# Patient Record
Sex: MALE | Race: WHITE | Employment: OTHER | URBAN - NONMETROPOLITAN AREA
[De-identification: names, ages, dates, MRNs, and addresses within clinical notes are randomized per-mention and may not be internally consistent; named-entity substitution may affect disease eponyms.]

---

## 2017-01-10 ENCOUNTER — OFFICE VISIT (OUTPATIENT)
Dept: FAMILY MEDICINE CLINIC | Age: 62
End: 2017-01-10

## 2017-01-10 VITALS
DIASTOLIC BLOOD PRESSURE: 62 MMHG | WEIGHT: 165.4 LBS | HEIGHT: 69 IN | TEMPERATURE: 96.7 F | OXYGEN SATURATION: 98 % | SYSTOLIC BLOOD PRESSURE: 118 MMHG | HEART RATE: 80 BPM | BODY MASS INDEX: 24.5 KG/M2

## 2017-01-10 DIAGNOSIS — E78.5 HYPERLIPIDEMIA, UNSPECIFIED HYPERLIPIDEMIA TYPE: ICD-10-CM

## 2017-01-10 DIAGNOSIS — K59.00 CONSTIPATION, UNSPECIFIED CONSTIPATION TYPE: ICD-10-CM

## 2017-01-10 DIAGNOSIS — Z23 NEED FOR PNEUMOCOCCAL VACCINATION: ICD-10-CM

## 2017-01-10 DIAGNOSIS — R97.20 ELEVATED PSA: ICD-10-CM

## 2017-01-10 DIAGNOSIS — K59.00 CONSTIPATION, UNSPECIFIED CONSTIPATION TYPE: Primary | ICD-10-CM

## 2017-01-10 DIAGNOSIS — J32.9 SINUSITIS, UNSPECIFIED CHRONICITY, UNSPECIFIED LOCATION: ICD-10-CM

## 2017-01-10 DIAGNOSIS — R86.9 SEMEN ABNORMALITY: ICD-10-CM

## 2017-01-10 LAB
T4 FREE: 1.1 NG/DL (ref 0.93–1.7)
TSH SERPL DL<=0.05 MIU/L-ACNC: 2.97 UIU/ML (ref 0.27–4.2)

## 2017-01-10 PROCEDURE — 90732 PPSV23 VACC 2 YRS+ SUBQ/IM: CPT | Performed by: NURSE PRACTITIONER

## 2017-01-10 PROCEDURE — 90471 IMMUNIZATION ADMIN: CPT | Performed by: NURSE PRACTITIONER

## 2017-01-10 PROCEDURE — 99213 OFFICE O/P EST LOW 20 MIN: CPT | Performed by: NURSE PRACTITIONER

## 2017-01-10 RX ORDER — LEVOFLOXACIN 500 MG/1
500 TABLET, FILM COATED ORAL DAILY
Qty: 7 TABLET | Refills: 0 | Status: SHIPPED | OUTPATIENT
Start: 2017-01-10 | End: 2017-01-17

## 2017-01-10 ASSESSMENT — ENCOUNTER SYMPTOMS: CONSTIPATION: 1

## 2017-01-11 ENCOUNTER — TELEPHONE (OUTPATIENT)
Dept: FAMILY MEDICINE CLINIC | Age: 62
End: 2017-01-11

## 2017-01-24 ENCOUNTER — OFFICE VISIT (OUTPATIENT)
Dept: UROLOGY | Age: 62
End: 2017-01-24

## 2017-01-24 VITALS
HEIGHT: 69 IN | WEIGHT: 170 LBS | BODY MASS INDEX: 25.18 KG/M2 | DIASTOLIC BLOOD PRESSURE: 80 MMHG | SYSTOLIC BLOOD PRESSURE: 128 MMHG | HEART RATE: 84 BPM

## 2017-01-24 DIAGNOSIS — R86.8 DISCOLORED SEMEN: Primary | ICD-10-CM

## 2017-01-24 DIAGNOSIS — N40.0 BENIGN NON-NODULAR PROSTATIC HYPERPLASIA WITHOUT LOWER URINARY TRACT SYMPTOMS: ICD-10-CM

## 2017-01-24 LAB
BILIRUBIN, POC: NORMAL
BLOOD URINE, POC: NORMAL
CLARITY, POC: CLEAR
COLOR, POC: YELLOW
GLUCOSE URINE, POC: NORMAL
KETONES, POC: NORMAL
LEUKOCYTE EST, POC: NORMAL
NITRITE, POC: NORMAL
PH, POC: 6.5
PROTEIN, POC: NORMAL
SPECIFIC GRAVITY, POC: 1.01
UROBILINOGEN, POC: 0.2

## 2017-01-24 PROCEDURE — 99243 OFF/OP CNSLTJ NEW/EST LOW 30: CPT | Performed by: UROLOGY

## 2017-01-24 PROCEDURE — 81003 URINALYSIS AUTO W/O SCOPE: CPT | Performed by: UROLOGY

## 2017-01-24 ASSESSMENT — ENCOUNTER SYMPTOMS
GASTROINTESTINAL NEGATIVE: 1
RESPIRATORY NEGATIVE: 1

## 2017-02-14 RX ORDER — PANTOPRAZOLE SODIUM 40 MG/1
40 TABLET, DELAYED RELEASE ORAL DAILY
Qty: 30 TABLET | Refills: 5 | Status: SHIPPED | OUTPATIENT
Start: 2017-02-14 | End: 2017-06-19 | Stop reason: SDUPTHER

## 2017-02-14 RX ORDER — LORAZEPAM 2 MG/1
2 TABLET ORAL EVERY 12 HOURS
Qty: 60 TABLET | Refills: 0 | Status: SHIPPED | OUTPATIENT
Start: 2017-02-14 | End: 2017-03-20 | Stop reason: SDUPTHER

## 2017-02-14 RX ORDER — PRAVASTATIN SODIUM 20 MG
TABLET ORAL
Qty: 30 TABLET | Refills: 5 | Status: SHIPPED | OUTPATIENT
Start: 2017-02-14 | End: 2017-07-15 | Stop reason: DRUGHIGH

## 2017-03-14 RX ORDER — FLUOXETINE 10 MG/1
10 CAPSULE ORAL 3 TIMES DAILY
Qty: 90 CAPSULE | Refills: 5 | Status: SHIPPED | OUTPATIENT
Start: 2017-03-14 | End: 2017-10-23 | Stop reason: SDUPTHER

## 2017-03-20 ENCOUNTER — OFFICE VISIT (OUTPATIENT)
Dept: FAMILY MEDICINE CLINIC | Age: 62
End: 2017-03-20

## 2017-03-20 VITALS
BODY MASS INDEX: 24.2 KG/M2 | DIASTOLIC BLOOD PRESSURE: 82 MMHG | HEART RATE: 87 BPM | TEMPERATURE: 96.7 F | SYSTOLIC BLOOD PRESSURE: 130 MMHG | OXYGEN SATURATION: 97 % | HEIGHT: 70 IN | WEIGHT: 169 LBS

## 2017-03-20 DIAGNOSIS — Z23 NEED FOR TDAP VACCINATION: ICD-10-CM

## 2017-03-20 DIAGNOSIS — L08.9 FOREIGN BODY IN LEFT FOOT WITH INFECTION, INITIAL ENCOUNTER: Primary | ICD-10-CM

## 2017-03-20 DIAGNOSIS — S90.852A FOREIGN BODY IN LEFT FOOT WITH INFECTION, INITIAL ENCOUNTER: Primary | ICD-10-CM

## 2017-03-20 PROCEDURE — 90471 IMMUNIZATION ADMIN: CPT | Performed by: NURSE PRACTITIONER

## 2017-03-20 PROCEDURE — 99213 OFFICE O/P EST LOW 20 MIN: CPT | Performed by: NURSE PRACTITIONER

## 2017-03-20 PROCEDURE — 90715 TDAP VACCINE 7 YRS/> IM: CPT | Performed by: NURSE PRACTITIONER

## 2017-03-20 RX ORDER — CEPHALEXIN 500 MG/1
500 CAPSULE ORAL 2 TIMES DAILY
Qty: 20 CAPSULE | Refills: 0 | Status: SHIPPED | OUTPATIENT
Start: 2017-03-20 | End: 2017-03-30

## 2017-03-20 RX ORDER — LORAZEPAM 2 MG/1
2 TABLET ORAL EVERY 12 HOURS
Qty: 60 TABLET | Refills: 0 | Status: SHIPPED | OUTPATIENT
Start: 2017-03-20 | End: 2017-04-20 | Stop reason: SDUPTHER

## 2017-03-20 ASSESSMENT — PATIENT HEALTH QUESTIONNAIRE - PHQ9
SUM OF ALL RESPONSES TO PHQ QUESTIONS 1-9: 0
2. FEELING DOWN, DEPRESSED OR HOPELESS: 0
SUM OF ALL RESPONSES TO PHQ9 QUESTIONS 1 & 2: 0
1. LITTLE INTEREST OR PLEASURE IN DOING THINGS: 0

## 2017-04-14 RX ORDER — LORAZEPAM 2 MG/1
2 TABLET ORAL EVERY 12 HOURS
Qty: 60 TABLET | Refills: 0 | OUTPATIENT
Start: 2017-04-14

## 2017-04-20 RX ORDER — LORAZEPAM 2 MG/1
2 TABLET ORAL EVERY 12 HOURS
Qty: 60 TABLET | Refills: 0 | Status: SHIPPED | OUTPATIENT
Start: 2017-04-20 | End: 2017-05-16 | Stop reason: SDUPTHER

## 2017-04-20 RX ORDER — LORAZEPAM 2 MG/1
2 TABLET ORAL EVERY 12 HOURS
Qty: 60 TABLET | Refills: 0 | Status: CANCELLED | OUTPATIENT
Start: 2017-04-20

## 2017-05-16 RX ORDER — LORAZEPAM 2 MG/1
2 TABLET ORAL EVERY 12 HOURS
Qty: 60 TABLET | Refills: 0 | Status: SHIPPED | OUTPATIENT
Start: 2017-05-16 | End: 2017-06-15 | Stop reason: SDUPTHER

## 2017-06-15 RX ORDER — LORAZEPAM 2 MG/1
2 TABLET ORAL EVERY 12 HOURS
Qty: 60 TABLET | Refills: 0 | Status: SHIPPED | OUTPATIENT
Start: 2017-06-15 | End: 2017-07-15 | Stop reason: SDUPTHER

## 2017-06-19 ENCOUNTER — TELEPHONE (OUTPATIENT)
Dept: FAMILY MEDICINE CLINIC | Age: 62
End: 2017-06-19

## 2017-06-19 RX ORDER — PANTOPRAZOLE SODIUM 40 MG/1
40 TABLET, DELAYED RELEASE ORAL DAILY
Qty: 90 TABLET | Refills: 1 | OUTPATIENT
Start: 2017-06-19 | End: 2017-12-26 | Stop reason: SDUPTHER

## 2017-07-06 ENCOUNTER — TELEPHONE (OUTPATIENT)
Dept: FAMILY MEDICINE CLINIC | Age: 62
End: 2017-07-06

## 2017-07-06 DIAGNOSIS — Z13.220 LIPID SCREENING: Primary | ICD-10-CM

## 2017-07-10 DIAGNOSIS — Z13.220 LIPID SCREENING: ICD-10-CM

## 2017-07-10 DIAGNOSIS — N40.0 BENIGN NON-NODULAR PROSTATIC HYPERPLASIA WITHOUT LOWER URINARY TRACT SYMPTOMS: ICD-10-CM

## 2017-07-10 LAB
CHOLESTEROL, TOTAL: 200 MG/DL (ref 0–199)
HDLC SERPL-MCNC: 48 MG/DL (ref 40–59)
LDL CHOLESTEROL CALCULATED: 132 MG/DL (ref 0–129)
PROSTATE SPECIFIC ANTIGEN: 3.54 NG/ML (ref 0–5.4)
TRIGL SERPL-MCNC: 98 MG/DL (ref 0–200)

## 2017-07-14 ENCOUNTER — OFFICE VISIT (OUTPATIENT)
Dept: UROLOGY | Age: 62
End: 2017-07-14

## 2017-07-14 VITALS
BODY MASS INDEX: 23.62 KG/M2 | HEART RATE: 69 BPM | DIASTOLIC BLOOD PRESSURE: 70 MMHG | SYSTOLIC BLOOD PRESSURE: 116 MMHG | WEIGHT: 165 LBS | HEIGHT: 70 IN

## 2017-07-14 DIAGNOSIS — N40.1 BPH WITH OBSTRUCTION/LOWER URINARY TRACT SYMPTOMS: Primary | ICD-10-CM

## 2017-07-14 DIAGNOSIS — N13.8 BPH WITH OBSTRUCTION/LOWER URINARY TRACT SYMPTOMS: Primary | ICD-10-CM

## 2017-07-14 PROCEDURE — 51798 US URINE CAPACITY MEASURE: CPT | Performed by: UROLOGY

## 2017-07-14 PROCEDURE — 51741 ELECTRO-UROFLOWMETRY FIRST: CPT | Performed by: UROLOGY

## 2017-07-14 PROCEDURE — 99213 OFFICE O/P EST LOW 20 MIN: CPT | Performed by: UROLOGY

## 2017-07-14 ASSESSMENT — ENCOUNTER SYMPTOMS
ABDOMINAL DISTENTION: 0
ABDOMINAL PAIN: 0
SHORTNESS OF BREATH: 0

## 2017-07-15 ENCOUNTER — OFFICE VISIT (OUTPATIENT)
Dept: FAMILY MEDICINE CLINIC | Age: 62
End: 2017-07-15

## 2017-07-15 VITALS
DIASTOLIC BLOOD PRESSURE: 80 MMHG | HEART RATE: 62 BPM | BODY MASS INDEX: 24.2 KG/M2 | HEIGHT: 70 IN | OXYGEN SATURATION: 97 % | TEMPERATURE: 96.7 F | SYSTOLIC BLOOD PRESSURE: 132 MMHG | WEIGHT: 169 LBS

## 2017-07-15 DIAGNOSIS — E78.5 HYPERLIPIDEMIA, UNSPECIFIED HYPERLIPIDEMIA TYPE: ICD-10-CM

## 2017-07-15 DIAGNOSIS — F41.9 ANXIETY: Primary | ICD-10-CM

## 2017-07-15 PROCEDURE — 99213 OFFICE O/P EST LOW 20 MIN: CPT | Performed by: NURSE PRACTITIONER

## 2017-07-15 RX ORDER — LORAZEPAM 2 MG/1
2 TABLET ORAL EVERY 12 HOURS
Qty: 60 TABLET | Refills: 2 | Status: SHIPPED | OUTPATIENT
Start: 2017-07-15 | End: 2017-10-18 | Stop reason: SDUPTHER

## 2017-07-15 RX ORDER — PRAVASTATIN SODIUM 40 MG
40 TABLET ORAL EVERY EVENING
Qty: 30 TABLET | Refills: 5 | Status: SHIPPED | OUTPATIENT
Start: 2017-07-15 | End: 2017-10-02 | Stop reason: SDUPTHER

## 2017-07-15 ASSESSMENT — ENCOUNTER SYMPTOMS
SHORTNESS OF BREATH: 0
COUGH: 0

## 2017-10-02 DIAGNOSIS — E78.5 HYPERLIPIDEMIA, UNSPECIFIED HYPERLIPIDEMIA TYPE: ICD-10-CM

## 2017-10-02 RX ORDER — PRAVASTATIN SODIUM 40 MG
40 TABLET ORAL EVERY EVENING
Qty: 30 TABLET | Refills: 5 | Status: SHIPPED | OUTPATIENT
Start: 2017-10-02 | End: 2018-04-02 | Stop reason: SDUPTHER

## 2017-10-02 NOTE — TELEPHONE ENCOUNTER
Received documentation from [facility] requesting pravastatin.  LOV July

## 2017-10-18 DIAGNOSIS — F41.9 ANXIETY: ICD-10-CM

## 2017-10-18 RX ORDER — LORAZEPAM 2 MG/1
2 TABLET ORAL EVERY 12 HOURS
Qty: 60 TABLET | Refills: 2 | Status: SHIPPED | OUTPATIENT
Start: 2017-10-18 | End: 2018-01-10 | Stop reason: SDUPTHER

## 2017-10-23 RX ORDER — FLUOXETINE 10 MG/1
10 CAPSULE ORAL 3 TIMES DAILY
Qty: 90 CAPSULE | Refills: 5 | Status: SHIPPED | OUTPATIENT
Start: 2017-10-23 | End: 2018-04-23 | Stop reason: SDUPTHER

## 2017-12-26 RX ORDER — PANTOPRAZOLE SODIUM 40 MG/1
40 TABLET, DELAYED RELEASE ORAL DAILY
Qty: 90 TABLET | Refills: 1 | Status: SHIPPED | OUTPATIENT
Start: 2017-12-26 | End: 2018-06-20 | Stop reason: SDUPTHER

## 2018-01-08 DIAGNOSIS — E78.00 ELEVATED CHOLESTEROL: ICD-10-CM

## 2018-01-08 DIAGNOSIS — Z13.220 LIPID SCREENING: Primary | ICD-10-CM

## 2018-01-08 LAB
ALBUMIN SERPL-MCNC: 4.1 G/DL (ref 3.9–4.9)
ALP BLD-CCNC: 53 U/L (ref 35–104)
ALT SERPL-CCNC: 10 U/L (ref 0–41)
ANION GAP SERPL CALCULATED.3IONS-SCNC: 18 MEQ/L (ref 7–13)
AST SERPL-CCNC: 12 U/L (ref 0–40)
BILIRUB SERPL-MCNC: 0.3 MG/DL (ref 0–1.2)
BUN BLDV-MCNC: 21 MG/DL (ref 8–23)
CALCIUM SERPL-MCNC: 9 MG/DL (ref 8.6–10.2)
CHLORIDE BLD-SCNC: 103 MEQ/L (ref 98–107)
CHOLESTEROL, TOTAL: 205 MG/DL (ref 0–199)
CO2: 21 MEQ/L (ref 22–29)
CREAT SERPL-MCNC: 1.01 MG/DL (ref 0.7–1.2)
GFR AFRICAN AMERICAN: >60
GFR NON-AFRICAN AMERICAN: >60
GLOBULIN: 2.5 G/DL (ref 2.3–3.5)
GLUCOSE BLD-MCNC: 75 MG/DL (ref 74–109)
HDLC SERPL-MCNC: 40 MG/DL (ref 40–59)
LDL CHOLESTEROL CALCULATED: 138 MG/DL (ref 0–129)
POTASSIUM SERPL-SCNC: 4 MEQ/L (ref 3.5–5.1)
SODIUM BLD-SCNC: 142 MEQ/L (ref 132–144)
TOTAL PROTEIN: 6.6 G/DL (ref 6.4–8.1)
TRIGL SERPL-MCNC: 134 MG/DL (ref 0–200)

## 2018-01-10 ENCOUNTER — OFFICE VISIT (OUTPATIENT)
Dept: FAMILY MEDICINE CLINIC | Age: 63
End: 2018-01-10

## 2018-01-10 VITALS
WEIGHT: 163 LBS | SYSTOLIC BLOOD PRESSURE: 106 MMHG | HEIGHT: 69 IN | OXYGEN SATURATION: 93 % | DIASTOLIC BLOOD PRESSURE: 66 MMHG | TEMPERATURE: 97.2 F | HEART RATE: 84 BPM | BODY MASS INDEX: 24.14 KG/M2

## 2018-01-10 DIAGNOSIS — F41.9 ANXIETY: ICD-10-CM

## 2018-01-10 DIAGNOSIS — R19.7 DIARRHEA, UNSPECIFIED TYPE: ICD-10-CM

## 2018-01-10 DIAGNOSIS — J34.2 NASAL SEPTAL DEVIATION: Primary | ICD-10-CM

## 2018-01-10 PROCEDURE — G8427 DOCREV CUR MEDS BY ELIG CLIN: HCPCS | Performed by: NURSE PRACTITIONER

## 2018-01-10 PROCEDURE — 4004F PT TOBACCO SCREEN RCVD TLK: CPT | Performed by: NURSE PRACTITIONER

## 2018-01-10 PROCEDURE — G8420 CALC BMI NORM PARAMETERS: HCPCS | Performed by: NURSE PRACTITIONER

## 2018-01-10 PROCEDURE — G8482 FLU IMMUNIZE ORDER/ADMIN: HCPCS | Performed by: NURSE PRACTITIONER

## 2018-01-10 PROCEDURE — 3017F COLORECTAL CA SCREEN DOC REV: CPT | Performed by: NURSE PRACTITIONER

## 2018-01-10 PROCEDURE — 99213 OFFICE O/P EST LOW 20 MIN: CPT | Performed by: NURSE PRACTITIONER

## 2018-01-10 RX ORDER — LORAZEPAM 2 MG/1
2 TABLET ORAL EVERY 12 HOURS
Qty: 60 TABLET | Refills: 2 | Status: SHIPPED | OUTPATIENT
Start: 2018-01-10 | End: 2018-04-18 | Stop reason: SDUPTHER

## 2018-01-10 ASSESSMENT — ENCOUNTER SYMPTOMS
SHORTNESS OF BREATH: 0
COUGH: 0
ABDOMINAL PAIN: 0
DIARRHEA: 1
RHINORRHEA: 1

## 2018-01-10 NOTE — PROGRESS NOTES
capsule Take 1 capsule by mouth 3 times daily 90 capsule 5    pravastatin (PRAVACHOL) 40 MG tablet Take 1 tablet by mouth every evening 30 tablet 5     No current facility-administered medications on file prior to visit. Allergies   Allergen Reactions    Alcohol     Benadryl [Diphenhydramine]      \"speeds him up\"    Sulfa Antibiotics        Review of Systems   Constitutional: Negative for fever. HENT: Positive for congestion and rhinorrhea. Respiratory: Negative for cough and shortness of breath. Gastrointestinal: Positive for diarrhea. Negative for abdominal pain. Objective  Vitals:    01/10/18 1004   BP: 106/66   Site: Left Arm   Position: Sitting   Cuff Size: Medium Adult   Pulse: 84   Temp: 97.2 °F (36.2 °C)   SpO2: 93%   Weight: 163 lb (73.9 kg)   Height: 5' 9\" (1.753 m)     Physical Exam   Constitutional: He is oriented to person, place, and time. He appears well-developed and well-nourished. HENT:   Head: Normocephalic. Right Ear: External ear normal.   Left Ear: External ear normal.   Nose: Mucosal edema, nasal deformity and septal deviation present. Mouth/Throat: Oropharynx is clear and moist.   Eyes: Conjunctivae are normal. Pupils are equal, round, and reactive to light. Neck: Neck supple. No thyromegaly present. Cardiovascular: Normal rate, regular rhythm, normal heart sounds and intact distal pulses. Pulmonary/Chest: Effort normal and breath sounds normal.   Abdominal: Soft. He exhibits no distension. There is no tenderness. Lymphadenopathy:     He has no cervical adenopathy. Neurological: He is alert and oriented to person, place, and time. Psychiatric: He has a normal mood and affect. His behavior is normal. Judgment and thought content normal.       Assessment & Plan    1. Nasal septal deviation  Referral To Ent - ([facility]) - Brent Newsome   2. Anxiety  LORazepam (ATIVAN) 2 MG tablet   3.  Diarrhea, unspecified type         Orders Placed This Encounter

## 2018-02-19 ENCOUNTER — HOSPITAL ENCOUNTER (OUTPATIENT)
Dept: PREADMISSION TESTING | Age: 63
Discharge: HOME | End: 2018-02-23
Payer: COMMERCIAL

## 2018-02-19 VITALS
HEART RATE: 81 BPM | WEIGHT: 161.2 LBS | BODY MASS INDEX: 24.43 KG/M2 | TEMPERATURE: 97.2 F | HEIGHT: 68 IN | OXYGEN SATURATION: 94 % | RESPIRATION RATE: 16 BRPM | DIASTOLIC BLOOD PRESSURE: 70 MMHG | SYSTOLIC BLOOD PRESSURE: 109 MMHG

## 2018-02-19 DIAGNOSIS — G47.33 OSA (OBSTRUCTIVE SLEEP APNEA): ICD-10-CM

## 2018-02-19 DIAGNOSIS — J34.2 DNS (DEVIATED NASAL SEPTUM): ICD-10-CM

## 2018-02-19 DIAGNOSIS — J34.3 HYPERTROPHY OF NASAL TURBINATES: ICD-10-CM

## 2018-02-19 PROBLEM — J30.9 ALLERGIC RHINITIS: Status: ACTIVE | Noted: 2018-02-19

## 2018-02-19 LAB
ANION GAP SERPL CALCULATED.3IONS-SCNC: 15 MEQ/L (ref 7–13)
BUN BLDV-MCNC: 18 MG/DL (ref 8–23)
CALCIUM SERPL-MCNC: 9.2 MG/DL (ref 8.6–10.2)
CHLORIDE BLD-SCNC: 104 MEQ/L (ref 98–107)
CO2: 22 MEQ/L (ref 22–29)
CREAT SERPL-MCNC: 0.85 MG/DL (ref 0.7–1.2)
EKG ATRIAL RATE: 72 BPM
EKG P AXIS: 67 DEGREES
EKG P-R INTERVAL: 136 MS
EKG Q-T INTERVAL: 390 MS
EKG QRS DURATION: 98 MS
EKG QTC CALCULATION (BAZETT): 427 MS
EKG R AXIS: -12 DEGREES
EKG T AXIS: 50 DEGREES
EKG VENTRICULAR RATE: 72 BPM
GFR AFRICAN AMERICAN: >60
GFR NON-AFRICAN AMERICAN: >60
GLUCOSE BLD-MCNC: 90 MG/DL (ref 74–109)
HCT VFR BLD CALC: 45.5 % (ref 42–52)
HEMOGLOBIN: 15.3 G/DL (ref 14–18)
MCH RBC QN AUTO: 32.9 PG (ref 27–31.3)
MCHC RBC AUTO-ENTMCNC: 33.6 % (ref 33–37)
MCV RBC AUTO: 97.8 FL (ref 80–100)
PDW BLD-RTO: 13.8 % (ref 11.5–14.5)
PLATELET # BLD: 225 K/UL (ref 130–400)
POTASSIUM SERPL-SCNC: 4.4 MEQ/L (ref 3.5–5.1)
RBC # BLD: 4.65 M/UL (ref 4.7–6.1)
SODIUM BLD-SCNC: 141 MEQ/L (ref 132–144)
WBC # BLD: 8.9 K/UL (ref 4.8–10.8)

## 2018-02-19 PROCEDURE — 80048 BASIC METABOLIC PNL TOTAL CA: CPT

## 2018-02-19 PROCEDURE — 85027 COMPLETE CBC AUTOMATED: CPT

## 2018-02-19 PROCEDURE — 93005 ELECTROCARDIOGRAM TRACING: CPT

## 2018-02-19 RX ORDER — AMOXICILLIN 875 MG/1
875 TABLET, COATED ORAL 2 TIMES DAILY
COMMUNITY
End: 2018-09-19 | Stop reason: ALTCHOICE

## 2018-02-19 RX ORDER — LIDOCAINE HYDROCHLORIDE 10 MG/ML
1 INJECTION, SOLUTION EPIDURAL; INFILTRATION; INTRACAUDAL; PERINEURAL
Status: CANCELLED | OUTPATIENT
Start: 2018-02-19 | End: 2018-02-19

## 2018-02-19 RX ORDER — SODIUM CHLORIDE 0.9 % (FLUSH) 0.9 %
10 SYRINGE (ML) INJECTION PRN
Status: CANCELLED | OUTPATIENT
Start: 2018-02-19

## 2018-02-19 RX ORDER — SODIUM CHLORIDE, SODIUM LACTATE, POTASSIUM CHLORIDE, CALCIUM CHLORIDE 600; 310; 30; 20 MG/100ML; MG/100ML; MG/100ML; MG/100ML
INJECTION, SOLUTION INTRAVENOUS CONTINUOUS
Status: CANCELLED | OUTPATIENT
Start: 2018-02-19

## 2018-02-19 RX ORDER — SODIUM CHLORIDE 0.9 % (FLUSH) 0.9 %
10 SYRINGE (ML) INJECTION EVERY 12 HOURS SCHEDULED
Status: CANCELLED | OUTPATIENT
Start: 2018-02-19

## 2018-02-19 ASSESSMENT — ENCOUNTER SYMPTOMS
NAUSEA: 0
SORE THROAT: 0
CONSTIPATION: 0
STRIDOR: 0
DOUBLE VISION: 0
WHEEZING: 0
BLURRED VISION: 0
HEARTBURN: 0
SHORTNESS OF BREATH: 0
ABDOMINAL PAIN: 0
VOMITING: 0
EYES NEGATIVE: 1
COUGH: 0
BACK PAIN: 0
DIARRHEA: 0

## 2018-02-20 PROCEDURE — 93010 ELECTROCARDIOGRAM REPORT: CPT | Performed by: INTERNAL MEDICINE

## 2018-02-21 ENCOUNTER — ANESTHESIA EVENT (OUTPATIENT)
Dept: OPERATING ROOM | Age: 63
End: 2018-02-21
Payer: COMMERCIAL

## 2018-02-22 ENCOUNTER — HOSPITAL ENCOUNTER (OUTPATIENT)
Age: 63
Setting detail: OUTPATIENT SURGERY
Discharge: HOME | End: 2018-02-22
Attending: OTOLARYNGOLOGY | Admitting: OTOLARYNGOLOGY
Payer: COMMERCIAL

## 2018-02-22 ENCOUNTER — ANESTHESIA (OUTPATIENT)
Dept: OPERATING ROOM | Age: 63
End: 2018-02-22
Payer: COMMERCIAL

## 2018-02-22 VITALS — DIASTOLIC BLOOD PRESSURE: 68 MMHG | OXYGEN SATURATION: 100 % | TEMPERATURE: 95.5 F | SYSTOLIC BLOOD PRESSURE: 104 MMHG

## 2018-02-22 VITALS
HEART RATE: 88 BPM | DIASTOLIC BLOOD PRESSURE: 79 MMHG | OXYGEN SATURATION: 96 % | TEMPERATURE: 97.6 F | SYSTOLIC BLOOD PRESSURE: 126 MMHG | HEIGHT: 68 IN | BODY MASS INDEX: 24.4 KG/M2 | RESPIRATION RATE: 16 BRPM | WEIGHT: 161 LBS

## 2018-02-22 DIAGNOSIS — J34.2 DNS (DEVIATED NASAL SEPTUM): Primary | ICD-10-CM

## 2018-02-22 PROCEDURE — 3700000000 HC ANESTHESIA ATTENDED CARE: Performed by: OTOLARYNGOLOGY

## 2018-02-22 PROCEDURE — 2500000003 HC RX 250 WO HCPCS: Performed by: OTOLARYNGOLOGY

## 2018-02-22 PROCEDURE — 3600000004 HC SURGERY LEVEL 4 BASE: Performed by: OTOLARYNGOLOGY

## 2018-02-22 PROCEDURE — 7100000001 HC PACU RECOVERY - ADDTL 15 MIN: Performed by: OTOLARYNGOLOGY

## 2018-02-22 PROCEDURE — 7100000000 HC PACU RECOVERY - FIRST 15 MIN: Performed by: OTOLARYNGOLOGY

## 2018-02-22 PROCEDURE — 3700000001 HC ADD 15 MINUTES (ANESTHESIA): Performed by: OTOLARYNGOLOGY

## 2018-02-22 PROCEDURE — 6370000000 HC RX 637 (ALT 250 FOR IP): Performed by: OTOLARYNGOLOGY

## 2018-02-22 PROCEDURE — 2580000003 HC RX 258: Performed by: STUDENT IN AN ORGANIZED HEALTH CARE EDUCATION/TRAINING PROGRAM

## 2018-02-22 PROCEDURE — 7100000011 HC PHASE II RECOVERY - ADDTL 15 MIN: Performed by: OTOLARYNGOLOGY

## 2018-02-22 PROCEDURE — 88304 TISSUE EXAM BY PATHOLOGIST: CPT

## 2018-02-22 PROCEDURE — 6360000002 HC RX W HCPCS: Performed by: NURSE ANESTHETIST, CERTIFIED REGISTERED

## 2018-02-22 PROCEDURE — 3600000014 HC SURGERY LEVEL 4 ADDTL 15MIN: Performed by: OTOLARYNGOLOGY

## 2018-02-22 PROCEDURE — 2500000003 HC RX 250 WO HCPCS: Performed by: NURSE ANESTHETIST, CERTIFIED REGISTERED

## 2018-02-22 PROCEDURE — 7100000010 HC PHASE II RECOVERY - FIRST 15 MIN: Performed by: OTOLARYNGOLOGY

## 2018-02-22 PROCEDURE — 2580000003 HC RX 258

## 2018-02-22 PROCEDURE — 2740000010 HC MISC ORTHOTIC: Performed by: OTOLARYNGOLOGY

## 2018-02-22 PROCEDURE — 88311 DECALCIFY TISSUE: CPT

## 2018-02-22 PROCEDURE — 6360000002 HC RX W HCPCS: Performed by: NURSE PRACTITIONER

## 2018-02-22 PROCEDURE — 2580000003 HC RX 258: Performed by: OTOLARYNGOLOGY

## 2018-02-22 RX ORDER — WOUND DRESSING ADHESIVE - LIQUID
LIQUID MISCELLANEOUS PRN
Status: DISCONTINUED | OUTPATIENT
Start: 2018-02-22 | End: 2018-02-22 | Stop reason: HOSPADM

## 2018-02-22 RX ORDER — LIDOCAINE HYDROCHLORIDE AND EPINEPHRINE 10; 10 MG/ML; UG/ML
INJECTION, SOLUTION INFILTRATION; PERINEURAL PRN
Status: DISCONTINUED | OUTPATIENT
Start: 2018-02-22 | End: 2018-02-22 | Stop reason: HOSPADM

## 2018-02-22 RX ORDER — ROCURONIUM BROMIDE 10 MG/ML
INJECTION, SOLUTION INTRAVENOUS PRN
Status: DISCONTINUED | OUTPATIENT
Start: 2018-02-22 | End: 2018-02-22 | Stop reason: SDUPTHER

## 2018-02-22 RX ORDER — AMOXICILLIN AND CLAVULANATE POTASSIUM 875; 125 MG/1; MG/1
1 TABLET, FILM COATED ORAL 2 TIMES DAILY
Qty: 20 TABLET | Refills: 0 | Status: CANCELLED | OUTPATIENT
Start: 2018-02-22 | End: 2018-03-04

## 2018-02-22 RX ORDER — ONDANSETRON 2 MG/ML
4 INJECTION INTRAMUSCULAR; INTRAVENOUS EVERY 6 HOURS PRN
Status: DISCONTINUED | OUTPATIENT
Start: 2018-02-22 | End: 2018-02-22 | Stop reason: HOSPADM

## 2018-02-22 RX ORDER — ONDANSETRON 2 MG/ML
INJECTION INTRAMUSCULAR; INTRAVENOUS PRN
Status: DISCONTINUED | OUTPATIENT
Start: 2018-02-22 | End: 2018-02-22 | Stop reason: SDUPTHER

## 2018-02-22 RX ORDER — LIDOCAINE HYDROCHLORIDE 10 MG/ML
1 INJECTION, SOLUTION EPIDURAL; INFILTRATION; INTRACAUDAL; PERINEURAL
Status: DISCONTINUED | OUTPATIENT
Start: 2018-02-22 | End: 2018-02-22 | Stop reason: HOSPADM

## 2018-02-22 RX ORDER — SODIUM CHLORIDE 0.9 % (FLUSH) 0.9 %
10 SYRINGE (ML) INJECTION EVERY 12 HOURS SCHEDULED
Status: DISCONTINUED | OUTPATIENT
Start: 2018-02-22 | End: 2018-02-22 | Stop reason: HOSPADM

## 2018-02-22 RX ORDER — METOCLOPRAMIDE HYDROCHLORIDE 5 MG/ML
10 INJECTION INTRAMUSCULAR; INTRAVENOUS
Status: DISCONTINUED | OUTPATIENT
Start: 2018-02-22 | End: 2018-02-22 | Stop reason: HOSPADM

## 2018-02-22 RX ORDER — FENTANYL CITRATE 50 UG/ML
INJECTION, SOLUTION INTRAMUSCULAR; INTRAVENOUS PRN
Status: DISCONTINUED | OUTPATIENT
Start: 2018-02-22 | End: 2018-02-22 | Stop reason: SDUPTHER

## 2018-02-22 RX ORDER — LIDOCAINE HYDROCHLORIDE 10 MG/ML
INJECTION, SOLUTION INFILTRATION; PERINEURAL PRN
Status: DISCONTINUED | OUTPATIENT
Start: 2018-02-22 | End: 2018-02-22 | Stop reason: SDUPTHER

## 2018-02-22 RX ORDER — HYDROCODONE BITARTRATE AND ACETAMINOPHEN 5; 325 MG/1; MG/1
2 TABLET ORAL PRN
Status: DISCONTINUED | OUTPATIENT
Start: 2018-02-22 | End: 2018-02-22 | Stop reason: HOSPADM

## 2018-02-22 RX ORDER — SODIUM CHLORIDE 0.9 % (FLUSH) 0.9 %
10 SYRINGE (ML) INJECTION PRN
Status: DISCONTINUED | OUTPATIENT
Start: 2018-02-22 | End: 2018-02-22 | Stop reason: HOSPADM

## 2018-02-22 RX ORDER — SODIUM CHLORIDE, SODIUM LACTATE, POTASSIUM CHLORIDE, CALCIUM CHLORIDE 600; 310; 30; 20 MG/100ML; MG/100ML; MG/100ML; MG/100ML
INJECTION, SOLUTION INTRAVENOUS CONTINUOUS
Status: DISCONTINUED | OUTPATIENT
Start: 2018-02-22 | End: 2018-02-22 | Stop reason: HOSPADM

## 2018-02-22 RX ORDER — GLYCOPYRROLATE 0.2 MG/ML
INJECTION INTRAMUSCULAR; INTRAVENOUS PRN
Status: DISCONTINUED | OUTPATIENT
Start: 2018-02-22 | End: 2018-02-22 | Stop reason: SDUPTHER

## 2018-02-22 RX ORDER — ACETAMINOPHEN 325 MG/1
650 TABLET ORAL EVERY 4 HOURS PRN
Status: DISCONTINUED | OUTPATIENT
Start: 2018-02-22 | End: 2018-02-22 | Stop reason: HOSPADM

## 2018-02-22 RX ORDER — ONDANSETRON 2 MG/ML
4 INJECTION INTRAMUSCULAR; INTRAVENOUS
Status: DISCONTINUED | OUTPATIENT
Start: 2018-02-22 | End: 2018-02-22 | Stop reason: HOSPADM

## 2018-02-22 RX ORDER — MAGNESIUM HYDROXIDE 1200 MG/15ML
LIQUID ORAL CONTINUOUS PRN
Status: DISCONTINUED | OUTPATIENT
Start: 2018-02-22 | End: 2018-02-22 | Stop reason: HOSPADM

## 2018-02-22 RX ORDER — PROPOFOL 10 MG/ML
INJECTION, EMULSION INTRAVENOUS PRN
Status: DISCONTINUED | OUTPATIENT
Start: 2018-02-22 | End: 2018-02-22 | Stop reason: SDUPTHER

## 2018-02-22 RX ORDER — MIDAZOLAM HYDROCHLORIDE 1 MG/ML
INJECTION INTRAMUSCULAR; INTRAVENOUS PRN
Status: DISCONTINUED | OUTPATIENT
Start: 2018-02-22 | End: 2018-02-22 | Stop reason: SDUPTHER

## 2018-02-22 RX ORDER — FENTANYL CITRATE 50 UG/ML
50 INJECTION, SOLUTION INTRAMUSCULAR; INTRAVENOUS EVERY 10 MIN PRN
Status: DISCONTINUED | OUTPATIENT
Start: 2018-02-22 | End: 2018-02-22 | Stop reason: HOSPADM

## 2018-02-22 RX ORDER — SODIUM CHLORIDE, SODIUM LACTATE, POTASSIUM CHLORIDE, CALCIUM CHLORIDE 600; 310; 30; 20 MG/100ML; MG/100ML; MG/100ML; MG/100ML
INJECTION, SOLUTION INTRAVENOUS
Status: COMPLETED
Start: 2018-02-22 | End: 2018-02-22

## 2018-02-22 RX ORDER — HYDROCODONE BITARTRATE AND ACETAMINOPHEN 5; 325 MG/1; MG/1
1 TABLET ORAL PRN
Status: DISCONTINUED | OUTPATIENT
Start: 2018-02-22 | End: 2018-02-22 | Stop reason: HOSPADM

## 2018-02-22 RX ORDER — DEXAMETHASONE SODIUM PHOSPHATE 10 MG/ML
INJECTION INTRAMUSCULAR; INTRAVENOUS PRN
Status: DISCONTINUED | OUTPATIENT
Start: 2018-02-22 | End: 2018-02-22 | Stop reason: SDUPTHER

## 2018-02-22 RX ORDER — HYDROCODONE BITARTRATE AND ACETAMINOPHEN 5; 325 MG/1; MG/1
1 TABLET ORAL EVERY 4 HOURS PRN
Qty: 20 TABLET | Refills: 0 | Status: CANCELLED | OUTPATIENT
Start: 2018-02-22 | End: 2018-03-01

## 2018-02-22 RX ADMIN — PHENYLEPHRINE HYDROCHLORIDE 100 MCG: 10 INJECTION INTRAVENOUS at 09:19

## 2018-02-22 RX ADMIN — MIDAZOLAM HYDROCHLORIDE 2 MG: 1 INJECTION, SOLUTION INTRAMUSCULAR; INTRAVENOUS at 08:39

## 2018-02-22 RX ADMIN — GLYCOPYRROLATE 0.2 MG: 0.2 INJECTION INTRAMUSCULAR; INTRAVENOUS at 09:17

## 2018-02-22 RX ADMIN — PHENYLEPHRINE HYDROCHLORIDE 200 MCG: 10 INJECTION INTRAVENOUS at 09:26

## 2018-02-22 RX ADMIN — SUGAMMADEX 200 MG: 100 INJECTION, SOLUTION INTRAVENOUS at 10:01

## 2018-02-22 RX ADMIN — DEXAMETHASONE SODIUM PHOSPHATE 10 MG: 10 INJECTION INTRAMUSCULAR; INTRAVENOUS at 09:00

## 2018-02-22 RX ADMIN — SODIUM CHLORIDE, POTASSIUM CHLORIDE, SODIUM LACTATE AND CALCIUM CHLORIDE: 600; 310; 30; 20 INJECTION, SOLUTION INTRAVENOUS at 09:52

## 2018-02-22 RX ADMIN — SODIUM CHLORIDE, POTASSIUM CHLORIDE, SODIUM LACTATE AND CALCIUM CHLORIDE: 600; 310; 30; 20 INJECTION, SOLUTION INTRAVENOUS at 07:40

## 2018-02-22 RX ADMIN — ROCURONIUM BROMIDE 20 MG: 10 INJECTION INTRAVENOUS at 09:27

## 2018-02-22 RX ADMIN — PHENYLEPHRINE HYDROCHLORIDE 100 MCG: 10 INJECTION INTRAVENOUS at 09:17

## 2018-02-22 RX ADMIN — ROCURONIUM BROMIDE 50 MG: 10 INJECTION INTRAVENOUS at 08:44

## 2018-02-22 RX ADMIN — CEFAZOLIN SODIUM 2 G: 2 SOLUTION INTRAVENOUS at 08:40

## 2018-02-22 RX ADMIN — PROPOFOL 160 MG: 10 INJECTION, EMULSION INTRAVENOUS at 08:44

## 2018-02-22 RX ADMIN — FENTANYL CITRATE 100 MCG: 50 INJECTION, SOLUTION INTRAMUSCULAR; INTRAVENOUS at 08:44

## 2018-02-22 RX ADMIN — PROPOFOL 40 MG: 10 INJECTION, EMULSION INTRAVENOUS at 09:00

## 2018-02-22 RX ADMIN — ONDANSETRON 4 MG: 2 INJECTION INTRAMUSCULAR; INTRAVENOUS at 09:00

## 2018-02-22 RX ADMIN — LIDOCAINE HYDROCHLORIDE 50 MG: 10 INJECTION, SOLUTION INFILTRATION; PERINEURAL at 08:44

## 2018-02-22 RX ADMIN — PHENYLEPHRINE HYDROCHLORIDE 200 MCG: 10 INJECTION INTRAVENOUS at 09:29

## 2018-02-22 ASSESSMENT — PULMONARY FUNCTION TESTS
PIF_VALUE: 16
PIF_VALUE: 16
PIF_VALUE: 13
PIF_VALUE: 16
PIF_VALUE: 16
PIF_VALUE: 13
PIF_VALUE: 12
PIF_VALUE: 18
PIF_VALUE: 16
PIF_VALUE: 3
PIF_VALUE: 16
PIF_VALUE: 17
PIF_VALUE: 16
PIF_VALUE: 14
PIF_VALUE: 13
PIF_VALUE: 13
PIF_VALUE: 19
PIF_VALUE: 16
PIF_VALUE: 13
PIF_VALUE: 16
PIF_VALUE: 15
PIF_VALUE: 8
PIF_VALUE: 16
PIF_VALUE: 16
PIF_VALUE: 13
PIF_VALUE: 16
PIF_VALUE: 16
PIF_VALUE: 6
PIF_VALUE: 16
PIF_VALUE: 16
PIF_VALUE: 15
PIF_VALUE: 16
PIF_VALUE: 13
PIF_VALUE: 12
PIF_VALUE: 16
PIF_VALUE: 20
PIF_VALUE: 17
PIF_VALUE: 16
PIF_VALUE: 15
PIF_VALUE: 4
PIF_VALUE: 10
PIF_VALUE: 16
PIF_VALUE: 3
PIF_VALUE: 5
PIF_VALUE: 15
PIF_VALUE: 15
PIF_VALUE: 16
PIF_VALUE: 16
PIF_VALUE: 13
PIF_VALUE: 15
PIF_VALUE: 3
PIF_VALUE: 13
PIF_VALUE: 15
PIF_VALUE: 16
PIF_VALUE: 12
PIF_VALUE: 16
PIF_VALUE: 13
PIF_VALUE: 0
PIF_VALUE: 13
PIF_VALUE: 16
PIF_VALUE: 13
PIF_VALUE: 16
PIF_VALUE: 16
PIF_VALUE: 12
PIF_VALUE: 15
PIF_VALUE: 12
PIF_VALUE: 16
PIF_VALUE: 16
PIF_VALUE: 0
PIF_VALUE: 12
PIF_VALUE: 3
PIF_VALUE: 16
PIF_VALUE: 16
PIF_VALUE: 15
PIF_VALUE: 16

## 2018-02-22 ASSESSMENT — PAIN - FUNCTIONAL ASSESSMENT: PAIN_FUNCTIONAL_ASSESSMENT: 0-10

## 2018-02-22 ASSESSMENT — PAIN DESCRIPTION - DESCRIPTORS: DESCRIPTORS: ACHING

## 2018-02-22 NOTE — H&P (VIEW-ONLY)
Topics    Smoking status: Current Every Day Smoker     Years: 4.00     Types: Cigars    Smokeless tobacco: Never Used      Comment: smoked cigarettes for 30 yrs, quit 9/1996    Alcohol use No      Comment: sober > 22 yrs    Drug use: No    Sexual activity: Not on file     Other Topics Concern    Not on file     Social History Narrative    No narrative on file       Family History:       Problem Relation Age of Onset    Cancer Mother      stomach    Heart Disease Father 48    Cancer Father      bone    Cancer Maternal Grandmother      lung    Cancer Paternal Grandmother     Heart Disease Paternal Grandfather     No Known Problems Sister     Cancer Brother     Heart Disease Brother      hole in heart in 1959 at 1 months age       Review of Systems   Constitutional: Negative. Negative for chills and fever. HENT: Positive for congestion. Negative for sore throat. Problems breathing at nite   Eyes: Negative. Negative for blurred vision and double vision. Respiratory: Negative for cough, shortness of breath, wheezing and stridor. Occas SOB / smoker   Cardiovascular: Negative for chest pain, palpitations and leg swelling. Gastrointestinal: Negative for abdominal pain, constipation, diarrhea, heartburn, nausea and vomiting. Genitourinary: Negative for dysuria and frequency. Musculoskeletal: Positive for joint pain. Negative for back pain, myalgias and neck pain. Right leg pain    Skin: Negative. Neurological: Negative. Negative for seizures, loss of consciousness and headaches. Endo/Heme/Allergies: Negative. Psychiatric/Behavioral: Negative. Vitals:  /70   Pulse 81   Temp 97.2 °F (36.2 °C) (Temporal)   Resp 16   Ht 5' 8\" (1.727 m)   Wt 161 lb 3.2 oz (73.1 kg)   SpO2 94%   BMI 24.51 kg/m²     Physical Exam   Constitutional: He is oriented to person, place, and time and well-developed, well-nourished, and in no distress.    HENT:   Head: Normocephalic and atraumatic. Right Ear: External ear normal.   Left Ear: External ear normal.   Nose: Nose normal.   Mouth/Throat: Oropharynx is clear and moist. No oropharyngeal exudate. Many absent teeth / remaining worn   Eyes: Conjunctivae and EOM are normal. Pupils are equal, round, and reactive to light. No scleral icterus. Bilateral arcus senilus   Neck: Normal range of motion. Neck supple. Cardiovascular: Normal rate, regular rhythm and normal heart sounds. Exam reveals no gallop and no friction rub. No murmur heard. Pulmonary/Chest: Effort normal and breath sounds normal. He has no wheezes. He has no rales. Abdominal: Soft. Bowel sounds are normal. He exhibits no mass. There is no tenderness. Genitourinary:   Genitourinary Comments: deferred   Musculoskeletal: Normal range of motion. He exhibits no edema. Neurological: He is alert and oriented to person, place, and time. Gait normal.   Skin: Skin is warm and dry. Psychiatric: Mood, memory, affect and judgment normal.   Vitals reviewed.       Assessment:  Patient Active Problem List   Diagnosis    DNS (deviated nasal septum)    Allergic rhinitis    Hypertrophy of nasal turbinates    ARNAUD (obstructive sleep apnea)         Plan:  Scheduled for septoplasty, microdebrider assisted turbinoplasty and out-fracturing myla nasal endoscopy    Paige Muñoz NP  2/19/2018  8:36 AM

## 2018-02-22 NOTE — OP NOTE
1%  with 1:200,000 epinephrine was injected under the mucoperichondrium on both  sides. A left hemitransfixion incision was made and the mucoperichondrium  was elevated superiorly, posteriorly, and inferiorly. It was noted that  there were three points of fracture in the nasal septum and quadrilateral  cartilage was then incised with a use of a #15 knife blade and the  mucoperichondrium on the opposite side was elevated in the same fashion. With his maneuver, we were able to free the bone and cartilaginous septum  from the overlying mucoperichondrium attachment. The quadrilateral  cartilage was then removed with a use of swivel knife and the inferior  margin was dislocated from the maxillary crest, was dissected with a use of  Kailash dissector and this was retrieved with the use of a Idalmis  forceps. The perpendicular plate of the quadrilateral cartilage was  dislocated and was warped against itself. This was taken down by a Turner  dissector and removed with use of a Idalmis forceps. The perpendicular  plate of the ethmoid was taken down by piecemeal with use of a  Reyna double-action forceps. With this maneuver, we were able  to realign the septum in the midline. The surgical wound was approximated  utilizing a 4-0 chromic suture material and the two leaves of the leaves of  mucoperichondrium were sutured with a use of absorbable staples to  obliterate the space. Then, we directed our attention in doing a  microdebrider-assisted turbinoplasty by first injecting the inferior  turbinates with the use of a Xylocaine 1% with 1:200,000 epinephrine and  using Fanzy microdebrider. This spatula tip was then inserted at the  anterior attachment of the inferior turbinates  and this was advanced posteriorly along the axis of the inferior  turbinates. Through a rotatory motion, the submucosa was then removed and  suctioned out.   This was supplemented with cauterization utilizing a

## 2018-04-02 DIAGNOSIS — E78.5 HYPERLIPIDEMIA, UNSPECIFIED HYPERLIPIDEMIA TYPE: ICD-10-CM

## 2018-04-02 RX ORDER — PRAVASTATIN SODIUM 40 MG
40 TABLET ORAL EVERY EVENING
Qty: 30 TABLET | Refills: 5 | Status: SHIPPED | OUTPATIENT
Start: 2018-04-02 | End: 2018-09-28 | Stop reason: SDUPTHER

## 2018-04-24 RX ORDER — FLUOXETINE 10 MG/1
10 CAPSULE ORAL 3 TIMES DAILY
Qty: 90 CAPSULE | Refills: 1 | Status: SHIPPED | OUTPATIENT
Start: 2018-04-24 | End: 2018-06-25 | Stop reason: SDUPTHER

## 2018-05-22 ENCOUNTER — OFFICE VISIT (OUTPATIENT)
Dept: FAMILY MEDICINE CLINIC | Age: 63
End: 2018-05-22
Payer: COMMERCIAL

## 2018-05-22 VITALS
SYSTOLIC BLOOD PRESSURE: 118 MMHG | HEIGHT: 69 IN | TEMPERATURE: 98.6 F | DIASTOLIC BLOOD PRESSURE: 68 MMHG | HEART RATE: 95 BPM | OXYGEN SATURATION: 98 % | BODY MASS INDEX: 25.03 KG/M2 | WEIGHT: 169 LBS

## 2018-05-22 DIAGNOSIS — F41.9 ANXIETY: Primary | ICD-10-CM

## 2018-05-22 PROCEDURE — 99213 OFFICE O/P EST LOW 20 MIN: CPT | Performed by: NURSE PRACTITIONER

## 2018-05-22 PROCEDURE — 4040F PNEUMOC VAC/ADMIN/RCVD: CPT | Performed by: NURSE PRACTITIONER

## 2018-05-22 PROCEDURE — 3017F COLORECTAL CA SCREEN DOC REV: CPT | Performed by: NURSE PRACTITIONER

## 2018-05-22 PROCEDURE — 4004F PT TOBACCO SCREEN RCVD TLK: CPT | Performed by: NURSE PRACTITIONER

## 2018-05-22 PROCEDURE — G8427 DOCREV CUR MEDS BY ELIG CLIN: HCPCS | Performed by: NURSE PRACTITIONER

## 2018-05-22 PROCEDURE — 1123F ACP DISCUSS/DSCN MKR DOCD: CPT | Performed by: NURSE PRACTITIONER

## 2018-05-22 PROCEDURE — G8420 CALC BMI NORM PARAMETERS: HCPCS | Performed by: NURSE PRACTITIONER

## 2018-05-22 RX ORDER — LORAZEPAM 2 MG/1
2 TABLET ORAL EVERY 12 HOURS PRN
Qty: 60 TABLET | Refills: 1 | Status: SHIPPED | OUTPATIENT
Start: 2018-05-22 | End: 2018-07-14 | Stop reason: SDUPTHER

## 2018-05-22 RX ORDER — LORAZEPAM 2 MG/1
2 TABLET ORAL EVERY 12 HOURS PRN
COMMUNITY
End: 2018-05-22 | Stop reason: SDUPTHER

## 2018-05-22 ASSESSMENT — ENCOUNTER SYMPTOMS
SHORTNESS OF BREATH: 0
COUGH: 0

## 2018-05-22 ASSESSMENT — PATIENT HEALTH QUESTIONNAIRE - PHQ9
2. FEELING DOWN, DEPRESSED OR HOPELESS: 0
SUM OF ALL RESPONSES TO PHQ9 QUESTIONS 1 & 2: 0
1. LITTLE INTEREST OR PLEASURE IN DOING THINGS: 0
SUM OF ALL RESPONSES TO PHQ QUESTIONS 1-9: 0

## 2018-06-20 RX ORDER — PANTOPRAZOLE SODIUM 40 MG/1
40 TABLET, DELAYED RELEASE ORAL DAILY
Qty: 90 TABLET | Refills: 1 | Status: SHIPPED | OUTPATIENT
Start: 2018-06-20 | End: 2018-12-12 | Stop reason: SDUPTHER

## 2018-06-25 RX ORDER — FLUOXETINE 10 MG/1
10 CAPSULE ORAL 3 TIMES DAILY
Qty: 90 CAPSULE | Refills: 1 | Status: SHIPPED | OUTPATIENT
Start: 2018-06-25 | End: 2019-01-29

## 2018-09-19 ENCOUNTER — OFFICE VISIT (OUTPATIENT)
Dept: FAMILY MEDICINE CLINIC | Age: 63
End: 2018-09-19
Payer: COMMERCIAL

## 2018-09-19 VITALS
OXYGEN SATURATION: 96 % | HEART RATE: 63 BPM | SYSTOLIC BLOOD PRESSURE: 118 MMHG | WEIGHT: 161 LBS | HEIGHT: 70 IN | TEMPERATURE: 97.5 F | BODY MASS INDEX: 23.05 KG/M2 | DIASTOLIC BLOOD PRESSURE: 80 MMHG

## 2018-09-19 DIAGNOSIS — F41.9 ANXIETY: ICD-10-CM

## 2018-09-19 DIAGNOSIS — G47.00 INSOMNIA, UNSPECIFIED TYPE: Primary | ICD-10-CM

## 2018-09-19 PROCEDURE — 99213 OFFICE O/P EST LOW 20 MIN: CPT | Performed by: NURSE PRACTITIONER

## 2018-09-19 PROCEDURE — 4040F PNEUMOC VAC/ADMIN/RCVD: CPT | Performed by: NURSE PRACTITIONER

## 2018-09-19 PROCEDURE — 1101F PT FALLS ASSESS-DOCD LE1/YR: CPT | Performed by: NURSE PRACTITIONER

## 2018-09-19 PROCEDURE — 4004F PT TOBACCO SCREEN RCVD TLK: CPT | Performed by: NURSE PRACTITIONER

## 2018-09-19 PROCEDURE — G8427 DOCREV CUR MEDS BY ELIG CLIN: HCPCS | Performed by: NURSE PRACTITIONER

## 2018-09-19 PROCEDURE — 3017F COLORECTAL CA SCREEN DOC REV: CPT | Performed by: NURSE PRACTITIONER

## 2018-09-19 PROCEDURE — G8420 CALC BMI NORM PARAMETERS: HCPCS | Performed by: NURSE PRACTITIONER

## 2018-09-19 PROCEDURE — 1123F ACP DISCUSS/DSCN MKR DOCD: CPT | Performed by: NURSE PRACTITIONER

## 2018-09-19 RX ORDER — TRAZODONE HYDROCHLORIDE 150 MG/1
150 TABLET ORAL NIGHTLY
Qty: 30 TABLET | Refills: 2 | Status: SHIPPED | OUTPATIENT
Start: 2018-09-19 | End: 2018-10-11 | Stop reason: SDUPTHER

## 2018-09-19 ASSESSMENT — ENCOUNTER SYMPTOMS
SHORTNESS OF BREATH: 0
COUGH: 0

## 2018-09-19 NOTE — PROGRESS NOTES
Subjective  Chief Complaint   Patient presents with    Anxiety     pt states doing well, pt states occasionally he gets a little more on edge then usual and it always seems to be around 4 AM     Flu Vaccine     getting at RA       HPI     Doing well. Much better than previously in the year. Was struggling with back pain radiating into hip. Went through physical therapy without much success   However, went to chiropractor and got a lot of relief. Doing ok with anxiety. Has some episodes in the middle of the night. Has had issues with sleeping for a long time. Tried ambien before and had sleep walking issues.    Has never had a sleep study    Patient Active Problem List    Diagnosis Date Noted    Anxiety 09/19/2018    Insomnia 09/19/2018    DNS (deviated nasal septum) 02/19/2018    Allergic rhinitis 02/19/2018    Hypertrophy of nasal turbinates 02/19/2018    ARNAUD (obstructive sleep apnea) 02/19/2018     Past Medical History:   Diagnosis Date    Allergic rhinitis 2/19/2018    Anxiety     Disorder of stomach     valve not closing properly    Hyperlipidemia     meds > 22 yrs    ARNAUD (obstructive sleep apnea) 2/19/2018    Substance abuse     alcholic, quit 20 yrs      Past Surgical History:   Procedure Laterality Date    COLONOSCOPY  12/22/2016    A SHALA MARLEY    DENTAL SURGERY  10 yrs ago    ENDOSCOPY, COLON, DIAGNOSTIC      EYE SURGERY      Lasik OU    KNEE ARTHROSCOPY Right     KNEE SURGERY Right 05/2016    Ray procedure    [place] NASAL SCOPY,OPEN MAXILL SINUS N/A 2/22/2018    SEPTOPLASTY MICRODEBRIDER ASSISTED TURBINOPLASTY AND OUT-FRACTURING BILATERAL NASAL ENDOSCOPY performed by Lexi Martinez MD at [address] History   Problem Relation Age of Onset    Cancer Mother         stomach    Heart Disease Father 48    Cancer Father         bone    Cancer Maternal Grandmother         lung    Cancer Paternal Grandmother     Heart Disease Paternal Grandfather     No Known Problems

## 2018-09-28 DIAGNOSIS — E78.5 HYPERLIPIDEMIA, UNSPECIFIED HYPERLIPIDEMIA TYPE: ICD-10-CM

## 2018-10-01 RX ORDER — PRAVASTATIN SODIUM 40 MG
40 TABLET ORAL EVERY EVENING
Qty: 90 TABLET | Refills: 1 | Status: SHIPPED | OUTPATIENT
Start: 2018-10-01 | End: 2019-03-31 | Stop reason: SDUPTHER

## 2018-10-11 DIAGNOSIS — G47.00 INSOMNIA, UNSPECIFIED TYPE: ICD-10-CM

## 2018-10-11 RX ORDER — TRAZODONE HYDROCHLORIDE 150 MG/1
150 TABLET ORAL NIGHTLY
Qty: 90 TABLET | Refills: 0 | Status: SHIPPED | OUTPATIENT
Start: 2018-10-11 | End: 2019-01-24

## 2018-12-12 RX ORDER — PANTOPRAZOLE SODIUM 40 MG/1
40 TABLET, DELAYED RELEASE ORAL DAILY
Qty: 90 TABLET | Refills: 1 | Status: SHIPPED | OUTPATIENT
Start: 2018-12-12 | End: 2019-06-12 | Stop reason: SDUPTHER

## 2019-01-24 ENCOUNTER — OFFICE VISIT (OUTPATIENT)
Dept: FAMILY MEDICINE CLINIC | Age: 64
End: 2019-01-24
Payer: COMMERCIAL

## 2019-01-24 VITALS
DIASTOLIC BLOOD PRESSURE: 60 MMHG | BODY MASS INDEX: 24.02 KG/M2 | TEMPERATURE: 96.9 F | SYSTOLIC BLOOD PRESSURE: 106 MMHG | OXYGEN SATURATION: 94 % | WEIGHT: 162.2 LBS | HEART RATE: 62 BPM | HEIGHT: 69 IN

## 2019-01-24 DIAGNOSIS — F41.9 ANXIETY: ICD-10-CM

## 2019-01-24 DIAGNOSIS — F17.200 SMOKER: Primary | ICD-10-CM

## 2019-01-24 DIAGNOSIS — G47.33 OSA (OBSTRUCTIVE SLEEP APNEA): ICD-10-CM

## 2019-01-24 DIAGNOSIS — R53.83 OTHER FATIGUE: ICD-10-CM

## 2019-01-24 DIAGNOSIS — E78.5 HYPERLIPIDEMIA, UNSPECIFIED HYPERLIPIDEMIA TYPE: ICD-10-CM

## 2019-01-24 DIAGNOSIS — Z87.891 PERSONAL HISTORY OF TOBACCO USE: ICD-10-CM

## 2019-01-24 LAB
ALBUMIN SERPL-MCNC: 4.2 G/DL (ref 3.9–4.9)
ALP BLD-CCNC: 56 U/L (ref 35–104)
ALT SERPL-CCNC: 9 U/L (ref 0–41)
ANION GAP SERPL CALCULATED.3IONS-SCNC: 12 MEQ/L (ref 7–13)
AST SERPL-CCNC: 13 U/L (ref 0–40)
BASOPHILS ABSOLUTE: 0.1 K/UL (ref 0–0.2)
BASOPHILS RELATIVE PERCENT: 1.2 %
BILIRUB SERPL-MCNC: 0.3 MG/DL (ref 0–1.2)
BUN BLDV-MCNC: 17 MG/DL (ref 8–23)
CALCIUM SERPL-MCNC: 9.4 MG/DL (ref 8.6–10.2)
CHLORIDE BLD-SCNC: 98 MEQ/L (ref 98–107)
CHOLESTEROL, TOTAL: 203 MG/DL (ref 0–199)
CO2: 25 MEQ/L (ref 22–29)
CREAT SERPL-MCNC: 0.98 MG/DL (ref 0.7–1.2)
EOSINOPHILS ABSOLUTE: 0.3 K/UL (ref 0–0.7)
EOSINOPHILS RELATIVE PERCENT: 3 %
GFR AFRICAN AMERICAN: >60
GFR NON-AFRICAN AMERICAN: >60
GLOBULIN: 2.8 G/DL (ref 2.3–3.5)
GLUCOSE BLD-MCNC: 87 MG/DL (ref 74–109)
HCT VFR BLD CALC: 47.4 % (ref 42–52)
HDLC SERPL-MCNC: 45 MG/DL (ref 40–59)
HEMOGLOBIN: 16.5 G/DL (ref 14–18)
LDL CHOLESTEROL CALCULATED: 135 MG/DL (ref 0–129)
LYMPHOCYTES ABSOLUTE: 2.5 K/UL (ref 1–4.8)
LYMPHOCYTES RELATIVE PERCENT: 30 %
MCH RBC QN AUTO: 33.6 PG (ref 27–31.3)
MCHC RBC AUTO-ENTMCNC: 34.8 % (ref 33–37)
MCV RBC AUTO: 96.7 FL (ref 80–100)
MONOCYTES ABSOLUTE: 0.5 K/UL (ref 0.2–0.8)
MONOCYTES RELATIVE PERCENT: 5.7 %
NEUTROPHILS ABSOLUTE: 5.1 K/UL (ref 1.4–6.5)
NEUTROPHILS RELATIVE PERCENT: 60.1 %
PDW BLD-RTO: 13.9 % (ref 11.5–14.5)
PLATELET # BLD: 238 K/UL (ref 130–400)
POTASSIUM SERPL-SCNC: 4.1 MEQ/L (ref 3.5–5.1)
RBC # BLD: 4.9 M/UL (ref 4.7–6.1)
SODIUM BLD-SCNC: 135 MEQ/L (ref 132–144)
TOTAL PROTEIN: 7 G/DL (ref 6.4–8.1)
TRIGL SERPL-MCNC: 115 MG/DL (ref 0–200)
WBC # BLD: 8.5 K/UL (ref 4.8–10.8)

## 2019-01-24 PROCEDURE — G0296 VISIT TO DETERM LDCT ELIG: HCPCS | Performed by: NURSE PRACTITIONER

## 2019-01-24 PROCEDURE — 99214 OFFICE O/P EST MOD 30 MIN: CPT | Performed by: NURSE PRACTITIONER

## 2019-01-24 PROCEDURE — G8427 DOCREV CUR MEDS BY ELIG CLIN: HCPCS | Performed by: NURSE PRACTITIONER

## 2019-01-24 PROCEDURE — 4040F PNEUMOC VAC/ADMIN/RCVD: CPT | Performed by: NURSE PRACTITIONER

## 2019-01-24 PROCEDURE — 1101F PT FALLS ASSESS-DOCD LE1/YR: CPT | Performed by: NURSE PRACTITIONER

## 2019-01-24 PROCEDURE — 4004F PT TOBACCO SCREEN RCVD TLK: CPT | Performed by: NURSE PRACTITIONER

## 2019-01-24 PROCEDURE — G8420 CALC BMI NORM PARAMETERS: HCPCS | Performed by: NURSE PRACTITIONER

## 2019-01-24 PROCEDURE — 3017F COLORECTAL CA SCREEN DOC REV: CPT | Performed by: NURSE PRACTITIONER

## 2019-01-24 PROCEDURE — 1123F ACP DISCUSS/DSCN MKR DOCD: CPT | Performed by: NURSE PRACTITIONER

## 2019-01-24 PROCEDURE — G8484 FLU IMMUNIZE NO ADMIN: HCPCS | Performed by: NURSE PRACTITIONER

## 2019-01-24 ASSESSMENT — ENCOUNTER SYMPTOMS
COUGH: 0
WHEEZING: 0
SHORTNESS OF BREATH: 1
CHEST TIGHTNESS: 0

## 2019-01-29 RX ORDER — FLUOXETINE 10 MG/1
10 CAPSULE ORAL 3 TIMES DAILY
Qty: 90 CAPSULE | Refills: 1 | Status: SHIPPED | OUTPATIENT
Start: 2019-01-29 | End: 2019-02-20 | Stop reason: SDUPTHER

## 2019-01-31 ENCOUNTER — TELEPHONE (OUTPATIENT)
Dept: CASE MANAGEMENT | Age: 64
End: 2019-01-31

## 2019-02-11 ENCOUNTER — HOSPITAL ENCOUNTER (OUTPATIENT)
Dept: CT IMAGING | Age: 64
Discharge: HOME | End: 2019-02-13
Payer: COMMERCIAL

## 2019-02-11 ENCOUNTER — HOSPITAL ENCOUNTER (OUTPATIENT)
Dept: PULMONOLOGY | Age: 64
Discharge: HOME | End: 2019-02-11
Payer: COMMERCIAL

## 2019-02-11 VITALS — DIASTOLIC BLOOD PRESSURE: 75 MMHG | RESPIRATION RATE: 16 BRPM | HEART RATE: 78 BPM | SYSTOLIC BLOOD PRESSURE: 103 MMHG

## 2019-02-11 DIAGNOSIS — G47.33 OSA (OBSTRUCTIVE SLEEP APNEA): ICD-10-CM

## 2019-02-11 DIAGNOSIS — Z87.891 PERSONAL HISTORY OF TOBACCO USE: ICD-10-CM

## 2019-02-11 DIAGNOSIS — F17.200 SMOKER: ICD-10-CM

## 2019-02-11 PROCEDURE — 94729 DIFFUSING CAPACITY: CPT

## 2019-02-11 PROCEDURE — 94726 PLETHYSMOGRAPHY LUNG VOLUMES: CPT

## 2019-02-11 PROCEDURE — 94729 DIFFUSING CAPACITY: CPT | Performed by: INTERNAL MEDICINE

## 2019-02-11 PROCEDURE — 94726 PLETHYSMOGRAPHY LUNG VOLUMES: CPT | Performed by: INTERNAL MEDICINE

## 2019-02-11 PROCEDURE — 94010 BREATHING CAPACITY TEST: CPT

## 2019-02-11 PROCEDURE — 94010 BREATHING CAPACITY TEST: CPT | Performed by: INTERNAL MEDICINE

## 2019-02-11 PROCEDURE — G0297 LDCT FOR LUNG CA SCREEN: HCPCS

## 2019-02-20 RX ORDER — FLUOXETINE 10 MG/1
10 CAPSULE ORAL 3 TIMES DAILY
Qty: 90 CAPSULE | Refills: 1 | Status: SHIPPED | OUTPATIENT
Start: 2019-02-20 | End: 2019-02-27 | Stop reason: SDUPTHER

## 2019-02-27 RX ORDER — FLUOXETINE 10 MG/1
10 CAPSULE ORAL 3 TIMES DAILY
Qty: 90 CAPSULE | Refills: 1 | Status: SHIPPED | OUTPATIENT
Start: 2019-02-27 | End: 2019-06-12 | Stop reason: SDUPTHER

## 2019-02-28 ENCOUNTER — OFFICE VISIT (OUTPATIENT)
Dept: FAMILY MEDICINE CLINIC | Age: 64
End: 2019-02-28
Payer: COMMERCIAL

## 2019-02-28 VITALS
HEIGHT: 70 IN | SYSTOLIC BLOOD PRESSURE: 110 MMHG | HEART RATE: 67 BPM | BODY MASS INDEX: 23.96 KG/M2 | TEMPERATURE: 97.1 F | OXYGEN SATURATION: 96 % | DIASTOLIC BLOOD PRESSURE: 76 MMHG | WEIGHT: 167.4 LBS

## 2019-02-28 DIAGNOSIS — Z87.891 PERSONAL HISTORY OF TOBACCO USE: Primary | ICD-10-CM

## 2019-02-28 DIAGNOSIS — J43.9 PULMONARY EMPHYSEMA, UNSPECIFIED EMPHYSEMA TYPE (HCC): ICD-10-CM

## 2019-02-28 PROCEDURE — 4040F PNEUMOC VAC/ADMIN/RCVD: CPT | Performed by: NURSE PRACTITIONER

## 2019-02-28 PROCEDURE — G8926 SPIRO NO PERF OR DOC: HCPCS | Performed by: NURSE PRACTITIONER

## 2019-02-28 PROCEDURE — 99213 OFFICE O/P EST LOW 20 MIN: CPT | Performed by: NURSE PRACTITIONER

## 2019-02-28 PROCEDURE — 3023F SPIROM DOC REV: CPT | Performed by: NURSE PRACTITIONER

## 2019-02-28 PROCEDURE — 4004F PT TOBACCO SCREEN RCVD TLK: CPT | Performed by: NURSE PRACTITIONER

## 2019-02-28 PROCEDURE — G8420 CALC BMI NORM PARAMETERS: HCPCS | Performed by: NURSE PRACTITIONER

## 2019-02-28 PROCEDURE — G8427 DOCREV CUR MEDS BY ELIG CLIN: HCPCS | Performed by: NURSE PRACTITIONER

## 2019-02-28 PROCEDURE — 1123F ACP DISCUSS/DSCN MKR DOCD: CPT | Performed by: NURSE PRACTITIONER

## 2019-02-28 PROCEDURE — 3017F COLORECTAL CA SCREEN DOC REV: CPT | Performed by: NURSE PRACTITIONER

## 2019-02-28 PROCEDURE — G8484 FLU IMMUNIZE NO ADMIN: HCPCS | Performed by: NURSE PRACTITIONER

## 2019-02-28 PROCEDURE — 1101F PT FALLS ASSESS-DOCD LE1/YR: CPT | Performed by: NURSE PRACTITIONER

## 2019-02-28 ASSESSMENT — PATIENT HEALTH QUESTIONNAIRE - PHQ9
SUM OF ALL RESPONSES TO PHQ QUESTIONS 1-9: 2
2. FEELING DOWN, DEPRESSED OR HOPELESS: 1
SUM OF ALL RESPONSES TO PHQ9 QUESTIONS 1 & 2: 2
SUM OF ALL RESPONSES TO PHQ QUESTIONS 1-9: 2
1. LITTLE INTEREST OR PLEASURE IN DOING THINGS: 1

## 2019-02-28 ASSESSMENT — ENCOUNTER SYMPTOMS
COUGH: 0
SHORTNESS OF BREATH: 1

## 2019-03-31 DIAGNOSIS — E78.5 HYPERLIPIDEMIA, UNSPECIFIED HYPERLIPIDEMIA TYPE: ICD-10-CM

## 2019-03-31 NOTE — TELEPHONE ENCOUNTER
Requesting medication refill.  Please approve or deny this request.    Rx requested:  Requested Prescriptions     Pending Prescriptions Disp Refills    pravastatin (PRAVACHOL) 40 MG tablet [Pharmacy Med Name: PRAVASTATIN SODIUM 40 MG TAB] 90 tablet 1     Sig: TAKE 1 TABLET BY MOUTH EVERY DAY IN THE EVENING       Last Office Visit:   2/28/2019        Next Visit Date:  Future Appointments   Date Time Provider Raphael Thompson   4/29/2019 10:15 AM MANUELA Muro CNP [facility] EMERGENCY [facility] AT [place]

## 2019-04-01 RX ORDER — PRAVASTATIN SODIUM 40 MG
TABLET ORAL
Qty: 90 TABLET | Refills: 1 | Status: SHIPPED | OUTPATIENT
Start: 2019-04-01 | End: 2019-06-12 | Stop reason: SDUPTHER

## 2019-04-29 ENCOUNTER — OFFICE VISIT (OUTPATIENT)
Dept: FAMILY MEDICINE CLINIC | Age: 64
End: 2019-04-29
Payer: COMMERCIAL

## 2019-04-29 VITALS
DIASTOLIC BLOOD PRESSURE: 88 MMHG | OXYGEN SATURATION: 94 % | SYSTOLIC BLOOD PRESSURE: 138 MMHG | BODY MASS INDEX: 24.29 KG/M2 | HEIGHT: 70 IN | TEMPERATURE: 97.8 F | WEIGHT: 169.7 LBS | HEART RATE: 75 BPM

## 2019-04-29 DIAGNOSIS — Z87.891 PERSONAL HISTORY OF TOBACCO USE: ICD-10-CM

## 2019-04-29 DIAGNOSIS — E78.5 HYPERLIPIDEMIA, UNSPECIFIED HYPERLIPIDEMIA TYPE: Primary | ICD-10-CM

## 2019-04-29 DIAGNOSIS — F41.9 ANXIETY: ICD-10-CM

## 2019-04-29 PROCEDURE — 1123F ACP DISCUSS/DSCN MKR DOCD: CPT | Performed by: NURSE PRACTITIONER

## 2019-04-29 PROCEDURE — 99213 OFFICE O/P EST LOW 20 MIN: CPT | Performed by: NURSE PRACTITIONER

## 2019-04-29 PROCEDURE — G8427 DOCREV CUR MEDS BY ELIG CLIN: HCPCS | Performed by: NURSE PRACTITIONER

## 2019-04-29 PROCEDURE — 3017F COLORECTAL CA SCREEN DOC REV: CPT | Performed by: NURSE PRACTITIONER

## 2019-04-29 PROCEDURE — 4040F PNEUMOC VAC/ADMIN/RCVD: CPT | Performed by: NURSE PRACTITIONER

## 2019-04-29 PROCEDURE — G8420 CALC BMI NORM PARAMETERS: HCPCS | Performed by: NURSE PRACTITIONER

## 2019-04-29 PROCEDURE — 4004F PT TOBACCO SCREEN RCVD TLK: CPT | Performed by: NURSE PRACTITIONER

## 2019-04-29 ASSESSMENT — ENCOUNTER SYMPTOMS
RESPIRATORY NEGATIVE: 1
EYES NEGATIVE: 1
CHEST TIGHTNESS: 0
SHORTNESS OF BREATH: 0
GASTROINTESTINAL NEGATIVE: 1

## 2019-04-29 NOTE — PROGRESS NOTES
Subjective  Chief Complaint   Patient presents with    3 Month Follow-Up     NO CONCERNS     Anxiety    Hyperlipidemia       HPI     Pt taking ativan 2 times a day in morning and evening. Pt feels good with that dosing and mood is stable. Pt feels more stressed out due to stress at home but states it is manageable right now. Pt states he doesn't really follow a healthy diet and is aware that it is not good for his health. States he wakes up at night to eat. Trying to cut back on smoking. Down to 2 cigars per day. No longer experiencing inflammatory pain after adjusting statin dosing.        Patient Active Problem List    Diagnosis Date Noted    Personal history of tobacco use     Hyperlipidemia 01/24/2019    Anxiety 09/19/2018    Insomnia 09/19/2018    DNS (deviated nasal septum) 02/19/2018    Allergic rhinitis 02/19/2018    Hypertrophy of nasal turbinates 02/19/2018    ARNAUD (obstructive sleep apnea) 02/19/2018     Past Medical History:   Diagnosis Date    Allergic rhinitis 2/19/2018    Anxiety     Disorder of stomach     valve not closing properly    Hyperlipidemia     meds > 22 yrs    ARNAUD (obstructive sleep apnea) 2/19/2018    Substance abuse ([address]     alcholic, quit 20 yrs      Past Surgical History:   Procedure Laterality Date    COLONOSCOPY  12/22/2016    DALIA LAST MD    DENTAL SURGERY  10 yrs ago    ENDOSCOPY, COLON, DIAGNOSTIC      EYE SURGERY      Lasik OU    KNEE ARTHROSCOPY Right     KNEE SURGERY Right 05/2016    Ray procedure    [place] NASAL SCOPY,OPEN MAXILL SINUS N/A 2/22/2018    SEPTOPLASTY MICRODEBRIDER ASSISTED TURBINOPLASTY AND OUT-FRACTURING BILATERAL NASAL ENDOSCOPY performed by Dre Novoa MD at [facility]     Family History   Problem Relation Age of Onset    Cancer Mother         stomach    Heart Disease Father 48    Cancer Father         bone    Cancer Maternal Grandmother         lung    Cancer Paternal Grandmother     Heart Disease Paternal Grandfather  No Known Problems Sister     Cancer Brother     Heart Disease Brother         hole in heart in 65 at 1 months age     Social History     Socioeconomic History    Marital status:      Spouse name: None    Number of children: None    Years of education: None    Highest education level: None   Occupational History    None   Social Needs    Financial resource strain: None    Food insecurity:     Worry: None     Inability: None    Transportation needs:     Medical: None     Non-medical: None   Tobacco Use    Smoking status: Current Every Day Smoker     Packs/day: 3.00     Years: 34.00     Pack years: 102.00     Types: Cigars, Cigarettes    Smokeless tobacco: Never Used    Tobacco comment: smoked cigarettes for 30 yrs, quit 9/1996, started smoking Cigars in 2014   Substance and Sexual Activity    Alcohol use: No     Comment: sober > 22 yrs    Drug use: No    Sexual activity: None   Lifestyle    Physical activity:     Days per week: None     Minutes per session: None    Stress: None   Relationships    Social connections:     Talks on phone: None     Gets together: None     Attends L[employer] service: None     Active member of club or organization: None     Attends meetings of clubs or organizations: None     Relationship status: None    Intimate partner violence:     Fear of current or ex partner: None     Emotionally abused: None     Physically abused: None     Forced sexual activity: None   Other Topics Concern    None   Social History Narrative    None     Current Outpatient Medications on File Prior to Visit   Medication Sig Dispense Refill    pravastatin (PRAVACHOL) 40 MG tablet TAKE 1 TABLET BY MOUTH EVERY DAY IN THE EVENING 90 tablet 1    LORazepam (ATIVAN) 2 MG tablet TAKE 1 TABLET BY MOUTH EVERY 12 HOURS IF NEEDED FOR ANXIETY 60 tablet 1    pantoprazole (PROTONIX) 40 MG tablet Take 1 tablet by mouth daily 90 tablet 1    FLUoxetine (PROZAC) 10 MG capsule Take 1 capsule by mouth 3 times daily (Patient taking differently: Take 10 mg by mouth 3 times daily Only taking in the winter) 90 capsule 1     No current facility-administered medications on file prior to visit. Allergies   Allergen Reactions    Alcohol Other (See Comments)     Sober 22 yrs    Benadryl [Diphenhydramine]      \"speeds him up\"    Demerol Hcl [Meperidine] Other (See Comments)     Aggressive behavior    Sulfa Antibiotics Other (See Comments)     Told by mother / patient unaware of reaction       Review of Systems   Constitutional: Negative. HENT: Negative. Eyes: Negative. Respiratory: Negative. Negative for chest tightness and shortness of breath. Cardiovascular: Negative. Gastrointestinal: Negative. Endocrine: Negative. Musculoskeletal: Negative. Skin: Negative. Neurological: Negative. Psychiatric/Behavioral: Negative. Objective  Vitals:    04/29/19 1008   BP: 138/88   Pulse: 75   Temp: 97.8 °F (36.6 °C)   SpO2: 94%   Weight: 169 lb 11.2 oz (77 kg)   Height: 5' 10\" (1.778 m)     Physical Exam   Constitutional: He is oriented to person, place, and time. He appears well-developed and well-nourished. HENT:   Head: Normocephalic and atraumatic. Right Ear: External ear normal.   Left Ear: External ear normal.   Nose: Nose normal.   Mouth/Throat: Oropharynx is clear and moist.   Eyes: Pupils are equal, round, and reactive to light. Conjunctivae and EOM are normal.   Cardiovascular: Normal rate, regular rhythm, normal heart sounds and intact distal pulses. Pulmonary/Chest: Breath sounds normal.   Musculoskeletal: Normal range of motion. Neurological: He is alert and oriented to person, place, and time. Skin: Skin is warm and dry. Capillary refill takes less than 2 seconds. Psychiatric: He has a normal mood and affect. His behavior is normal. Judgment and thought content normal.     Assessment & Plan     Diagnosis Orders   1.  Hyperlipidemia, unspecified hyperlipidemia type     2. Anxiety     3. Personal history of tobacco use       Side effects, adverse effects of the medication prescribed today, as well as treatment plan/ rationale and result expectations have been discussed with the patient who expresses understanding and desires to proceed. Close follow up to evaluate treatment results and for coordination of care. I have reviewed the patient's medical history in detail and updated the computerized patient record. As always, patient is advised that if symptoms worsen in any way they will proceed to the nearest emergency room. Pt declined AAA screening order today. Will consider at future date. No orders of the defined types were placed in this encounter. Return in about 4 months (around 8/29/2019).     Riya Beck, MANUELA - CNP

## 2019-04-30 ENCOUNTER — TELEPHONE (OUTPATIENT)
Dept: ULTRASOUND IMAGING | Age: 64
End: 2019-04-30

## 2019-04-30 NOTE — TELEPHONE ENCOUNTER
Pt would like to get the [place] for AAA   Before 5/31st. His insurance will be changing. Stated that you talked him into it. Please let him know.

## 2019-05-01 DIAGNOSIS — F17.200 SMOKER: Primary | ICD-10-CM

## 2019-05-07 ENCOUNTER — HOSPITAL ENCOUNTER (OUTPATIENT)
Dept: ULTRASOUND IMAGING | Age: 64
Discharge: HOME | End: 2019-05-09
Payer: COMMERCIAL

## 2019-05-07 DIAGNOSIS — F17.200 SMOKER: ICD-10-CM

## 2019-05-07 PROCEDURE — 76706 US ABDL AORTA SCREEN AAA: CPT

## 2019-06-11 ENCOUNTER — TELEPHONE (OUTPATIENT)
Dept: FAMILY MEDICINE CLINIC | Age: 64
End: 2019-06-11

## 2019-06-12 DIAGNOSIS — E78.5 HYPERLIPIDEMIA, UNSPECIFIED HYPERLIPIDEMIA TYPE: ICD-10-CM

## 2019-06-12 NOTE — TELEPHONE ENCOUNTER
3 meds are ordered. PROZAC- pt is only taking 10 mg TID when needed. There is [facility] only 20 or 40 mg??? They are requesting on paper 10 mg. I HAVE LEFT SCRIPT A 10 mg, CORRECT?     LOV April

## 2019-06-13 RX ORDER — PRAVASTATIN SODIUM 40 MG
TABLET ORAL
Qty: 90 TABLET | Refills: 1 | Status: SHIPPED | OUTPATIENT
Start: 2019-06-13 | End: 2019-06-17 | Stop reason: SDUPTHER

## 2019-06-13 RX ORDER — FLUOXETINE 10 MG/1
10 CAPSULE ORAL 3 TIMES DAILY
Qty: 90 CAPSULE | Refills: 1 | Status: SHIPPED | OUTPATIENT
Start: 2019-06-13 | End: 2019-06-17 | Stop reason: SDUPTHER

## 2019-06-13 RX ORDER — PANTOPRAZOLE SODIUM 40 MG/1
TABLET, DELAYED RELEASE ORAL
Qty: 90 TABLET | Refills: 1 | Status: SHIPPED | OUTPATIENT
Start: 2019-06-13 | End: 2019-06-17 | Stop reason: SDUPTHER

## 2019-06-14 NOTE — TELEPHONE ENCOUNTER
NL pt. These meds were supposed to go to [facility], not [facility]. Can you please change pharmacies for pt? Thanks!     [phone]

## 2019-06-17 RX ORDER — PANTOPRAZOLE SODIUM 40 MG/1
TABLET, DELAYED RELEASE ORAL
Qty: 90 TABLET | Refills: 1 | Status: SHIPPED | OUTPATIENT
Start: 2019-06-17 | End: 2021-05-24 | Stop reason: SDUPTHER

## 2019-06-17 RX ORDER — FLUOXETINE 10 MG/1
10 CAPSULE ORAL 3 TIMES DAILY
Qty: 90 CAPSULE | Refills: 1 | Status: SHIPPED | OUTPATIENT
Start: 2019-06-17 | End: 2019-06-27 | Stop reason: SDUPTHER

## 2019-06-17 RX ORDER — PRAVASTATIN SODIUM 40 MG
TABLET ORAL
Qty: 90 TABLET | Refills: 1 | Status: SHIPPED | OUTPATIENT
Start: 2019-06-17 | End: 2021-12-13 | Stop reason: ALTCHOICE

## 2019-06-21 DIAGNOSIS — F41.9 ANXIETY: ICD-10-CM

## 2019-06-21 RX ORDER — LORAZEPAM 2 MG/1
TABLET ORAL
Qty: 60 TABLET | Refills: 0 | Status: SHIPPED | OUTPATIENT
Start: 2019-06-21 | End: 2019-06-22 | Stop reason: SDUPTHER

## 2019-06-21 NOTE — TELEPHONE ENCOUNTER
Pt stopped by the office and states that he is out of this med. Would like refilled before the weekend. Thanks!

## 2019-06-22 RX ORDER — LORAZEPAM 2 MG/1
TABLET ORAL
Qty: 60 TABLET | Refills: 0 | Status: SHIPPED | OUTPATIENT
Start: 2019-06-22 | End: 2019-06-24

## 2019-06-22 NOTE — TELEPHONE ENCOUNTER
Orders Placed This Encounter   Medications    DISCONTD: LORazepam (ATIVAN) 2 MG tablet     Sig: take 1 tablet by mouth every 12 hours if needed FOR ANXIETY     Dispense:  60 tablet     Refill:  0    LORazepam (ATIVAN) 2 MG tablet     Sig: take 1 tablet by mouth every 12 hours if needed FOR ANXIETY     Dispense:  60 tablet     Refill:  0       The above med(s) were e-scripted to the patient's pharmacy. Please advise patient  Dominique Renteria MD    Cancel other refill at other pharm!

## 2019-06-22 NOTE — TELEPHONE ENCOUNTER
Per [facility], this pt contacted her last night (on-call) and needs this med to go to [facility]. Can you please correct and advise pt?

## 2019-06-27 RX ORDER — FLUOXETINE 10 MG/1
10 CAPSULE ORAL 3 TIMES DAILY
Qty: 90 CAPSULE | Refills: 1 | Status: SHIPPED | OUTPATIENT
Start: 2019-06-27 | End: 2021-03-10

## 2019-07-01 ENCOUNTER — OFFICE VISIT (OUTPATIENT)
Dept: FAMILY MEDICINE CLINIC | Age: 64
End: 2019-07-01
Payer: COMMERCIAL

## 2019-07-01 VITALS
OXYGEN SATURATION: 98 % | HEART RATE: 82 BPM | WEIGHT: 155 LBS | SYSTOLIC BLOOD PRESSURE: 122 MMHG | HEIGHT: 69 IN | BODY MASS INDEX: 22.96 KG/M2 | DIASTOLIC BLOOD PRESSURE: 68 MMHG

## 2019-07-01 DIAGNOSIS — R11.0 NAUSEA: ICD-10-CM

## 2019-07-01 DIAGNOSIS — F41.9 ANXIETY: Primary | ICD-10-CM

## 2019-07-01 DIAGNOSIS — R63.4 WEIGHT LOSS: ICD-10-CM

## 2019-07-01 PROCEDURE — G8420 CALC BMI NORM PARAMETERS: HCPCS | Performed by: NURSE PRACTITIONER

## 2019-07-01 PROCEDURE — 99213 OFFICE O/P EST LOW 20 MIN: CPT | Performed by: NURSE PRACTITIONER

## 2019-07-01 PROCEDURE — 4040F PNEUMOC VAC/ADMIN/RCVD: CPT | Performed by: NURSE PRACTITIONER

## 2019-07-01 PROCEDURE — 1123F ACP DISCUSS/DSCN MKR DOCD: CPT | Performed by: NURSE PRACTITIONER

## 2019-07-01 PROCEDURE — G8427 DOCREV CUR MEDS BY ELIG CLIN: HCPCS | Performed by: NURSE PRACTITIONER

## 2019-07-01 PROCEDURE — 4004F PT TOBACCO SCREEN RCVD TLK: CPT | Performed by: NURSE PRACTITIONER

## 2019-07-01 PROCEDURE — 3017F COLORECTAL CA SCREEN DOC REV: CPT | Performed by: NURSE PRACTITIONER

## 2019-07-01 ASSESSMENT — ENCOUNTER SYMPTOMS
COUGH: 0
NAUSEA: 1
SHORTNESS OF BREATH: 0

## 2019-07-01 NOTE — PROGRESS NOTES
filling 3 mos supply of ativan when he is due for next fill so that I wouldn't have to send a controlled substance out of state until he establishes with someone new. I am ok with this as long as pharmacy is ok with it.     Lavern Hernándezor, APRN - CNP

## 2019-07-21 DIAGNOSIS — F41.9 ANXIETY: ICD-10-CM

## 2019-07-22 RX ORDER — LORAZEPAM 2 MG/1
TABLET ORAL
Qty: 180 TABLET | Refills: 0 | Status: SHIPPED | OUTPATIENT
Start: 2019-07-22 | End: 2019-10-20

## 2021-01-14 ENCOUNTER — OFFICE VISIT (OUTPATIENT)
Dept: FAMILY MEDICINE CLINIC | Age: 66
End: 2021-01-14
Payer: MEDICARE

## 2021-01-14 VITALS
BODY MASS INDEX: 22.07 KG/M2 | DIASTOLIC BLOOD PRESSURE: 80 MMHG | HEART RATE: 88 BPM | OXYGEN SATURATION: 98 % | WEIGHT: 149 LBS | TEMPERATURE: 98 F | SYSTOLIC BLOOD PRESSURE: 132 MMHG | HEIGHT: 69 IN

## 2021-01-14 DIAGNOSIS — Z13.220 LIPID SCREENING: ICD-10-CM

## 2021-01-14 DIAGNOSIS — D50.9 IRON DEFICIENCY ANEMIA, UNSPECIFIED IRON DEFICIENCY ANEMIA TYPE: ICD-10-CM

## 2021-01-14 DIAGNOSIS — Z87.891 PERSONAL HISTORY OF TOBACCO USE: ICD-10-CM

## 2021-01-14 DIAGNOSIS — R53.83 FATIGUE, UNSPECIFIED TYPE: ICD-10-CM

## 2021-01-14 DIAGNOSIS — R41.3 MEMORY CHANGES: Primary | ICD-10-CM

## 2021-01-14 LAB
BILIRUBIN, POC: NORMAL
BLOOD URINE, POC: NORMAL
CLARITY, POC: CLEAR
COLOR, POC: YELLOW
GLUCOSE URINE, POC: NORMAL
KETONES, POC: NORMAL
LEUKOCYTE EST, POC: NORMAL
NITRITE, POC: NORMAL
PH, POC: 6
PROTEIN, POC: NORMAL
SPECIFIC GRAVITY, POC: 1.02
UROBILINOGEN, POC: 0.2

## 2021-01-14 PROCEDURE — 99214 OFFICE O/P EST MOD 30 MIN: CPT | Performed by: NURSE PRACTITIONER

## 2021-01-14 PROCEDURE — 81002 URINALYSIS NONAUTO W/O SCOPE: CPT | Performed by: NURSE PRACTITIONER

## 2021-01-14 PROCEDURE — G0296 VISIT TO DETERM LDCT ELIG: HCPCS | Performed by: NURSE PRACTITIONER

## 2021-01-14 SDOH — ECONOMIC STABILITY: INCOME INSECURITY: HOW HARD IS IT FOR YOU TO PAY FOR THE VERY BASICS LIKE FOOD, HOUSING, MEDICAL CARE, AND HEATING?: NOT HARD AT ALL

## 2021-01-14 SDOH — ECONOMIC STABILITY: TRANSPORTATION INSECURITY
IN THE PAST 12 MONTHS, HAS THE LACK OF TRANSPORTATION KEPT YOU FROM MEDICAL APPOINTMENTS OR FROM GETTING MEDICATIONS?: NO

## 2021-01-14 ASSESSMENT — ENCOUNTER SYMPTOMS
DIARRHEA: 0
SHORTNESS OF BREATH: 0
CONSTIPATION: 0
COUGH: 0

## 2021-01-14 ASSESSMENT — PATIENT HEALTH QUESTIONNAIRE - PHQ9
SUM OF ALL RESPONSES TO PHQ QUESTIONS 1-9: 1
1. LITTLE INTEREST OR PLEASURE IN DOING THINGS: 0
SUM OF ALL RESPONSES TO PHQ QUESTIONS 1-9: 1

## 2021-01-14 NOTE — PROGRESS NOTES
Subjective  Chief Complaint   Patient presents with    Memory Loss     having trouble thought processing, wife noticed about 18 months ago. states that he woke up the other day confused and did not know where he was at.  Health Maintenance     would like CT lung screening. HPI     Memory concerns- forgetting what he needs to do sometime.   - Confused when waking up three days ago. - worse over the past few weeks. -  Sister has noticed changes over past 6 mos since moving back to [place]   - forgetting facts- mostly recent, age   - comprehension concerns   - short term memory issues more common. Has been under incredible stress recently. Anxiety has been up. A lot of fear. Not sleeping well. Had an episode that appeared manic like. Talking nonstop and hyperactive.     Always cold    Patient Active Problem List    Diagnosis Date Noted    Personal history of tobacco use     Hyperlipidemia 01/24/2019    Anxiety 09/19/2018    Insomnia 09/19/2018    DNS (deviated nasal septum) 02/19/2018    Allergic rhinitis 02/19/2018    Hypertrophy of nasal turbinates 02/19/2018    ARNAUD (obstructive sleep apnea) 02/19/2018     Past Medical History:   Diagnosis Date    Allergic rhinitis 2/19/2018    Anxiety     Disorder of stomach     valve not closing properly    Hyperlipidemia     meds > 22 yrs    ARNAUD (obstructive sleep apnea) 2/19/2018    Substance abuse ([address]     alcholic, quit 20 yrs      Past Surgical History:   Procedure Laterality Date    COLONOSCOPY  12/22/2016    A SHALA MARLEY    DENTAL SURGERY  10 yrs ago    ENDOSCOPY, COLON, DIAGNOSTIC      EYE SURGERY      Lasik OU    KNEE ARTHROSCOPY Right     KNEE SURGERY Right 05/2016    Ary procedure    ND NASAL SCOPY,OPEN MAXILL SINUS N/A 2/22/2018    SEPTOPLASTY MICRODEBRIDER ASSISTED TURBINOPLASTY AND OUT-FRACTURING BILATERAL NASAL ENDOSCOPY performed by Radha Navarrete MD at [facility]     Family History   Problem Relation Age of Onset tablet TAKE 1 TABLET BY MOUTH EVERY DAY 90 tablet 1    pravastatin (PRAVACHOL) 40 MG tablet TAKE 1 TABLET BY MOUTH EVERY DAY IN THE EVENING 90 tablet 1     No current facility-administered medications on file prior to visit. Allergies   Allergen Reactions    Alcohol Other (See Comments)     Sober 22 yrs    Benadryl [Diphenhydramine]      \"speeds him up\"    Demerol Hcl [Meperidine] Other (See Comments)     Aggressive behavior    Sulfa Antibiotics Other (See Comments)     Told by mother / patient unaware of reaction       Review of Systems   Constitutional: Positive for fatigue. Respiratory: Negative for cough and shortness of breath. Cardiovascular: Negative for chest pain. Gastrointestinal: Negative for constipation and diarrhea. Psychiatric/Behavioral: Positive for behavioral problems, confusion and sleep disturbance. The patient is nervous/anxious. Objective  Vitals:    01/14/21 1022 01/14/21 1029 01/14/21 1051   BP: (!) 150/78 (!) 150/82 132/80   Site: Right Upper Arm Right Upper Arm    Position: Sitting Sitting    Cuff Size: Medium Adult Medium Adult    Pulse: 88     Temp: 98 °F (36.7 °C)     SpO2: 98%     Weight: 149 lb (67.6 kg)     Height: 5' 9\" (1.753 m)       Physical Exam  Vitals signs and nursing note reviewed. Constitutional:       Appearance: Normal appearance. He is normal weight. HENT:      Head: Normocephalic. Mouth/Throat:      Mouth: Mucous membranes are moist.   Eyes:      Extraocular Movements: Extraocular movements intact. Conjunctiva/sclera: Conjunctivae normal.      Pupils: Pupils are equal, round, and reactive to light. Cardiovascular:      Rate and Rhythm: Normal rate and regular rhythm. Pulses: Normal pulses. Heart sounds: Normal heart sounds. Pulmonary:      Effort: Pulmonary effort is normal.   Skin:     General: Skin is warm. Neurological:      General: No focal deficit present.       Mental Status: He is alert and oriented to person, place, and time. Mental status is at baseline. Psychiatric:         Mood and Affect: Mood is anxious. Speech: Speech normal.         Behavior: Behavior normal.         Thought Content: Thought content normal.         Cognition and Memory: Memory is impaired. He exhibits impaired recent memory (pt unable to recall three words). Assessment & Plan     Diagnosis Orders   1. Memory changes  POCT Urinalysis no Micro    Michael Orozco MD, Neurology, [place]   2. Personal history of tobacco use  GA VISIT TO DISCUSS LUNG CA SCREEN W LDCT    CT Lung Screen (Annual)   3. Fatigue, unspecified type  Comprehensive Metabolic Panel    CBC Auto Differential    Iron and TIBC    TSH with Reflex   4. Lipid screening  Lipid Panel   5. Iron deficiency anemia, unspecified iron deficiency anemia type  Iron and TIBC       Orders Placed This Encounter   Procedures    CT Lung Screen (Annual)     Age: Patient is 77 y.o. Smoking History: Social History    Tobacco Use      Smoking status: Current Every Day Smoker        Packs/day: 3.00        Years: 34.00        Pack years: 102        Types: Cigars, Cigarettes      Smokeless tobacco: Never Used      Tobacco comment: smoked cigarettes for 30 yrs, quit 9/1996, started smoking Cigars in 2014    Alcohol use: No      Comment: sober > 22 yrs    Drug use: No   Pack years: 102    Date of last lung cancer screening: [unfilled]     Standing Status:   Future     Standing Expiration Date:   7/14/2022     Order Specific Question:   Is there documentation of shared decision making? Answer:   Yes     Order Specific Question:   Is this a low dose CT or a routine CT? Answer:   Low Dose CT [1]     Order Specific Question:   Is this the first (baseline) CT or an annual exam?     Answer: Annual [2]     Order Specific Question:   Does the patient show any signs or symptoms of lung cancer? Answer:   Yes     Order Specific Question:   Smoking Status?      Answer:   Current year since quit   No sign or symptoms of lung cancer   > 11 months since last LDCT     Risks and benefits of lung cancer screening with LDCT scans discussed:    Significance of positive screen - False-positive LDCT results often occur. 95% of all positive results do not lead to a diagnosis of cancer. Usually further imaging can resolve most false-positive results; however, some patients may require invasive procedures. Over diagnosis risk - 10% to 12% of screen-detected lung cancer cases are over diagnosed--that is, the cancer would not have been detected in the patient's lifetime without the screening. Need for follow up screens annually to continue lung cancer screening effectiveness     Risks associated with radiation from annual LDCT- Radiation exposure is about the same as for a mammogram, which is about 1/3 of the annual background radiation exposure from everyday life. Starting screening at age 54 is not likely to increase cancer risk from radiation exposure. Patients with comorbidities resulting in life expectancy of < 10 years, or that would preclude treatment of an abnormality identified on CT, should not be screened due to lack of benefit.     To obtain maximal benefit from this screening, smoking cessation and long-term abstinence from smoking is critical

## 2021-01-18 DIAGNOSIS — R53.83 FATIGUE, UNSPECIFIED TYPE: ICD-10-CM

## 2021-01-18 DIAGNOSIS — Z13.220 LIPID SCREENING: ICD-10-CM

## 2021-01-18 DIAGNOSIS — D50.9 IRON DEFICIENCY ANEMIA, UNSPECIFIED IRON DEFICIENCY ANEMIA TYPE: ICD-10-CM

## 2021-01-18 LAB
ALBUMIN SERPL-MCNC: 4.1 G/DL (ref 3.5–4.6)
ALP BLD-CCNC: 60 U/L (ref 35–104)
ALT SERPL-CCNC: 11 U/L (ref 0–41)
ANION GAP SERPL CALCULATED.3IONS-SCNC: 12 MEQ/L (ref 9–15)
AST SERPL-CCNC: 11 U/L (ref 0–40)
BASOPHILS ABSOLUTE: 0.1 K/UL (ref 0–0.2)
BASOPHILS RELATIVE PERCENT: 1.2 %
BILIRUB SERPL-MCNC: 0.3 MG/DL (ref 0.2–0.7)
BUN BLDV-MCNC: 17 MG/DL (ref 8–23)
CALCIUM SERPL-MCNC: 9.1 MG/DL (ref 8.5–9.9)
CHLORIDE BLD-SCNC: 100 MEQ/L (ref 95–107)
CHOLESTEROL, TOTAL: 167 MG/DL (ref 0–199)
CO2: 26 MEQ/L (ref 20–31)
CREAT SERPL-MCNC: 0.92 MG/DL (ref 0.7–1.2)
EOSINOPHILS ABSOLUTE: 0.2 K/UL (ref 0–0.7)
EOSINOPHILS RELATIVE PERCENT: 2.3 %
GFR AFRICAN AMERICAN: >60
GFR NON-AFRICAN AMERICAN: >60
GLOBULIN: 2.4 G/DL (ref 2.3–3.5)
GLUCOSE BLD-MCNC: 83 MG/DL (ref 70–99)
HCT VFR BLD CALC: 42.8 % (ref 42–52)
HDLC SERPL-MCNC: 54 MG/DL (ref 40–59)
HEMOGLOBIN: 14.6 G/DL (ref 14–18)
IRON SATURATION: 36 % (ref 11–46)
IRON: 106 UG/DL (ref 59–158)
LDL CHOLESTEROL CALCULATED: 95 MG/DL (ref 0–129)
LYMPHOCYTES ABSOLUTE: 2.3 K/UL (ref 1–4.8)
LYMPHOCYTES RELATIVE PERCENT: 27.7 %
MCH RBC QN AUTO: 33.3 PG (ref 27–31.3)
MCHC RBC AUTO-ENTMCNC: 34 % (ref 33–37)
MCV RBC AUTO: 98.1 FL (ref 80–100)
MONOCYTES ABSOLUTE: 0.6 K/UL (ref 0.2–0.8)
MONOCYTES RELATIVE PERCENT: 6.7 %
NEUTROPHILS ABSOLUTE: 5.1 K/UL (ref 1.4–6.5)
NEUTROPHILS RELATIVE PERCENT: 62.1 %
PDW BLD-RTO: 13.6 % (ref 11.5–14.5)
PLATELET # BLD: 334 K/UL (ref 130–400)
POTASSIUM SERPL-SCNC: 4 MEQ/L (ref 3.4–4.9)
RBC # BLD: 4.36 M/UL (ref 4.7–6.1)
SODIUM BLD-SCNC: 138 MEQ/L (ref 135–144)
TOTAL IRON BINDING CAPACITY: 298 UG/DL (ref 178–450)
TOTAL PROTEIN: 6.5 G/DL (ref 6.3–8)
TRIGL SERPL-MCNC: 92 MG/DL (ref 0–150)
TSH REFLEX: 2.35 UIU/ML (ref 0.44–3.86)
WBC # BLD: 8.2 K/UL (ref 4.8–10.8)

## 2021-01-20 DIAGNOSIS — F41.9 ANXIETY: ICD-10-CM

## 2021-01-20 PROBLEM — I10 ESSENTIAL HYPERTENSION: Status: ACTIVE | Noted: 2019-11-04

## 2021-01-20 PROBLEM — J43.8 OTHER EMPHYSEMA (HCC): Status: ACTIVE | Noted: 2019-10-04

## 2021-01-20 PROBLEM — M51.369 OTHER INTERVERTEBRAL DISC DEGENERATION, LUMBAR REGION: Status: ACTIVE | Noted: 2018-03-23

## 2021-01-20 PROBLEM — M54.50 LOW BACK PAIN: Status: ACTIVE | Noted: 2018-05-01

## 2021-01-20 PROBLEM — K21.9 GASTROESOPHAGEAL REFLUX DISEASE: Status: ACTIVE | Noted: 2019-10-04

## 2021-01-20 PROBLEM — M51.36 OTHER INTERVERTEBRAL DISC DEGENERATION, LUMBAR REGION: Status: ACTIVE | Noted: 2018-03-23

## 2021-01-20 PROBLEM — S39.012A STRAIN OF MUSCLE, FASCIA AND TENDON OF LOWER BACK, INITIAL ENCOUNTER: Status: ACTIVE | Noted: 2018-02-28

## 2021-01-20 PROBLEM — M54.16 RADICULOPATHY, LUMBAR REGION: Status: ACTIVE | Noted: 2018-02-28

## 2021-01-20 PROBLEM — Z72.0 TOBACCO ABUSE: Status: ACTIVE | Noted: 2019-10-04

## 2021-01-20 PROBLEM — F33.8 SEASONAL AFFECTIVE DISORDER (HCC): Status: ACTIVE | Noted: 2019-10-04

## 2021-01-20 PROBLEM — S33.5XXA SPRAIN OF LIGAMENTS OF LUMBAR SPINE: Status: ACTIVE | Noted: 2018-02-28

## 2021-01-20 PROBLEM — Z96.651 PRESENCE OF RIGHT ARTIFICIAL KNEE JOINT: Status: ACTIVE | Noted: 2018-02-28

## 2021-01-20 RX ORDER — LORAZEPAM 2 MG/1
TABLET ORAL
Qty: 180 TABLET | Refills: 0 | Status: CANCELLED | OUTPATIENT
Start: 2021-01-20 | End: 2021-04-20

## 2021-01-20 NOTE — TELEPHONE ENCOUNTER
Pt isn't sure if you want him to start taking these again, for stress/anxiety?  Please refill, if you do.  lov 1/14/21

## 2021-01-20 NOTE — TELEPHONE ENCOUNTER
Honestly, with as many memory issues as you are having I think that ativan will likely only make that worse. Thus, this can be hard to get off of. Since you are already weaned off I think we should discuss what else we could do to help.

## 2021-01-21 ENCOUNTER — TELEPHONE (OUTPATIENT)
Dept: CASE MANAGEMENT | Age: 66
End: 2021-01-21

## 2021-01-21 NOTE — TELEPHONE ENCOUNTER
Physician documentation on smoking history and CT Lung Screening reviewed. All required documentation complete. Patient is a current smoker with a 102 pack year history (3 ppd x 34 years) per physician documentation. I left voicemail requesting return call if he has any questions regarding his upcoming CT Lung Screening exam and informed patient of entrance to facility as well as requirement to wear a mask due to COVID precautions at this time.

## 2021-01-25 ENCOUNTER — HOSPITAL ENCOUNTER (OUTPATIENT)
Dept: CT IMAGING | Age: 66
Discharge: HOME | End: 2021-01-27
Payer: MEDICARE

## 2021-01-25 DIAGNOSIS — Z87.891 PERSONAL HISTORY OF TOBACCO USE: ICD-10-CM

## 2021-01-25 PROCEDURE — 71271 CT THORAX LUNG CANCER SCR C-: CPT

## 2021-01-26 ENCOUNTER — OFFICE VISIT (OUTPATIENT)
Dept: NEUROLOGY | Age: 66
End: 2021-01-26
Payer: MEDICARE

## 2021-01-26 VITALS
DIASTOLIC BLOOD PRESSURE: 86 MMHG | BODY MASS INDEX: 22.92 KG/M2 | HEART RATE: 81 BPM | SYSTOLIC BLOOD PRESSURE: 136 MMHG | TEMPERATURE: 96.8 F | WEIGHT: 155.2 LBS

## 2021-01-26 DIAGNOSIS — R42 VERTIGO: ICD-10-CM

## 2021-01-26 DIAGNOSIS — R00.0 TACHYCARDIA: ICD-10-CM

## 2021-01-26 DIAGNOSIS — G47.00 INSOMNIA, UNSPECIFIED TYPE: ICD-10-CM

## 2021-01-26 DIAGNOSIS — F41.9 ANXIETY: ICD-10-CM

## 2021-01-26 DIAGNOSIS — R41.3 MEMORY PROBLEM: Primary | ICD-10-CM

## 2021-01-26 PROCEDURE — 99204 OFFICE O/P NEW MOD 45 MIN: CPT | Performed by: STUDENT IN AN ORGANIZED HEALTH CARE EDUCATION/TRAINING PROGRAM

## 2021-01-26 RX ORDER — BUSPIRONE HYDROCHLORIDE 10 MG/1
10 TABLET ORAL 2 TIMES DAILY
Qty: 60 TABLET | Refills: 5 | Status: SHIPPED | OUTPATIENT
Start: 2021-01-26 | End: 2021-02-17 | Stop reason: CLARIF

## 2021-01-26 ASSESSMENT — ENCOUNTER SYMPTOMS
CHEST TIGHTNESS: 0
NAUSEA: 0
TROUBLE SWALLOWING: 0
PHOTOPHOBIA: 0
VOMITING: 0
SHORTNESS OF BREATH: 0
COLOR CHANGE: 0
DIARRHEA: 0

## 2021-01-26 NOTE — PROGRESS NOTES
[address] [facility], [facility] [address]  [address]  [place] [address]  [phone]     Date of Visit:  2021  Patient Name: Yessica Oswald   Patient :  1955     CHIEF COMPLAINT:     Yessica Oswald is a 74 y.o. male who presents today for an general visit to be evaluated for the following condition(s): Memory problems  No chief complaint on file. REVIEW OF SYSTEM      Review of Systems   Constitutional: Negative for diaphoresis and fever. HENT: Negative for congestion and trouble swallowing. Eyes: Negative for photophobia and visual disturbance. Respiratory: Negative for chest tightness and shortness of breath. Cardiovascular: Negative for chest pain. Gastrointestinal: Negative for diarrhea, nausea and vomiting. Musculoskeletal: Negative. Skin: Negative for color change. Neurological: Positive for dizziness. Negative for tremors, seizures, syncope, facial asymmetry, speech difficulty, weakness, light-headedness, numbness and headaches. Psychiatric/Behavioral: Positive for sleep disturbance. Negative for hallucinations and self-injury. The patient is nervous/anxious. Memory problem       HISTORY OF PRESENT ILLNESS     HPI  Daniel Mir is a 49-year-old male with a past medical history of lumbar radiculopathy, ARNAUD, anxiety, lipidemia, hypertension, and emphysema, who presents for complaint of memory problems. He is accompanied by a relative. He states that he has noticed over the past year that he is having trouble remembering certain things, including forgetting why he is scheduled for certain doctors appointments. He also reports that he had to pause and consider where he was going when he drove to [place] recently. States he is able to pay his bills because he writes himself notes. Denies getting lost while driving, or and not being able to perform activities of daily living.     Relative states that she has also noticed him being more forgetful. She states that he has a history of anxiety and increasing stress this year with a divorce, and she is unsure if the anxiety is causing the memory issues or if the memory issues are causing the anxiety. Patient also reports that he does not sleep well ever since he quit drinking over 20 years ago. States that he cannot get his mind to stop thinking in the evening. States he believes he had a sleep study in the past but is not sure of the results. Admits that he has had increasing stress and drinks only caffeine and Coke throughout the day. Reports that he has had 3 episodes of heart racing this week, but struggles to describe whether the heart pounding started before the anxiety, or if the anxiety caused the heart pounding. Reports that he has a strong family history of cardiology problems which also gives him anxiety. He also states that he has episodes of world spinning dizziness, ringing in his ears, and sudden episodes of imbalance. Says that he is able to catch himself during these episodes of imbalance before he falls. Denies seizure activity, bowel or bladder changes, focal weakness, changes in vision, or chest pain.   REVIEWED INFORMATION      Allergies   Allergen Reactions    Alcohol Other (See Comments)     Sober 22 yrs    Benadryl [Diphenhydramine]      \"speeds him up\"    Demerol Hcl [Meperidine] Other (See Comments)     Aggressive behavior    Sulfa Antibiotics Other (See Comments)     Told by mother / patient unaware of reaction       Patient Active Problem List   Diagnosis    DNS (deviated nasal septum)    Allergic rhinitis    Hypertrophy of nasal turbinates    ARNAUD (obstructive sleep apnea)    Anxiety    Insomnia    Hyperlipidemia    Tobacco abuse    Low back pain    Other intervertebral disc degeneration, lumbar region    Presence of right artificial knee joint    Radiculopathy, lumbar region    Sprain of ligaments of lumbar spine    Strain of muscle, fascia and tendon of lower back, initial encounter    Essential hypertension    Gastroesophageal reflux disease    Other emphysema ([address]    Seasonal affective disorder ([address]       Past Medical History:   Diagnosis Date    Allergic rhinitis 2/19/2018    Anxiety     Disorder of stomach     valve not closing properly    Essential hypertension 11/4/2019    Gastroesophageal reflux disease 10/4/2019    Hyperlipidemia     meds > 22 yrs    Low back pain 5/1/2018    ARNAUD (obstructive sleep apnea) 2/19/2018    Other emphysema ([address] 10/4/2019    Other intervertebral disc degeneration, lumbar region 3/23/2018    Radiculopathy, lumbar region 2/28/2018    Seasonal affective disorder ([address] 10/4/2019    Substance abuse ([facility] [address]     alcholic, quit 20 yrs        Past Surgical History:   Procedure Laterality Date    COLONOSCOPY  12/22/2016    DALIA LAST MD    DENTAL SURGERY  10 yrs ago    ENDOSCOPY, COLON, DIAGNOSTIC      EYE SURGERY      Lasik OU    KNEE ARTHROSCOPY Right     KNEE SURGERY Right 05/2016    Ray procedure    [place] NASAL SCOPY,OPEN MAXILL SINUS N/A 2/22/2018    SEPTOPLASTY MICRODEBRIDER ASSISTED TURBINOPLASTY AND OUT-FRACTURING BILATERAL NASAL ENDOSCOPY performed by Briana Zapata MD at [address] History     Socioeconomic History    Marital status:      Spouse name: Not on file    Number of children: Not on file    Years of education: Not on file    Highest education level: Not on file   Occupational History    Not on file   Social Needs    Financial resource strain: Not hard at all   [place] insecurity     Worry: Never true     Inability: Never true    Transportation needs     Medical: No     Non-medical: No   Tobacco Use    Smoking status: Current Every Day Smoker     Packs/day: 3.00     Years: 34.00     Pack years: 102.00     Types: Cigars, Cigarettes    Smokeless tobacco: Never Used    Tobacco comment: smoked cigarettes for 30 yrs, quit 9/1996, started smoking Cigars in 2014   Substance and Sexual Activity    Alcohol use: No     Comment: sober > 22 yrs    Drug use: No    Sexual activity: Not on file   Lifestyle    Physical activity     Days per week: Not on file     Minutes per session: Not on file    Stress: Not on file   Relationships    Social connections     Talks on phone: Not on file     Gets together: Not on file     Attends [employer] service: Not on file     Active member of club or organization: Not on file     Attends meetings of clubs or organizations: Not on file     Relationship status: Not on file    Intimate partner violence     Fear of current or ex partner: Not on file     Emotionally abused: Not on file     Physically abused: Not on file     Forced sexual activity: Not on file   Other Topics Concern    Not on file   Social History Narrative    Not on file        PHYSICAL EXAM     Vitals:    01/26/21 1421   BP: 136/86   Site: Left Upper Arm   Position: Sitting   Cuff Size: Medium Adult   Pulse: 81   Temp: 96.8 °F (36 °C)   Weight: 155 lb 3.2 oz (70.4 kg)     Physical Exam  Vitals signs and nursing note reviewed. Constitutional:       Appearance: Normal appearance. HENT:      Head: Normocephalic and atraumatic. Eyes:      Conjunctiva/sclera: Conjunctivae normal.   Cardiovascular:      Rate and Rhythm: Normal rate and regular rhythm. Pulses: Normal pulses. Heart sounds: Normal heart sounds. Pulmonary:      Effort: Pulmonary effort is normal.      Breath sounds: Normal breath sounds. Musculoskeletal: Normal range of motion. Skin:     General: Skin is warm and dry. Neurological:      General: No focal deficit present. Mental Status: He is alert and oriented to person, place, and time. Mental status is at baseline.       Coordination: Finger-Nose-Finger Test and Romberg Test normal.      Gait: Tandem walk abnormal.   Psychiatric:         Mood and Affect: Mood normal.         Speech: Speech normal.         Behavior: Behavior normal. Neurologic Exam     Mental Status   Oriented to person, place, and time. Speech: speech is normal     Cranial Nerves   Cranial nerves II through XII intact. Motor Exam   Muscle bulk: normal  Overall muscle tone: normal    Strength   Right neck flexion: 5/5  Left neck flexion: 5/5  Right neck extension: 5/5  Left neck extension: 5/5  Right deltoid: 5/5  Left deltoid: 5/5  Right biceps: 5/5  Left biceps: 5/5  Right triceps: 5/5  Left triceps: 5/5  Right wrist flexion: 5/5  Left wrist flexion: 5/5  Right wrist extension: 5/5  Left wrist extension: 5/5  Right interossei: 5/5  Left interossei: 5/5  Right abdominals: 5/5  Left abdominals: 5/5  Right iliopsoas: 5/5  Left iliopsoas: 5/5  Right quadriceps: 5/5  Left quadriceps: 5/5  Right hamstrin/5  Left hamstrin/5  Right glutei: 5/5  Left glutei: 5/5  Right anterior tibial: 5/5  Left anterior tibial: 5/5  Right posterior tibial: 5/5  Left posterior tibial: 5/5  Right peroneal: 5/5  Left peroneal: 5/5  Right gastroc: 5/5  Left gastroc: 5/5    Sensory Exam   Vibration normal.   Pinprick normal.     Gait, Coordination, and Reflexes     Gait  Gait: (Difficulty with tandem walking)    Coordination   Romberg: negative  Finger to nose coordination: normal  Tandem walking coordination: abnormal    Tremor   Resting tremor: absent    Reflexes   Reflexes 2+ except as noted. [place] negative  MMSE: 30/30  Delayed recall 0/5 after 20 minutes    ASSESSMENT/PLAN   Subjective reports of memory issues without objective deficits in ADLs, paying bills, or getting lost while driving. Memory issues could be multifactorial, related to his anxiety, poor sleep, high caffeine intake, or social stressors. 1. Vertigo   Episodic vertigo and gait ataxia associated with almost falling. [place] Ariel Mullet was normal.  Will get CT angio of head and neck to rule out VBI. 2. Memory problem  Refer to neuropsych testing.   MMSE was 30/30 but patient cannot recall 5 items I asked him to name 20 minutes later. Needs further testing to determine if this is a true memory deficit or pseudodementia caused by anxiety. 3. Anxiety  Patient already on Prozac and will add BuSpar. Patient has emphysema and not a candidate for propranolol. 4. Insomnia, unspecified type  May consider sleep study in the future. 5. Tachycardia  Will refer to cardiology for episodic tachycardia. Patient has a history of anxiety but is unsure of the episodic tachycardia occurs before or after anxiety episode. Heart arrhythmia including PSVT needs to be ruled out. Return in about 3 months (around 4/26/2021).     COMMUNICATION:       Electronically signed by Giovani Heredia MD on 1/26/2021 at 1:53 PM

## 2021-01-29 ENCOUNTER — TELEPHONE (OUTPATIENT)
Dept: NEUROLOGY | Age: 66
End: 2021-01-29

## 2021-01-29 NOTE — TELEPHONE ENCOUNTER
Patient called, stated he tried Buspar for 3 nights and is having awful nightmares. He is not going to take it anymore. Is there anything else he can try?       Please advise  Thanks  Richard Zepeda

## 2021-01-31 ENCOUNTER — TELEPHONE (OUTPATIENT)
Dept: FAMILY MEDICINE CLINIC | Age: 66
End: 2021-01-31

## 2021-02-02 ENCOUNTER — HOSPITAL ENCOUNTER (OUTPATIENT)
Dept: CT IMAGING | Age: 66
Discharge: HOME | End: 2021-02-04
Payer: MEDICARE

## 2021-02-02 ENCOUNTER — APPOINTMENT (OUTPATIENT)
Dept: CT IMAGING | Age: 66
End: 2021-02-02
Payer: MEDICARE

## 2021-02-02 VITALS — BODY MASS INDEX: 22.96 KG/M2 | WEIGHT: 155 LBS | HEIGHT: 69 IN

## 2021-02-02 DIAGNOSIS — R42 VERTIGO: ICD-10-CM

## 2021-02-02 PROCEDURE — 70496 CT ANGIOGRAPHY HEAD: CPT

## 2021-02-02 PROCEDURE — 70498 CT ANGIOGRAPHY NECK: CPT

## 2021-02-02 PROCEDURE — 6360000004 HC RX CONTRAST MEDICATION: Performed by: STUDENT IN AN ORGANIZED HEALTH CARE EDUCATION/TRAINING PROGRAM

## 2021-02-02 RX ORDER — SODIUM CHLORIDE 0.9 % (FLUSH) 0.9 %
10 SYRINGE (ML) INJECTION
Status: DISPENSED | OUTPATIENT
Start: 2021-02-02 | End: 2021-02-02

## 2021-02-02 RX ADMIN — IOPAMIDOL 100 ML: 612 INJECTION, SOLUTION INTRAVENOUS at 13:08

## 2021-02-04 ENCOUNTER — TELEPHONE (OUTPATIENT)
Dept: NEUROLOGY | Age: 66
End: 2021-02-04

## 2021-02-04 NOTE — TELEPHONE ENCOUNTER
16:10  Discussed imaging results with Rosa Joshi. 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 Patient states that his memory seems to have improved as well as seeing his anxiety although he continues to have a lot of stressors in his life including a divorce he is going through. States that the BuSpar had to many side effects and he weaned himself off of it. I was considering Atarax or meclizine to treat both the vertigo and the anxiety, but he states that he had a bad reaction to Benadryl in the past, explaining that Benadryl makes him hyperactive. These may be a poor option. He has emphysema and propranolol is contraindicated. He has been prescribed a benzo in the past and feels he benefited. He does have a history of alcohol abuse and is currently in [place]. I explained that alcohol and benzodiazepines have the same mechanism of action, and I advised to speak with the provider that originally prescribed to him. Patient stated that he would call if he has increasing anxiety, worsening memory concerns, or needed to discuss anything.

## 2021-02-17 ENCOUNTER — TELEMEDICINE (OUTPATIENT)
Dept: FAMILY MEDICINE CLINIC | Age: 66
End: 2021-02-17
Payer: MEDICARE

## 2021-02-17 DIAGNOSIS — Z00.00 ROUTINE GENERAL MEDICAL EXAMINATION AT A HEALTH CARE FACILITY: Primary | ICD-10-CM

## 2021-02-17 PROCEDURE — G0438 PPPS, INITIAL VISIT: HCPCS | Performed by: NURSE PRACTITIONER

## 2021-02-17 ASSESSMENT — PATIENT HEALTH QUESTIONNAIRE - PHQ9
SUM OF ALL RESPONSES TO PHQ QUESTIONS 1-9: 0
SUM OF ALL RESPONSES TO PHQ9 QUESTIONS 1 & 2: 0

## 2021-02-17 NOTE — PROGRESS NOTES
Medicare Annual Wellness Visit  Are Name: Bettyjo Kehr Date: 2021   MRN: [id number] Sex: Male   Age: 74 y.o. Ethnicity: Non-/Non    : 1955 Race: Katie Chávez is here for Medicare AWV    Screenings for behavioral, psychosocial and functional/safety risks, and cognitive dysfunction are all negative except as indicated below. These results, as well as other patient data from the [email or web address] form, are documented in Flowsheets linked to this Encounter. Allergies   Allergen Reactions    Alcohol Other (See Comments)     Sober 22 yrs    Benadryl [Diphenhydramine]      \"speeds him up\"    Demerol Hcl [Meperidine] Other (See Comments)     Aggressive behavior    Sulfa Antibiotics Other (See Comments)     Told by mother / patient unaware of reaction         Prior to Visit Medications    Medication Sig Taking?  Authorizing Provider   FLUoxetine (PROZAC) 10 MG capsule Take 1 capsule by mouth 3 times daily Yes MANUELA Leahy CNP   pantoprazole (PROTONIX) 40 MG tablet TAKE 1 TABLET BY MOUTH EVERY DAY Yes MANUELA Stephen CNP   pravastatin (PRAVACHOL) 40 MG tablet TAKE 1 TABLET BY MOUTH EVERY DAY IN THE EVENING Yes MANUELA Stephen CNP         Past Medical History:   Diagnosis Date    Allergic rhinitis 2018    Anxiety     Disorder of stomach     valve not closing properly    Essential hypertension 2019    Gastroesophageal reflux disease 10/4/2019    Hyperlipidemia     meds > 22 yrs    Low back pain 2018    ARNAUD (obstructive sleep apnea) 2018    Other emphysema ([address] 10/4/2019    Other intervertebral disc degeneration, lumbar region 3/23/2018    Radiculopathy, lumbar region 2018    Seasonal affective disorder ([address] 10/4/2019    Substance abuse ([address]     alcholic, quit 20 yrs        Past Surgical History:   Procedure Laterality Date    COLONOSCOPY  2016    DALIA LAST MD    DENTAL SURGERY  10 yrs ago   Radha Nichols ENDOSCOPY, COLON, DIAGNOSTIC      EYE SURGERY      Lasik [facility]    KNEE ARTHROSCOPY Right     KNEE SURGERY Right 05/2016    Ray procedure    MS NASAL SCOPY,OPEN MAXILL SINUS N/A 2/22/2018    SEPTOPLASTY MICRODEBRIDER ASSISTED TURBINOPLASTY AND OUT-FRACTURING BILATERAL NASAL ENDOSCOPY performed by Adriana Basurto MD at [address] History   Problem Relation Age of Onset    Cancer Mother         stomach    Heart Disease Father 48    Cancer Father         bone    Cancer Maternal Grandmother         lung    Cancer Paternal Grandmother     Heart Disease Paternal Grandfather     No Known Problems Sister     Cancer Brother     Heart Disease Brother         hole in heart in 1959 at 1 months age       CareTeam (Including outside providers/suppliers regularly involved in providing care):   Patient Care Team:  MANUELA Cox CNP as PCP - General (Nurse Practitioner)  MANUELA Cox CNP as PCP - [facility] Empaneled Provider    Wt Readings from Last 3 Encounters:   02/02/21 155 lb (70.3 kg)   01/26/21 155 lb 3.2 oz (70.4 kg)   01/14/21 149 lb (67.6 kg)      Patient-Reported Vitals 2/17/2021   Patient-Reported Weight 155 lbs   Patient-Reported Height 5 9      There is no height or weight on file to calculate BMI. Based upon direct observation of the patient, evaluation of cognition reveals remote memory intact, recent memory impaired. Patient's complete Health Risk Assessment and screening values have been reviewed and are found in Flowsheets. The following problems were reviewed today and where indicated follow up appointments were made and/or referrals ordered.     Positive Risk Factor Screenings with Interventions:         Substance History:  Social History     Tobacco History     Smoking Status  Current Every Day Smoker Smoking Frequency  3 packs/day for 34 years (102 pk yrs) Smoking Tobacco Type  Cigars, Cigarettes    Smokeless Tobacco Use  Never Used    Tobacco Comment  smoked cigarettes for 30 yrs, quit 9/1996, started smoking Cigars in 2014          Alcohol History     Alcohol Use Status  No Comment  sober > 22 yrs          Drug Use     Drug Use Status  No          Sexual Activity     Sexually Active  Not Asked               Alcohol Screening:       A score of 8 or more is associated with harmful or hazardous drinking. A score of 13 or more in women, and 15 or more in men, is likely to indicate alcohol dependence.   Substance Abuse Interventions:  · Tobacco abuse:  patient is not ready to work toward tobacco cessation at this time     Health Habits/Nutrition:  Health Habits/Nutrition  Do you exercise for at least 20 minutes 2-3 times per week?: Yes  Have you lost any weight without trying in the past 3 months?: No  Do you eat only one meal per day?: No  Have you seen the dentist within the past year?: (!) No     Health Habits/Nutrition Interventions:  · Dental exam overdue:  patient encouraged to make appointment with his/her dentist    Hearing/Vision:  No exam data present  Hearing/Vision  Do you or your family notice any trouble with your hearing that hasn't been managed with hearing aids?: No  Do you have difficulty driving, watching TV, or doing any of your daily activities because of your eyesight?: No  Have you had an eye exam within the past year?: (!) No  Hearing/Vision Interventions:  · Vision concerns:  patient encouraged to make appointment with his/her eye specialist   · Dizzy spells intermittently, thinks it's eye related    Safety:  Safety  Do you have working smoke detectors?: Yes  Have all throw rugs been removed or fastened?: Yes  Do you have non-slip mats or surfaces in all bathtubs/showers?: (!) No  Do all of your stairways have a railing or banister?: Yes  Are your doorways, halls and stairs free of clutter?: Yes  Do you always fasten your seatbelt when you are in a car?: (!) No  Safety Interventions:  · Home safety tips provided     Personalized Preventive Plan under the [facility], [facility] waiver authority and the [employer] Act, this Virtual Visit was conducted with patient's (and/or legal guardian's) consent, to reduce the patient's risk of exposure to COVID-19 and provide necessary medical care. The patient (and/or legal guardian) has also been advised to contact this office for worsening conditions or problems, and seek emergency medical treatment and/or call 911 if deemed necessary. Patient identification was verified at the start of the visit: Yes    Services were provided through phone to substitute for in-person clinic visit. Patient and provider were located at their individual homes. --MANUELA Cox CNP on 2/17/2021 at 1:22 PM    An electronic signature was used to authenticate this note.

## 2021-02-17 NOTE — PATIENT INSTRUCTIONS
Personalized Preventive Plan for Mirta Schroeder - 2/17/2021  Medicare offers a range of preventive health benefits. Some of the tests and screenings are paid in full while other may be subject to a deductible, co-insurance, and/or copay. Some of these benefits include a comprehensive review of your medical history including lifestyle, illnesses that may run in your family, and various assessments and screenings as appropriate. After reviewing your medical record and screening and assessments performed today your provider may have ordered immunizations, labs, imaging, and/or referrals for you. A list of these orders (if applicable) as well as your Preventive Care list are included within your After Visit Summary for your review. Other Preventive Recommendations:    · A preventive eye exam performed by an eye specialist is recommended every 1-2 years to screen for glaucoma; cataracts, macular degeneration, and other eye disorders. · A preventive dental visit is recommended every 6 months. · Try to get at least 150 minutes of exercise per week or 10,000 steps per day on a pedometer . · Order or download the FREE \"Exercise & Physical Activity: Your Everyday Guide\" from [employer]. Call 9-384.755.3220 or search [employer] online. · You need 8778-0867 mg of calcium and 9199-6754 IU of vitamin D per day. It is possible to meet your calcium requirement with diet alone, but a vitamin D supplement is usually necessary to meet this goal.  · When exposed to the sun, use a sunscreen that protects against both UVA and UVB radiation with an SPF of 30 or greater. Reapply every 2 to 3 hours or after sweating, drying off with a towel, or swimming. · Always wear a seat belt when traveling in a car. Always wear a helmet when riding a bicycle or motorcycle.

## 2021-03-10 RX ORDER — FLUOXETINE 10 MG/1
CAPSULE ORAL
Qty: 90 CAPSULE | Refills: 1 | Status: SHIPPED | OUTPATIENT
Start: 2021-03-10 | End: 2021-03-23

## 2021-03-11 ENCOUNTER — APPOINTMENT (OUTPATIENT)
Dept: GENERAL RADIOLOGY | Age: 66
End: 2021-03-11
Payer: MEDICARE

## 2021-03-11 ENCOUNTER — HOSPITAL ENCOUNTER (EMERGENCY)
Age: 66
Discharge: HOME | End: 2021-03-11
Attending: STUDENT IN AN ORGANIZED HEALTH CARE EDUCATION/TRAINING PROGRAM
Payer: MEDICARE

## 2021-03-11 ENCOUNTER — APPOINTMENT (OUTPATIENT)
Dept: CT IMAGING | Age: 66
End: 2021-03-11
Payer: MEDICARE

## 2021-03-11 VITALS
SYSTOLIC BLOOD PRESSURE: 112 MMHG | BODY MASS INDEX: 22.96 KG/M2 | OXYGEN SATURATION: 94 % | WEIGHT: 155 LBS | HEIGHT: 69 IN | TEMPERATURE: 97.6 F | RESPIRATION RATE: 20 BRPM | HEART RATE: 90 BPM | DIASTOLIC BLOOD PRESSURE: 83 MMHG

## 2021-03-11 DIAGNOSIS — F41.1 ANXIETY STATE: ICD-10-CM

## 2021-03-11 DIAGNOSIS — G47.00 INSOMNIA, UNSPECIFIED TYPE: ICD-10-CM

## 2021-03-11 DIAGNOSIS — Z72.820 SLEEP DEPRIVATION: Primary | ICD-10-CM

## 2021-03-11 DIAGNOSIS — F17.200 TOBACCO DEPENDENCE: ICD-10-CM

## 2021-03-11 DIAGNOSIS — R41.82 ALTERED MENTAL STATUS, UNSPECIFIED ALTERED MENTAL STATUS TYPE: ICD-10-CM

## 2021-03-11 LAB
ABO/RH: NORMAL
ACETAMINOPHEN LEVEL: <5 UG/ML (ref 10–30)
ALBUMIN SERPL-MCNC: 4 G/DL (ref 3.5–4.6)
ALP BLD-CCNC: 58 U/L (ref 35–104)
ALT SERPL-CCNC: 9 U/L (ref 0–41)
AMMONIA: 33 UMOL/L (ref 16–60)
AMPHETAMINE SCREEN, URINE: NORMAL
ANION GAP SERPL CALCULATED.3IONS-SCNC: 11 MEQ/L (ref 9–15)
ANTIBODY SCREEN: NORMAL
APTT: 29.1 SEC (ref 24.4–36.8)
AST SERPL-CCNC: 11 U/L (ref 0–40)
BARBITURATE SCREEN URINE: NORMAL
BASOPHILS ABSOLUTE: 0.1 K/UL (ref 0–0.2)
BASOPHILS RELATIVE PERCENT: 1.2 %
BENZODIAZEPINE SCREEN, URINE: NORMAL
BILIRUB SERPL-MCNC: <0.2 MG/DL (ref 0.2–0.7)
BILIRUBIN URINE: NEGATIVE
BLOOD, URINE: NEGATIVE
BUN BLDV-MCNC: 15 MG/DL (ref 8–23)
C-REACTIVE PROTEIN, HIGH SENSITIVITY: 1.6 MG/L (ref 0–5)
CALCIUM SERPL-MCNC: 9.1 MG/DL (ref 8.5–9.9)
CANNABINOID SCREEN URINE: NORMAL
CHLORIDE BLD-SCNC: 98 MEQ/L (ref 95–107)
CHP ED QC CHECK: YES
CLARITY: CLEAR
CO2: 21 MEQ/L (ref 20–31)
COCAINE METABOLITE SCREEN URINE: NORMAL
COLOR: YELLOW
CREAT SERPL-MCNC: 0.78 MG/DL (ref 0.7–1.2)
EKG ATRIAL RATE: 77 BPM
EKG P AXIS: 69 DEGREES
EKG P-R INTERVAL: 134 MS
EKG Q-T INTERVAL: 376 MS
EKG QRS DURATION: 88 MS
EKG QTC CALCULATION (BAZETT): 425 MS
EKG R AXIS: -22 DEGREES
EKG T AXIS: 64 DEGREES
EKG VENTRICULAR RATE: 77 BPM
EOSINOPHILS ABSOLUTE: 0.1 K/UL (ref 0–0.7)
EOSINOPHILS RELATIVE PERCENT: 1.8 %
ETHANOL PERCENT: NORMAL G/DL
ETHANOL: <10 MG/DL (ref 0–0.08)
FOLATE: 11.6 NG/ML (ref 7.3–26.1)
GFR AFRICAN AMERICAN: >60
GFR NON-AFRICAN AMERICAN: >60
GLOBULIN: 2.4 G/DL (ref 2.3–3.5)
GLUCOSE BLD-MCNC: 95 MG/DL
GLUCOSE BLD-MCNC: 95 MG/DL (ref 60–115)
GLUCOSE BLD-MCNC: 95 MG/DL (ref 70–99)
GLUCOSE URINE: NEGATIVE MG/DL
HCT VFR BLD CALC: 41.6 % (ref 42–52)
HEMOGLOBIN: 14.3 G/DL (ref 14–18)
INR BLD: 0.9
KETONES, URINE: 15 MG/DL
LEUKOCYTE ESTERASE, URINE: NEGATIVE
LYMPHOCYTES ABSOLUTE: 1.9 K/UL (ref 1–4.8)
LYMPHOCYTES RELATIVE PERCENT: 23.9 %
Lab: NORMAL
MAGNESIUM: 1.9 MG/DL (ref 1.7–2.4)
MCH RBC QN AUTO: 33.2 PG (ref 27–31.3)
MCHC RBC AUTO-ENTMCNC: 34.5 % (ref 33–37)
MCV RBC AUTO: 96.4 FL (ref 80–100)
METHADONE SCREEN, URINE: NORMAL
MONOCYTES ABSOLUTE: 0.4 K/UL (ref 0.2–0.8)
MONOCYTES RELATIVE PERCENT: 5.1 %
NEUTROPHILS ABSOLUTE: 5.3 K/UL (ref 1.4–6.5)
NEUTROPHILS RELATIVE PERCENT: 68 %
NITRITE, URINE: NEGATIVE
OPIATE SCREEN URINE: NORMAL
OXYCODONE URINE: NORMAL
PDW BLD-RTO: 13.7 % (ref 11.5–14.5)
PERFORMED ON: NORMAL
PH UA: 7 (ref 5–9)
PHENCYCLIDINE SCREEN URINE: NORMAL
PLATELET # BLD: 317 K/UL (ref 130–400)
POTASSIUM SERPL-SCNC: 4.2 MEQ/L (ref 3.4–4.9)
PRO-BNP: 205 PG/ML
PROPOXYPHENE SCREEN: NORMAL
PROTEIN UA: NEGATIVE MG/DL
PROTHROMBIN TIME: 11.9 SEC (ref 12.3–14.9)
RBC # BLD: 4.31 M/UL (ref 4.7–6.1)
SALICYLATE, SERUM: <0.3 MG/DL (ref 15–30)
SARS-COV-2, NAAT: NOT DETECTED
SODIUM BLD-SCNC: 130 MEQ/L (ref 135–144)
SPECIFIC GRAVITY UA: 1.01 (ref 1–1.03)
TOTAL CK: 158 U/L (ref 0–190)
TOTAL PROTEIN: 6.4 G/DL (ref 6.3–8)
TROPONIN: <0.01 NG/ML (ref 0–0.01)
URINE REFLEX TO CULTURE: ABNORMAL
UROBILINOGEN, URINE: 1 E.U./DL
VITAMIN B-12: 193 PG/ML (ref 232–1245)
WBC # BLD: 7.8 K/UL (ref 4.8–10.8)

## 2021-03-11 PROCEDURE — 93005 ELECTROCARDIOGRAM TRACING: CPT | Performed by: STUDENT IN AN ORGANIZED HEALTH CARE EDUCATION/TRAINING PROGRAM

## 2021-03-11 PROCEDURE — 84484 ASSAY OF TROPONIN QUANT: CPT

## 2021-03-11 PROCEDURE — 83735 ASSAY OF MAGNESIUM: CPT

## 2021-03-11 PROCEDURE — 82550 ASSAY OF CK (CPK): CPT

## 2021-03-11 PROCEDURE — 86900 BLOOD TYPING SEROLOGIC ABO: CPT

## 2021-03-11 PROCEDURE — 99285 EMERGENCY DEPT VISIT HI MDM: CPT

## 2021-03-11 PROCEDURE — 80143 DRUG ASSAY ACETAMINOPHEN: CPT

## 2021-03-11 PROCEDURE — 83880 ASSAY OF NATRIURETIC PEPTIDE: CPT

## 2021-03-11 PROCEDURE — 70450 CT HEAD/BRAIN W/O DYE: CPT

## 2021-03-11 PROCEDURE — 71045 X-RAY EXAM CHEST 1 VIEW: CPT

## 2021-03-11 PROCEDURE — 80307 DRUG TEST PRSMV CHEM ANLYZR: CPT

## 2021-03-11 PROCEDURE — 81003 URINALYSIS AUTO W/O SCOPE: CPT

## 2021-03-11 PROCEDURE — 82077 ASSAY SPEC XCP UR&BREATH IA: CPT

## 2021-03-11 PROCEDURE — 93010 ELECTROCARDIOGRAM REPORT: CPT | Performed by: INTERNAL MEDICINE

## 2021-03-11 PROCEDURE — 36415 COLL VENOUS BLD VENIPUNCTURE: CPT

## 2021-03-11 PROCEDURE — 80179 DRUG ASSAY SALICYLATE: CPT

## 2021-03-11 PROCEDURE — 86141 C-REACTIVE PROTEIN HS: CPT

## 2021-03-11 PROCEDURE — 85025 COMPLETE CBC W/AUTO DIFF WBC: CPT

## 2021-03-11 PROCEDURE — 86850 RBC ANTIBODY SCREEN: CPT

## 2021-03-11 PROCEDURE — 82746 ASSAY OF FOLIC ACID SERUM: CPT

## 2021-03-11 PROCEDURE — 87635 SARS-COV-2 COVID-19 AMP PRB: CPT

## 2021-03-11 PROCEDURE — 80053 COMPREHEN METABOLIC PANEL: CPT

## 2021-03-11 PROCEDURE — 86901 BLOOD TYPING SEROLOGIC RH(D): CPT

## 2021-03-11 PROCEDURE — 82140 ASSAY OF AMMONIA: CPT

## 2021-03-11 PROCEDURE — 85610 PROTHROMBIN TIME: CPT

## 2021-03-11 PROCEDURE — 82607 VITAMIN B-12: CPT

## 2021-03-11 PROCEDURE — 85730 THROMBOPLASTIN TIME PARTIAL: CPT

## 2021-03-11 ASSESSMENT — ENCOUNTER SYMPTOMS
BACK PAIN: 0
VOMITING: 0
COUGH: 0
SHORTNESS OF BREATH: 0
TROUBLE SWALLOWING: 0
CHEST TIGHTNESS: 0
DIARRHEA: 0
ABDOMINAL PAIN: 0
SINUS PRESSURE: 0

## 2021-03-11 NOTE — ED NOTES
Pt is aware and waiting for his D/C papers from Dr. Jodi Powers  Pt is quiet and cooperative with no problems and no C/O any kind expressed.      Tho Rivera, HUSEYIN  03/11/21 5450

## 2021-03-11 NOTE — ED NOTES
Dispo discussed with Dr Vincenzo Redmond. Ok to discharge pt home to follow-up outpatient. Pt to f/u with his PCP and pt's sister provided with a list of community therapist and psychiatrist for pt to f/u with [facility] Provider as well. Family to transport pt home.       Peter Dyer RN  03/11/21 2834

## 2021-03-11 NOTE — ED TRIAGE NOTES
Pt has co altered mental status , states he can not remember yesterday, has not slept in days. Pt states he is beening seen for this by primary but he is concerned because he can not sleep, eat and his having more memory issues.

## 2021-03-11 NOTE — ED NOTES
Provisional Diagnosis: Anxiety    Psychosocial and Contextual Factors:    Pt lives alone. Currently going through a divorce. Pt has 2 adult sons that live out of town. 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 C-SSRS Summary:     Patient: C-SSRS Suicide Screening  1) Within the past month, have you wished you were dead or wished you could go to sleep and not wake up? : No  2) Have you actually had any thoughts of killing yourself? : No  6) Have you ever done anything, started to do anything, or prepared to do anything to end your life?: No    Family: Collateral information obtained from pt's son, Tianna Phillips and sister, Hugo Arriola. Family does not think pt is at risk for harming himself or others. They have no concerns with pt having any psychotic symptoms. Family believes that pt needs medication to help him sleep and that pt is just dealing with a lot financial stress. Agency:none    C-SSRS Risk Assessment  Activating Events (Recent): Recent loss(es) or other significant negative event(s) (legal, financial, relationship, etc.  Treatment History: Not receiving treatment  Protective Factors (Recent): Identifies reasons for living, Responsibility to family or others/living with family, Supportive social network or family, Fear of death or dying due to pain and suffering, Belief that suicide is immoral/ high spirituality     Abuse Assessment  Physical Abuse: Denies  Verbal Abuse: Denies  Emotional abuse: Denies  Financial Abuse: Denies  Sexual abuse: Denies  Elder abuse: No    Clinical Summary:    Pt behavior cooperative. Mood and affect appropriate. Eye contact good. Speech normal. Thought content and process intact. No psychotic features. Pt denies any delusions, paranoid, or hallucinations. Pt denies SI/HI. Pt denies any hx of SA or self-inflicting harm. Pt has no complaints of any depressive issues.  Pt states he called his sister from [place] this morning scared that he could not remember things from yesterday due to financial stress. Pt admits that he took out a loan with the bank to pay off credit cards and bills. Pt states he got anxiety that he realized he somehow messed up his finances and that he does not know if has enough money to cover his bills monthly. Pt having difficulty sleeping for past 3 days. Pt reports just getting \"cat naps\". Pt reports he has 25 yrs sobriety and sees his sponsor daily per his sister. Pt states he has followed up with a consult in Neurology with Dr Kody Lara and Cardiology. Pt states he has a stress test scheduled. Pt has no prior MH hx of hospitalization or treatment. Level of Care Disposition:       To be determined by physician       Neal Savage RN  03/11/21 0910

## 2021-03-11 NOTE — ED PROVIDER NOTES
[address] [facility] ED  eMERGENCY dEPARTMENT eNCOUnter      Pt Name: Kim Avendaño  MRN: [id number]  Abiodungfzion 1955  Date of evaluation: 3/11/2021  Provider: Lora Claudio DO    CHIEF COMPLAINT       Chief Complaint   Patient presents with    Altered Mental Status     Pt states he can not remeber yesterday . HISTORY OF PRESENT ILLNESS   (Location/Symptom, Timing/Onset,Context/Setting, Quality, Duration, Modifying Factors, Severity)  Note limiting factors. Kim Avendaño is a 74 y.o. male who presents to the emergency department complaint of confusion. Patient states he cannot remember yesterday at all. Patient states is happened before in the past.  Patient reports that he has had a vascular study of his neck by [facility] and was told that he could live another 75 years and not have any blockage in those blood vessels. Patient denies fever, chills, cough or headache. Patient denies any vision changes. Patient denies any loss of vision or loss of hearing. Patient denies any loss of taste or smell. Patient denies loss of balance or weakness to arms or legs. Upon further questioning the patient admits that he sleeps in short minute intervals and will even have many dreams while doing something in the kitchen. He states that his mind simply races and that he cannot sleep. Patient has not slept more than 15 minutes in the past 4 days. On chart review the patient is taking an antidepressant. When asked why he was doing this he states his family doctor told him that it would be a good idea for him to do so and he has been prescribed Prozac which she has been on for years. Patient denies self-harm. Patient denies any auditory or visual hallucinations. Patient denies any homicidal ideation. The patient sister is present and I have his permission to interview, examined, discussed his care with his sister present. The history is provided by the patient and a relative. NursingNotes were reviewed. REVIEW OF SYSTEMS    (2-9 systems for level 4, 10 or more for level 5)     Review of Systems   Constitutional: Negative for activity change, appetite change, chills, fever and unexpected weight change. HENT: Negative for drooling, ear pain, nosebleeds, sinus pressure and trouble swallowing. Respiratory: Negative for cough, chest tightness and shortness of breath. Cardiovascular: Negative for chest pain and leg swelling. Gastrointestinal: Negative for abdominal pain, diarrhea and vomiting. Endocrine: Negative for polydipsia and polyphagia. Genitourinary: Negative for dysuria, flank pain and frequency. Musculoskeletal: Negative for back pain and myalgias. Skin: Negative for pallor and rash. Neurological: Negative for syncope, weakness and headaches. Hematological: Does not bruise/bleed easily. Psychiatric/Behavioral: Positive for confusion and sleep disturbance. All other systems reviewed and are negative. Except as noted above the remainder of the review of systems was reviewed and negative.        PAST MEDICAL HISTORY     Past Medical History:   Diagnosis Date    Allergic rhinitis 2/19/2018    Anxiety     Disorder of stomach     valve not closing properly    Essential hypertension 11/4/2019    Gastroesophageal reflux disease 10/4/2019    Hyperlipidemia     meds > 22 yrs    Low back pain 5/1/2018    ARNAUD (obstructive sleep apnea) 2/19/2018    Other emphysema ([address] 10/4/2019    Other intervertebral disc degeneration, lumbar region 3/23/2018    Radiculopathy, lumbar region 2/28/2018    Seasonal affective disorder ([address] 10/4/2019    Substance abuse ([address]     alcholic, quit 20 yrs          SURGICALHISTORY       Past Surgical History:   Procedure Laterality Date    COLONOSCOPY  12/22/2016    A SHALA MARLEY    DENTAL SURGERY  10 yrs ago    ENDOSCOPY, COLON, DIAGNOSTIC      EYE SURGERY      Lasik OU    KNEE ARTHROSCOPY Right     KNEE SURGERY Right 05/2016    Ray procedure    CT NASAL SCOPY,OPEN MAXILL SINUS N/A 2/22/2018    SEPTOPLASTY MICRODEBRIDER ASSISTED TURBINOPLASTY AND OUT-FRACTURING BILATERAL NASAL ENDOSCOPY performed by Arlette Yung MD at [address] [facility]       Discharge Medication List as of 3/11/2021  3:33 PM      CONTINUE these medications which have NOT CHANGED    Details   FLUoxetine (PROZAC) 10 MG capsule take 3 capsules by mouth once daily, Disp-90 capsule, R-1Normal      pantoprazole (PROTONIX) 40 MG tablet TAKE 1 TABLET BY MOUTH EVERY DAY, Disp-90 tablet, R-1Normal      pravastatin (PRAVACHOL) 40 MG tablet TAKE 1 TABLET BY MOUTH EVERY DAY IN THE EVENING, Disp-90 tablet, R-1Normal             ALLERGIES     Alcohol, Benadryl [diphenhydramine], Demerol hcl [meperidine], and Sulfa antibiotics    FAMILY HISTORY       Family History   Problem Relation Age of Onset    Cancer Mother         stomach    Heart Disease Father 48    Cancer Father         bone    Cancer Maternal Grandmother         lung    Cancer Paternal Grandmother     Heart Disease Paternal Grandfather     No Known Problems Sister     Cancer Brother     Heart Disease Brother         hole in heart in 65 at 1 months age          SOCIAL HISTORY       Social History     Socioeconomic History    Marital status:      Spouse name: None    Number of children: None    Years of education: None    Highest education level: None   Occupational History    None   Social Needs    Financial resource strain: Not hard at all   [place] insecurity     Worry: Never true     Inability: Never true    Transportation needs     Medical: No     Non-medical: No   Tobacco Use    Smoking status: Current Every Day Smoker     Packs/day: 3.00     Years: 34.00     Pack years: 102.00     Types: Cigars, Cigarettes    Smokeless tobacco: Never Used    Tobacco comment: smoked cigarettes for 30 yrs, quit 9/1996, started smoking Cigars in 2014   Substance and Sexual Activity    Alcohol use: No     Comment: sober > 22 yrs    Drug use: No    Sexual activity: None   Lifestyle    Physical activity     Days per week: None     Minutes per session: None    Stress: None   Relationships    Social connections     Talks on phone: None     Gets together: None     Attends Nondenominational service: None     Active member of club or organization: None     Attends meetings of clubs or organizations: None     Relationship status: None    Intimate partner violence     Fear of current or ex partner: None     Emotionally abused: None     Physically abused: None     Forced sexual activity: None   Other Topics Concern    None   Social History Narrative    None       SCREENINGS   NIH Stroke Scale  Interval: Baseline  Level of Consciousness (1a. ): Alert  LOC Questions (1b. ): Answers both correctly  LOC Commands (1c. ): Performs both tasks correctly  Best Gaze (2. ): Normal  Facial Palsy (4. ): Normal symmetrical movement  Motor Arm, Left (5a. ): No drift  Motor Arm, Right (5b. ): No drift  Motor Leg, Left (6a. ): No drift  Motor Leg, Right (6b. ): No drift  Limb Ataxia (7. ): Absent  Sensory (8. ): Normal  Dysarthria (10. ): Normal  Extinction and Inattention (11): No abnormalityGlasgow Coma Scale  Eye Opening: Spontaneous  Best Verbal Response: Oriented  Best Motor Response: Obeys commands  Sarah Coma Scale Score: 15 @FLOW([id number])@      PHYSICAL EXAM    (up to 7 for level 4, 8 or more for level 5)     ED Triage Vitals [03/11/21 0743]   BP Temp Temp Source Pulse Resp SpO2 Height Weight   (!) 119/90 97.6 °F (36.4 °C) Temporal 91 18 96 % 5' 9\" (1.753 m) 155 lb (70.3 kg)       Physical Exam  Vitals signs and nursing note reviewed. Constitutional:       General: He is awake. He is not in acute distress. Appearance: Normal appearance. He is well-developed and normal weight. He is not ill-appearing, toxic-appearing or diaphoretic. Comments: No photophobia. No phonophobia.    HENT: Head: Normocephalic and atraumatic. No Kirkpatrick's sign. Right Ear: External ear normal.      Left Ear: External ear normal.      Nose: Nose normal. No congestion or rhinorrhea. Mouth/Throat:      Mouth: Mucous membranes are moist.      Pharynx: Oropharynx is clear. No oropharyngeal exudate or posterior oropharyngeal erythema. Eyes:      General: No scleral icterus. Right eye: No foreign body or discharge. Left eye: No discharge. Extraocular Movements: Extraocular movements intact. Conjunctiva/sclera: Conjunctivae normal.      Left eye: No exudate. Pupils: Pupils are equal, round, and reactive to light. Neck:      Musculoskeletal: Normal range of motion and neck supple. No neck rigidity. Vascular: No JVD. Trachea: No tracheal deviation. Comments: No meningismus. Cardiovascular:      Rate and Rhythm: Normal rate and regular rhythm. Heart sounds: Normal heart sounds. Heart sounds not distant. No murmur. No friction rub. No gallop. Pulmonary:      Effort: Pulmonary effort is normal. No respiratory distress. Breath sounds: Normal breath sounds. No stridor. No wheezing, rhonchi or rales. Chest:      Chest wall: No tenderness. Abdominal:      General: Abdomen is flat. Bowel sounds are normal. There is no distension. Palpations: Abdomen is soft. There is no mass. Tenderness: There is no abdominal tenderness. There is no right CVA tenderness, left CVA tenderness, guarding or rebound. Hernia: No hernia is present. Musculoskeletal: Normal range of motion. General: No swelling, tenderness, deformity or signs of injury. Lymphadenopathy:      Head:      Right side of head: No submental adenopathy. Left side of head: No submental adenopathy. Skin:     General: Skin is warm and dry. Capillary Refill: Capillary refill takes less than 2 seconds. Coloration: Skin is not jaundiced or pale.       Findings: No bruising, erythema, lesion or rash. Neurological:      General: No focal deficit present. Mental Status: He is alert and oriented to person, place, and time. Mental status is at baseline. GCS: GCS eye subscore is 4. GCS verbal subscore is 5. GCS motor subscore is 6. Cranial Nerves: No cranial nerve deficit. Sensory: No sensory deficit. Motor: No weakness. Coordination: Coordination normal.      Deep Tendon Reflexes: Reflexes are normal and symmetric. Psychiatric:         Attention and Perception: Attention and perception normal.         Mood and Affect: Mood and affect normal. Mood is not depressed. Affect is not flat. Speech: Speech normal.         Behavior: Behavior normal. Behavior is cooperative. Thought Content: Thought content normal. Thought content is not delusional. Thought content does not include homicidal or suicidal plan. Cognition and Memory: Cognition and memory normal.         Judgment: Judgment normal.         DIAGNOSTIC RESULTS     EKG: All EKG's are interpreted by the Emergency Department Physician who either signs or Co-signsthis chart in the absence of a cardiologist.    EKG: Sinus rhythm at 77 bpm.  Normal axis. There is artifact in V3. QT interval is 336 ms. No PVCs. RADIOLOGY:   Non-plain filmimages such as CT, Ultrasound and MRI are read by the radiologist. Plain radiographic images are visualized and preliminarily interpreted by the emergency physician with the below findings:        Interpretation per the Radiologist below, if available at the time ofthis note:    CT HEAD WO CONTRAST   Final Result      NO ACUTE INTRA-AXIAL OR EXTRA-AXIAL FINDINGS. IF SIGNS OR SYMPTOMS PERSIST THEN CONSIDER  MRI TO FURTHER EVALUATE IF THERE ARE NO CONTRAINDICATIONS      All CT scans at this facility use dose modulation, iterative reconstruction, and/or weight based dosing when appropriate to reduce radiation dose to as low as reasonably achievable. XR CHEST PORTABLE   Final Result   NO ACUTE ACTIVE CARDIOPULMONARY PROCESS            ED BEDSIDE ULTRASOUND:   Performed by ED Physician - none    LABS:  Labs Reviewed   SALICYLATE LEVEL - Abnormal; Notable for the following components:       Result Value    Salicylate, Serum <1.7 (*)     All other components within normal limits   ACETAMINOPHEN LEVEL - Abnormal; Notable for the following components:    Acetaminophen Level <5 (*)     All other components within normal limits   CBC WITH AUTO DIFFERENTIAL - Abnormal; Notable for the following components:    RBC 4.31 (*)     Hematocrit 41.6 (*)     MCH 33.2 (*)     All other components within normal limits   COMPREHENSIVE METABOLIC PANEL - Abnormal; Notable for the following components:    Sodium 130 (*)     All other components within normal limits   PROTIME-INR - Abnormal; Notable for the following components:    Protime 11.9 (*)     All other components within normal limits   URINE RT REFLEX TO CULTURE - Abnormal; Notable for the following components:    Ketones, Urine 15 (*)     All other components within normal limits   VITAMIN B12 & FOLATE - Abnormal; Notable for the following components:    Vitamin B-12 193 (*)     All other components within normal limits   POCT GLUCOSE - Normal   COVID-19, RAPID   AMMONIA   ETHANOL   URINE DRUG SCREEN   APTT   BRAIN NATRIURETIC PEPTIDE   CK   HIGH SENSITIVITY CRP   MAGNESIUM   TROPONIN   POCT GLUCOSE   TYPE AND SCREEN       All other labs were within normal range or not returned as of this dictation.     EMERGENCY DEPARTMENT COURSE and DIFFERENTIAL DIAGNOSIS/MDM:   Vitals:    Vitals:    03/11/21 0743 03/11/21 0936 03/11/21 1100   BP: (!) 119/90 120/89 112/83   Pulse: 91 86 90   Resp: 18 13 20   Temp: 97.6 °F (36.4 °C)     TempSrc: Temporal     SpO2: 96% 95% 94%   Weight: 155 lb (70.3 kg)     Height: 5' 9\" (1.753 m)         Patient medically cleared 3/11/2020 11AM.    MDM  Patient states he is anxiousness and his

## 2021-03-23 ENCOUNTER — OFFICE VISIT (OUTPATIENT)
Dept: FAMILY MEDICINE CLINIC | Age: 66
End: 2021-03-23
Payer: MEDICARE

## 2021-03-23 ENCOUNTER — OFFICE VISIT (OUTPATIENT)
Dept: NEUROLOGY | Age: 66
End: 2021-03-23
Payer: MEDICARE

## 2021-03-23 VITALS
DIASTOLIC BLOOD PRESSURE: 74 MMHG | HEART RATE: 91 BPM | TEMPERATURE: 98.3 F | OXYGEN SATURATION: 98 % | HEIGHT: 69 IN | SYSTOLIC BLOOD PRESSURE: 118 MMHG | WEIGHT: 149.6 LBS | BODY MASS INDEX: 22.16 KG/M2

## 2021-03-23 VITALS
WEIGHT: 149.4 LBS | DIASTOLIC BLOOD PRESSURE: 81 MMHG | HEART RATE: 89 BPM | BODY MASS INDEX: 22.06 KG/M2 | SYSTOLIC BLOOD PRESSURE: 109 MMHG

## 2021-03-23 DIAGNOSIS — R41.3 MEMORY PROBLEM: Primary | ICD-10-CM

## 2021-03-23 DIAGNOSIS — E87.1 HYPONATREMIA: ICD-10-CM

## 2021-03-23 DIAGNOSIS — E78.5 HYPERLIPIDEMIA, UNSPECIFIED HYPERLIPIDEMIA TYPE: ICD-10-CM

## 2021-03-23 DIAGNOSIS — F41.9 ANXIETY: ICD-10-CM

## 2021-03-23 DIAGNOSIS — G47.00 INSOMNIA, UNSPECIFIED TYPE: ICD-10-CM

## 2021-03-23 DIAGNOSIS — E87.1 HYPONATREMIA: Primary | ICD-10-CM

## 2021-03-23 DIAGNOSIS — R41.3 MEMORY CHANGES: ICD-10-CM

## 2021-03-23 DIAGNOSIS — F32.A DEPRESSION, UNSPECIFIED DEPRESSION TYPE: ICD-10-CM

## 2021-03-23 DIAGNOSIS — R42 DIZZINESS: ICD-10-CM

## 2021-03-23 DIAGNOSIS — I10 ESSENTIAL HYPERTENSION: ICD-10-CM

## 2021-03-23 DIAGNOSIS — E53.8 B12 DEFICIENCY: ICD-10-CM

## 2021-03-23 LAB
ALBUMIN SERPL-MCNC: 4.1 G/DL (ref 3.5–4.6)
ALP BLD-CCNC: 61 U/L (ref 35–104)
ALT SERPL-CCNC: 10 U/L (ref 0–41)
ANION GAP SERPL CALCULATED.3IONS-SCNC: 12 MEQ/L (ref 9–15)
AST SERPL-CCNC: 11 U/L (ref 0–40)
BILIRUB SERPL-MCNC: 0.3 MG/DL (ref 0.2–0.7)
BUN BLDV-MCNC: 17 MG/DL (ref 8–23)
CALCIUM SERPL-MCNC: 9.1 MG/DL (ref 8.5–9.9)
CHLORIDE BLD-SCNC: 98 MEQ/L (ref 95–107)
CO2: 24 MEQ/L (ref 20–31)
CREAT SERPL-MCNC: 0.9 MG/DL (ref 0.7–1.2)
GFR AFRICAN AMERICAN: >60
GFR NON-AFRICAN AMERICAN: >60
GLOBULIN: 2.5 G/DL (ref 2.3–3.5)
GLUCOSE BLD-MCNC: 91 MG/DL (ref 70–99)
POTASSIUM SERPL-SCNC: 3.9 MEQ/L (ref 3.4–4.9)
SODIUM BLD-SCNC: 134 MEQ/L (ref 135–144)
TOTAL PROTEIN: 6.6 G/DL (ref 6.3–8)

## 2021-03-23 PROCEDURE — 99214 OFFICE O/P EST MOD 30 MIN: CPT | Performed by: NURSE PRACTITIONER

## 2021-03-23 PROCEDURE — 99214 OFFICE O/P EST MOD 30 MIN: CPT | Performed by: STUDENT IN AN ORGANIZED HEALTH CARE EDUCATION/TRAINING PROGRAM

## 2021-03-23 PROCEDURE — 96372 THER/PROPH/DIAG INJ SC/IM: CPT | Performed by: NURSE PRACTITIONER

## 2021-03-23 RX ORDER — CYANOCOBALAMIN 1000 UG/ML
1000 INJECTION INTRAMUSCULAR; SUBCUTANEOUS ONCE
Status: COMPLETED | OUTPATIENT
Start: 2021-03-23 | End: 2021-03-23

## 2021-03-23 RX ORDER — FLUOXETINE HYDROCHLORIDE 40 MG/1
40 CAPSULE ORAL DAILY
Qty: 30 CAPSULE | Refills: 5 | Status: SHIPPED | OUTPATIENT
Start: 2021-03-23 | End: 2021-10-11

## 2021-03-23 RX ADMIN — CYANOCOBALAMIN 1000 MCG: 1000 INJECTION INTRAMUSCULAR; SUBCUTANEOUS at 14:33

## 2021-03-23 ASSESSMENT — ENCOUNTER SYMPTOMS
SHORTNESS OF BREATH: 0
COUGH: 0

## 2021-03-23 NOTE — PROGRESS NOTES
Subjective  Chief Complaint   Patient presents with    Follow-Up from Hospital     follow up from hospital for memory loss and anxiety, states that he has been doing much better since he was discharged.  Results     would like to go over all of test results.  Discuss Medications     would like to get on a vitamin D supplement.  Anxiety     would like to discuss medications for depression and anxirty. HPI     Pt recently at the ER. Labs and testing done    B12 is low. Lab Results   Component Value Date    WBC 7.8 03/11/2021    HGB 14.3 03/11/2021    HCT 41.6 (L) 03/11/2021     03/11/2021    CHOL 167 01/18/2021    TRIG 92 01/18/2021    HDL 54 01/18/2021    ALT 9 03/11/2021    AST 11 03/11/2021     (L) 03/11/2021    K 4.2 03/11/2021    CL 98 03/11/2021    CREATININE 0.78 03/11/2021    BUN 15 03/11/2021    CO2 21 03/11/2021    TSH 2.970 01/10/2017    PSA 3.54 07/10/2017    INR 0.9 03/11/2021     Saw cardiology. Had stress test. Had carotid study    Has been worried about memory concerns. Still having anxiety and depression as well. Has fu with neurologist coming up. Confusion, inability concentrate. Worried about potential dementia.   Anxiety is a struggle    All studies have been reviewed with pt and family member    Patient Active Problem List    Diagnosis Date Noted    Essential hypertension 11/04/2019    Tobacco abuse 10/04/2019    Gastroesophageal reflux disease 10/04/2019    Other emphysema ([facility] 10/04/2019    Seasonal affective disorder ([facility] 10/04/2019    Hyperlipidemia 01/24/2019    Anxiety 09/19/2018    Insomnia 09/19/2018    Low back pain 05/01/2018    Other intervertebral disc degeneration, lumbar region 03/23/2018    Presence of right artificial knee joint 02/28/2018    Radiculopathy, lumbar region 02/28/2018    Sprain of ligaments of lumbar spine 02/28/2018    Strain of muscle, fascia and tendon of lower back, initial encounter 02/28/2018    DNS (deviated nasal septum) 02/19/2018    Allergic rhinitis 02/19/2018    Hypertrophy of nasal turbinates 02/19/2018    ARNAUD (obstructive sleep apnea) 02/19/2018     Past Medical History:   Diagnosis Date    Allergic rhinitis 2/19/2018    Anxiety     Disorder of stomach     valve not closing properly    Essential hypertension 11/4/2019    Gastroesophageal reflux disease 10/4/2019    Hyperlipidemia     meds > 22 yrs    Low back pain 5/1/2018    ARNAUD (obstructive sleep apnea) 2/19/2018    Other emphysema ([address] 10/4/2019    Other intervertebral disc degeneration, lumbar region 3/23/2018    Radiculopathy, lumbar region 2/28/2018    Seasonal affective disorder ([address] 10/4/2019    Sub[address] [address]     alcholic, quit 20 yrs      Past Surgical History:   Procedure Laterality Date    COLONOSCOPY  12/22/2016    DALIA LAST MD    DENTAL SURGERY  10 yrs ago    ENDOSCOPY, COLON, DIAGNOSTIC      EYE SURGERY      Lasik OU    KNEE ARTHROSCOPY Right     KNEE SURGERY Right 05/2016    Ray procedure    RI NASAL SCOPY,OPEN MAXILL SINUS N/A 2/22/2018    SEPTOPLASTY MICRODEBRIDER ASSISTED TURBINOPLASTY AND OUT-FRACTURING BILATERAL NASAL ENDOSCOPY performed by Gina Duff MD at [address] History   Problem Relation Age of Onset    Cancer Mother         stomach    Heart Disease Father 48    Cancer Father         bone    Cancer Maternal Grandmother         lung    Cancer Paternal Grandmother     Heart Disease Paternal Grandfather     No Known Problems Sister     Cancer Brother     Heart Disease Brother         hole in heart in 65 at 1 months age     Social History     Socioeconomic History    Marital status:      Spouse name: None    Number of children: None    Years of education: None    Highest education level: None   Occupational History    None   Social Needs    Financial resource strain: Not hard at all   [place] insecurity     Worry: Never true     Inability: Never true   Kwabena Hickey Transportation needs     Medical: No     Non-medical: No   Tobacco Use    Smoking status: Current Every Day Smoker     Packs/day: 3.00     Years: 34.00     Pack years: 102.00     Types: Cigars, Cigarettes    Smokeless tobacco: Never Used    Tobacco comment: smoked cigarettes for 30 yrs, quit 9/1996, started smoking Cigars in 2014   Substance and Sexual Activity    Alcohol use: No     Comment: sober > 22 yrs    Drug use: No    Sexual activity: None   Lifestyle    Physical activity     Days per week: None     Minutes per session: None    Stress: None   Relationships    Social connections     Talks on phone: None     Gets together: None     Attends [employer] service: None     Active member of club or organization: None     Attends meetings of clubs or organizations: None     Relationship status: None    Intimate partner violence     Fear of current or ex partner: None     Emotionally abused: None     Physically abused: None     Forced sexual activity: None   Other Topics Concern    None   Social History Narrative    None     Current Outpatient Medications on File Prior to Visit   Medication Sig Dispense Refill    pantoprazole (PROTONIX) 40 MG tablet TAKE 1 TABLET BY MOUTH EVERY DAY 90 tablet 1    pravastatin (PRAVACHOL) 40 MG tablet TAKE 1 TABLET BY MOUTH EVERY DAY IN THE EVENING (Patient taking differently: 80 mg daily TAKES 2 TABLETS IN THE DAY) 90 tablet 1     No current facility-administered medications on file prior to visit. Allergies   Allergen Reactions    Alcohol Other (See Comments)     Sober 22 yrs    Benadryl [Diphenhydramine]      \"speeds him up\"    Demerol Hcl [Meperidine] Other (See Comments)     Aggressive behavior    Sulfa Antibiotics Other (See Comments)     Told by mother / patient unaware of reaction       Review of Systems   Constitutional: Positive for fatigue. Respiratory: Negative for cough and shortness of breath. Cardiovascular: Negative for chest pain. Psychiatric/Behavioral: Positive for sleep disturbance. The patient is nervous/anxious. Objective  Vitals:    03/23/21 1350   BP: 118/74   Site: Left Upper Arm   Position: Sitting   Cuff Size: Medium Adult   Pulse: 91   Temp: 98.3 °F (36.8 °C)   SpO2: 98%   Weight: 149 lb 9.6 oz (67.9 kg)   Height: 5' 9\" (1.753 m)     Physical Exam  Vitals signs and nursing note reviewed. Constitutional:       Appearance: Normal appearance. He is normal weight. HENT:      Head: Normocephalic. Mouth/Throat:      Mouth: Mucous membranes are moist.   Eyes:      Extraocular Movements: Extraocular movements intact. Conjunctiva/sclera: Conjunctivae normal.      Pupils: Pupils are equal, round, and reactive to light. Cardiovascular:      Rate and Rhythm: Normal rate and regular rhythm. Pulses: Normal pulses. Heart sounds: Normal heart sounds. Pulmonary:      Effort: Pulmonary effort is normal.      Breath sounds: Normal breath sounds. Skin:     General: Skin is warm. Neurological:      General: No focal deficit present. Mental Status: He is alert and oriented to person, place, and time. Mental status is at baseline. Psychiatric:         Mood and Affect: Mood normal.         Behavior: Behavior normal.         Thought Content: Thought content normal.         Judgment: Judgment normal.         Assessment & Plan     Diagnosis Orders   1. Hyponatremia  Comprehensive Metabolic Panel   2. B12 deficiency  cyanocobalamin injection 1,000 mcg   3. Anxiety  FLUoxetine (PROZAC) 40 MG capsule   4. Hyperlipidemia, unspecified hyperlipidemia type     5. Depression, unspecified depression type  FLUoxetine (PROZAC) 40 MG capsule   6. Memory changes     7.  Essential hypertension            Orders Placed This Encounter   Procedures    Comprehensive Metabolic Panel     Standing Status:   Future     Standing Expiration Date:   3/23/2022       Orders Placed This Encounter   Medications    cyanocobalamin injection 1,000 mcg    FLUoxetine (PROZAC) 40 MG capsule     Sig: Take 1 capsule by mouth daily     Dispense:  30 capsule     Refill:  5     Side effects, adverse effects of the medication prescribed today, as well as treatment plan/ rationale and result expectations have been discussed with the patient who expresses understanding and desires to proceed. Close follow up to evaluate treatment results and for coordination of care. I have reviewed the patient's medical history in detail and updated the computerized patient record. As always, patient is advised that if symptoms worsen in any way they will proceed to the nearest emergency room. FU in 3 mos.     Deric Posada, MANUELA - CNP

## 2021-03-23 NOTE — PROGRESS NOTES
[address]  [facility]  [address]  [phone]     Date of Visit:  3/23/2021  Patient Name: Butch Nayak   Patient :  1955     CHIEF COMPLAINT:     Butch Nayak is a 74 y.o. male who presents today for an general visit to be evaluated for the following condition(s):  No chief complaint on file. HISTORY OF PRESENT ILLNESS     HPI    Subjective reports of memory issues without objective deficits in ADLs, paying bills, or getting lost while driving. Memory issues could be multifactorial, related to his anxiety, poor sleep, high caffeine intake, or social stressors. Patient reports that he continues to have memory problems. He is accompanied by friend who states that she had to remind him multiple times that it was daylight saving time and pt did not remember the conversation or whether he changed the clocks. Has episodes where he cannot remember the day before. He was recently hospitalized this month. Family member reports that he called her yelling and \"freaking out\" and she could not redirect him or get him to calm down. This was in the setting of insomnia and patient had more than 15 minutes of sleep in 4 days. She took him to the hospital, where he was assessed with no concerning findings. Has continued to have bouts of dizziness, but no syncope. Tried Buspar for anxiety, but had very vivid dreams and discontinued. Anxiety and insomnia persists. Is hesitant to take medication and would prefer more conservative treatment. No chest pain, dysphagia, vision changes, diplopia, or focal weakness. Last seen on 2021, my impression was:  1. Vertigo    Episodic vertigo and gait ataxia associated with almost falling. [place] Hinojosa Dove was normal.  Will get CT angio of head and neck to rule out VBI.     2. Memory problem  Refer to neuropsych testing.   MMSE was 30/30 but patient cannot recall 5 items I asked him to name 20 minutes later. Needs further testing to determine if this is a true memory deficit or pseudodementia caused by anxiety.     3. Anxiety  Patient already on Prozac and will add BuSpar. Patient has emphysema and not a candidate for propranolol.     4. Insomnia, unspecified type  May consider sleep study in the future.     5. Tachycardia  Will refer to cardiology for episodic tachycardia. Patient has a history of anxiety but is unsure of the episodic tachycardia occurs before or after anxiety episode.   Heart arrhythmia including PSVT needs to be ruled out.         REVIEW OF SYSTEM      Review of Systems    REVIEWED INFORMATION      Allergies   Allergen Reactions    Alcohol Other (See Comments)     Sober 22 yrs    Benadryl [Diphenhydramine]      \"speeds him up\"    Demerol Hcl [Meperidine] Other (See Comments)     Aggressive behavior    Sulfa Antibiotics Other (See Comments)     Told by mother / patient unaware of reaction       Patient Active Problem List   Diagnosis    DNS (deviated nasal septum)    Allergic rhinitis    Hypertrophy of nasal turbinates    ARNAUD (obstructive sleep apnea)    Anxiety    Insomnia    Hyperlipidemia    Tobacco abuse    Low back pain    Other intervertebral disc degeneration, lumbar region    Presence of right artificial knee joint    Radiculopathy, lumbar region    Sprain of ligaments of lumbar spine    Strain of muscle, fascia and tendon of lower back, initial encounter    Essential hypertension    Gastroesophageal reflux disease    Other emphysema ([address]    Seasonal affective disorder ([address]       Past Medical History:   Diagnosis Date    Allergic rhinitis 2/19/2018    Anxiety     Disorder of stomach     valve not closing properly    Essential hypertension 11/4/2019    Gastroesophageal reflux disease 10/4/2019    Hyperlipidemia     meds > 22 yrs    Low back pain 5/1/2018    ARNAUD (obstructive sleep apnea) 2/19/2018    Other emphysema ([address] 10/4/2019    Other intervertebral disc degeneration, lumbar region 3/23/2018    Radiculopathy, lumbar region 2/28/2018    Seasonal affective disorder ([facility] 10/4/2019    Substance [facility]     alcholic, quit 20 yrs        Past Surgical History:   Procedure Laterality Date    COLONOSCOPY  12/22/2016    DALIA LAST MD    DENTAL SURGERY  10 yrs ago    ENDOSCOPY, COLON, DIAGNOSTIC      EYE SURGERY      Lasik OU    KNEE ARTHROSCOPY Right     KNEE SURGERY Right 05/2016    Ray procedure    CT NASAL SCOPY,OPEN MAXILL SINUS N/A 2/22/2018    SEPTOPLASTY MICRODEBRIDER ASSISTED TURBINOPLASTY AND OUT-FRACTURING BILATERAL NASAL ENDOSCOPY performed by Bob Su MD at [address] History     Socioeconomic History    Marital status:      Spouse name: Not on file    Number of children: Not on file    Years of education: Not on file    Highest education level: Not on file   Occupational History    Not on file   Social Needs    Financial resource strain: Not hard at all   [employer] insecurity     Worry: Never true     Inability: Never true   [employer] needs     Medical: No     Non-medical: No   Tobacco Use    Smoking status: Current Every Day Smoker     Packs/day: 3.00     Years: 34.00     Pack years: 102.00     Types: Cigars, Cigarettes    Smokeless tobacco: Never Used    Tobacco comment: smoked cigarettes for 30 yrs, quit 9/1996, started smoking Cigars in 2014   Substance and Sexual Activity    Alcohol use: No     Comment: sober > 22 yrs    Drug use: No    Sexual activity: Not on file   Lifestyle    Physical activity     Days per week: Not on file     Minutes per session: Not on file    Stress: Not on file   Relationships    Social connections     Talks on phone: Not on file     Gets together: Not on file     Attends Church service: Not on file     Active member of club or organization: Not on file     Attends meetings of clubs or organizations: Not on file     Relationship status: Not on file    Intimate partner violence     Fear of current or ex partner: Not on file     Emotionally abused: Not on file     Physically abused: Not on file     Forced sexual activity: Not on file   Other Topics Concern    Not on file   Social History Narrative    Not on file        PHYSICAL EXAM     Vitals:    21 1505   BP: 109/81   Site: Left Upper Arm   Position: Sitting   Cuff Size: Medium Adult   Pulse: 89   Weight: 149 lb 6.4 oz (67.8 kg)     Physical Exam  Vitals signs and nursing note reviewed. Constitutional:       Appearance: Normal appearance. HENT:      Head: Normocephalic and atraumatic. Eyes:      Conjunctiva/sclera: Conjunctivae normal.   Cardiovascular:      Rate and Rhythm: Normal rate and regular rhythm. Pulses: Normal pulses. Heart sounds: Normal heart sounds. Pulmonary:      Effort: Pulmonary effort is normal.      Breath sounds: Normal breath sounds. Musculoskeletal: Normal range of motion. Skin:     General: Skin is warm and dry. Neurological:      Mental Status: He is alert and oriented to person, place, and time. Mental status is at baseline. Psychiatric:         Mood and Affect: Mood normal.         Speech: Speech normal.         Behavior: Behavior normal.       Neurologic Exam     Mental Status   Oriented to person, place, and time. Speech: speech is normal     Cranial Nerves   Cranial nerves II through XII intact.      Motor Exam   Muscle bulk: normal  Overall muscle tone: normal    Strength   Right deltoid: 5/5  Left deltoid: 5/5  Right biceps: 5/5  Left biceps: 5/5  Right triceps: 5/5  Left triceps: 5/5  Right wrist flexion: 5/5  Left wrist flexion: 5/5  Right wrist extension: 5/5  Left wrist extension: 5/5  Right interossei: 5/5  Left interossei: 5/5  Right iliopsoas: 5/5  Left iliopsoas: 5/5  Right quadriceps: 5/5  Left quadriceps: 5/5  Right hamstrin/5  Left hamstrin/5  Right glutei: 5/5  Left glutei: 5/5  Right anterior tibial: 5/5  Left

## 2021-03-23 NOTE — PROGRESS NOTES
After obtaining consent, and per orders of Yesica Thornton, injection of B12  given in Right upper quad. gluteus by Dre Allen. Patient instructed to remain in clinic for 20 minutes afterwards, and to report any adverse reaction to me immediately.

## 2021-03-30 ENCOUNTER — NURSE ONLY (OUTPATIENT)
Dept: FAMILY MEDICINE CLINIC | Age: 66
End: 2021-03-30
Payer: MEDICARE

## 2021-03-30 DIAGNOSIS — E53.8 B12 DEFICIENCY: Primary | ICD-10-CM

## 2021-03-30 PROCEDURE — 96372 THER/PROPH/DIAG INJ SC/IM: CPT | Performed by: NURSE PRACTITIONER

## 2021-03-30 RX ORDER — CYANOCOBALAMIN 1000 UG/ML
1000 INJECTION INTRAMUSCULAR; SUBCUTANEOUS ONCE
Status: COMPLETED | OUTPATIENT
Start: 2021-03-30 | End: 2021-03-30

## 2021-03-30 RX ADMIN — CYANOCOBALAMIN 1000 MCG: 1000 INJECTION INTRAMUSCULAR; SUBCUTANEOUS at 10:29

## 2021-04-06 ENCOUNTER — TELEPHONE (OUTPATIENT)
Dept: FAMILY MEDICINE CLINIC | Age: 66
End: 2021-04-06

## 2021-04-06 NOTE — TELEPHONE ENCOUNTER
Pt came in today for a b12 injection. Noted in chart that b12 was given 03/30/21. Pt stated he is supposed to get them weekly however there is no documentation. Verified with Elenita Wu, she said to hold off and send Devang Otto a message, she will be back in on Thursday 04/08 and will let pt know.

## 2021-04-07 ENCOUNTER — NURSE ONLY (OUTPATIENT)
Dept: FAMILY MEDICINE CLINIC | Age: 66
End: 2021-04-07
Payer: MEDICARE

## 2021-04-07 DIAGNOSIS — E53.8 B12 DEFICIENCY: Primary | ICD-10-CM

## 2021-04-07 PROCEDURE — 96372 THER/PROPH/DIAG INJ SC/IM: CPT | Performed by: FAMILY MEDICINE

## 2021-04-07 RX ORDER — CYANOCOBALAMIN 1000 UG/ML
1000 INJECTION INTRAMUSCULAR; SUBCUTANEOUS ONCE
Status: COMPLETED | OUTPATIENT
Start: 2021-04-07 | End: 2021-04-07

## 2021-04-07 RX ADMIN — CYANOCOBALAMIN 1000 MCG: 1000 INJECTION INTRAMUSCULAR; SUBCUTANEOUS at 11:37

## 2021-04-07 NOTE — PROGRESS NOTES
After obtaining consent, and per orders of Dr. Ashwini Starr, injection of B12 was given in LT gluteal by [facility]. Patient instructed to remain in clinic for 20 minutes afterwards, and to report any adverse reaction to me immediately. Tolerated well.

## 2021-04-07 NOTE — TELEPHONE ENCOUNTER
Spoke to patients sister. Aware of b12 injection.  Will let pt know to come in today for b12 injection

## 2021-04-14 ENCOUNTER — NURSE ONLY (OUTPATIENT)
Dept: FAMILY MEDICINE CLINIC | Age: 66
End: 2021-04-14
Payer: MEDICARE

## 2021-04-14 DIAGNOSIS — E53.8 B12 DEFICIENCY: Primary | ICD-10-CM

## 2021-04-14 PROCEDURE — 96372 THER/PROPH/DIAG INJ SC/IM: CPT | Performed by: NURSE PRACTITIONER

## 2021-04-14 RX ORDER — CYANOCOBALAMIN 1000 UG/ML
1000 INJECTION INTRAMUSCULAR; SUBCUTANEOUS ONCE
Status: COMPLETED | OUTPATIENT
Start: 2021-04-14 | End: 2021-04-14

## 2021-04-14 RX ADMIN — CYANOCOBALAMIN 1000 MCG: 1000 INJECTION INTRAMUSCULAR; SUBCUTANEOUS at 10:05

## 2021-04-14 NOTE — PROGRESS NOTES
After obtaining consent, and per orders of Dr. Severa Headings- CNP, injection of B12 given in Right upper quad. gluteus by Nadja Jennings. Patient instructed to remain in clinic for 20 minutes afterwards, and to report any adverse reaction to me immediately.

## 2021-04-21 ENCOUNTER — NURSE ONLY (OUTPATIENT)
Dept: FAMILY MEDICINE CLINIC | Age: 66
End: 2021-04-21
Payer: MEDICARE

## 2021-04-21 DIAGNOSIS — E53.8 B12 DEFICIENCY: Primary | ICD-10-CM

## 2021-04-21 PROCEDURE — 96372 THER/PROPH/DIAG INJ SC/IM: CPT | Performed by: NURSE PRACTITIONER

## 2021-04-21 RX ORDER — CYANOCOBALAMIN 1000 UG/ML
1000 INJECTION INTRAMUSCULAR; SUBCUTANEOUS ONCE
Status: COMPLETED | OUTPATIENT
Start: 2021-04-21 | End: 2021-04-21

## 2021-04-21 RX ADMIN — CYANOCOBALAMIN 1000 MCG: 1000 INJECTION INTRAMUSCULAR; SUBCUTANEOUS at 10:04

## 2021-04-21 NOTE — PROGRESS NOTES
After obtaining consent, and per orders of Mark Anthony Ferrara, injection of B12 given in Right upper quad. gluteus by Betzy Kitchen. Patient instructed to remain in clinic for 20 minutes afterwards, and to report any adverse reaction to me immediately.

## 2021-05-13 ENCOUNTER — CARE COORDINATION (OUTPATIENT)
Dept: CARE COORDINATION | Age: 66
End: 2021-05-13

## 2021-05-13 NOTE — CARE COORDINATION
Attempted to contact patient for care coordination discussion and enrollment. Unable to reach patient by phone. Message left regarding the purpose of this call. Number provided and call back requested.

## 2021-05-13 NOTE — LETTER
5/13/2021    [address] [place] [address] [address]      Dear Lesa Haq,    My name is Pino Marroquin and I am a registered nurse who partners with MANUELA Moreno CNP to improve patients' health. MANUELA Moreno CNP believes you would benefit from working with me. As a member of your health care team, I would work with other providers involved in your care, offer education for your specific health conditions, and connect you with additional resources as needed. I will collaborate with MANUELA Moreno CNP to support you in following your treatment plan. The additional support I provide is no additional cost to you. My primary focus is to help you achieve specific goals and improve your health. We are committed to walk with you on this journey and look forward to working with you. Please call me to further discuss your healthcare needs. I am available by phone or for appointments at the office. You can reach me at [phone].     In good health,     Pino Marroquin RN

## 2021-05-19 ENCOUNTER — NURSE ONLY (OUTPATIENT)
Dept: FAMILY MEDICINE CLINIC | Age: 66
End: 2021-05-19
Payer: MEDICARE

## 2021-05-19 DIAGNOSIS — E53.8 B12 DEFICIENCY: Primary | ICD-10-CM

## 2021-05-19 PROCEDURE — 96372 THER/PROPH/DIAG INJ SC/IM: CPT | Performed by: NURSE PRACTITIONER

## 2021-05-19 RX ORDER — CYANOCOBALAMIN 1000 UG/ML
1000 INJECTION INTRAMUSCULAR; SUBCUTANEOUS ONCE
Status: COMPLETED | OUTPATIENT
Start: 2021-05-19 | End: 2021-05-19

## 2021-05-19 RX ADMIN — CYANOCOBALAMIN 1000 MCG: 1000 INJECTION INTRAMUSCULAR; SUBCUTANEOUS at 13:17

## 2021-05-19 NOTE — PROGRESS NOTES
After obtaining consent, and per orders of [employer], injection of B12 given in Left upper quad. gluteus by Hardy Fox MA. Patient instructed to remain in clinic for 20 minutes afterwards, and to report any adverse reaction to me immediately.

## 2021-05-24 RX ORDER — PANTOPRAZOLE SODIUM 40 MG/1
TABLET, DELAYED RELEASE ORAL
Qty: 90 TABLET | Refills: 1 | Status: SHIPPED | OUTPATIENT
Start: 2021-05-24 | End: 2021-11-08

## 2021-05-25 ENCOUNTER — CARE COORDINATION (OUTPATIENT)
Dept: CARE COORDINATION | Age: 66
End: 2021-05-25

## 2021-05-25 NOTE — CARE COORDINATION
Attempted to contact patient for care coordination discussion and possible enrollment  Unable to reach patient by phone. Message left regarding purpose of call. Number provided and call back requested.

## 2021-05-29 ENCOUNTER — HOSPITAL ENCOUNTER (EMERGENCY)
Age: 66
Discharge: HOME | End: 2021-05-29
Payer: MEDICARE

## 2021-05-29 VITALS
DIASTOLIC BLOOD PRESSURE: 76 MMHG | SYSTOLIC BLOOD PRESSURE: 133 MMHG | OXYGEN SATURATION: 96 % | WEIGHT: 145 LBS | TEMPERATURE: 97.3 F | RESPIRATION RATE: 18 BRPM | BODY MASS INDEX: 21.48 KG/M2 | HEART RATE: 110 BPM | HEIGHT: 69 IN

## 2021-05-29 DIAGNOSIS — G89.29 ACUTE EXACERBATION OF CHRONIC LOW BACK PAIN: Primary | ICD-10-CM

## 2021-05-29 DIAGNOSIS — M54.50 ACUTE EXACERBATION OF CHRONIC LOW BACK PAIN: Primary | ICD-10-CM

## 2021-05-29 PROCEDURE — 99283 EMERGENCY DEPT VISIT LOW MDM: CPT

## 2021-05-29 PROCEDURE — 96372 THER/PROPH/DIAG INJ SC/IM: CPT

## 2021-05-29 PROCEDURE — 6360000002 HC RX W HCPCS: Performed by: NURSE PRACTITIONER

## 2021-05-29 RX ORDER — OXYCODONE HYDROCHLORIDE AND ACETAMINOPHEN 5; 325 MG/1; MG/1
1 TABLET ORAL EVERY 6 HOURS PRN
Qty: 12 TABLET | Refills: 0 | Status: SHIPPED | OUTPATIENT
Start: 2021-05-29 | End: 2021-06-01

## 2021-05-29 RX ADMIN — HYDROMORPHONE HYDROCHLORIDE 1 MG: 1 INJECTION, SOLUTION INTRAMUSCULAR; INTRAVENOUS; SUBCUTANEOUS at 17:48

## 2021-05-29 ASSESSMENT — ENCOUNTER SYMPTOMS
WHEEZING: 0
COLOR CHANGE: 0
NAUSEA: 0
SORE THROAT: 0
RHINORRHEA: 0
BLOOD IN STOOL: 0
CONSTIPATION: 0
BACK PAIN: 1
ABDOMINAL PAIN: 0
COUGH: 0
SHORTNESS OF BREATH: 0
EYE DISCHARGE: 0
DIARRHEA: 0
VOMITING: 0
EYE REDNESS: 0
TROUBLE SWALLOWING: 0
EYE PAIN: 0

## 2021-05-29 ASSESSMENT — PAIN SCALES - GENERAL
PAINLEVEL_OUTOF10: 10
PAINLEVEL_OUTOF10: 10

## 2021-05-29 ASSESSMENT — PAIN DESCRIPTION - FREQUENCY: FREQUENCY: CONTINUOUS

## 2021-05-29 ASSESSMENT — PAIN DESCRIPTION - DESCRIPTORS: DESCRIPTORS: ACHING;SHARP;SHOOTING

## 2021-05-29 ASSESSMENT — PAIN DESCRIPTION - LOCATION: LOCATION: BACK

## 2021-05-29 ASSESSMENT — PAIN DESCRIPTION - PAIN TYPE: TYPE: CHRONIC PAIN

## 2021-05-29 NOTE — ED PROVIDER NOTES
[address] [facility] ED  EMERGENCY DEPARTMENT ENCOUNTER      Pt Name: Dia Cason  MRN: [id number]  [place] 1955  Date of evaluation: 5/29/2021  Provider: MANUELA Joy CNP    CHIEF COMPLAINT       Chief Complaint   Patient presents with    Back Pain         HISTORY OF PRESENT ILLNESS   (Location/Symptom, Timing/Onset,Context/Setting, Quality, Duration, Modifying Factors, Severity)  Note limiting factors. Dia Cason is a 74 y.o. male who presents to the emergency department with a chart reviewed past medical history of hyperlipidemia, chronic back pain, obstructive sleep apnea, hypertension, and GERD for chief complaint of acute exacerbation of chronic back pain. Patient is complaining of 10 out of 10 left-sided lower back pain. Patient denies numbness or tingling. Patient denies loss of bladder bowel control. Patient has no alleviating modifying factors. Patient would like to receive pain medication. Nursing Notes were reviewed. REVIEW OF SYSTEMS    (2-9 systems for level 4, 10 or more for level 5)     Review of Systems   Constitutional: Negative for activity change, appetite change, fatigue and fever. HENT: Negative for congestion, ear pain, rhinorrhea, sore throat and trouble swallowing. Eyes: Negative for pain, discharge and redness. Respiratory: Negative for cough, shortness of breath and wheezing. Cardiovascular: Negative for chest pain and palpitations. Gastrointestinal: Negative for abdominal pain, blood in stool, constipation, diarrhea, nausea and vomiting. Endocrine: Negative for polydipsia and polyuria. Genitourinary: Negative for decreased urine volume, dysuria, flank pain and hematuria. Musculoskeletal: Positive for back pain. Negative for arthralgias and myalgias. Skin: Negative for color change, rash and wound. Neurological: Negative for dizziness, syncope, weakness, light-headedness and headaches.    Psychiatric/Behavioral: Negative for behavioral problems. All other systems reviewed and are negative. Except as noted above the remainder of the review of systems was reviewed and negative.        PAST MEDICAL HISTORY     Past Medical History:   Diagnosis Date    Allergic rhinitis 2/19/2018    Anxiety     Disorder of stomach     valve not closing properly    Essential hypertension 11/4/2019    Gastroesophageal reflux disease 10/4/2019    Hyperlipidemia     meds > 22 yrs    Low back pain 5/1/2018    ARNAUD (obstructive sleep apnea) 2/19/2018    Other emphysema ([facility] 10/4/2019    Other intervertebral disc degeneration, lumbar region 3/23/2018    Radiculopathy, lumbar region 2/28/2018    Seasonal affective disorder ([facility] 10/4/2019    Substance abuse ([facility]     alcholic, quit 20 yrs      Past Surgical History:   Procedure Laterality Date    COLONOSCOPY  12/22/2016    A SHALA MARLEY    DENTAL SURGERY  10 yrs ago    ENDOSCOPY, COLON, DIAGNOSTIC      EYE SURGERY      Lasik OU    KNEE ARTHROSCOPY Right     KNEE SURGERY Right 05/2016    Ray procedure    [place] NASAL SCOPY,OPEN MAXILL SINUS N/A 2/22/2018    SEPTOPLASTY MICRODEBRIDER ASSISTED TURBINOPLASTY AND OUT-FRACTURING BILATERAL NASAL ENDOSCOPY performed by Luz Diaz MD at [address] [place] [address] History     Socioeconomic History    Marital status:      Spouse name: None    Number of children: None    Years of education: None    Highest education level: None   Occupational History    None   Tobacco Use    Smoking status: Current Every Day Smoker     Packs/day: 3.00     Years: 34.00     Pack years: 102.00     Types: Cigars, Cigarettes    Smokeless tobacco: Never Used    Tobacco comment: smoked cigarettes for 30 yrs, quit 9/1996, started smoking Cigars in 2014   Substance and Sexual Activity    Alcohol use: No     Comment: sober > 22 yrs    Drug use: No    Sexual activity: None   Other Topics Concern    None   Social History Narrative    None     Social Determinants of Musculoskeletal:      Cervical back: Normal range of motion and neck supple. Comments: No midline tenderness or step-off throughout the C, T, or L-spine. Bilateral hand grasps are strong and symmetric. There is no incontinence of bowel or bladder. There is no foot drop. No saddle parasthesias. Light tough remains intact. 2 point discrimination remains intact. Skin:     General: Skin is warm and dry. Capillary Refill: Capillary refill takes less than 2 seconds. Findings: No rash. Neurological:      Mental Status: He is alert and oriented to person, place, and time. Cranial Nerves: No cranial nerve deficit. Psychiatric:         Behavior: Behavior normal.         RESULTS     EKG: All EKG's are interpreted by the Emergency Department Physician who either signs or Co-signsthis chart in the absence of a cardiologist.        RADIOLOGY:   Rhoderick Bolds such as CT, Ultrasound and MRI are read by the radiologist. Plain radiographic images are visualized and preliminarily interpreted by the emergency physician with the below findings:        Interpretation per the Radiologist below, if available at the time ofthis note:    No orders to display         ED BEDSIDE ULTRASOUND:   Performed by ED Physician - none    LABS:  Labs Reviewed - No data to display    All other labs were within normal range or not returned as of this dictation. EMERGENCY DEPARTMENT COURSE and DIFFERENTIAL DIAGNOSIS/MDM:   Vitals:    Vitals:    05/29/21 1721   BP: 133/76   Pulse: 110   Resp: 18   Temp: 97.3 °F (36.3 °C)   TempSrc: Temporal   SpO2: 96%   Weight: 145 lb (65.8 kg)   Height: 5' 9\" (1.753 m)            MDM   Patient is a 53-year-old male presenting the ER chief complaint of back pain. Hemodynamically stable nontoxic-appearing. Patient states Dilaudid helps with his pain the most so I ordered a shot of IM Dilaudid. Patient will be reassessed. Upon reassessment some relief noted.   Patient given a prescription for a couple of days of Percocet until he can get in with his doctor. He is instructed to return back to the ER if he worsens in any way or new symptoms develop. He is discharged in stable condition with stable vital signs. CRITICAL CARE TIME       CONSULTS:  None    PROCEDURES:  Unless otherwise noted below, none     Procedures    FINAL IMPRESSION      1. Acute exacerbation of chronic low back pain          DISPOSITION/PLAN   DISPOSITION Decision To Discharge 05/29/2021 06:22:26 PM      PATIENT REFERRED TO:  MANUELA Welch CNP  [address] [facility]  [phone]    Schedule an appointment as soon as possible for a visit in 1 day      [place] [id number] [place] [address]  [phone]    Schedule an appointment as soon as possible for a visit in 1 day        DISCHARGE MEDICATIONS:  New Prescriptions    OXYCODONE-ACETAMINOPHEN (PERCOCET) 5-325 MG PER TABLET    Take 1 tablet by mouth every 6 hours as needed for Pain for up to 3 days. Intended supply: 3 days.  Take lowest dose possible to manage pain          (Please notethat portions of this note were completed with a voice recognition program.  Efforts were made to edit the dictations but occasionally words are mis-transcribed.)    MANUELA Gleason CNP (electronically signed)  Attending Emergency Physician          MANUELA Herring CNP  05/29/21 0003

## 2021-06-01 ENCOUNTER — CARE COORDINATION (OUTPATIENT)
Dept: CARE COORDINATION | Age: 66
End: 2021-06-01

## 2021-06-01 ENCOUNTER — OFFICE VISIT (OUTPATIENT)
Dept: FAMILY MEDICINE CLINIC | Age: 66
End: 2021-06-01
Payer: MEDICARE

## 2021-06-01 VITALS
HEIGHT: 70 IN | BODY MASS INDEX: 20.04 KG/M2 | WEIGHT: 140 LBS | SYSTOLIC BLOOD PRESSURE: 126 MMHG | HEART RATE: 110 BPM | OXYGEN SATURATION: 99 % | DIASTOLIC BLOOD PRESSURE: 74 MMHG

## 2021-06-01 DIAGNOSIS — J43.9 PULMONARY EMPHYSEMA, UNSPECIFIED EMPHYSEMA TYPE (HCC): ICD-10-CM

## 2021-06-01 DIAGNOSIS — F33.8 SEASONAL AFFECTIVE DISORDER (HCC): ICD-10-CM

## 2021-06-01 DIAGNOSIS — M51.36 OTHER INTERVERTEBRAL DISC DEGENERATION, LUMBAR REGION: Primary | ICD-10-CM

## 2021-06-01 PROCEDURE — 99213 OFFICE O/P EST LOW 20 MIN: CPT | Performed by: NURSE PRACTITIONER

## 2021-06-01 RX ORDER — PREDNISONE 20 MG/1
20 TABLET ORAL 2 TIMES DAILY
Qty: 10 TABLET | Refills: 0 | Status: SHIPPED | OUTPATIENT
Start: 2021-06-01 | End: 2021-06-06

## 2021-06-01 RX ORDER — TIZANIDINE 4 MG/1
4 TABLET ORAL NIGHTLY
Qty: 30 TABLET | Refills: 0 | Status: SHIPPED | OUTPATIENT
Start: 2021-06-01 | End: 2021-06-22 | Stop reason: CLARIF

## 2021-06-01 RX ORDER — ROSUVASTATIN CALCIUM 40 MG/1
TABLET, COATED ORAL
COMMUNITY
Start: 2021-04-30 | End: 2022-05-17

## 2021-06-01 RX ORDER — HYDROCODONE BITARTRATE AND ACETAMINOPHEN 5; 325 MG/1; MG/1
TABLET ORAL
COMMUNITY
Start: 2021-05-18 | End: 2021-06-09

## 2021-06-01 ASSESSMENT — ENCOUNTER SYMPTOMS
COUGH: 0
SHORTNESS OF BREATH: 0
BACK PAIN: 1

## 2021-06-01 NOTE — PROGRESS NOTES
lumbar region 3/23/2018    Radiculopathy, lumbar region 2/28/2018    Seasonal affective disorder ([facility] 10/4/2019    Substance abuse ([facility]     alcholic, quit 20 yrs      Past Surgical History:   Procedure Laterality Date    COLONOSCOPY  12/22/2016    DALIA LAST MD    DENTAL SURGERY  10 yrs ago    ENDOSCOPY, COLON, DIAGNOSTIC      EYE SURGERY      Lasik OU    KNEE ARTHROSCOPY Right     KNEE SURGERY Right 05/2016    Ray procedure    [place] NASAL SCOPY,OPEN MAXILL SINUS N/A 2/22/2018    SEPTOPLASTY MICRODEBRIDER ASSISTED TURBINOPLASTY AND OUT-FRACTURING BILATERAL NASAL ENDOSCOPY performed by Oleksandr Kendall MD at [address] History   Problem Relation Age of Onset    Cancer Mother         stomach    Heart Disease Father 48    Cancer Father         bone    Cancer Maternal Grandmother         lung    Cancer Paternal Grandmother     Heart Disease Paternal Grandfather     No Known Problems Sister     Cancer Brother     Heart Disease Brother         hole in heart in 65 at 1 months age     Social History     Socioeconomic History    Marital status:      Spouse name: None    Number of children: None    Years of education: None    Highest education level: None   Occupational History    None   Tobacco Use    Smoking status: Current Every Day Smoker     Packs/day: 3.00     Years: 34.00     Pack years: 102.00     Types: Cigars, Cigarettes    Smokeless tobacco: Never Used    Tobacco comment: smoked cigarettes for 30 yrs, quit 9/1996, started smoking Cigars in 2014   Substance and Sexual Activity    Alcohol use: No     Comment: sober > 22 yrs    Drug use: No    Sexual activity: None   Other Topics Concern    None   Social History Narrative    None     Social Determinants of Health     Financial Resource Strain: Low Risk     Difficulty of Paying Living Expenses: Not hard at all   Food Insecurity: No Food Insecurity    Worried About Running Out of Food in the Last Year: Never true    Ran Out of Food in the Last Year: Never true   Transportation Needs: No Transportation Needs    Lack of Transportation (Medical): No    Lack of Transportation (Non-Medical): No   Physical Activity:     Days of Exercise per Week:     Minutes of Exercise per Session:    Stress:     Feeling of Stress :    Social Connections:     Frequency of Communication with Friends and Family:     Frequency of Social Gatherings with Friends and Family:     Attends Yazdanism Services:     Active Member of Clubs or Organizations:     Attends Club or Organization Meetings:     Marital Status:    Intimate Partner Violence:     Fear of Current or Ex-Partner:     Emotionally Abused:     Physically Abused:     Sexually Abused:      Current Outpatient Medications on File Prior to Visit   Medication Sig Dispense Refill    rosuvastatin (CRESTOR) 40 MG tablet TAKE 1 TABLET BY MOUTH DAILY AT BEDTIME.  oxyCODONE-acetaminophen (PERCOCET) 5-325 MG per tablet Take 1 tablet by mouth every 6 hours as needed for Pain for up to 3 days. Intended supply: 3 days. Take lowest dose possible to manage pain 12 tablet 0    pantoprazole (PROTONIX) 40 MG tablet TAKE 1 TABLET BY MOUTH EVERY DAY 90 tablet 1    FLUoxetine (PROZAC) 40 MG capsule Take 1 capsule by mouth daily 30 capsule 5    HYDROcodone-acetaminophen (NORCO) 5-325 MG per tablet TAKE 1 TO 2 TABLETS BY MOUTH EVERY 4 TO 6 HOURS AS NEEDED FOR PAIN FOR 3 DAYS.  pravastatin (PRAVACHOL) 40 MG tablet TAKE 1 TABLET BY MOUTH EVERY DAY IN THE EVENING (Patient not taking: Reported on 6/1/2021) 90 tablet 1     No current facility-administered medications on file prior to visit.      Allergies   Allergen Reactions    Alcohol Other (See Comments)     Sober 22 yrs    Benadryl [Diphenhydramine]      \"speeds him up\"    Demerol Hcl [Meperidine] Other (See Comments)     Aggressive behavior    Sulfa Antibiotics Other (See Comments)     Told by mother / patient unaware of reaction       Review of Systems   Respiratory: Negative for cough and shortness of breath. Cardiovascular: Negative for chest pain. Musculoskeletal: Positive for back pain. Neurological: Positive for numbness (left foot). Negative for weakness and paresthesias. Objective  Vitals:    06/01/21 1354   BP: 126/74   Pulse: 110   SpO2: 99%   Weight: 140 lb (63.5 kg)   Height: 5' 10\" (1.778 m)     Physical Exam  Vitals and nursing note reviewed. Constitutional:       Appearance: Normal appearance. He is normal weight. Pulmonary:      Effort: Pulmonary effort is normal.   Musculoskeletal:         General: Normal range of motion. Lumbar back: Edema, spasms and tenderness present. Normal range of motion. Skin:     General: Skin is warm. Neurological:      General: No focal deficit present. Mental Status: He is alert and oriented to person, place, and time. Mental status is at baseline. Deep Tendon Reflexes: Reflexes normal.   Psychiatric:         Mood and Affect: Mood normal.         Behavior: Behavior normal.         Thought Content: Thought content normal.         Judgment: Judgment normal.         Assessment & Plan     Diagnosis Orders   1. Other intervertebral disc degeneration, lumbar region  tiZANidine (ZANAFLEX) 4 MG tablet    predniSONE (DELTASONE) 20 MG tablet   2. Pulmonary emphysema, unspecified emphysema type ([facility]     3. [facility] ([facility]          No orders of the defined types were placed in this encounter.       Orders Placed This Encounter   Medications    tiZANidine (ZANAFLEX) 4 MG tablet     Sig: Take 1 tablet by mouth nightly     Dispense:  30 tablet     Refill:  0    predniSONE (DELTASONE) 20 MG tablet     Sig: Take 1 tablet by mouth 2 times daily for 5 days     Dispense:  10 tablet     Refill:  0       Side effects, adverse effects of the medication prescribed today, as well as treatment plan/ rationale and result expectations have been discussed with the patient who expresses understanding and desires to proceed. Close follow up to evaluate treatment results and for coordination of care. I have reviewed the patient's medical history in detail and updated the computerized patient record. As always, patient is advised that if symptoms worsen in any way they will proceed to the nearest emergency room. Return in about 1 week (around 6/8/2021).     Carlito Arriola, MANUELA - CNP

## 2021-06-01 NOTE — CARE COORDINATION
Third and final attempt to contact patient for car coordination discussion and possible enrollment  Unable to reach patient by phone. Message left regarding the purpose of this call. Number provided and call back requested.

## 2021-06-02 RX ORDER — AMOXICILLIN 500 MG/1
500 CAPSULE ORAL 4 TIMES DAILY PRN
COMMUNITY
End: 2021-12-13 | Stop reason: ALTCHOICE

## 2021-06-02 RX ORDER — ACETAMINOPHEN 500 MG
500 TABLET ORAL EVERY 6 HOURS PRN
COMMUNITY

## 2021-06-02 RX ORDER — OMEGA-3S/DHA/EPA/FISH OIL/D3 300MG-1000
400 CAPSULE ORAL DAILY
COMMUNITY
End: 2022-03-17

## 2021-06-06 ENCOUNTER — APPOINTMENT (OUTPATIENT)
Dept: CT IMAGING | Age: 66
End: 2021-06-06
Payer: MEDICARE

## 2021-06-06 ENCOUNTER — HOSPITAL ENCOUNTER (EMERGENCY)
Age: 66
Discharge: HOME | End: 2021-06-06
Attending: EMERGENCY MEDICINE
Payer: MEDICARE

## 2021-06-06 VITALS
TEMPERATURE: 97.6 F | OXYGEN SATURATION: 98 % | RESPIRATION RATE: 20 BRPM | DIASTOLIC BLOOD PRESSURE: 88 MMHG | WEIGHT: 140 LBS | BODY MASS INDEX: 20.73 KG/M2 | HEART RATE: 76 BPM | SYSTOLIC BLOOD PRESSURE: 127 MMHG | HEIGHT: 69 IN

## 2021-06-06 DIAGNOSIS — M54.16 LUMBAR RADICULOPATHY: Primary | ICD-10-CM

## 2021-06-06 DIAGNOSIS — M51.36 DEGENERATIVE DISC DISEASE, LUMBAR: ICD-10-CM

## 2021-06-06 LAB
ANION GAP SERPL CALCULATED.3IONS-SCNC: 11 MEQ/L (ref 9–15)
BASOPHILS ABSOLUTE: 0.2 K/UL (ref 0–0.2)
BASOPHILS RELATIVE PERCENT: 1.4 %
BILIRUBIN URINE: NEGATIVE
BLOOD, URINE: NEGATIVE
BUN BLDV-MCNC: 17 MG/DL (ref 8–23)
C-REACTIVE PROTEIN, HIGH SENSITIVITY: 0.9 MG/L (ref 0–5)
CALCIUM SERPL-MCNC: 9.3 MG/DL (ref 8.5–9.9)
CHLORIDE BLD-SCNC: 97 MEQ/L (ref 95–107)
CLARITY: CLEAR
CO2: 26 MEQ/L (ref 20–31)
COLOR: YELLOW
CREAT SERPL-MCNC: 1.12 MG/DL (ref 0.7–1.2)
EOSINOPHILS ABSOLUTE: 0.1 K/UL (ref 0–0.7)
EOSINOPHILS RELATIVE PERCENT: 1.2 %
GFR AFRICAN AMERICAN: >60
GFR NON-AFRICAN AMERICAN: >60
GLUCOSE BLD-MCNC: 95 MG/DL (ref 70–99)
GLUCOSE URINE: NEGATIVE MG/DL
HCT VFR BLD CALC: 43.2 % (ref 42–52)
HEMOGLOBIN: 15.1 G/DL (ref 14–18)
KETONES, URINE: ABNORMAL MG/DL
LEUKOCYTE ESTERASE, URINE: NEGATIVE
LYMPHOCYTES ABSOLUTE: 4 K/UL (ref 1–4.8)
LYMPHOCYTES RELATIVE PERCENT: 36.2 %
MCH RBC QN AUTO: 33.8 PG (ref 27–31.3)
MCHC RBC AUTO-ENTMCNC: 34.9 % (ref 33–37)
MCV RBC AUTO: 96.9 FL (ref 80–100)
MONOCYTES ABSOLUTE: 0.7 K/UL (ref 0.2–0.8)
MONOCYTES RELATIVE PERCENT: 6.1 %
NEUTROPHILS ABSOLUTE: 6 K/UL (ref 1.4–6.5)
NEUTROPHILS RELATIVE PERCENT: 55.1 %
NITRITE, URINE: NEGATIVE
PDW BLD-RTO: 13 % (ref 11.5–14.5)
PH UA: 6 (ref 5–9)
PLATELET # BLD: 296 K/UL (ref 130–400)
POTASSIUM SERPL-SCNC: 4.1 MEQ/L (ref 3.4–4.9)
PROTEIN UA: NEGATIVE MG/DL
RBC # BLD: 4.46 M/UL (ref 4.7–6.1)
SEDIMENTATION RATE, ERYTHROCYTE: 8 MM (ref 0–20)
SODIUM BLD-SCNC: 134 MEQ/L (ref 135–144)
SPECIFIC GRAVITY UA: 1.02 (ref 1–1.03)
URINE REFLEX TO CULTURE: ABNORMAL
UROBILINOGEN, URINE: 1 E.U./DL
WBC # BLD: 10.9 K/UL (ref 4.8–10.8)

## 2021-06-06 PROCEDURE — 86141 C-REACTIVE PROTEIN HS: CPT

## 2021-06-06 PROCEDURE — 85025 COMPLETE CBC W/AUTO DIFF WBC: CPT

## 2021-06-06 PROCEDURE — 96375 TX/PRO/DX INJ NEW DRUG ADDON: CPT

## 2021-06-06 PROCEDURE — 96374 THER/PROPH/DIAG INJ IV PUSH: CPT

## 2021-06-06 PROCEDURE — 6360000002 HC RX W HCPCS: Performed by: EMERGENCY MEDICINE

## 2021-06-06 PROCEDURE — 72131 CT LUMBAR SPINE W/O DYE: CPT

## 2021-06-06 PROCEDURE — 80048 BASIC METABOLIC PNL TOTAL CA: CPT

## 2021-06-06 PROCEDURE — 36415 COLL VENOUS BLD VENIPUNCTURE: CPT

## 2021-06-06 PROCEDURE — 81003 URINALYSIS AUTO W/O SCOPE: CPT

## 2021-06-06 PROCEDURE — 99283 EMERGENCY DEPT VISIT LOW MDM: CPT

## 2021-06-06 PROCEDURE — 85652 RBC SED RATE AUTOMATED: CPT

## 2021-06-06 RX ORDER — OXYCODONE HYDROCHLORIDE AND ACETAMINOPHEN 5; 325 MG/1; MG/1
1 TABLET ORAL EVERY 6 HOURS PRN
Qty: 12 TABLET | Refills: 0 | Status: SHIPPED | OUTPATIENT
Start: 2021-06-06 | End: 2021-06-09

## 2021-06-06 RX ORDER — ONDANSETRON 2 MG/ML
4 INJECTION INTRAMUSCULAR; INTRAVENOUS ONCE
Status: COMPLETED | OUTPATIENT
Start: 2021-06-06 | End: 2021-06-06

## 2021-06-06 RX ORDER — DEXAMETHASONE SODIUM PHOSPHATE 10 MG/ML
10 INJECTION INTRAMUSCULAR; INTRAVENOUS ONCE
Status: COMPLETED | OUTPATIENT
Start: 2021-06-06 | End: 2021-06-06

## 2021-06-06 RX ADMIN — DEXAMETHASONE SODIUM PHOSPHATE 10 MG: 10 INJECTION INTRAMUSCULAR; INTRAVENOUS at 07:46

## 2021-06-06 RX ADMIN — HYDROMORPHONE HYDROCHLORIDE 1 MG: 1 INJECTION, SOLUTION INTRAMUSCULAR; INTRAVENOUS; SUBCUTANEOUS at 07:46

## 2021-06-06 RX ADMIN — ONDANSETRON 4 MG: 2 INJECTION INTRAMUSCULAR; INTRAVENOUS at 07:44

## 2021-06-06 ASSESSMENT — ENCOUNTER SYMPTOMS
VOMITING: 0
RHINORRHEA: 0
SHORTNESS OF BREATH: 0
ALLERGIC/IMMUNOLOGIC NEGATIVE: 1
ABDOMINAL PAIN: 0
NAUSEA: 0
WHEEZING: 0
EYES NEGATIVE: 1
BACK PAIN: 1
TROUBLE SWALLOWING: 0

## 2021-06-06 ASSESSMENT — PAIN DESCRIPTION - DESCRIPTORS: DESCRIPTORS: SHARP;SHOOTING;ACHING

## 2021-06-06 ASSESSMENT — PAIN DESCRIPTION - ORIENTATION: ORIENTATION: LOWER

## 2021-06-06 ASSESSMENT — PAIN DESCRIPTION - FREQUENCY: FREQUENCY: CONTINUOUS

## 2021-06-06 ASSESSMENT — PAIN SCALES - GENERAL
PAINLEVEL_OUTOF10: 10
PAINLEVEL_OUTOF10: 8

## 2021-06-06 ASSESSMENT — PAIN DESCRIPTION - PAIN TYPE: TYPE: ACUTE PAIN

## 2021-06-06 ASSESSMENT — PAIN DESCRIPTION - LOCATION: LOCATION: BACK

## 2021-06-06 NOTE — ED PROVIDER NOTES
[address] [facility] ED  eMERGENCY dEPARTMENT eNCOUnter      Pt Name: Jaswant Ramírez  MRN: [id number]  [place] 1955  Date of evaluation: 6/6/2021  Provider: Omayra Bradshaw MD    CHIEF COMPLAINT       Chief Complaint   Patient presents with    Back Pain         HISTORY OF PRESENT ILLNESS   (Location/Symptom, Timing/Onset,Context/Setting, Quality, Duration, Modifying Factors, Severity)  Note limiting factors. Jaswant Ramírez is a 74 y.o. male who presents to the emergency department plaint of significant back pain. Patient admits the pain has been ongoing for some time. Patient admits he is followed with a primary care physician and was started on Norco.  Patient admits he is also taking tizanidine as well as prednisone. Patient admits that his condition has not gotten any better. Patient complains of significant radiation down left lower extremity. On initial evaluation, patient is standing in room, unable to sit or find comfortable position. Patient denies associate abdominal pain, nausea, vomiting, or other constitutional symptomatologies. Patient is seeking evaluation and relief of symptomatologies. Patient denies incontinence of stool or urine. HPI    NursingNotes were reviewed. REVIEW OF SYSTEMS    (2-9 systems for level 4, 10 or more for level 5)     Review of Systems   Constitutional: Negative for activity change, chills and fever. HENT: Negative for congestion, ear pain, rhinorrhea and trouble swallowing. Eyes: Negative. Respiratory: Negative for shortness of breath and wheezing. Cardiovascular: Negative for chest pain and leg swelling. Gastrointestinal: Negative for abdominal pain, nausea and vomiting. Endocrine: Negative. Genitourinary: Negative for dysuria, frequency and hematuria. Musculoskeletal: Positive for arthralgias, back pain and gait problem. Negative for neck pain. Skin: Negative. Allergic/Immunologic: Negative.     Neurological: Negative for TABLET BY MOUTH EVERY DAY    PRAVASTATIN (PRAVACHOL) 40 MG TABLET    TAKE 1 TABLET BY MOUTH EVERY DAY IN THE EVENING    PREDNISONE (DELTASONE) 20 MG TABLET    Take 1 tablet by mouth 2 times daily for 5 days    ROSUVASTATIN (CRESTOR) 40 MG TABLET    TAKE 1 TABLET BY MOUTH DAILY AT BEDTIME.     TIZANIDINE (ZANAFLEX) 4 MG TABLET    Take 1 tablet by mouth nightly    VITAMIN D3 (CHOLECALCIFEROL) 10 MCG (400 UNIT) TABS TABLET    Take 400 Units by mouth daily       ALLERGIES     Alcohol, Benadryl [diphenhydramine], Demerol hcl [meperidine], and Sulfa antibiotics    FAMILY HISTORY       Family History   Problem Relation Age of Onset    Cancer Mother         stomach    Heart Disease Father 48    Cancer Father         bone    Cancer Maternal Grandmother         lung    Cancer Paternal Grandmother     Heart Disease Paternal Grandfather     No Known Problems Sister     Cancer Brother     Heart Disease Brother         hole in heart in 65 at 1 months age          SOCIAL HISTORY       Social History     Socioeconomic History    Marital status:      Spouse name: None    Number of children: None    Years of education: None    Highest education level: None   Occupational History    None   Tobacco Use    Smoking status: Current Every Day Smoker     Packs/day: 3.00     Years: 34.00     Pack years: 102.00     Types: Cigars, Cigarettes    Smokeless tobacco: Never Used    Tobacco comment: smoked cigarettes for 30 yrs, quit 9/1996, started smoking Cigars in 2014   Substance and Sexual Activity    Alcohol use: No     Comment: sober > 22 yrs    Drug use: No    Sexual activity: None   Other Topics Concern    None   Social History Narrative    None     Social Determinants of Health     Financial Resource Strain: Low Risk     Difficulty of Paying Living Expenses: Not hard at all   Food Insecurity: No Food Insecurity    Worried About Running Out of Food in the Last Year: Never true    Ivana of Food in the Last Year: Never true   Transportation Needs: No Transportation Needs    Lack of Transportation (Medical): No    Lack of Transportation (Non-Medical): No   Physical Activity:     Days of Exercise per Week:     Minutes of Exercise per Session:    Stress:     Feeling of Stress :    Social Connections:     Frequency of Communication with Friends and Family:     Frequency of Social Gatherings with Friends and Family:     Attends Tenriism Services:     Active Member of Clubs or Organizations:     Attends Club or Organization Meetings:     Marital Status:    Intimate Partner Violence:     Fear of Current or Ex-Partner:     Emotionally Abused:     Physically Abused:     Sexually Abused:        SCREENINGS             PHYSICAL EXAM    (up to 7 for level 4, 8 or more for level 5)     ED Triage Vitals [06/06/21 0656]   BP Temp Temp Source Pulse Resp SpO2 Height Weight   127/88 97.6 °F (36.4 °C) Oral 76 20 98 % 5' 9\" (1.753 m) 140 lb (63.5 kg)       Physical Exam  Vitals and nursing note reviewed. Constitutional:       General: He is not in acute distress. Appearance: Normal appearance. He is well-developed. He is not ill-appearing. HENT:      Head: Normocephalic and atraumatic. Right Ear: External ear normal.      Left Ear: External ear normal.   Eyes:      General:         Right eye: No discharge. Left eye: No discharge. Conjunctiva/sclera: Conjunctivae normal.      Pupils: Pupils are equal, round, and reactive to light. Neck:      Trachea: Trachea normal.   Cardiovascular:      Rate and Rhythm: Normal rate and regular rhythm. Pulses: Normal pulses. Heart sounds: Normal heart sounds. No friction rub. No gallop. Pulmonary:      Effort: Pulmonary effort is normal. No respiratory distress. Breath sounds: Normal breath sounds. No stridor. No wheezing or rhonchi. Abdominal:      General: Bowel sounds are normal. There is no distension. Palpations: Abdomen is soft. Tenderness: There is no abdominal tenderness. Musculoskeletal:         General: Tenderness present. No swelling or deformity. Cervical back: Full passive range of motion without pain, normal range of motion and neck supple. Lumbar back: Tenderness present. No signs of trauma or bony tenderness. Decreased range of motion. Back:       Right lower leg: No edema. Left lower leg: No edema. Skin:     General: Skin is warm and dry. Neurological:      Mental Status: He is alert and oriented to person, place, and time. Psychiatric:         Speech: Speech normal.         Behavior: Behavior normal.         Thought Content: Thought content normal.         Judgment: Judgment normal.         DIAGNOSTIC RESULTS     EKG: All EKG's are interpreted by the Emergency Department Physician who either signs or Co-signsthis chart in the absence of a cardiologist.    -    RADIOLOGY:   [facility] such as CT, Ultrasound and MRI are read by the radiologist. Plain radiographic images are visualized and preliminarily interpreted by the emergency physician with the below findings:    CT imaging of the lumbar spine is noted radiologist.  Please see detailed results. There are degenerative degenerative changes noted, facet changes, and the like. Interpretation per the Radiologist below, if available at the time ofthis note:    Harsha   Final Result   There are no acute osseous changes.                   ED BEDSIDE ULTRASOUND:   Performed by ED Physician - none    LABS:  Labs Reviewed   URINE RT REFLEX TO CULTURE - Abnormal; Notable for the following components:       Result Value    Ketones, Urine TRACE (*)     All other components within normal limits   CBC WITH AUTO DIFFERENTIAL - Abnormal; Notable for the following components:    WBC 10.9 (*)     RBC 4.46 (*)     MCH 33.8 (*)     All other components within normal limits   BASIC METABOLIC PANEL - Abnormal; Notable for the following components:    Sodium 134 (*)     All other components within normal limits   SEDIMENTATION RATE   HIGH SENSITIVITY CRP       All other labs were within normal range or not returned as of this dictation. EMERGENCY DEPARTMENT COURSE and DIFFERENTIAL DIAGNOSIS/MDM:   Vitals:    Vitals:    06/06/21 0656   BP: 127/88   Pulse: 76   Resp: 20   Temp: 97.6 °F (36.4 °C)   TempSrc: Oral   SpO2: 98%   Weight: 140 lb (63.5 kg)   Height: 5' 9\" (1.753 m)       Patient made stable emergency department. Patient feeling significant better status post medication. Advised for close follow-up with Keyanna Corbett. Patient will likely need follow-up MRI of the lumbar spine. Given evidence of radiculopathy, advanced degenerative changes of the lumbar spine facet changes, disc space narrowing, suspect patient likely with neuroforaminal impingement. Will increase patient's analgesia at this time to Percocet. Recommend continuation of muscular excellent prednisone as written by PCP. Advised return the emergency department should condition worsening capacity. Patient expressed understanding. Patient be appreciative of care. MDM    CRITICAL CARE TIME   Total Critical Care time was - minutes, excluding separately reportableprocedures. There was a high probability of clinicallysignificant/life threatening deterioration in the patient's condition which required my urgent intervention.  -    CONSULTS:  None    PROCEDURES:  Unless otherwise noted below, none     Procedures    FINAL IMPRESSION      1. Lumbar radiculopathy    2.  Degenerative disc disease, lumbar        DISPOSITION/PLAN   DISPOSITION        PATIENT REFERRED TO:  MANUELA Gilmore - CNP  [address]  [place]   [phone]    Call today  for follow up Emergency Department visit      DISCHARGE MEDICATIONS:  New Prescriptions    OXYCODONE-ACETAMINOPHEN (PERCOCET) 5-325 MG PER TABLET    Take 1 tablet by mouth every 6 hours as needed for Pain for up to 3 days. WARNING:  May cause drowsiness. May impair ability to operate vehicles or machinery. Do not use in combination with alcohol.           (Please note that portions of this note were completed with a voice recognitionprogram.  Efforts were made to edit the dictations but occasionally words are mis-transcribed.)    Paulo Aquino MD (electronically signed)  Attending Emergency Physician          Paulo Aquino MD  06/06/21 9690

## 2021-06-06 NOTE — ED TRIAGE NOTES
Pt states that he was seen by his PCP, pt states that his lower back continues to hurt and he can not take it anymore.  Pt states that he has a \"blown disk\"

## 2021-06-07 ENCOUNTER — TELEPHONE (OUTPATIENT)
Dept: FAMILY MEDICINE CLINIC | Age: 66
End: 2021-06-07

## 2021-06-09 ENCOUNTER — HOSPITAL ENCOUNTER (EMERGENCY)
Age: 66
Discharge: HOME | End: 2021-06-09
Payer: MEDICARE

## 2021-06-09 ENCOUNTER — OFFICE VISIT (OUTPATIENT)
Dept: FAMILY MEDICINE CLINIC | Age: 66
End: 2021-06-09
Payer: MEDICARE

## 2021-06-09 VITALS
HEART RATE: 77 BPM | SYSTOLIC BLOOD PRESSURE: 110 MMHG | WEIGHT: 140 LBS | OXYGEN SATURATION: 97 % | BODY MASS INDEX: 20.73 KG/M2 | DIASTOLIC BLOOD PRESSURE: 74 MMHG | HEIGHT: 69 IN

## 2021-06-09 VITALS
TEMPERATURE: 98.2 F | SYSTOLIC BLOOD PRESSURE: 132 MMHG | HEART RATE: 71 BPM | WEIGHT: 140 LBS | RESPIRATION RATE: 16 BRPM | DIASTOLIC BLOOD PRESSURE: 80 MMHG | OXYGEN SATURATION: 97 % | BODY MASS INDEX: 20.73 KG/M2 | HEIGHT: 69 IN

## 2021-06-09 DIAGNOSIS — M54.50 ACUTE EXACERBATION OF CHRONIC LOW BACK PAIN: Primary | ICD-10-CM

## 2021-06-09 DIAGNOSIS — R41.3 MEMORY CHANGES: ICD-10-CM

## 2021-06-09 DIAGNOSIS — G89.29 ACUTE EXACERBATION OF CHRONIC LOW BACK PAIN: Primary | ICD-10-CM

## 2021-06-09 DIAGNOSIS — M51.36 OTHER INTERVERTEBRAL DISC DEGENERATION, LUMBAR REGION: Primary | ICD-10-CM

## 2021-06-09 PROBLEM — F11.99 OPIOID USE, UNSPECIFIED WITH UNSPECIFIED OPIOID-INDUCED DISORDER (HCC): Status: ACTIVE | Noted: 2021-06-09

## 2021-06-09 PROBLEM — F11.29 OPIOID DEPENDENCE WITH UNSPECIFIED OPIOID-INDUCED DISORDER (HCC): Status: ACTIVE | Noted: 2021-06-09

## 2021-06-09 PROBLEM — F11.20 OPIOID DEPENDENCE, UNCOMPLICATED (HCC): Status: ACTIVE | Noted: 2021-06-09

## 2021-06-09 PROCEDURE — 96372 THER/PROPH/DIAG INJ SC/IM: CPT

## 2021-06-09 PROCEDURE — 99214 OFFICE O/P EST MOD 30 MIN: CPT | Performed by: NURSE PRACTITIONER

## 2021-06-09 PROCEDURE — 99284 EMERGENCY DEPT VISIT MOD MDM: CPT

## 2021-06-09 PROCEDURE — 6360000002 HC RX W HCPCS: Performed by: NURSE PRACTITIONER

## 2021-06-09 RX ORDER — HYDROCODONE BITARTRATE AND ACETAMINOPHEN 5; 325 MG/1; MG/1
1 TABLET ORAL EVERY 6 HOURS PRN
Qty: 28 TABLET | Refills: 0 | Status: SHIPPED | OUTPATIENT
Start: 2021-06-09 | End: 2021-06-16

## 2021-06-09 RX ORDER — OXYCODONE HYDROCHLORIDE AND ACETAMINOPHEN 5; 325 MG/1; MG/1
1 TABLET ORAL ONCE
Status: DISCONTINUED | OUTPATIENT
Start: 2021-06-09 | End: 2021-06-09

## 2021-06-09 RX ORDER — MELOXICAM 15 MG/1
15 TABLET ORAL DAILY
Qty: 30 TABLET | Refills: 3 | Status: SHIPPED | OUTPATIENT
Start: 2021-06-09 | End: 2021-09-27

## 2021-06-09 RX ADMIN — HYDROMORPHONE HYDROCHLORIDE 1 MG: 1 INJECTION, SOLUTION INTRAMUSCULAR; INTRAVENOUS; SUBCUTANEOUS at 03:08

## 2021-06-09 ASSESSMENT — ENCOUNTER SYMPTOMS
NAUSEA: 0
ABDOMINAL PAIN: 0
WHEEZING: 0
VOMITING: 0
SHORTNESS OF BREATH: 0
EYE REDNESS: 0
COLOR CHANGE: 0
RHINORRHEA: 0
TROUBLE SWALLOWING: 0
EYE DISCHARGE: 0
CONSTIPATION: 0
BACK PAIN: 1
BACK PAIN: 1
COUGH: 0
BLOOD IN STOOL: 0
SHORTNESS OF BREATH: 0
DIARRHEA: 0
SORE THROAT: 0
COUGH: 0
EYE PAIN: 0

## 2021-06-09 ASSESSMENT — PAIN DESCRIPTION - LOCATION: LOCATION: BACK

## 2021-06-09 ASSESSMENT — PAIN SCALES - GENERAL
PAINLEVEL_OUTOF10: 10

## 2021-06-09 NOTE — ED PROVIDER NOTES
Negative for dizziness, syncope, weakness, light-headedness and headaches. Psychiatric/Behavioral: Negative for behavioral problems. All other systems reviewed and are negative. Except as noted above the remainder of the review of systems was reviewed and negative.        PAST MEDICAL HISTORY     Past Medical History:   Diagnosis Date    Allergic rhinitis 2/19/2018    Anxiety     Disorder of stomach     valve not closing properly    Essential hypertension 11/4/2019    Gastroesophageal reflux disease 10/4/2019    Hyperlipidemia     meds > 22 yrs    Low back pain 5/1/2018    ARNAUD (obstructive sleep apnea) 2/19/2018    Other emphysema ([facility] 10/4/2019    Other intervertebral disc degeneration, lumbar region 3/23/2018    Radiculopathy, lumbar region 2/28/2018    Seasonal affective disorder ([facility] 10/4/2019    Substance abuse ([facility]     alcholic, quit 20 yrs      Past Surgical History:   Procedure Laterality Date    COLONOSCOPY  12/22/2016    DALIA LAST MD    DENTAL SURGERY  10 yrs ago    ENDOSCOPY, COLON, DIAGNOSTIC      EYE SURGERY      Lasik OU    KNEE ARTHROSCOPY Right     KNEE SURGERY Right 05/2016    Ray procedure    [place] NASAL SCOPY,OPEN MAXILL SINUS N/A 2/22/2018    SEPTOPLASTY MICRODEBRIDER ASSISTED TURBINOPLASTY AND OUT-FRACTURING BILATERAL NASAL ENDOSCOPY performed by Paul Bravo MD [facility] History     Socioeconomic History    Marital status:      Spouse name: Not on file    Number of children: Not on file    Years of education: Not on file    Highest education level: Not on file   Occupational History    Not on file   Tobacco Use    Smoking status: Current Every Day Smoker     Packs/day: 3.00     Years: 34.00     Pack years: 102.00     Types: Cigars, Cigarettes    Smokeless tobacco: Never Used    Tobacco comment: smoked cigarettes for 30 yrs, quit 9/1996, started smoking Cigars in 2014   Substance and Sexual Activity    Alcohol use: No     Comment: sober Palpations: Abdomen is soft. Tenderness: There is no abdominal tenderness. Musculoskeletal:      Cervical back: Normal, normal range of motion and neck supple. Thoracic back: Normal.      Lumbar back: Normal.      Comments: No midline tenderness or step-off throughout the C, T, or L-spine. Bilateral hand grasps are strong and symmetric. There is no incontinence of bowel or bladder. There is no foot drop. No saddle parasthesias. Light tough remains intact. 2 point discrimination remains intact. Skin:     General: Skin is warm and dry. Capillary Refill: Capillary refill takes less than 2 seconds. Findings: No rash. Neurological:      Mental Status: He is alert and oriented to person, place, and time. Cranial Nerves: No cranial nerve deficit. Psychiatric:         Behavior: Behavior normal.         RESULTS     EKG: All EKG's are interpreted by the Emergency Department Physician who either signs or Co-signsthis chart in the absence of a cardiologist.        RADIOLOGY:   [facility] [employer] such as CT, Ultrasound and MRI are read by the radiologist. Plain radiographic images are visualized and preliminarily interpreted by the emergency physician with the below findings:      Interpretation per the Radiologist below, if available at the time ofthis note:    No orders to display         ED BEDSIDE ULTRASOUND:   Performed by ED Physician - none    LABS:  Labs Reviewed - No data to display    All other labs were within normal range or not returned as of this dictation. EMERGENCY DEPARTMENT COURSE and DIFFERENTIAL DIAGNOSIS/MDM:   Vitals:    Vitals:    06/09/21 0257   BP: 132/80   Pulse: 71   Resp: 16   Temp: 98.2 °F (36.8 °C)   TempSrc: Oral   SpO2: 97%   Weight: 140 lb (63.5 kg)   Height: 5' 9\" (1.753 m)            MDM   Patient is a 56-year-old male presenting the ER with a chief complaint of back pain. Hemodynamically stable nontoxic-appearing.   I spoke to the patient about his recurrent emergency department visits for acute exacerbation of the chronic back pain. He understands and he states he will be following up with his doctor and he tried to get in today and was unable to. Gave patient a shot of Dilaudid here to help control his pain until he gets home. Patient is discharged to follow-up outpatient in stable condition with stable vital signs. CRITICAL CARE TIME       CONSULTS:  None    PROCEDURES:  Unless otherwise noted below, none     Procedures    FINAL IMPRESSION      1.  Acute exacerbation of chronic low back pain          DISPOSITION/PLAN   DISPOSITION Decision To Discharge 06/09/2021 03:06:49 AM      PATIENT REFERRED TO:  MANUELA Daly CNP  [address], [address] [facility]  [phone]    Schedule an appointment as soon as possible for a visit in 1 day        DISCHARGE MEDICATIONS:  New Prescriptions    No medications on file          (Please notethat portions of this note were completed with a voice recognition program.  Efforts were made to edit the dictations but occasionally words are mis-transcribed.)    Claudean Cough, APRN - CNP (electronically signed)  Attending Emergency Physician          [facility], APRN - CNP  06/09/21 6402

## 2021-06-09 NOTE — PROGRESS NOTES
Subjective  Chief Complaint   Patient presents with    Follow-up       HPI    Struggling with low back pain. Has been over past month. Having a hard time tolerating the pain. Walking with a cane. No change in bowel or bladder. Numbness in the right foot. Has been to th ER multiple times over the past month due to the discomfort. I have given him a script of percocet. He notes that he did not like how he felt on it. Made him \"loopy\"  Pt also admits to being somewhat confused about what he is supposed to be taking and not supposed to be taking. Of note, CATALINA Duvall, helped go through his medications the past visit. He is accompanied with relative currently to help sort out what he needs to do while at home. He believes he took all the steroids. Also had CT done at the ER while there of the back. That has been reviewed. LUMBAR SPINE CT FINDINGS:        There is no acute fracture or subluxation. [place] is no loss of vertebral body height.        The spine is in anatomic alignment. There is intervertebral disc space narrowing at every level with vacuum phenomenon at L1-2 and L5-S1. There are Schmorl's nodes involving the superior articular surface versus from T12 to L2.    There is preservation of the intervertebral disc spaces.        There is no evidence of central canal narrowing or neural foraminal narrowing at any level. There is severe bilateral facet arthrosis at L4-5 and L5-S1.       The SI joints are symmetric. Surrounding soft tissues are within normal limits.          Patient Active Problem List    Diagnosis Date Noted    Essential hypertension 11/04/2019    Tobacco abuse 10/04/2019    Gastroesophageal reflux disease 10/04/2019    Other emphysema ([facility] 10/04/2019    Seasonal affective disorder ([address] 10/04/2019    Hyperlipidemia 01/24/2019    Anxiety 09/19/2018    Insomnia 09/19/2018    Low back pain 05/01/2018    Other intervertebral disc degeneration, lumbar region 03/23/2018    Presence of right artificial knee joint 02/28/2018    Radiculopathy, lumbar region 02/28/2018    Sprain of ligaments of lumbar spine 02/28/2018    Strain of muscle, fascia and tendon of lower back, initial encounter 02/28/2018    DNS (deviated nasal septum) 02/19/2018    Allergic rhinitis 02/19/2018    Hypertrophy of nasal turbinates 02/19/2018    ARNAUD (obstructive sleep apnea) 02/19/2018     Past Medical History:   Diagnosis Date    Allergic rhinitis 2/19/2018    Anxiety     Disorder of stomach     valve not closing properly    Essential hypertension 11/4/2019    Gastroesophageal reflux disease 10/4/2019    Hyperlipidemia     meds > 22 yrs    Low back pain 5/1/2018    ARNAUD (obstructive sleep apnea) 2/19/2018    Other emphysema ([address] 10/4/2019    Other intervertebral disc degeneration, lumbar region 3/23/2018    Radiculopathy, lumbar region 2/28/2018    Seasonal affective disorder ([address] 10/4/2019    Substance abuse ([address]     alcholic, quit 20 yrs      Past Surgical History:   Procedure Laterality Date    COLONOSCOPY  12/22/2016    DALIA LAST MD    DENTAL SURGERY  10 yrs ago    ENDOSCOPY, COLON, DIAGNOSTIC      EYE SURGERY      Lasik OU    KNEE ARTHROSCOPY Right     KNEE SURGERY Right 05/2016    Ray procedure    [place] NASAL SCOPY,OPEN MAXILL SINUS N/A 2/22/2018    SEPTOPLASTY MICRODEBRIDER ASSISTED TURBINOPLASTY AND OUT-FRACTURING BILATERAL NASAL ENDOSCOPY performed by Heather Nickerson MD at [address] History   Problem Relation Age of Onset    Cancer Mother         stomach    Heart Disease Father 48    Cancer Father         bone    Cancer Maternal Grandmother         lung    Cancer Paternal Grandmother     Heart Disease Paternal Grandfather     No Known Problems Sister     Cancer Brother     Heart Disease Brother         hole in heart in 65 at 1 months age     Social History     Socioeconomic History    Marital status:      Spouse name: None    Number of children: None    Years of education: None    Highest education level: None   Occupational History    None   Tobacco Use    Smoking status: Current Every Day Smoker     Packs/day: 3.00     Years: 34.00     Pack years: 102.00     Types: Cigars, Cigarettes    Smokeless tobacco: Never Used    Tobacco comment: smoked cigarettes for 30 yrs, quit 9/1996, started smoking Cigars in 2014   Substance and Sexual Activity    Alcohol use: No     Comment: sober > 22 yrs    Drug use: No    Sexual activity: None   Other Topics Concern    None   Social History Narrative    None     Social Determinants of Health     Financial Resource Strain: Low Risk     Difficulty of Paying Living Expenses: Not hard at all   Food Insecurity: No Food Insecurity    Worried About Running Out of Food in the Last Year: Never true    Ivana of Food in the Last Year: Never true   Transportation Needs: No Transportation Needs    Lack of Transportation (Medical): No    Lack of Transportation (Non-Medical): No   Physical Activity:     Days of Exercise per Week:     Minutes of Exercise per Session:    Stress:     Feeling of Stress :    Social Connections:     Frequency of Communication with Friends and Family:     Frequency of Social Gatherings with Friends and Family:     Attends Sabianist Services:     Active Member of Clubs or Organizations:     Attends Club or Organization Meetings:     Marital Status:    Intimate Partner Violence:     Fear of Current or Ex-Partner:     Emotionally Abused:     Physically Abused:     Sexually Abused:      Current Outpatient Medications on File Prior to Visit   Medication Sig Dispense Refill    oxyCODONE-acetaminophen (PERCOCET) 5-325 MG per tablet Take 1 tablet by mouth every 6 hours as needed for Pain for up to 3 days. WARNING:  May cause drowsiness. May impair ability to operate vehicles or machinery. Do not use in combination with alcohol.  12 tablet 0    vitamin D3 (CHOLECALCIFEROL) 10 MCG (400 UNIT) TABS tablet Take 400 Units by mouth daily      ASPIRIN 81 PO Take by mouth      acetaminophen (TYLENOL) 500 MG tablet Take 500 mg by mouth every 6 hours as needed for Pain      Apoaequorin (PREVAGEN PO) Take by mouth daily      amoxicillin (AMOXIL) 500 MG capsule Take 500 mg by mouth 4 times daily as needed For procedures      HYDROcodone-acetaminophen (NORCO) 5-325 MG per tablet TAKE 1 TO 2 TABLETS BY MOUTH EVERY 4 TO 6 HOURS AS NEEDED FOR PAIN FOR 3 DAYS.  rosuvastatin (CRESTOR) 40 MG tablet TAKE 1 TABLET BY MOUTH DAILY AT BEDTIME.  tiZANidine (ZANAFLEX) 4 MG tablet Take 1 tablet by mouth nightly 30 tablet 0    pantoprazole (PROTONIX) 40 MG tablet TAKE 1 TABLET BY MOUTH EVERY DAY 90 tablet 1    FLUoxetine (PROZAC) 40 MG capsule Take 1 capsule by mouth daily 30 capsule 5    pravastatin (PRAVACHOL) 40 MG tablet TAKE 1 TABLET BY MOUTH EVERY DAY IN THE EVENING 90 tablet 1     No current facility-administered medications on file prior to visit. Allergies   Allergen Reactions    Alcohol Other (See Comments)     Sober 22 yrs    Benadryl [Diphenhydramine]      \"speeds him up\"    Demerol Hcl [Meperidine] Other (See Comments)     Aggressive behavior    Sulfa Antibiotics Other (See Comments)     Told by mother / patient unaware of reaction       Review of Systems   Constitutional: Negative for fatigue. Respiratory: Negative for cough and shortness of breath. Cardiovascular: Negative for chest pain. Musculoskeletal: Positive for back pain and gait problem. Objective  Vitals:    06/09/21 1339   BP: 110/74   Pulse: 77   SpO2: 97%   Weight: 140 lb (63.5 kg)   Height: 5' 9\" (1.753 m)     Physical Exam  Vitals and nursing note reviewed. Constitutional:       Appearance: Normal appearance. He is normal weight. HENT:      Head: Normocephalic. Cardiovascular:      Rate and Rhythm: Normal rate and regular rhythm. Pulses: Normal pulses.       Heart sounds: Normal heart sounds. Pulmonary:      Effort: Pulmonary effort is normal.      Breath sounds: Normal breath sounds. Musculoskeletal:         General: Normal range of motion. Skin:     General: Skin is warm. Neurological:      General: No focal deficit present. Mental Status: He is alert and oriented to person, place, and time. Mental status is at baseline. Motor: No weakness. Gait: Gait abnormal.   Psychiatric:         Mood and Affect: Mood is anxious. Behavior: Behavior is agitated. Thought Content: Thought content normal.         Cognition and Memory: Cognition is impaired. Judgment: Judgment normal.         Assessment & Plan     Diagnosis Orders   1. Other intervertebral disc degeneration, lumbar region  HYDROcodone-acetaminophen (NORCO) 5-325 MG per tablet    meloxicam (MOBIC) 15 MG tablet    Lon Yost MD, Pain Management, [place]       Orders Placed This Encounter   Procedures   Lon Yost MD, Pain Management [place]     Referral Priority:   Routine     Referral Type:   Eval and Treat     Referral Reason:   Specialty Services Required     Referred to Provider:   Savannah Zamora MD     Requested Specialty:   Pain Management     Number of Visits Requested:   1       Orders Placed This Encounter   Medications    HYDROcodone-acetaminophen (NORCO) 5-325 MG per tablet     Sig: Take 1 tablet by mouth every 6 hours as needed for Pain for up to 7 days. Intended supply: 7 days. Take lowest dose possible to manage pain     Dispense:  28 tablet     Refill:  0     Reduce doses taken as pain becomes manageable    meloxicam (MOBIC) 15 MG tablet     Sig: Take 1 tablet by mouth daily     Dispense:  30 tablet     Refill:  3     Side effects, adverse effects of the medication prescribed today, as well as treatment plan/ rationale and result expectations have been discussed with the patient who expresses understanding and desires to proceed.     Close follow up to evaluate treatment results and for coordination of care. I have reviewed the patient's medical history in detail and updated the computerized patient record. As always, patient is advised that if symptoms worsen in any way they will proceed to the nearest emergency room. Fu with us within 1 mos.     MANUELA Gomes - CNP

## 2021-06-13 ENCOUNTER — HOSPITAL ENCOUNTER (EMERGENCY)
Age: 66
Discharge: HOME | End: 2021-06-13
Payer: MEDICARE

## 2021-06-13 ENCOUNTER — HOSPITAL ENCOUNTER (EMERGENCY)
Age: 66
Discharge: HOME | End: 2021-06-13
Attending: EMERGENCY MEDICINE
Payer: MEDICARE

## 2021-06-13 VITALS
WEIGHT: 140 LBS | BODY MASS INDEX: 20.73 KG/M2 | HEIGHT: 69 IN | OXYGEN SATURATION: 98 % | TEMPERATURE: 97.3 F | HEART RATE: 74 BPM | DIASTOLIC BLOOD PRESSURE: 78 MMHG | SYSTOLIC BLOOD PRESSURE: 132 MMHG | RESPIRATION RATE: 18 BRPM

## 2021-06-13 VITALS
SYSTOLIC BLOOD PRESSURE: 163 MMHG | HEIGHT: 69 IN | DIASTOLIC BLOOD PRESSURE: 85 MMHG | WEIGHT: 140 LBS | OXYGEN SATURATION: 98 % | BODY MASS INDEX: 20.73 KG/M2 | RESPIRATION RATE: 18 BRPM | HEART RATE: 79 BPM | TEMPERATURE: 97.5 F

## 2021-06-13 DIAGNOSIS — M25.562 CHRONIC PAIN OF LEFT KNEE: Primary | ICD-10-CM

## 2021-06-13 DIAGNOSIS — G89.29 CHRONIC PAIN OF LEFT KNEE: Primary | ICD-10-CM

## 2021-06-13 DIAGNOSIS — G89.29 CHRONIC BILATERAL LOW BACK PAIN WITHOUT SCIATICA: Primary | ICD-10-CM

## 2021-06-13 DIAGNOSIS — M54.50 CHRONIC BILATERAL LOW BACK PAIN WITHOUT SCIATICA: Primary | ICD-10-CM

## 2021-06-13 DIAGNOSIS — M25.562 ACUTE PAIN OF LEFT KNEE: ICD-10-CM

## 2021-06-13 PROCEDURE — 96372 THER/PROPH/DIAG INJ SC/IM: CPT

## 2021-06-13 PROCEDURE — 6360000002 HC RX W HCPCS: Performed by: PERSONAL EMERGENCY RESPONSE ATTENDANT

## 2021-06-13 PROCEDURE — 6360000002 HC RX W HCPCS: Performed by: EMERGENCY MEDICINE

## 2021-06-13 PROCEDURE — 99282 EMERGENCY DEPT VISIT SF MDM: CPT

## 2021-06-13 PROCEDURE — 99283 EMERGENCY DEPT VISIT LOW MDM: CPT

## 2021-06-13 RX ORDER — KETOROLAC TROMETHAMINE 30 MG/ML
30 INJECTION, SOLUTION INTRAMUSCULAR; INTRAVENOUS ONCE
Status: COMPLETED | OUTPATIENT
Start: 2021-06-13 | End: 2021-06-13

## 2021-06-13 RX ORDER — MORPHINE SULFATE 2 MG/ML
4 INJECTION, SOLUTION INTRAMUSCULAR; INTRAVENOUS ONCE
Status: COMPLETED | OUTPATIENT
Start: 2021-06-13 | End: 2021-06-13

## 2021-06-13 RX ADMIN — MORPHINE SULFATE 4 MG: 2 INJECTION, SOLUTION INTRAMUSCULAR; INTRAVENOUS at 05:58

## 2021-06-13 RX ADMIN — KETOROLAC TROMETHAMINE 30 MG: 30 INJECTION, SOLUTION INTRAMUSCULAR; INTRAVENOUS at 03:06

## 2021-06-13 ASSESSMENT — ENCOUNTER SYMPTOMS
COLOR CHANGE: 0
BLOOD IN STOOL: 0
NAUSEA: 0
COUGH: 0
VOMITING: 0
DIARRHEA: 0
BACK PAIN: 1
RHINORRHEA: 0
SHORTNESS OF BREATH: 0
SORE THROAT: 0
ABDOMINAL PAIN: 0

## 2021-06-13 ASSESSMENT — PAIN SCALES - GENERAL
PAINLEVEL_OUTOF10: 10
PAINLEVEL_OUTOF10: 10
PAINLEVEL_OUTOF10: 9
PAINLEVEL_OUTOF10: 10

## 2021-06-13 ASSESSMENT — PAIN DESCRIPTION - PAIN TYPE
TYPE: ACUTE PAIN
TYPE: ACUTE PAIN;CHRONIC PAIN

## 2021-06-13 ASSESSMENT — PAIN DESCRIPTION - ORIENTATION
ORIENTATION: LEFT
ORIENTATION: LEFT

## 2021-06-13 ASSESSMENT — PAIN DESCRIPTION - LOCATION
LOCATION: KNEE
LOCATION: KNEE

## 2021-06-13 ASSESSMENT — PAIN DESCRIPTION - DESCRIPTORS
DESCRIPTORS: ACHING
DESCRIPTORS: ACHING;SHARP;SHOOTING

## 2021-06-13 ASSESSMENT — PAIN DESCRIPTION - FREQUENCY
FREQUENCY: CONTINUOUS
FREQUENCY: CONTINUOUS

## 2021-06-13 NOTE — ED PROVIDER NOTES
[facility] ED  EMERGENCY DEPARTMENT ENCOUNTER      Pt Name: Dia Cason  MRN: [id number]  Ab[place]fzion 1955  Date of evaluation: 6/13/2021  Provider: Ahsan Osman MD    [address] [place] [address]       Chief Complaint   Patient presents with    Knee Pain     left          HISTORY OF PRESENT ILLNESS   (Location/Symptom, Timing/Onset, Context/Setting, Quality, Duration, Modifying Factors, Severity)  Note limiting factors. 75-year-old male presenting with left knee pain. History of pain in this knee. No new injury. Ongoing for about 1 year. Has not tried anything for relief prior to arrival.  He is able to walk. No fevers. Nursing Notes were reviewed. REVIEW OF SYSTEMS    (2-9 systems for level 4, 10 or more for level 5)     Review of Systems   All other systems reviewed and are negative. Except as noted above the remainder of the review of systems was reviewed and negative.        PAST MEDICAL HISTORY     Past Medical History:   Diagnosis Date    Allergic rhinitis 2/19/2018    Anxiety     Disorder of stomach     valve not closing properly    Essential hypertension 11/4/2019    Gastroesophageal reflux disease 10/4/2019    Hyperlipidemia     meds > 22 yrs    Low back pain 5/1/2018    ARNAUD (obstructive sleep apnea) 2/19/2018    Other emphysema ([address] 10/4/2019    Other intervertebral disc degeneration, lumbar region 3/23/2018    Radiculopathy, lumbar region 2/28/2018    Seasonal affective disorder ([address] 10/4/2019    Substance abuse ([address]     alcholic, quit 20 yrs          SURGICAL HISTORY       Past Surgical History:   Procedure Laterality Date    COLONOSCOPY  12/22/2016    A SHALA MARLEY    DENTAL SURGERY  10 yrs ago    ENDOSCOPY, COLON, DIAGNOSTIC      EYE SURGERY      Lasik OU    KNEE ARTHROSCOPY Right     KNEE SURGERY Right 05/2016    Ray procedure    [place] NASAL SCOPY,OPEN MAXILL SINUS N/A 2/22/2018    SEPTOPLASTY MICRODEBRIDER ASSISTED TURBINOPLASTY AND OUT-FRACTURING BILATERAL NASAL ENDOSCOPY performed by Munir Abrams MD at [address] [place] [address]       Previous Medications    ACETAMINOPHEN (TYLENOL) 500 MG TABLET    Take 500 mg by mouth every 6 hours as needed for Pain    AMOXICILLIN (AMOXIL) 500 MG CAPSULE    Take 500 mg by mouth 4 times daily as needed For procedures    APOAEQUORIN (PREVAGEN PO)    Take by mouth daily    ASPIRIN 81 PO    Take by mouth    FLUOXETINE (PROZAC) 40 MG CAPSULE    Take 1 capsule by mouth daily    HYDROCODONE-ACETAMINOPHEN (NORCO) 5-325 MG PER TABLET    Take 1 tablet by mouth every 6 hours as needed for Pain for up to 7 days. Intended supply: 7 days. Take lowest dose possible to manage pain    MELOXICAM (MOBIC) 15 MG TABLET    Take 1 tablet by mouth daily    PANTOPRAZOLE (PROTONIX) 40 MG TABLET    TAKE 1 TABLET BY MOUTH EVERY DAY    PRAVASTATIN (PRAVACHOL) 40 MG TABLET    TAKE 1 TABLET BY MOUTH EVERY DAY IN THE EVENING    ROSUVASTATIN (CRESTOR) 40 MG TABLET    TAKE 1 TABLET BY MOUTH DAILY AT BEDTIME.     TIZANIDINE (ZANAFLEX) 4 MG TABLET    Take 1 tablet by mouth nightly    VITAMIN D3 (CHOLECALCIFEROL) 10 MCG (400 UNIT) TABS TABLET    Take 400 Units by mouth daily       ALLERGIES     Alcohol, Benadryl [diphenhydramine], Demerol hcl [meperidine], and Sulfa antibiotics    FAMILY HISTORY       Family History   Problem Relation Age of Onset    Cancer Mother         stomach    Heart Disease Father 48    Cancer Father         bone    Cancer Maternal Grandmother         lung    Cancer Paternal Grandmother     Heart Disease Paternal Grandfather     No Known Problems Sister     Cancer Brother     Heart Disease Brother         hole in heart in 65 at 1 months age          SOCIAL HISTORY       Social History     Socioeconomic History    Marital status:      Spouse name: None    Number of children: None    Years of education: None    Highest education level: None   Occupational History    None   Tobacco Use    Smoking status: Current Every Day Smoker     Packs/day: 3.00     Years: 34.00     Pack years: 102.00     Types: Cigars, Cigarettes    Smokeless tobacco: Never Used    Tobacco comment: smoked cigarettes for 30 yrs, quit 9/1996, started smoking Cigars in 2014   Substance and Sexual Activity    Alcohol use: No     Comment: sober > 22 yrs    Drug use: No    Sexual activity: None   Other Topics Concern    None   Social History Narrative    None     Social Determinants of Health     Financial Resource Strain: Low Risk     Difficulty of Paying Living Expenses: Not hard at all   Food Insecurity: No Food Insecurity    Worried About Running Out of Food in the Last Year: Never true    Ivana of Food in the Last Year: Never true   Transportation Needs: No Transportation Needs    Lack of Transportation (Medical): No    Lack of Transportation (Non-Medical): No   Physical Activity:     Days of Exercise per Week:     Minutes of Exercise per Session:    Stress:     Feeling of Stress :    Social Connections:     Frequency of Communication with Friends and Family:     Frequency of Social Gatherings with Friends and Family:     Attends Yarsani Services:     Active Member of Clubs or Organizations:     Attends Club or Organization Meetings:     Marital Status:    Intimate Partner Violence:     Fear of Current or Ex-Partner:     Emotionally Abused:     Physically Abused:     Sexually Abused:        SCREENINGS               PHYSICAL EXAM    (up to 7 for level 4, 8 or more for level 5)     ED Triage Vitals [06/13/21 0236]   BP Temp Temp Source Pulse Resp SpO2 Height Weight   (!) 147/87 97.3 °F (36.3 °C) Oral 76 18 96 % 5' 9\" (1.753 m) 140 lb (63.5 kg)       Physical Exam  Vitals and nursing note reviewed. Constitutional:       General: He is not in acute distress. Appearance: Normal appearance. He is well-developed. He is not ill-appearing. HENT:      Head: Normocephalic and atraumatic.       Mouth/Throat: Mouth: Mucous membranes are moist.      Pharynx: Oropharynx is clear. Eyes:      Extraocular Movements: Extraocular movements intact. Conjunctiva/sclera: Conjunctivae normal.   Cardiovascular:      Rate and Rhythm: Normal rate and regular rhythm. Pulmonary:      Effort: Pulmonary effort is normal.      Breath sounds: Normal breath sounds. Abdominal:      General: Bowel sounds are normal.      Palpations: Abdomen is soft. Tenderness: There is no abdominal tenderness. Musculoskeletal:         General: No deformity. Normal range of motion. Cervical back: Normal range of motion and neck supple. Comments: 2+ dp/pt pulses   Skin:     General: Skin is warm and dry. Capillary Refill: Capillary refill takes less than 2 seconds. Neurological:      General: No focal deficit present. Mental Status: He is alert and oriented to person, place, and time. Mental status is at baseline. Cranial Nerves: No cranial nerve deficit. Psychiatric:         Thought Content: Thought content normal.         DIAGNOSTIC RESULTS     EKG: All EKG's are interpreted by the Emergency Department Physician who either signs or Co-signs this chart in the absence of a cardiologist.    RADIOLOGY:   Non-plain film images such as CT, Ultrasound and MRI are read by the radiologist. Plain radiographic images are visualized and preliminarily interpreted by the emergency physician with the below findings:    Interpretation per the Radiologist below, if available at the time of this note:    No orders to display       LABS:  Labs Reviewed - No data to display    All other labs were within normal range or not returned as of this dictation.     EMERGENCY DEPARTMENT COURSE and DIFFERENTIAL DIAGNOSIS/MDM:   Vitals:    Vitals:    06/13/21 0236   BP: (!) 147/87   Pulse: 76   Resp: 18   Temp: 97.3 °F (36.3 °C)   TempSrc: Oral   SpO2: 96%   Weight: 140 lb (63.5 kg)   Height: 5' 9\" (1.753 m)       MDM  Number of Diagnoses or

## 2021-06-13 NOTE — ED PROVIDER NOTES
[facility] ED  eMERGENCY dEPARTMENT eNCOUnter      Pt Name: Carmela Wilks  MRN: [id number]  Abiod[place]fzion 1955  Date of evaluation: 6/13/2021  Provider: LYNETTE Estrada      HISTORY OF PRESENT ILLNESS    Carmela Wilks is a 74 y.o. male with PMHx of hypertension, GERD, ARNAUD, anxiety, opioid dependence presents to the emergency department with acute on chronic low back pain and left knee pain. Patient denies any injuries. He was seen here in the ER today, given Toradol IM, and states he drove home, pain was not relieved, so he drove back to the ER. However patient was seen sitting in the waiting room for 1 hour and checked himself back in. He denies injuries. No fevers, chills. He says he is throwing out his percocet at home since they do not help and he used to abuse drugs and doesn't want them around. HPI    Nursing Notes were reviewed. REVIEW OF SYSTEMS       Review of Systems   Constitutional: Negative for appetite change, chills and fever. HENT: Negative for congestion, rhinorrhea and sore throat. Respiratory: Negative for cough and shortness of breath. Cardiovascular: Negative for chest pain. Gastrointestinal: Negative for abdominal pain, blood in stool, diarrhea, nausea and vomiting. Genitourinary: Negative for difficulty urinating. Musculoskeletal: Positive for arthralgias and back pain. Negative for neck stiffness. Skin: Negative for color change and rash. Neurological: Negative for dizziness, syncope, weakness, light-headedness, numbness and headaches. All other systems reviewed and are negative.             PAST MEDICAL HISTORY     Past Medical History:   Diagnosis Date    Allergic rhinitis 2/19/2018    Anxiety     Disorder of stomach     valve not closing properly    Essential hypertension 11/4/2019    Gastroesophageal reflux disease 10/4/2019    Hyperlipidemia     meds > 22 yrs    Low back pain 5/1/2018    ARNAUD (obstructive sleep apnea) 2/19/2018    Partner Violence:     Fear of Current or Ex-Partner:     Emotionally Abused:     Physically Abused:     Sexually Abused:          PHYSICAL EXAM         ED Triage Vitals [06/13/21 0516]   BP Temp Temp Source Pulse Resp SpO2 Height Weight   (!) 163/85 97.5 °F (36.4 °C) Oral 79 18 98 % 5' 9\" (1.753 m) 140 lb (63.5 kg)       Physical Exam  Constitutional:       Appearance: He is well-developed. HENT:      Head: Normocephalic and atraumatic. Eyes:      Conjunctiva/sclera: Conjunctivae normal.      Pupils: Pupils are equal, round, and reactive to light. Neck:      Trachea: No tracheal deviation. Cardiovascular:      Heart sounds: Normal heart sounds. Pulmonary:      Effort: Pulmonary effort is normal. No respiratory distress. Breath sounds: Normal breath sounds. No stridor. Abdominal:      General: Bowel sounds are normal. There is no distension. Palpations: Abdomen is soft. There is no mass. Tenderness: There is no abdominal tenderness. There is no guarding or rebound. Musculoskeletal:         General: Normal range of motion. Cervical back: Normal range of motion and neck supple. Comments: No obvious tenderness palpation, full range of motion, no signs of trauma   Skin:     General: Skin is warm and dry. Capillary Refill: Capillary refill takes less than 2 seconds. Findings: No rash. Neurological:      Mental Status: He is alert and oriented to person, place, and time. Deep Tendon Reflexes: Reflexes are normal and symmetric. Psychiatric:         Behavior: Behavior normal.         Thought Content:  Thought content normal.         Judgment: Judgment normal.         DIAGNOSTIC RESULTS     EKG:All EKG's are interpreted by the Emergency Department Physician who either signs or Co-signs this chart in the absence of a cardiologist.        RADIOLOGY:   Non-plain film images such as CT, Ultrasound and MRI are read by theradiologist. Plain radiographic images are visualized and preliminarily interpreted by the emergency physician with the below findings:    Interpretation per theRadiologist below, if available at the time of this note:    No orders to display           LABS:  Labs Reviewed - No data to display    All other labs were within normal range or not returned as of this dictation. EMERGENCY DEPARTMENT COURSE and DIFFERENTIAL DIAGNOSIS/MDM:   Vitals:    Vitals:    06/13/21 0516   BP: (!) 163/85   Pulse: 79   Resp: 18   Temp: 97.5 °F (36.4 °C)   TempSrc: Oral   SpO2: 98%   Weight: 140 lb (63.5 kg)   Height: 5' 9\" (1.753 m)         MDM    Pt given morphine IM. He states he is supposed to be seeing an orthopedic doctor. Standard anticipatory guidance given to patient upon discharge. Have given them a specific time frame in which to follow-up and who to follow-up with. I have also advised them that they should return to the emergency department if they get worse, or not getting better or develop any new or concerning symptoms. Patient demonstrates understanding. CRITICAL CARE TIME   Total Critical Caretime was 0 minutes, excluding separately reportable procedures. There was a high probability of clinically significant/life threatening deterioration in the patient's condition which required my urgent intervention. Procedures    FINAL IMPRESSION      1. Chronic bilateral low back pain without sciatica    2. Acute pain of left knee          DISPOSITION/PLAN   DISPOSITION Decision To Discharge 06/13/2021 05:42:07 AM      PATIENT REFERRED TO:  Follow-up with specialist as scheduled            DISCHARGE MEDICATIONS:  New Prescriptions    No medications on file          (Please notethat portions of this note were completed with a voice recognition program.  Efforts were made to edit the dictations but occasionally words are mis-transcribed. )    LYNETTE Sarmiento (electronically signed)  Emergency Physician Assistant         Ahmet Munson, [phone]  06/13/21 8073

## 2021-06-14 ENCOUNTER — TELEPHONE (OUTPATIENT)
Dept: FAMILY MEDICINE CLINIC | Age: 66
End: 2021-06-14

## 2021-06-14 NOTE — TELEPHONE ENCOUNTER
Please call the pharmacy ([address] [place] [address]) back [phone] in regards to 40 mg of prozac medication.

## 2021-06-15 ENCOUNTER — CARE COORDINATION (OUTPATIENT)
Dept: CARE COORDINATION | Age: 66
End: 2021-06-15

## 2021-06-15 ENCOUNTER — OFFICE VISIT (OUTPATIENT)
Dept: FAMILY MEDICINE CLINIC | Age: 66
End: 2021-06-15
Payer: MEDICARE

## 2021-06-15 VITALS
SYSTOLIC BLOOD PRESSURE: 120 MMHG | DIASTOLIC BLOOD PRESSURE: 70 MMHG | HEIGHT: 69 IN | HEART RATE: 90 BPM | BODY MASS INDEX: 20.73 KG/M2 | OXYGEN SATURATION: 97 % | WEIGHT: 140 LBS

## 2021-06-15 DIAGNOSIS — F41.9 ANXIETY: ICD-10-CM

## 2021-06-15 DIAGNOSIS — M25.562 ACUTE PAIN OF LEFT KNEE: ICD-10-CM

## 2021-06-15 DIAGNOSIS — M51.36 OTHER INTERVERTEBRAL DISC DEGENERATION, LUMBAR REGION: Primary | ICD-10-CM

## 2021-06-15 DIAGNOSIS — M54.16 RADICULOPATHY, LUMBAR REGION: ICD-10-CM

## 2021-06-15 PROCEDURE — 99214 OFFICE O/P EST MOD 30 MIN: CPT | Performed by: NURSE PRACTITIONER

## 2021-06-15 RX ORDER — PREDNISONE 20 MG/1
20 TABLET ORAL 2 TIMES DAILY
Qty: 10 TABLET | Refills: 0 | Status: SHIPPED | OUTPATIENT
Start: 2021-06-15 | End: 2021-06-20

## 2021-06-15 ASSESSMENT — ENCOUNTER SYMPTOMS
BACK PAIN: 1
SHORTNESS OF BREATH: 0
COUGH: 0

## 2021-06-15 NOTE — PROGRESS NOTES
When Xray tech was attempting to perform order a flag came up asking to confirm with [employer].  Called [employer] and PA is not needed, ref # [id number]   # [phone]

## 2021-06-15 NOTE — PROGRESS NOTES
Subjective  Chief Complaint   Patient presents with    Other     pt states not able to sleep, left knee pain, lower back pain has gotten worse, right knee constant shaking and toe and fingers are numb/tingle       HPI     Hasn't slept over past three days due to pain per pt. States that maybe he has slept a few hrs \"here and there\"    Still struggling with back pain. Has an appt in July scheduled. Also struggling with left knee pain. Doesn't recall any specific injury    States that his right leg has been \"moving a lot\"     Still confused on his medications and what he should be taking. Reports that he has \"just taken acetaminophen for pain\" in spite of me prescribing mobic last visit. States that he couldn't remember what is was for and whether it was controlled.     Patient Active Problem List    Diagnosis Date Noted    Opioid use, unspecified with unspecified opioid-induced disorder 06/09/2021    Opioid dependence with unspecified opioid-induced disorder 06/09/2021    Opioid dependence, uncomplicated 35/64/3895    Essential hypertension 11/04/2019    Tobacco abuse 10/04/2019    Gastroesophageal reflux disease 10/04/2019    Other emphysema ([facility] 10/04/2019    Seasonal affective disorder ([facility] 10/04/2019    Hyperlipidemia 01/24/2019    Anxiety 09/19/2018    Insomnia 09/19/2018    Low back pain 05/01/2018    Other intervertebral disc degeneration, lumbar region 03/23/2018    Presence of right artificial knee joint 02/28/2018    Radiculopathy, lumbar region 02/28/2018    Sprain of ligaments of lumbar spine 02/28/2018    Strain of muscle, fascia and tendon of lower back, initial encounter 02/28/2018    DNS (deviated nasal septum) 02/19/2018    Allergic rhinitis 02/19/2018    Hypertrophy of nasal turbinates 02/19/2018    ARNAUD (obstructive sleep apnea) 02/19/2018     Past Medical History:   Diagnosis Date    Allergic rhinitis 2/19/2018    Anxiety     Disorder of stomach     valve not closing properly    Essential hypertension 11/4/2019    Gastroesophageal reflux disease 10/4/2019    Hyperlipidemia     meds > 22 yrs    Low back pain 5/1/2018    ARNAUD (obstructive sleep apnea) 2/19/2018    Other emphysema ([facility] 10/4/2019    Other intervertebral disc degeneration, lumbar region 3/23/2018    Radiculopathy, lumbar region 2/28/2018    Seasonal affective disorder ([facility] 10/4/2019    Substance abuse ([facility]     alcholic, quit 20 yrs      Past Surgical History:   Procedure Laterality Date    COLONOSCOPY  12/22/2016    DALIA LAST MD    DENTAL SURGERY  10 yrs ago    ENDOSCOPY, COLON, DIAGNOSTIC      EYE SURGERY      Lasik OU    KNEE ARTHROSCOPY Right     KNEE SURGERY Right 05/2016    Ray procedure    MI NASAL SCOPY,OPEN MAXILL SINUS N/A 2/22/2018    SEPTOPLASTY MICRODEBRIDER ASSISTED TURBINOPLASTY AND OUT-FRACTURING BILATERAL NASAL ENDOSCOPY performed by Severo Reilly, MD at [address] History   Problem Relation Age of Onset    Cancer Mother         stomach    Heart Disease Father 48    Cancer Father         bone    Cancer Maternal Grandmother         lung    Cancer Paternal Grandmother     Heart Disease Paternal Grandfather     No Known Problems Sister     Cancer Brother     Heart Disease Brother         hole in heart in 65 at 1 months age     Social History     Socioeconomic History    Marital status:      Spouse name: None    Number of children: None    Years of education: None    Highest education level: None   Occupational History    None   Tobacco Use    Smoking status: Current Every Day Smoker     Packs/day: 3.00     Years: 34.00     Pack years: 102.00     Types: Cigars, Cigarettes    Smokeless tobacco: Never Used    Tobacco comment: smoked cigarettes for 30 yrs, quit 9/1996, started smoking Cigars in 2014   Substance and Sexual Activity    Alcohol use: No     Comment: sober > 22 yrs    Drug use: No    Sexual activity: None   Other Topics Concern    None Social History Narrative    None     Social Determinants of Health     Financial Resource Strain: Low Risk     Difficulty of Paying Living Expenses: Not hard at all   Food Insecurity: No Food Insecurity    Worried About Running Out of Food in the Last Year: Never true    Ivana of Food in the Last Year: Never true   Transportation Needs: No Transportation Needs    Lack of Transportation (Medical): No    Lack of Transportation (Non-Medical): No   Physical Activity:     Days of Exercise per Week:     Minutes of Exercise per Session:    Stress:     Feeling of Stress :    Social Connections:     Frequency of Communication with Friends and Family:     Frequency of Social Gatherings with Friends and Family:     Attends Mu-ism Services:     Active Member of Clubs or Organizations:     Attends Club or Organization Meetings:     Marital Status:    Intimate Partner Violence:     Fear of Current or Ex-Partner:     Emotionally Abused:     Physically Abused:     Sexually Abused:      Current Outpatient Medications on File Prior to Visit   Medication Sig Dispense Refill    HYDROcodone-acetaminophen ([facility] [address]) 5-325 MG per tablet Take 1 tablet by mouth every 6 hours as needed for Pain for up to 7 days. Intended supply: 7 days. Take lowest dose possible to manage pain 28 tablet 0    meloxicam (MOBIC) 15 MG tablet Take 1 tablet by mouth daily 30 tablet 3    vitamin D3 (CHOLECALCIFEROL) 10 MCG (400 UNIT) TABS tablet Take 400 Units by mouth daily      ASPIRIN 81 PO Take by mouth      acetaminophen (TYLENOL) 500 MG tablet Take 500 mg by mouth every 6 hours as needed for Pain      Apoaequorin (PREVAGEN PO) Take by mouth daily      amoxicillin (AMOXIL) 500 MG capsule Take 500 mg by mouth 4 times daily as needed For procedures      rosuvastatin (CRESTOR) 40 MG tablet TAKE 1 TABLET BY MOUTH DAILY AT BEDTIME.       tiZANidine (ZANAFLEX) 4 MG tablet Take 1 tablet by mouth nightly 30 tablet 0    pantoprazole No swelling or deformity. Left knee: No swelling, deformity or bony tenderness. Tenderness present. Skin:     General: Skin is warm. Neurological:      General: No focal deficit present. Mental Status: He is alert and oriented to person, place, and time. Mental status is at baseline. Psychiatric:         Mood and Affect: Mood is anxious. Speech: Speech normal.         Behavior: Behavior is agitated and hyperactive. Assessment & Plan     Diagnosis Orders   1. Other intervertebral disc degeneration, lumbar region  predniSONE (DELTASONE) 20 MG tablet   2. Radiculopathy, lumbar region  predniSONE (DELTASONE) 20 MG tablet   3. Acute pain of left knee  [address] [facility] [place]    XR KNEE LEFT (3 VIEWS)   4. Anxiety         Orders Placed This Encounter   Procedures    XR KNEE LEFT (3 VIEWS)     Standing Status:   Future     Standing Expiration Date:   6/15/2022     Order Specific Question:   Reason for exam:     Answer:   knee pain   [address] [facility] [place]     Referral Priority:   Routine     Referral Type:   Eval and Treat     Referral Reason:   Specialty Services Required     Requested Specialty:   Orthopedic Surgery     Number of Visits Requested:   1       Orders Placed This Encounter   Medications    predniSONE (DELTASONE) 20 MG tablet     Sig: Take 1 tablet by mouth 2 times daily for 5 days     Dispense:  10 tablet     Refill:  0       Side effects, adverse effects of the medication prescribed today, as well as treatment plan/ rationale and result expectations have been discussed with the patient who expresses understanding and desires to proceed. Close follow up to evaluate treatment results and for coordination of care. I have reviewed the patient's medical history in detail and updated the computerized patient record.     As always, patient is advised that if symptoms worsen in any way they will proceed to the nearest emergency room. Return in about 1 week (around 6/22/2021).     Rory Ray, APRN - CNP

## 2021-06-15 NOTE — CARE COORDINATION
Ambulatory Care Coordination Note  6/15/2021  CM Risk Score: 6  Charlson 10 Year Mortality Risk Score: 23%     ACC: Bulmaro Serra, HUSEYIN    Summary Note:    I called to discuss care coordination with Christiano Lau after receiving a referral from Severa Headings. I spoke with his sister Sofya Chandler (health care decision maker). She tells me that he does struggle making good decisions and there are concerns around his memory. She adds that he has had a very stressful year as his divorce was finalized yesterday. He does have two children however they are estranged due to his drinking history over 25 years ago. Sofya Chandler feels care coordination would be good for Trinidad Mitchell will be the POC for our encounters. She is very knowledgeable about his health care and very interested in ensuring that he receive the best care possible,  I have reviewed allergies and current medical history. CC protocol initiated   Depression screening deferred. Sofya Chandler tells me that he does struggle with depression and is taking Prozac. She adds that he does have issues with agitation but feels some of this may be due to stress and increasing memory loss. She also tells me that he does not sleep and has had insomnia since he quit drinking. He refuses all sedatives and pain medications that may lead to addiction  I discussed melatonin with her as he is allergic to benadryl but she says that he does not feel the melatonin helps. .   Medication reconciliation completed. She does go over to his home to fill his medication box every two weeks   SDOH completed, goals established, travel screening, and ACP review. He does have a living will and durable Power of  for health care. She is asking appropriate questions regarding his health care. I have provided her with my contact information and I have asked her to call me with any questions or concerns.     Care Coordination Interventions    Program Enrollment: Rising Risk  Referral from Primary Care Provider: Yes  Suggested Interventions and Community Resources         Goals Addressed    None         Prior to Admission medications    Medication Sig Start Date End Date Taking? Authorizing Provider   predniSONE (DELTASONE) 20 MG tablet Take 1 tablet by mouth 2 times daily for 5 days 6/15/21 6/20/21 Yes Darryle Burner, APRN - CNP   meloxicam (MOBIC) 15 MG tablet Take 1 tablet by mouth daily 6/9/21  Yes Darryle Burner, APRN - CNP   vitamin D3 (CHOLECALCIFEROL) 10 MCG (400 UNIT) TABS tablet Take 400 Units by mouth daily   Yes Historical Provider, MD   acetaminophen (TYLENOL) 500 MG tablet Take 500 mg by mouth every 6 hours as needed for Pain   Yes Historical Provider, MD   Apoaequorin (PREVAGEN PO) Take by mouth daily   Yes Historical Provider, MD   pantoprazole (PROTONIX) 40 MG tablet TAKE 1 TABLET BY MOUTH EVERY DAY 5/24/21  Yes Darryle Burner, APRN - CNP   FLUoxetine (PROZAC) 40 MG capsule Take 1 capsule by mouth daily 3/23/21  Yes Darryle Burner, APRN - CNP   pravastatin (PRAVACHOL) 40 MG tablet TAKE 1 TABLET BY MOUTH EVERY DAY IN THE EVENING 6/17/19  Yes Maribeth Favre, APRN - CNP   HYDROcodone-acetaminophen (NORCO) 5-325 MG per tablet Take 1 tablet by mouth every 6 hours as needed for Pain for up to 7 days. Intended supply: 7 days. Take lowest dose possible to manage pain  Patient not taking: Reported on 6/15/2021 6/9/21 6/16/21  Darryle Burner, APRN - CNP   ASPIRIN 81 PO Take by mouth  Patient not taking: Reported on 6/15/2021    Historical Provider, MD   amoxicillin (AMOXIL) 500 MG capsule Take 500 mg by mouth 4 times daily as needed For procedures  Patient not taking: Reported on 6/15/2021    Historical Provider, MD   rosuvastatin (CRESTOR) 40 MG tablet TAKE 1 TABLET BY MOUTH DAILY AT BEDTIME.   Patient not taking: Reported on 6/15/2021 4/30/21   Historical Provider, MD   tiZANidine (ZANAFLEX) 4 MG tablet Take 1 tablet by mouth nightly  Patient not taking: Reported on 6/15/2021 6/1/21   Darryle Burner, APRN - CNP       Future Appointments   Date Time Provider Raphael Thompson   6/22/2021 10:45 AM MANUELA Max CNP [facility] [place]   7/6/2021  8:30 AM MD [facility] [place]   7/9/2021 11:00 AM Gerald Mancini PA-C [facility]   2/21/2022  1:00 PM MANUELA Max CNP [facility] AT [place]      and   COPD Assessment    Does the patient understand envrionmental exposure?: No  Is the patient able to verbalize Rescue vs. Long Acting medications?: No  Does the patient have a nebulizer?: No  Does the patient use a space with inhaled medications?: No     No patient-reported symptoms         Symptoms:

## 2021-06-15 NOTE — LETTER
6/15/2021        [address] [place] [address] [address]      Dear Panchito Sutton,    My name is Karen Armando RN and I am a registered nurse who partners with MANUELA Carlisle CNP to improve patients' health. MANUELA Carlisle CNP believes you would benefit from working with me. As a member of your health care team, I would work with other providers involved in your care, offer education for your specific health conditions, and connect you with additional resources as needed. I will collaborate with MANUELA Carlisle CNP to support you in following your treatment plan. The additional support I provide is no additional cost to you. My primary focus is to help you achieve specific goals and improve your health. We are committed to walk with you on this journey and look forward to working with you. Please call me to further discuss your healthcare needs. I am available by phone or for appointments at the office. You can reach me at [phone].           In good health,           Karen Armando RN          ENC:  COPD packet and zone tool

## 2021-06-15 NOTE — PATIENT INSTRUCTIONS
Take prednisone as directed x 5 days. Then start taking the meloxicam. You may take up to 4 acetaminophen a day as needed daily.

## 2021-06-16 ENCOUNTER — CARE COORDINATION (OUTPATIENT)
Dept: CARE COORDINATION | Age: 66
End: 2021-06-16

## 2021-06-16 NOTE — CARE COORDINATION
Case referred to this writer by DAVIDE Vogel to assist patient with financial concerns and meals-on-wheels referral. Telephone call to Emily/sister. Left voicemail of nature of call with request for return phone call. Call back number was provided.

## 2021-06-16 NOTE — CARE COORDINATION
Telephone call to Emily/sister. Explained role of CCSW. Discussed could help out with investigating resources for medication affordability and meals-on-wheels. She wants to speak with patient first about these options. She asked that this writer contact patient later in the day.

## 2021-06-17 ENCOUNTER — CARE COORDINATION (OUTPATIENT)
Dept: CARE COORDINATION | Age: 66
End: 2021-06-17

## 2021-06-17 NOTE — CARE COORDINATION
Returning patient's phone call. Patient asking for this writer to review what types of needs he could get assistance with in the future. Reviewed types of assistance available. Patient may need help with utilities in the future. He was not interested in meals-on-wheels at this time. Discussed plan for follow up and provide assistance with community resources as needed.

## 2021-06-22 ENCOUNTER — OFFICE VISIT (OUTPATIENT)
Dept: FAMILY MEDICINE CLINIC | Age: 66
End: 2021-06-22
Payer: MEDICARE

## 2021-06-22 ENCOUNTER — CARE COORDINATION (OUTPATIENT)
Dept: CARE COORDINATION | Age: 66
End: 2021-06-22

## 2021-06-22 VITALS
BODY MASS INDEX: 21.03 KG/M2 | HEIGHT: 69 IN | WEIGHT: 142 LBS | OXYGEN SATURATION: 100 % | HEART RATE: 74 BPM | DIASTOLIC BLOOD PRESSURE: 70 MMHG | SYSTOLIC BLOOD PRESSURE: 120 MMHG

## 2021-06-22 DIAGNOSIS — E55.9 VITAMIN D DEFICIENCY: ICD-10-CM

## 2021-06-22 DIAGNOSIS — E53.8 B12 DEFICIENCY: ICD-10-CM

## 2021-06-22 DIAGNOSIS — F11.99 OPIOID USE, UNSPECIFIED WITH UNSPECIFIED OPIOID-INDUCED DISORDER (HCC): ICD-10-CM

## 2021-06-22 DIAGNOSIS — M54.16 RADICULOPATHY, LUMBAR REGION: ICD-10-CM

## 2021-06-22 DIAGNOSIS — M51.36 OTHER INTERVERTEBRAL DISC DEGENERATION, LUMBAR REGION: Primary | ICD-10-CM

## 2021-06-22 PROBLEM — F11.20 OPIOID DEPENDENCE, UNCOMPLICATED (HCC): Status: RESOLVED | Noted: 2021-06-09 | Resolved: 2021-06-22

## 2021-06-22 PROCEDURE — 99213 OFFICE O/P EST LOW 20 MIN: CPT | Performed by: NURSE PRACTITIONER

## 2021-06-22 PROCEDURE — 96372 THER/PROPH/DIAG INJ SC/IM: CPT | Performed by: NURSE PRACTITIONER

## 2021-06-22 RX ORDER — CYANOCOBALAMIN 1000 UG/ML
1000 INJECTION INTRAMUSCULAR; SUBCUTANEOUS ONCE
Status: COMPLETED | OUTPATIENT
Start: 2021-06-22 | End: 2021-06-22

## 2021-06-22 RX ADMIN — CYANOCOBALAMIN 1000 MCG: 1000 INJECTION INTRAMUSCULAR; SUBCUTANEOUS at 11:09

## 2021-06-22 ASSESSMENT — ENCOUNTER SYMPTOMS
BACK PAIN: 1
COUGH: 0
SHORTNESS OF BREATH: 0
STRIDOR: 0

## 2021-06-22 NOTE — PROGRESS NOTES
After obtaining consent, and per orders of [employer], injection of B12 given in Right deltoid by Hardy Fox MA. Patient instructed to remain in clinic for 20 minutes afterwards, and to report any adverse reaction to me immediately.     PT STATES HE PREFERS B12 IN RIGHT ARM

## 2021-06-22 NOTE — PROGRESS NOTES
Subjective  Chief Complaint   Patient presents with    Follow-up     one week f/u        HPI     Here for a fu of back pain. Taking acetaminophen and meloxicam for relief. Doing very slightly better. Here with family member again. Has been following regularly with Wil Richards as well to help with care coordination. Pt has appt with pain management in a couple of weeks to help with his back. Asking if PT would be recommended?      Patient Active Problem List    Diagnosis Date Noted    Opioid dependence with unspecified opioid-induced disorder 06/09/2021    Essential hypertension 11/04/2019    Tobacco abuse 10/04/2019    Gastroesophageal reflux disease 10/04/2019    Other emphysema ([address] 10/04/2019    Seasonal affective disorder ([address] 10/04/2019    Hyperlipidemia 01/24/2019    Anxiety 09/19/2018    Insomnia 09/19/2018    Low back pain 05/01/2018    Other intervertebral disc degeneration, lumbar region 03/23/2018    Presence of right artificial knee joint 02/28/2018    Radiculopathy, lumbar region 02/28/2018    Sprain of ligaments of lumbar spine 02/28/2018    Strain of muscle, fascia and tendon of lower back, initial encounter 02/28/2018    DNS (deviated nasal septum) 02/19/2018    Allergic rhinitis 02/19/2018    Hypertrophy of nasal turbinates 02/19/2018    ARNAUD (obstructive sleep apnea) 02/19/2018     Past Medical History:   Diagnosis Date    Allergic rhinitis 2/19/2018    Anxiety     Disorder of stomach     valve not closing properly    Essential hypertension 11/4/2019    Gastroesophageal reflux disease 10/4/2019    Hyperlipidemia     meds > 22 yrs    Low back pain 5/1/2018    ARNAUD (obstructive sleep apnea) 2/19/2018    Other emphysema ([address] 10/4/2019    Other intervertebral disc degeneration, lumbar region 3/23/2018    Radiculopathy, lumbar region 2/28/2018    Seasonal affective disorder ([address] 10/4/2019    Substance abuse ([address]     alcholic, quit 20 yrs      Past Surgical History:   Procedure Laterality Date    COLONOSCOPY  12/22/2016    A SHALA MARLEY    DENTAL SURGERY  10 yrs ago    ENDOSCOPY, COLON, DIAGNOSTIC      EYE SURGERY      Lasik OU    KNEE ARTHROSCOPY Right     KNEE SURGERY Right 05/2016    Ray procedure    CA NASAL SCOPY,OPEN MAXILL SINUS N/A 2/22/2018    SEPTOPLASTY MICRODEBRIDER ASSISTED TURBINOPLASTY AND OUT-FRACTURING BILATERAL NASAL ENDOSCOPY performed by Lara Penaloza MD at [address] History   Problem Relation Age of Onset    Cancer Mother         stomach    Heart Disease Father 48    Cancer Father         bone    Cancer Maternal Grandmother         lung    Cancer Paternal Grandmother     Heart Disease Paternal Grandfather     No Known Problems Sister     Cancer Brother     Heart Disease Brother         hole in heart in 65 at 1 months age     Social History     Socioeconomic History    Marital status:      Spouse name: None    Number of children: 2    Years of education: 15    Highest education level: High school graduate   Occupational History    None   Tobacco Use    Smoking status: Current Every Day Smoker     Packs/day: 2.00     Years: 8.00     Pack years: 16.00     Types: Cigars    Smokeless tobacco: Never Used    Tobacco comment: smoked cigarettes for 30 yrs, quit 9/1996, started smoking Cigars in 2014   Vaping Use    Vaping Use: Never used   Substance and Sexual Activity    Alcohol use: No     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    Drug use: No    Sexual activity: Not Currently     Partners: Female   Other Topics Concern    None   Social History Narrative    None     Social Determinants of Health     Financial Resource Strain: Low Risk     Difficulty of Paying Living Expenses: Not hard at all   Food Insecurity: No Food Insecurity    Worried About Running Out of Food in the Last Year: Never true    Ivana of Food in the Last Year: Never true   Transportation Needs: No Transportation Needs    Lack of Transportation (Medical): No    Lack of Transportation (Non-Medical): No   Physical Activity:     Days of Exercise per Week:     Minutes of Exercise per Session:    Stress:     Feeling of Stress :    Social Connections:     Frequency of Communication with Friends and Family:     Frequency of Social Gatherings with Friends and Family:     Attends Yarsani Services:     Active Member of Clubs or Organizations:     Attends Club or Organization Meetings:     Marital Status:    Intimate Partner Violence:     Fear of Current or Ex-Partner:     Emotionally Abused:     Physically Abused:     Sexually Abused:      Current Outpatient Medications on File Prior to Visit   Medication Sig Dispense Refill    meloxicam (MOBIC) 15 MG tablet Take 1 tablet by mouth daily 30 tablet 3    vitamin D3 (CHOLECALCIFEROL) 10 MCG (400 UNIT) TABS tablet Take 400 Units by mouth daily      acetaminophen (TYLENOL) 500 MG tablet Take 500 mg by mouth every 6 hours as needed for Pain      Apoaequorin (PREVAGEN PO) Take by mouth daily      rosuvastatin (CRESTOR) 40 MG tablet TAKE 1 TABLET BY MOUTH DAILY AT BEDTIME.  pantoprazole (PROTONIX) 40 MG tablet TAKE 1 TABLET BY MOUTH EVERY DAY 90 tablet 1    FLUoxetine (PROZAC) 40 MG capsule Take 1 capsule by mouth daily 30 capsule 5    pravastatin (PRAVACHOL) 40 MG tablet TAKE 1 TABLET BY MOUTH EVERY DAY IN THE EVENING 90 tablet 1    amoxicillin (AMOXIL) 500 MG capsule Take 500 mg by mouth 4 times daily as needed For procedures  (Patient not taking: Reported on 6/22/2021)       No current facility-administered medications on file prior to visit.      Allergies   Allergen Reactions    Alcohol Other (See Comments)     Sober 22 yrs    Benadryl [Diphenhydramine]      \"speeds him up\"    Demerol Hcl [Meperidine] Other (See Comments)     Aggressive behavior    Sulfa Antibiotics Other (See Comments)     Told by mother / patient unaware of reaction       Review of Systems   Constitutional: Negative for fatigue. Respiratory: Negative for cough, shortness of breath and stridor. Musculoskeletal: Positive for back pain. Objective  Vitals:    06/22/21 1038   BP: 120/70   Pulse: 74   SpO2: 100%   Weight: 142 lb (64.4 kg)   Height: 5' 9\" (1.753 m)     Physical Exam  Vitals and nursing note reviewed. Constitutional:       Appearance: Normal appearance. He is normal weight. HENT:      Head: Normocephalic. Nose: Nose normal.      Mouth/Throat:      Mouth: Mucous membranes are moist.   Eyes:      Extraocular Movements: Extraocular movements intact. Conjunctiva/sclera: Conjunctivae normal.      Pupils: Pupils are equal, round, and reactive to light. Cardiovascular:      Rate and Rhythm: Normal rate and regular rhythm. Pulses: Normal pulses. Heart sounds: Normal heart sounds. Pulmonary:      Effort: Pulmonary effort is normal.      Breath sounds: Normal breath sounds. Skin:     General: Skin is warm. Neurological:      General: No focal deficit present. Mental Status: He is alert and oriented to person, place, and time. Mental status is at baseline. Psychiatric:         Mood and Affect: Mood normal.         Behavior: Behavior normal.         Thought Content: Thought content normal.         Judgment: Judgment normal.       Assessment & Plan     Diagnosis Orders   1. Other intervertebral disc degeneration, lumbar region     2. B12 deficiency  cyanocobalamin injection 1,000 mcg   3. Vitamin D deficiency  Vitamin D 25 Hydroxy   4. Radiculopathy, lumbar region     5. Opioid use, unspecified with unspecified opioid-induced disorder ([facility]  In remission.  No current issue       Orders Placed This Encounter   Procedures    Vitamin D 25 Hydroxy     Standing Status:   Future     Standing Expiration Date:   6/22/2022       Orders Placed This Encounter   Medications    cyanocobalamin injection 1,000 mcg     Side effects, adverse effects of the medication prescribed today, as well as treatment plan/ rationale and result expectations have been discussed with the patient who expresses understanding and desires to proceed. Close follow up to evaluate treatment results and for coordination of care. I have reviewed the patient's medical history in detail and updated the computerized patient record. As always, patient is advised that if symptoms worsen in any way they will proceed to the nearest emergency room. FU mid July after visits with neuro and pain management.     Hermann Aguilera, APRN - CNP

## 2021-06-22 NOTE — CARE COORDINATION
Telephone call with Emily/sister who is with patient. Discussed medication affordability. At this time Reinier Amor does not believe that patient has a high cost medication. She is going to investigate with patient what exactly his medications are costing him and get back to this writer. Discussed Social Security Extra Help Application as a possibility to help with prescription co-pays. She also confirmed with patient that he is not interested in meals-on-wheels at this time.

## 2021-06-22 NOTE — CARE COORDINATION
Ambulatory Care Coordination Note  6/22/2021  CM Risk Score: 2  Charlson 10 Year Mortality Risk Score: 23%     ACC: Yissel Gage, RN    Summary Note:     I called to discuss COPD and memory issues. I spoke with Lizeth Sydni sister and health care decision maker for Ariana Alvarez. She says that he did have an appointment with Hugh Andersen today and says that he is doing good. He still has issues with back pain. He will be following up with pain management for this issue. I spoke with her at length about physical therapy and silver sneakers. She states that she had a discussion with him about this and he is not having any part of physical therapy and she feels that he will not go to the gym. He will also be following up with neurology in July   She adds that his memory is unchanged. He does have notes up in the house to serve as reminders. She has checked his pill box and the counts are correct. I also spoke with her about memory resources including mind games, memory cafe, and the alzheimer's association. She is working on dental appointments and vision appointments in order to maintain / improve his health. PLAN:  Ariana Alvarez will follow up on all scheduled appointments  Ariana Alvarez will take all medications as prescribed. I will work with Lizeth Troy to provide her any resources for [employer]. Care Coordination Interventions    Program Enrollment: Rising Risk  Referral from Primary Care Provider: Yes  Suggested Interventions and Community Resources  Fall Risk Prevention: In Process  Disease Association: In Process  Social Work: In Process  Zone Management Tools: In Process  Other Services or Interventions: COPD zone tool education initiated.  referral intiated, Walk in Clinic hours and usage discussed. COVID vaccine declined         Goals Addressed                 This Visit's Progress     Conditions and Symptoms   On track     I will schedule office visits, as directed by my provider.   I will keep my appointment or reschedule if I have to cancel. I will notify my provider of any barriers to my plan of care. I will follow my Zone Management tool to seek urgent or emergent care. I will notify my provider of any symptoms that indicate a worsening of my condition. Barriers: impairment:  mental health: Depression, lack of motivation, financial, overwhelmed by complexity of regimen, time constraints, and lack of education  Plan for overcoming my barriers: I will work with my ACM and health care team to increase my knowledge and self care management skills for my overall health  Confidence: 7/10  Anticipated Goal Completion Date: 12/15/2021         Reduce Falls    Improving     I will reduce my risk of falls by the following: Remove rugs or use non slip rugs  Install grab bars in bathroom  Use walking aids like cane or walker  Be cognizant of changes with back symptoms  follow through on all appointments and physical therapy    Barriers: impairment:  mental health: Depression, lack of motivation, financial, overwhelmed by complexity of regimen, stress, and lack of education  Plan for overcoming my barriers: I will work with my ACM and health care team to increase my education and self care management related to falls prevention  Confidence: 7/10  Anticipated Goal Completion Date: 12/15/2021            Prior to Admission medications    Medication Sig Start Date End Date Taking?  Authorizing Provider   meloxicam (MOBIC) 15 MG tablet Take 1 tablet by mouth daily 6/9/21   MANUELA Reddy - CNP   vitamin D3 (CHOLECALCIFEROL) 10 MCG (400 UNIT) TABS tablet Take 400 Units by mouth daily    Historical Provider, MD   acetaminophen (TYLENOL) 500 MG tablet Take 500 mg by mouth every 6 hours as needed for Pain    Historical Provider, MD   Apoaequorin (PREVAGEN PO) Take by mouth daily    Historical Provider, MD   amoxicillin (AMOXIL) 500 MG capsule Take 500 mg by mouth 4 times daily as needed For procedures Patient not taking: Reported on 6/22/2021    Historical Provider, MD   rosuvastatin (CRESTOR) 40 MG tablet TAKE 1 TABLET BY MOUTH DAILY AT BEDTIME. 4/30/21   Historical Provider, MD   pantoprazole (PROTONIX) 40 MG tablet TAKE 1 TABLET BY MOUTH EVERY DAY 5/24/21   MANUELA Vasquez CNP   FLUoxetine (PROZAC) 40 MG capsule Take 1 capsule by mouth daily 3/23/21   MANUELA Vasquez CNP   pravastatin (PRAVACHOL) 40 MG tablet TAKE 1 TABLET BY MOUTH EVERY DAY IN THE EVENING 6/17/19   MANUELA Thurman CNP       Future Appointments   Date Time Provider Raphael Thompson   7/6/2021  8:30 AM MD GERDA [facility] EMERGENCY [facility] CENTER AT [place]   7/9/2021 11:00 AM RAUL Rosales   2/21/2022  1:00 PM MANUELA Vasquez CNP [facility] AT [place]      and   COPD Assessment    Does the patient understand envrionmental exposure?: No  Is the patient able to verbalize Rescue vs. Long Acting medications?: No  Does the patient have a nebulizer?: No  Does the patient use a space with inhaled medications?: No     No patient-reported symptoms         Symptoms:  None: Yes      Change in chronic cough?: No/At Baseline  Change in sputum?: No/At Baseline

## 2021-06-25 LAB — VITAMIN D 25-HYDROXY: 46 NG/ML (ref 30–100)

## 2021-06-27 ENCOUNTER — CARE COORDINATION (OUTPATIENT)
Dept: CARE COORDINATION | Age: 66
End: 2021-06-27

## 2021-06-27 ENCOUNTER — HOSPITAL ENCOUNTER (EMERGENCY)
Age: 66
Discharge: HOME | End: 2021-06-27
Payer: MEDICARE

## 2021-06-27 VITALS
HEIGHT: 69 IN | TEMPERATURE: 98.4 F | BODY MASS INDEX: 20.73 KG/M2 | WEIGHT: 140 LBS | DIASTOLIC BLOOD PRESSURE: 80 MMHG | SYSTOLIC BLOOD PRESSURE: 157 MMHG | HEART RATE: 91 BPM | OXYGEN SATURATION: 96 % | RESPIRATION RATE: 17 BRPM

## 2021-06-27 DIAGNOSIS — G89.29 ACUTE EXACERBATION OF CHRONIC LOW BACK PAIN: ICD-10-CM

## 2021-06-27 DIAGNOSIS — M54.31 SCIATICA OF RIGHT SIDE: Primary | ICD-10-CM

## 2021-06-27 DIAGNOSIS — M54.50 ACUTE EXACERBATION OF CHRONIC LOW BACK PAIN: ICD-10-CM

## 2021-06-27 PROCEDURE — 96372 THER/PROPH/DIAG INJ SC/IM: CPT

## 2021-06-27 PROCEDURE — 6360000002 HC RX W HCPCS: Performed by: PHYSICIAN ASSISTANT

## 2021-06-27 PROCEDURE — 99284 EMERGENCY DEPT VISIT MOD MDM: CPT

## 2021-06-27 PROCEDURE — 6370000000 HC RX 637 (ALT 250 FOR IP): Performed by: PHYSICIAN ASSISTANT

## 2021-06-27 RX ORDER — CAPSAICIN 0.025 %
CREAM (GRAM) TOPICAL
Qty: 1 TUBE | Refills: 0 | Status: SHIPPED | OUTPATIENT
Start: 2021-06-27 | End: 2021-07-27

## 2021-06-27 RX ORDER — KETOROLAC TROMETHAMINE 30 MG/ML
60 INJECTION, SOLUTION INTRAMUSCULAR; INTRAVENOUS ONCE
Status: COMPLETED | OUTPATIENT
Start: 2021-06-27 | End: 2021-06-27

## 2021-06-27 RX ORDER — CYCLOBENZAPRINE HCL 10 MG
10 TABLET ORAL ONCE
Status: COMPLETED | OUTPATIENT
Start: 2021-06-27 | End: 2021-06-27

## 2021-06-27 RX ORDER — CYCLOBENZAPRINE HCL 10 MG
10 TABLET ORAL 3 TIMES DAILY PRN
Qty: 21 TABLET | Refills: 0 | Status: SHIPPED | OUTPATIENT
Start: 2021-06-27 | End: 2021-07-04

## 2021-06-27 RX ADMIN — CYCLOBENZAPRINE 10 MG: 10 TABLET, FILM COATED ORAL at 12:08

## 2021-06-27 RX ADMIN — KETOROLAC TROMETHAMINE 60 MG: 30 INJECTION, SOLUTION INTRAMUSCULAR at 12:08

## 2021-06-27 ASSESSMENT — ENCOUNTER SYMPTOMS
RESPIRATORY NEGATIVE: 1
BACK PAIN: 1
GASTROINTESTINAL NEGATIVE: 1
EYES NEGATIVE: 1

## 2021-06-27 ASSESSMENT — PAIN DESCRIPTION - FREQUENCY: FREQUENCY: CONTINUOUS

## 2021-06-27 ASSESSMENT — PAIN DESCRIPTION - ORIENTATION
ORIENTATION: LOWER
ORIENTATION: LOWER

## 2021-06-27 ASSESSMENT — PAIN SCALES - GENERAL
PAINLEVEL_OUTOF10: 7

## 2021-06-27 ASSESSMENT — PAIN DESCRIPTION - ONSET: ONSET: ON-GOING

## 2021-06-27 ASSESSMENT — PAIN DESCRIPTION - DESCRIPTORS
DESCRIPTORS: ACHING
DESCRIPTORS: ACHING

## 2021-06-27 ASSESSMENT — PAIN - FUNCTIONAL ASSESSMENT: PAIN_FUNCTIONAL_ASSESSMENT: PREVENTS OR INTERFERES WITH ALL ACTIVE AND SOME PASSIVE ACTIVITIES

## 2021-06-27 ASSESSMENT — PAIN DESCRIPTION - LOCATION
LOCATION: BACK
LOCATION: BACK

## 2021-06-27 ASSESSMENT — PAIN DESCRIPTION - PAIN TYPE: TYPE: ACUTE PAIN;CHRONIC PAIN

## 2021-06-27 ASSESSMENT — PAIN DESCRIPTION - PROGRESSION: CLINICAL_PROGRESSION: GRADUALLY WORSENING

## 2021-06-27 NOTE — CARE COORDINATION
Ambulatory Care Coordination Note  6/27/2021  CM Risk Score: 6  Charlson 10 Year Mortality Risk Score: 23%     ACC: Denzel Son, RN    Summary Note:     I called Brynn Jacobson to complete an ER follow up call as requested. I have updated her on the plan  I spoke with her about follow with pain management  I also spoke with her about supporting the lower back through positioning and placement of support pillows. I spoke with her about using ice and heat  We chatted briefly about stretching before moving and exercises to help strengthen the low back and core. She verbalizes understanding. I reached out to Kody Bolden to discuss his ER visit and follow up plan  Unable to speak with patient  Message left requesting a call back with call back number provided. Care Coordination Interventions    Program Enrollment: Rising Risk  Referral from Primary Care Provider: Yes  Suggested Interventions and Community Resources  Fall Risk Prevention: In Process  Disease Association: In Process  Social Work: In Process  Zone Management Tools: In Process  Other Services or Interventions: COPD zone tool education initiated.  referral intiated, Walk in Clinic hours and usage discussed. COVID vaccine declined         Goals Addressed    None         Prior to Admission medications    Medication Sig Start Date End Date Taking? Authorizing Provider   cyclobenzaprine (FLEXERIL) 10 MG tablet Take 1 tablet by mouth 3 times daily as needed for Muscle spasms 6/27/21 7/4/21  Cole Donnelly PA-C   capsaicin (ZOSTRIX) 0.025 % cream Apply topically 2 times daily.  6/27/21 7/27/21  Cole Donnelly PA-C   meloxicam [facility]) 15 MG tablet Take 1 tablet by mouth daily 6/9/21   MANUELA Good - CNP   vitamin D3 (CHOLECALCIFEROL) 10 MCG (400 UNIT) TABS tablet Take 400 Units by mouth daily    Historical Provider, MD   acetaminophen (TYLENOL) 500 MG tablet Take 500 mg by mouth every 6 hours as needed for Pain    Historical Provider, MD Apoaequorin (PREVAGEN PO) Take by mouth daily    Historical Provider, MD   amoxicillin (AMOXIL) 500 MG capsule Take 500 mg by mouth 4 times daily as needed For procedures   Patient not taking: Reported on 6/22/2021    Historical Provider, MD   rosuvastatin (CRESTOR) 40 MG tablet TAKE 1 TABLET BY MOUTH DAILY AT BEDTIME. 4/30/21   Historical Provider, MD   pantoprazole (PROTONIX) 40 MG tablet TAKE 1 TABLET BY MOUTH EVERY DAY 5/24/21   MANUELA Kahn CNP   FLUoxetine (PROZAC) 40 MG capsule Take 1 capsule by mouth daily 3/23/21   MANUELA Kahn CNP   pravastatin (PRAVACHOL) 40 MG tablet TAKE 1 TABLET BY MOUTH EVERY DAY IN THE EVENING 6/17/19   MANUELA Hernandez CNP       Future Appointments   Date Time Provider Raphael Thompson   7/6/2021  8:30 AM MD GERDA Rooney [facility] EMERGENCY MEDICAL CENTER AT [place]   7/9/2021 11:00 AM Marylou Barron PA-C [facility]   2/21/2022  1:00 PM MANUELA Kahn CNP [facility] EMERGENCY MEDICAL [place] AT [place]

## 2021-06-27 NOTE — ED NOTES
Lower back pain became worse yesterday numbness in rt and left lower extremity.  Decreased sensation more in RLL than LLE     Star Gaviria RN  06/27/21 9183

## 2021-06-27 NOTE — CARE COORDINATION
Ambulatory Care Coordination Note  6/27/2021  CM Risk Score: 6  Charlson 10 Year Mortality Risk Score: 23%     ACC: Leandra West RN    Summary Note:     Reinier Zuleyma (sister and health care decision maker) called stating that she has concerns for Ashley Bermeo. She states that he had called her stating that \"something is not right. \"  She tells me that he C/O \"being cold all night even though he was under a heated pad. \"  She added that he is so cold that he is \"shaking  He is also having back pain, his legs are shaking and he is unable to sleep. I called to check in with Ashley Bermeo  He tells me that he did not sleep due to the pain in his low back  He says that the pain goes across his back and down his right leg  He add that both legs are shaking and his feet are numb. He denies fever, abdominal pain, SOB, or cough. He is waking with a cane adding that it is more difficult to walk denying any falls    I have asked him to go to the ER to get these symptoms checked and to avoid injury due to pain, numbness, and tingling. I have updated Stephanielalit Amor as well. Care Coordination Interventions    Program Enrollment: Rising Risk  Referral from Primary Care Provider: Yes  Suggested Interventions and Community Resources  Fall Risk Prevention: In Process  Disease Association: In Process  Social Work: In Process  Zone Management Tools: In Process  Other Services or Interventions: COPD zone tool education initiated.  referral intiated, Walk in Clinic hours and usage discussed. COVID vaccine declined         Goals Addressed    None         Prior to Admission medications    Medication Sig Start Date End Date Taking?  Authorizing Provider   meloxicam (MOBIC) 15 MG tablet Take 1 tablet by mouth daily 6/9/21   Braulio Dwyer, APRN - CNP   vitamin D3 (CHOLECALCIFEROL) 10 MCG (400 UNIT) TABS tablet Take 400 Units by mouth daily    Historical Provider, MD   acetaminophen (TYLENOL) 500 MG tablet Take 500 mg by mouth every 6 hours as needed for Pain    Historical Provider, MD   Apoaequorin (PREVAGEN PO) Take by mouth daily    Historical Provider, MD   amoxicillin (AMOXIL) 500 MG capsule Take 500 mg by mouth 4 times daily as needed For procedures   Patient not taking: Reported on 6/22/2021    Historical Provider, MD   rosuvastatin (CRESTOR) 40 MG tablet TAKE 1 TABLET BY MOUTH DAILY AT BEDTIME. 4/30/21   Historical Provider, MD   pantoprazole (PROTONIX) 40 MG tablet TAKE 1 TABLET BY MOUTH EVERY DAY 5/24/21   MANUELA Doran CNP   FLUoxetine (PROZAC) 40 MG capsule Take 1 capsule by mouth daily 3/23/21   MANUELA Doran CNP   pravastatin (PRAVACHOL) 40 MG tablet TAKE 1 TABLET BY MOUTH EVERY DAY IN THE EVENING 6/17/19   MANUELA Peoples CNP       Future Appointments   Date Time Provider Raphael Thompson   7/6/2021  8:30 AM MD GERDA Truong   7/9/2021 11:00 AM RAUL Jodran   2/21/2022  1:00 PM MANUELA Doran CNP [facility] [place]

## 2021-06-27 NOTE — ED PROVIDER NOTES
[facility] [facility] ED  eMERGENCY dEPARTMENT eNCOUnter      Pt Name: Ruth Ng  MRN: [id number]  [place] 1955  Date of evaluation: 6/27/2021  Provider: Wendy Sheikh PA-C      HISTORY OF PRESENT ILLNESS    Ruth Ng is a 74 y.o. male who presents to the Emergency Department with chief complaint of on and off back pain. Patient states he has an upcoming appointment with pain management. Patient is due to see Dr. Raymond Mobley in the next few weeks. Patient states he has been dealing with on and off for a while. Patient recently had x-rays of left knee by primary care provider and is also been following with his primary care provider for his back pain. Patient states he has had x-rays and MRI of his back that revealed a herniated disc in the past.  Patient states it is in the same location and just more intense than usual.  Patient states he was prescribed a tapered dose of prednisone, but he did not take much of it because it did not make him feel bad. Patient denies loss of bowel or bladder function also denies numbness or tingling the private area. Patient has no other concerns at this time. REVIEW OF SYSTEMS       Review of Systems   Constitutional: Negative. HENT: Negative. Eyes: Negative. Respiratory: Negative. Cardiovascular: Negative. Gastrointestinal: Negative. Endocrine: Negative. Genitourinary: Negative. Musculoskeletal: Positive for back pain. Skin: Negative. Neurological: Negative. Psychiatric/Behavioral: Negative.           PAST MEDICAL HISTORY     Past Medical History:   Diagnosis Date    Allergic rhinitis 2/19/2018    Anxiety     Disorder of stomach     valve not closing properly    Essential hypertension 11/4/2019    Gastroesophageal reflux disease 10/4/2019    Hyperlipidemia     meds > 22 yrs    Low back pain 5/1/2018    ARNAUD (obstructive sleep apnea) 2/19/2018    Other emphysema ([address] 10/4/2019    Other intervertebral disc Disease Father 48    Cancer Father         bone    Cancer Maternal Grandmother         lung    Cancer Paternal Grandmother     Heart Disease Paternal Grandfather     No Known Problems Sister     Cancer Brother     Heart Disease Brother         hole in heart in 65 at 1 months age          SOCIAL HISTORY       Social History     Socioeconomic History    Marital status:      Spouse name: None    Number of children: 2    Years of education: 15    Highest education level: High school graduate   Occupational History    None   Tobacco Use    Smoking status: Current Every Day Smoker     Packs/day: 2.00     Years: 8.00     Pack years: 16.00     Types: Cigars    Smokeless tobacco: Never Used    Tobacco comment: 3-5 cigars qd   Vaping Use    Vaping Use: Never used   Substance and Sexual Activity    Alcohol use: No     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    Drug use: No    Sexual activity: Not Currently     Partners: Female   Other Topics Concern    None   Social History Narrative    None     Social Determinants of Health     Financial Resource Strain: Low Risk     Difficulty of Paying Living Expenses: Not hard at all   Food Insecurity: No Food Insecurity    Worried About Running Out of Food in the Last Year: Never true    Ivana of Food in the Last Year: Never true   Transportation Needs: No Transportation Needs    Lack of Transportation (Medical): No    Lack of Transportation (Non-Medical):  No   Physical Activity:     Days of Exercise per Week:     Minutes of Exercise per Session:    Stress:     Feeling of Stress :    Social Connections:     Frequency of Communication with Friends and Family:     Frequency of Social Gatherings with Friends and Family:     Attends Jehovah's witness Services:     Active Member of Clubs or Organizations:     Attends Club or Organization Meetings:     Marital Status:    Intimate Partner Violence:     Fear of Current or Ex-Partner:     Emotionally Abused:     Physically Abused:     Sexually Abused:        SCREENINGS    [place] Coma Scale  Eye Opening: Spontaneous  Best Verbal Response: Oriented  Best Motor Response: Obeys commands  [place] Coma Scale Score: 15 @FLOW(23360838)@      PHYSICAL EXAM    (up to 7 for level 4, 8 or more for level 5)     ED Triage Vitals [06/27/21 1101]   BP Temp Temp Source Pulse Resp SpO2 Height Weight   (!) 157/80 98.4 °F (36.9 °C) Temporal 91 17 96 % 5' 9\" (1.753 m) 140 lb (63.5 kg)       Physical Exam  Constitutional:       General: He is not in acute distress. Appearance: He is well-developed. HENT:      Head: Normocephalic and atraumatic. Eyes:      Conjunctiva/sclera: Conjunctivae normal.      Pupils: Pupils are equal, round, and reactive to light. Cardiovascular:      Rate and Rhythm: Normal rate and regular rhythm. Heart sounds: No murmur heard. Pulmonary:      Effort: No respiratory distress. Breath sounds: Normal breath sounds. No wheezing or rales. Abdominal:      General: There is no distension. Palpations: Abdomen is soft. Tenderness: There is no abdominal tenderness. Musculoskeletal:         General: Normal range of motion. Cervical back: Normal range of motion and neck supple. Lumbar back: Tenderness and bony tenderness present. Back:    Skin:     General: Skin is warm and dry. Findings: No erythema or rash. Neurological:      Mental Status: He is alert and oriented to person, place, and time. Cranial Nerves: No cranial nerve deficit. Psychiatric:         Judgment: Judgment normal.           All other labs were within normal range or not returned as of this dictation.     EMERGENCY DEPARTMENT COURSE and DIFFERENTIALDIAGNOSIS/MDM:   Vitals:    Vitals:    06/27/21 1101   BP: (!) 157/80   Pulse: 91   Resp: 17   Temp: 98.4 °F (36.9 °C)   TempSrc: Temporal   SpO2: 96%   Weight: 140 lb (63.5 kg)   Height: 5' 9\" (1.753 m)        The patient was open and discussed his none

## 2021-06-28 ENCOUNTER — CARE COORDINATION (OUTPATIENT)
Dept: CARE COORDINATION | Age: 66
End: 2021-06-28

## 2021-06-28 NOTE — CARE COORDINATION
Depression, lack of motivation, financial, overwhelmed by complexity of regimen, time constraints, and lack of education  Plan for overcoming my barriers: I will work with my ACM and health care team to increase my knowledge and self care management skills for my overall health  Confidence: 7/10  Anticipated Goal Completion Date: 12/15/2021         Reduce Falls    Improving     I will reduce my risk of falls by the following: Remove rugs or use non slip rugs  Install grab bars in bathroom  Use walking aids like cane or walker  Be cognizant of changes with back symptoms  follow through on all appointments and physical therapy    Barriers: impairment:  mental health: Depression, lack of motivation, financial, overwhelmed by complexity of regimen, stress, and lack of education  Plan for overcoming my barriers: I will work with my ACM and health care team to increase my education and self care management related to falls prevention  Confidence: 7/10  Anticipated Goal Completion Date: 12/15/2021            Prior to Admission medications    Medication Sig Start Date End Date Taking? Authorizing Provider   cyclobenzaprine (FLEXERIL) 10 MG tablet Take 1 tablet by mouth 3 times daily as needed for Muscle spasms 6/27/21 7/4/21  Carlos Saha PA-C   capsaicin (ZOSTRIX) 0.025 % cream Apply topically 2 times daily.  6/27/21 7/27/21  Carlos Saha PA-C   meloxicam [facility]) 15 MG tablet Take 1 tablet by mouth daily 6/9/21   Edgardo Degroot APRN - CNP   vitamin D3 (CHOLECALCIFEROL) 10 MCG (400 UNIT) TABS tablet Take 400 Units by mouth daily    Historical Provider, MD   acetaminophen (TYLENOL) 500 MG tablet Take 500 mg by mouth every 6 hours as needed for Pain    Historical Provider, MD   Apoaequorin (PREVAGEN PO) Take by mouth daily    Historical Provider, MD   amoxicillin (AMOXIL) 500 MG capsule Take 500 mg by mouth 4 times daily as needed For procedures   Patient not taking: Reported on 6/22/2021    Historical Provider, MD rosuvastatin (CRESTOR) 40 MG tablet TAKE 1 TABLET BY MOUTH DAILY AT BEDTIME. 4/30/21   Historical Provider, MD   pantoprazole (PROTONIX) 40 MG tablet TAKE 1 TABLET BY MOUTH EVERY DAY 5/24/21   MANUELA Sutton CNP   FLUoxetine (PROZAC) 40 MG capsule Take 1 capsule by mouth daily 3/23/21   MANUELA Sutton CNP   pravastatin (PRAVACHOL) 40 MG tablet TAKE 1 TABLET BY MOUTH EVERY DAY IN THE EVENING 6/17/19   MANUELA Dorsey CNP       Future Appointments   Date Time Provider Raphael Thompson   7/6/2021  8:30 AM MD GERDA Diaz [facility] EMERGENCY MEDICAL CENTER AT [place]   7/9/2021 11:00 AM RAUL Verma   2/21/2022  1:00 PM MANUELA Sutton CNP [facility] EMERGENCY [facility] CENTER AT [place]      and   COPD Assessment    Does the patient understand envrionmental exposure?: No  Is the patient able to verbalize Rescue vs. Long Acting medications?: No  Does the patient have a nebulizer?: No  Does the patient use a space with inhaled medications?: No     No patient-reported symptoms         Symptoms:  None: Yes      Symptom course: stable  Change in chronic cough?: No/At Baseline  Change in sputum?: No/At Baseline  Self Monitoring - SaO2: No  Have you had a recent diagnosis of pneumonia either by PCP or at a hospital?: No

## 2021-07-01 PROBLEM — R94.30 ABNORMAL RESULT OF CARDIOVASCULAR FUNCTION STUDY, UNSPECIFIED: Status: ACTIVE | Noted: 2021-03-15

## 2021-07-01 PROBLEM — M51.26 OTHER INTERVERTEBRAL DISC DISPLACEMENT, LUMBAR REGION: Status: ACTIVE | Noted: 2018-03-23

## 2021-07-01 PROBLEM — R93.1 ABNORMAL FINDINGS ON DX IMAGING OF HEART AND COR CIRC: Status: ACTIVE | Noted: 2021-03-15

## 2021-07-01 PROBLEM — R00.2 PALPITATIONS: Status: ACTIVE | Noted: 2021-03-15

## 2021-07-06 ENCOUNTER — INITIAL CONSULT (OUTPATIENT)
Dept: PAIN MANAGEMENT | Age: 66
End: 2021-07-06
Payer: MEDICARE

## 2021-07-06 VITALS
HEIGHT: 69 IN | TEMPERATURE: 97.1 F | SYSTOLIC BLOOD PRESSURE: 124 MMHG | WEIGHT: 140 LBS | BODY MASS INDEX: 20.73 KG/M2 | DIASTOLIC BLOOD PRESSURE: 60 MMHG

## 2021-07-06 DIAGNOSIS — M47.817 LUMBOSACRAL SPONDYLOSIS WITHOUT MYELOPATHY: Primary | ICD-10-CM

## 2021-07-06 DIAGNOSIS — M51.36 DDD (DEGENERATIVE DISC DISEASE), LUMBAR: ICD-10-CM

## 2021-07-06 PROCEDURE — 99203 OFFICE O/P NEW LOW 30 MIN: CPT | Performed by: ANESTHESIOLOGY

## 2021-07-06 NOTE — PROGRESS NOTES
Patient:  Ashley Medina  YOB: 1955  Date: 7/6/2021      Subjective:     Ashley Medina is a 74 y.o. male who presents today with:    Chief Complaint   Patient presents with    Back Pain    Knee Pain     left side       HPI: The patient presents to the clinic with a chief complaint of's severe low back pain started 2 months ago without any history of injury or trauma. The patient had a CT scan of the lumbosacral spine on June 6 and the report indicates that the patient has severe bilateral facet arthrosis at the L4-5 and L5-S1 levels with a multiple level degenerative disc disease. The back pain at times radiates down to the left knee. X-ray of the left knee is unremarkable.     Allergies:  Alcohol, Benadryl [diphenhydramine], Demerol hcl [meperidine], and Sulfa antibiotics  Past Medical History:   Diagnosis Date    Allergic rhinitis 2/19/2018    Anxiety     Disorder of stomach     valve not closing properly    Essential hypertension 11/4/2019    Gastroesophageal reflux disease 10/4/2019    Hyperlipidemia     meds > 22 yrs    Low back pain 5/1/2018    ARNAUD (obstructive sleep apnea) 2/19/2018    Other emphysema ([address] 10/4/2019    Other intervertebral disc degeneration, lumbar region 3/23/2018    Radiculopathy, lumbar region 2/28/2018    Seasonal affective disorder ([address] 10/4/2019    Substance abuse ([address]     alcholic, quit 20 yrs      Past Surgical History:   Procedure Laterality Date    COLONOSCOPY  12/22/2016    A SHALA MARLEY    DENTAL SURGERY  10 yrs ago    ENDOSCOPY, COLON, DIAGNOSTIC      EYE SURGERY      Lasik OU    KNEE ARTHROSCOPY Right     KNEE SURGERY Right 05/2016    Ray procedure    [place] NASAL SCOPY,OPEN MAXILL SINUS N/A 2/22/2018    SEPTOPLASTY MICRODEBRIDER ASSISTED TURBINOPLASTY AND OUT-FRACTURING BILATERAL NASAL ENDOSCOPY performed by Kd Monreal MD at [address] [facility] History     Socioeconomic History    Marital status:      Spouse name: Not hole in heart in 1959 at 1 months age       Current Outpatient Medications on File Prior to Visit   Medication Sig Dispense Refill    meloxicam (MOBIC) 15 MG tablet Take 1 tablet by mouth daily 30 tablet 3    acetaminophen (TYLENOL) 500 MG tablet Take 500 mg by mouth every 6 hours as needed for Pain      pantoprazole (PROTONIX) 40 MG tablet TAKE 1 TABLET BY MOUTH EVERY DAY 90 tablet 1    FLUoxetine (PROZAC) 40 MG capsule Take 1 capsule by mouth daily 30 capsule 5    pravastatin (PRAVACHOL) 40 MG tablet TAKE 1 TABLET BY MOUTH EVERY DAY IN THE EVENING 90 tablet 1    capsaicin (ZOSTRIX) 0.025 % cream Apply topically 2 times daily. 1 Tube 0    vitamin D3 (CHOLECALCIFEROL) 10 MCG (400 UNIT) TABS tablet Take 400 Units by mouth daily      Apoaequorin (PREVAGEN PO) Take by mouth daily      amoxicillin (AMOXIL) 500 MG capsule Take 500 mg by mouth 4 times daily as needed For procedures  (Patient not taking: Reported on 6/22/2021)      rosuvastatin (CRESTOR) 40 MG tablet TAKE 1 TABLET BY MOUTH DAILY AT BEDTIME. No current facility-administered medications on file prior to visit. He has not tried physical therapy. Recent Procedures:  N/A    Review of Systems    Objective:     Vitals:  /60   Temp 97.1 °F (36.2 °C)   Ht 5' 9\" (1.753 m)   Wt 140 lb (63.5 kg)   BMI 20.67 kg/m² Pain Score:   7    PHYSICAL EXAM:    Patient alert and oriented times three, recent and remote memory intact, mood and affect normal, judgement and insight normal. Strength functional for ambulation. Balance and coordinational functional. Visualized skin intact. No visualized cyanosis, pulses intact. Cranial nerves 2-12 grossly intact. No obvious upper motor neuron signs seen. Sensation intact to light touch. On physical examination the heel and toe walking is adequate. The range of motion of the lumbosacral spine is markedly limited especially on hyperextension and the lateral flexion bilaterally.   Straight leg raising is negative, and knee and ankle reflexes are one positive and symmetrical.    Radiculopathy:  Negative    Studies Review:  Reviewed CT report of Lumbar spine dated 6/6. Assessment:           Diagnosis Orders   1. Lumbosacral spondylosis without myelopathy  MD INJ DX/THER AGNT PARAVERT FACET JOINT, LUMBAR/SAC, 1ST LEVEL    MD INJ DX/THER AGNT PARAVERT FACET JOINT, LUMBAR/SAC, 2ND LEVEL   2. DDD (degenerative disc disease), lumbar  MD INJ DX/THER AGNT PARAVERT FACET JOINT, LUMBAR/SAC, 1ST LEVEL    MD INJ DX/THER AGNT PARAVERT FACET JOINT, LUMBAR/SAC, 2ND LEVEL         Plan:     Orders Placed This Encounter   Procedures    MD INJ DX/THER AGNT PARAVERT FACET JOINT, LUMBAR/SAC, 1ST LEVEL     Brenden L3,4,5  MBB     Standing Status:   Future     Standing Expiration Date:   10/4/2021    MD INJ DX/THER AGNT PARAVERT FACET JOINT, LUMBAR/SAC, 2ND LEVEL     Brenden L3,4,5  MBB     Standing Status:   Future     Standing Expiration Date:   10/4/2021       No orders of the defined types were placed in this encounter. Clinically we are dealing with the severe facet arthrosis at the L4-5 and L5-S1 levels and he will be scheduled for bilateral L 3, 4, 5 MBB's. Explained to the patient in details and the patient understands and wishes to proceed. He will not be placed on any medications at this time. Follow up:  Return for 1-2 weeks MBB.     Maile Shetty MD

## 2021-07-07 ENCOUNTER — CARE COORDINATION (OUTPATIENT)
Dept: CARE COORDINATION | Age: 66
End: 2021-07-07

## 2021-07-07 ENCOUNTER — TELEPHONE (OUTPATIENT)
Dept: PAIN MANAGEMENT | Age: 66
End: 2021-07-07

## 2021-07-07 NOTE — CARE COORDINATION
Ambulatory Care Coordination Note  7/7/2021  CM Risk Score: 6  Charlson 10 Year Mortality Risk Score: 23%     ACC: Roland Diehl RN    Summary Note:     Yanely Courser sister and health care decision maker for Sheron Hastings returned my call. She tells me that he did see pain management yesterday and will have injections. She adds that Sheron Hastings is not icing heating his back or doing any exercises for strengthening. She does tell me that he is not using the can for ambulation  I discussed the need for the cane and she does understand. She adds that he is walking in a bent over position. I explained the hazards and increased falls with this. I spoke with her about a walker but she does not feel that he would use this device. She will strongly encourage him to use the cane. She does not feel that he has any real issues with his breathing although she does notice that he is coughing more. She feels that his appetite is good. I have asked her to have Sheron Hastings call me so that I can check in on him. PLAN:  Sheron Hastings will complete all scheduled appointments. Sheron Hastings will use a cane whenever he ambulates. Care Coordination Interventions    Program Enrollment: Rising Risk  Referral from Primary Care Provider: Yes  Suggested Interventions and Community Resources  Fall Risk Prevention: In Process  Disease Association: In Process  Social Work: In Process  Zone Management Tools: In Process  Other Services or Interventions: COPD zone tool education initiated.  referral intiated, Walk in Clinic hours and usage discussed. COVID vaccine declined         Goals Addressed    None         Prior to Admission medications    Medication Sig Start Date End Date Taking? Authorizing Provider   capsaicin (ZOSTRIX) 0.025 % cream Apply topically 2 times daily.  6/27/21 7/27/21  Megan Cavazos PA-C   meloxicam [facility]) 15 MG tablet Take 1 tablet by mouth daily 6/9/21   MANUELA Munoz - CNP   vitamin D3 (CHOLECALCIFEROL) 10 MCG (400 UNIT) TABS tablet Take 400 Units by mouth daily    Historical Provider, MD   acetaminophen (TYLENOL) 500 MG tablet Take 500 mg by mouth every 6 hours as needed for Pain    Historical Provider, MD   Apoaequorin (PREVAGEN PO) Take by mouth daily    Historical Provider, MD   amoxicillin (AMOXIL) 500 MG capsule Take 500 mg by mouth 4 times daily as needed For procedures   Patient not taking: Reported on 6/22/2021    Historical Provider, MD   rosuvastatin (CRESTOR) 40 MG tablet TAKE 1 TABLET BY MOUTH DAILY AT BEDTIME. 4/30/21   Historical Provider, MD   pantoprazole (PROTONIX) 40 MG tablet TAKE 1 TABLET BY MOUTH EVERY DAY 5/24/21   MANUELA Fairchild CNP   FLUoxetine (PROZAC) 40 MG capsule Take 1 capsule by mouth daily 3/23/21   MANUELA Fairchild CNP   pravastatin (PRAVACHOL) 40 MG tablet TAKE 1 TABLET BY MOUTH EVERY DAY IN THE EVENING 6/17/19   MANUELA Duffy CNP       Future Appointments   Date Time Provider Raphael Thompson   7/9/2021 11:00 AM RAUL Stone   2/21/2022  1:00 PM [facility] AT [place]

## 2021-07-07 NOTE — CARE COORDINATION
I called Dr Costello Red office to seek clarity on injection scheduling as requested by [facility] sister Kathy Arboleda. She does drive him to his appointments. I have updated Kathy Arboleda and Jesse Abad on the plan. Jesse Abad returned my call. He tells me  that he is doing well overall. He does say that he is trying to prevent any stress and strain on his back. He is not using any ice or heat. He is taking tylenol extra strength everyday 2-3 times per day. He states that he refuses all narcotics due to his past addiction to alcohol. He adds that he does have numbness and tingling in his toes of both feet with the right being worse than the left. He adds that he prefers that I speak with Kathy Arboleda as she knows everything about his health and he has memory issues.

## 2021-07-09 ENCOUNTER — OFFICE VISIT (OUTPATIENT)
Dept: NEUROLOGY | Age: 66
End: 2021-07-09
Payer: MEDICARE

## 2021-07-09 VITALS
WEIGHT: 140.3 LBS | DIASTOLIC BLOOD PRESSURE: 78 MMHG | OXYGEN SATURATION: 98 % | BODY MASS INDEX: 20.72 KG/M2 | HEART RATE: 88 BPM | SYSTOLIC BLOOD PRESSURE: 130 MMHG

## 2021-07-09 DIAGNOSIS — R41.3 MEMORY PROBLEM: Primary | ICD-10-CM

## 2021-07-09 DIAGNOSIS — M54.16 RADICULOPATHY, LUMBAR REGION: ICD-10-CM

## 2021-07-09 DIAGNOSIS — E53.8 VITAMIN B 12 DEFICIENCY: ICD-10-CM

## 2021-07-09 DIAGNOSIS — F41.9 ANXIETY: ICD-10-CM

## 2021-07-09 DIAGNOSIS — G47.00 INSOMNIA, UNSPECIFIED TYPE: ICD-10-CM

## 2021-07-09 PROCEDURE — 99214 OFFICE O/P EST MOD 30 MIN: CPT | Performed by: STUDENT IN AN ORGANIZED HEALTH CARE EDUCATION/TRAINING PROGRAM

## 2021-07-09 NOTE — PROGRESS NOTES
[address] [facility] [address]  [address]  [place]  [phone]     Date of Visit:  2021  Patient Name: Bernie Villagomez   Patient :  1955     CHIEF COMPLAINT:     Bernie Villagomez is a 74 y.o. male who presents today for an general visit to be evaluated for the following condition(s):  Chief Complaint   Patient presents with    Follow-up     Pt states that he feels his memory as gotten better. pts sister states she feels like its not she states it hasnt gotten worse but hasnt gotten better either. Pts sister states hes forgetting short term things. Pt states hes been priortizing things he states hes been making notes to help him do things and hes been doing that because he feels like he has a lot on his plate. He states hes not as active as he was due to COVID-19. HISTORY OF PRESENT ILLNESS     HPI  21  Since last visit, neuropsych testing ordered and patient reportedly became very agitated and walked out testing. Neuropsychologist was not able to gather adequate data. Today patient has less insight into his current memory deficits. Patient reports that his memory has improved since he has become more active, but sister who accompanied him says it has gotten worse. Sister has had to increasingly help him with keeping appointments. Patient became increasingly agitated when MMSE was attempted refusing to try the recall portion because he \"didn't see the point. \" Attempted to do CLVT-II, but patient refused to cooperate after first trial.     Patient reports any memory problems are related to having \"a lot going on\" including not sleeping well and severe back pain. These could not be addressed during the visit, due to patient becoming so agitated, we wanted to leave. Appointment ended abruptly after patient refused any further testing or treatment recommendations.      3/23/21  Subjective reports of memory issues without objective deficits in ADLs, paying bills, or getting lost while driving. Memory issues could be multifactorial, related to his anxiety, poor sleep, high caffeine intake, or social stressors. Patient reports that he continues to have memory problems. He is accompanied by friend who states that she had to remind him multiple times that it was daylight saving time and pt did not remember the conversation or whether he changed the clocks. Has episodes where he cannot remember the day before. He was recently hospitalized this month. Family member reports that he called her yelling and \"freaking out\" and she could not redirect him or get him to calm down. This was in the setting of insomnia and patient had more than 15 minutes of sleep in 4 days. She took him to the hospital, where he was assessed with no concerning findings. Has continued to have bouts of dizziness, but no syncope. Tried Buspar for anxiety, but had very vivid dreams and discontinued. Anxiety and insomnia persists. Is hesitant to take medication and would prefer more conservative treatment. No chest pain, dysphagia, vision changes, diplopia, or focal weakness. Last seen on 1/26/2021, my impression was:  1. Vertigo    Episodic vertigo and gait ataxia associated with almost falling. Hardik-Hill Ya Oral was normal.  Will get CT angio of head and neck to rule out VBI.     2. Memory problem  Refer to neuropsych testing. MMSE was 30/30 but patient cannot recall 5 items I asked him to name 20 minutes later. Needs further testing to determine if this is a true memory deficit or pseudodementia caused by anxiety.     3. Anxiety  Patient already on Prozac and will add BuSpar. Patient has emphysema and not a candidate for propranolol.     4. Insomnia, unspecified type  May consider sleep study in the future.     5. Tachycardia  Will refer to cardiology for episodic tachycardia.   Patient has a history of anxiety but is unsure of the episodic circ    Abnormal result of cardiovascular function study, unspecified    Palpitations       Past Medical History:   Diagnosis Date    Allergic rhinitis 2/19/2018    Anxiety     Chronic back pain     COPD (chronic obstructive pulmonary disease) (HCC)     Depression     Disorder of stomach     valve not closing properly    Em[facility] [address]     Essential hypertension 11/4/2019    Gastroesophageal reflux disease 10/4/2019    Hyperlipidemia     meds > 22 yrs    Low back pain 5/1/2018    ARNAUD (obstructive sleep apnea) 2/19/2018    Other emphysema ([address] 10/4/2019    Other intervertebral disc degeneration, lumbar region 3/23/2018    Radiculopathy, lumbar region 2/28/2018    Restless leg syndrome     Seasonal affective disorder ([address] 10/4/2019    Substance abuse ([facility]     alcholic, quit 20 yrs        Past Surgical History:   Procedure Laterality Date    COLONOSCOPY  12/22/2016    DALIA LAST MD    DENTAL SURGERY  10 yrs ago    ENDOSCOPY, COLON, DIAGNOSTIC      EYE SURGERY      Lasik OU    KNEE ARTHROSCOPY Right     KNEE SURGERY Right 05/2016    Ray procedure    KNEE SURGERY      [place] NASAL SCOPY,OPEN MAXILL SINUS N/A 2/22/2018    SEPTOPLASTY MICRODEBRIDER ASSISTED TURBINOPLASTY AND OUT-FRACTURING BILATERAL NASAL ENDOSCOPY performed by Pepper Pinzon MD at [address] History     Socioeconomic History    Marital status:      Spouse name: None    Number of children: 2    Years of education: 15    Highest education level: High school graduate   Occupational History    None   Tobacco Use    Smoking status: Current Every Day Smoker     Packs/day: 2.00     Years: 8.00     Pack years: 16.00     Types: Cigars    Smokeless tobacco: Never Used    Tobacco comment: 3-5 cigars qd   Vaping Use    Vaping Use: Never used   Substance and Sexual Activity    Alcohol use: No     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    Drug use: No    Sexual activity: Not Currently     Partners: Female   Other Topics Concern    None   Social History Narrative    None     Social Determinants of Health     Financial Resource Strain: Low Risk     Difficulty of Paying Living Expenses: Not hard at all   Food Insecurity: No Food Insecurity    Worried About Running Out of Food in the Last Year: Never true    Ivana of Food in the Last Year: Never true   Transportation Needs: No Transportation Needs    Lack of Transportation (Medical): No    Lack of Transportation (Non-Medical): No   Physical Activity:     Days of Exercise per Week:     Minutes of Exercise per Session:    Stress:     Feeling of Stress :    Social Connections:     Frequency of Communication with Friends and Family:     Frequency of Social Gatherings with Friends and Family:     Attends Congregation Services:     Active Member of Clubs or Organizations:     Attends Club or Organization Meetings:     Marital Status:    Intimate Partner Violence:     Fear of Current or Ex-Partner:     Emotionally Abused:     Physically Abused:     Sexually Abused:         PHYSICAL EXAM     Vitals:    07/09/21 1111   BP: 130/78   Site: Right Upper Arm   Position: Sitting   Cuff Size: Medium Adult   Pulse: 88   SpO2: 98%   Weight: 140 lb 4.8 oz (63.6 kg)     Physical Exam  Vitals and nursing note reviewed. Constitutional:       Appearance: Normal appearance. HENT:      Head: Normocephalic and atraumatic. Eyes:      Conjunctiva/sclera: Conjunctivae normal.   Cardiovascular:      Rate and Rhythm: Normal rate and regular rhythm. Pulses: Normal pulses. Heart sounds: Normal heart sounds. Pulmonary:      Effort: Pulmonary effort is normal.      Breath sounds: Normal breath sounds. Musculoskeletal:         General: Normal range of motion. Skin:     General: Skin is warm and dry. Neurological:      Mental Status: He is alert and oriented to person, place, and time. Mental status is at baseline.    Psychiatric:         Mood and Affect: Mood normal. Speech: Speech normal.         Behavior: Behavior normal.       Neurologic Exam     Mental Status   Oriented to person, place, and time. Speech: speech is normal     Cranial Nerves   Cranial nerves II through XII intact. Motor Exam   Muscle bulk: normal  Overall muscle tone: normal    Strength   Right deltoid: 5/5  Left deltoid: 5/5  Right biceps: 5/5  Left biceps: 5/5  Right triceps: 5/5  Left triceps: 5/5  Right wrist flexion: 5/5  Left wrist flexion: 5/5  Right wrist extension: 5/5  Left wrist extension: 5/5  Right interossei: 5/5  Left interossei: 5/5  Right iliopsoas: 5/5  Left iliopsoas: 5/5  Right quadriceps: 5/5  Left quadriceps: 5/5  Right hamstrin/5  Left hamstrin/5  Right glutei: 5/5  Left glutei: 5/5  Right anterior tibial: 5/5  Left anterior tibial: 5/5  Right posterior tibial: 5/5  Left posterior tibial: 5/5  Right peroneal: 5/5  Left peroneal: 5/5  Right gastroc: 5/5  Left gastroc: 5/5  Exam nonfocal  MMSE 22/30 when previously 30/30 on 21  ASSESSMENT/PLAN   78-year-old male here for follow-up for multiple complaints, primarily memory issues but also insomnia, anxiety, and dizziness. Since last visit, neuropsych testing was ordered and patient did not complete the testing due to agitation. MMSE as significantly worsened from previous 6 months ago now 22/30 when previously 30/30. Patient also exhibits poor insight into his memory difficulties when previously he did have some insight. Patient became agitated during MMSE and when see CVLT-II was attempted, abruptly ending the appointment refusing all further testing or medical recommendations. Reviewed CT of the head which was not suggestive of NPH and showed moderate generalized parenchymal volume loss without acute or remote CVA. Was found to have B12 deficiency and is being treated by primary care.      I do recommend MRI of the brain given the significant drop in MMSE and worsening memory difficulties seen both on exam and reported by sister. Would also recommend repeat B12 levels to ensure that B12 is increasing. Unfortunately, my ability to help this patient is limited as patient is refusing further testing and medication recommendations at this time. Encouraged patient that he can always call or make another appointment if he changes his mind. Memory difficulties  -Significant drop in MMSE from 30/30 in January 2020 22/30 today (7/9/21)  - TSH, WNL  -Patient was found to have B12 deficiency which is being treated by PCP. Recommend repeat B12 testing  -Reviewed CT of the head which is not suggestive of NPH and did show moderate parenchymal volume loss  -Neuropsych testing ordered at last appointment but patient did not complete testing due to agitation  -Recommend MRI of the brain    Anxiety  -Tried on Buspar at last visit and has adverse effects  -Benadryl is activating for him and atarax is not an option  -Propranolol not an option due to COPD  -Prozac increased by PCP  -Encouraged exercise    Insomnia  -Still having trouble falling asleep and staying asleep  -Discussed sleep hygiene  -This was not discussed further at this appointment due to agitation    \"Dizziness\"  - CTA head and neck showed 50% stenosis in vertebral arteries  -Continue 81 mg of aspirin  -Consider MRI of the brain given memory difficulties and risk factors for stroke    Patient is reportedly having severe back pain with paresthesias resulting in multiple ER visits although discussion regarding this was limited. Would recommend EMG of the bilateral lower extremities to determine if paresthesias are present peripheral polyneuropathy, including B12 deficiency, or radiculopathy. Discussion on this was limited as the focus of the interview was related to memory difficulties. Can be addressed in the future if patient is amenable to another follow-up appointment. Follow-up recommended to patient but he has declined at this time.   Patient encouraged to call our office if he changes his mind. Return if symptoms worsen or fail to improve.     COMMUNICATION:       Electronically signed by Mally Matos PA-C, PA-C on 7/9/2021 at 11:35 AM

## 2021-07-09 NOTE — TELEPHONE ENCOUNTER
AUTHORIZATION: SHILO L3,4,5 MBB     INSURANCE: HAFSA REDDY VIA: [employer] #: [id number]    DATE RANGE: 7/9/2021 TO 1/5/2022     TELEPHONE CALL ROUTED TO MA TO SCHEDULE.

## 2021-07-12 DIAGNOSIS — E53.8 VITAMIN B 12 DEFICIENCY: Primary | ICD-10-CM

## 2021-07-12 RX ORDER — ASPIRIN 81 MG/1
81 TABLET ORAL DAILY
COMMUNITY
End: 2021-11-18

## 2021-07-12 ASSESSMENT — ENCOUNTER SYMPTOMS
NAUSEA: 0
SHORTNESS OF BREATH: 0
DIARRHEA: 0
VOMITING: 0
PHOTOPHOBIA: 0
COLOR CHANGE: 0
CHEST TIGHTNESS: 0
TROUBLE SWALLOWING: 0
BACK PAIN: 1

## 2021-07-14 ENCOUNTER — TELEPHONE (OUTPATIENT)
Dept: PAIN MANAGEMENT | Age: 66
End: 2021-07-14

## 2021-07-14 ENCOUNTER — CARE COORDINATION (OUTPATIENT)
Dept: CARE COORDINATION | Age: 66
End: 2021-07-14

## 2021-07-14 NOTE — CARE COORDINATION
Returning patient's voicemail. Telephone call to patient. Left voicemail of nature of call with request for return phone call. Call back number was provided.

## 2021-07-14 NOTE — TELEPHONE ENCOUNTER
BENEFITS:    Romario Swanson [facility]  Phone: [phone]  Contact Name: Amaya Golden  Effective Date: 1/1/21     Plan year: KATRIN  Deductible: 0      Deductible Met: 0  Allowed/benefits paid at: 100% AFTER $250 COPAY  OOP: $5900.00, $1294.01 MET  Freq Limits:MED NEC  Prior Auth Requirement: YES, ALREADY OBTAINED THROUGH [employer]    Notes:     Call Reference #: [id number]    Time of call: 120PM

## 2021-07-15 ENCOUNTER — PROCEDURE VISIT (OUTPATIENT)
Dept: PAIN MANAGEMENT | Age: 66
End: 2021-07-15
Payer: MEDICARE

## 2021-07-15 ENCOUNTER — CARE COORDINATION (OUTPATIENT)
Dept: CARE COORDINATION | Age: 66
End: 2021-07-15

## 2021-07-15 DIAGNOSIS — M51.36 DDD (DEGENERATIVE DISC DISEASE), LUMBAR: ICD-10-CM

## 2021-07-15 DIAGNOSIS — M47.817 LUMBOSACRAL SPONDYLOSIS WITHOUT MYELOPATHY: Primary | ICD-10-CM

## 2021-07-15 PROCEDURE — 64494 INJ PARAVERT F JNT L/S 2 LEV: CPT | Performed by: ANESTHESIOLOGY

## 2021-07-15 PROCEDURE — 64493 INJ PARAVERT F JNT L/S 1 LEV: CPT | Performed by: ANESTHESIOLOGY

## 2021-07-15 RX ORDER — LIDOCAINE HYDROCHLORIDE 10 MG/ML
5 INJECTION, SOLUTION EPIDURAL; INFILTRATION; INTRACAUDAL; PERINEURAL ONCE
Status: COMPLETED | OUTPATIENT
Start: 2021-07-15 | End: 2021-07-15

## 2021-07-15 RX ADMIN — LIDOCAINE HYDROCHLORIDE 5 ML: 10 INJECTION, SOLUTION EPIDURAL; INFILTRATION; INTRACAUDAL; PERINEURAL at 11:47

## 2021-07-15 NOTE — PROGRESS NOTES
[facility]) Physicians  [facility]  [address], [address] [facility] [address], [address] [facility] [address]: [phone]  F: [phone]             Provider: Sandrine Contreras MD          Patient Name: Chris Delcid : 1955        Date: 7/15/2021         PROCEDURE: Bilateral L3, L4, L5 medial branch blocks, diagnostic. Description of Procedure: The procedure and potential complications were explained to the patient and a voluntary informed, signed consent was obtained. The patient was placed prone on the x-ray table, under fluoroscopic guidance a 25-gauge, 3-1/2 inch long curved spinal needle was inserted aiming at the medial branches located at the junction of the superior articular process and transverse process of respective vertebrae except at the L5 level instead of the transverse process the sacral ala was used. After negative aspiration 0.8 mL of 1% Xylocaine was injected at each level. Fifteen minutes later the patient's pain level was assessed. There was marked pain relief of 80%. Summary and Recommendations: The patient will be back for MBB.

## 2021-07-15 NOTE — LETTER
[address] [facility]      Dear Coni Brennan,    I hope this letter finds you doing well! I am sending you out confirmation that we completed Social Security Extra Help Application. I hope you find the information helpful. Please look them over and let me know if you have any questions or need further assistance. You can contact me at any of the numbers listed below. Sincerely,    DMITRY Tsang  , [facility]  Cell Phone: [phone]  Email: [email or web address]

## 2021-07-15 NOTE — CARE COORDINATION
Telephone call with patient. Explained Social Security Extra Help Program and patient was in agreement to do application. Completed Social Security Extra Help Application with patient while he was on the phone. Explained would be mailing him out confirmation that we had completed this application.

## 2021-07-16 ENCOUNTER — TELEPHONE (OUTPATIENT)
Dept: PAIN MANAGEMENT | Age: 66
End: 2021-07-16

## 2021-07-16 NOTE — TELEPHONE ENCOUNTER
ORDER PLACED:    Date: 07/15/21  Description: Bubba Mcwilliams MBB  Order Number: [id number]  Ordering Provider: [facility]  Performing Provider: [facility]  CPT Codes: 02708, 77072  ICD10 Codes: R42.010

## 2021-07-22 ENCOUNTER — NURSE ONLY (OUTPATIENT)
Dept: FAMILY MEDICINE CLINIC | Age: 66
End: 2021-07-22
Payer: MEDICARE

## 2021-07-22 ENCOUNTER — CARE COORDINATION (OUTPATIENT)
Dept: CARE COORDINATION | Age: 66
End: 2021-07-22

## 2021-07-22 DIAGNOSIS — E53.8 B12 DEFICIENCY: Primary | ICD-10-CM

## 2021-07-22 PROCEDURE — 96372 THER/PROPH/DIAG INJ SC/IM: CPT | Performed by: FAMILY MEDICINE

## 2021-07-22 RX ORDER — CYANOCOBALAMIN 1000 UG/ML
1000 INJECTION INTRAMUSCULAR; SUBCUTANEOUS ONCE
Status: COMPLETED | OUTPATIENT
Start: 2021-07-22 | End: 2021-07-22

## 2021-07-22 RX ADMIN — CYANOCOBALAMIN 1000 MCG: 1000 INJECTION INTRAMUSCULAR; SUBCUTANEOUS at 09:19

## 2021-07-22 NOTE — PROGRESS NOTES
After obtaining consent, and per orders of Dr. Denzel Avendaño, injection of B12 given in Right deltoid by Daniel jain MA. Patient instructed to remain in clinic for 20 minutes afterwards, and to report any adverse reaction to me immediately.

## 2021-07-23 ENCOUNTER — CARE COORDINATION (OUTPATIENT)
Dept: CARE COORDINATION | Age: 66
End: 2021-07-23

## 2021-07-23 NOTE — CARE COORDINATION
Returning patient's voicemail message. Telephone call to patient. Left voicemail of nature of call with request for return phone call. Call back number was provided.

## 2021-07-23 NOTE — CARE COORDINATION
Telephone call with patient. Explained that the information he received was copy of Social Security Extra Help Application. Patient has not heard from [email or web address] Extra Help Application yet. Discussed meals-on-wheels with patient. He stated that he was not in favor of this option at this time.

## 2021-07-29 ENCOUNTER — CARE COORDINATION (OUTPATIENT)
Dept: CARE COORDINATION | Age: 66
End: 2021-07-29

## 2021-07-29 NOTE — CARE COORDINATION
Ambulatory Care Coordination Note  7/29/2021  CM Risk Score: 6  Charlson 10 Year Mortality Risk Score: 23%     ACC: Camron Chen, RN    Summary Note:     I called to complete a follow up with Coni Brennan  I spoke with his sister YAMINI (sister and health care decision maker)  She tells me that she feels that his memory is getting worse,  She tells me even minute to minute his conversations and memory are increasingly short. She adds that within two sentences he will forget the conversation and this is new. She denies that he is missing medication, having eating or hygiene changes. He does seem to become short when he is unable to remember things. She states that he did see the pain doctor and has had injections. She feels that he is walking a little more upright and his pace is a little quicker. She is concerned about an appointment that he had with cardiology. She tells me that he told her that he is supposed to have ultrasound of his carotids tomorrow. I explained that I do see lab work ordered but I do not see any imaging. I also explained that I will follow up with Dr Kim Madrigal office. PLAN:  Coni Brennan will remain safe and free from falls. Coni Brennan will complete all schedules appointments and testing. YAMINI will call with any questions or concerns. Care Coordination Interventions    Program Enrollment: Rising Risk  Referral from Primary Care Provider: Yes  Suggested Interventions and Community Resources  Fall Risk Prevention: In Process  Disease Association: In Process  Social Work: In Process  Zone Management Tools: In Process  Other Services or Interventions: COPD zone tool education initiated.  referral intiated, Walk in Clinic hours and usage discussed. COVID vaccine declined         Goals Addressed    None         Prior to Admission medications    Medication Sig Start Date End Date Taking?  Authorizing Provider   aspirin 81 MG EC tablet Take 81 mg by mouth daily    Historical Provider, MD   meloxicam (MOBIC) 15 MG tablet Take 1 tablet by mouth daily 6/9/21   MANUELA Alcaraz CNP   vitamin D3 (CHOLECALCIFEROL) 10 MCG (400 UNIT) TABS tablet Take 400 Units by mouth daily  Patient not taking: Reported on 7/9/2021    Historical Provider, MD   acetaminophen (TYLENOL) 500 MG tablet Take 500 mg by mouth every 6 hours as needed for Pain    Historical Provider, MD   Apoaequorin (PREVAGEN PO) Take by mouth daily  Patient not taking: Reported on 7/9/2021    Historical Provider, MD   amoxicillin (AMOXIL) 500 MG capsule Take 500 mg by mouth 4 times daily as needed For procedures   Patient not taking: Reported on 6/22/2021    Historical Provider, MD   rosuvastatin (CRESTOR) 40 MG tablet TAKE 1 TABLET BY MOUTH DAILY AT BEDTIME.   Patient not taking: Reported on 7/9/2021 4/30/21   Historical Provider, MD   pantoprazole (PROTONIX) 40 MG tablet TAKE 1 TABLET BY MOUTH EVERY DAY 5/24/21   MANUELA Alcaraz CNP   FLUoxetine (PROZAC) 40 MG capsule Take 1 capsule by mouth daily 3/23/21   MANUELA Alcaraz CNP   pravastatin (PRAVACHOL) 40 MG tablet TAKE 1 TABLET BY MOUTH EVERY DAY IN THE EVENING 6/17/19   Adriane Boxer, APRN - CNP       Future Appointments   Date Time Provider Raphael Thompson   8/4/2021 11:00 AM MD GERDA Antonio [facility] EMERGENCY MEDICAL [place] [facility] [place]   2/21/2022  1:00 PM MANUELA Alcaraz CNP [facility] EMERGENCY MEDICAL [facility]ENTER AT [place]

## 2021-07-31 NOTE — TELEPHONE ENCOUNTER
AUTHORIZATION: SHILO L3,4,5 MBB     INSURANCE: [facility] VIA: Sophie Green #: [id number]    DATE RANGE: 7/31/2021 TO 1/27/2022    TELEPHONE CALL ROUTED TO MA TO SCHEDULE.

## 2021-08-02 ENCOUNTER — TELEPHONE (OUTPATIENT)
Dept: PAIN MANAGEMENT | Age: 66
End: 2021-08-02

## 2021-08-02 NOTE — TELEPHONE ENCOUNTER
BENEFITS:    [facility]  Phone: [phone]  Contact Name: Mg Martel  Effective Date: 1/1/21     Plan year: KATRIN  Deductible: 0      Deductible Met: 0  Allowed/benefits paid at: 100% AFTER $90 COPAY  OOP: $5,900.00, $1,634.01  Freq Limits:   Prior Auth Requirement: ALREADY OBTAINED THROUGH [employer]    Notes:     Call Reference #: [id number]    Time of call: 145PM

## 2021-08-04 ENCOUNTER — PROCEDURE VISIT (OUTPATIENT)
Dept: PAIN MANAGEMENT | Age: 66
End: 2021-08-04
Payer: MEDICARE

## 2021-08-04 DIAGNOSIS — M51.36 DDD (DEGENERATIVE DISC DISEASE), LUMBAR: ICD-10-CM

## 2021-08-04 DIAGNOSIS — M47.817 LUMBOSACRAL SPONDYLOSIS WITHOUT MYELOPATHY: Primary | ICD-10-CM

## 2021-08-04 PROCEDURE — 64494 INJ PARAVERT F JNT L/S 2 LEV: CPT | Performed by: ANESTHESIOLOGY

## 2021-08-04 PROCEDURE — 64493 INJ PARAVERT F JNT L/S 1 LEV: CPT | Performed by: ANESTHESIOLOGY

## 2021-08-04 RX ORDER — LIDOCAINE HYDROCHLORIDE 10 MG/ML
5 INJECTION, SOLUTION EPIDURAL; INFILTRATION; INTRACAUDAL; PERINEURAL ONCE
Status: COMPLETED | OUTPATIENT
Start: 2021-08-04 | End: 2021-08-04

## 2021-08-04 RX ADMIN — LIDOCAINE HYDROCHLORIDE 5 ML: 10 INJECTION, SOLUTION EPIDURAL; INFILTRATION; INTRACAUDAL; PERINEURAL at 10:40

## 2021-08-05 ENCOUNTER — TELEPHONE (OUTPATIENT)
Dept: PAIN MANAGEMENT | Age: 66
End: 2021-08-05

## 2021-08-05 ENCOUNTER — CARE COORDINATION (OUTPATIENT)
Dept: CARE COORDINATION | Age: 66
End: 2021-08-05

## 2021-08-05 NOTE — TELEPHONE ENCOUNTER
ORDER PLACED:    Date: 08/04/21  Description: Jones Murphy [facility]  Order Number: [id number]  Ordering Provider: [facility]  Performing Provider: [facility]  CPT Codes: 13325, 19927  ICD10 Codes: O60.437

## 2021-08-05 NOTE — CARE COORDINATION
Telephone call with patient. He indicated that he has not heard from [address] [place] [address].  He stated that he has been able to afford his medications this month.

## 2021-08-07 NOTE — TELEPHONE ENCOUNTER
AUTHORIZATION: SHILO L3,4,5 RFA     INSURANCE: [facility] VIA: Luther oMsher #: [id number]    DATE RANGE: 8/7/2021 TO 2/2/2021     TELEPHONE CALL ROUTED TO MA TO SCHEDULE.

## 2021-08-10 ENCOUNTER — TELEPHONE (OUTPATIENT)
Dept: PAIN MANAGEMENT | Age: 66
End: 2021-08-10

## 2021-08-11 ENCOUNTER — OFFICE VISIT (OUTPATIENT)
Dept: PAIN MANAGEMENT | Age: 66
End: 2021-08-11
Payer: MEDICARE

## 2021-08-11 DIAGNOSIS — M51.36 DDD (DEGENERATIVE DISC DISEASE), LUMBAR: ICD-10-CM

## 2021-08-11 DIAGNOSIS — M47.817 LUMBOSACRAL SPONDYLOSIS WITHOUT MYELOPATHY: Primary | ICD-10-CM

## 2021-08-11 PROCEDURE — 64636 DESTROY L/S FACET JNT ADDL: CPT | Performed by: ANESTHESIOLOGY

## 2021-08-11 PROCEDURE — 64635 DESTROY LUMB/SAC FACET JNT: CPT | Performed by: ANESTHESIOLOGY

## 2021-08-11 RX ORDER — LIDOCAINE HYDROCHLORIDE 10 MG/ML
15 INJECTION, SOLUTION EPIDURAL; INFILTRATION; INTRACAUDAL; PERINEURAL ONCE
Status: COMPLETED | OUTPATIENT
Start: 2021-08-11 | End: 2021-08-11

## 2021-08-11 RX ADMIN — LIDOCAINE HYDROCHLORIDE 15 ML: 10 INJECTION, SOLUTION EPIDURAL; INFILTRATION; INTRACAUDAL; PERINEURAL at 15:34

## 2021-08-11 RX ADMIN — Medication 2 MEQ: at 15:34

## 2021-08-16 ENCOUNTER — TELEPHONE (OUTPATIENT)
Dept: PAIN MANAGEMENT | Age: 66
End: 2021-08-16

## 2021-08-16 DIAGNOSIS — M51.36 DDD (DEGENERATIVE DISC DISEASE), LUMBAR: ICD-10-CM

## 2021-08-16 DIAGNOSIS — M47.817 LUMBOSACRAL SPONDYLOSIS WITHOUT MYELOPATHY: Primary | ICD-10-CM

## 2021-08-16 RX ORDER — GABAPENTIN 300 MG/1
300 CAPSULE ORAL 2 TIMES DAILY
Qty: 60 CAPSULE | Refills: 1 | Status: SHIPPED | OUTPATIENT
Start: 2021-08-16 | End: 2021-11-18 | Stop reason: SDUPTHER

## 2021-08-16 NOTE — TELEPHONE ENCOUNTER
Patient received ablation 8/11 by Dr. James Bazan. Patient states they are experiencing a lot of numbness and tingling in his feet and toes.  Pt inquires for Dr. Erik Fuchs advice on what to do/if this is normal.

## 2021-08-17 NOTE — TELEPHONE ENCOUNTER
It should go away. Use ice and gabapentin 300 mg at night, if tolerated take another one in the morning.

## 2021-08-19 ENCOUNTER — CARE COORDINATION (OUTPATIENT)
Dept: CARE COORDINATION | Age: 66
End: 2021-08-19

## 2021-08-19 NOTE — CARE COORDINATION
Telephone call with patient. He did hear from [address] [place] [address] and they were requesting additional information. Patient indicated that he sent the requested information back to them. Discussed that he does have his medications and able to afford at this time.

## 2021-08-22 ENCOUNTER — HOSPITAL ENCOUNTER (EMERGENCY)
Age: 66
Discharge: HOME | End: 2021-08-22
Payer: MEDICARE

## 2021-08-22 VITALS
TEMPERATURE: 98.1 F | WEIGHT: 140 LBS | OXYGEN SATURATION: 96 % | HEART RATE: 116 BPM | HEIGHT: 69 IN | SYSTOLIC BLOOD PRESSURE: 158 MMHG | RESPIRATION RATE: 18 BRPM | DIASTOLIC BLOOD PRESSURE: 84 MMHG | BODY MASS INDEX: 20.73 KG/M2

## 2021-08-22 DIAGNOSIS — M54.41 CHRONIC BILATERAL LOW BACK PAIN WITH BILATERAL SCIATICA: Primary | ICD-10-CM

## 2021-08-22 DIAGNOSIS — G89.29 CHRONIC BILATERAL LOW BACK PAIN WITH BILATERAL SCIATICA: Primary | ICD-10-CM

## 2021-08-22 DIAGNOSIS — M54.42 CHRONIC BILATERAL LOW BACK PAIN WITH BILATERAL SCIATICA: Primary | ICD-10-CM

## 2021-08-22 PROCEDURE — 6360000002 HC RX W HCPCS: Performed by: PHYSICIAN ASSISTANT

## 2021-08-22 PROCEDURE — 99283 EMERGENCY DEPT VISIT LOW MDM: CPT

## 2021-08-22 PROCEDURE — 96372 THER/PROPH/DIAG INJ SC/IM: CPT

## 2021-08-22 RX ORDER — KETOROLAC TROMETHAMINE 30 MG/ML
30 INJECTION, SOLUTION INTRAMUSCULAR; INTRAVENOUS ONCE
Status: COMPLETED | OUTPATIENT
Start: 2021-08-22 | End: 2021-08-22

## 2021-08-22 RX ORDER — CYCLOBENZAPRINE HCL 10 MG
10 TABLET ORAL 3 TIMES DAILY PRN
Qty: 21 TABLET | Refills: 0 | Status: SHIPPED | OUTPATIENT
Start: 2021-08-22 | End: 2021-09-13 | Stop reason: SDUPTHER

## 2021-08-22 RX ORDER — PREDNISONE 20 MG/1
40 TABLET ORAL DAILY
Qty: 10 TABLET | Refills: 0 | Status: SHIPPED | OUTPATIENT
Start: 2021-08-22 | End: 2021-08-27

## 2021-08-22 RX ADMIN — KETOROLAC TROMETHAMINE 30 MG: 30 INJECTION, SOLUTION INTRAMUSCULAR; INTRAVENOUS at 07:35

## 2021-08-22 ASSESSMENT — ENCOUNTER SYMPTOMS
COLOR CHANGE: 0
ABDOMINAL PAIN: 0
CONSTIPATION: 0
SORE THROAT: 0
RHINORRHEA: 0
SHORTNESS OF BREATH: 0
ABDOMINAL DISTENTION: 0
EYE DISCHARGE: 0
BACK PAIN: 1

## 2021-08-22 ASSESSMENT — PAIN DESCRIPTION - ORIENTATION: ORIENTATION: LOWER

## 2021-08-22 ASSESSMENT — PAIN DESCRIPTION - LOCATION: LOCATION: BACK

## 2021-08-22 ASSESSMENT — PAIN DESCRIPTION - PAIN TYPE: TYPE: ACUTE PAIN

## 2021-08-22 ASSESSMENT — PAIN DESCRIPTION - DESCRIPTORS: DESCRIPTORS: ACHING;TINGLING

## 2021-08-22 ASSESSMENT — PAIN SCALES - GENERAL
PAINLEVEL_OUTOF10: 7
PAINLEVEL_OUTOF10: 7

## 2021-08-22 NOTE — ED PROVIDER NOTES
[address] [facility] ED  eMERGENCY dEPARTMENT eNCOUnter      Pt Name: Frannie Meek  MRN: [id number]  Abiodungfzion 1955  Date of evaluation: 8/22/2021  Provider: Teresa Williamson PA-C    CHIEF COMPLAINT       Chief Complaint   Patient presents with    Back Pain     lower         HISTORY OF PRESENT ILLNESS   (Location/Symptom, Timing/Onset,Context/Setting, Quality, Duration, Modifying Factors, Severity)  Note limiting factors. Frannie Meek is a 74 y.o. male who presents to the emergency department complaint of chronic low back pain which patient states been ongoing for many months. He states over last couple days not been able to sleep, he states he is currently active involved with Dr. Frederic Yancey of pain management for ongoing back pain. He states that he just recently received radioablation on his low back, he complains of some numbness to his lower extremities. He states this was reviewed by Dr. Frederic Yancey, and advised him to ice, use anti-inflammatories and this would go away. Patient denies any bowel or bladder dysfunction, no difficulty with ambulation. He is currently on gabapentin from pain management, he states this is not working for him, he is has not recontacted his pain management specialist in regards of ongoing pain. No saddle paresthesias. Patient rates his current pain at this time is 7 out of 10. Past medical history significant for hypertension, gastric reflux, emphysema, chronic low back pain, anxiety, COPD, depression, hyperlipidemia, alcohol abuse. HPI    NursingNotes were reviewed. REVIEW OF SYSTEMS    (2-9 systems for level 4, 10 or more for level 5)     Review of Systems   Constitutional: Negative for activity change and appetite change. HENT: Negative for congestion, ear discharge, ear pain, nosebleeds, rhinorrhea and sore throat. Eyes: Negative for discharge. Respiratory: Negative for shortness of breath.     Cardiovascular: Negative for chest pain, palpitations and leg swelling. Gastrointestinal: Negative for abdominal distention, abdominal pain and constipation. Genitourinary: Negative for difficulty urinating and dysuria. Musculoskeletal: Positive for back pain. Negative for arthralgias. Skin: Negative for color change, pallor, rash and wound. Neurological: Negative for dizziness, tremors, syncope, weakness, numbness and headaches. Psychiatric/Behavioral: Negative for agitation and confusion. Except as noted above the remainder of the review of systems was reviewed and negative.        PAST MEDICAL HISTORY     Past Medical History:   Diagnosis Date    Allergic rhinitis 2/19/2018    Anxiety     Chronic back pain     COPD (chronic obstructive pulmonary disease) ([address]     Depression     Disorder of stomach     valve not closing properly    Emphysema lung ([address]     Essential hypertension 11/4/2019    Gastroesophageal reflux disease 10/4/2019    Hyperlipidemia     meds > 22 yrs    Low back pain 5/1/2018    ARNAUD (obstructive sleep apnea) 2/19/2018    Other emphysema ([address] 10/4/2019    Other intervertebral disc degeneration, lumbar region 3/23/2018    Radiculopathy, lumbar region 2/28/2018    Restless leg syndrome     Seasonal affective disorder ([address] 10/4/2019    Substance abuse ([address]     alcholic, quit 20 yrs          SURGICALHISTORY       Past Surgical History:   Procedure Laterality Date    COLONOSCOPY  12/22/2016    DALIA LAST MD    DENTAL SURGERY  10 yrs ago    ENDOSCOPY, COLON, DIAGNOSTIC      EYE SURGERY      Lasik OU    KNEE ARTHROSCOPY Right     KNEE SURGERY Right 05/2016    Ray procedure    KNEE SURGERY      NM NASAL SCOPY,OPEN MAXILL SINUS N/A 2/22/2018    SEPTOPLASTY MICRODEBRIDER ASSISTED TURBINOPLASTY AND OUT-FRACTURING BILATERAL NASAL ENDOSCOPY performed by Mahad Acevedo MD at [facility]       Previous Medications    ACETAMINOPHEN (TYLENOL) 500 MG TABLET    Take 500 mg by mouth every 6 hours as needed for Pain    AMOXICILLIN (AMOXIL) 500 MG CAPSULE    Take 500 mg by mouth 4 times daily as needed For procedures     APOAEQUORIN (PREVAGEN PO)    Take by mouth daily    ASPIRIN 81 MG EC TABLET    Take 81 mg by mouth daily    FLUOXETINE (PROZAC) 40 MG CAPSULE    Take 1 capsule by mouth daily    GABAPENTIN (NEURONTIN) 300 MG CAPSULE    Take 1 capsule by mouth 2 times daily for 30 days. Take one at night, if tolerated take another one in the morning. MELOXICAM (MOBIC) 15 MG TABLET    Take 1 tablet by mouth daily    PANTOPRAZOLE (PROTONIX) 40 MG TABLET    TAKE 1 TABLET BY MOUTH EVERY DAY    PRAVASTATIN (PRAVACHOL) 40 MG TABLET    TAKE 1 TABLET BY MOUTH EVERY DAY IN THE EVENING    ROSUVASTATIN (CRESTOR) 40 MG TABLET    TAKE 1 TABLET BY MOUTH DAILY AT BEDTIME.     VITAMIN D3 (CHOLECALCIFEROL) 10 MCG (400 UNIT) TABS TABLET    Take 400 Units by mouth daily       ALLERGIES     Alcohol, Benadryl [diphenhydramine], Demerol hcl [meperidine], and Sulfa antibiotics    FAMILY HISTORY       Family History   Problem Relation Age of Onset    Cancer Mother         stomach    Arthritis Mother     Heart Disease Father 48    Cancer Father         bone    Cancer Maternal Grandmother         lung    Cancer Paternal Grandmother     Heart Disease Paternal Grandfather     No Known Problems Sister     Cancer Brother     Heart Disease Brother         hole in heart in 65 at 1 months age          SOCIAL HISTORY       Social History     Socioeconomic History    Marital status:      Spouse name: None    Number of children: 2    Years of education: 15    Highest education level: High school graduate   Occupational History    None   Tobacco Use    Smoking status: Current Every Day Smoker     Packs/day: 2.00     Years: 8.00     Pack years: 16.00     Types: Cigars    Smokeless tobacco: Never Used    Tobacco comment: 3-5 cigars qd   Vaping Use    Vaping Use: Never used   Substance and Sexual Activity    Alcohol use: No     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    Drug use: No    Sexual activity: Not Currently     Partners: Female   Other Topics Concern    None   Social History Narrative    None     Social Determinants of Health     Financial Resource Strain: Low Risk     Difficulty of Paying Living Expenses: Not hard at all   Food Insecurity: No Food Insecurity    Worried About Running Out of Food in the Last Year: Never true    Ivana of Food in the Last Year: Never true   Transportation Needs: No Transportation Needs    Lack of Transportation (Medical): No    Lack of Transportation (Non-Medical): No   Physical Activity:     Days of Exercise per Week:     Minutes of Exercise per Session:    Stress:     Feeling of Stress :    Social Connections:     Frequency of Communication with Friends and Family:     Frequency of Social Gatherings with Friends and Family:     Attends Nondenominational Services:     Active Member of Clubs or Organizations:     Attends Club or Organization Meetings:     Marital Status:    Intimate Partner Violence:     Fear of Current or Ex-Partner:     Emotionally Abused:     Physically Abused:     Sexually Abused:        SCREENINGS      @FLOW([id number])@      PHYSICAL EXAM    (up to 7 for level 4, 8 or more for level 5)     ED Triage Vitals [08/22/21 0642]   BP Temp Temp Source Pulse Resp SpO2 Height Weight   (!) 158/84 98.1 °F (36.7 °C) Oral 116 18 96 % 5' 9\" (1.753 m) 140 lb (63.5 kg)       Physical Exam  Vitals and nursing note reviewed. Constitutional:       General: He is not in acute distress. Appearance: He is well-developed. He is not ill-appearing, toxic-appearing or diaphoretic. HENT:      Head: Normocephalic. Nose: No congestion. Mouth/Throat:      Mouth: Mucous membranes are moist.      Pharynx: No oropharyngeal exudate or posterior oropharyngeal erythema. Eyes:      Extraocular Movements: Extraocular movements intact.       Conjunctiva/sclera: Conjunctivae normal. Pupils: Pupils are equal, round, and reactive to light. Neck:      Vascular: No JVD. Trachea: No tracheal deviation. Cardiovascular:      Rate and Rhythm: Normal rate. Pulses: Normal pulses. Heart sounds: Normal heart sounds. No murmur heard. No friction rub. No gallop. Pulmonary:      Effort: Pulmonary effort is normal. No tachypnea, accessory muscle usage, respiratory distress or retractions. Breath sounds: Normal breath sounds. No stridor. No wheezing, rhonchi or rales. Comments: Lung sounds are clear in all fields, there is no wheezes rales or rhonchi, no accessory muscle use, no retractions, room air saturations are 96%  Chest:      Chest wall: No tenderness. Abdominal:      General: Abdomen is flat. Bowel sounds are normal. There is no distension or abdominal bruit. Palpations: There is no shifting dullness, fluid wave, hepatomegaly, splenomegaly, mass or pulsatile mass. Tenderness: There is no abdominal tenderness. There is no right CVA tenderness, left CVA tenderness, guarding or rebound. Negative signs include Buitrago's sign, Rovsing's sign and McBurney's sign. Musculoskeletal:         General: No deformity. Cervical back: Normal range of motion and neck supple. No rigidity. Comments: Patient is moving fairly comfortably, is able to stand and ambulate without any ataxia, foot drop. Patient is laying in the bed, he is able to sit up without any acute distress, no facial grimace, no weakness noted to arms or lower extremities. No tenderness on palpation to the low lumbar region. No cut scrapes abrasions are noted no rashes. No step-offs or deformity. Skin:     General: Skin is warm and dry. Capillary Refill: Capillary refill takes less than 2 seconds. Coloration: Skin is not jaundiced. Neurological:      General: No focal deficit present. Mental Status: He is alert and oriented to person, place, and time.  Mental status is at baseline. Cranial Nerves: No cranial nerve deficit. Sensory: No sensory deficit. Motor: No weakness. Coordination: Coordination normal.   Psychiatric:         Mood and Affect: Mood normal.         DIAGNOSTIC RESULTS     EKG: All EKG's are interpreted by the Emergency Department Physician who either signs or Co-signsthis chart in the absence of a cardiologist.        RADIOLOGY:   Perez Ceja such as CT, Ultrasound and MRI are read by the radiologist. Plain radiographic images are visualized and preliminarily interpreted by the emergency physician with the below findings:        Interpretation per the Radiologist below, if available at the time ofthis note:    No orders to display         ED BEDSIDE ULTRASOUND:   Performed by ED Physician - none    LABS:  Labs Reviewed - No data to display    All other labs were within normal range or not returned as of this dictation. EMERGENCY DEPARTMENT COURSE and DIFFERENTIAL DIAGNOSIS/MDM:   Vitals:    Vitals:    08/22/21 0642   BP: (!) 158/84   Pulse: 116   Resp: 18   Temp: 98.1 °F (36.7 °C)   TempSrc: Oral   SpO2: 96%   Weight: 140 lb (63.5 kg)   Height: 5' 9\" (1.753 m)            MDM  Number of Diagnoses or Management Options  Chronic bilateral low back pain with bilateral sciatica  Diagnosis management comments: Patient presents emerged part with complaint of chronic low back pain which she states been ongoing for many months, he is currently actively involved with pain management Dr. Patito Frankel states that he is received injections, radioablation to his low back recently. He is currently on gabapentin for pain control. Denies any new or acute injury. He states that he is just not been able to sleep over the last couple days due to increasing back pain. He denies any numbness or tingling, no bowel or bladder dysfunction, no saddle paresthesias. Patient was given an injection of Toradol while in the ER, and sent home with prednisone and Flexeril. He was advised to contact his pain management specialist for further ongoing evaluation of his back pain, as well as pain control. He was advised if he has worsening or changes symptoms, return to the ER. CRITICAL CARE TIME   Total Critical Care time was  minutes, excluding separately reportableprocedures. There was a high probability of clinicallysignificant/life threatening deterioration in the patient's condition which required my urgent intervention. CONSULTS:  None    PROCEDURES:  Unless otherwise noted below, none     Procedures    FINAL IMPRESSION      1.  Chronic bilateral low back pain with bilateral sciatica          DISPOSITION/PLAN   DISPOSITION Decision To Discharge 08/22/2021 07:06:52 AM      PATIENT REFERRED TO:  MANUELA Martínez - CNP  [address] [facility] [place] [phone]    In 3 days      Sukh Knight MD  [address] [place] [address]  [phone]    In 1 day        DISCHARGE MEDICATIONS:  New Prescriptions    CYCLOBENZAPRINE (FLEXERIL) 10 MG TABLET    Take 1 tablet by mouth 3 times daily as needed for Muscle spasms    PREDNISONE (DELTASONE) 20 MG TABLET    Take 2 tablets by mouth daily for 5 doses          (Please note that portions of this note were completed with a voice recognition program.  Efforts were made to edit the dictations but occasionally words are mis-transcribed.)    Teresa Williamson PA-C (electronically signed)  Attending Emergency Physician         Teresa Williamson PA-C  08/22/21 [address] Jonathan Garcia PA-C  08/22/21 0877

## 2021-08-22 NOTE — ED TRIAGE NOTES
Pt states that he began having lower back pain. Pt states that he has chronic pain but he has been feeling good. Pt states that this episode started a few days ago and has not been able to sleep for 2 days.

## 2021-08-23 ENCOUNTER — NURSE ONLY (OUTPATIENT)
Dept: FAMILY MEDICINE CLINIC | Age: 66
End: 2021-08-23
Payer: MEDICARE

## 2021-08-23 DIAGNOSIS — E53.8 B12 DEFICIENCY: Primary | ICD-10-CM

## 2021-08-23 PROCEDURE — 96372 THER/PROPH/DIAG INJ SC/IM: CPT | Performed by: NURSE PRACTITIONER

## 2021-08-23 RX ORDER — CYANOCOBALAMIN 1000 UG/ML
1000 INJECTION INTRAMUSCULAR; SUBCUTANEOUS ONCE
Status: COMPLETED | OUTPATIENT
Start: 2021-08-23 | End: 2021-08-23

## 2021-08-23 RX ADMIN — CYANOCOBALAMIN 1000 MCG: 1000 INJECTION INTRAMUSCULAR; SUBCUTANEOUS at 12:50

## 2021-08-23 NOTE — Clinical Note
Pt states that he believes that he is done with B12 shots. I told him that I would speak with you and let him know. Please advise.

## 2021-08-24 NOTE — PROGRESS NOTES
After obtaining consent, and per orders of Kim Lindsay, injection of B12 was given in the RT dorsogluteal by Farzana Bush CMA. Patient instructed to remain in clinic for 20 minutes afterwards, and to report any adverse reaction to me immediately. Tolerated well.
He can continue to get them monthly.
Spoke to pt and he is aware of recommendation.
Unknown

## 2021-08-26 ENCOUNTER — CARE COORDINATION (OUTPATIENT)
Dept: CARE COORDINATION | Age: 66
End: 2021-08-26

## 2021-08-26 NOTE — CARE COORDINATION
Telephone call with patient. He indicated that [facility] is supplying his medications at this time. He feels he can afford his medications at this time.   He is still waiting to hear from the [address] [place] [address].

## 2021-08-27 ENCOUNTER — OFFICE VISIT (OUTPATIENT)
Dept: PAIN MANAGEMENT | Age: 66
End: 2021-08-27
Payer: MEDICARE

## 2021-08-27 VITALS
SYSTOLIC BLOOD PRESSURE: 145 MMHG | TEMPERATURE: 97.6 F | DIASTOLIC BLOOD PRESSURE: 90 MMHG | WEIGHT: 140 LBS | HEIGHT: 69 IN | BODY MASS INDEX: 20.73 KG/M2 | HEART RATE: 88 BPM

## 2021-08-27 DIAGNOSIS — M47.817 LUMBOSACRAL SPONDYLOSIS WITHOUT MYELOPATHY: Primary | ICD-10-CM

## 2021-08-27 DIAGNOSIS — M51.36 DDD (DEGENERATIVE DISC DISEASE), LUMBAR: ICD-10-CM

## 2021-08-27 PROCEDURE — 99213 OFFICE O/P EST LOW 20 MIN: CPT | Performed by: ANESTHESIOLOGY

## 2021-08-27 ASSESSMENT — ENCOUNTER SYMPTOMS
DIARRHEA: 0
SHORTNESS OF BREATH: 0
CONSTIPATION: 0
NAUSEA: 0
BACK PAIN: 1

## 2021-08-27 NOTE — PROGRESS NOTES
+  Patient:  Jaspreet Foreman  YOB: 1955  Date: 8/27/2021      Subjective:     Jasrpeet Foreman is a 74 y.o. male who presents today with:    Chief Complaint   Patient presents with    Leg Pain       HPI: The patient presents to the clinic to report that he has been feeling much better since he underwent lower lumbar facet denervation bilaterally on 8/11 except the fact that he developed a tingling sensation in his feet which has been getting much better. The patient has been placed on prednisone by his primary care which has helped him also. The functionality of the low back is much better. The patient has been on gabapentin 300 mg twice a day and it will be increased to 600 mg twice a day for now.     Allergies:  Alcohol, Benadryl [diphenhydramine], Demerol hcl [meperidine], and Sulfa antibiotics  Past Medical History:   Diagnosis Date    Allergic rhinitis 2/19/2018    Anxiety     Chronic back pain     COPD [facility]     Depression     Disorder of stomach     valve not closing properly    Emphysema lung ([address]     Essential hypertension 11/4/2019    Gastroesophageal reflux disease 10/4/2019    Hyperlipidemia     meds > 22 yrs    Low back pain 5/1/2018    ARNAUD (obstructive sleep apnea) 2/19/2018    Other emphysema ([address] 10/4/2019    Other intervertebral disc degeneration, lumbar region 3/23/2018    Radiculopathy, lumbar region 2/28/2018    Restless leg syndrome     Seasonal affective disorder ([address] 10/4/2019    Substance abuse ([address]     alcholic, quit 20 yrs      Past Surgical History:   Procedure Laterality Date    COLONOSCOPY  12/22/2016    DALIA LAST MD    DENTAL SURGERY  10 yrs ago    ENDOSCOPY, COLON, DIAGNOSTIC      EYE SURGERY      Lasik OU    KNEE ARTHROSCOPY Right     KNEE SURGERY Right 05/2016    Ray procedure    KNEE SURGERY      [place] NASAL SCOPY,OPEN [place] SINUS N/A 2/22/2018    SEPTOPLASTY MICRODEBRIDER ASSISTED TURBINOPLASTY AND OUT-FRACTURING BILATERAL NASAL ENDOSCOPY performed by Devon Guajardo MD at [address] History     Socioeconomic History    Marital status:      Spouse name: Not on file    Number of children: 2    Years of education: 15    Highest education level: High school graduate   Occupational History    Not on file   Tobacco Use    Smoking status: Current Every Day Smoker     Packs/day: 2.00     Years: 8.00     Pack years: 16.00     Types: Cigars    Smokeless tobacco: Never Used    Tobacco comment: 3-5 cigars qd   Vaping Use    Vaping Use: Never used   Substance and Sexual Activity    Alcohol use: No     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    Drug use: No    Sexual activity: Not Currently     Partners: Female   Other Topics Concern    Not on file   Social History Narrative    Not on file     Social Determinants of Health     Financial Resource Strain: Low Risk     Difficulty of Paying Living Expenses: Not hard at all   Food Insecurity: No Food Insecurity    Worried About Running Out of Food in the Last Year: Never true    Ivana of Food in the Last Year: Never true   Transportation Needs: No Transportation Needs    Lack of Transportation (Medical): No    Lack of Transportation (Non-Medical):  No   Physical Activity:     Days of Exercise per Week:     Minutes of Exercise per Session:    Stress:     Feeling of Stress :    Social Connections:     Frequency of Communication with Friends and Family:     Frequency of Social Gatherings with Friends and Family:     Attends Scientologist Services:     Active Member of Clubs or Organizations:     Attends Club or Organization Meetings:     Marital Status:    Intimate Partner Violence:     Fear of Current or Ex-Partner:     Emotionally Abused:     Physically Abused:     Sexually Abused:      Family History   Problem Relation Age of Onset    Cancer Mother         stomach    Arthritis Mother     Heart Disease Father 48    Cancer Father bone    Cancer Maternal Grandmother         lung    Cancer Paternal Grandmother     Heart Disease Paternal Grandfather     No Known Problems Sister     Cancer Brother     Heart Disease Brother         hole in heart in 1959 at 1 months age       Current Outpatient Medications on File Prior to Visit   Medication Sig Dispense Refill    predniSONE (DELTASONE) 20 MG tablet Take 2 tablets by mouth daily for 5 doses 10 tablet 0    cyclobenzaprine (FLEXERIL) 10 MG tablet Take 1 tablet by mouth 3 times daily as needed for Muscle spasms 21 tablet 0    gabapentin (NEURONTIN) 300 MG capsule Take 1 capsule by mouth 2 times daily for 30 days. Take one at night, if tolerated take another one in the morning. 60 capsule 1    aspirin 81 MG EC tablet Take 81 mg by mouth daily      meloxicam (MOBIC) 15 MG tablet Take 1 tablet by mouth daily 30 tablet 3    vitamin D3 (CHOLECALCIFEROL) 10 MCG (400 UNIT) TABS tablet Take 400 Units by mouth daily (Patient not taking: Reported on 7/9/2021)      acetaminophen (TYLENOL) 500 MG tablet Take 500 mg by mouth every 6 hours as needed for Pain      Apoaequorin (PREVAGEN PO) Take by mouth daily (Patient not taking: Reported on 7/9/2021)      amoxicillin (AMOXIL) 500 MG capsule Take 500 mg by mouth 4 times daily as needed For procedures  (Patient not taking: Reported on 6/22/2021)      rosuvastatin (CRESTOR) 40 MG tablet TAKE 1 TABLET BY MOUTH DAILY AT BEDTIME. (Patient not taking: Reported on 7/9/2021)      pantoprazole (PROTONIX) 40 MG tablet TAKE 1 TABLET BY MOUTH EVERY DAY 90 tablet 1    FLUoxetine (PROZAC) 40 MG capsule Take 1 capsule by mouth daily 30 capsule 5    pravastatin (PRAVACHOL) 40 MG tablet TAKE 1 TABLET BY MOUTH EVERY DAY IN THE EVENING 90 tablet 1     No current facility-administered medications on file prior to visit. He has not tried physical therapy.         Recent Procedures:  Lower lumbar facet denervation by radiofrequency on8/11 provided 80 % pain relief for Present days    Review of Systems   Constitutional: Negative for fever. HENT: Negative for hearing loss. Respiratory: Negative for shortness of breath. Gastrointestinal: Negative for constipation, diarrhea and nausea. Genitourinary: Negative for difficulty urinating. Musculoskeletal: Positive for back pain. Negative for neck pain. Skin: Negative for rash. Neurological: Negative for headaches. Hematological: Does not bruise/bleed easily. Psychiatric/Behavioral: Negative for sleep disturbance. Objective:     Vitals:  BP (!) 145/90   Pulse 88   Temp 97.6 °F (36.4 °C)   Ht 5' 9\" (1.753 m)   Wt 140 lb (63.5 kg)   BMI 20.67 kg/m² Pain Score:   3    PHYSICAL EXAM:    Patient alert and oriented times three, recent and remote memory intact, mood and affect normal, judgement and insight normal. Strength functional for ambulation. Balance and coordinational functional. Visualized skin intact. No visualized cyanosis, pulses intact. Cranial nerves 2-12 grossly intact. No obvious upper motor neuron signs seen. Sensation intact to light touch. Improve the range of motion of the lumbosacral spine. Radiculopathy:  Negative    Studies Review:  Reviewed None. Assessment:           Diagnosis Orders   1. Lumbosacral spondylosis without myelopathy     2. DDD (degenerative disc disease), lumbar           Plan:     No orders of the defined types were placed in this encounter. No orders of the defined types were placed in this encounter. I will see him in 3 months unless there are other problems. Follow up:  Return in about 3 months (around 11/27/2021) for follow up.     Latoya Negrete MD

## 2021-09-02 ENCOUNTER — CARE COORDINATION (OUTPATIENT)
Dept: CARE COORDINATION | Age: 66
End: 2021-09-02

## 2021-09-02 ENCOUNTER — TELEPHONE (OUTPATIENT)
Dept: PAIN MANAGEMENT | Age: 66
End: 2021-09-02

## 2021-09-02 DIAGNOSIS — M54.16 RADICULOPATHY, LUMBAR REGION: Primary | ICD-10-CM

## 2021-09-02 RX ORDER — HYDROCODONE BITARTRATE AND ACETAMINOPHEN 5; 325 MG/1; MG/1
1 TABLET ORAL 2 TIMES DAILY PRN
Qty: 30 TABLET | Refills: 0 | Status: SHIPPED | OUTPATIENT
Start: 2021-09-02 | End: 2021-09-17

## 2021-09-02 NOTE — TELEPHONE ENCOUNTER
Patient called stating that he is still having pain and numbness on his right side from his knee to his toes. It is also starting to spread to his left side. He says he has not slept in 2 days due to this pain. He cannot sit, stand, or lay without being in pain. He is asking if Dr. Norma Ellis can prescribe him something for this?

## 2021-09-02 NOTE — CARE COORDINATION
Telephone call with patient. He indicated that he still has not heard from [address] [place] [address].  He stated that his prescriptions are costing him less at this time. He has all of his prescribed medications.

## 2021-09-08 ENCOUNTER — CARE COORDINATION (OUTPATIENT)
Dept: CARE COORDINATION | Age: 66
End: 2021-09-08

## 2021-09-08 ENCOUNTER — TELEPHONE (OUTPATIENT)
Dept: PAIN MANAGEMENT | Age: 66
End: 2021-09-08

## 2021-09-08 NOTE — CARE COORDINATION
Ambulatory Care Coordination Note  9/8/2021  CM Risk Score: 6  Charlson 10 Year Mortality Risk Score: 23%     ACC: David Grace, RN    Summary Note:     I spoke with Cassie Rodriguez sister and primary health care decision maker for Denny Taylor. She feels that he is doing better from a mental health standpoint. She was also made aware by Denny Taylor that he was in the ER recently with back pain and inability to sleep. I made her aware that the did order steroid pack and muscle relaxer. She is also aware of the office visit with Dr Sean Whelan. I made her aware that his gabapentin was increased according to Dr Kristian Dowell note to 600 mg bid. She asked about gabapentin use and asked if this could be causing his issues with sleep. I will message Dr Sean Whelan to obtain his input regarding possible anxiety/sleeplessness from the gabapentin. I will follow up with Dr Kristian Dowell office regarding follow up appointment as per his notes as well. PLAN:  Cassie Rodriguez will check in on Denny Taylor to see if he is taking the increased dosage of gabapentin and determine if he is still having issues with insomnia. I will follow up with Dr Sean Whelan and his office regarding insomnia/anxiety and a follow up appointment         Care Coordination Interventions    Program Enrollment: Rising Risk  Referral from Primary Care Provider: Yes  Suggested Interventions and Community Resources  Fall Risk Prevention: In Process  Disease Association: In Process  Social Work: In Process  Zone Management Tools: In Process  Other Services or Interventions: COPD zone tool education initiated.  referral intiated, Walk in Clinic hours and usage discussed. COVID vaccine declined         Goals Addressed    None         Prior to Admission medications    Medication Sig Start Date End Date Taking? Authorizing Provider   HYDROcodone-acetaminophen (NORCO) 5-325 MG per tablet Take 1 tablet by mouth 2 times daily as needed for Pain for up to 15 days.  9/2/21 9/17/21  Tiffany Mitchell MD gabapentin (NEURONTIN) 300 MG capsule Take 1 capsule by mouth 2 times daily for 30 days. Take one at night, if tolerated take another one in the morning. 8/16/21 9/15/21  Loyda Can MD   aspirin 81 MG EC tablet Take 81 mg by mouth daily    Historical Provider, MD   meloxicam (MOBIC) 15 MG tablet Take 1 tablet by mouth daily 6/9/21   MANUELA Alonso CNP   vitamin D3 (CHOLECALCIFEROL) 10 MCG (400 UNIT) TABS tablet Take 400 Units by mouth daily  Patient not taking: Reported on 7/9/2021    Historical Provider, MD   acetaminophen (TYLENOL) 500 MG tablet Take 500 mg by mouth every 6 hours as needed for Pain    Historical Provider, MD   Apoaequorin (PREVAGEN PO) Take by mouth daily  Patient not taking: Reported on 7/9/2021    Historical Provider, MD   amoxicillin (AMOXIL) 500 MG capsule Take 500 mg by mouth 4 times daily as needed For procedures   Patient not taking: Reported on 6/22/2021    Historical Provider, MD   rosuvastatin (CRESTOR) 40 MG tablet TAKE 1 TABLET BY MOUTH DAILY AT BEDTIME.   Patient not taking: Reported on 7/9/2021 4/30/21   Historical Provider, MD   pantoprazole (PROTONIX) 40 MG tablet TAKE 1 TABLET BY MOUTH EVERY DAY 5/24/21   MANUELA Alonso CNP   FLUoxetine (PROZAC) 40 MG capsule Take 1 capsule by mouth daily 3/23/21   MANUELA Alonso CNP   pravastatin (PRAVACHOL) 40 MG tablet TAKE 1 TABLET BY MOUTH EVERY DAY IN THE EVENING 6/17/19   MANUELA Singh Ma, CNP       Future Appointments   Date Time Provider Raphael Thompson   2/21/2022  1:00 PM MANUELA Alonso CNP [facility] EMERGENCY [facility] [place]

## 2021-09-08 NOTE — CARE COORDINATION
I spoke with Dr Brianna Santillan office Erin Porter has canceled his 3 month follow up appointment with Dr Meena Ramírez as of yesterday as he feels that he does not need to see Dr Meena Ramírez any longer.

## 2021-09-10 ENCOUNTER — CARE COORDINATION (OUTPATIENT)
Dept: CARE COORDINATION | Age: 66
End: 2021-09-10

## 2021-09-10 NOTE — CARE COORDINATION
Telephone call with patient. He doesn't believe that he has heard from [address] [place] [address].  He is going to check his paperwork and get back to this writer.

## 2021-09-11 NOTE — TELEPHONE ENCOUNTER
Darian came into the office asking for this to be refilled. Helps for his muscle spasms. Says if needed he will come in for appt.     8/22/21 prescribed by Paige Hernandez in ER.

## 2021-09-13 RX ORDER — CYCLOBENZAPRINE HCL 10 MG
10 TABLET ORAL 3 TIMES DAILY PRN
Qty: 21 TABLET | Refills: 0 | Status: SHIPPED | OUTPATIENT
Start: 2021-09-13 | Stop reason: SDUPTHER

## 2021-09-15 ENCOUNTER — TELEPHONE (OUTPATIENT)
Dept: PAIN MANAGEMENT | Age: 66
End: 2021-09-15

## 2021-09-15 NOTE — TELEPHONE ENCOUNTER
Patient states that he is still having pain in his back and legs after the RF. He is wondering what he can do?

## 2021-09-17 ENCOUNTER — CARE COORDINATION (OUTPATIENT)
Dept: CARE COORDINATION | Age: 66
End: 2021-09-17

## 2021-09-20 ENCOUNTER — CARE COORDINATION (OUTPATIENT)
Dept: CARE COORDINATION | Age: 66
End: 2021-09-20

## 2021-09-20 NOTE — CARE COORDINATION
Telephone call with patient. He is not sure if he did or didn't receive letter from [address] [place] [address].  He believes he may have gotten something in mail and took it to his pharmacy and was told it was cheaper to stay with his insurance. Patient is going to look for documentation and get back to this writer. Discussed also the option of making joint phone call to Social Security.

## 2021-09-23 ENCOUNTER — NURSE ONLY (OUTPATIENT)
Dept: FAMILY MEDICINE CLINIC | Age: 66
End: 2021-09-23
Payer: MEDICARE

## 2021-09-23 DIAGNOSIS — E53.8 B12 DEFICIENCY: Primary | ICD-10-CM

## 2021-09-23 PROCEDURE — 96372 THER/PROPH/DIAG INJ SC/IM: CPT | Performed by: NURSE PRACTITIONER

## 2021-09-23 RX ORDER — CYANOCOBALAMIN 1000 UG/ML
1000 INJECTION INTRAMUSCULAR; SUBCUTANEOUS ONCE
Status: COMPLETED | OUTPATIENT
Start: 2021-09-23 | End: 2021-09-23

## 2021-09-23 RX ADMIN — CYANOCOBALAMIN 1000 MCG: 1000 INJECTION INTRAMUSCULAR; SUBCUTANEOUS at 15:36

## 2021-09-23 NOTE — PROGRESS NOTES
After obtaining consent, and per orders of Leopold Dredge NP-C, injection of B12 was given in the LT dorsogluteal by Dania Asher CMA. Patient instructed to remain in clinic for 20 minutes afterwards, and to report any adverse reaction to me immediately. tolerated well.

## 2021-09-25 DIAGNOSIS — M51.36 OTHER INTERVERTEBRAL DISC DEGENERATION, LUMBAR REGION: ICD-10-CM

## 2021-09-27 ENCOUNTER — CARE COORDINATION (OUTPATIENT)
Dept: CARE COORDINATION | Age: 66
End: 2021-09-27

## 2021-09-27 RX ORDER — MELOXICAM 15 MG/1
TABLET ORAL
Qty: 30 TABLET | Refills: 3 | Status: SHIPPED | OUTPATIENT
Start: 2021-09-27 | End: 2021-11-18 | Stop reason: SDUPTHER

## 2021-09-27 NOTE — TELEPHONE ENCOUNTER
Future Appointments     Provider Department   2/21/2022 Caitie Ye, APRN - [facility]   Appt Notes: AWV     LOV June 6/22/21

## 2021-10-04 ENCOUNTER — CARE COORDINATION (OUTPATIENT)
Dept: CARE COORDINATION | Age: 66
End: 2021-10-04

## 2021-10-04 NOTE — CARE COORDINATION
Telephone call with patient. He believes that he has not heard from [address] [place] [address].  He feels he can handle the cost of his medications at this time. Yashira Laynes to make joint phone to Social Security. Patient could not complete this phone call today. He asked that this writer call him another day this week to complete joint phone call.

## 2021-10-05 ENCOUNTER — CARE COORDINATION (OUTPATIENT)
Dept: CARE COORDINATION | Age: 66
End: 2021-10-05

## 2021-10-05 NOTE — CARE COORDINATION
Joint call with patient to Social Security/[place] Office. Discussed Social Security Extra Help Program Application. Representative indicated that patient was approved on 8/1/2021. Patient was approved for 75% subsidy for medications.  Asked that they send patient out another letter stating her was approved for Social Security Extra Help Program.  Discussed patient letting his pharmacy know he has been approved for Social Security Extra Help Program.

## 2021-10-05 NOTE — CARE COORDINATION
Telephone call to patient. Left voicemail indicating call to make phone call to Social Security. Asked for return phone call. Call back number was provided.

## 2021-10-08 ENCOUNTER — CARE COORDINATION (OUTPATIENT)
Dept: CARE COORDINATION | Age: 66
End: 2021-10-08

## 2021-10-08 NOTE — CARE COORDINATION
Attempted to contact patient for care coordination follow up for COPD and back pain. Unable to reach patient by phone. Message left regarding purpose of call. Number provided and call back requested.

## 2021-10-13 ENCOUNTER — CARE COORDINATION (OUTPATIENT)
Dept: CARE COORDINATION | Age: 66
End: 2021-10-13

## 2021-10-13 ENCOUNTER — OFFICE VISIT (OUTPATIENT)
Dept: FAMILY MEDICINE CLINIC | Age: 66
End: 2021-10-13
Payer: MEDICARE

## 2021-10-13 VITALS
HEART RATE: 97 BPM | WEIGHT: 137 LBS | OXYGEN SATURATION: 95 % | HEIGHT: 69 IN | BODY MASS INDEX: 20.29 KG/M2 | DIASTOLIC BLOOD PRESSURE: 72 MMHG | SYSTOLIC BLOOD PRESSURE: 114 MMHG

## 2021-10-13 DIAGNOSIS — H61.23 BILATERAL IMPACTED CERUMEN: Primary | ICD-10-CM

## 2021-10-13 PROCEDURE — 99213 OFFICE O/P EST LOW 20 MIN: CPT | Performed by: NURSE PRACTITIONER

## 2021-10-13 PROCEDURE — 69209 REMOVE IMPACTED EAR WAX UNI: CPT | Performed by: NURSE PRACTITIONER

## 2021-10-13 ASSESSMENT — ENCOUNTER SYMPTOMS
SHORTNESS OF BREATH: 0
COUGH: 0

## 2021-10-13 NOTE — PROGRESS NOTES
Subjective  Chief Complaint   Patient presents with    Ear Problem     pt here for ear flush        HPI    Pt here because he is having a hard time hearing out of both ears.   Feels full  Previous hx of wax impaction      Patient Active Problem List    Diagnosis Date Noted    Opioid dependence with unspecified opioid-induced disorder 06/09/2021    Abnormal findings on dx imaging of heart and cor circ 03/15/2021    Abnormal result of cardiovascular function study, unspecified 03/15/2021    Palpitations 03/15/2021    Essential hypertension 11/04/2019    Tobacco abuse 10/04/2019    Gastroesophageal reflux disease 10/04/2019    Other emphysema ([address] 10/04/2019    Seasonal affective disorder ([address] 10/04/2019    Hyperlipidemia 01/24/2019    Anxiety 09/19/2018    Insomnia 09/19/2018    Low back pain 05/01/2018    Other intervertebral disc degeneration, lumbar region 03/23/2018    Other intervertebral disc displacement, lumbar region 03/23/2018    Presence of right artificial knee joint 02/28/2018    Radiculopathy, lumbar region 02/28/2018    Sprain of ligaments of lumbar spine 02/28/2018    Strain of muscle, fascia and tendon of lower back, initial encounter 02/28/2018    DNS (deviated nasal septum) 02/19/2018    Allergic rhinitis 02/19/2018    Hypertrophy of nasal turbinates 02/19/2018    ARNAUD (obstructive sleep apnea) 02/19/2018     Past Medical History:   Diagnosis Date    Allergic rhinitis 2/19/2018    Anxiety     Chronic back pain     COPD (chronic obstructive pulmonary disease) ([address]     Depression     Disorder of stomach     valve not closing properly    Emphysema lung ([address]     Essential hypertension 11/4/2019    Gastroesophageal reflux disease 10/4/2019    Hyperlipidemia     meds > 22 yrs    Low back pain 5/1/2018    ARNAUD (obstructive sleep apnea) 2/19/2018    Other emphysema ([address] 10/4/2019    Other intervertebral disc degeneration, lumbar region 3/23/2018    Radiculopathy, lumbar region 2/28/2018    Restless leg syndrome     Seasonal affective disorder ([address] 10/4/2019    Substance abuse ([facility])     alcholic, quit 20 yrs      Past Surgical History:   Procedure Laterality Date    COLONOSCOPY  12/22/2016    A SHALA MARLEY    DENTAL SURGERY  10 yrs ago    ENDOSCOPY, COLON, DIAGNOSTIC      EYE SURGERY      Lasik OU    KNEE ARTHROSCOPY Right     KNEE SURGERY Right 05/2016    Ray procedure    KNEE SURGERY      [place] NASAL SCOPY,OPEN [place] SINUS N/A 2/22/2018    SEPTOPLASTY MICRODEBRIDER ASSISTED TURBINOPLASTY AND OUT-FRACTURING BILATERAL NASAL ENDOSCOPY performed by Suman Clark MD at [address] History   Problem Relation Age of Onset    Cancer Mother         stomach    Arthritis Mother     Heart Disease Father 48    Cancer Father         bone    Cancer Maternal Grandmother         lung    Cancer Paternal Grandmother     Heart Disease Paternal Grandfather     No Known Problems Sister     Cancer Brother     Heart Disease Brother         hole in heart in 65 at 1 months age     Social History     Socioeconomic History    Marital status:      Spouse name: None    Number of children: 2    Years of education: 15    Highest education level: High school graduate   Occupational History    None   Tobacco Use    Smoking status: Current Every Day Smoker     Packs/day: 2.00     Years: 8.00     Pack years: 16.00     Types: Cigars    Smokeless tobacco: Never Used    Tobacco comment: 3-5 cigars qd   Vaping Use    Vaping Use: Never used   Substance and Sexual Activity    Alcohol use: No     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    Drug use: No    Sexual activity: Not Currently     Partners: Female   Other Topics Concern    None   Social History Narrative    None     Social Determinants of Health     Financial Resource Strain: Low Risk     Difficulty of Paying Living Expenses: Not hard at all   Food Insecurity: No Food Insecurity    Worried About Running Out of Food in the Last Year: Never true   [address] in the Last Year: Never true   Transportation Needs: No Transportation Needs    Lack of Transportation (Medical): No    Lack of Transportation (Non-Medical): No   Physical Activity:     Days of Exercise per Week:     Minutes of Exercise per Session:    Stress:     Feeling of Stress :    Social Connections:     Frequency of Communication with Friends and Family:     Frequency of Social Gatherings with Friends and Family:     Attends Anabaptism Services:     Active Member of Clubs or Organizations:     Attends Club or Organization Meetings:     Marital Status:    Intimate Partner Violence:     Fear of Current or Ex-Partner:     Emotionally Abused:     Physically Abused:     Sexually Abused:      Current Outpatient Medications on File Prior to Visit   Medication Sig Dispense Refill    FLUoxetine (PROZAC) 40 MG capsule TAKE 1 CAPSULE BY MOUTH DAILY. 90 capsule 1    acetaminophen (TYLENOL) 500 MG tablet Take 500 mg by mouth every 6 hours as needed for Pain      pantoprazole (PROTONIX) 40 MG tablet TAKE 1 TABLET BY MOUTH EVERY DAY 90 tablet 1    pravastatin (PRAVACHOL) 40 MG tablet TAKE 1 TABLET BY MOUTH EVERY DAY IN THE EVENING 90 tablet 1    meloxicam (MOBIC) 15 MG tablet take 1 tablet by mouth once daily (Patient not taking: Reported on 10/13/2021) 30 tablet 3    gabapentin (NEURONTIN) 300 MG capsule Take 1 capsule by mouth 2 times daily for 30 days. Take one at night, if tolerated take another one in the morning.  (Patient not taking: Reported on 10/13/2021) 60 capsule 1    aspirin 81 MG EC tablet Take 81 mg by mouth daily (Patient not taking: Reported on 10/13/2021)      vitamin D3 (CHOLECALCIFEROL) 10 MCG (400 UNIT) TABS tablet Take 400 Units by mouth daily  (Patient not taking: Reported on 10/13/2021)      Apoaequorin (PREVAGEN PO) Take by mouth daily  (Patient not taking: Reported on 10/13/2021)      amoxicillin (AMOXIL) 500 MG capsule Take 500 mg by mouth 4 times daily as needed For procedures (Patient not taking: Reported on 10/13/2021)      rosuvastatin (CRESTOR) 40 MG tablet TAKE 1 TABLET BY MOUTH DAILY AT BEDTIME. (Patient not taking: Reported on 10/13/2021)       No current facility-administered medications on file prior to visit. Allergies   Allergen Reactions    Alcohol Other (See Comments)     Sober 22 yrs    Benadryl [Diphenhydramine]      \"speeds him up\"    Demerol Hcl [Meperidine] Other (See Comments)     Aggressive behavior    Sulfa Antibiotics Other (See Comments)     Told by mother / patient unaware of reaction       Review of Systems   HENT: Positive for hearing loss. Respiratory: Negative for cough and shortness of breath. Objective  Vitals:    10/13/21 1100   BP: 114/72   Pulse: 97   SpO2: 95%   Weight: 137 lb (62.1 kg)   Height: 5' 9\" (1.753 m)     Physical Exam  Vitals and nursing note reviewed. Constitutional:       Appearance: Normal appearance. He is normal weight. HENT:      Head: Normocephalic. Right Ear: There is impacted cerumen. Left Ear: There is impacted cerumen. Pulmonary:      Effort: Pulmonary effort is normal.   Neurological:      General: No focal deficit present. Mental Status: He is alert and oriented to person, place, and time. Mental status is at baseline. Psychiatric:         Mood and Affect: Mood normal.         Behavior: Behavior normal.         Thought Content: Thought content normal.         Judgment: Judgment normal.       Assessment & Plan     Diagnosis Orders   1. Bilateral impacted cerumen  ID REMOVAL IMPACTED CERUMEN IRRIGATION/LVG UNILAT       Orders Placed This Encounter   Procedures    ID REMOVAL IMPACTED CERUMEN IRRIGATION/LVG UNILAT     We were successful in flushing both ears with an almost complete removal of cerumen. Pt tolerating well.     Side effects, adverse effects of the medication prescribed today, as well as treatment plan/ rationale and result expectations have been discussed with the patient who expresses understanding and desires to proceed. Close follow up to evaluate treatment results and for coordination of care. I have reviewed the patient's medical history in detail and updated the computerized patient record. As always, patient is advised that if symptoms worsen in any way they will proceed to the nearest emergency room.        Gege Ryder, APRN - CNP

## 2021-10-15 ENCOUNTER — CARE COORDINATION (OUTPATIENT)
Dept: CARE COORDINATION | Age: 66
End: 2021-10-15

## 2021-10-15 ASSESSMENT — ENCOUNTER SYMPTOMS: DYSPNEA ASSOCIATED WITH: EXERTION

## 2021-10-15 NOTE — CARE COORDINATION
my plan of care. I will follow my Zone Management tool to seek urgent or emergent care. I will notify my provider of any symptoms that indicate a worsening of my condition. Barriers: impairment:  mental health: Depression, lack of motivation, financial, overwhelmed by complexity of regimen, time constraints, and lack of education  Plan for overcoming my barriers: I will work with my ACM and health care team to increase my knowledge and self care management skills for my overall health  Confidence: 7/10  Anticipated Goal Completion Date: 12/15/2021              Prior to Admission medications    Medication Sig Start Date End Date Taking? Authorizing Provider   FLUoxetine (PROZAC) 40 MG capsule TAKE 1 CAPSULE BY MOUTH DAILY. 10/11/21   Rishi Levels, APRN - CNP   meloxicam (MOBIC) 15 MG tablet take 1 tablet by mouth once daily  Patient not taking: Reported on 10/13/2021 9/27/21   Rishi Levels, APRN - CNP   gabapentin (NEURONTIN) 300 MG capsule Take 1 capsule by mouth 2 times daily for 30 days. Take one at night, if tolerated take another one in the morning. Patient not taking: Reported on 10/13/2021 8/16/21 9/15/21  Carmen Serrato MD   aspirin 81 MG EC tablet Take 81 mg by mouth daily  Patient not taking: Reported on 10/13/2021    Historical Provider, MD   vitamin D3 (CHOLECALCIFEROL) 10 MCG (400 UNIT) TABS tablet Take 400 Units by mouth daily   Patient not taking: Reported on 10/13/2021    Historical Provider, MD   acetaminophen (TYLENOL) 500 MG tablet Take 500 mg by mouth every 6 hours as needed for Pain    Historical Provider, MD   Apoaequorin (PREVAGEN PO) Take by mouth daily   Patient not taking: Reported on 10/13/2021    Historical Provider, MD   amoxicillin (AMOXIL) 500 MG capsule Take 500 mg by mouth 4 times daily as needed For procedures  Patient not taking: Reported on 10/13/2021    Historical Provider, MD   rosuvastatin (CRESTOR) 40 MG tablet TAKE 1 TABLET BY MOUTH DAILY AT BEDTIME.   Patient not taking: Reported on 10/13/2021 4/30/21   Historical Provider, MD   pantoprazole (PROTONIX) 40 MG tablet TAKE 1 TABLET BY MOUTH EVERY DAY 5/24/21   MANUELA Raya CNP   pravastatin (PRAVACHOL) 40 MG tablet TAKE 1 TABLET BY MOUTH EVERY DAY IN THE EVENING 6/17/19   MANUELA Green CNP       Future Appointments   Date Time Provider Raphael Thompson   10/25/2021  2:00 PM [facility]EDU[facility] [place] [facility]   2/21/2022  1:00 PM [facility] [place]      and   COPD Assessment    Does the patient understand envrionmental exposure?: No  Is the patient able to verbalize Rescue vs. Long Acting medications?: No  Does the patient have a nebulizer?: No  Does the patient use a space with inhaled medications?: No     No patient-reported symptoms         Symptoms:  None: Yes      Breathlessness: exertion  Increase use of rapid acting/rescue inhaled medications?: No  Change in chronic cough?: No/At Baseline  Change in sputum?: No/At Baseline  Sputum characteristics: Clear  Self Monitoring - SaO2: No

## 2021-10-17 RX ORDER — CYCLOBENZAPRINE HCL 10 MG
TABLET ORAL
Qty: 21 TABLET | Refills: 0 | Status: SHIPPED | OUTPATIENT
Start: 2021-10-17 | End: 2021-12-17

## 2021-10-20 ENCOUNTER — TELEPHONE (OUTPATIENT)
Dept: FAMILY MEDICINE CLINIC | Age: 66
End: 2021-10-20

## 2021-10-20 ENCOUNTER — CARE COORDINATION (OUTPATIENT)
Dept: CARE COORDINATION | Age: 66
End: 2021-10-20

## 2021-10-20 DIAGNOSIS — G47.00 INSOMNIA, UNSPECIFIED TYPE: ICD-10-CM

## 2021-10-20 DIAGNOSIS — F41.9 ANXIETY: Primary | ICD-10-CM

## 2021-10-20 DIAGNOSIS — G47.00 INSOMNIA, UNSPECIFIED TYPE: Primary | ICD-10-CM

## 2021-10-20 RX ORDER — QUETIAPINE FUMARATE 25 MG/1
25 TABLET, FILM COATED ORAL NIGHTLY
Qty: 30 TABLET | Refills: 2 | Status: SHIPPED | OUTPATIENT
Start: 2021-10-20 | End: 2021-12-17

## 2021-10-20 RX ORDER — TRAZODONE HYDROCHLORIDE 100 MG/1
100 TABLET ORAL NIGHTLY PRN
Qty: 30 TABLET | Refills: 5 | Status: SHIPPED | OUTPATIENT
Start: 2021-10-20 | End: 2021-12-17

## 2021-10-20 NOTE — TELEPHONE ENCOUNTER
[facility] Pharmacist calling stating Prozac and Seroquel is not recommended to be taken together. Alternative ?

## 2021-10-20 NOTE — TELEPHONE ENCOUNTER
----- Message from [employer] sent at 10/20/2021  9:23 AM EDT -----  Subject: Medication Problem    QUESTIONS  Name of Medication? cyclobenzaprine (FLEXERIL) 10 MG tablet  Patient-reported dosage and instructions? 10 mg taking 1 before bed  What question or problem do you have with the medication? ptcalled and   states that this medication is not working. He is not able to sleep. Has   an appt on Monday and would like you to call something else in for him. Something that will also help him sleep through the pain  Preferred Pharmacy? [id number] [address], [address] [facility] phone number (if available)? [phone]  Additional Information for Provider?   ---------------------------------------------------------------------------  --------------  CALL BACK INFO  What is the best way for the office to contact you? OK to leave message on   voicemail  Preferred Call Back Phone Number? [phone]  ---------------------------------------------------------------------------  --------------  SCRIPT ANSWERS  Relationship to Patient?  Self

## 2021-10-25 ENCOUNTER — NURSE ONLY (OUTPATIENT)
Dept: FAMILY MEDICINE CLINIC | Age: 66
End: 2021-10-25
Payer: MEDICARE

## 2021-10-25 DIAGNOSIS — E53.8 B12 DEFICIENCY: Primary | ICD-10-CM

## 2021-10-25 PROCEDURE — 96372 THER/PROPH/DIAG INJ SC/IM: CPT | Performed by: NURSE PRACTITIONER

## 2021-10-25 RX ORDER — CYANOCOBALAMIN 1000 UG/ML
1000 INJECTION INTRAMUSCULAR; SUBCUTANEOUS ONCE
Status: COMPLETED | OUTPATIENT
Start: 2021-10-25 | End: 2021-10-25

## 2021-10-25 RX ADMIN — CYANOCOBALAMIN 1000 MCG: 1000 INJECTION INTRAMUSCULAR; SUBCUTANEOUS at 13:58

## 2021-10-27 ENCOUNTER — CARE COORDINATION (OUTPATIENT)
Dept: CARE COORDINATION | Age: 66
End: 2021-10-27

## 2021-10-27 NOTE — CARE COORDINATION
Telephone call with patient. He feels his medications are affordable at this time. He denied having problems affording rent, utilities or food. No other concerns or needs were voiced at time of phone call. Discussed plan to discharge from [facility] and patient was in agreement. Patient has this writer's phone number for future reference.

## 2021-11-06 NOTE — TELEPHONE ENCOUNTER
Future Appointments    Encounter Information    Provider Department Appt Notes   2/21/2022 Jeanette Amos [facility]       Recent Visits    10/13/2021 Bilateral impacted cerumen   [id number] [facility], APRN - [facility]     Last fill 05/24/21

## 2021-11-08 RX ORDER — PANTOPRAZOLE SODIUM 40 MG/1
TABLET, DELAYED RELEASE ORAL
Qty: 90 TABLET | Refills: 1 | Status: SHIPPED | OUTPATIENT
Start: 2021-11-08 | End: 2021-12-17

## 2021-11-15 ENCOUNTER — CARE COORDINATION (OUTPATIENT)
Dept: CARE COORDINATION | Age: 66
End: 2021-11-15

## 2021-11-15 ASSESSMENT — ENCOUNTER SYMPTOMS: DYSPNEA ASSOCIATED WITH: EXERTION

## 2021-11-15 NOTE — CARE COORDINATION
Ambulatory Care Coordination Note  11/15/2021  CM Risk Score: 6  Charlson 10 Year Mortality Risk Score: 23%     ACC: Dc Martinez, HUSEYIN    Summary Note:     Berenice says that he is doing pretty good. He does have some issues with sinus infection and he is taking OTC medications  I have asked him to monitor his symptoms for any changes or worsening  I also spoke with him about using the walk in clinic for symptom management  He does not feel that he has any symptoms related to COVID  He denies SOB at rest or with activity. He tells me that he is having intermittent issues with his back but he is dealing with it  He plans to follow up with pain management with any worsening symptoms    PLAN:  Berenice will go to the walk in clinic with any changes or worsening of symptoms related to sinuses  Berenice will follow up with pain management regarding back pain. Berenice will take all medications as prescribed. Care Coordination Interventions    Program Enrollment: Rising Risk  Referral from Primary Care Provider: Yes  Suggested Interventions and Community Resources  Fall Risk Prevention: In Process  Disease Association: In Process  Social Work: In Process  Zone Management Tools: In Process  Other Services or Interventions: COPD zone tool education initiated.  referral intiated, Walk in Clinic hours and usage discussed. COVID vaccine declined         Goals Addressed                 This Visit's Progress     Conditions and Symptoms   On track     I will schedule office visits, as directed by my provider. I will keep my appointment or reschedule if I have to cancel. I will notify my provider of any barriers to my plan of care. I will follow my Zone Management tool to seek urgent or emergent care. I will notify my provider of any symptoms that indicate a worsening of my condition.     Barriers: impairment:  mental health: Depression, lack of motivation, financial, overwhelmed by complexity of regimen, time constraints, and lack of education  Plan for overcoming my barriers: I will work with my ACM and health care team to increase my knowledge and self care management skills for my overall health  Confidence: 7/10  Anticipated Goal Completion Date: 12/15/2021              Prior to Admission medications    Medication Sig Start Date End Date Taking? Authorizing Provider   pantoprazole (PROTONIX) 40 MG tablet take 1 tablet by mouth once daily 11/8/21   Kalli Chong MD   QUEtiapine (SEROQUEL) 25 MG tablet Take 1 tablet by mouth nightly 10/20/21 1/18/22  MANUELA Valadez - CNP   traZODone (DESYREL) 100 MG tablet Take 1 tablet by mouth nightly as needed for Sleep 10/20/21   MANUELA Valadez - CNP   cyclobenzaprine (FLEXERIL) 10 MG tablet take 1 tablet by mouth three times a day if needed for muscle spasm 10/17/21   MANUELA Valadez - CNP   FLUoxetine (PROZAC) 40 MG capsule TAKE 1 CAPSULE BY MOUTH DAILY. 10/11/21   MANUELA Valadez - CNP   meloxicam [facility]) 15 MG tablet take 1 tablet by mouth once daily  Patient not taking: Reported on 10/13/2021 9/27/21   MANUELA Valadez CNP   cyclobenzaprine (FLEXERIL) 10 MG tablet Take 1 tablet by mouth 3 times daily as needed for Muscle spasms 9/13/21   MANUELA Valadez - CNP   gabapentin (NEURONTIN) 300 MG capsule Take 1 capsule by mouth 2 times daily for 30 days. Take one at night, if tolerated take another one in the morning.   Patient not taking: Reported on 10/13/2021 8/16/21 9/15/21  Raven Weir MD   aspirin 81 MG EC tablet Take 81 mg by mouth daily  Patient not taking: Reported on 10/13/2021    Historical Provider, MD   vitamin D3 (CHOLECALCIFEROL) 10 MCG (400 UNIT) TABS tablet Take 400 Units by mouth daily   Patient not taking: Reported on 10/13/2021    Historical Provider, MD   acetaminophen (TYLENOL) 500 MG tablet Take 500 mg by mouth every 6 hours as needed for Pain    Historical Provider, MD   Apoaequorin (PREVAGEN PO) Take by mouth daily Patient not taking: Reported on 10/13/2021    Historical Provider, MD   amoxicillin (AMOXIL) 500 MG capsule Take 500 mg by mouth 4 times daily as needed For procedures  Patient not taking: Reported on 10/13/2021    Historical Provider, MD   rosuvastatin (CRESTOR) 40 MG tablet TAKE 1 TABLET BY MOUTH DAILY AT BEDTIME.   Patient not taking: Reported on 10/13/2021 4/30/21   Historical Provider, MD   pravastatin (PRAVACHOL) 40 MG tablet TAKE 1 TABLET BY MOUTH EVERY DAY IN THE EVENING 6/17/19   MANUELA Morin CNP       Future Appointments   Date Time Provider Raphael Thompson   2/21/2022  1:00 PM Ardis Boast, APRN - CNP [facility] EMERGENCY [facility] AT [place]      and   COPD Assessment    Does the patient understand envrionmental exposure?: No  Is the patient able to verbalize Rescue vs. Long Acting medications?: No  Does the patient have a nebulizer?: No  Does the patient use a space with inhaled medications?: No     No patient-reported symptoms         Symptoms:  None: Yes      Breathlessness: exertion  Increase use of rapid acting/rescue inhaled medications?: No  Change in chronic cough?: No/At Baseline  Change in sputum?: No/At Baseline  Sputum characteristics: Clear  Self Monitoring - SaO2: No

## 2021-11-16 ENCOUNTER — CARE COORDINATION (OUTPATIENT)
Dept: CARE COORDINATION | Age: 66
End: 2021-11-16

## 2021-11-16 ENCOUNTER — TELEPHONE (OUTPATIENT)
Dept: FAMILY MEDICINE CLINIC | Age: 66
End: 2021-11-16

## 2021-11-16 ENCOUNTER — HOSPITAL ENCOUNTER (EMERGENCY)
Age: 66
Discharge: HOME | End: 2021-11-16
Payer: MEDICARE

## 2021-11-16 ENCOUNTER — TELEPHONE (OUTPATIENT)
Dept: PAIN MANAGEMENT | Age: 66
End: 2021-11-16

## 2021-11-16 VITALS
TEMPERATURE: 97.4 F | HEART RATE: 81 BPM | OXYGEN SATURATION: 94 % | WEIGHT: 145 LBS | RESPIRATION RATE: 18 BRPM | BODY MASS INDEX: 21.41 KG/M2 | SYSTOLIC BLOOD PRESSURE: 108 MMHG | DIASTOLIC BLOOD PRESSURE: 74 MMHG

## 2021-11-16 DIAGNOSIS — M51.36 DDD (DEGENERATIVE DISC DISEASE), LUMBAR: ICD-10-CM

## 2021-11-16 DIAGNOSIS — M47.817 LUMBOSACRAL SPONDYLOSIS WITHOUT MYELOPATHY: Primary | ICD-10-CM

## 2021-11-16 DIAGNOSIS — M54.50 ACUTE EXACERBATION OF CHRONIC LOW BACK PAIN: Primary | ICD-10-CM

## 2021-11-16 DIAGNOSIS — G89.29 ACUTE EXACERBATION OF CHRONIC LOW BACK PAIN: Primary | ICD-10-CM

## 2021-11-16 PROCEDURE — 6370000000 HC RX 637 (ALT 250 FOR IP): Performed by: STUDENT IN AN ORGANIZED HEALTH CARE EDUCATION/TRAINING PROGRAM

## 2021-11-16 PROCEDURE — 99283 EMERGENCY DEPT VISIT LOW MDM: CPT

## 2021-11-16 PROCEDURE — 96372 THER/PROPH/DIAG INJ SC/IM: CPT

## 2021-11-16 PROCEDURE — 6360000002 HC RX W HCPCS: Performed by: STUDENT IN AN ORGANIZED HEALTH CARE EDUCATION/TRAINING PROGRAM

## 2021-11-16 RX ORDER — METHYLPREDNISOLONE 4 MG/1
TABLET ORAL
Qty: 21 TABLET | Refills: 0 | Status: SHIPPED | OUTPATIENT
Start: 2021-11-16 | End: 2021-11-22

## 2021-11-16 RX ORDER — HYDROCODONE BITARTRATE AND ACETAMINOPHEN 5; 325 MG/1; MG/1
1 TABLET ORAL 2 TIMES DAILY PRN
Qty: 60 TABLET | Refills: 0 | Status: SHIPPED | OUTPATIENT
Start: 2021-11-16 | End: 2021-11-16

## 2021-11-16 RX ORDER — LIDOCAINE 50 MG/G
3 PATCH TOPICAL DAILY
Qty: 30 PATCH | Refills: 0 | Status: SHIPPED | OUTPATIENT
Start: 2021-11-16 | End: 2021-11-26

## 2021-11-16 RX ORDER — ONDANSETRON 4 MG/1
4 TABLET, ORALLY DISINTEGRATING ORAL ONCE
Status: COMPLETED | OUTPATIENT
Start: 2021-11-16 | End: 2021-11-16

## 2021-11-16 RX ORDER — OXYCODONE HYDROCHLORIDE AND ACETAMINOPHEN 5; 325 MG/1; MG/1
1 TABLET ORAL ONCE
Status: COMPLETED | OUTPATIENT
Start: 2021-11-16 | End: 2021-11-16

## 2021-11-16 RX ORDER — CYCLOBENZAPRINE HCL 10 MG
10 TABLET ORAL 3 TIMES DAILY PRN
Qty: 21 TABLET | Refills: 0 | Status: SHIPPED | OUTPATIENT
Start: 2021-11-16 | End: 2021-11-26

## 2021-11-16 RX ORDER — KETOROLAC TROMETHAMINE 30 MG/ML
30 INJECTION, SOLUTION INTRAMUSCULAR; INTRAVENOUS ONCE
Status: COMPLETED | OUTPATIENT
Start: 2021-11-16 | End: 2021-11-16

## 2021-11-16 RX ADMIN — OXYCODONE AND ACETAMINOPHEN 1 TABLET: 5; 325 TABLET ORAL at 01:56

## 2021-11-16 RX ADMIN — KETOROLAC TROMETHAMINE 30 MG: 30 INJECTION, SOLUTION INTRAMUSCULAR; INTRAVENOUS at 02:30

## 2021-11-16 RX ADMIN — ONDANSETRON 4 MG: 4 TABLET, ORALLY DISINTEGRATING ORAL at 01:56

## 2021-11-16 ASSESSMENT — PAIN SCALES - GENERAL
PAINLEVEL_OUTOF10: 7

## 2021-11-16 ASSESSMENT — ENCOUNTER SYMPTOMS
ABDOMINAL PAIN: 0
SHORTNESS OF BREATH: 0
VOMITING: 0
PHOTOPHOBIA: 0
WHEEZING: 0
NAUSEA: 0
BACK PAIN: 1
COUGH: 0

## 2021-11-16 NOTE — TELEPHONE ENCOUNTER
Talked to the patient and he refuses to take any strong pain meds because he does not want to be confused. I am going to cancel the pain meds I have  just ordered. The patient has been advised to contact us if there is any other thing we can do for him. He was very appreciative.

## 2021-11-16 NOTE — TELEPHONE ENCOUNTER
Pt calling. States he was at the ED last night. States he told ED that he is unable to take cyclobenzaprine  Cyclobenzaprine  Sent to pharmacy. Patient states he has been in pain for 2 days. Patient asking if there is something pcp can do.  states he does not want percocet    Please advise    Pharmacy - [facility] [place].      Ph. [phone]

## 2021-11-16 NOTE — ED PROVIDER NOTES
[address] [facility] ED  EMERGENCY DEPARTMENT ENCOUNTER      Pt Name: Kylie Lombardo  MRN: [id number]  [place] 1955  Date of evaluation: 11/16/2021  Provider: Yariel Bryant PA-C    CHIEF COMPLAINT       Chief Complaint   Patient presents with    Back Pain     low back, chronic         HISTORY OF PRESENT ILLNESS   (Location/Symptom, Timing/Onset, Context/Setting, Quality, Duration, Modifying Factors, Severity)  Note limiting factors. Kylie Lombardo is a 74 y.o. male who per chart reviews a past medical history of hyperlipidemia anxiety hypertension GERD emphysema opioid abuse presents to the emergency department for acute on chronic low back pain x 2-3 days. Patient follows with Dr. Maria Natarajan of pain management. He states that in August he had a procedure done by Dr. Maria Natarajan and had some relief for a short period of time of her pain continued to return. He states he has a known herniated disc of the lumbar spine. He does not follow with neurosurgery. No recent or new injuries to the back. He is able to ambulate. He reports some chronic tingling from the knee down on the right side however this is not new or worse than usual.  He states he is prescribed tramadol for his symptoms but does not take it because he hallucinates on it. He has not tried anything else for symptoms. He states that his legs are \"shaking\", has a history of restless leg syndrome. He denies fever chills numbness tingling weakness bowel or bladder continence saddle anesthesia chest pain shortness of breath. HPI    Nursing Notes were reviewed. REVIEW OF SYSTEMS    (2-9 systems for level 4, 10 or more for level 5)     Review of Systems   Constitutional: Negative for chills and fever. HENT: Negative for congestion. Eyes: Negative for photophobia. Respiratory: Negative for cough, shortness of breath and wheezing. Cardiovascular: Negative for chest pain and palpitations.    Gastrointestinal: Negative for abdominal pain, nausea and vomiting. Genitourinary: Negative for dysuria, frequency and hematuria. Musculoskeletal: Positive for back pain. Negative for myalgias. Allergic/Immunologic: Negative for immunocompromised state. Neurological: Negative for dizziness, weakness and headaches. All other systems reviewed and are negative. Except as noted above the remainder of the review of systems was reviewed and negative. PAST MEDICAL HISTORY     Past Medical History:   Diagnosis Date    Allergic rhinitis 2/19/2018    Anxiety     Chronic back pain     COPD (chronic obstructive pulmonary disease) ([address]     Depression     Disorder of stomach     valve not closing properly    Emphysema lung ([address]     Essential hypertension 11/4/2019    Gastroesophageal reflux disease 10/4/2019    Hyperlipidemia     meds > 22 yrs    Low back pain 5/1/2018    ARNAUD (obstructive sleep apnea) 2/19/2018    Other emphysema ([address] 10/4/2019    Other intervertebral disc degeneration, lumbar region 3/23/2018    Radiculopathy, lumbar region 2/28/2018    Restless leg syndrome     Seasonal affective disorder ([facility] 10/4/2019    Substance abuse ([facility]     alcholic, quit 20 yrs          SURGICAL HISTORY       Past Surgical History:   Procedure Laterality Date    COLONOSCOPY  12/22/2016    A SHALA MARLEY    DENTAL SURGERY  10 yrs ago    ENDOSCOPY, COLON, DIAGNOSTIC      EYE SURGERY      Lasik OU    KNEE ARTHROSCOPY Right     KNEE SURGERY Right 05/2016    Ray procedure    KNEE SURGERY      OK NASAL SCOPY,OPEN MAXILL SINUS N/A 2/22/2018    SEPTOPLASTY MICRODEBRIDER ASSISTED TURBINOPLASTY AND OUT-FRACTURING BILATERAL NASAL ENDOSCOPY performed by Rema Beckham MD at [facility]       Discharge Medication List as of 11/16/2021  2:52 AM      CONTINUE these medications which have NOT CHANGED    Details   FLUoxetine (PROZAC) 40 MG capsule TAKE 1 CAPSULE BY MOUTH DAILY. , Disp-90 capsule, R-1Normal      rosuvastatin (CRESTOR) 40 MG tablet TAKE 1 TABLET BY MOUTH DAILY AT BEDTIME. Historical Med      pantoprazole (PROTONIX) 40 MG tablet take 1 tablet by mouth once daily, Disp-90 tablet, R-1Normal      QUEtiapine (SEROQUEL) 25 MG tablet Take 1 tablet by mouth nightly, Disp-30 tablet, R-2Normal      traZODone (DESYREL) 100 MG tablet Take 1 tablet by mouth nightly as needed for Sleep, Disp-30 tablet, R-5Normal      !! cyclobenzaprine (FLEXERIL) 10 MG tablet take 1 tablet by mouth three times a day if needed for muscle spasm, Disp-21 tablet, R-0Normal      meloxicam (MOBIC) 15 MG tablet take 1 tablet by mouth once daily, Disp-30 tablet, R-3Normal      gabapentin (NEURONTIN) 300 MG capsule Take 1 capsule by mouth 2 times daily for 30 days. Take one at night, if tolerated take another one in the morning., Disp-60 capsule, R-1Normal      aspirin 81 MG EC tablet Take 81 mg by mouth dailyHistorical Med      vitamin D3 (CHOLECALCIFEROL) 10 MCG (400 UNIT) TABS tablet Take 400 Units by mouth daily Historical Med      acetaminophen (TYLENOL) 500 MG tablet Take 500 mg by mouth every 6 hours as needed for PainHistorical Med      Apoaequorin (PREVAGEN PO) Take by mouth daily Historical Med      amoxicillin (AMOXIL) 500 MG capsule Take 500 mg by mouth 4 times daily as needed For proceduresHistorical Med      pravastatin (PRAVACHOL) 40 MG tablet TAKE 1 TABLET BY MOUTH EVERY DAY IN THE EVENING, Disp-90 tablet, R-1Normal       !! - Potential duplicate medications found. Please discuss with provider.           ALLERGIES     Alcohol, Benadryl [diphenhydramine], Demerol hcl [meperidine], and Sulfa antibiotics    FAMILY HISTORY       Family History   Problem Relation Age of Onset    Cancer Mother         stomach    Arthritis Mother     Heart Disease Father 48    Cancer Father         bone    Cancer Maternal Grandmother         lung    Cancer Paternal Grandmother     Heart Disease Paternal Grandfather     No Known Problems Sister     Cancer Brother     Heart Disease Brother         hole in heart in 65 at 1 months age          SOCIAL HISTORY       Social History     Socioeconomic History    Marital status:      Spouse name: Not on file    Number of children: 2    Years of education: 15    Highest education level: High school graduate   Occupational History    Not on file   Tobacco Use    Smoking status: Current Every Day Smoker     Packs/day: 2.00     Years: 8.00     Pack years: 16.00     Types: Cigars    Smokeless tobacco: Never Used    Tobacco comment: 3-5 cigars qd   Vaping Use    Vaping Use: Never used   Substance and Sexual Activity    Alcohol use: No     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    Drug use: No    Sexual activity: Not Currently     Partners: Female   Other Topics Concern    Not on file   Social History Narrative    Not on file     Social Determinants of Health     Financial Resource Strain: Low Risk     Difficulty of Paying Living Expenses: Not hard at all   Food Insecurity: No Food Insecurity    Worried About Running Out of Food in the Last Year: Never true    Ivana of Food in the Last Year: Never true   Transportation Needs: No Transportation Needs    Lack of Transportation (Medical): No    Lack of Transportation (Non-Medical):  No   Physical Activity:     Days of Exercise per Week: Not on file    Minutes of Exercise per Session: Not on file   Stress:     Feeling of Stress : Not on file   Social Connections:     Frequency of Communication with Friends and Family: Not on file    Frequency of Social Gatherings with Friends and Family: Not on file    Attends Anabaptist Services: Not on file    Active Member of Clubs or Organizations: Not on file    Attends Club or Organization Meetings: Not on file    Marital Status: Not on file   Intimate Partner Violence:     Fear of Current or Ex-Partner: Not on file    Emotionally Abused: Not on file    Physically Abused: Not on file    Sexually Abused: Not on file Housing Stability:     Unable to Pay for Housing in the Last Year: Not on file    Number of Places Lived in the Last Year: Not on file    Unstable Housing in the Last Year: Not on file       SCREENINGS                        PHYSICAL EXAM    (up to 7 for level 4, 8 or more for level 5)     ED Triage Vitals   BP Temp Temp Source Pulse Resp SpO2 Height Weight   11/16/21 0128 11/16/21 0130 11/16/21 0130 11/16/21 0128 11/16/21 0128 11/16/21 0128 -- 11/16/21 0128   104/83 97.4 °F (36.3 °C) Oral 85 16 95 %  145 lb (65.8 kg)       Physical Exam  Constitutional:       General: He is not in acute distress. Appearance: He is well-developed. He is not ill-appearing, toxic-appearing or diaphoretic. HENT:      Head: Normocephalic and atraumatic. Nose: Nose normal.      Mouth/Throat:      Mouth: Mucous membranes are moist.   Eyes:      Pupils: Pupils are equal, round, and reactive to light. Cardiovascular:      Rate and Rhythm: Normal rate and regular rhythm. Heart sounds: No murmur heard. No friction rub. No gallop. Pulmonary:      Effort: Pulmonary effort is normal.      Breath sounds: Normal breath sounds. Abdominal:      General: There is no distension. Tenderness: There is no abdominal tenderness. Musculoskeletal:         General: No swelling. Cervical back: Normal and normal range of motion. Thoracic back: Normal.      Lumbar back: Tenderness present. No bony tenderness. Negative right straight leg raise test and negative left straight leg raise test.      Comments: Diffuse lower lumbar tenderness. No midline tenderness bony step-off crepitus or obvious deformities. Skin:     General: Skin is warm and dry. Neurological:      General: No focal deficit present. Mental Status: He is alert and oriented to person, place, and time. GCS: GCS eye subscore is 4. GCS verbal subscore is 5. GCS motor subscore is 6. Cranial Nerves: Cranial nerves are intact.       Sensory: Sensation is intact. Motor: Motor function is intact. Coordination: Coordination is intact. Gait: Gait is intact. Deep Tendon Reflexes: Reflexes are normal and symmetric. DIAGNOSTIC RESULTS     EKG: All EKG's are interpreted by the Emergency Department Physician who either signs or Co-signs this chart in the absence of a cardiologist.      RADIOLOGY:   Non-plain film images such as CT, Ultrasound and MRI are read by the radiologist. Plain radiographic images are visualized and preliminarily interpreted by the emergency physician with the below findings:        Interpretation per the Radiologist below, if available at the time of this note:    No orders to display         ED BEDSIDE ULTRASOUND:   Performed by ED Physician - none    LABS:  Labs Reviewed - No data to display    All other labs were within normal range or not returned as of this dictation. EMERGENCY DEPARTMENT COURSE and DIFFERENTIAL DIAGNOSIS/MDM:   Vitals:    Vitals:    11/16/21 0128 11/16/21 0130 11/16/21 0227   BP: 104/83  108/74   Pulse: 85  81   Resp: 16  18   Temp:  97.4 °F (36.3 °C)    TempSrc:  Oral    SpO2: 95%  94%   Weight: 145 lb (65.8 kg)         MDM    Patient is a 22-year-old male presents the ED for acute on chronic low back pain. He is afebrile hemodynamically stable. Bilateral lower extremities are neurovascular intact he is ambulatory in the ED. No signs or symptoms or physical exam findings of cord compression. He was given p.o. [place] and IM Toradol in the ED. Reassessed states pain has improved. He is nontoxic-appearing stable vitals stable for discharge. Less concern for cord compression at this time. He is to follow-up with Dr. Abhijit Stout and pain management 1 to 2 days return to the ED for worsening symptoms given back pain warning signs. Patient understands agrees to plan.     REASSESSMENT          CRITICAL CARE TIME   Total Critical Care time was 0 minutes, excluding separately reportable procedures. There was a high probability of clinically significant/life threatening deterioration in the patient's condition which required my urgent intervention. CONSULTS:  None    PROCEDURES:  Unless otherwise noted below, none     Procedures        FINAL IMPRESSION      1. Acute exacerbation of chronic low back pain          DISPOSITION/PLAN   DISPOSITION Decision To Discharge 11/16/2021 02:51:41 AM      PATIENT REFERRED TO:  MANUELA Baker CNP  [address]  [phone]    Schedule an appointment as soon as possible for a visit   As needed, If symptoms worsen    [facility]) ED  [address] [facility]  [phone]  Go to   As needed, If symptoms worsen    Gee Cohen MD  [address] [address]  [phone]    Schedule an appointment as soon as possible for a visit in 1 day      Carmen Serrato MD  [address] [facility], [address] [place] [address]  [phone]    Schedule an appointment as soon as possible for a visit in 1 day        DISCHARGE MEDICATIONS:  Discharge Medication List as of 11/16/2021  2:52 AM      START taking these medications    Details   methylPREDNISolone (MEDROL, ISABELLA,) 4 MG tablet Take by mouth., Disp-21 tablet, R-0Print      !! cyclobenzaprine (FLEXERIL) 10 MG tablet Take 1 tablet by mouth 3 times daily as needed for Muscle spasms, Disp-21 tablet, R-0Print      lidocaine (LIDODERM) 5 % Place 3 patches onto the skin daily for 10 days 12 hours on, 12 hours off., Disp-30 patch, R-0Print       !! - Potential duplicate medications found. Please discuss with provider. Controlled Substances Monitoring:     RX Monitoring 5/17/2019   Attestation The Prescription Monitoring Report for this patient was reviewed today.    Periodic Controlled Substance Monitoring -       (Please note that portions of this note were completed with a voice recognition program.  Efforts were made to edit the dictations but occasionally words are mis-transcribed.)    Pritesh BrookerRAUL (electronically signed)             Pritesh Ruiz PA-C  11/16/21 [id number]

## 2021-11-16 NOTE — ED NOTES
Patient c/o chronic back pain. Denies any recent injury. Able to ambulate without difficulty. MSPs intact.      Billy Daley, RN  11/16/21 [id number]

## 2021-11-16 NOTE — CARE COORDINATION
Ambulatory Care Coordination Note  11/16/2021  CM Risk Score: 6  Charlson 10 Year Mortality Risk Score: 23%     ACC: Dino Graham, RN    Summary Note:     Ilia Chinchilla called me three times in a row leaving messages each time  He is upset that he continues to have issues with his \"back pain and he is not getting any relief\"  He tells me that he does have numbness and tingling in his legs   He adds that he is waiting to [facility] surgery on his back\" but can't \"seem to get anything scheduled. \"  He is following up with Dr Alayna Anand for his back issues. He adds that he has called the office a couple of times today to get \"pain medication. \"  He adds that he called Sheridan Wade to see if there was anything that she can do for his back pain. He tells me that he was in the ER last week and they gave him two shots and made him feel great. I reviewed the chart making him aware that he was in the ER last night and they did give him Toradol   He tells me that he has not slept or eaten anything in two days. I reminded him that we did speak yesterday and he told me that he was doing fine having only \"intermittent issues\" and that he was \"dealing with it. \"  I explained that I am confused and I am not sure what he is looking to do. He states \"I need someone to listen to me and help me with this back pain. \"  I explained that since he is established with pain management this would be the best place for him to seek additional help. PLAN:  I will update Sheridan Wade and Dr Alayna Anand to obtain their input and advise. Care Coordination Interventions    Program Enrollment: Rising Risk  Referral from Primary Care Provider: Yes  Suggested Interventions and Community Resources  Fall Risk Prevention: In Process  Disease Association: In Process  Social Work: In Process  Zone Management Tools: In Process  Other Services or Interventions: COPD zone tool education initiated.  referral intiated, Walk in Clinic hours and usage discussed.   COVID vaccine declined         Goals Addressed    None         Prior to Admission medications    Medication Sig Start Date End Date Taking? Authorizing Provider   methylPREDNISolone (MEDROL, ISABELLA,) 4 MG tablet Take by mouth. 11/16/21 11/22/21  Colette Quach PA-C   cyclobenzaprine (FLEXERIL) 10 MG tablet Take 1 tablet by mouth 3 times daily as needed for Muscle spasms 11/16/21 11/26/21  Raisa Camarillo PA-C   lidocaine (LIDODERM) 5 % Place 3 patches onto the skin daily for 10 days 12 hours on, 12 hours off. 11/16/21 11/26/21  Colette Quach PA-C   pantoprazole (PROTONIX) 40 MG tablet take 1 tablet by mouth once daily 11/8/21   Amanda Chicas MD   QUEtiapine (SEROQUEL) 25 MG tablet Take 1 tablet by mouth nightly 10/20/21 1/18/22  MANUELA Borges CNP   traZODone (DESYREL) 100 MG tablet Take 1 tablet by mouth nightly as needed for Sleep 10/20/21   MANUELA Borges CNP   cyclobenzaprine (FLEXERIL) 10 MG tablet take 1 tablet by mouth three times a day if needed for muscle spasm 10/17/21   MANUELA Borges CNP   FLUoxetine (PROZAC) 40 MG capsule TAKE 1 CAPSULE BY MOUTH DAILY. 10/11/21   MANUELA Borges CNP   meloxicam [facility]RIG[facility]) 15 MG tablet take 1 tablet by mouth once daily  Patient not taking: Reported on 10/13/2021 9/27/21   MANUELA Borges CNP   cyclobenzaprine (FLEXERIL) 10 MG tablet Take 1 tablet by mouth 3 times daily as needed for Muscle spasms 9/13/21   MANUELA Borges CNP   gabapentin (NEURONTIN) 300 MG capsule Take 1 capsule by mouth 2 times daily for 30 days. Take one at night, if tolerated take another one in the morning.   Patient not taking: Reported on 10/13/2021 8/16/21 9/15/21  Myrna Sinclair MD   aspirin 81 MG EC tablet Take 81 mg by mouth daily  Patient not taking: Reported on 10/13/2021    Historical Provider, MD   vitamin D3 (CHOLECALCIFEROL) 10 MCG (400 UNIT) TABS tablet Take 400 Units by mouth daily   Patient not taking: Reported on 10/13/2021    Historical Provider, MD   acetaminophen (TYLENOL) 500 MG tablet Take 500 mg by mouth every 6 hours as needed for Pain    Historical Provider, MD   Apoaequorin (PREVAGEN PO) Take by mouth daily   Patient not taking: Reported on 10/13/2021    Historical Provider, MD   amoxicillin (AMOXIL) 500 MG capsule Take 500 mg by mouth 4 times daily as needed For procedures  Patient not taking: Reported on 10/13/2021    Historical Provider, MD   rosuvastatin (CRESTOR) 40 MG tablet TAKE 1 TABLET BY MOUTH DAILY AT BEDTIME. 4/30/21   Historical Provider, MD   pravastatin (PRAVACHOL) 40 MG tablet TAKE 1 TABLET BY MOUTH EVERY DAY IN THE EVENING 6/17/19   MANUELA Pat CNP       Future Appointments   Date Time Provider Raphael Thompson   2/21/2022  1:00 PM MANUELA Lamar CNP [facility] EMERGENCY MEDICAL Pep AT [place]

## 2021-11-16 NOTE — TELEPHONE ENCOUNTER
Patient called stating that he cannot take cyclobenzaprine. He says that it causes him to hallucinate. He has been in so much pain, he has not been able to eat or sleep in the last 2 days. Pain is in low right back. He is asking if he can be prescribed something else or what else can he do?

## 2021-11-17 NOTE — CARE COORDINATION
I'm honestly not sure what I can do? He is currently on a prednisone garrett from the ER so he can't take any other anti-inflammatories. Also, since he does see pain management they really should be helping with this. ..

## 2021-11-17 NOTE — TELEPHONE ENCOUNTER
He sees Dr Brad Vaughan  I sent Dr Brad Vaughan a message asked a couple of questions  He has not responded which is some of Marquez's concern.

## 2021-11-17 NOTE — TELEPHONE ENCOUNTER
Spoke to pt, states that he went for dome bengay and spoke to specialist. Pain is maintained for now

## 2021-11-18 ENCOUNTER — OFFICE VISIT (OUTPATIENT)
Dept: FAMILY MEDICINE CLINIC | Age: 66
End: 2021-11-18
Payer: MEDICARE

## 2021-11-18 VITALS
HEART RATE: 83 BPM | OXYGEN SATURATION: 98 % | SYSTOLIC BLOOD PRESSURE: 110 MMHG | DIASTOLIC BLOOD PRESSURE: 70 MMHG | WEIGHT: 138.4 LBS | TEMPERATURE: 96 F | HEIGHT: 69 IN | BODY MASS INDEX: 20.5 KG/M2

## 2021-11-18 DIAGNOSIS — M47.817 LUMBOSACRAL SPONDYLOSIS WITHOUT MYELOPATHY: ICD-10-CM

## 2021-11-18 DIAGNOSIS — M51.36 DDD (DEGENERATIVE DISC DISEASE), LUMBAR: ICD-10-CM

## 2021-11-18 DIAGNOSIS — M51.36 OTHER INTERVERTEBRAL DISC DEGENERATION, LUMBAR REGION: ICD-10-CM

## 2021-11-18 PROCEDURE — 99213 OFFICE O/P EST LOW 20 MIN: CPT | Performed by: NURSE PRACTITIONER

## 2021-11-18 RX ORDER — MELOXICAM 15 MG/1
TABLET ORAL
Qty: 30 TABLET | Refills: 1 | Status: SHIPPED | OUTPATIENT
Start: 2021-11-18 | End: 2022-01-10

## 2021-11-18 RX ORDER — GABAPENTIN 300 MG/1
300 CAPSULE ORAL 2 TIMES DAILY
Qty: 60 CAPSULE | Refills: 1 | Status: SHIPPED | OUTPATIENT
Start: 2021-11-18 | End: 2022-01-14 | Stop reason: SDUPTHER

## 2021-11-18 ASSESSMENT — ENCOUNTER SYMPTOMS
BACK PAIN: 1
RESPIRATORY NEGATIVE: 1
GASTROINTESTINAL NEGATIVE: 1
EYES NEGATIVE: 1

## 2021-11-18 NOTE — PROGRESS NOTES
Subjective  Chief Complaint   Patient presents with    Back Pain     pt did not call pain specialist or  bengay as he said yesterday. in ED on 11/16/21       HPI    Patient states he threw away flexeril because it made him \"go crazy\"     Denies taking any medications prescribed for his back at his last visit because he was afraid and also did not know what he was taking     Taking acetaminophen and Prozac.      Has been using lidocaine patches but they do not work anymore  Still having pain and tingling in his legs       Patient Active Problem List    Diagnosis Date Noted    Opioid dependence with unspecified opioid-induced disorder 06/09/2021    Abnormal findings on dx imaging of heart and cor circ 03/15/2021    Abnormal result of cardiovascular function study, unspecified 03/15/2021    Palpitations 03/15/2021    Essential hypertension 11/04/2019    Tobacco abuse 10/04/2019    Gastroesophageal reflux disease 10/04/2019    Other emphysema ([address] 10/04/2019    Seasonal affective disorder ([address] 10/04/2019    Hyperlipidemia 01/24/2019    Anxiety 09/19/2018    Insomnia 09/19/2018    Low back pain 05/01/2018    Other intervertebral disc degeneration, lumbar region 03/23/2018    Other intervertebral disc displacement, lumbar region 03/23/2018    Presence of right artificial knee joint 02/28/2018    Radiculopathy, lumbar region 02/28/2018    Sprain of ligaments of lumbar spine 02/28/2018    Strain of muscle, fascia and tendon of lower back, initial encounter 02/28/2018    DNS (deviated nasal septum) 02/19/2018    Allergic rhinitis 02/19/2018    Hypertrophy of nasal turbinates 02/19/2018    ARNAUD (obstructive sleep apnea) 02/19/2018     Past Medical History:   Diagnosis Date    Allergic rhinitis 2/19/2018    Anxiety     Chronic back pain     COPD (chronic obstructive pulmonary disease) ([address]     Depression     Disorder of stomach     valve not closing properly    Emphysema lung ([address]     Essential hypertension 11/4/2019    Gastroesophageal reflux disease 10/4/2019    Hyperlipidemia     meds > 22 yrs    Low back pain 5/1/2018    ARNAUD (obstructive sleep apnea) 2/19/2018    Other emphysema ([facility] 10/4/2019    Other intervertebral disc degeneration, lumbar region 3/23/2018    Radiculopathy, lumbar region 2/28/2018    Restless leg syndrome     Seasonal affective disorder ([facility] 10/4/2019    Substance abuse ([facility]     alcholic, quit 20 yrs      Past Surgical History:   Procedure Laterality Date    COLONOSCOPY  12/22/2016    DALIA LAST MD    DENTAL SURGERY  10 yrs ago    ENDOSCOPY, COLON, DIAGNOSTIC      EYE SURGERY      Lasik OU    KNEE ARTHROSCOPY Right     KNEE SURGERY Right 05/2016    Ray procedure    KNEE SURGERY      [place] NASAL SCOPY,OPEN MAXILL SINUS N/A 2/22/2018    SEPTOPLASTY MICRODEBRIDER ASSISTED TURBINOPLASTY AND OUT-FRACTURING BILATERAL NASAL ENDOSCOPY performed by Christiano Sanchez MD at [address] History   Problem Relation Age of Onset    Cancer Mother         stomach    Arthritis Mother     Heart Disease Father 48    Cancer Father         bone    Cancer Maternal Grandmother         lung    Cancer Paternal Grandmother     Heart Disease Paternal Grandfather     No Known Problems Sister     Cancer Brother     Heart Disease Brother         hole in heart in 65 at 1 months age     Social History     Socioeconomic History    Marital status:      Spouse name: None    Number of children: 2    Years of education: 15    Highest education level: High school graduate   Occupational History    None   Tobacco Use    Smoking status: Current Every Day Smoker     Packs/day: 2.00     Years: 8.00     Pack years: 16.00     Types: Cigars    Smokeless tobacco: Never Used    Tobacco comment: 3-5 cigars qd   Vaping Use    Vaping Use: Never used   Substance and Sexual Activity    Alcohol use: No     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    Drug use: No    Sexual activity: Not Currently     Partners: Female   Other Topics Concern    None   Social History Narrative    None     Social Determinants of Health     Financial Resource Strain: Low Risk     Difficulty of Paying Living Expenses: Not hard at all   Food Insecurity: No Food Insecurity    Worried About Running Out of Food in the Last Year: Never true    Ivana of Food in the Last Year: Never true   Transportation Needs: No Transportation Needs    Lack of Transportation (Medical): No    Lack of Transportation (Non-Medical): No   Physical Activity:     Days of Exercise per Week: Not on file    Minutes of Exercise per Session: Not on file   Stress:     Feeling of Stress : Not on file   Social Connections:     Frequency of Communication with Friends and Family: Not on file    Frequency of Social Gatherings with Friends and Family: Not on file    Attends Jain Services: Not on file    Active Member of [address] [place] [address] or Organizations: Not on file    Attends Club or Organization Meetings: Not on file    Marital Status: Not on file   Intimate Partner Violence:     Fear of Current or Ex-Partner: Not on file    Emotionally Abused: Not on file    Physically Abused: Not on file    Sexually Abused: Not on file   Housing Stability:     Unable to Pay for Housing in the Last Year: Not on file    Number of [place] in the Last Year: Not on file    Unstable Housing in the Last Year: Not on file     Current Outpatient Medications on File Prior to Visit   Medication Sig Dispense Refill    FLUoxetine (PROZAC) 40 MG capsule TAKE 1 CAPSULE BY MOUTH DAILY. 90 capsule 1    acetaminophen (TYLENOL) 500 MG tablet Take 500 mg by mouth every 6 hours as needed for Pain      rosuvastatin (CRESTOR) 40 MG tablet TAKE 1 TABLET BY MOUTH DAILY AT BEDTIME.  methylPREDNISolone (MEDROL, ISABELLA,) 4 MG tablet Take by mouth.  (Patient not taking: Reported on 11/18/2021) 21 tablet 0    cyclobenzaprine (FLEXERIL) 10 MG tablet Take 1 tablet by mouth 3 times daily as needed for Muscle spasms (Patient not taking: Reported on 11/18/2021) 21 tablet 0    lidocaine (LIDODERM) 5 % Place 3 patches onto the skin daily for 10 days 12 hours on, 12 hours off. (Patient not taking: Reported on 11/18/2021) 30 patch 0    pantoprazole (PROTONIX) 40 MG tablet take 1 tablet by mouth once daily (Patient not taking: Reported on 11/18/2021) 90 tablet 1    QUEtiapine (SEROQUEL) 25 MG tablet Take 1 tablet by mouth nightly (Patient not taking: Reported on 11/18/2021) 30 tablet 2    traZODone (DESYREL) 100 MG tablet Take 1 tablet by mouth nightly as needed for Sleep (Patient not taking: Reported on 11/18/2021) 30 tablet 5    cyclobenzaprine (FLEXERIL) 10 MG tablet take 1 tablet by mouth three times a day if needed for muscle spasm (Patient not taking: Reported on 11/18/2021) 21 tablet 0    [DISCONTINUED] cyclobenzaprine (FLEXERIL) 10 MG tablet Take 1 tablet by mouth 3 times daily as needed for Muscle spasms 21 tablet 0    vitamin D3 (CHOLECALCIFEROL) 10 MCG (400 UNIT) TABS tablet Take 400 Units by mouth daily  (Patient not taking: Reported on 10/13/2021)      Apoaequorin (PREVAGEN PO) Take by mouth daily  (Patient not taking: Reported on 10/13/2021)      amoxicillin (AMOXIL) 500 MG capsule Take 500 mg by mouth 4 times daily as needed For procedures (Patient not taking: Reported on 10/13/2021)      pravastatin (PRAVACHOL) 40 MG tablet TAKE 1 TABLET BY MOUTH EVERY DAY IN THE EVENING (Patient not taking: Reported on 11/18/2021) 90 tablet 1     No current facility-administered medications on file prior to visit. Allergies   Allergen Reactions    Alcohol Other (See Comments)     Sober 22 yrs    Benadryl [Diphenhydramine]      \"speeds him up\"    Demerol Hcl [Meperidine] Other (See Comments)     Aggressive behavior    Sulfa Antibiotics Other (See Comments)     Told by mother / patient unaware of reaction       Review of Systems   Constitutional: Negative. HENT: Negative. Eyes: Negative. Respiratory: Negative. Cardiovascular: Negative. Gastrointestinal: Negative. Endocrine: Negative. Genitourinary: Negative. Musculoskeletal: Positive for back pain. Skin: Negative. Neurological: Positive for numbness. Psychiatric/Behavioral: Negative. Objective  Vitals:    11/18/21 1050   BP: 110/70   Pulse: 83   Temp: 96 °F (35.6 °C)   SpO2: 98%   Weight: 138 lb 6.4 oz (62.8 kg)   Height: 5' 9\" (1.753 m)     Physical Exam  Constitutional:       Appearance: Normal appearance. HENT:      Head: Normocephalic. Eyes:      Pupils: Pupils are equal, round, and reactive to light. Cardiovascular:      Rate and Rhythm: Normal rate and regular rhythm. Pulses: Normal pulses. Heart sounds: Normal heart sounds. Pulmonary:      Effort: Pulmonary effort is normal.      Breath sounds: Normal breath sounds. Musculoskeletal:         General: Normal range of motion. Cervical back: Normal range of motion. Skin:     General: Skin is warm. Neurological:      Mental Status: He is alert. Motor: Weakness present. Psychiatric:         Mood and Affect: Mood normal.         Behavior: Behavior normal.         Assessment & Plan     Diagnosis Orders   1. Lumbosacral spondylosis without myelopathy  gabapentin (NEURONTIN) 300 MG capsule   2. DDD (degenerative disc disease), lumbar  gabapentin (NEURONTIN) 300 MG capsule   3. Other intervertebral disc degeneration, lumbar region  meloxicam (MOBIC) 15 MG tablet         Orders Placed This Encounter   Medications    gabapentin (NEURONTIN) 300 MG capsule     Sig: Take 1 capsule by mouth 2 times daily for 60 days.      Dispense:  60 capsule     Refill:  1    meloxicam (MOBIC) 15 MG tablet     Sig: take 1 tablet by mouth once daily     Dispense:  30 tablet     Refill:  1       Medications Discontinued During This Encounter   Medication Reason    aspirin 81 MG EC tablet Patient Choice    gabapentin (NEURONTIN) 300 MG capsule REORDER    meloxicam (MOBIC) 15 MG tablet REORDER        Return in about 4 weeks (around 12/16/2021). -Educated on Mobic and using it daily for pain after prednisone pack is completed  -Educated on taking Gabapentin daily at night for 5 days and increasing to twice a day if tolerating medication well.  -Call office or [place] if needs any help or has any questions regarding medications        Side effects, adverse effects of the medication prescribed today, as well as treatment plan/ rationale and result expectations have been discussed with the patient who expresses understanding and desires to proceed. Close follow up to evaluate treatment results and for coordination of care. I have reviewed the patient's medical history in detail and updated the computerized patient record. As always, patient is advised that if symptoms worsen in any way they will proceed to the nearest emergency room.           Regine Flor, MANUELA - CNP

## 2021-11-18 NOTE — PATIENT INSTRUCTIONS
Take the prednisone pack. After the prednisone is finished, you may start the mobic once daily. Start the gabapentin (neurontin). May start with once a day at night. If tolerating ok, increase to two times daily. Keep taking this indefinitely.

## 2021-12-13 ENCOUNTER — OFFICE VISIT (OUTPATIENT)
Dept: FAMILY MEDICINE CLINIC | Age: 66
End: 2021-12-13
Payer: MEDICARE

## 2021-12-13 VITALS
OXYGEN SATURATION: 98 % | HEIGHT: 70 IN | HEART RATE: 65 BPM | TEMPERATURE: 96.4 F | WEIGHT: 136 LBS | DIASTOLIC BLOOD PRESSURE: 86 MMHG | BODY MASS INDEX: 19.47 KG/M2 | SYSTOLIC BLOOD PRESSURE: 126 MMHG

## 2021-12-13 DIAGNOSIS — M54.50 ACUTE EXACERBATION OF CHRONIC LOW BACK PAIN: Primary | ICD-10-CM

## 2021-12-13 DIAGNOSIS — G89.29 ACUTE EXACERBATION OF CHRONIC LOW BACK PAIN: Primary | ICD-10-CM

## 2021-12-13 PROCEDURE — 96372 THER/PROPH/DIAG INJ SC/IM: CPT | Performed by: NURSE PRACTITIONER

## 2021-12-13 PROCEDURE — 99213 OFFICE O/P EST LOW 20 MIN: CPT | Performed by: NURSE PRACTITIONER

## 2021-12-13 RX ORDER — KETOROLAC TROMETHAMINE 30 MG/ML
60 INJECTION, SOLUTION INTRAMUSCULAR; INTRAVENOUS ONCE
Status: COMPLETED | OUTPATIENT
Start: 2021-12-13 | End: 2021-12-13

## 2021-12-13 RX ORDER — METHYLPREDNISOLONE 4 MG/1
TABLET ORAL
Qty: 1 KIT | Refills: 0 | Status: SHIPPED | OUTPATIENT
Start: 2021-12-13 | End: 2022-01-26 | Stop reason: ALTCHOICE

## 2021-12-13 RX ADMIN — KETOROLAC TROMETHAMINE 60 MG: 30 INJECTION, SOLUTION INTRAMUSCULAR; INTRAVENOUS at 12:47

## 2021-12-13 NOTE — PROGRESS NOTES
Subjective  Brian Laughlin, 74 y.o. male presents today with:  Chief Complaint   Patient presents with    Back Pain     tailbone, has been atleast week and half.  been taking tylenol has been no help. hsa not slept for 2 days        HPI   [place] [facility]  Acute on chronic pain   Pain located lumbar, midline with b/l sciatica   Describes pain as \"constant\"  Been up for the last 2 days d/t increasing discomfort  Denies recent fall, injury or trauma  Denies change in bowel or bladder function   Denies change from baseline numbness/pain in legs or feet   11/16 ER for back pain  Doesn't want \"strong\" pain meds or muscle relaxer as he has had negative s/e from these   Has taken 1000 mg Tylenol and used lido patches             Past Medical History:   Diagnosis Date    Allergic rhinitis 2/19/2018    Anxiety     Chronic back pain     [facility]     Depression     Disorder of stomach     valve not closing properly    Emphysema lung ([address]     Essential hypertension 11/4/2019    Gastroesophageal reflux disease 10/4/2019    Hyperlipidemia     meds > 22 yrs    Low back pain 5/1/2018    ARNAUD (obstructive sleep apnea) 2/19/2018    Other emphysema ([address] 10/4/2019    Other intervertebral disc degeneration, lumbar region 3/23/2018    Radiculopathy, lumbar region 2/28/2018    Restless leg syndrome     Seasonal affective disorder ([address] 10/4/2019    Substance abuse ([address]     alcholic, quit 20 yrs       Past Surgical History:   Procedure Laterality Date    COLONOSCOPY  12/22/2016    A SHALA MARLEY    DENTAL SURGERY  10 yrs ago    ENDOSCOPY, COLON, DIAGNOSTIC      EYE SURGERY      Lasik OU    KNEE ARTHROSCOPY Right     KNEE SURGERY Right 05/2016    Ray procedure    KNEE SURGERY      [place] NASAL SCOPY,OPEN MAXILL SINUS N/A 2/22/2018    SEPTOPLASTY MICRODEBRIDER ASSISTED TURBINOPLASTY AND OUT-FRACTURING BILATERAL NASAL ENDOSCOPY performed [facility]vin Lewis [facility] at [facility]     Family History   Problem Relation Age of Onset    Cancer Mother         stomach    Arthritis Mother     Heart Disease Father 48    Cancer Father         bone    Cancer Maternal Grandmother         lung    Cancer Paternal Grandmother     Heart Disease Paternal Grandfather     No Known Problems Sister     Cancer Brother     Heart Disease Brother         hole in heart in 1959 at 1 months age           Review of Systems   Constitutional: Positive for activity change, appetite change and fatigue (not sleeping ). Negative for fever. Respiratory: Negative for cough. Cardiovascular: Negative for chest pain and palpitations. Gastrointestinal: Negative for abdominal pain, blood in stool, constipation, diarrhea, nausea and vomiting. Genitourinary: Negative for difficulty urinating. Musculoskeletal: Positive for back pain. Negative for neck pain and neck stiffness. Neurological: Negative for dizziness, light-headedness and headaches. Psychiatric/Behavioral: Positive for sleep disturbance. PMH, Surgical Hx, Family Hx, and Social Hx reviewed and updated. Objective  Vitals:    12/13/21 1151   BP: 126/86   Site: Right Upper Arm   Position: Sitting   Cuff Size: Medium Adult   Pulse: 65   Temp: 96.4 °F (35.8 °C)   TempSrc: Tympanic   SpO2: 98%   Weight: 136 lb (61.7 kg)   Height: 5' 10\" (1.778 m)     BP Readings from Last 3 Encounters:   12/13/21 126/86   11/18/21 110/70   11/16/21 108/74     Wt Readings from Last 3 Encounters:   12/13/21 136 lb (61.7 kg)   11/18/21 138 lb 6.4 oz (62.8 kg)   11/16/21 145 lb (65.8 kg)         Physical Exam  Vitals reviewed. Constitutional:       General: He is in acute distress (d/t pain and no sleep over last 3 nights). Appearance: He is not diaphoretic.    HENT:      Right Ear: External ear normal.      Left Ear: External ear normal.      Nose: Nose normal.   Eyes:      General: Lids are normal.      Conjunctiva/sclera: Conjunctivae normal. Cardiovascular:      Rate and Rhythm: Normal rate. Pulses: Normal pulses. Pulmonary:      Effort: Pulmonary effort is normal.      Breath sounds: Normal breath sounds and air entry. Musculoskeletal:      Cervical back: Normal range of motion. No rigidity. No pain with movement. Lumbar back: Tenderness and bony tenderness present. No swelling, signs of trauma or spasms. Decreased range of motion. Skin:     General: Skin is warm and dry. Capillary Refill: Capillary refill takes less than 2 seconds. Coloration: Skin is not pale. Neurological:      General: No focal deficit present. Mental Status: He is alert and oriented to person, place, and time. Assessment & Plan    Diagnosis Orders   1. Acute exacerbation of chronic low back pain  methylPREDNISolone (MEDROL, ISABELLA,) 4 MG tablet    ketorolac (TORADOL) injection 60 mg IM     No orders of the defined types were placed in this encounter. Orders Placed This Encounter   Medications    methylPREDNISolone (MEDROL, ISABELLA,) 4 MG tablet     Sig: Take by mouth. Dispense:  1 kit     Refill:  0    ketorolac (TORADOL) injection 60 mg IM       Return for scheduled appt with pain management   Able to schedule patient on Friday. Pt aware of appointment location, time and provider       If symptoms worsen or you have any of the red flag s/s discussed, seek care at ER     Reviewed with the patient: current clinical status & medications. Side effects, adverse effects of the medications administered/prescribed today, as well as treatment plan/rationale and result expectations have been discussed with the patient who expressed understanding. You were given Toradol for pain management. Please DO Not take Motrin/Ibuprofen/Aleve (NSAIDS) for the next 6 hours (Until 7pm) Pt aware. Oral Steroid Instructions:  · Take each dose with a small snack or meal to lessen potential GI upset.     · Follow dosing instructions provided with prescription. · Common side effects include difficulty sleeping and irritability. · Take full course as ordered. How can you care for yourself at home? 1. Sit or lie in positions that are most comfortable and reduce your pain. Try one of these positions when you lie down:  ? Lie on your back with your knees bent and supported by large pillows. ? Lie on the floor with your legs on the seat of a sofa or chair. ? Lie on your side with your knees and hips bent and a pillow between your legs. ? Lie on your stomach if it does not make pain worse. 2. Do not sit up in bed, and avoid soft couches and twisted positions. Bed rest can help relieve pain at first, but it delays healing. Avoid bed rest after the first day of back pain. 3. Change positions every 30 minutes. If you must sit for long periods of time, take breaks from sitting. Get up and walk around, or lie in a comfortable position. 4. Try using a heating pad on a low or medium setting for 15 to 20 minutes every 2 or 3 hours. Try a warm shower in place of one session with the heating pad. 5. You can also try an ice pack for 10 to 15 minutes every 2 to 3 hours. Put a thin cloth between the ice pack and your skin. 6. Take pain medicines exactly as directed. ? If the doctor gave you a prescription medicine for pain, take it as prescribed. ? If you are not taking a prescription pain medicine, ask your doctor if you can take an over-the-counter medicine. 7. Take short walks several times a day. You can start with 5 to 10 minutes, 3 or 4 times a day, and work up to longer walks. Walk on level surfaces and avoid hills and stairs until your back is better. 8. Return to work and other activities as soon as you can. Continued rest without activity is usually not good for your back. 9. To prevent future back pain, do exercises to stretch and strengthen your back and stomach.  Learn how to use good posture, safe lifting techniques, and proper body mechanics. When should you call for help? Seek immediate medical care if:    · You have new or worsening numbness in your legs. · You have new or worsening weakness in your legs. (This could make it hard to stand up.)     · You lose control of your bladder or bowels. Close follow up to evaluate treatment results and for coordination of care. I have reviewed the patient's medical history in detail and updated the computerized patient record.         Rodolfo Meza, MANUELA Hernández NP

## 2021-12-13 NOTE — PROGRESS NOTES
After obtaining consent, and per orders of Dr. Tia ROY NP , injection of Toradol given in Left upper quad. gluteus by Stew Pickard MA. Patient instructed to remain in clinic for 20 minutes afterwards, and to report any adverse reaction to me immediately.     Pt tolerated Well

## 2021-12-15 ENCOUNTER — CARE COORDINATION (OUTPATIENT)
Dept: CARE COORDINATION | Age: 66
End: 2021-12-15

## 2021-12-15 ASSESSMENT — ENCOUNTER SYMPTOMS
CONSTIPATION: 0
DIARRHEA: 0
COUGH: 0
BACK PAIN: 1
BLOOD IN STOOL: 0
ABDOMINAL PAIN: 0
VOMITING: 0
NAUSEA: 0

## 2021-12-15 NOTE — CARE COORDINATION
Ambulatory Care Coordination Note  12/15/2021  CM Risk Score: 6  Charlson 10 Year Mortality Risk Score: 23%     ACC: Leandra Rodriguez, RN    Summary Note:     Linda Villeda tells me that he is still having issues with his back  He did go to the walk in clinic and he received a \"shot\" and steroid isabella  He states that it is helping. He does have a follow up with pain management on Friday. He tells me that he cannot follow up with Dr Chin Card for personal reasons. I did remind him that he has an appointment with Margarito Márquez tomorrow and he is aware. He denies any issues with his breathing  He states that his breathing is good as long as he does not smoke more than 3 cigars per day. He states that his breathing is good at rest and with activity  He denies any issues with fever, chills, wheezing, or color in his mucus  He feels that he is eating and sleeping well,    PLAN:  Linda Villeda will complete all scheduled appointments   Linda Villeda will monitor his symptoms for any changes using the COPD zone tool   Linda Villeda will call me with any changes or worsening of symptoms for recommendations  If Linda Villeda remains stable over the next month I will graduate him from care coordination. Care Coordination Interventions    Program Enrollment: Rising Risk  Referral from Primary Care Provider: Yes  Suggested Interventions and Community Resources  Fall Risk Prevention: In Process  Disease Association: In Process  Social Work: In Process  Zone Management Tools: In Process  Other Services or Interventions: COPD zone tool education initiated.  referral intiated, Walk in Clinic hours and usage discussed. COVID vaccine declined         Goals Addressed    None         Prior to Admission medications    Medication Sig Start Date End Date Taking?  Authorizing Provider   methylPREDNISolone (MEDROL, ISABELLA,) 4 MG tablet Take by mouth. 12/13/21   MANUELA Hooks - NP   gabapentin (NEURONTIN) 300 MG capsule Take 1 capsule by mouth 2 times daily for 60 days. 11/18/21 1/17/22  MANUELA Virk CNP   meloxicam [facility]) 15 MG tablet take 1 tablet by mouth once daily 11/18/21   MANUELA Virk CNP   pantoprazole (PROTONIX) 40 MG tablet take 1 tablet by mouth once daily  Patient not taking: Reported on 11/18/2021 11/8/21   Robyn Car MD   QUEtiapine (SEROQUEL) 25 MG tablet Take 1 tablet by mouth nightly  Patient not taking: Reported on 11/18/2021 10/20/21 1/18/22  MANUELA Virk CNP   traZODone (DESYREL) 100 MG tablet Take 1 tablet by mouth nightly as needed for Sleep  Patient not taking: Reported on 11/18/2021 10/20/21   MANUELA Virk CNP   cyclobenzaprine (FLEXERIL) 10 MG tablet take 1 tablet by mouth three times a day if needed for muscle spasm  Patient not taking: Reported on 11/18/2021 10/17/21   MANUELA Virk CNP   FLUoxetine (PROZAC) 40 MG capsule TAKE 1 CAPSULE BY MOUTH DAILY.  10/11/21   MANUELA Virk CNP   cyclobenzaprine (FLEXERIL) 10 MG tablet Take 1 tablet by mouth 3 times daily as needed for Muscle spasms 9/13/21   MANUELA Virk CNP   vitamin D3 (CHOLECALCIFEROL) 10 MCG (400 UNIT) TABS tablet Take 400 Units by mouth daily   Patient not taking: Reported on 10/13/2021    Historical Provider, MD   acetaminophen (TYLENOL) 500 MG tablet Take 500 mg by mouth every 6 hours as needed for Pain    Historical Provider, MD   Apoaequorin (PREVAGEN PO) Take by mouth daily   Patient not taking: Reported on 10/13/2021    Historical Provider, MD   rosuvastatin (CRESTOR) 40 MG tablet TAKE 1 TABLET BY MOUTH DAILY AT BEDTIME. 4/30/21   Historical Provider, MD       Future Appointments   Date Time Provider Raphael Thompson   12/16/2021  1:45 PM MANUELA Virk CNP [facility] AT [place]   12/17/2021  8:30 AM MANUELA Figueroa CNP [facility] [place]   2/21/2022  1:00 PM MANUELA Virk CNP [facility] AT [place]      and   COPD Assessment    Does the patient understand envrionmental exposure?: No  Is the patient able to verbalize Rescue vs. Long Acting medications?: No  Does the patient have a nebulizer?: No  Does the patient use a space with inhaled medications?: No            Symptoms:

## 2021-12-16 ENCOUNTER — OFFICE VISIT (OUTPATIENT)
Dept: FAMILY MEDICINE CLINIC | Age: 66
End: 2021-12-16
Payer: MEDICARE

## 2021-12-16 VITALS
WEIGHT: 138 LBS | OXYGEN SATURATION: 99 % | BODY MASS INDEX: 20.44 KG/M2 | TEMPERATURE: 97.5 F | HEART RATE: 85 BPM | HEIGHT: 69 IN | SYSTOLIC BLOOD PRESSURE: 126 MMHG | DIASTOLIC BLOOD PRESSURE: 82 MMHG

## 2021-12-16 DIAGNOSIS — M54.16 RADICULOPATHY, LUMBAR REGION: ICD-10-CM

## 2021-12-16 DIAGNOSIS — M51.36 DDD (DEGENERATIVE DISC DISEASE), LUMBAR: Primary | ICD-10-CM

## 2021-12-16 PROCEDURE — 99213 OFFICE O/P EST LOW 20 MIN: CPT | Performed by: NURSE PRACTITIONER

## 2021-12-16 ASSESSMENT — ENCOUNTER SYMPTOMS
COUGH: 0
SHORTNESS OF BREATH: 0
BACK PAIN: 1

## 2021-12-16 NOTE — PROGRESS NOTES
Subjective  Chief Complaint   Patient presents with    1 Month Follow-Up       HPI     Pt is following up for back pain. Doing some better since the prednisone given by [facility] care. Moving some better. Using lidocaine patches as well. Has taken gabapentin since last visit. Has been taking it once a day. Can't tell whether it is helping.      Patient Active Problem List    Diagnosis Date Noted    Opioid dependence with unspecified opioid-induced disorder 06/09/2021    Abnormal findings on dx imaging of heart and cor circ 03/15/2021    Abnormal result of cardiovascular function study, unspecified 03/15/2021    Palpitations 03/15/2021    Essential hypertension 11/04/2019    Tobacco abuse 10/04/2019    Gastroesophageal reflux disease 10/04/2019    Other emphysema ([address] 10/04/2019    Seasonal affective disorder ([address] 10/04/2019    Hyperlipidemia 01/24/2019    Anxiety 09/19/2018    Insomnia 09/19/2018    Low back pain 05/01/2018    Other intervertebral disc degeneration, lumbar region 03/23/2018    Other intervertebral disc displacement, lumbar region 03/23/2018    Presence of right artificial knee joint 02/28/2018    Radiculopathy, lumbar region 02/28/2018    Sprain of ligaments of lumbar spine 02/28/2018    Strain of muscle, fascia and tendon of lower back, initial encounter 02/28/2018    DNS (deviated nasal septum) 02/19/2018    Allergic rhinitis 02/19/2018    Hypertrophy of nasal turbinates 02/19/2018    ARNAUD (obstructive sleep apnea) 02/19/2018     Past Medical History:   Diagnosis Date    Allergic rhinitis 2/19/2018    Anxiety     Chronic back pain     COPD (chronic obstructive pulmonary disease) ([address]     Depression     Disorder of stomach     valve not closing properly    Emphysema lung ([address]     Essential hypertension 11/4/2019    Gastroesophageal reflux disease 10/4/2019    Hyperlipidemia     meds > 22 yrs    Low back pain 5/1/2018    ARNAUD (obstructive sleep apnea) Difficulty of Paying Living Expenses: Not hard at all   Food Insecurity: No Food Insecurity    Worried About Running Out of Food in the Last Year: Never true    Ran Out of Food in the Last Year: Never true   Transportation Needs: No Transportation Needs    Lack of Transportation (Medical): No    Lack of Transportation (Non-Medical): No   Physical Activity:     Days of Exercise per Week: Not on file    Minutes of Exercise per Session: Not on file   Stress:     Feeling of Stress : Not on file   Social Connections:     Frequency of Communication with Friends and Family: Not on file    Frequency of Social Gatherings with Friends and Family: Not on file    Attends Zoroastrianism Services: Not on file    Active Member of [address] [place] [address] or Organizations: Not on file    Attends Club or Organization Meetings: Not on file    Marital Status: Not on file   Intimate Partner Violence:     Fear of Current or Ex-Partner: Not on file    Emotionally Abused: Not on file    Physically Abused: Not on file    Sexually Abused: Not on file   Housing Stability:     Unable to Pay for Housing in the Last Year: Not on file    Number of [place] in the Last Year: Not on file    Unstable Housing in the Last Year: Not on file     Current Outpatient Medications on File Prior to Visit   Medication Sig Dispense Refill    methylPREDNISolone (MEDROL, ISABELLA,) 4 MG tablet Take by mouth. 1 kit 0    gabapentin (NEURONTIN) 300 MG capsule Take 1 capsule by mouth 2 times daily for 60 days. 60 capsule 1    meloxicam (MOBIC) 15 MG tablet take 1 tablet by mouth once daily 30 tablet 1    pantoprazole (PROTONIX) 40 MG tablet take 1 tablet by mouth once daily 90 tablet 1    QUEtiapine (SEROQUEL) 25 MG tablet Take 1 tablet by mouth nightly 30 tablet 2    traZODone (DESYREL) 100 MG tablet Take 1 tablet by mouth nightly as needed for Sleep 30 tablet 5    FLUoxetine (PROZAC) 40 MG capsule TAKE 1 CAPSULE BY MOUTH DAILY.  90 capsule 1    vitamin D3 (CHOLECALCIFEROL) 10 MCG (400 UNIT) TABS tablet Take 400 Units by mouth daily       acetaminophen (TYLENOL) 500 MG tablet Take 500 mg by mouth every 6 hours as needed for Pain      rosuvastatin (CRESTOR) 40 MG tablet TAKE 1 TABLET BY MOUTH DAILY AT BEDTIME.  cyclobenzaprine (FLEXERIL) 10 MG tablet take 1 tablet by mouth three times a day if needed for muscle spasm (Patient not taking: Reported on 11/18/2021) 21 tablet 0    [DISCONTINUED] cyclobenzaprine (FLEXERIL) 10 MG tablet Take 1 tablet by mouth 3 times daily as needed for Muscle spasms 21 tablet 0    Apoaequorin (PREVAGEN PO) Take by mouth daily  (Patient not taking: Reported on 10/13/2021)       No current facility-administered medications on file prior to visit. Allergies   Allergen Reactions    Alcohol Other (See Comments)     Sober 22 yrs    Benadryl [Diphenhydramine]      \"speeds him up\"    Demerol Hcl [Meperidine] Other (See Comments)     Aggressive behavior    Sulfa Antibiotics Other (See Comments)     Told by mother / patient unaware of reaction       Review of Systems   Constitutional: Negative for fatigue. Respiratory: Negative for cough and shortness of breath. Cardiovascular: Negative for chest pain. Musculoskeletal: Positive for back pain. Neurological: Positive for weakness and numbness. Objective  Vitals:    12/16/21 1341   BP: 126/82   Pulse: 85   Temp: 97.5 °F (36.4 °C)   SpO2: 99%   Weight: 138 lb (62.6 kg)   Height: 5' 9\" (1.753 m)     Physical Exam  Vitals and nursing note reviewed. Constitutional:       Appearance: Normal appearance. He is normal weight. HENT:      Head: Normocephalic. Eyes:      Pupils: Pupils are equal, round, and reactive to light. Cardiovascular:      Rate and Rhythm: Normal rate and regular rhythm. Pulses: Normal pulses. Heart sounds: Normal heart sounds. Pulmonary:      Effort: Pulmonary effort is normal.      Breath sounds: Normal breath sounds.    Musculoskeletal: Legs:    Neurological:      General: No focal deficit present. Mental Status: He is alert and oriented to person, place, and time. Mental status is at baseline. Psychiatric:         Mood and Affect: Mood normal.         Behavior: Behavior normal.         Thought Content: Thought content normal.         Judgment: Judgment normal.         Assessment & Plan     Diagnosis Orders   1. DDD (degenerative disc disease), lumbar     2. Radiculopathy, lumbar region       Increase gabapentin to twice daily  Fu with pain management. Fu with me in 3 mos. Side effects, adverse effects of the medication prescribed today, as well as treatment plan/ rationale and result expectations have been discussed with the patient who expresses understanding and desires to proceed. Close follow up to evaluate treatment results and for coordination of care. I have reviewed the patient's medical history in detail and updated the computerized patient record. As always, patient is advised that if symptoms worsen in any way they will proceed to the nearest emergency room. plan of action.          MANUELA Bagley - CNP

## 2021-12-17 ENCOUNTER — OFFICE VISIT (OUTPATIENT)
Dept: PAIN MANAGEMENT | Age: 66
End: 2021-12-17
Payer: MEDICARE

## 2021-12-17 VITALS
TEMPERATURE: 97 F | BODY MASS INDEX: 19.76 KG/M2 | WEIGHT: 138 LBS | DIASTOLIC BLOOD PRESSURE: 76 MMHG | SYSTOLIC BLOOD PRESSURE: 130 MMHG | HEIGHT: 70 IN

## 2021-12-17 DIAGNOSIS — M47.817 LUMBOSACRAL SPONDYLOSIS WITHOUT MYELOPATHY: ICD-10-CM

## 2021-12-17 DIAGNOSIS — R20.2 PARESTHESIA OF BOTH FEET: Primary | ICD-10-CM

## 2021-12-17 DIAGNOSIS — M51.36 DDD (DEGENERATIVE DISC DISEASE), LUMBAR: ICD-10-CM

## 2021-12-17 PROCEDURE — 99214 OFFICE O/P EST MOD 30 MIN: CPT | Performed by: NURSE PRACTITIONER

## 2021-12-17 ASSESSMENT — ENCOUNTER SYMPTOMS
RESPIRATORY NEGATIVE: 1
CONSTIPATION: 0
DIARRHEA: 0
BACK PAIN: 1

## 2021-12-17 NOTE — PROGRESS NOTES
Patient: Marce Ratliff  YOB: 1955  Date: 12/17/21        Subjective:     Marce Ratliff is a 74 y.o. male who complains today of:    Chief Complaint   Patient presents with    Follow-up     Lower Back Pain         Allergies:  Alcohol, Benadryl [diphenhydramine], Demerol hcl [meperidine], and Sulfa antibiotics    Past Medical History:   Diagnosis Date    Allergic rhinitis 2/19/2018    Anxiety     Chronic back pain     COPD (chronic obstructive pulmonary disease) ([address]     Depression     Disorder of stomach     valve not closing properly    Emphysema lung ([address]     Essential hypertension 11/4/2019    Gastroesophageal reflux disease 10/4/2019    Hyperlipidemia     meds > 22 yrs    Low back pain 5/1/2018    ARNAUD (obstructive sleep apnea) 2/19/2018    Other emphysema ([address] 10/4/2019    Other intervertebral disc degeneration, lumbar region 3/23/2018    Radiculopathy, lumbar region 2/28/2018    Restless leg syndrome     Seasonal affective disorder ([facility] [address] 10/4/2019    Substance abuse ([facility] [facility] [address]     alcholic, quit 20 yrs      Past Surgical History:   Procedure Laterality Date    COLONOSCOPY  12/22/2016    DALIA LAST MD    DENTAL SURGERY  10 yrs ago    ENDOSCOPY, COLON, DIAGNOSTIC      EYE SURGERY      Lasik [facility]    KNEE ARTHROSCOPY Right     KNEE SURGERY Right 05/2016    Ray procedure    KNEE SURGERY      OK NASAL SCOPY,OPEN MAXILL SINUS N/A 2/22/2018    SEPTOPLASTY MICRODEBRIDER ASSISTED TURBINOPLASTY AND OUT-FRACTURING BILATERAL NASAL ENDOSCOPY performed by Ema Rondon MD at [address] History   Problem Relation Age of Onset    Cancer Mother         stomach    Arthritis Mother     Heart Disease Father 48    Cancer Father         bone    Cancer Maternal Grandmother         lung    Cancer Paternal Grandmother     Heart Disease Paternal Grandfather     No Known Problems Sister     Cancer Brother     Heart Disease Brother         hole in heart in 1959 at 3 months age     Social History     Socioeconomic History    Marital status:      Spouse name: Not on file    Number of children: 2    Years of education: 15    Highest education level: High school graduate   Occupational History    Not on file   Tobacco Use    Smoking status: Current Every Day Smoker     Packs/day: 2.00     Years: 8.00     Pack years: 16.00     Types: Cigars    Smokeless tobacco: Never Used    Tobacco comment: 3-5 cigars qd   Vaping Use    Vaping Use: Never used   Substance and Sexual Activity    Alcohol use: No     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    Drug use: No    Sexual activity: Not Currently     Partners: Female   Other Topics Concern    Not on file   Social History Narrative    Not on file     Social Determinants of Health     Financial Resource Strain: Low Risk     Difficulty of Paying Living Expenses: Not hard at all   Food Insecurity: No Food Insecurity    Worried About Running Out of Food in the Last Year: Never true    Ivana of Food in the Last Year: Never true   Transportation Needs: No Transportation Needs    Lack of Transportation (Medical): No    Lack of Transportation (Non-Medical):  No   Physical Activity:     Days of Exercise per Week: Not on file    Minutes of Exercise per Session: Not on file   Stress:     Feeling of Stress : Not on file   Social Connections:     Frequency of Communication with Friends and Family: Not on file    Frequency of Social Gatherings with Friends and Family: Not on file    Attends Tenriism Services: Not on file    Active Member of Clubs or Organizations: Not on file    Attends Club or Organization Meetings: Not on file    Marital Status: Not on file   Intimate Partner Violence:     Fear of Current or Ex-Partner: Not on file    Emotionally Abused: Not on file    Physically Abused: Not on file    Sexually Abused: Not on file   Housing Stability:     Unable to Pay for Housing in the Last Year: Not on file    Number of Places in this encounter. Orders Placed This Encounter   Procedures    XR LUMBAR SPINE (MIN 4 VIEWS)     Standing Status:   Future     Standing Expiration Date:   3/17/2022     Scheduling Instructions:      XR LUMBAR AP LAT FLEX EXT     Order Specific Question:   Reason for exam:     Answer:   eval for instability    EMG     Standing Status:   Future     Standing Expiration Date:   3/17/2022     Order Specific Question:   Which body part? Answer:   Bilateral lower extremities     -EMG with Dr April Banuelos to eval for neuropathy  -lumbar xray with flex/ext to eval for instability  -He will continue the gabapentin 300 mg twice daily per Sue Dahl CNP  -We will discuss treatment plan after the studies.     Assign to Dr Margy Nicole    Follow up:  Return in about 4 weeks (around 1/14/2022) for review meds and reassess pain, EMG Internal.    MANUELA Cuellar - CNP

## 2022-01-03 ENCOUNTER — TELEPHONE (OUTPATIENT)
Dept: PAIN MANAGEMENT | Age: 67
End: 2022-01-03

## 2022-01-03 NOTE — TELEPHONE ENCOUNTER
Patient called stating that he is in an extreme amount of back pain. He says that his feet are going numb. He did not want to have a message sent to Dr. Emery Peres. He is asking if there is anything Charmayne Burrows can do until he sees her 1/19?

## 2022-01-10 DIAGNOSIS — M51.36 OTHER INTERVERTEBRAL DISC DEGENERATION, LUMBAR REGION: ICD-10-CM

## 2022-01-10 RX ORDER — MELOXICAM 15 MG/1
TABLET ORAL
Qty: 30 TABLET | Refills: 1 | Status: SHIPPED | OUTPATIENT
Start: 2022-01-10 | End: 2022-03-08

## 2022-01-13 ENCOUNTER — CARE COORDINATION (OUTPATIENT)
Dept: CARE COORDINATION | Age: 67
End: 2022-01-13

## 2022-01-13 NOTE — CARE COORDINATION
Ambulatory Care Coordination Note  1/13/2022  CM Risk Score: 6  Charlson 10 Year Mortality Risk Score: 23%     ACC: Myra Ruvalcaba RN    Summary Note:     Kamaljit Oden returned my call  He tells me that he still has issues with his back and he is working with pain management  He states that he knows when he has overexerted himself because he develops tingling in his feet  I spoke with him about limitations and using heat at the end of the day  He tells me that heat is the only thing that works at home  He is taking 2-3 tylenol per day  He says that he will have back surgery once he completed the physical therapy portion  He says that his breathing is good  He is not having any issues with SOB at rest or with activity  He denies any issues with fever, chills, or change in mucus  He says that he is sleeping well an his appetite is adequate    PLAN:  Sunday Grewal has met all goals for care coordination and is ready for graduation  I have asked him to call me with any questions or concerns. Care Coordination Interventions    Program Enrollment: Rising Risk  Referral from Primary Care Provider: Yes  Suggested Interventions and Community Resources  Fall Risk Prevention: In Process  Disease Association: In Process  Social Work: In Process  Zone Management Tools: In Process  Other Services or Interventions: COPD zone tool education initiated.  referral intiated, Walk in Clinic hours and usage discussed. COVID vaccine declined         Goals Addressed                 This Visit's Progress     COMPLETED: Conditions and Symptoms        I will schedule office visits, as directed by my provider. I will keep my appointment or reschedule if I have to cancel. I will notify my provider of any barriers to my plan of care. I will follow my Zone Management tool to seek urgent or emergent care. I will notify my provider of any symptoms that indicate a worsening of my condition.     Barriers: impairment:  mental health: Depression, lack of motivation, financial, overwhelmed by complexity of regimen, time constraints, and lack of education  Plan for overcoming my barriers: I will work with my ACM and health care team to increase my knowledge and self care management skills for my overall health  Confidence: 7/10  Anticipated Goal Completion Date: 12/15/2021         COMPLETED: Reduce Falls         I will reduce my risk of falls by the following: Remove rugs or use non slip rugs  Install grab bars in bathroom  Use walking aids like cane or walker  Be cognizant of changes with back symptoms  follow through on all appointments and physical therapy    Barriers: impairment:  mental health: Depression, lack of motivation, financial, overwhelmed by complexity of regimen, stress, and lack of education  Plan for overcoming my barriers: I will work with my ACM and health care team to increase my education and self care management related to falls prevention  Confidence: 7/10  Anticipated Goal Completion Date: 12/15/2021            Prior to Admission medications    Medication Sig Start Date End Date Taking? Authorizing Provider   meloxicam (MOBIC) 15 MG tablet take 1 tablet by mouth once daily 1/10/22   MANUELA Liz CNP   methylPREDNISolone (MEDROL, ISABELLA,) 4 MG tablet Take by mouth. 12/13/21   MANUELA Segal NP   gabapentin (NEURONTIN) 300 MG capsule Take 1 capsule by mouth 2 times daily for 60 days. 11/18/21 1/17/22  MANUELA Liz CNP   FLUoxetine (PROZAC) 40 MG capsule TAKE 1 CAPSULE BY MOUTH DAILY.  10/11/21   MANUELA Liz CNP   cyclobenzaprine (FLEXERIL) 10 MG tablet Take 1 tablet by mouth 3 times daily as needed for Muscle spasms 9/13/21   MANUELA Liz CNP   vitamin D3 (CHOLECALCIFEROL) 10 MCG (400 UNIT) TABS tablet Take 400 Units by mouth daily     Historical Provider, MD   acetaminophen (TYLENOL) 500 MG tablet Take 500 mg by mouth every 6 hours as needed for Pain    Historical Provider, MD   rosuvastatin (CRESTOR) 40 MG tablet TAKE 1 TABLET BY MOUTH DAILY AT BEDTIME. 4/30/21   Historical Provider, MD       Future Appointments   Date Time Provider Raphael Thompson   1/19/2022  2:00 PM MANUELA Phillip CNP [facility] [place]   2/21/2022  1:00 PM MANUELA Moore CNP [facility] EMERGENCY [facility] [place]

## 2022-01-14 DIAGNOSIS — M51.36 DDD (DEGENERATIVE DISC DISEASE), LUMBAR: ICD-10-CM

## 2022-01-14 DIAGNOSIS — M47.817 LUMBOSACRAL SPONDYLOSIS WITHOUT MYELOPATHY: ICD-10-CM

## 2022-01-14 RX ORDER — GABAPENTIN 300 MG/1
CAPSULE ORAL
Qty: 60 CAPSULE | Refills: 2 | Status: SHIPPED | OUTPATIENT
Start: 2022-01-14 | End: 2022-02-16

## 2022-01-14 NOTE — TELEPHONE ENCOUNTER
Future Appointments    Encounter Information    Provider Department Appt Notes   1/19/2022 Shahrzad Sinclair, APRN - [id number] Pain Management 1 Month Follow Up   2/21/2022 Jeanette Retana [facility] Primary Care [facility]       Recent Visits    12/17/2021 Paresthesia of both feet   [address] Pain Management MANUELA Pan - [facility] [place]   12/16/2021 DDD (degenerative disc disease), lumbar   [address] [facility], APRSANTOSH - CNP

## 2022-01-19 ENCOUNTER — OFFICE VISIT (OUTPATIENT)
Dept: PAIN MANAGEMENT | Age: 67
End: 2022-01-19
Payer: MEDICARE

## 2022-01-19 VITALS
HEIGHT: 69 IN | SYSTOLIC BLOOD PRESSURE: 118 MMHG | WEIGHT: 140 LBS | BODY MASS INDEX: 20.73 KG/M2 | TEMPERATURE: 98.1 F | DIASTOLIC BLOOD PRESSURE: 72 MMHG

## 2022-01-19 DIAGNOSIS — M47.817 LUMBOSACRAL SPONDYLOSIS WITHOUT MYELOPATHY: ICD-10-CM

## 2022-01-19 DIAGNOSIS — R20.2 PARESTHESIA OF BOTH FEET: Primary | ICD-10-CM

## 2022-01-19 DIAGNOSIS — M51.36 DDD (DEGENERATIVE DISC DISEASE), LUMBAR: ICD-10-CM

## 2022-01-19 PROCEDURE — 99213 OFFICE O/P EST LOW 20 MIN: CPT | Performed by: NURSE PRACTITIONER

## 2022-01-19 ASSESSMENT — ENCOUNTER SYMPTOMS
RESPIRATORY NEGATIVE: 1
DIARRHEA: 0
CONSTIPATION: 0
BACK PAIN: 1
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
## 2022-01-20 ENCOUNTER — OFFICE VISIT (OUTPATIENT)
Dept: FAMILY MEDICINE CLINIC | Age: 67
End: 2022-01-20
Payer: MEDICARE

## 2022-01-20 VITALS
OXYGEN SATURATION: 94 % | BODY MASS INDEX: 21.48 KG/M2 | SYSTOLIC BLOOD PRESSURE: 112 MMHG | HEIGHT: 69 IN | HEART RATE: 72 BPM | DIASTOLIC BLOOD PRESSURE: 72 MMHG | WEIGHT: 145 LBS

## 2022-01-20 DIAGNOSIS — F33.8 SEASONAL AFFECTIVE DISORDER (HCC): ICD-10-CM

## 2022-01-20 DIAGNOSIS — G62.9 NEUROPATHY: Primary | ICD-10-CM

## 2022-01-20 DIAGNOSIS — J43.9 PULMONARY EMPHYSEMA, UNSPECIFIED EMPHYSEMA TYPE (HCC): ICD-10-CM

## 2022-01-20 PROBLEM — F11.29 OPIOID DEPENDENCE WITH UNSPECIFIED OPIOID-INDUCED DISORDER (HCC): Status: RESOLVED | Noted: 2021-06-09 | Resolved: 2022-01-20

## 2022-01-20 PROCEDURE — 99213 OFFICE O/P EST LOW 20 MIN: CPT | Performed by: NURSE PRACTITIONER

## 2022-01-20 SDOH — ECONOMIC STABILITY: FOOD INSECURITY: WITHIN THE PAST 12 MONTHS, YOU WORRIED THAT YOUR FOOD WOULD RUN OUT BEFORE YOU GOT MONEY TO BUY MORE.: NEVER TRUE

## 2022-01-20 SDOH — ECONOMIC STABILITY: FOOD INSECURITY: WITHIN THE PAST 12 MONTHS, THE FOOD YOU BOUGHT JUST DIDN'T LAST AND YOU DIDN'T HAVE MONEY TO GET MORE.: NEVER TRUE

## 2022-01-20 ASSESSMENT — ENCOUNTER SYMPTOMS
SHORTNESS OF BREATH: 0
COUGH: 0
BACK PAIN: 1

## 2022-01-20 ASSESSMENT — SOCIAL DETERMINANTS OF HEALTH (SDOH): HOW HARD IS IT FOR YOU TO PAY FOR THE VERY BASICS LIKE FOOD, HOUSING, MEDICAL CARE, AND HEATING?: NOT HARD AT ALL

## 2022-01-20 NOTE — PROGRESS NOTES
Subjective  Chief Complaint   Patient presents with    Discuss Medications     gabapentin       HPI     Following up for neuropathic pain. Has been following with pain management. Taking gabapentin. States that he definitely has a lot of numbness and tingling still but makes it tolerable    Does have hx of memory concerns over the past few years. Not improving but not drastically worsening either. Pain management provider brought this up as a concern with the gabapentin. Risks and benefits discussed with patient.      Patient Active Problem List    Diagnosis Date Noted    Abnormal findings on dx imaging of heart and cor circ 03/15/2021    Abnormal result of cardiovascular function study, unspecified 03/15/2021    Palpitations 03/15/2021    Essential hypertension 11/04/2019    Tobacco abuse 10/04/2019    Gastroesophageal reflux disease 10/04/2019    Other emphysema ([address] 10/04/2019    Seasonal affective disorder ([address] 10/04/2019    Hyperlipidemia 01/24/2019    Anxiety 09/19/2018    Insomnia 09/19/2018    Low back pain 05/01/2018    Other intervertebral disc degeneration, lumbar region 03/23/2018    Other intervertebral disc displacement, lumbar region 03/23/2018    Presence of right artificial knee joint 02/28/2018    Radiculopathy, lumbar region 02/28/2018    Sprain of ligaments of lumbar spine 02/28/2018    Strain of muscle, fascia and tendon of lower back, initial encounter 02/28/2018    DNS (deviated nasal septum) 02/19/2018    Allergic rhinitis 02/19/2018    Hypertrophy of nasal turbinates 02/19/2018    ARNAUD (obstructive sleep apnea) 02/19/2018     Past Medical History:   Diagnosis Date    Allergic rhinitis 2/19/2018    Anxiety     Chronic back pain     COPD (chronic obstructive pulmonary disease) ([address]     Depression     Disorder of stomach     valve not closing properly    Emphysema lung ([address]     Essential hypertension 11/4/2019    Gastroesophageal reflux disease 10/4/2019    Hyperlipidemia     meds > 22 yrs    Low back pain 5/1/2018    ARNAUD (obstructive sleep apnea) 2/19/2018    Other emphysema ([facility] 10/4/2019    Other intervertebral disc degeneration, lumbar region 3/23/2018    Radiculopathy, lumbar region 2/28/2018    Restless leg syndrome     Seasonal affective disorder ([facility] 10/4/2019    Substance abuse ([facility]     alcholic, quit 20 yrs      Past Surgical History:   Procedure Laterality Date    COLONOSCOPY  12/22/2016    DALIA LAST MD    DENTAL SURGERY  10 yrs ago    ENDOSCOPY, COLON, DIAGNOSTIC      EYE SURGERY      Lasik OU    KNEE ARTHROSCOPY Right     KNEE SURGERY Right 05/2016    Ray procedure    KNEE SURGERY      ID NASAL SCOPY,OPEN MAXILL SINUS N/A 2/22/2018    SEPTOPLASTY MICRODEBRIDER ASSISTED TURBINOPLASTY AND OUT-FRACTURING BILATERAL NASAL ENDOSCOPY performed by Lacy Rojas MD at [address] History   Problem Relation Age of Onset    Cancer Mother         stomach    Arthritis Mother     Heart Disease Father 48    Cancer Father         bone    Cancer Maternal Grandmother         lung    Cancer Paternal Grandmother     Heart Disease Paternal Grandfather     No Known Problems Sister     Cancer Brother     Heart Disease Brother         hole in heart in 65 at 1 months age     Social History     Socioeconomic History    Marital status:      Spouse name: None    Number of children: 2    Years of education: 15    Highest education level: High school graduate   Occupational History    None   Tobacco Use    Smoking status: Current Every Day Smoker     Packs/day: 2.00     Years: 8.00     Pack years: 16.00     Types: Cigars    Smokeless tobacco: Never Used    Tobacco comment: 3-5 cigars qd   Vaping Use    Vaping Use: Never used   Substance and Sexual Activity    Alcohol use: No     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    Drug use: No    Sexual activity: Not Currently     Partners: Female   Other Topics Concern    None Social History Narrative    None     Social Determinants of Health     Financial Resource Strain: Low Risk     Difficulty of Paying Living Expenses: Not hard at all   Food Insecurity: No Food Insecurity    Worried About Running Out of Food in the Last Year: Never true    Ivana of Food in the Last Year: Never true   Transportation Needs:     Lack of Transportation (Medical): Not on file    Lack of Transportation (Non-Medical): Not on file   Physical Activity:     Days of Exercise per Week: Not on file    Minutes of Exercise per Session: Not on file   Stress:     Feeling of Stress : Not on file   Social Connections:     Frequency of Communication with Friends and Family: Not on file    Frequency of Social Gatherings with Friends and Family: Not on file    Attends Yarsanism Services: Not on file    Active Member of [address] [place] [address] [employer] or Organizations: Not on file    Attends Club or Organization Meetings: Not on file    Marital Status: Not on file   Intimate Partner Violence:     Fear of Current or Ex-Partner: Not on file    Emotionally Abused: Not on file    Physically Abused: Not on file    Sexually Abused: Not on file   Housing Stability:     Unable to Pay for Housing in the Last Year: Not on file    Number of [place] in the Last Year: Not on file    Unstable Housing in the Last Year: Not on file     Current Outpatient Medications on File Prior to Visit   Medication Sig Dispense Refill    gabapentin (NEURONTIN) 300 MG capsule TAKE 1 CAPSULE BY MOUTH TWICE DAILY FOR 60 DAYS. 60 capsule 2    meloxicam (MOBIC) 15 MG tablet take 1 tablet by mouth once daily 30 tablet 1    methylPREDNISolone (MEDROL, ISABELLA,) 4 MG tablet Take by mouth. 1 kit 0    FLUoxetine (PROZAC) 40 MG capsule TAKE 1 CAPSULE BY MOUTH DAILY.  90 capsule 1    vitamin D3 (CHOLECALCIFEROL) 10 MCG (400 UNIT) TABS tablet Take 400 Units by mouth daily       acetaminophen (TYLENOL) 500 MG tablet Take 500 mg by mouth every 6 hours as needed for Pain      rosuvastatin (CRESTOR) 40 MG tablet TAKE 1 TABLET BY MOUTH DAILY AT BEDTIME.      [DISCONTINUED] cyclobenzaprine (FLEXERIL) 10 MG tablet Take 1 tablet by mouth 3 times daily as needed for Muscle spasms 21 tablet 0     No current facility-administered medications on file prior to visit. Allergies   Allergen Reactions    Alcohol Other (See Comments)     Sober 22 yrs    Benadryl [Diphenhydramine]      \"speeds him up\"    Demerol Hcl [Meperidine] Other (See Comments)     Aggressive behavior    Sulfa Antibiotics Other (See Comments)     Told by mother / patient unaware of reaction       Review of Systems   Respiratory: Negative for cough and shortness of breath. Cardiovascular: Negative for chest pain. Musculoskeletal: Positive for back pain. Neurological: Positive for numbness. Negative for weakness. Objective  Vitals:    01/20/22 1118   BP: 112/72   Pulse: 72   SpO2: 94%   Weight: 145 lb (65.8 kg)   Height: 5' 9\" (1.753 m)     Physical Exam  Vitals and nursing note reviewed. Constitutional:       Appearance: Normal appearance. HENT:      Head: Normocephalic. Nose: Nose normal.      Mouth/Throat:      Mouth: Mucous membranes are moist.      Pharynx: Oropharynx is clear. Eyes:      Extraocular Movements: Extraocular movements intact. Conjunctiva/sclera: Conjunctivae normal.      Pupils: Pupils are equal, round, and reactive to light. Cardiovascular:      Rate and Rhythm: Normal rate and regular rhythm. Pulses: Normal pulses. Heart sounds: Normal heart sounds. Pulmonary:      Effort: Pulmonary effort is normal.      Breath sounds: Normal breath sounds. Skin:     General: Skin is warm. Neurological:      General: No focal deficit present. Mental Status: He is alert and oriented to person, place, and time. Mental status is at baseline.    Psychiatric:         Mood and Affect: Mood normal.         Behavior: Behavior normal.         Thought Content: Thought content normal.         Judgment: Judgment normal.         Assessment & Plan     Diagnosis Orders   1. Neuropathy     2. Pulmonary emphysema, unspecified emphysema type ([facility]     3. Seasonal affective disorder ([facility]       Risks and benefits and risks discussed in regards to gabapentin. Will continue to monitor memory. Currently medication helping with quality of life      Side effects, adverse effects of the medication prescribed today, as well as treatment plan/ rationale and result expectations have been discussed with the patient who expresses understanding and desires to proceed. Close follow up to evaluate treatment results and for coordination of care. I have reviewed the patient's medical history in detail and updated the computerized patient record. As always, patient is advised that if symptoms worsen in any way they will proceed to the nearest emergency room. There are no discontinued medications. SHADY go.     MANUELA Garrison - CNP

## 2022-01-26 ENCOUNTER — HOSPITAL ENCOUNTER (EMERGENCY)
Age: 67
Discharge: HOME | End: 2022-01-26
Payer: MEDICARE

## 2022-01-26 VITALS
HEART RATE: 62 BPM | RESPIRATION RATE: 14 BRPM | TEMPERATURE: 97.2 F | BODY MASS INDEX: 21.41 KG/M2 | OXYGEN SATURATION: 94 % | DIASTOLIC BLOOD PRESSURE: 80 MMHG | SYSTOLIC BLOOD PRESSURE: 109 MMHG | WEIGHT: 145 LBS

## 2022-01-26 VITALS
WEIGHT: 145 LBS | RESPIRATION RATE: 18 BRPM | BODY MASS INDEX: 21.48 KG/M2 | HEIGHT: 69 IN | HEART RATE: 76 BPM | TEMPERATURE: 97 F | SYSTOLIC BLOOD PRESSURE: 118 MMHG | OXYGEN SATURATION: 96 % | DIASTOLIC BLOOD PRESSURE: 82 MMHG

## 2022-01-26 DIAGNOSIS — M54.50 ACUTE EXACERBATION OF CHRONIC LOW BACK PAIN: Primary | ICD-10-CM

## 2022-01-26 DIAGNOSIS — G89.29 ACUTE EXACERBATION OF CHRONIC LOW BACK PAIN: Primary | ICD-10-CM

## 2022-01-26 PROCEDURE — 99284 EMERGENCY DEPT VISIT MOD MDM: CPT

## 2022-01-26 PROCEDURE — 6360000002 HC RX W HCPCS

## 2022-01-26 PROCEDURE — 96372 THER/PROPH/DIAG INJ SC/IM: CPT

## 2022-01-26 PROCEDURE — 6370000000 HC RX 637 (ALT 250 FOR IP): Performed by: STUDENT IN AN ORGANIZED HEALTH CARE EDUCATION/TRAINING PROGRAM

## 2022-01-26 PROCEDURE — 6360000002 HC RX W HCPCS: Performed by: STUDENT IN AN ORGANIZED HEALTH CARE EDUCATION/TRAINING PROGRAM

## 2022-01-26 RX ORDER — ONDANSETRON 4 MG/1
4 TABLET, ORALLY DISINTEGRATING ORAL ONCE
Status: COMPLETED | OUTPATIENT
Start: 2022-01-26 | End: 2022-01-26

## 2022-01-26 RX ORDER — METHYLPREDNISOLONE SODIUM SUCCINATE 125 MG/2ML
40 INJECTION, POWDER, LYOPHILIZED, FOR SOLUTION INTRAMUSCULAR; INTRAVENOUS ONCE
Status: COMPLETED | OUTPATIENT
Start: 2022-01-26 | End: 2022-01-26

## 2022-01-26 RX ORDER — METHYLPREDNISOLONE 4 MG/1
TABLET ORAL
Qty: 21 TABLET | Refills: 0 | Status: SHIPPED | OUTPATIENT
Start: 2022-01-26 | End: 2022-02-01

## 2022-01-26 RX ORDER — CYCLOBENZAPRINE HCL 10 MG
10 TABLET ORAL 3 TIMES DAILY PRN
Qty: 21 TABLET | Refills: 0 | Status: SHIPPED | OUTPATIENT
Start: 2022-01-26 | End: 2022-02-05

## 2022-01-26 RX ADMIN — HYDROMORPHONE HYDROCHLORIDE 1 MG: 1 INJECTION, SOLUTION INTRAMUSCULAR; INTRAVENOUS; SUBCUTANEOUS at 02:47

## 2022-01-26 RX ADMIN — ONDANSETRON 4 MG: 4 TABLET, ORALLY DISINTEGRATING ORAL at 02:47

## 2022-01-26 RX ADMIN — METHYLPREDNISOLONE SODIUM SUCCINATE 40 MG: 125 INJECTION, POWDER, FOR SOLUTION INTRAMUSCULAR; INTRAVENOUS at 23:05

## 2022-01-26 ASSESSMENT — ENCOUNTER SYMPTOMS
PHOTOPHOBIA: 0
NAUSEA: 0
VOMITING: 0
SHORTNESS OF BREATH: 0
BACK PAIN: 1
DIARRHEA: 0
COUGH: 0
WHEEZING: 0
PHOTOPHOBIA: 0
ABDOMINAL PAIN: 0
NAUSEA: 0
ABDOMINAL PAIN: 0
SHORTNESS OF BREATH: 0
COUGH: 0
VOMITING: 0
BACK PAIN: 1

## 2022-01-26 ASSESSMENT — PAIN DESCRIPTION - ORIENTATION
ORIENTATION: LOWER
ORIENTATION: LOWER

## 2022-01-26 ASSESSMENT — PAIN SCALES - GENERAL
PAINLEVEL_OUTOF10: 5
PAINLEVEL_OUTOF10: 6
PAINLEVEL_OUTOF10: 9
PAINLEVEL_OUTOF10: 1
PAINLEVEL_OUTOF10: 4

## 2022-01-26 ASSESSMENT — PAIN DESCRIPTION - PROGRESSION: CLINICAL_PROGRESSION: RAPIDLY IMPROVING

## 2022-01-26 ASSESSMENT — PAIN DESCRIPTION - PAIN TYPE
TYPE: ACUTE PAIN
TYPE: ACUTE PAIN;CHRONIC PAIN

## 2022-01-26 ASSESSMENT — PAIN DESCRIPTION - LOCATION
LOCATION: BACK
LOCATION: BACK

## 2022-01-26 ASSESSMENT — PAIN DESCRIPTION - DESCRIPTORS: DESCRIPTORS: ACHING

## 2022-01-26 ASSESSMENT — PAIN - FUNCTIONAL ASSESSMENT
PAIN_FUNCTIONAL_ASSESSMENT: 0-10
PAIN_FUNCTIONAL_ASSESSMENT: 0-10

## 2022-01-26 ASSESSMENT — PAIN DESCRIPTION - FREQUENCY: FREQUENCY: CONTINUOUS

## 2022-01-26 NOTE — ED PROVIDER NOTES
[address] [facility] ED  EMERGENCY DEPARTMENT ENCOUNTER      Pt Name: Parish Jj  MRN: [id number]  [place] 1955  Date of evaluation: 1/26/2022  Provider: Marquise Jean, [facility]       Chief Complaint   Patient presents with    Back Pain     chronic         HISTORY OF PRESENT ILLNESS   (Location/Symptom, Timing/Onset, Context/Setting, Quality, Duration, Modifying Factors, Severity)  Note limiting factors. Parish Jj is a 69 y.o. male who per chart wheeze past medical history of ARNAUD anxiety hyperlipidemia lumbar disc degeneration lumbar radiculopathy hypertension GERD emphysema seasonal affective disorder presents to the emergency department for evaluation of acute on chronic low back pain over the last \"few weeks. \" pt has hx of chronic back pain, no recent injuries. Follows with pain management, has upcoming EMG in Feb. Has tried ibuprofen and tylenol without relief. States pain kept him from sleeping tonight. Worsens with movement. occassionally radiation down posterior R leg with associated tingling which is also chronic per patient. He is able to walk. Denies fever, chills, abd pain, nvd, urinary sx, rash, numbness, weakness, saddle anesthesia, bowel or bladder incontinence. HPI    Nursing Notes were reviewed. REVIEW OF SYSTEMS    (2-9 systems for level 4, 10 or more for level 5)     Review of Systems   Constitutional: Negative for chills and fever. HENT: Negative for congestion. Eyes: Negative for photophobia. Respiratory: Negative for cough, shortness of breath and wheezing. Cardiovascular: Negative for chest pain and palpitations. Gastrointestinal: Negative for abdominal pain, nausea and vomiting. Genitourinary: Negative for dysuria, frequency and hematuria. Musculoskeletal: Positive for back pain. Negative for myalgias. Allergic/Immunologic: Negative for immunocompromised state. Neurological: Negative for dizziness, weakness and headaches.    All other systems reviewed and are negative. Except as noted above the remainder of the review of systems was reviewed and negative. PAST MEDICAL HISTORY     Past Medical History:   Diagnosis Date    Allergic rhinitis 2/19/2018    Anxiety     Chronic back pain     COPD [facility] disease) [facility]     Depression     Disorder of stomach     valve not closing properly    Emphysema lung ([address]     Essential hypertension 11/4/2019    Gastroesophageal reflux disease 10/4/2019    Hyperlipidemia     meds > 22 yrs    Low back pain 5/1/2018    ARNAUD (obstructive sleep apnea) 2/19/2018    Other emphysema ([address] 10/4/2019    Other intervertebral disc degeneration, lumbar region 3/23/2018    Radiculopathy, lumbar region 2/28/2018    Restless leg syndrome     Seasonal affective disorder ([facility] 10/4/2019    Substance abuse ([facility]     alcholic, quit 20 yrs          SURGICAL HISTORY       Past Surgical History:   Procedure Laterality Date    COLONOSCOPY  12/22/2016    DALIA LAST MD    DENTAL SURGERY  10 yrs ago    ENDOSCOPY, COLON, DIAGNOSTIC      EYE SURGERY      Lasik OU    KNEE ARTHROSCOPY Right     KNEE SURGERY Right 05/2016    Ray procedure    KNEE SURGERY      [place] NASAL SCOPY,OPEN MAXILL SINUS N/A 2/22/2018    SEPTOPLASTY MICRODEBRIDER ASSISTED TURBINOPLASTY AND OUT-FRACTURING BILATERAL NASAL ENDOSCOPY performed by Jaxson Aparicio MD at [address] [facility]       Previous Medications    ACETAMINOPHEN (TYLENOL) 500 MG TABLET    Take 500 mg by mouth every 6 hours as needed for Pain    FLUOXETINE (PROZAC) 40 MG CAPSULE    TAKE 1 CAPSULE BY MOUTH DAILY. GABAPENTIN (NEURONTIN) 300 MG CAPSULE    TAKE 1 CAPSULE BY MOUTH TWICE DAILY FOR 60 DAYS. MELOXICAM (MOBIC) 15 MG TABLET    take 1 tablet by mouth once daily    ROSUVASTATIN (CRESTOR) 40 MG TABLET    TAKE 1 TABLET BY MOUTH DAILY AT BEDTIME.     VITAMIN D3 (CHOLECALCIFEROL) 10 MCG (400 UNIT) TABS TABLET    Take 400 Units by mouth daily        ALLERGIES     Alcohol, Benadryl [diphenhydramine], Demerol hcl [meperidine], and Sulfa antibiotics    FAMILY HISTORY       Family History   Problem Relation Age of Onset   Maria Dolores King Cancer Mother         stomach    Arthritis Mother     Heart Disease Father 48    Cancer Father         bone    Cancer Maternal Grandmother         lung    Cancer Paternal Grandmother     Heart Disease Paternal Grandfather     No Known Problems Sister     Cancer Brother     Heart Disease Brother         hole in heart in 65 at 1 months age          SOCIAL HISTORY       Social History     Socioeconomic History    Marital status:      Spouse name: Not on file    Number of children: 2    Years of education: 15    Highest education level: High school graduate   Occupational History    Not on file   Tobacco Use    Smoking status: Current Every Day Smoker     Packs/day: 2.00     Years: 8.00     Pack years: 16.00     Types: Cigars    Smokeless tobacco: Never Used    Tobacco comment: 3-5 cigars qd   Vaping Use    Vaping Use: Never used   Substance and Sexual Activity    Alcohol use: No     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    Drug use: No    Sexual activity: Not Currently     Partners: Female   Other Topics Concern    Not on file   Social History Narrative    Not on file     Social Determinants of Health     Financial Resource Strain: Low Risk     Difficulty of Paying Living Expenses: Not hard at all   Food Insecurity: No Food Insecurity    Worried About Running Out of Food in the Last Year: Never true    Ivana of Food in the Last Year: Never true   Transportation Needs:     Lack of Transportation (Medical): Not on file    Lack of Transportation (Non-Medical):  Not on file   Physical Activity:     Days of Exercise per Week: Not on file    Minutes of Exercise per Session: Not on file   Stress:     Feeling of Stress : Not on file   Social Connections:     Frequency of Communication with Friends and Family: Not on file    Frequency of Social Gatherings with Friends and Family: Not on file    Attends Catholic Services: Not on file    Active Member of Clubs or Organizations: Not on file    Attends Club or Organization Meetings: Not on file    Marital Status: Not on file   Intimate Partner Violence:     Fear of Current or Ex-Partner: Not on file    Emotionally Abused: Not on file    Physically Abused: Not on file    Sexually Abused: Not on file   Housing Stability:     Unable to Pay for Housing in the Last Year: Not on file    Number of [place] in the Last Year: Not on file    Unstable Housing in the Last Year: Not on file       SCREENINGS                               CIWA Assessment  BP: (!) 120/96  Pulse: 71                 PHYSICAL EXAM    (up to 7 for level 4, 8 or more for level 5)     ED Triage Vitals   BP Temp Temp src Pulse Resp SpO2 Height Weight   -- -- -- -- -- -- -- --       Physical Exam  Constitutional:       General: He is not in acute distress. Appearance: He is well-developed. He is not ill-appearing, toxic-appearing or diaphoretic. HENT:      Head: Normocephalic and atraumatic. Nose: Nose normal.      Mouth/Throat:      Mouth: Mucous membranes are moist.   Eyes:      Pupils: Pupils are equal, round, and reactive to light. Cardiovascular:      Rate and Rhythm: Normal rate and regular rhythm. Heart sounds: No murmur heard. No friction rub. No gallop. Pulmonary:      Effort: Pulmonary effort is normal.      Breath sounds: Normal breath sounds. Abdominal:      General: There is no distension. Tenderness: There is no abdominal tenderness. Musculoskeletal:         General: No swelling. Cervical back: Normal and normal range of motion. Thoracic back: Normal.      Lumbar back: Tenderness present. Normal range of motion. Back:    Skin:     General: Skin is warm and dry. Neurological:      General: No focal deficit present. Mental Status: He is alert and oriented to person, place, and time. GCS: GCS eye subscore is 4. GCS verbal subscore is 5. GCS motor subscore is 6. Cranial Nerves: Cranial nerves are intact. Sensory: Sensation is intact. Coordination: Coordination is intact. Gait: Gait is intact. Deep Tendon Reflexes: Reflexes are normal and symmetric. Comments: Ambulatory in the ED. Bilateral lower extremities are neurovascularly intact. DIAGNOSTIC RESULTS     EKG: All EKG's are interpreted by the Emergency Department Physician who either signs or Co-signs this chart in the absence of a cardiologist.      RADIOLOGY:   Non-plain film images such as CT, Ultrasound and MRI are read by the radiologist. Plain radiographic images are visualized and preliminarily interpreted by the emergency physician with the below findings:        Interpretation per the Radiologist below, if available at the time of this note:    No orders to display         ED BEDSIDE ULTRASOUND:   Performed by ED Physician - none    LABS:  Labs Reviewed - No data to display    All other labs were within normal range or not returned as of this dictation. EMERGENCY DEPARTMENT COURSE and DIFFERENTIAL DIAGNOSIS/MDM:   Vitals:    Vitals:    01/26/22 0225 01/26/22 0230   BP: (!) 120/96    Pulse: 71    Resp: 16    Temp:  97.2 °F (36.2 °C)   TempSrc:  Tympanic   SpO2: 98%    Weight: 145 lb (65.8 kg)          MDM      Patient is a 68-year-old male presents the ED for evaluation of acute on chronic back pain. He is afebrile and hemodynamically stable. He is ambulatory in the ED, no signs symptoms or physical exam findings to suggest cord compression. No recent or new injuries therefore imaging was not done in the ED today. He was treated with IM Dilaudid and p.o. Zofran in the ED with complete relief of his pain. He is nontoxic-appearing with stable vitals and stable for discharge.   Follow-up with pain management 1 to 2 days return to the ED for worsening symptoms given back pain warning signs. Patient understands agrees to plan. REASSESSMENT          CRITICAL CARE TIME   Total Critical Care time was 0 minutes, excluding separately reportable procedures. There was a high probability of clinically significant/life threatening deterioration in the patient's condition which required my urgent intervention. CONSULTS:  None    PROCEDURES:  Unless otherwise noted below, none     Procedures        FINAL IMPRESSION      1. Acute exacerbation of chronic low back pain          DISPOSITION/PLAN   DISPOSITION Discharge - Pending Orders Complete 01/26/2022 03:46:27 AM      PATIENT REFERRED TO:  MANUELA Gonzalez - CNP  [address], [address]  [facility]  [phone]    Schedule an appointment as soon as possible for a visit in 1 day      [facility]) ED  [address] [facility]  [phone]  Go to   As needed, If symptoms worsen      DISCHARGE MEDICATIONS:  New Prescriptions    CYCLOBENZAPRINE (FLEXERIL) 10 MG TABLET    Take 1 tablet by mouth 3 times daily as needed for Muscle spasms    METHYLPREDNISOLONE (MEDROL, ISABELLA,) 4 MG TABLET    Take by mouth. Controlled Substances Monitoring:     RX Monitoring 5/17/2019   Attestation The Prescription Monitoring Report for this patient was reviewed today.    Periodic Controlled Substance Monitoring -       (Please note that portions of this note were completed with a voice recognition program.  Efforts were made to edit the dictations but occasionally words are mis-transcribed.)    Vincent De Anda PA-C (electronically signed)             Vincent De Anda PA-C  01/26/22 0988

## 2022-01-27 ENCOUNTER — CARE COORDINATION (OUTPATIENT)
Dept: CARE COORDINATION | Age: 67
End: 2022-01-27

## 2022-01-27 ENCOUNTER — HOSPITAL ENCOUNTER (EMERGENCY)
Age: 67
Discharge: HOME | End: 2022-01-27
Attending: EMERGENCY MEDICINE
Payer: MEDICARE

## 2022-01-27 VITALS
SYSTOLIC BLOOD PRESSURE: 105 MMHG | BODY MASS INDEX: 21.48 KG/M2 | OXYGEN SATURATION: 100 % | HEIGHT: 69 IN | WEIGHT: 145 LBS | TEMPERATURE: 98 F | HEART RATE: 64 BPM | DIASTOLIC BLOOD PRESSURE: 65 MMHG | RESPIRATION RATE: 18 BRPM

## 2022-01-27 DIAGNOSIS — G89.29 CHRONIC BILATERAL LOW BACK PAIN WITHOUT SCIATICA: ICD-10-CM

## 2022-01-27 DIAGNOSIS — S39.012A STRAIN OF LUMBAR REGION, INITIAL ENCOUNTER: Primary | ICD-10-CM

## 2022-01-27 DIAGNOSIS — M54.50 CHRONIC BILATERAL LOW BACK PAIN WITHOUT SCIATICA: ICD-10-CM

## 2022-01-27 PROCEDURE — 99282 EMERGENCY DEPT VISIT SF MDM: CPT

## 2022-01-27 PROCEDURE — 6360000002 HC RX W HCPCS: Performed by: EMERGENCY MEDICINE

## 2022-01-27 PROCEDURE — 96372 THER/PROPH/DIAG INJ SC/IM: CPT

## 2022-01-27 RX ORDER — KETOROLAC TROMETHAMINE 15 MG/ML
30 INJECTION, SOLUTION INTRAMUSCULAR; INTRAVENOUS ONCE
Status: COMPLETED | OUTPATIENT
Start: 2022-01-27 | End: 2022-01-27

## 2022-01-27 RX ORDER — ORPHENADRINE CITRATE 100 MG/1
100 TABLET, EXTENDED RELEASE ORAL 2 TIMES DAILY
Qty: 20 TABLET | Refills: 0 | Status: SHIPPED | OUTPATIENT
Start: 2022-01-27 | End: 2022-02-06

## 2022-01-27 RX ORDER — ORPHENADRINE CITRATE 30 MG/ML
60 INJECTION INTRAMUSCULAR; INTRAVENOUS ONCE
Status: COMPLETED | OUTPATIENT
Start: 2022-01-27 | End: 2022-01-27

## 2022-01-27 RX ADMIN — ORPHENADRINE CITRATE 60 MG: 30 INJECTION INTRAMUSCULAR; INTRAVENOUS at 03:08

## 2022-01-27 RX ADMIN — KETOROLAC TROMETHAMINE 30 MG: 15 INJECTION, SOLUTION INTRAMUSCULAR; INTRAVENOUS at 03:08

## 2022-01-27 ASSESSMENT — ENCOUNTER SYMPTOMS: BACK PAIN: 1

## 2022-01-27 ASSESSMENT — PAIN SCALES - GENERAL
PAINLEVEL_OUTOF10: 10
PAINLEVEL_OUTOF10: 10

## 2022-01-27 NOTE — ED TRIAGE NOTES
Pt states he has chronic lower back pain and was seen yesterday for same. Pt is taking flexeril and tylenol but unable to sleep due to pain. Pt states he needs a stronger pain medication for sleep. Pt is a/o x 4 calm, skin p/w/d resp even and non-labored. amb in with slow, limping gait. No acute distress noted.

## 2022-01-27 NOTE — ED PROVIDER NOTES
[address] [facility] ED  eMERGENCY dEPARTMENT eNCOUnter      Pt Name: Jamie Marie  MRN: [id number]  [place] 1955  Date of evaluation: 1/26/2022  Provider: LYNETTE La        HISTORY OF PRESENT ILLNESS    Jamie Marie is a 69 y.o. male per chart review has ah/o low back pain, tobacco use, hypertension, GERD, alcohol abuse, radiculopathy, ARNAUD, anxiety, hyperlipidemia. Patient presents to the ED today with continued exacerbation of his low back pain. Patient was seen in this ED last night for same complaint. He was given IM Dilaudid and IM Zofran, patient states this resolved his pain for several hours. He was also given a prescription for Medrol Dosepak and Flexeril. He states he tried [employer], without relief. He has not started his Medrol Dosepak. Patient does follow with pain management. States he contacted them this morning and the earliest appointment he can get is 2 weeks from now. Patient does state that there are no new symptoms with his pain tonight. He reports intermittent paresthesias to the lower extremities, which he has had chronically, he is on gabapentin, states he does not take this as prescribed. No new numbness, weakness, saddle anesthesia, bowel/bladder incontinence. No fever, chills. Patient does describe his pain as \"more of a nagging. \"  He states it is worse when he lies down to sleep which makes it difficult for him to sleep. REVIEW OF SYSTEMS       Review of Systems   Constitutional: Negative for chills and fever. HENT: Negative for congestion. Eyes: Negative for photophobia. Respiratory: Negative for cough and shortness of breath. Cardiovascular: Negative for chest pain. Gastrointestinal: Negative for abdominal pain, diarrhea, nausea and vomiting. Genitourinary: Negative for difficulty urinating. Musculoskeletal: Positive for back pain. Negative for gait problem, myalgias, neck pain and neck stiffness.    Neurological: Negative for weakness and headaches. Psychiatric/Behavioral: Negative for confusion. Except as noted above the remainder of the review of systems was reviewed and negative. PAST MEDICAL HISTORY     Past Medical History:   Diagnosis Date    Allergic rhinitis 2/19/2018    Anxiety     Chronic back pain     COPD (chronic obstructive pulmonary disease) ([address]     Depression     Disorder of stomach     valve not closing properly    Emphysema lung ([address]     Essential hypertension 11/4/2019    Gastroesophageal reflux disease 10/4/2019    Hyperlipidemia     meds > 22 yrs    Low back pain 5/1/2018    ARNAUD (obstructive sleep apnea) 2/19/2018    Other emphysema ([address] 10/4/2019    Other intervertebral disc degeneration, lumbar region 3/23/2018    Radiculopathy, lumbar region 2/28/2018    Restless leg syndrome     Seasonal affective disorder ([address] 10/4/2019    Substance abuse ([facility]     alcholic, quit 20 yrs          SURGICAL HISTORY       Past Surgical History:   Procedure Laterality Date    COLONOSCOPY  12/22/2016    A SHALA MARLEY    DENTAL SURGERY  10 yrs ago    ENDOSCOPY, COLON, DIAGNOSTIC      EYE SURGERY      Lasik OU    KNEE ARTHROSCOPY Right     KNEE SURGERY Right 05/2016    Ray procedure    KNEE SURGERY      [place] NASAL SCOPY,OPEN MAXILL SINUS N/A 2/22/2018    SEPTOPLASTY MICRODEBRIDER ASSISTED TURBINOPLASTY AND OUT-FRACTURING BILATERAL NASAL ENDOSCOPY performed by Latha Suarez MD at [facility]       Previous Medications    ACETAMINOPHEN (TYLENOL) 500 MG TABLET    Take 500 mg by mouth every 6 hours as needed for Pain    CYCLOBENZAPRINE (FLEXERIL) 10 MG TABLET    Take 1 tablet by mouth 3 times daily as needed for Muscle spasms    FLUOXETINE (PROZAC) 40 MG CAPSULE    TAKE 1 CAPSULE BY MOUTH DAILY. GABAPENTIN (NEURONTIN) 300 MG CAPSULE    TAKE 1 CAPSULE BY MOUTH TWICE DAILY FOR 60 DAYS.     MELOXICAM (MOBIC) 15 MG TABLET    take 1 tablet by mouth once daily METHYLPREDNISOLONE (MEDROL, ISABELLA,) 4 MG TABLET    Take by mouth. ROSUVASTATIN (CRESTOR) 40 MG TABLET    TAKE 1 TABLET BY MOUTH DAILY AT BEDTIME. VITAMIN D3 (CHOLECALCIFEROL) 10 MCG (400 UNIT) TABS TABLET    Take 400 Units by mouth daily        ALLERGIES     Alcohol, Benadryl [diphenhydramine], Demerol hcl [meperidine], and Sulfa antibiotics    FAMILY HISTORY       Family History   Problem Relation Age of Onset    Cancer Mother         stomach    Arthritis Mother     Heart Disease Father 48    Cancer Father         bone    Cancer Maternal Grandmother         lung    Cancer Paternal Grandmother     Heart Disease Paternal Grandfather     No Known Problems Sister     Cancer Brother     Heart Disease Brother         hole in heart in 65 at 1 months age          SOCIAL HISTORY       Social History     Socioeconomic History    Marital status:      Spouse name: Not on file    Number of children: 2    Years of education: 15    Highest education level: High school graduate   Occupational History    Not on file   Tobacco Use    Smoking status: Current Every Day Smoker     Packs/day: 2.00     Years: 8.00     Pack years: 16.00     Types: Cigars    Smokeless tobacco: Never Used    Tobacco comment: 3-5 cigars qd   Vaping Use    Vaping Use: Never used   Substance and Sexual Activity    Alcohol use: No     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    Drug use: No    Sexual activity: Not Currently     Partners: Female   Other Topics Concern    Not on file   Social History Narrative    Not on file     Social Determinants of Health     Financial Resource Strain: Low Risk     Difficulty of Paying Living Expenses: Not hard at all   Food Insecurity: No Food Insecurity    Worried About Running Out of Food in the Last Year: Never true    Ivana of Food in the Last Year: Never true   Transportation Needs:     Lack of Transportation (Medical): Not on file    Lack of Transportation (Non-Medical):  Not on file Physical Activity:     Days of Exercise per Week: Not on file    Minutes of Exercise per Session: Not on file   Stress:     Feeling of Stress : Not on file   Social Connections:     Frequency of Communication with Friends and Family: Not on file    Frequency of Social Gatherings with Friends and Family: Not on file    Attends Mandaeism Services: Not on file    Active Member of [address] [place] [address] [employer] or Organizations: Not on file    Attends Club or Organization Meetings: Not on file    Marital Status: Not on file   Intimate Partner Violence:     Fear of Current or Ex-Partner: Not on file    Emotionally Abused: Not on file    Physically Abused: Not on file    Sexually Abused: Not on file   Housing Stability:     Unable to Pay for Housing in the Last Year: Not on file    Number of [place] in the Last Year: Not on file    Unstable Housing in the Last Year: Not on file         PHYSICAL EXAM        ED Triage Vitals [01/26/22 2202]   BP Temp Temp src Pulse Resp SpO2 Height Weight   118/82 97 °F (36.1 °C) -- 76 18 96 % 5' 9\" (1.753 m) 145 lb (65.8 kg)       Physical Exam  Constitutional:       General: He is not in acute distress. Appearance: Normal appearance. HENT:      Head: Normocephalic and atraumatic. Right Ear: External ear normal.      Left Ear: External ear normal.      Nose: Nose normal.      Mouth/Throat:      Mouth: Mucous membranes are moist.      Pharynx: Oropharynx is clear. Eyes:      Extraocular Movements: Extraocular movements intact. Cardiovascular:      Rate and Rhythm: Normal rate and regular rhythm. Pulses: Normal pulses. Pulmonary:      Effort: Pulmonary effort is normal. No respiratory distress. Breath sounds: Normal breath sounds. Abdominal:      General: Bowel sounds are normal.      Palpations: Abdomen is soft. Tenderness: There is no abdominal tenderness. Musculoskeletal:         General: No swelling or deformity. Normal range of motion.       Cervical back: Normal range of motion. Skin:     General: Skin is warm. Findings: No lesion or rash. Neurological:      Mental Status: He is alert and oriented to person, place, and time. Sensory: No sensory deficit. Motor: No weakness. Gait: Gait normal.      Comments: Intact sensation and strength to bilateral lower extremities. Psychiatric:         Mood and Affect: Mood normal.         Behavior: Behavior normal.           LABS:  Labs Reviewed - No data to display      MDM:   Vitals:    Vitals:    01/26/22 2202   BP: 118/82   Pulse: 76   Resp: 18   Temp: 97 °F (36.1 °C)   SpO2: 96%   Weight: 145 lb (65.8 kg)   Height: 5' 9\" (1.753 m)       38-year-old male patient presents to the ED for acute on chronic back pain exacerbation. Of which he was seen for the same last night and given new Medrol Dosepak and Flexeril prescriptions. Patient reports little relief with Flexeril, has not tried Medrol Dosepak. He is afebrile, hemodynamically stable. He is denying any new symptoms from his chronic back pain. He is denying numbness, weakness, cauda equina symptoms. He is able to ambulate without difficulty. There is no loss of strength or sensation to the lower extremities bilaterally, pulses are intact. Patient does follow with pain management. He is already on gabapentin and meloxicam with them. He has an appointment for 2 weeks from now. Patient received IM Dilaudid last night, states this helped his pain, patient did drive here today so this is not an option. Also discussed with patient need long-term control rather than continued visits to the ED and patient does agree. Patient states he is primarily here as it was difficult for him to sleep, does describe his pain is more of a \"nagging. \"  Again a long discussion was had with patient regarding the need for better long-term control of symptoms, primarily through his pain management provider. Again patient is agreeable to this discussion.   As he is not started his Medrol Dosepak, will provide a steroid injection tonight. Discussed we will not provide any narcotic medication. Patient remains agreeable to this plan. All questions answered. CRITICAL CARE TIME   Total CriticalCare time was 0 minutes, excluding separately reportable procedures. There was a high probability of clinically significant/life threatening deterioration in the patient's condition which required my urgent intervention. PROCEDURES:  Unlessotherwise noted below, none      Procedures      FINAL IMPRESSION      1.  Acute exacerbation of chronic low back pain          DISPOSITION/PLAN   DISPOSITION Discharge - Pending Orders Complete 01/26/2022 10:56:57 PM          LYNETTE Dunbar (electronically signed)  Attending Emergency Physician          Tripp Dunbarma  01/26/22 1045

## 2022-01-27 NOTE — ED NOTES
Patient medicated as ordered for pain  Patient acknowledged D/C instruction and noted stretch exercises   Patient encouraged to take prescribed Norflex and Motrin as needed for pain as directed by provider, patient expressed understanding     Mikayla Holbrook RN  01/27/22 2185

## 2022-01-27 NOTE — ED PROVIDER NOTES
[address] [facility] ED  EMERGENCY MEDICINE     Pt Name: Ruth Ng  MRN: [id number]  [place] 1955  Date of evaluation: 1/27/2022  PCP:    MANUELA Howard - CNP  Provider: Daria Huitron, [facility]       Chief Complaint   Patient presents with    Back Pain       HISTORY OF PRESENT ILLNESS    HPI     43-year-old male presents to the emergency department for the third time in less than 24 hours for concern of back pain. States that he supposed to see pain management in the next couple of weeks and states he cannot wait any longer as his pain is getting worse. He denies any bowel or bladder incontinence. Denies any saddle anesthesia. Patient was able to ambulate to the room with no difficulty. Denies any weakness in his legs. States that he has been trying all the medications he has including Mobic, Tylenol, and muscle relaxers which has not been helping. He received Dilaudid yesterday night by her provider, [facility] earlier this evening and has tried his home medications. He is also wearing a Lidoderm patch. Triage notes and Nursing notes were reviewed by myself. Any discrepancies are addressed above.     PAST MEDICAL HISTORY     Past Medical History:   Diagnosis Date    Allergic rhinitis 2/19/2018    Anxiety     Chronic back pain     COPD (chronic obstructive pulmonary disease) ([address]     Depression     Disorder of stomach     valve not closing properly    Emphysema lung ([address]     Essential hypertension 11/4/2019    Gastroesophageal reflux disease 10/4/2019    Hyperlipidemia     meds > 22 yrs    Low back pain 5/1/2018    ARNAUD (obstructive sleep apnea) 2/19/2018    Other emphysema ([address] 10/4/2019    Other intervertebral disc degeneration, lumbar region 3/23/2018    Radiculopathy, lumbar region 2/28/2018    Restless leg syndrome     Seasonal affective disorder ([address] 10/4/2019    Substance abuse ([address]     alcholic, quit 20 yrs        SURGICAL HISTORY Past Surgical History:   Procedure Laterality Date    COLONOSCOPY  12/22/2016    DALIA LAST MD    DENTAL SURGERY  10 yrs ago    ENDOSCOPY, COLON, DIAGNOSTIC      EYE SURGERY      Lasik OU    KNEE ARTHROSCOPY Right     KNEE SURGERY Right 05/2016    Ray procedure    KNEE SURGERY      SD NASAL SCOPY,OPEN MAXILL SINUS N/A 2/22/2018    SEPTOPLASTY MICRODEBRIDER ASSISTED TURBINOPLASTY AND OUT-FRACTURING BILATERAL NASAL ENDOSCOPY performed by Leila Valdez MD at [address]       Discharge Medication List as of 1/27/2022  2:58 AM      CONTINUE these medications which have NOT CHANGED    Details   cyclobenzaprine (FLEXERIL) 10 MG tablet Take 1 tablet by mouth 3 times daily as needed for Muscle spasms, Disp-21 tablet, R-0Print      methylPREDNISolone (MEDROL, ISABELLA,) 4 MG tablet Take by mouth., Disp-21 tablet, R-0Print      gabapentin (NEURONTIN) 300 MG capsule TAKE 1 CAPSULE BY MOUTH TWICE DAILY FOR 60 DAYS., Disp-60 capsule, R-2Normal      meloxicam (MOBIC) 15 MG tablet take 1 tablet by mouth once daily, Disp-30 tablet, R-1Normal      FLUoxetine (PROZAC) 40 MG capsule TAKE 1 CAPSULE BY MOUTH DAILY. , Disp-90 capsule, R-1Normal      vitamin D3 (CHOLECALCIFEROL) 10 MCG (400 UNIT) TABS tablet Take 400 Units by mouth daily Historical Med      acetaminophen (TYLENOL) 500 MG tablet Take 500 mg by mouth every 6 hours as needed for PainHistorical Med      rosuvastatin (CRESTOR) 40 MG tablet TAKE 1 TABLET BY MOUTH DAILY AT BEDTIME. Historical Med             ALLERGIES       Allergies   Allergen Reactions    Alcohol Other (See Comments)     Sober 22 yrs    Benadryl [Diphenhydramine]      \"speeds him up\"    Demerol Hcl [Meperidine] Other (See Comments)     Aggressive behavior    Sulfa Antibiotics Other (See Comments)     Told by mother / patient unaware of reaction       FAMILY HISTORY       Family History   Problem Relation Age of Onset    Cancer Mother         stomach    Arthritis Mother    Chris Heart Disease Father 48    Cancer Father         bone    Cancer Maternal Grandmother         lung    Cancer Paternal Grandmother     Heart Disease Paternal Grandfather     No Known Problems Sister     Cancer Brother     Heart Disease Brother         hole in heart in 65 at 1 months age        SOCIAL HISTORY       Social History     Socioeconomic History    Marital status:      Spouse name: Not on file    Number of children: 2    Years of education: 15    Highest education level: High school graduate   Occupational History    Not on file   Tobacco Use    Smoking status: Current Every Day Smoker     Packs/day: 2.00     Years: 8.00     Pack years: 16.00     Types: Cigars    Smokeless tobacco: Never Used    Tobacco comment: 3-5 cigars qd   Vaping Use    Vaping Use: Never used   Substance and Sexual Activity    Alcohol use: No     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    Drug use: No    Sexual activity: Not Currently     Partners: Female   Other Topics Concern    Not on file   Social History Narrative    Not on file     Social Determinants of Health     Financial Resource Strain: Low Risk     Difficulty of Paying Living Expenses: Not hard at all   Food Insecurity: No Food Insecurity    Worried About Running Out of Food in the Last Year: Never true    Ivana of Food in the Last Year: Never true   Transportation Needs:     Lack of Transportation (Medical): Not on file    Lack of Transportation (Non-Medical):  Not on file   Physical Activity:     Days of Exercise per Week: Not on file    Minutes of Exercise per Session: Not on file   Stress:     Feeling of Stress : Not on file   Social Connections:     Frequency of Communication with Friends and Family: Not on file    Frequency of Social Gatherings with Friends and Family: Not on file    Attends Denominational Services: Not on file    Active Member of Clubs or Organizations: Not on file    Attends Club or Organization Meetings: Not on file   Luz Elena Marital Status: Not on file   Intimate Partner Violence:     Fear of Current or Ex-Partner: Not on file    Emotionally Abused: Not on file    Physically Abused: Not on file    Sexually Abused: Not on file   Housing Stability:     Unable to Pay for Housing in the Last Year: Not on file    Number of [place] in the Last Year: Not on file    Unstable Housing in the Last Year: Not on file       REVIEW OF SYSTEMS     Review of Systems   Musculoskeletal: Positive for back pain. Except as noted above the remainder of the review of systems was reviewed and is negative. SCREENINGS                        PHYSICAL EXAM    (up to 7 for level 4, 8 or more for level 5)     ED Triage Vitals   BP Temp Temp src Pulse Resp SpO2 Height Weight   01/27/22 0234 01/27/22 0255 -- 01/27/22 0232 01/27/22 0232 01/27/22 0232 01/27/22 0232 01/27/22 0232   105/65 98 °F (36.7 °C)  64 18 100 % 5' 9\" (1.753 m) 145 lb (65.8 kg)       Physical Exam  Constitutional:       General: He is not in acute distress. Appearance: Normal appearance. He is normal weight. HENT:      Head: Normocephalic and atraumatic. Right Ear: External ear normal.      Left Ear: External ear normal.      Nose: Nose normal. No congestion or rhinorrhea. Mouth/Throat:      Mouth: Mucous membranes are moist.   Eyes:      General:         Right eye: No discharge. Left eye: No discharge. Conjunctiva/sclera: Conjunctivae normal.   Cardiovascular:      Rate and Rhythm: Normal rate and regular rhythm. Pulses: Normal pulses. Pulmonary:      Effort: Pulmonary effort is normal.   Abdominal:      General: Abdomen is flat. There is no distension. Musculoskeletal:         General: No swelling. Normal range of motion. Cervical back: Normal range of motion. Right lower leg: No edema. Left lower leg: No edema. Comments: Lumbar back pain paraspinal   Skin:     General: Skin is warm and dry.       Capillary Refill: Capillary refill takes less than 2 seconds. Neurological:      General: No focal deficit present. Mental Status: He is alert. Psychiatric:         Mood and Affect: Mood normal.           DIAGNOSTIC RESULTS     EKG:(none if blank)  All EKGs are interpreted by the Emergency Department Physician who either signs or Co-signs this chart in the absence of a cardiologist.        RADIOLOGY: (none if blank)   I directly visualized the following images and reviewed the radiologist interpretations. Interpretation per the Radiologist below, if available at the time of this note:  No orders to display       LABS:  Labs Reviewed - No data to display    All other labs were within normal range or not returned as of this dictation. Please note, any cultures that may have been sent were not resulted at the time of this patient visit. EMERGENCY DEPARTMENT COURSE and Medical Decision Making:     Vitals:    Vitals:    01/27/22 0232 01/27/22 0234 01/27/22 0255   BP:  105/65    Pulse: 64     Resp: 18     Temp:   98 °F (36.7 °C)   SpO2: 100%     Weight: 145 lb (65.8 kg)     Height: 5' 9\" (1.753 m)         PROCEDURES: (None if blank)  Procedures       MDM       Patient ambulating appropriately. Spoke to him about using the emergency room for emergencies only. Third time within 24 hours. Patient has been asking for Dilaudid or morphine shot. He did drive here. Will receive Norflex and Toradol for me as he does have an appointment with pain management coming up within the next couple of weeks. Strict return precautions and follow up instructions were discussed with the patient with which the patient agrees    ED Medications administered this visit:    Medications   ketorolac (TORADOL) injection 30 mg (30 mg IntraMUSCular Given 1/27/22 0308)   orphenadrine (NORFLEX) injection 60 mg (60 mg IntraMUSCular Given 1/27/22 0308)         FINAL IMPRESSION      1. Strain of lumbar region, initial encounter    2.  Chronic bilateral low back pain without sciatica          DISPOSITION/PLAN   DISPOSITION Decision To Discharge 01/27/2022 02:56:18 AM      PATIENT REFERRED TO:  MANUELA Moreno CNP  [address]  [place]   [phone]            DISCHARGE MEDICATIONS:  Discharge Medication List as of 1/27/2022  2:58 AM      START taking these medications    Details   orphenadrine (NORFLEX) 100 MG extended release tablet Take 1 tablet by mouth 2 times daily for 10 days, Disp-20 tablet, R-0Print                    Nick Chan DO (electronically signed)  Attending Physician, Emergency Department         Nick Chan, [facility]  01/27/22 4848

## 2022-01-27 NOTE — ED TRIAGE NOTES
Patient present to the ED from home with a chief complaint of back pain. Patient report this is his third visit to ED for pain relief. Patient denies any follow up treatment. Patient is A/O X4. Patient expressed no other complaints.

## 2022-01-27 NOTE — CARE COORDINATION
I called Kaley Drew for an ER follow up   He has been to the ER 3 times within 24 hours for back pain. I spoke with him at length about using the ER and when to use the ER  I have asked him to use the walk in clinic for chronic issues if he is unable to get an appointment with his provider  I have asked him to go to the ER with any changes with numbness tingling gait changes or issues with bladder or bowel  He states the office was not open when he went to the ER and the pain was affecting his sleep  He is still complaining of spasms   He does have an appointment tomorrow with pain management  He has received flexeril and norflex prescriptions in the last two days. I have asked him to take the newest medication ordered (norflex) and to stop taking the flexeril   He verbalizes understanding  I will update Lucia Araujo on his back issues and frequent ER usage.

## 2022-01-28 ENCOUNTER — CARE COORDINATION (OUTPATIENT)
Dept: CARE COORDINATION | Age: 67
End: 2022-01-28

## 2022-01-28 ENCOUNTER — OFFICE VISIT (OUTPATIENT)
Dept: PAIN MANAGEMENT | Age: 67
End: 2022-01-28
Payer: MEDICARE

## 2022-01-28 ENCOUNTER — TELEPHONE (OUTPATIENT)
Dept: PAIN MANAGEMENT | Age: 67
End: 2022-01-28

## 2022-01-28 VITALS
TEMPERATURE: 97.7 F | DIASTOLIC BLOOD PRESSURE: 64 MMHG | SYSTOLIC BLOOD PRESSURE: 126 MMHG | HEIGHT: 69 IN | BODY MASS INDEX: 21.48 KG/M2 | WEIGHT: 145 LBS

## 2022-01-28 DIAGNOSIS — R20.2 PARESTHESIA OF BOTH FEET: Primary | ICD-10-CM

## 2022-01-28 DIAGNOSIS — M47.817 LUMBOSACRAL SPONDYLOSIS WITHOUT MYELOPATHY: ICD-10-CM

## 2022-01-28 PROCEDURE — 99213 OFFICE O/P EST LOW 20 MIN: CPT | Performed by: NURSE PRACTITIONER

## 2022-01-28 ASSESSMENT — ENCOUNTER SYMPTOMS
BACK PAIN: 1
DIARRHEA: 0
CONSTIPATION: 0
RESPIRATORY NEGATIVE: 1

## 2022-01-28 NOTE — CARE COORDINATION
Follow up call regarding ER visit was completed yesterday  He will follow up with pain management today regarding his back spasms and back pain.   He did speak with me about all of his bills from the ER and he is not sure what to do  He states that many of the bills are over $100.00 which he cannot afford  I have asked him to reach out to the HELP department at the hospital  I again spoke with him about using the Walk In Clinic to avoid high cost of ER visits

## 2022-01-28 NOTE — TELEPHONE ENCOUNTER
ORDER PLACED:    Date: 1/28/22  Description: BILAT L3,4,5 RFA  Order Number: [id number]  Ordering Provider: Simona Marinelli  Performing Provider: [facility]  CPT Codes: 04712,09658  ICD10 Codes: U56.369
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
## 2022-01-31 ENCOUNTER — OFFICE VISIT (OUTPATIENT)
Dept: PAIN MANAGEMENT | Age: 67
End: 2022-01-31
Payer: MEDICARE

## 2022-01-31 ENCOUNTER — CARE COORDINATION (OUTPATIENT)
Dept: CARE COORDINATION | Age: 67
End: 2022-01-31

## 2022-01-31 VITALS — WEIGHT: 145 LBS | HEIGHT: 69 IN | TEMPERATURE: 97.6 F | BODY MASS INDEX: 21.48 KG/M2

## 2022-01-31 DIAGNOSIS — R20.2 PARESTHESIA OF BOTH FEET: ICD-10-CM

## 2022-01-31 PROCEDURE — 95911 NRV CNDJ TEST 9-10 STUDIES: CPT | Performed by: PHYSICAL MEDICINE & REHABILITATION

## 2022-01-31 PROCEDURE — 95886 MUSC TEST DONE W/N TEST COMP: CPT | Performed by: PHYSICAL MEDICINE & REHABILITATION

## 2022-01-31 NOTE — CARE COORDINATION
Returning patient's voicemail message. He indicated that he has questions about his [employer].  Recommended that he contact [email or web address]. Patient is filling out HEAP Application.

## 2022-02-01 ENCOUNTER — TELEPHONE (OUTPATIENT)
Dept: FAMILY MEDICINE CLINIC | Age: 67
End: 2022-02-01

## 2022-02-01 DIAGNOSIS — Z87.891 PERSONAL HISTORY OF TOBACCO USE: Primary | ICD-10-CM

## 2022-02-01 NOTE — PROGRESS NOTES
Electromyography (EMG)/Nerve conduction studies (NCS) Report: Lower Extremity    Name: Aby Golden   YOB: 1955  Date of Service: 1/31/2022   Provider: Dena Gay MD        INDICATIONS:  Aby Golden is a 69 y.o. male who presents for electrodiagnostic evaluation for bilateral foot paresthesias by request of John Cary CNP working with Dr. Carmelo Contreras. He denies any history of diabetes mellitus, thyroid disease, or chemotherapy. Both limbs are necessary to examine in order to evaluate for any evidence of systemic disease as well as establish normal baseline values from which to compare any abnormal unilateral findings. The study is explained and verbal consent to proceed is obtained. NERVE CONDUCTION STUDIES:    Sensory nerve conduction studies: Bilateral sural and superficial peroneal sensory nerve conduction studies demonstrate normal distal latencies and amplitudes. Waveforms are limited in all studies. Bilateral lower limb temperatures are normal.     Motor nerve conduction studies: Bilateral peroneal motor nerve conduction studies with pickup over the extensor digitorum brevis demonstrate normal distal latencies and amplitudes. Bilateral tibial motor nerve conduction studies with pickup over the abductor hallucis demonstrate normal distal latencies and amplitudes. H reflex: Bilateral H reflexes are symmetric and not prolonged, waveforms are limited bilaterally. ELECTROMYOGRAPHY: This study is limited due to patient tolerance. A disposable monopolar needle is used to evaluate bilateral vastus medialis, tibialis anterior, extensor hallucis longus, flexor digitorum longus, peroneus longus, medial gastrocnemius, and lateral gastrocnemius. Brief sampling of all of the muscles is free of any clear abnormal spontaneous activity. Motor unit recruitment is limited due to patient tolerance.   Bilateral low lumbar paraspinal muscle sampling is free of any clear abnormal spontaneous activity. SUMMARY:  This study is limited, but normal. Although limited, there is no clear electrodiagnostic evidence for an active bilateral lumbosacral motor radiculopathy or generalized large fiber sensorimotor peripheral polyneuropathy. RECOMMENDATIONS: Today's study does not explain the patient's bilateral foot paresthesias. The patient should follow up with Gaby Woods CNP and/or Dr. Royal Sandra as previously instructed. If his symptoms persist or worsen, further electrodiagnostic evaluation may be considered if the patient is agreeable. Clinical correlation is recommended.

## 2022-02-11 NOTE — TELEPHONE ENCOUNTER
BILAT L3,4,5 RFA    AUTH APPROVED FROM 2/11/22-8/10/22    OK to schedule procedure approved as above. Please note sides/levels approved and date range.    (If applicable, sides/levels approved may differ from those ordered)     [address]

## 2022-02-11 NOTE — TELEPHONE ENCOUNTER
AUTHORIZATION:    INSURANCE: [employer] [facility] Jung President #: [id number]    DATE RANGE: 2/11/22-8/10/22    TELEPHONE CALL ROUTED TO MA TO SCHEDULE.

## 2022-02-13 ENCOUNTER — HOSPITAL ENCOUNTER (EMERGENCY)
Age: 67
Discharge: HOME | End: 2022-02-13
Payer: MEDICARE

## 2022-02-13 VITALS
HEIGHT: 66 IN | BODY MASS INDEX: 28.45 KG/M2 | RESPIRATION RATE: 19 BRPM | DIASTOLIC BLOOD PRESSURE: 84 MMHG | SYSTOLIC BLOOD PRESSURE: 135 MMHG | OXYGEN SATURATION: 99 % | TEMPERATURE: 97 F | HEART RATE: 63 BPM | WEIGHT: 177 LBS

## 2022-02-13 VITALS
DIASTOLIC BLOOD PRESSURE: 78 MMHG | HEIGHT: 69 IN | BODY MASS INDEX: 20.73 KG/M2 | RESPIRATION RATE: 17 BRPM | SYSTOLIC BLOOD PRESSURE: 122 MMHG | HEART RATE: 99 BPM | WEIGHT: 140 LBS | OXYGEN SATURATION: 98 % | TEMPERATURE: 97.8 F

## 2022-02-13 DIAGNOSIS — G89.29 ACUTE EXACERBATION OF CHRONIC LOW BACK PAIN: Primary | ICD-10-CM

## 2022-02-13 DIAGNOSIS — M54.50 ACUTE EXACERBATION OF CHRONIC LOW BACK PAIN: Primary | ICD-10-CM

## 2022-02-13 DIAGNOSIS — M62.838 SPASM OF MUSCLE: ICD-10-CM

## 2022-02-13 PROCEDURE — 6360000002 HC RX W HCPCS

## 2022-02-13 PROCEDURE — 6360000002 HC RX W HCPCS: Performed by: PHYSICIAN ASSISTANT

## 2022-02-13 PROCEDURE — 99284 EMERGENCY DEPT VISIT MOD MDM: CPT

## 2022-02-13 PROCEDURE — 96372 THER/PROPH/DIAG INJ SC/IM: CPT

## 2022-02-13 RX ORDER — KETOROLAC TROMETHAMINE 30 MG/ML
30 INJECTION, SOLUTION INTRAMUSCULAR; INTRAVENOUS ONCE
Status: COMPLETED | OUTPATIENT
Start: 2022-02-13 | End: 2022-02-13

## 2022-02-13 RX ORDER — ORPHENADRINE CITRATE 30 MG/ML
60 INJECTION INTRAMUSCULAR; INTRAVENOUS ONCE
Status: COMPLETED | OUTPATIENT
Start: 2022-02-13 | End: 2022-02-13

## 2022-02-13 RX ORDER — LIDOCAINE 4 G/G
1 PATCH TOPICAL DAILY
Status: DISCONTINUED | OUTPATIENT
Start: 2022-02-13 | End: 2022-02-13 | Stop reason: HOSPADM

## 2022-02-13 RX ADMIN — KETOROLAC TROMETHAMINE 30 MG: 30 INJECTION, SOLUTION INTRAMUSCULAR at 00:44

## 2022-02-13 RX ADMIN — KETOROLAC TROMETHAMINE 30 MG: 30 INJECTION, SOLUTION INTRAMUSCULAR at 09:56

## 2022-02-13 RX ADMIN — ORPHENADRINE CITRATE 60 MG: 30 INJECTION INTRAMUSCULAR; INTRAVENOUS at 00:44

## 2022-02-13 RX ADMIN — ORPHENADRINE CITRATE 60 MG: 30 INJECTION INTRAMUSCULAR; INTRAVENOUS at 09:56

## 2022-02-13 ASSESSMENT — ENCOUNTER SYMPTOMS
VOMITING: 0
SHORTNESS OF BREATH: 0
VOMITING: 0
DIARRHEA: 0
NAUSEA: 0
ABDOMINAL PAIN: 0
COUGH: 0
BACK PAIN: 1
NAUSEA: 0
BACK PAIN: 1
SHORTNESS OF BREATH: 0
PHOTOPHOBIA: 0
ABDOMINAL PAIN: 0

## 2022-02-13 ASSESSMENT — PAIN DESCRIPTION - DESCRIPTORS
DESCRIPTORS: ACHING
DESCRIPTORS: ACHING

## 2022-02-13 ASSESSMENT — PAIN DESCRIPTION - ORIENTATION
ORIENTATION: LOWER

## 2022-02-13 ASSESSMENT — PAIN SCALES - GENERAL
PAINLEVEL_OUTOF10: 10
PAINLEVEL_OUTOF10: 6
PAINLEVEL_OUTOF10: 3
PAINLEVEL_OUTOF10: 10
PAINLEVEL_OUTOF10: 9

## 2022-02-13 ASSESSMENT — PAIN DESCRIPTION - LOCATION
LOCATION: BACK

## 2022-02-13 ASSESSMENT — PAIN DESCRIPTION - FREQUENCY: FREQUENCY: CONTINUOUS

## 2022-02-13 ASSESSMENT — PAIN DESCRIPTION - PAIN TYPE
TYPE: CHRONIC PAIN
TYPE: ACUTE PAIN
TYPE: CHRONIC PAIN

## 2022-02-13 ASSESSMENT — PAIN DESCRIPTION - PROGRESSION: CLINICAL_PROGRESSION: GRADUALLY IMPROVING

## 2022-02-13 NOTE — ED PROVIDER NOTES
[facility] ED  eMERGENCY dEPARTMENT eNCOUnter      Pt Name: Sendy Mills  MRN: [id number]  Abiodungfzion 1955  Date of evaluation: 2/13/2022  Provider: LYNETTE Rosa        HISTORY OF PRESENT ILLNESS    Sendy Mills is a 69 y.o. male per chart review has ah/o back pain, tobacco use, hyperlipidemia, ARNAUD, hypertension, GERD, emphysema, radiculopathy. Patient presents with acute on chronic back pain, he is seen by pain management, without any pain medications recommended. He also had EMG done for his radiculopathy symptoms that was normal, recommended for further testing. He states there are no new symptoms today. He has been seen several times in this ED for this complaint. He states he took 3 Tylenol prior to arrival without relief. He is ambulating without difficulty, denies saddle anesthesia, no bowel bladder incontinence. REVIEW OF SYSTEMS       Review of Systems   Constitutional: Negative for chills and fever. HENT: Negative for congestion. Eyes: Negative for photophobia. Respiratory: Negative for cough and shortness of breath. Cardiovascular: Negative for chest pain. Gastrointestinal: Negative for abdominal pain, diarrhea, nausea and vomiting. Genitourinary: Negative for difficulty urinating. Musculoskeletal: Positive for back pain. Negative for gait problem, myalgias, neck pain and neck stiffness. Neurological: Negative for headaches. Psychiatric/Behavioral: Negative for confusion. Except as noted above the remainder of the review of systems was reviewed and negative.        PAST MEDICAL HISTORY     Past Medical History:   Diagnosis Date    Allergic rhinitis 2/19/2018    Anxiety     Chronic back pain     COPD (chronic obstructive pulmonary disease) ([address]     Depression     Disorder of stomach     valve not closing properly    Emphysema lung ([address]     Essential hypertension 11/4/2019    Gastroesophageal reflux disease 10/4/2019    Hyperlipidemia     meds > 22 yrs    Low back pain 5/1/2018    ARNAUD (obstructive sleep apnea) 2/19/2018    Other emphysema ([facility] 10/4/2019    Other intervertebral disc degeneration, lumbar region 3/23/2018    Radiculopathy, lumbar region 2/28/2018    Restless leg syndrome     Seasonal affective disorder ([facility] 10/4/2019    Substance abuse ([facility]     alcholic, quit 20 yrs          SURGICAL HISTORY       Past Surgical History:   Procedure Laterality Date    COLONOSCOPY  12/22/2016    A SHALA MARLEY    DENTAL SURGERY  10 yrs ago    ENDOSCOPY, COLON, DIAGNOSTIC      EYE SURGERY      Lasik OU    KNEE ARTHROSCOPY Right     KNEE SURGERY Right 05/2016    Ray procedure    KNEE SURGERY      [place] NASAL SCOPY,OPEN MAXILL SINUS N/A 2/22/2018    SEPTOPLASTY MICRODEBRIDER ASSISTED TURBINOPLASTY AND OUT-FRACTURING BILATERAL NASAL ENDOSCOPY performed by Lo Mckeon MD at [facility]       Discharge Medication List as of 2/13/2022 12:47 AM      CONTINUE these medications which have NOT CHANGED    Details   gabapentin (NEURONTIN) 300 MG capsule TAKE 1 CAPSULE BY MOUTH TWICE DAILY FOR 60 DAYS., Disp-60 capsule, R-2Normal      meloxicam (MOBIC) 15 MG tablet take 1 tablet by mouth once daily, Disp-30 tablet, R-1Normal      FLUoxetine (PROZAC) 40 MG capsule TAKE 1 CAPSULE BY MOUTH DAILY. , Disp-90 capsule, R-1Normal      vitamin D3 (CHOLECALCIFEROL) 10 MCG (400 UNIT) TABS tablet Take 400 Units by mouth daily Historical Med      acetaminophen (TYLENOL) 500 MG tablet Take 500 mg by mouth every 6 hours as needed for PainHistorical Med      rosuvastatin (CRESTOR) 40 MG tablet TAKE 1 TABLET BY MOUTH DAILY AT BEDTIME. Historical Med             ALLERGIES     Alcohol, Benadryl [diphenhydramine], Demerol hcl [meperidine], and Sulfa antibiotics    FAMILY HISTORY       Family History   Problem Relation Age of Onset    Cancer Mother         stomach    Arthritis Mother     Heart Disease Father 48    Cancer Father         bone    Cancer Maternal Grandmother         lung    Cancer Paternal Grandmother     Heart Disease Paternal Grandfather     No Known Problems Sister     Cancer Brother     Heart Disease Brother         hole in heart in 65 at 1 months age          SOCIAL HISTORY       Social History     Socioeconomic History    Marital status:      Spouse name: Not on file    Number of children: 2    Years of education: 15    Highest education level: High school graduate   Occupational History    Not on file   Tobacco Use    Smoking status: Current Every Day Smoker     Packs/day: 2.00     Years: 8.00     Pack years: 16.00     Types: Cigars    Smokeless tobacco: Never Used    Tobacco comment: 3-5 cigars qd   Vaping Use    Vaping Use: Never used   Substance and Sexual Activity    Alcohol use: No     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    Drug use: No    Sexual activity: Not Currently     Partners: Female   Other Topics Concern    Not on file   Social History Narrative    Not on file     Social Determinants of Health     Financial Resource Strain: Low Risk     Difficulty of Paying Living Expenses: Not hard at all   Food Insecurity: No Food Insecurity    Worried About Running Out of Food in the Last Year: Never true    Ivana of Food in the Last Year: Never true   Transportation Needs:     Lack of Transportation (Medical): Not on file    Lack of Transportation (Non-Medical):  Not on file   Physical Activity:     Days of Exercise per Week: Not on file    Minutes of Exercise per Session: Not on file   Stress:     Feeling of Stress : Not on file   Social Connections:     Frequency of Communication with Friends and Family: Not on file    Frequency of Social Gatherings with Friends and Family: Not on file    Attends Orthodoxy Services: Not on file    Active Member of Clubs or Organizations: Not on file    Attends Club or Organization Meetings: Not on file    Marital Status: Not on file Intimate Partner Violence:     Fear of Current or Ex-Partner: Not on file    Emotionally Abused: Not on file    Physically Abused: Not on file    Sexually Abused: Not on file   Housing Stability:     Unable to Pay for Housing in the Last Year: Not on file    Number of [place] in the Last Year: Not on file    Unstable Housing in the Last Year: Not on file         PHYSICAL EXAM        ED Triage Vitals   BP Temp Temp Source Pulse Resp SpO2 Height Weight   02/13/22 0025 02/13/22 0025 02/13/22 0129 02/13/22 0025 02/13/22 0025 02/13/22 0025 02/13/22 0025 02/13/22 0025   120/71 97.8 °F (36.6 °C) Oral 111 18 97 % 5' 9\" (1.753 m) 140 lb (63.5 kg)       Physical Exam  Constitutional:       General: He is not in acute distress. Appearance: Normal appearance. HENT:      Head: Normocephalic and atraumatic. Right Ear: External ear normal.      Left Ear: External ear normal.      Nose: Nose normal.      Mouth/Throat:      Mouth: Mucous membranes are moist.      Pharynx: Oropharynx is clear. Eyes:      Extraocular Movements: Extraocular movements intact. Cardiovascular:      Rate and Rhythm: Normal rate and regular rhythm. Pulmonary:      Effort: Pulmonary effort is normal. No respiratory distress. Breath sounds: Normal breath sounds. Abdominal:      General: Bowel sounds are normal.      Palpations: Abdomen is soft. Tenderness: There is no abdominal tenderness. Musculoskeletal:         General: Normal range of motion. Cervical back: Normal range of motion. No rigidity. Skin:     General: Skin is warm. Neurological:      Mental Status: He is alert and oriented to person, place, and time. GCS: GCS eye subscore is 4. GCS verbal subscore is 5. GCS motor subscore is 6. Cranial Nerves: Cranial nerves are intact. Sensory: No sensory deficit. Motor: No weakness.       Coordination: Coordination normal.      Gait: Gait normal.   Psychiatric:         Mood and Affect: Mood normal.         Behavior: Behavior normal.           LABS:  Labs Reviewed - No data to display      MDM:   Vitals:    Vitals:    02/13/22 0025 02/13/22 0129   BP: 120/71 122/78   Pulse: 111 99   Resp: 18 17   Temp: 97.8 °F (36.6 °C) 97.8 °F (36.6 °C)   TempSrc:  Oral   SpO2: 97% 98%   Weight: 140 lb (63.5 kg)    Height: 5' 9\" (1.753 m)        64-year-old male patient presents for acute on chronic exacerbation of his back pain. No new symptoms today. No new neurologic complaints, ambulating without difficulty. Patient does follow with pain management. States he cannot sleep. States he plans to call pain management on Monday morning. He has received IM Toradol and IM norflex in the ED with relief, this is provided. He is stable for discharge. CRITICAL CARE TIME   Total CriticalCare time was 0 minutes, excluding separately reportable procedures. There was a high probability of clinically significant/life threatening deterioration in the patient's condition which required my urgent intervention. PROCEDURES:  Unlessotherwise noted below, none      Procedures      FINAL IMPRESSION      1.  Acute exacerbation of chronic low back pain          DISPOSITION/PLAN   DISPOSITION Decision To Discharge 02/13/2022 01:30:39 AM          LYNETTE Gonzalez (electronically signed)  Attending Emergency Physician          Margarita Gonzalez  02/13/22 [id number]

## 2022-02-13 NOTE — ED TRIAGE NOTES
Pt c/o chronic low back pain. Pt is taking tylenol for pain but unable to sleep. Pt is a/o x 4 calm, pt amb into triage with brisk, steady gait. No acute distress noted.

## 2022-02-13 NOTE — ED PROVIDER NOTES
[facility] [facility] ED  eMERGENCY dEPARTMENT eNCOUnter      Pt Name: Jamie Marie  MRN: [id number]  Abiodungfzion 1955  Date of evaluation: 2/13/2022  Provider: Xiomy Long PA-C        HISTORY OF PRESENT ILLNESS    Jamie Marie is a 69 y.o. male per chart review has ah/o hyperlipidemia, tobacco dependency, COPD, depression, HTN; presenting to the ED for acute on chronic LBP radiating down BLE that began years ago but worsened over the past few weeks. Pain with extension, flexion, lateral rotation from left to right. Taking tylenol, mobic, and flexeril. Pain feels like prior back pain flare ups. No new injury or trauma. No loss of bowel or bladder control, saddle anesthesia, BLE weakness, no foot drop, abdominal pain, cp, sob, fever, IVDA, no hx of malignancy, no unintentional weight loss. REVIEW OF SYSTEMS       Review of Systems   Constitutional: Negative for fever. Respiratory: Negative for shortness of breath. Cardiovascular: Negative for chest pain. Gastrointestinal: Negative for abdominal pain, nausea and vomiting. Musculoskeletal: Positive for back pain. Skin: Negative. Neurological: Negative for weakness and numbness. All other systems reviewed and are negative. Except as noted above the remainder of the review of systems was reviewed and negative.        PAST MEDICAL HISTORY     Past Medical History:   Diagnosis Date    Allergic rhinitis 2/19/2018    Anxiety     Chronic back pain     COPD (chronic obstructive pulmonary disease) ([address]     Depression     Disorder of stomach     valve not closing properly    Emphysema lung ([address]     Essential hypertension 11/4/2019    Gastroesophageal reflux disease 10/4/2019    Hyperlipidemia     meds > 22 yrs    Low back pain 5/1/2018    ARNAUD (obstructive sleep apnea) 2/19/2018    Other emphysema ([address] 10/4/2019    Other intervertebral disc degeneration, lumbar region 3/23/2018    Radiculopathy, lumbar region 2/28/2018  Restless leg syndrome     Seasonal affective disorder ([facility] 10/4/2019    Sub[facility]     alcholic, quit 20 yrs          SURGICAL HISTORY       Past Surgical History:   Procedure Laterality Date    COLONOSCOPY  12/22/2016    A SHALA MARLEY    DENTAL SURGERY  10 yrs ago    ENDOSCOPY, COLON, DIAGNOSTIC      EYE SURGERY      Lasik OU    KNEE ARTHROSCOPY Right     KNEE SURGERY Right 05/2016    Ray procedure    KNEE SURGERY      [place] NASAL SCOPY,OPEN MAXILL SINUS N/A 2/22/2018    SEPTOPLASTY MICRODEBRIDER ASSISTED TURBINOPLASTY AND OUT-FRACTURING BILATERAL NASAL ENDOSCOPY performed by Heidi Spear MD at [address]       Previous Medications    ACETAMINOPHEN (TYLENOL) 500 MG TABLET    Take 500 mg by mouth every 6 hours as needed for Pain    FLUOXETINE (PROZAC) 40 MG CAPSULE    TAKE 1 CAPSULE BY MOUTH DAILY. GABAPENTIN (NEURONTIN) 300 MG CAPSULE    TAKE 1 CAPSULE BY MOUTH TWICE DAILY FOR 60 DAYS. MELOXICAM (MOBIC) 15 MG TABLET    take 1 tablet by mouth once daily    ROSUVASTATIN (CRESTOR) 40 MG TABLET    TAKE 1 TABLET BY MOUTH DAILY AT BEDTIME.     VITAMIN D3 (CHOLECALCIFEROL) 10 MCG (400 UNIT) TABS TABLET    Take 400 Units by mouth daily        ALLERGIES     Alcohol, Benadryl [diphenhydramine], Demerol hcl [meperidine], and Sulfa antibiotics    FAMILY HISTORY       Family History   Problem Relation Age of Onset    Cancer Mother         stomach    Arthritis Mother     Heart Disease Father 48    Cancer Father         bone    Cancer Maternal Grandmother         lung    Cancer Paternal Grandmother     Heart Disease Paternal Grandfather     No Known Problems Sister     Cancer Brother     Heart Disease Brother         hole in heart in 65 at 1 months age          SOCIAL HISTORY       Social History     Socioeconomic History    Marital status:      Spouse name: None    Number of children: 2    Years of education: 12    Highest education level: High school graduate   Occupational History    None   Tobacco Use    Smoking status: Current Every Day Smoker     Packs/day: 2.00     Years: 8.00     Pack years: 16.00     Types: Cigars    Smokeless tobacco: Never Used    Tobacco comment: 3-5 cigars qd   Vaping Use    Vaping Use: Never used   Substance and Sexual Activity    Alcohol use: No     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    Drug use: No    Sexual activity: Not Currently     Partners: Female   Other Topics Concern    None   Social History Narrative    None     Social Determinants of Health     Financial Resource Strain: Low Risk     Difficulty of Paying Living Expenses: Not hard at all   Food Insecurity: No Food Insecurity    Worried About Running Out of Food in the Last Year: Never true    Ivana of Food in the Last Year: Never true   Transportation Needs:     Lack of Transportation (Medical): Not on file    Lack of Transportation (Non-Medical):  Not on file   Physical Activity:     Days of Exercise per Week: Not on file    Minutes of Exercise per Session: Not on file   Stress:     Feeling of Stress : Not on file   Social Connections:     Frequency of Communication with Friends and Family: Not on file    Frequency of Social Gatherings with Friends and Family: Not on file    Attends Yazidism Services: Not on file    Active Member of [address] [place] [address] [employer] or Organizations: Not on file    Attends Club or Organization Meetings: Not on file    Marital Status: Not on file   Intimate Partner Violence:     Fear of Current or Ex-Partner: Not on file    Emotionally Abused: Not on file    Physically Abused: Not on file    Sexually Abused: Not on file   Housing Stability:     Unable to Pay for Housing in the Last Year: Not on file    Number of [place] in the Last Year: Not on file    Unstable Housing in the Last Year: Not on file         PHYSICAL EXAM        ED Triage Vitals [02/13/22 0932]   BP Temp Temp src Pulse Resp SpO2 Height Weight   135/84 97 °F (36.1 °C) -- 63 19 99 % 5' 6\" (1.676 m) 177 lb (80.3 kg)       Physical Exam  Vitals and nursing note reviewed. Constitutional:       General: He is not in acute distress. Appearance: Normal appearance. He is well-developed. He is not ill-appearing, toxic-appearing or diaphoretic. HENT:      Head: Normocephalic and atraumatic. Right Ear: External ear normal.      Left Ear: External ear normal.      Nose: Nose normal.      Mouth/Throat:      Mouth: Mucous membranes are moist.      Pharynx: Oropharynx is clear. No oropharyngeal exudate. Eyes:      General: No scleral icterus. Right eye: No discharge. Left eye: No discharge. Conjunctiva/sclera: Conjunctivae normal.      Pupils: Pupils are equal, round, and reactive to light. Cardiovascular:      Rate and Rhythm: Normal rate and regular rhythm. Pulses: Normal pulses. Heart sounds: Normal heart sounds. No murmur heard. Pulmonary:      Effort: Pulmonary effort is normal. No respiratory distress. Breath sounds: Normal breath sounds. No wheezing or rales. Chest:      Chest wall: No tenderness. Abdominal:      General: Bowel sounds are normal. There is no distension. Palpations: Abdomen is soft. There is no mass. Tenderness: There is no abdominal tenderness. There is no guarding or rebound. Musculoskeletal:         General: Tenderness present. No swelling or deformity. Normal range of motion. Cervical back: Normal, normal range of motion and neck supple. Thoracic back: Normal.      Lumbar back: Spasms and tenderness (bilateral paraspinal muscle tenderness ) present. No lacerations or bony tenderness. Normal range of motion. Negative right straight leg raise test and negative left straight leg raise test.      Comments: No bilateral foot drop  5/5 bilateral lower extremity extension and flexion   Skin:     General: Skin is warm. Coloration: Skin is not pale. Findings: No erythema or rash. Neurological:      Mental Status: He is alert and oriented to person, place, and time. Cranial Nerves: No cranial nerve deficit. Sensory: No sensory deficit. Motor: No weakness. Coordination: Coordination normal.      Gait: Gait normal.      Deep Tendon Reflexes: Reflexes normal.   Psychiatric:         Mood and Affect: Mood normal.         Behavior: Behavior normal.         Thought Content: Thought content normal.         Judgment: Judgment normal.           LABS:  Labs Reviewed - No data to display      MDM:   Vitals:    Vitals:    02/13/22 0932   BP: 135/84   Pulse: 63   Resp: 19   Temp: 97 °F (36.1 °C)   SpO2: 99%   Weight: 177 lb (80.3 kg)   Height: 5' 6\" (1.676 m)         PROCEDURES:  Unlessotherwise noted below, none      Procedures      FINAL IMPRESSION      1. Acute exacerbation of chronic low back pain    2. Spasm of muscle          DISPOSITION/PLAN   DISPOSITION    Nursing notes, medical records and triage notes reviewed. Vital signs reviewed. This is 69 male per chart review has ah/o hyperlipidemia, tobacco dependency, COPD, depression, HTN; presenting to the ED for acute on chronic LBP radiating down BLE that began years ago. Follow up with pain mgmt for further imaging, PT, or percutaneous intervention. No no imaging in ED as patient endorses that the pain feels just like prior back pain flare ups and no new injury or trauma.      The patient and/or family  -had the results of all tests and diagnosis explained to them  -Given both verbal and written discharge instructions  -Were instructed of the importance of close follow-up  -Were told that close follow-up is essential for good health and good outcomes    Enedelia Pina PA-C (electronically signed)   Emergency Physician Assistant                Enedelia Pina PA-C  02/13/22 [facility]  02/13/22 [id number]

## 2022-02-14 ENCOUNTER — TELEPHONE (OUTPATIENT)
Dept: CASE MANAGEMENT | Age: 67
End: 2022-02-14

## 2022-02-14 NOTE — TELEPHONE ENCOUNTER
Physician documentation on smoking history and CT Lung Screening reviewed. All required documentation complete. Patient is a current every day smoker with a 104 pack year history ( 2 ppd x 52 years) per physician documentation.

## 2022-02-14 NOTE — TELEPHONE ENCOUNTER
This is Dr. Kathy Garcia patient. Can we schedule with SM or does it have to be authorized with him?

## 2022-02-15 NOTE — TELEPHONE ENCOUNTER
[facility] [address] DOES NOT ALLOW YOU TO CHANGE PROIVDERS ON THE CASE  WALESKA Mobley REQUEST FAXED TO [facility] WITH ATTACHED LETTER STATING THAT WE WILL NOT BE USING [address]

## 2022-02-16 ENCOUNTER — CARE COORDINATION (OUTPATIENT)
Dept: CARE COORDINATION | Age: 67
End: 2022-02-16

## 2022-02-16 DIAGNOSIS — M47.817 LUMBOSACRAL SPONDYLOSIS WITHOUT MYELOPATHY: Primary | ICD-10-CM

## 2022-02-16 RX ORDER — GABAPENTIN 400 MG/1
400 CAPSULE ORAL 3 TIMES DAILY
Qty: 90 CAPSULE | Refills: 2 | Status: SHIPPED | OUTPATIENT
Start: 2022-02-16 | End: 2022-07-02 | Stop reason: SDUPTHER

## 2022-02-16 NOTE — CARE COORDINATION
I spoke with Dominick Garcia about your concerns with the medications  He will have follow up with pain management tomorrow  He feels that increasing the gabapentin is probably the best thing in the meantime  Please advise on dosage for the gabapentin currently taking 300 mg one tablet bid
Patient informed of new script for gabapentin
We could increase the gabapentin dose. My only concern with the toradol tablets is they should piggy back the shot and that he doesn't take them with the mobic or other anti-inflammatories. He is not a very good historian. Let me know! Thanks!
tablet Take 500 mg by mouth every 6 hours as needed for Pain    Historical Provider, MD   rosuvastatin (CRESTOR) 40 MG tablet TAKE 1 TABLET BY MOUTH DAILY AT BEDTIME. 4/30/21   Historical Provider, MD       Future Appointments   Date Time Provider Raphael Jimenezi   2/17/2022  1:00 PM MANUELA Pitt - CNP [facility] EMERGENCY [facility] [place]   2/18/2022  1:30 PM [facility] CT ROOM 1 ML CT [facility] Fac RAD   2/21/2022  1:00 PM MANUELA Gaming - CNP [facility] EMERGENCY [facility] AT [place]

## 2022-02-17 ENCOUNTER — OFFICE VISIT (OUTPATIENT)
Dept: PAIN MANAGEMENT | Age: 67
End: 2022-02-17
Payer: MEDICARE

## 2022-02-17 VITALS — BODY MASS INDEX: 20.04 KG/M2 | WEIGHT: 140 LBS | HEIGHT: 70 IN | TEMPERATURE: 97.4 F

## 2022-02-17 DIAGNOSIS — M47.817 LUMBOSACRAL SPONDYLOSIS WITHOUT MYELOPATHY: Primary | ICD-10-CM

## 2022-02-17 PROCEDURE — 99214 OFFICE O/P EST MOD 30 MIN: CPT | Performed by: NURSE PRACTITIONER

## 2022-02-17 ASSESSMENT — ENCOUNTER SYMPTOMS
CONSTIPATION: 0
RESPIRATORY NEGATIVE: 1
DIARRHEA: 0
BACK PAIN: 1
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
## 2022-02-18 ENCOUNTER — HOSPITAL ENCOUNTER (OUTPATIENT)
Dept: CT IMAGING | Age: 67
Discharge: HOME | End: 2022-02-20
Payer: MEDICARE

## 2022-02-18 ENCOUNTER — TELEPHONE (OUTPATIENT)
Dept: PAIN MANAGEMENT | Age: 67
End: 2022-02-18

## 2022-02-18 DIAGNOSIS — Z87.891 PERSONAL HISTORY OF TOBACCO USE: ICD-10-CM

## 2022-02-18 PROCEDURE — 71271 CT THORAX LUNG CANCER SCR C-: CPT

## 2022-02-18 NOTE — TELEPHONE ENCOUNTER
ORDER PLACED:    Date: 2/17/22  Description: BILAT L3,4,5 RFA  Order Number: [id number]  Ordering Provider: Thea Shepherd  Performing Provider: [facility]  CPT Codes: 20180,16201  ICD10 Codes: J63.501

## 2022-02-21 ENCOUNTER — OFFICE VISIT (OUTPATIENT)
Dept: FAMILY MEDICINE CLINIC | Age: 67
End: 2022-02-21
Payer: MEDICARE

## 2022-02-21 VITALS
SYSTOLIC BLOOD PRESSURE: 118 MMHG | WEIGHT: 146 LBS | DIASTOLIC BLOOD PRESSURE: 76 MMHG | BODY MASS INDEX: 21.62 KG/M2 | OXYGEN SATURATION: 94 % | HEIGHT: 69 IN | HEART RATE: 79 BPM

## 2022-02-21 DIAGNOSIS — Z00.00 MEDICARE ANNUAL WELLNESS VISIT, SUBSEQUENT: Primary | ICD-10-CM

## 2022-02-21 PROCEDURE — G0439 PPPS, SUBSEQ VISIT: HCPCS | Performed by: NURSE PRACTITIONER

## 2022-02-21 ASSESSMENT — PATIENT HEALTH QUESTIONNAIRE - PHQ9
SUM OF ALL RESPONSES TO PHQ QUESTIONS 1-9: 0
7. TROUBLE CONCENTRATING ON THINGS, SUCH AS READING THE NEWSPAPER OR WATCHING TELEVISION: 0
2. FEELING DOWN, DEPRESSED OR HOPELESS: 0
9. THOUGHTS THAT YOU WOULD BE BETTER OFF DEAD, OR OF HURTING YOURSELF: 0
4. FEELING TIRED OR HAVING LITTLE ENERGY: 0
SUM OF ALL RESPONSES TO PHQ QUESTIONS 1-9: 0
10. IF YOU CHECKED OFF ANY PROBLEMS, HOW DIFFICULT HAVE THESE PROBLEMS MADE IT FOR YOU TO DO YOUR WORK, TAKE CARE OF THINGS AT HOME, OR GET ALONG WITH OTHER PEOPLE: 0
SUM OF ALL RESPONSES TO PHQ9 QUESTIONS 1 & 2: 0
1. LITTLE INTEREST OR PLEASURE IN DOING THINGS: 0
5. POOR APPETITE OR OVEREATING: 0
6. FEELING BAD ABOUT YOURSELF - OR THAT YOU ARE A FAILURE OR HAVE LET YOURSELF OR YOUR FAMILY DOWN: 0
8. MOVING OR SPEAKING SO SLOWLY THAT OTHER PEOPLE COULD HAVE NOTICED. OR THE OPPOSITE, BEING SO FIGETY OR RESTLESS THAT YOU HAVE BEEN MOVING AROUND A LOT MORE THAN USUAL: 0
3. TROUBLE FALLING OR STAYING ASLEEP: 0

## 2022-02-21 NOTE — PROGRESS NOTES
Medicare Annual Wellness Visit    Daniel Chadwick is here for Medicare AWV and Health Maintenance (pt declined colon cancer screen )    Assessment & Plan   Tatum López was seen today for medicare awv and health maintenance. Diagnoses and all orders for this visit:    Medicare annual wellness visit, subsequent         Recommendations for Preventive Services Due: see orders and patient instructions/AVS.  Recommended screening schedule for the next 5-10 years is provided to the patient in written form: see Patient Instructions/AVS.     Return for Medicare Annual Wellness Visit in 1 year. Reviewed and updated this visit by clinical staff:  Tobacco  Allergies  Meds  Med Hx  Surg Hx  Soc Hx  Fam Hx      Subjective   The following acute and/or chronic problems were also addressed today:  - memory loss- declining further work up. Left neurologist appt that he had gone too. States that he had become frustrated. - Mood disorder- has been stable on the prozac. Patient's complete Health Risk Assessment and screening values have been reviewed and are found in Flowsheets. The following problems were reviewed today and where indicated follow up appointments were made and/or referrals ordered. Positive Risk Factor Screenings with Interventions:     Cognitive:   Words recalled: 1 Word Recalled  Clock Drawing Test (CDT): (!) Abnormal  Total Score Interpretation: Abnormal Mini-Cog    Cognitive Impairment Interventions:  · following with neurology       Tobacco Use:     Tobacco Use: High Risk    Smoking Tobacco Use: Current Every Day Smoker    Smokeless Tobacco Use: Never Used     E-Cigarettes/Vaping Use     Questions Responses    E-Cigarette/Vaping Use Never User    Start Date     Passive Exposure     Quit Date     Counseling Given     Comments         Substance Abuse - Tobacco Interventions:  patient is not ready to work toward tobacco cessation at this time          Health Habits/Nutrition:     Physical Activity: Inactive    Days of Exercise per Week: 0 days    Minutes of Exercise per Session: 0 min     Have you lost any weight without trying in the past 3 months?: No    Body mass index: 21.56    Have you seen the dentist within the past year?: Yes      Health Habits/Nutrition Interventions:  · Inadequate physical activity:  patient is not ready to increase his/her physical activity level at this time    Hearing/Vision:  No exam data present  Hearing/Vision  Do you or your family notice any trouble with your hearing that hasn't been managed with hearing aids?: No  Do you have difficulty driving, watching TV, or doing any of your daily activities because of your eyesight?: No  Have you had an eye exam within the past year?: (!) No    Hearing/Vision Interventions:  · Vision concerns:  patient encouraged to make appointment with his/her eye specialist    Safety:  Do you have working smoke detectors?: Yes  Do you have any tripping hazards - loose or unsecured carpets or rugs?: No  Do you have any tripping hazards - clutter in doorways, halls, or stairs?: No  Do you have either shower bars, grab bars, non-slip mats or non-slip surfaces in your shower or bathtub?: Yes  Do all of your stairways have a railing or banister?: (!) No (pt states no stairs)  Do you always fasten your seatbelt when you are in a car?: (!) No    Safety Interventions:  · Home safety tips provided    ADLs:  In the past 7 days, did you need help from others to perform any of the following everyday activities: Eating, dressing, grooming, bathing, toileting, or walking/balance?: No  In the past 7 days, did you need help from others to take care of any of the following: Laundry, housekeeping, banking/finances, shopping, telephone use, food preparation, transportation, or taking medications?: (!) Yes  Select all that apply: (!) Laundry,Housekeeping,Banking/Finances    ADL Interventions:  · Patient declines any further evaluation/treatment for this issue Objective               Allergies   Allergen Reactions    Alcohol Other (See Comments)     Sober 22 yrs    Benadryl [Diphenhydramine]      \"speeds him up\"    Demerol Hcl [Meperidine] Other (See Comments)     Aggressive behavior    Sulfa Antibiotics Other (See Comments)     Told by mother / patient unaware of reaction     Prior to Visit Medications    Medication Sig Taking? Authorizing Provider   gabapentin (NEURONTIN) 400 MG capsule Take 1 capsule by mouth 3 times daily for 90 days. Yes MANUELA Carbajal CNP   meloxicam (MOBIC) 15 MG tablet take 1 tablet by mouth once daily Yes MANUELA Carbajal CNP   FLUoxetine (PROZAC) 40 MG capsule TAKE 1 CAPSULE BY MOUTH DAILY. Yes MANUELA Carbajal CNP   vitamin D3 (CHOLECALCIFEROL) 10 MCG (400 UNIT) TABS tablet Take 400 Units by mouth daily  Yes Historical Provider, MD   acetaminophen (TYLENOL) 500 MG tablet Take 500 mg by mouth every 6 hours as needed for Pain Yes Historical Provider, MD   rosuvastatin (CRESTOR) 40 MG tablet TAKE 1 TABLET BY MOUTH DAILY AT BEDTIME.  Yes Historical Provider, MD   cyclobenzaprine (FLEXERIL) 10 MG tablet Take 1 tablet by mouth 3 times daily as needed for Muscle spasms  MANUELA Carbajal CNP       CareTeam (Including outside providers/suppliers regularly involved in providing care):   Patient Care Team:  MANUELA Carbajal CNP as PCP - General (Nurse Practitioner)  MANUELA Carbajal CNP as PCP - [facility] Empaneled Provider

## 2022-02-23 ENCOUNTER — TELEPHONE (OUTPATIENT)
Dept: PAIN MANAGEMENT | Age: 67
End: 2022-02-23

## 2022-02-23 NOTE — TELEPHONE ENCOUNTER
BENEFITS: SHILO L3,4,5 RFA    Insurance: [id number] [address]. [facility]  Phone: [phone]  Contact Name: Lavinia Jeter  Effective Date: 1.1.2022     Plan year: YES-CALENDAR  Deductible: 0.00      Deductible Met: 0.00  Allowed/benefits paid at: 100%  OOP: 4900.00 MET $180.00  Freq Limits: 98068 & 64636--BASED ON MEDICAL NECESSITY  Prior Auth Requirement: NO AUTH REQUIRED    Notes: NO PRE-EX CLAUSE    Call Reference #: [id number]    Time of call: 9:50AM

## 2022-03-01 ENCOUNTER — OFFICE VISIT (OUTPATIENT)
Dept: PAIN MANAGEMENT | Age: 67
End: 2022-03-01
Payer: MEDICARE

## 2022-03-01 DIAGNOSIS — M47.817 LUMBOSACRAL SPONDYLOSIS WITHOUT MYELOPATHY: ICD-10-CM

## 2022-03-01 PROCEDURE — 64635 DESTROY LUMB/SAC FACET JNT: CPT | Performed by: PAIN MEDICINE

## 2022-03-01 PROCEDURE — 64636 DESTROY L/S FACET JNT ADDL: CPT | Performed by: PAIN MEDICINE

## 2022-03-01 RX ORDER — BETAMETHASONE SODIUM PHOSPHATE AND BETAMETHASONE ACETATE 3; 3 MG/ML; MG/ML
6 INJECTION, SUSPENSION INTRA-ARTICULAR; INTRALESIONAL; INTRAMUSCULAR; SOFT TISSUE ONCE
Status: COMPLETED | OUTPATIENT
Start: 2022-03-01 | End: 2022-03-01

## 2022-03-01 RX ORDER — LIDOCAINE HYDROCHLORIDE 10 MG/ML
10 INJECTION, SOLUTION EPIDURAL; INFILTRATION; INTRACAUDAL; PERINEURAL ONCE
Status: COMPLETED | OUTPATIENT
Start: 2022-03-01 | End: 2022-03-01

## 2022-03-01 RX ADMIN — BETAMETHASONE SODIUM PHOSPHATE AND BETAMETHASONE ACETATE 6 MG: 3; 3 INJECTION, SUSPENSION INTRA-ARTICULAR; INTRALESIONAL; INTRAMUSCULAR; SOFT TISSUE at 14:27

## 2022-03-01 RX ADMIN — LIDOCAINE HYDROCHLORIDE 10 MG: 10 INJECTION, SOLUTION EPIDURAL; INFILTRATION; INTRACAUDAL; PERINEURAL at 14:27

## 2022-03-01 NOTE — PROGRESS NOTES
[facility]) Physicians  Neurosurgery and Pain [address] [facility] [address]: [phone]  F: [phone]      Lumbar Radio Frequency Ablation     Provider: Ryan Small DO          Patient Name: Oswaldo Britt : 1955        Date: 3/1/2022      Oswaldo Britt is here today for interventional pain management. Standard ASI guidelines were followed and sterile technique used. Area was cleaned with Betadine x3. Informed consent was obtained. Fluoroscopic guidance was used for this procedure. Multiple views of fluoroscopy were used during procedure to assist with needle placement. Appropriate sized RF 10mm active tip needle was used and advance to appropriate anatomic location. There was appropriate multifidus contraction noted with motor stimulation at 2 Hz between 0.5-1.5 volts. No limb or gluteal contraction was noted taking it up to 3.5 volts. Prior to lesioning at 80 degrees Celsius for 90 seconds, approximately 0.75mg/1mg of Celestone and ½ cc of 1% preservative free Lidocaine was injected. Impedance was between 200-500 ohms during the procedure. Patient tolerated the procedure well, no obvious complications occurred during the procedure. Patient was appropriately monitored and discharged home in stable condition with their usual motor strength. Post Op instructions were given to patient.           [x] Bilateral [] T11 [] L1 [] S1     [] T12 [] L2 [] S2    [] Right  [x] L3 [] S3      [x] L4 [] S4    [] Left  [x] L5                              Ryan Small DO

## 2022-03-04 NOTE — TELEPHONE ENCOUNTER
AUTHORIZATION:RAMSES L3,4,5 RFA    INSURANCE: [employer] [facility] VIA: Thuy [place] #: [id number]    DATE RANGE: 3/3/22-8/30/22    AUTH WAS OBTAINED UNDER DR. Diantha Nageotte

## 2022-03-04 NOTE — TELEPHONE ENCOUNTER
RAMSES L3,4,5 RFA    AUTH APPROVED FROM 3/3/22-8/30/22    OK to schedule procedure approved as above. Please note sides/levels approved and date range. (If applicable, sides/levels approved may differ from those ordered)    TO BE SCHEDULED WITH DR. Geo Ryan

## 2022-03-08 DIAGNOSIS — M51.36 OTHER INTERVERTEBRAL DISC DEGENERATION, LUMBAR REGION: ICD-10-CM

## 2022-03-08 RX ORDER — MELOXICAM 15 MG/1
TABLET ORAL
Qty: 30 TABLET | Refills: 5 | Status: SHIPPED | OUTPATIENT
Start: 2022-03-08 | End: 2022-08-30

## 2022-03-14 ENCOUNTER — CARE COORDINATION (OUTPATIENT)
Dept: CARE COORDINATION | Age: 67
End: 2022-03-14

## 2022-03-14 NOTE — CARE COORDINATION
Telephone call with patient. While patient was on the phone provided assistance in completing Social Security Extra Help Application on line. Sent patient confirmation that application was completed. Noe Ponce

## 2022-03-14 NOTE — CARE COORDINATION
Telephone call to [facility]. Representative indicated they do not have Social Security Extra Help Information in their system.

## 2022-03-14 NOTE — LETTER
[address] [facility]      Dear Javy Cruz,    I hope this letter finds you doing well! I am sending you resource information on [facility]. I hope you find the information helpful. Please look them over and let me know if you have any questions or need further assistance. You can contact me at any of the numbers listed below. Sincerely,    DMITRY De La Torre  , [facility]  Cell Phone: [phone]  Email: [email or web address]

## 2022-03-14 NOTE — CARE COORDINATION
Telephone call to [address]. They asked to have patient call them and could help with one time prescription assistance.   Explained just completed Social Security Extra Help Program.

## 2022-03-14 NOTE — LETTER
[address] [facility]      Dear Jus Malone,    I hope this letter finds you doing well! I am sending you Confirmation of Social Security Extra Help Application,  I hope you find the information helpful. Please look them over and let me know if you have any questions or need further assistance. You can contact me at any of the numbers listed below. Sincerely,    DMITRY Nina  , [facility]  Cell Phone: [phone]  Email: [email or web address]

## 2022-03-14 NOTE — CARE COORDINATION
Telephone call with patient. H identified that he is in need of Varinder which is due in May. Discussed plan to mail patient a form.

## 2022-03-14 NOTE — CARE COORDINATION
Telephone call to patient. Provided him with contact information for [employer] and he expressed plan to contact them for medication assistance.

## 2022-03-14 NOTE — CARE COORDINATION
Initial Contact Social Work Note - Ambulatory  3/14/2022      Date of referral: 3/14/2022  Referral received from: DAVIDE Yee  Reason for referral: Medication Assistance  Previous SW referral: Yes  If yes, brief summary of outcome: Social Security Extra Help Program  Two Identifiers Verified: Yes    Insurance Provider: [employer]  Support System: Adult Child/Children  Sister and two sons.  Status: Not a Glycobia Providers: SNAP/Food [place]ector. Wants to apply for Medicaid  ADL Assistance Needed: Independent    Housing/Living Concerns or Home Modification Needs: House is all on one floor. Patient has a home equity loan. Transportation Concern: None    Medication Cost Concern: Patient indicates that he is taking four medication and three of them are cost.    Medication Adherence Concern:     Financial Concern(s): No problems affording housing or utilities. Income (only if applicable): 7.077. plus 348.00 He quit doing his gun shows. Ability to Read/Write: Yes    Advance Care Plan:  Discussed have someone to help with these documents. Other: Patient stated that he has tried to apply for Medicaid on line and has not been able to do so. Discussed plan to send patient out a Medicaid Application. Patient did have letter from Joseph he was approved for Social Security I  Identified Needs:   Medication Assistance   Medicaid Application    Social Work Plan:   Medicaid Application   Medication Assistance   Next Steps: Mail Application for [facility] regarding Social Security Extra Help .   Contact Social Security  Method of Communication With Provider (if appropriate): None      Goals Addressed    None

## 2022-03-14 NOTE — LETTER
[address] [facility]      [facility],    I hope this letter finds you doing well! I am sending you resource information on Medicaid Application. I hope you find the information helpful. Please look them over and let me know if you have any questions or need further assistance. You can contact me at any of the numbers listed below. Sincerely,    DMITRY Haider  , [facility]  Cell Phone: [phone]  Email: [email or web address]

## 2022-03-14 NOTE — CARE COORDINATION
Case was referred to this writer by DAVIDE Osborn to assist patient with medication affordability. Telephone call to patient. Left voicemail of nature of call with request for return phone call Call back number was provided.

## 2022-03-16 ENCOUNTER — CARE COORDINATION (OUTPATIENT)
Dept: CARE COORDINATION | Age: 67
End: 2022-03-16

## 2022-03-16 NOTE — CARE COORDINATION
Telephone call with patient. Reviewed what this writer has done  and information he will be receiving in the mail. Patient has a co-pay for his appointment tomorrow. Discussed Varinder and could help with this co-pay. Patient expressed plan to go to financial assistance office prior to his appointment tomorrow.

## 2022-03-17 ENCOUNTER — OFFICE VISIT (OUTPATIENT)
Dept: PAIN MANAGEMENT | Age: 67
End: 2022-03-17
Payer: MEDICARE

## 2022-03-17 VITALS
DIASTOLIC BLOOD PRESSURE: 74 MMHG | BODY MASS INDEX: 21.48 KG/M2 | SYSTOLIC BLOOD PRESSURE: 114 MMHG | TEMPERATURE: 97.2 F | WEIGHT: 145 LBS | HEIGHT: 69 IN | HEART RATE: 76 BPM

## 2022-03-17 DIAGNOSIS — R20.2 PARESTHESIA OF BOTH FEET: Primary | ICD-10-CM

## 2022-03-17 DIAGNOSIS — M51.36 DDD (DEGENERATIVE DISC DISEASE), LUMBAR: ICD-10-CM

## 2022-03-17 DIAGNOSIS — M54.16 RADICULOPATHY, LUMBAR REGION: ICD-10-CM

## 2022-03-17 DIAGNOSIS — M47.817 LUMBOSACRAL SPONDYLOSIS WITHOUT MYELOPATHY: ICD-10-CM

## 2022-03-17 PROCEDURE — 99214 OFFICE O/P EST MOD 30 MIN: CPT | Performed by: NURSE PRACTITIONER

## 2022-03-17 ASSESSMENT — ENCOUNTER SYMPTOMS
BACK PAIN: 1
DIARRHEA: 0
CONSTIPATION: 0
RESPIRATORY NEGATIVE: 1
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
## 2022-03-23 ENCOUNTER — CARE COORDINATION (OUTPATIENT)
Dept: CARE COORDINATION | Age: 67
End: 2022-03-23

## 2022-03-23 NOTE — CARE COORDINATION
Telephone call with patient. He stated that he has an appointment with Social [employer] Program on 4/21/2022 at [address] [place] [address].   He mailed out Medicaid Application and returned it. He also dropped off [employer]. Discussed calling out [employer] for assistance, one time with medications. He is getting food from [employer].

## 2022-03-30 ENCOUNTER — CARE COORDINATION (OUTPATIENT)
Dept: CARE COORDINATION | Age: 67
End: 2022-03-30

## 2022-03-30 NOTE — CARE COORDINATION
Telephone call with patient. He stated that he got behind his gas bill and shut notice is scheduled for Tuesday . He  Stated that he has been in contact with [employer] about this need and left a message. David Settler He is wanting to know if other resources to help with that. Discussed would do some investigating of resources to help out with gas bill. He stated that he heard from [employer] and he is already on the [address] [place] [address]. Patient feels he can afford his medications at this time. Patient stated that he continues to receive food stamps and he goes to food distributions at [employer] twice a month.

## 2022-03-30 NOTE — CARE COORDINATION
Telephone call to [employer]. With patient on that patient does not have not have a shut office because he made a payment. He owes 114.09. They do have three medical certificates, 3 a year to stop shut off notices. He stated that he is suppose to have appointment with [employer] today or tomorrow at 230 about utility assistance.

## 2022-03-30 NOTE — CARE COORDINATION
Telephone call to [address]. Left voicemail of purpose of call with request for return phone call. Call back number was provided.

## 2022-03-30 NOTE — CARE COORDINATION
Telephone call to Hayes Munoz [facility]. She indicated the following agencies that maybe able to help with gas bill:  [employer]-she indicated they will communicate with gas bill while working on process to get help to avoid shut off,  [employer] [phone] and [employer] [phone].

## 2022-03-30 NOTE — CARE COORDINATION
Telephone call with [facility]/[place]. They indicated that they only help with people who live in [place]. They referred this writer to [employer].

## 2022-03-30 NOTE — CARE COORDINATION
Telephone call to [facility]/[place]. Left voicemail of nature of phone call with request for return phone call. Call back number was provided.

## 2022-03-30 NOTE — CARE COORDINATION
Telephone call with patient. Provided patient with update on phone calls made. He indicated that he called [employer] and had to leave a message. He indicated they are helping him with electric bill and water bill. He stated that his gas provider is [place] ([phone]) [de-identified]. He has not talked to them about not shutting off his gas. Discussed plan to also call [employer].

## 2022-03-30 NOTE — CARE COORDINATION
Telephone call with patient. Patient driving and could not take down information about [address]. Patient expressed plan to return phone call later in the day.

## 2022-03-30 NOTE — CARE COORDINATION
Telephone call with patient. Provided patient with contact information for [facility] ([facility] [employer]. Patient indicated that he remembered that he sent [address] a payment last week. He expressed plan to pay the amount he owes in near future. Reviewed that he has an appointment tomorrow with [employer] at 230 for utility assistance. Discussed waiting return phone call from [facility]/[place].

## 2022-03-30 NOTE — CARE COORDINATION
Telephone call to [facility]/[place]. Left voicemail of purpose of call with request for return phone call. Call back number was provided.

## 2022-03-30 NOTE — CARE COORDINATION
Received voicemail from [employer] [place] [employer]. She indicated that is [address] in [place] which help out with utility assistance. Their contact phone number is [phone].

## 2022-03-30 NOTE — CARE COORDINATION
Telephone call with [facility]. She indicated that [employer] is the only resource that helps out with utility assistance. She stated that [employer] is not helping out with [facility] residents for assistance with utilities. Xavier Nieto believes there is a [employer] in [facility] that is helping with utilities and will get back to this writer with this information.

## 2022-04-01 ENCOUNTER — CARE COORDINATION (OUTPATIENT)
Dept: CARE COORDINATION | Age: 67
End: 2022-04-01

## 2022-04-01 NOTE — CARE COORDINATION
Returning phone call from [facility]. Telephone call to [place]/[place]. Left voicemail returning phone call. Call back number was provided.

## 2022-04-04 ENCOUNTER — INITIAL CONSULT (OUTPATIENT)
Dept: PODIATRY | Age: 67
End: 2022-04-04
Payer: MEDICARE

## 2022-04-04 ENCOUNTER — CARE COORDINATION (OUTPATIENT)
Dept: CARE COORDINATION | Age: 67
End: 2022-04-04

## 2022-04-04 VITALS — HEIGHT: 70 IN | WEIGHT: 148 LBS | TEMPERATURE: 96.9 F | BODY MASS INDEX: 21.19 KG/M2

## 2022-04-04 DIAGNOSIS — G57.92 PERIPHERAL NEURITIS OF LEFT FOOT: ICD-10-CM

## 2022-04-04 DIAGNOSIS — M20.12 HALLUX ABDUCTO VALGUS, BILATERAL: ICD-10-CM

## 2022-04-04 DIAGNOSIS — M20.5X9 ACQUIRED ADDUCTOVARUS ROTATION OF TOE, UNSPECIFIED LATERALITY: ICD-10-CM

## 2022-04-04 DIAGNOSIS — M20.11 HALLUX ABDUCTO VALGUS, BILATERAL: ICD-10-CM

## 2022-04-04 DIAGNOSIS — M21.622 TAILOR'S BUNIONETTE, BILATERAL: ICD-10-CM

## 2022-04-04 DIAGNOSIS — M79.671 PAIN IN BOTH FEET: Primary | ICD-10-CM

## 2022-04-04 DIAGNOSIS — G57.91 PERIPHERAL NEURITIS OF RIGHT FOOT: ICD-10-CM

## 2022-04-04 DIAGNOSIS — M21.621 TAILOR'S BUNIONETTE, BILATERAL: ICD-10-CM

## 2022-04-04 DIAGNOSIS — R09.89 WEAK ARTERIAL PULSE: ICD-10-CM

## 2022-04-04 DIAGNOSIS — R20.2 PARESTHESIA OF FOOT, BILATERAL: ICD-10-CM

## 2022-04-04 DIAGNOSIS — M79.672 PAIN IN BOTH FEET: Primary | ICD-10-CM

## 2022-04-04 PROCEDURE — 99203 OFFICE O/P NEW LOW 30 MIN: CPT | Performed by: PODIATRIST

## 2022-04-04 ASSESSMENT — ENCOUNTER SYMPTOMS
SHORTNESS OF BREATH: 1
NAUSEA: 0
BACK PAIN: 1
VOMITING: 0

## 2022-04-04 NOTE — CARE COORDINATION
Telephone call with Molly. She indicated that patient would need to March Utility Bills. The are their 9-12 M/T/Thr/Fri. Wed and Friday they have food pantries going on. Explained would reach out to patient and explain this.

## 2022-04-04 NOTE — PROGRESS NOTES
[address] [facility]  [address] [place] [address]  Dept: [phone]  Loc: [phone]       Kenya Alberto  (1955)    4/4/22    Subjective     Kenya Alberto is 69 y.o. male who complains today of:    Chief Complaint   Patient presents with    Foot Pain     SHILO TOES NUMBNESS     Kenya Alberto is seen in consultation at the request of MANUELA Swift CNP for evaluation of bilateral foot paresthesia. HPI: Patient presents for evaluation of numbness to both lower extremities, he states that he is numb from the mid calf down on the right side and from the knee down on the left side. Patient states this is been a chronic problem, he denies a history of diabetes. Patient does report a history of lower back problems as well as a history of alcoholism. Patient describes having numbness and discomfort that he rates at 7/10. He states that the symptoms have worsened over time. Patient states that medication injections have been the prior treatment. Standing and walking increase the pain. He gets mild relief with the medication. Patient states he has not tried a topical compounded pain cream in the past.  Patient has had multiple EMGs. Patient does state that he has a circulation problem. He does not recall having noninvasive vascular studies performed in the past.    Review of Systems   Constitutional: Negative for chills and fever. HENT: Negative for hearing loss. Respiratory: Positive for shortness of breath. Cardiovascular: Negative for chest pain. Gastrointestinal: Negative for nausea and vomiting. Genitourinary: Negative for difficulty urinating. Musculoskeletal: Positive for back pain and gait problem. Skin: Negative for wound. Neurological: Positive for numbness. Hematological: Does not bruise/bleed easily.    Psychiatric/Behavioral: Negative for sleep disturbance. The patient is not a diabetic. Allergies:  Alcohol, Benadryl [diphenhydramine], Demerol hcl [meperidine], and Sulfa antibiotics    Current Outpatient Medications on File Prior to Visit   Medication Sig Dispense Refill    meloxicam (MOBIC) 15 MG tablet take 1 tablet by mouth once daily 30 tablet 5    gabapentin (NEURONTIN) 400 MG capsule Take 1 capsule by mouth 3 times daily for 90 days. 90 capsule 2    FLUoxetine (PROZAC) 40 MG capsule TAKE 1 CAPSULE BY MOUTH DAILY. 90 capsule 1    [DISCONTINUED] cyclobenzaprine (FLEXERIL) 10 MG tablet Take 1 tablet by mouth 3 times daily as needed for Muscle spasms 21 tablet 0    acetaminophen (TYLENOL) 500 MG tablet Take 500 mg by mouth every 6 hours as needed for Pain      rosuvastatin (CRESTOR) 40 MG tablet TAKE 1 TABLET BY MOUTH DAILY AT BEDTIME. No current facility-administered medications on file prior to visit.        Past Medical History:   Diagnosis Date    Allergic rhinitis 2/19/2018    Anxiety     Chronic back pain     [facility]     Depression     Disorder of stomach     valve not closing properly    Emphysema lung ([address]     Essential hypertension 11/4/2019    Gastroesophageal reflux disease 10/4/2019    Hyperlipidemia     meds > 22 yrs    Low back pain 5/1/2018    ARNAUD (obstructive sleep apnea) 2/19/2018    Other emphysema ([address] 10/4/2019    Other intervertebral disc degeneration, lumbar region 3/23/2018    Radiculopathy, lumbar region 2/28/2018    Restless leg syndrome     Seasonal affective disorder ([address] 10/4/2019    Substance abuse ([address]     alcholic, quit 20 yrs      Past Surgical History:   Procedure Laterality Date    COLONOSCOPY  12/22/2016    A SHALA MARLEY    DENTAL SURGERY  10 yrs ago    ENDOSCOPY, COLON, DIAGNOSTIC      EYE SURGERY      Lasik OU    KNEE ARTHROSCOPY Right     KNEE SURGERY Right 05/2016    Ray procedure    KNEE SURGERY      LA NASAL SCOPY,OPEN MAXILL SINUS N/A 2/22/2018    SEPTOPLASTY MICRODEBRIDER ASSISTED TURBINOPLASTY AND OUT-FRACTURING BILATERAL NASAL ENDOSCOPY performed by Roman Cogan, MD at [facility] History     Socioeconomic History    Marital status:      Spouse name: Not on file    Number of children: 2    Years of education: 12    Highest education level: High school graduate   Occupational History    Not on file   Tobacco Use    Smoking status: Current Every Day Smoker     Packs/day: 2.00     Years: 52.00     Pack years: 104.00     Types: Cigars     Start date: 1970    Smokeless tobacco: Never Used    Tobacco comment: 3-5 cigars qd   Vaping Use    Vaping Use: Never used   Substance and Sexual Activity    Alcohol use: No     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    Drug use: No    Sexual activity: Not Currently     Partners: Female   Other Topics Concern    Not on file   Social History Narrative    Not on file     Social Determinants of Health     Financial Resource Strain: Low Risk     Difficulty of Paying Living Expenses: Not hard at all   Food Insecurity: No Food Insecurity    Worried About Running Out of Food in the Last Year: Never true    Ivana of Food in the Last Year: Never true   Transportation Needs:     Lack of Transportation (Medical): Not on file    Lack of Transportation (Non-Medical):  Not on file   Physical Activity: Inactive    Days of Exercise per Week: 0 days    Minutes of Exercise per Session: 0 min   Stress:     Feeling of Stress : Not on file   Social Connections:     Frequency of Communication with Friends and Family: Not on file    Frequency of Social Gatherings with Friends and Family: Not on file    Attends Latter-day Services: Not on file    Active Member of Clubs or Organizations: Not on file    Attends Club or Organization Meetings: Not on file    Marital Status: Not on file   Intimate Partner Violence:     Fear of Current or Ex-Partner: Not on file    Emotionally Abused: Not on file    Physically Abused: Not on file    Sexually Abused: Not on file   Housing Stability:     Unable to Pay for Housing in the Last Year: Not on file    Number of Places Lived in the Last Year: Not on file    Unstable Housing in the Last Year: Not on file     Family History   Problem Relation Age of Onset    Cancer Mother         stomach    Arthritis Mother     Heart Disease Father 48    Cancer Father         bone    Cancer Maternal Grandmother         lung    Cancer Paternal Grandmother     Heart Disease Paternal Grandfather     No Known Problems Sister     Cancer Brother     Heart Disease Brother         hole in heart in 1959 at 1 months age           Objective:   Vitals:  Temp 96.9 °F (36.1 °C)   Ht 5' 10\" (1.778 m)   Wt 148 lb (67.1 kg)   BMI 21.24 kg/m²        Physical Exam  Vitals reviewed. Constitutional:       Appearance: He is normal weight. HENT:      Head: Normocephalic and atraumatic. Cardiovascular:      Pulses:           Dorsalis pedis pulses are detected w/ Doppler on the right side and detected w/ Doppler on the left side. Posterior tibial pulses are detected w/ Doppler on the right side and 2+ on the left side. Comments: Diminished hair growth noted bilaterally. Taut skin noted bilaterally. Telangiectasia noted bilaterally. Skin temperature gradient is warm to cool from proximal tibia to distal toes bilaterally. Pulmonary:      Effort: Pulmonary effort is normal.   Musculoskeletal:      Right lower leg: No edema. Left lower leg: No edema. Right foot: Deformity and bunion present. Left foot: Deformity and bunion present. Feet:      Right foot:      Protective Sensation: 10 sites tested. 10 sites sensed. Skin integrity: Dry skin present. Toenail Condition: Right toenails are normal.      Left foot:      Protective Sensation: 10 sites tested. 10 sites sensed. Skin integrity: Dry skin present.       Toenail Condition: Left toenails are normal.      Comments: Rectus foot type noted bilaterally. MMT graded 5/5 for all extrinsic foot muscle groups bilaterally, uncontrolled movements noted to the right foot. Mild hallux abductovalgus deformity noted bilaterally. Mild tailor's bunionette deformity with adductovarus rotation deformity of the fourth and fifth toes noted bilaterally. Normal ankle joint dorsiflexion noted bilaterally. No pain or crepitus noted with range of motion of the foot and ankle joints. Lymphadenopathy:      Comments: Popliteal lymph nodes are soft and nontender. Skin:     General: Skin is dry. Capillary Refill: Capillary refill takes 2 to 3 seconds. Comments: No open lesions noted bilaterally. Xerotic skin texture noted bilaterally. Normal skin turgor noted. Neurological:      Mental Status: He is alert and oriented to person, place, and time. Deep Tendon Reflexes: Babinski sign absent on the right side. Babinski sign absent on the left side. Reflex Scores:       Patellar reflexes are 3+ on the right side and 3+ on the left side. Achilles reflexes are 2+ on the right side and 2+ on the left side. Comments: Hyperreflexia noted with Babinski exam.  No ankle clonus noted bilaterally. Vibratory sensation is decreased bilaterally. Hot versus cold and sharp versus dull discrimination are intact bilaterally. Positive Tinel's sign elicited with percussion of the left deep peroneal nerve and tibial nerve. Positive Tinel's sign elicited with percussion of the right common peroneal, superficial peroneal, deep peroneal, and sural nerve. Psychiatric:         Mood and Affect: Mood normal.         Behavior: Behavior normal.         Assessment:      Diagnosis Orders   1. Pain in both feet  US JAZLYN SHILO REST AND POST TREAD COMPLETE   2. Paresthesia of foot, bilateral     3. Weak arterial pulse  US JAZLYN SHILO REST AND POST TREAD COMPLETE   4. Peripheral neuritis of right foot     5.  Peripheral neuritis of left foot     6. Hallux abducto valgus, bilateral     7. Tailor's bunionette, bilateral     8. Acquired adductovarus rotation of toe, unspecified laterality         Plan:     Weak pedal pulses: I have ordered noninvasive vascular studies, we will contact patient for critical results we will review the results at follow-up if normal or mild. Neuropathic pain: I have prescribed a topical compounded pain cream, apply as directed. Orders Placed This Encounter   Procedures    US JAZLYN SHILO REST AND POST TREAD COMPLETE     Standing Status:   Future     Standing Expiration Date:   4/4/2023     Order Specific Question:   Reason for exam:     Answer:   weak pulses, pain, cold feet       All questions were answered to the patient's satisfaction. Thank you for allowing me to participate in the care of your patient. Follow up:  Return in about 8 weeks (around 5/30/2022). Laureen Mcgee DPM      Level of medical decision making: low. Please note that this report has been partially produced using speech recognition software which may cause errors including grammar, punctuation, and spelling or words and phrases that may seem inappropriate. If there are questions or concerns please feel free to contact me for clarification.

## 2022-04-11 ENCOUNTER — CARE COORDINATION (OUTPATIENT)
Dept: CARE COORDINATION | Age: 67
End: 2022-04-11

## 2022-04-20 ENCOUNTER — CARE COORDINATION (OUTPATIENT)
Dept: CARE COORDINATION | Age: 67
End: 2022-04-20

## 2022-04-20 NOTE — CARE COORDINATION
Telephone call to patient. Message stated, \" person you are trying to reach is not accepting calls at this time. \"  Unable to leave a voicemail message.

## 2022-04-25 DIAGNOSIS — F41.9 ANXIETY: ICD-10-CM

## 2022-04-25 DIAGNOSIS — F32.A DEPRESSION, UNSPECIFIED DEPRESSION TYPE: ICD-10-CM

## 2022-04-25 RX ORDER — FLUOXETINE HYDROCHLORIDE 40 MG/1
40 CAPSULE ORAL DAILY
Qty: 90 CAPSULE | Refills: 1 | Status: SHIPPED | OUTPATIENT
Start: 2022-04-25 | End: 2022-10-09 | Stop reason: SDUPTHER

## 2022-04-27 ENCOUNTER — CARE COORDINATION (OUTPATIENT)
Dept: CARE COORDINATION | Age: 67
End: 2022-04-27

## 2022-04-27 NOTE — CARE COORDINATION
Telephone call with patient. He indicated that he is now on the Social Security Extra Help Program and is helping with cost of medications. He spoke of his upcoming acupuncture appointment. He stated that he is all up to date for utilities. He stated that he did get help for [employer]. [facility] [employer] is helping out with food pantry twice a month. He is also going to [employer] for meals.

## 2022-04-27 NOTE — CARE COORDINATION
Returning patient's voicemail message. Patient  indicated in message that he is doing well with cost of medications. He spoke of upcoming acupuncture visit to help with his back. Left message requesting return phone call. Call back number was provided.

## 2022-04-28 ENCOUNTER — CARE COORDINATION (OUTPATIENT)
Dept: CARE COORDINATION | Age: 67
End: 2022-04-28

## 2022-04-28 ENCOUNTER — TELEPHONE (OUTPATIENT)
Dept: FAMILY MEDICINE CLINIC | Age: 67
End: 2022-04-28

## 2022-04-28 NOTE — TELEPHONE ENCOUNTER
I have no idea who to refer you to for acupuncture?  If covered by your insurance I would find out who is covered first.

## 2022-04-28 NOTE — TELEPHONE ENCOUNTER
----- Message from Vero Lion sent at 4/28/2022  1:09 PM EDT -----  Subject: Referral Request    QUESTIONS   Reason for referral request? referral for acupuncture. Patient does not   want physical therapy   Has the physician seen you for this condition before? No   Preferred Specialist (if applicable)? Do you already have an appointment scheduled? No  Additional Information for Provider?   ---------------------------------------------------------------------------  --------------  CALL BACK INFO  What is the best way for the office to contact you? OK to leave message on   voicemail  Preferred Call Back Phone Number? [phone]  ---------------------------------------------------------------------------  --------------  SCRIPT ANSWERS  Relationship to Patient?  Self

## 2022-04-28 NOTE — CARE COORDINATION
Telephone call with patient. Patient was asking if this writer knew if [facility] had an acupuncturist. Acupuncturist he is seeing does not take his insurance.   [facility] and they indicated that [facility] does not have an Acupuncturist.  Discussed with pain about [facility] and he indicated that he has seen them in the past and did not offer him an Acupuncturist.

## 2022-05-05 ENCOUNTER — OFFICE VISIT (OUTPATIENT)
Dept: FAMILY MEDICINE CLINIC | Age: 67
End: 2022-05-05
Payer: MEDICARE

## 2022-05-05 ENCOUNTER — CARE COORDINATION (OUTPATIENT)
Dept: CARE COORDINATION | Age: 67
End: 2022-05-05

## 2022-05-05 VITALS
TEMPERATURE: 97.3 F | OXYGEN SATURATION: 97 % | BODY MASS INDEX: 21.92 KG/M2 | HEIGHT: 69 IN | WEIGHT: 148 LBS | DIASTOLIC BLOOD PRESSURE: 72 MMHG | SYSTOLIC BLOOD PRESSURE: 130 MMHG | HEART RATE: 74 BPM

## 2022-05-05 DIAGNOSIS — R22.31 LOCALIZED SWELLING, MASS, OR LUMP OF UPPER EXTREMITY, RIGHT: Primary | ICD-10-CM

## 2022-05-05 PROCEDURE — 99213 OFFICE O/P EST LOW 20 MIN: CPT | Performed by: NURSE PRACTITIONER

## 2022-05-05 NOTE — CARE COORDINATION
Telephone call with patient. He stated that he did find a acupuncturist and is finding it helpful. He indicated that Social Security Extra Help Program is really helping him with cost of medications. He continues to go to [employer] and his [employer] for meals. He receives food stamps. He was able to get on PIPP and HEAP to help with utilities. At this time he feels that he is getting caught up on some unexpected expenses with his house and car.

## 2022-05-05 NOTE — PROGRESS NOTES
Subjective  Berniemaverick Villagomez, 69 y.o. male presents today with:  Chief Complaint   Patient presents with    Other     pt. has a big bruise on his rt. arm not sure hat it is from states it has been like hat for a few days and he thinks it is a infection .        HPI   Pt presents to [facility] for swelling and discomfort of his arm   Affected area right upper arm   Pt is unsure what occurred to cause swelling and bruising of his arm   States he noticed the area around a week prior   No heavy lifting, trauma or injury per pt  Denies weakness, numbness or tingling of right arm or hand   Discomfort exacerbated with flexion of right upper arm   Discomfort not affecting sleep   No intervention thus far for arm   Taking his Mobic as prescribed   Eating and drinking  Denies fever or chills   He has concerns that his arm is infected  Sleep unchanged                         Past Medical History:   Diagnosis Date    Allergic rhinitis 2/19/2018    Anxiety     Chronic back pain     COPD (chronic obstructive pulmonary disease) ([address]     Depression     Disorder of stomach     valve not closing properly    Emphysema lung ([address]     Essential hypertension 11/4/2019    Gastroesophageal reflux disease 10/4/2019    Hyperlipidemia     meds > 22 yrs    Low back pain 5/1/2018    ARNAUD (obstructive sleep apnea) 2/19/2018    Other emphysema ([address] 10/4/2019    Other intervertebral disc degeneration, lumbar region 3/23/2018    Radiculopathy, lumbar region 2/28/2018    Restless leg syndrome     Seasonal affective disorder ([address] 10/4/2019    Substance abuse ([address]     alcholic, quit 20 yrs       Past Surgical History:   Procedure Laterality Date    COLONOSCOPY  12/22/2016    A SHALA MARLEY    DENTAL SURGERY  10 yrs ago    ENDOSCOPY, COLON, DIAGNOSTIC      EYE SURGERY      Lasik OU    KNEE ARTHROSCOPY Right     KNEE SURGERY Right 05/2016    Ray procedure    KNEE SURGERY      ID NASAL SCOPY,OPEN MAXILL SINUS N/A 2/22/2018 SEPTOPLASTY MICRODEBRIDER ASSISTED TURBINOPLASTY AND OUT-FRACTURING BILATERAL NASAL ENDOSCOPY performed by Angelo Novoa MD at [address] History   Problem Relation Age of Onset    Cancer Mother         stomach    Arthritis Mother     Heart Disease Father 48    Cancer Father         bone    Cancer Maternal Grandmother         lung    Cancer Paternal Grandmother     Heart Disease Paternal Grandfather     No Known Problems Sister     Cancer Brother     Heart Disease Brother         hole in heart in 1959 at 1 months age               Review of Systems   Constitutional: Positive for activity change. Negative for chills, diaphoresis, fatigue and fever. HENT: Negative for congestion, ear pain, rhinorrhea and sore throat. Respiratory: Negative for cough. Cardiovascular: Negative for chest pain and palpitations. Musculoskeletal: Negative for neck pain and neck stiffness. Skin: Positive for color change. Neurological: Negative for dizziness, light-headedness and headaches. PMH, Surgical Hx, Family Hx, and Social Hx reviewed and updated. Objective  Vitals:    05/05/22 1858   BP: 130/72   Site: Right Upper Arm   Position: Sitting   Cuff Size: Medium Adult   Pulse: 74   Temp: 97.3 °F (36.3 °C)   TempSrc: Temporal   SpO2: 97%   Weight: 148 lb (67.1 kg)   Height: 5' 9\" (1.753 m)     BP Readings from Last 3 Encounters:   05/05/22 130/72   03/17/22 114/74   02/21/22 118/76     Wt Readings from Last 3 Encounters:   05/05/22 148 lb (67.1 kg)   04/04/22 148 lb (67.1 kg)   03/17/22 145 lb (65.8 kg)               Physical Exam  Vitals reviewed. Constitutional:       General: He is not in acute distress. Appearance: Normal appearance. He is not ill-appearing or toxic-appearing.    HENT:      Right Ear: External ear normal.      Left Ear: External ear normal.      Nose: Nose normal.   Eyes:      General: Lids are normal.      Conjunctiva/sclera: Conjunctivae normal. Cardiovascular:      Rate and Rhythm: Normal rate. Pulses:           Radial pulses are 2+ on the right side. Pulmonary:      Effort: Pulmonary effort is normal.   Musculoskeletal:         General: Normal range of motion. Right upper arm: Swelling and tenderness present. Cervical back: Normal range of motion. Skin:     General: Skin is warm and dry. Capillary Refill: Capillary refill takes less than 2 seconds. Findings: Bruising present. No abscess. Neurological:      Mental Status: He is alert and oriented to person, place, and time. Assessment & Plan    Diagnosis Orders   1. Localized swelling, mass, or lump of upper extremity, right  US SOFT TISSUE LIMITED AREA     Orders Placed This Encounter   Procedures    US SOFT TISSUE LIMITED AREA     This procedure can be scheduled via [employer]. Access your [employer] account by visiting [email or web address]. Standing Status:   Future     Standing Expiration Date:   5/6/2023     Order Specific Question:   Reason for exam:     Answer:   localized swelling     Order Specific Question:   Reason for exam:     Answer:   ecchymosis     No orders of the defined types were placed in this encounter. Return if symptoms worsen or fail to improve, for follow up with PCP. Reviewed with the patient: current clinical status & that an order has been placed for an US. Discussed with patient he will be called to schedule his appt for imaging. The treatment plan/rationale and result expectations have been discussed with the patient who expressed understanding. Right UA wrapped to compress area. Discussed with patient to keep arm wrapped daily & remove as needed for bathing, comfort, ect. Pt verbalized understanding. Close follow up to evaluate treatment results and for coordination of care. I have reviewed the patient's medical history in detail and updated the computerized patient record.         Dru Jovel Nikki Hernández NP

## 2022-05-06 ENCOUNTER — TELEPHONE (OUTPATIENT)
Dept: FAMILY MEDICINE CLINIC | Age: 67
End: 2022-05-06

## 2022-05-06 NOTE — TELEPHONE ENCOUNTER
Patient is calling from [facility] xray office, thought an xray had been ordered after walk in appointment was completed 5/5/22. If one needs ordered please call patient.

## 2022-05-09 ENCOUNTER — CARE COORDINATION (OUTPATIENT)
Dept: CARE COORDINATION | Age: 67
End: 2022-05-09

## 2022-05-09 NOTE — CARE COORDINATION
Ambulatory Care Coordination Note  5/9/2022  CM Risk Score: 6  Charlson 10 Year Mortality Risk Score: 23%     ACC: Dacia Fink, HUSEYIN    Summary Note:     Larisa Fernandes called stating that he is need of assistance with general life sustainability. He says that he is having a great deal of time making ends meets  He has received one time financial assistance from [employer]  He is also in need of food. He is going to [employer] and [employer] for food. He adds that he has tried several times to get connected with [employer] and he is not having any of his calls returned. I will reach out to our social workers to see if they are able to help him with his social needs. Goals Addressed    None         Prior to Admission medications    Medication Sig Start Date End Date Taking? Authorizing Provider   FLUoxetine (PROZAC) 40 MG capsule TAKE 1 CAPSULE BY MOUTH DAILY. 4/25/22   MANUELA Ellington CNP   meloxicam [facility]) 15 MG tablet take 1 tablet by mouth once daily 3/8/22   MANUELA Ellington CNP   gabapentin (NEURONTIN) 400 MG capsule Take 1 capsule by mouth 3 times daily for 90 days.  2/16/22 5/17/22  MANUELA Ellington CNP   cyclobenzaprine (FLEXERIL) 10 MG tablet Take 1 tablet by mouth 3 times daily as needed for Muscle spasms 9/13/21   MANUELA Ellington CNP   acetaminophen (TYLENOL) 500 MG tablet Take 500 mg by mouth every 6 hours as needed for Pain    Historical Provider, MD   rosuvastatin (CRESTOR) 40 MG tablet TAKE 1 TABLET BY MOUTH DAILY AT BEDTIME. 4/30/21   Historical Provider, MD       Future Appointments   Date Time Provider Raphael Thompson   2/22/2023 11:30 AM MANUELA Ellington CNP [facility] EMERGENCY [facility] AT [place]

## 2022-05-09 NOTE — CARE COORDINATION
Requested by DAVIDE Gomes to help with extra help for general life issues. Telephone call with patient he indicated that he is in need of help with general expenses. He is unclear about which things he needs help with as he is very overwhelmed. He expressed plan to contact this writer once things are straight in his mind. He relayed that he has some private deals going on right now and will no better in a few day. He denied needing help with mortgage,as [facility] paying [facility] this. No problems affording medications. He is receiving food stamps but only 20 dollars a month. He goes to [employer] for [employer] He goes to [email or web address] twice a week for dinner. [employer] is helping him with food pantry. He did identify that he could probably need some assistance with food. Discussed utilities and he is caught up on electricity. He is on PIPP and HEAP. The [employer] has paid his water bill. In general he is need of money for general living expenses. Discssed with patient  would need a idea of specifics on needs to further assist him with community resources. Discussed plan to wait his return phone call as to specific help he needs.

## 2022-05-11 ASSESSMENT — ENCOUNTER SYMPTOMS
COUGH: 0
SORE THROAT: 0
RHINORRHEA: 0
COLOR CHANGE: 1

## 2022-05-13 ENCOUNTER — CARE COORDINATION (OUTPATIENT)
Dept: CARE COORDINATION | Age: 67
End: 2022-05-13

## 2022-05-13 NOTE — CARE COORDINATION
Telephone call with patient. He indicated that he is very confused about community resources to help him with utilities. He does not have any shut off notices for utilities. He is getting help with water and electric bill. Discussed PIPP and HEAP Program.  He wants to be to have someone to sit down with him to discuss these programs and others. .  Provided patient with contact information for [employer]. He does not have any local family support. Patient has phone number for HEAP  and  plans to call them. .  Patient asking about Social Security Disability. Recommended he contact an  which handles disability law. Provided patient with name of  which does disability law.

## 2022-05-17 RX ORDER — ROSUVASTATIN CALCIUM 40 MG/1
TABLET, COATED ORAL
Qty: 90 TABLET | Refills: 5 | Status: SHIPPED | OUTPATIENT
Start: 2022-05-17

## 2022-05-17 NOTE — TELEPHONE ENCOUNTER
Appointments    Encounter Information    Provider Department Appt Notes   2/22/2023 MANUELA Peres - [address] [place] [facility]       Past Visits    Date Provider Specialty Visit Type Primary Dx   05/05/2022 MANUELA Mcrae NP Family Medicine Office Visit Localized swelling, mass, or lump of upper extremity, right   04/04/2022 Ld Marroquin DPM Podiatry Initial consult Pain in both feet   03/17/2022 MANUELA Irene CNP Pain Management Office Visit Paresthesia of both feet   03/01/2022 Uri Eng DO Pain Management Office Visit Lumbosacral spondylosis without myelopathy   02/21/2022 MANUELA Peres CNP Family Medicine Office Visit Medicare annual wellness visit,

## 2022-05-20 ENCOUNTER — CARE COORDINATION (OUTPATIENT)
Dept: CARE COORDINATION | Age: 67
End: 2022-05-20

## 2022-05-20 NOTE — CARE COORDINATION
Telephone call to patient. He indicated that he got his utilities taken care of with HEAP and PIPP. HEAP is helping with his gas bill. He stated that PIPP is helping with Electric. City is helping him with his water bill. He has to pay trash and . He has enough money for food. He indicated that he gets 20 dollars a month for food stamps. He goes to [employer] for food and feels he is stocked up at this time and hasn't had to go to them in a month. He has been in contact with [facility] which helped him with HEAP/Gas bills. He feels he is able to afford his medications. He has adequate money at this time.

## 2022-06-01 ENCOUNTER — CARE COORDINATION (OUTPATIENT)
Dept: CARE COORDINATION | Age: 67
End: 2022-06-01

## 2022-06-01 NOTE — CARE COORDINATION
Telephone call with patient. He feels he has adequate money at this time. He paid off his credit cards. He just returned from [facility] and got a Seniors Box. He is feeling very organized at this time. No new needs or concerns were voiced at time of phone call.

## 2022-06-08 ENCOUNTER — CARE COORDINATION (OUTPATIENT)
Dept: CARE COORDINATION | Age: 67
End: 2022-06-08

## 2022-06-08 NOTE — CARE COORDINATION
Telephone call with patient. He feels he has adequate food supply at this time. No problems afford utilities or rent. No other concerns were voiced at time of phone call.

## 2022-06-15 ENCOUNTER — CARE COORDINATION (OUTPATIENT)
Dept: CARE COORDINATION | Age: 67
End: 2022-06-15

## 2022-06-15 NOTE — CARE COORDINATION
Telephone call with patient. He feels that his monthly expenses are affordable at this time. He cashed in some of his mutual funds to pay off his credit cards. He has destroyed his credit cards. He continues to received food stamps and goes to [employer] for [employer] and their food pantry. No problems affording medications as they have a low cost.  Discussed his household expense and he indicated that his house is paid off but he does have a home equity loan. He feels he can afford this loan. No problems with utilities at this time.

## 2022-06-22 ENCOUNTER — CARE COORDINATION (OUTPATIENT)
Dept: CARE COORDINATION | Age: 67
End: 2022-06-22

## 2022-06-22 NOTE — CARE COORDINATION
Telephone call to patient. He feels that he has adequate food supply at this time. He seniors box from [employer]. He continues to go to  [employer] for hot meals twice a week. No problems affording medications at this time. No other concerns were voiced at time of phone call.

## 2022-07-01 DIAGNOSIS — M47.817 LUMBOSACRAL SPONDYLOSIS WITHOUT MYELOPATHY: ICD-10-CM

## 2022-07-04 RX ORDER — GABAPENTIN 400 MG/1
CAPSULE ORAL
Qty: 90 CAPSULE | Refills: 2 | Status: SHIPPED | OUTPATIENT
Start: 2022-07-04 | End: 2022-09-26

## 2022-07-06 ENCOUNTER — CARE COORDINATION (OUTPATIENT)
Dept: CARE COORDINATION | Age: 67
End: 2022-07-06

## 2022-07-06 NOTE — CARE COORDINATION
Telephone call with patient. He feels that he is able to afford his medications. He receives 20 dollars a month in food stamps. He goes to [employer] for [employer] twice a month. No problems affording his utilities. He is on PIPP and HEAP. Patient recently paid off his credit card debit and used his mutual funds to pay off debt. No other concerns or needs at this time. Discussed plan to discharge patient from [facility] and patient is in agreement. Confirmed that patient that has this writer's phone number for future reference.

## 2022-08-30 DIAGNOSIS — M51.36 OTHER INTERVERTEBRAL DISC DEGENERATION, LUMBAR REGION: ICD-10-CM

## 2022-08-30 RX ORDER — MELOXICAM 15 MG/1
TABLET ORAL
Qty: 30 TABLET | Refills: 5 | Status: SHIPPED | OUTPATIENT
Start: 2022-08-30

## 2022-08-30 NOTE — TELEPHONE ENCOUNTER
2018 - F [phone]          Medication Refill  (Newest Message First)  Yoandy, Arashribritta In routed conversation to Constanza Wu Pcp Clinical Staff 1 hour ago (9:20 AM)      Future Appointments    Encounter Information    Provider Department Appt Notes   2/22/2023 Jeanette Ray[facility]     Past Visits    Date Provider Specialty Visit Type Primary Dx   05/05/2022 MANUELA Cobb - NP Family Medicine Office Visit Localized swelling, mass, or lump of upper extremity, right   04/04/2022 Juvenal Healy DPM Podiatry Initial consult Pain in both feet   03/17/2022 MANUELA Vallejo - CNP Pain Management Office Visit Paresthesia of both feet   03/01/2022 Austin Foley

## 2022-09-07 ENCOUNTER — TELEPHONE (OUTPATIENT)
Dept: FAMILY MEDICINE CLINIC | Age: 67
End: 2022-09-07

## 2022-09-07 DIAGNOSIS — M65.30 TRIGGER FINGER, UNSPECIFIED FINGER, UNSPECIFIED LATERALITY: Primary | ICD-10-CM

## 2022-09-07 NOTE — TELEPHONE ENCOUNTER
Pt calling asking for a referral has trigger finger in the left hand ring finger.         Pt [phone]

## 2022-09-25 DIAGNOSIS — M47.817 LUMBOSACRAL SPONDYLOSIS WITHOUT MYELOPATHY: ICD-10-CM

## 2022-09-26 RX ORDER — GABAPENTIN 400 MG/1
CAPSULE ORAL
Qty: 90 CAPSULE | Refills: 2 | Status: SHIPPED | OUTPATIENT
Start: 2022-09-26 | End: 2022-11-01

## 2022-09-26 NOTE — TELEPHONE ENCOUNTER
Future Appointments    Encounter Information    Provider Department Appt Notes   10/4/2022 Paul Zamora  Valerio Apple. trigger finger left hand ring finger   2/22/2023 MANUELA Raya CNP [facility]     Past Visits    Date Provider Specialty Visit Type Primary Dx   05/05/2022 MANUELA Cobb NP Family Medicine Office Visit Localized swelling, mass, or lump of upper extremity, right   04/04/2022 Juvenal Healy DPM Podiatry Initial consult Pain in both feet   03/17/2022 MANUELA Ramos CNP Pain Management Office Visit Paresthesia of both feet   03/01/2022 Austin Foley DO Pain Management Office Visit Lumbosacral spondylosis without myelopathy   02/21/2022 MANUELA Raya CNP Family Medicine Office Visit Medicare annual wellness visit, subsequent

## 2022-10-04 ENCOUNTER — OFFICE VISIT (OUTPATIENT)
Dept: ORTHOPEDIC SURGERY | Age: 67
End: 2022-10-04
Payer: MEDICARE

## 2022-10-04 VITALS
BODY MASS INDEX: 21.92 KG/M2 | HEART RATE: 65 BPM | HEIGHT: 69 IN | WEIGHT: 148 LBS | OXYGEN SATURATION: 95 % | TEMPERATURE: 98.4 F

## 2022-10-04 DIAGNOSIS — M65.342 TRIGGER FINGER, LEFT RING FINGER: Primary | ICD-10-CM

## 2022-10-04 PROCEDURE — 1123F ACP DISCUSS/DSCN MKR DOCD: CPT | Performed by: ORTHOPAEDIC SURGERY

## 2022-10-04 PROCEDURE — 99203 OFFICE O/P NEW LOW 30 MIN: CPT | Performed by: ORTHOPAEDIC SURGERY

## 2022-10-04 ASSESSMENT — ENCOUNTER SYMPTOMS
ABDOMINAL DISTENTION: 0
CHEST TIGHTNESS: 0

## 2022-10-04 NOTE — PROGRESS NOTES
Subjective:      Patient ID: Serena Allen is a 69 y.o. male who presents today for:  Chief Complaint   Patient presents with    New Patient     Patient presents for trigger finger left hand/ ring finger.         HPI  Patient states he has been having the symptoms for about 2 to 3 weeks  He has tried splinting them but the discomfort tends to persist  It is particularly uncomfortable when he wakes up in the morning of the fingers locked, and he has to force it to normal neutral position    Past Medical History:   Diagnosis Date    Allergic rhinitis 2/19/2018    Anxiety     Chronic back pain     COPD (chronic obstructive pulmonary disease) ([facility])     Depression     Disorder of stomach     valve not closing properly    Emphysema lung ([address]     Essential hypertension 11/4/2019    Gastroesophageal reflux disease 10/4/2019    Hyperlipidemia     meds > 22 yrs    Low back pain 5/1/2018    ARNAUD (obstructive sleep apnea) 2/19/2018    Other emphysema ([address] 10/4/2019    Other intervertebral disc degeneration, lumbar region 3/23/2018    Radiculopathy, lumbar region 2/28/2018    Restless leg syndrome     Seasonal affective disorder ([address] 10/4/2019    Substance abuse ([address]     alcholic, quit 20 yrs      Past Surgical History:   Procedure Laterality Date    COLONOSCOPY  12/22/2016    A SHALA MARLEY    DENTAL SURGERY  10 yrs ago    ENDOSCOPY, COLON, DIAGNOSTIC      EYE SURGERY      Lasik OU    KNEE ARTHROSCOPY Right     KNEE SURGERY Right 05/2016    Ray procedure    KNEE SURGERY      [place] NASAL SCOPY,OPEN MAXILL SINUS N/A 2/22/2018    SEPTOPLASTY MICRODEBRIDER ASSISTED TURBINOPLASTY AND OUT-FRACTURING BILATERAL NASAL ENDOSCOPY performed by Ailyn Marcano MD at [address] [facility] [facility] History     Socioeconomic History    Marital status:      Spouse name: Not on file    Number of children: 2    Years of education: 12    Highest education level: High school graduate   Occupational History    Not on file   Tobacco Use    Smoking status: Every Day     Packs/day: 2.00     Years: 52.00     Pack years: 104.00     Types: Cigars, Cigarettes     Start date: 1970    Smokeless tobacco: Never    Tobacco comments:     3-5 cigars qd   Vaping Use    Vaping Use: Never used   Substance and Sexual Activity    Alcohol use: No     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    Drug use: No    Sexual activity: Not Currently     Partners: Female   Other Topics Concern    Not on file   Social History Narrative    Not on file     Social Determinants of Health     Financial Resource Strain: Low Risk     Difficulty of Paying Living Expenses: Not hard at all   Food Insecurity: No Food Insecurity    Worried About Running Out of Food in the Last Year: Never true    Ran Out of Food in the Last Year: Never true   Transportation Needs: Not on file   Physical Activity: Inactive    Days of Exercise per Week: 0 days    Minutes of Exercise per Session: 0 min   Stress: Not on file   Social Connections: Not on file   Intimate Partner Violence: Not on file   Housing Stability: Not on file     Family History   Problem Relation Age of Onset    Cancer Mother         stomach    Arthritis Mother     Heart Disease Father 48    Cancer Father         bone    Cancer Maternal Grandmother         lung    Cancer Paternal Grandmother     Heart Disease Paternal Grandfather     No Known Problems Sister     Cancer Brother     Heart Disease Brother         hole in heart in 65 at 1 months age     Allergies   Allergen Reactions    Alcohol Other (See Comments)     Sober 22 yrs    Benadryl [Diphenhydramine]      \"speeds him up\"    Demerol Hcl [Meperidine] Other (See Comments)     Aggressive behavior    Sulfa Antibiotics Other (See Comments)     Told by mother / patient unaware of reaction     Current Outpatient Medications on File Prior to Visit   Medication Sig Dispense Refill    gabapentin (NEURONTIN) 400 MG capsule take 1 capsule by mouth three times a day 90 capsule 2    meloxicam (MOBIC) 15 MG tablet take 1 tablet by mouth once daily 30 tablet 5    rosuvastatin (CRESTOR) 40 MG tablet TAKE 1 TABLET BY MOUTH DAILY AT BEDTIME. 90 tablet 5    FLUoxetine (PROZAC) 40 MG capsule TAKE 1 CAPSULE BY MOUTH DAILY. 90 capsule 1    acetaminophen (TYLENOL) 500 MG tablet Take 500 mg by mouth every 6 hours as needed for Pain      [DISCONTINUED] cyclobenzaprine (FLEXERIL) 10 MG tablet Take 1 tablet by mouth 3 times daily as needed for Muscle spasms 21 tablet 0     No current facility-administered medications on file prior to visit. Controlled Substance Monitoring:    Acute and Chronic Pain Monitoring:   RX Monitoring 5/17/2019   Attestation The Prescription Monitoring Report for this patient was reviewed today. Periodic Controlled Substance Monitoring -         Review of Systems   Constitutional:  Negative for activity change and appetite change. Respiratory:  Negative for chest tightness. Cardiovascular:  Negative for chest pain. Gastrointestinal:  Negative for abdominal distention. Genitourinary:  Negative for difficulty urinating. Neurological:  Negative for headaches. Objective:   Pulse 65   Temp 98.4 °F (36.9 °C) (Tympanic)   Ht 5' 9\" (1.753 m)   Wt 148 lb (67.1 kg)   SpO2 95%   BMI 21.86 kg/m²     Physical Exam:    Left hand  There is no significant swelling in the hand  He has ring finger partly flexed at the PIP joint about 10 to 15 degrees  Tenderness present over the A1 pulley to the ring finger  Active flexion extension clearly elicits triggering  Sensations intact light touch and pressure in all the fingers  No discomfort over the carpal canal area  No triggering or thickening no drinks in any of the other fingers A1 pulley      Diagnostic Imaging: No x-rays were taken      Assessment:       Diagnosis Orders   1. Trigger finger, left ring finger              Plan:      Planed to the patient the significance of these findings  Nonoperative measures are first discussed such as splinting.   He states that he has had splints placed and this did not help him  He takes anti-inflammatory medications for his discomfort  Cortisone injection is an option, but he declined    I therefore explained to him surgical intervention the form of expiration or release of the A1 pulley    The risks and benefits of a trigger finger release have been discussed in detail  Risk of anesthesia, which is a Biers block. Risk of injury to the vessel, nerve, tendon  Postoperative complications such as hematoma formation, infection, continued pain in the hand, restricted range of motion, neuroma formation, have all been discussed  Recurrence of the triggering is a rare problem but could happen. Triggering of the other fingers requiring intervention is also possible  He understands all the risks and benefits and consents for the surgery  He is going to get in touch with my office schedule the procedure in the near future for his left hand ring finger      No orders of the defined types were placed in this encounter. No orders of the defined types were placed in this encounter. Return for Plan for surgery and left hand ring finger.       Nayeli Hand MD           [address] [place] [address] and Sports Medicine  Ph: [phone]       Ph: [phone]   Fx: [phone]       Fx: [phone]    Surgery Scheduling, Pre-Op and Post-Op Information Form  Call to Pre-[place] at [phone] at least 24 hours Prior to Procedure Date     Surgery Location: [facility] Surgery: [address] [place] [address]    OFFICE   Surgeon: Nayeli Hand MD  Surgery Date: TBD  Time: am   Patient: Alissa Jon   : 1955   #: [id number]  Gender: male  Home Phone: [phone] Mobile Phone: [phone]  Emergency Contact: Christy Barb Phone: [phone]  Insurance Co: Payor: Ria Moseley /  /  /  ID No:  [id number]        PROVIDER  Diagnosis: Left hand trigger finger-ring finger  Procedure/Consent: Left hand-trigger finger-ring finger-release of A1 pulley  CPT CODES:   Case Comments/Implants: N/A   Surgery Schedule Type: Outpatient  Anesthesia Requested: Cortes Pearson  Referring Family Doctor: MANUELA Che CNP  [x] [facility] Date/Time: _________________________________________________________  History & Physical: [] Physician will Provide [] Attached [] Dictated [] Other  [x] Follow Anesthesia Pre-Op Orders for X-rays, [facility]     [x] SN & PT to evaluate and treat/educate disease management, medications, home safety & equipment needs for total joint patients  [] Other: ____________________________________________________  Consults: Medical/Cardiac Clearance done by  ____________________    PRE-OP ORDERS:  Allergies: Alcohol, Benadryl [diphenhydramine], Demerol hcl [meperidine], and Sulfa antibiotics Latex Allergies: _____  Diabetic: _____  [] IV ________________________  [x] IV Start with J-loop     Preprinted Orders: Attached [] Yes [] No   Antibiotic Pre-Op: [x] ANCEF 2 gram IVPB if > 120 kg 3 grams IVPB within 1 hr. of Incision, if Allergic, use Vancomycin 1 gram IV, 2 hrs.  Pre-op  [] TXA Protocol [] Other:   [x] NPO   [] Betablocker (if needed) _____________________________________   [] Knee high anti-embolic hose [] Thigh high anti-embolic hose   Other: ______________________________________________________    Physician Franco Correa MD    Date/Time: 10/4/2022

## 2022-10-07 ENCOUNTER — TELEPHONE (OUTPATIENT)
Dept: ORTHOPEDIC SURGERY | Age: 67
End: 2022-10-07

## 2022-10-07 NOTE — TELEPHONE ENCOUNTER
Surgery Scheduling and Authorization Information    Surgery Date:11/1/2022  Surgery good through dates:   CPT Code(s) 91088  Procedure(s) Left hand trigger release of A1 pulley       Approved [] Not Required [x] Denied []  Reference # Scanned   Auth #    How authorization obtained:  [x] web portal             [] via phone       [] Via fax    Provider  [] Addison Ahumada  [] Leny Palmer  [] Hilton Wynn  [] Rey Abbott  [] Lizbeth Castaneda  [] Jose Rowley  [x] Devota Blizzard  [] Florentin Swan  [] Macie Adhikari  [] Sanchez Tapia      Surgery Sheet Faxed to Scheduling [x]  Surgery and Prior Authorization Information Sheet Scanned [x]

## 2022-10-09 DIAGNOSIS — F41.9 ANXIETY: ICD-10-CM

## 2022-10-09 DIAGNOSIS — F32.A DEPRESSION, UNSPECIFIED DEPRESSION TYPE: ICD-10-CM

## 2022-10-09 RX ORDER — FLUOXETINE HYDROCHLORIDE 40 MG/1
40 CAPSULE ORAL DAILY
Qty: 90 CAPSULE | Refills: 1 | Status: SHIPPED | OUTPATIENT
Start: 2022-10-09

## 2022-10-09 NOTE — TELEPHONE ENCOUNTER
Patient is requesting medication refill. Please approve or deny this request.    Rx requested:  Requested Prescriptions     Pending Prescriptions Disp Refills    FLUoxetine (PROZAC) 40 MG capsule [Pharmacy Med Name: FLUOXETINE HCL 40 MG CAPSULE] 90 capsule 1     Sig: TAKE 1 CAPSULE BY MOUTH DAILY.          Last Office Visit:   2/21/2022      Next Visit Date:  Future Appointments   Date Time Provider Raphael Thompson   10/28/2022  2:00 PM Baron Ortiz PA-C [facility]   11/15/2022  2:00 PM Baron Ortiz PA-C [facility]   2/22/2023 11:30 AM MANUELA Lara CNP [facility] [place] [facility]

## 2022-10-21 ENCOUNTER — CARE COORDINATION (OUTPATIENT)
Dept: CARE COORDINATION | Age: 67
End: 2022-10-21

## 2022-10-21 NOTE — CARE COORDINATION
I called to discuss care coordination with Monster Duffy. I have worked with Monster Duffy in the past  He tells me that overall he is doing well  He will have trigger release on his ring finger  He does continue to have back pain but he says that he is able to deal with this issue. He does not feel that he needs care coordination at this time.

## 2022-10-28 ENCOUNTER — OFFICE VISIT (OUTPATIENT)
Dept: ORTHOPEDIC SURGERY | Age: 67
End: 2022-10-28
Payer: MEDICARE

## 2022-10-28 VITALS
SYSTOLIC BLOOD PRESSURE: 101 MMHG | WEIGHT: 142.8 LBS | TEMPERATURE: 97.5 F | DIASTOLIC BLOOD PRESSURE: 77 MMHG | BODY MASS INDEX: 21.15 KG/M2 | HEIGHT: 69 IN | OXYGEN SATURATION: 95 % | HEART RATE: 70 BPM

## 2022-10-28 DIAGNOSIS — Z01.811 PRE-OP CHEST EXAM: ICD-10-CM

## 2022-10-28 DIAGNOSIS — Z01.811 PRE-OP CHEST EXAM: Primary | ICD-10-CM

## 2022-10-28 LAB
ANION GAP SERPL CALCULATED.3IONS-SCNC: 13 MEQ/L (ref 9–15)
BUN BLDV-MCNC: 15 MG/DL (ref 8–23)
CALCIUM SERPL-MCNC: 8.9 MG/DL (ref 8.5–9.9)
CHLORIDE BLD-SCNC: 97 MEQ/L (ref 95–107)
CO2: 23 MEQ/L (ref 20–31)
CREAT SERPL-MCNC: 0.88 MG/DL (ref 0.7–1.2)
GFR SERPL CREATININE-BSD FRML MDRD: >60 ML/MIN/{1.73_M2}
GLUCOSE BLD-MCNC: 81 MG/DL (ref 70–99)
HCT VFR BLD CALC: 41.6 % (ref 42–52)
HEMOGLOBIN: 13.8 G/DL (ref 14–18)
MCH RBC QN AUTO: 32.7 PG (ref 27–31.3)
MCHC RBC AUTO-ENTMCNC: 33.2 % (ref 33–37)
MCV RBC AUTO: 98.5 FL (ref 79–92.2)
PDW BLD-RTO: 13.8 % (ref 11.5–14.5)
PLATELET # BLD: 252 K/UL (ref 130–400)
POTASSIUM SERPL-SCNC: 3.8 MEQ/L (ref 3.4–4.9)
RBC # BLD: 4.22 M/UL (ref 4.7–6.1)
SODIUM BLD-SCNC: 133 MEQ/L (ref 135–144)
WBC # BLD: 8 K/UL (ref 4.8–10.8)

## 2022-10-28 PROCEDURE — PREOPEXAM PRE-OP EXAM: Performed by: PHYSICIAN ASSISTANT

## 2022-10-28 PROCEDURE — 93000 ELECTROCARDIOGRAM COMPLETE: CPT | Performed by: PHYSICIAN ASSISTANT

## 2022-10-28 PROCEDURE — 99406 BEHAV CHNG SMOKING 3-10 MIN: CPT | Performed by: PHYSICIAN ASSISTANT

## 2022-10-28 NOTE — PROGRESS NOTES
[place] [address] and Sports Medicine    H&P: Preadmission Testing     Patient: Candida Yuan  YOB: 1955  MRN: [id number]    Subjective:     Chief Complaint   Patient presents with    Pre-op Exam     Pre op exam       HPI: Candida Yuan is a 69 y.o. maleis here for preop evaluation for left ring finger trigger release with Dr. Isi Kelly who is referring surgeon  They have a pertinent history of hypertension, palpitations, GERD, ARNAUD, hypertrophic nasal turbinates, tobacco use    There is no known history of heart disease or infarction. There is no recent chest pain or discomfort, palpitations, shortness of breath, MOORE, difficulties with exercise, bilateral ankle swelling. Not taking any blood thinners. There is no known history of obstructive or restrictive lung disease. He is a smoker, he smokes cigars. We talked about the negative consequences both long-term and short-term for 5 minutes. No recent wheezing or use of any kind of inhalers. No recent hospitalizations for any lung-related illnesses. No recent Covid infection. No known history of gastric issues which includes ulcers, herniations, bariatric surgeries. No recent GI bleeds    No known history of hyper or hypercoagulable states. They are not diabetic. They have normal kidney function.      Past Medical History:        Diagnosis Date    Allergic rhinitis 2/19/2018    Anxiety     Chronic back pain     COPD (chronic obstructive pulmonary disease) ([facility])     Depression     Disorder of stomach     valve not closing properly    Emphysema lung ([address]     Essential hypertension 11/4/2019    Gastroesophageal reflux disease 10/4/2019    Hyperlipidemia     meds > 22 yrs    Low back pain 5/1/2018    ARNAUD (obstructive sleep apnea) 2/19/2018    Other emphysema ([address] 10/4/2019    Other intervertebral disc degeneration, lumbar region 3/23/2018    Radiculopathy, lumbar region 2/28/2018    Restless leg syndrome     Seasonal affective disorder ([facility] 10/4/2019    Substance abuse ([facility]     alcholic, quit 20 yrs      Past Surgical History:    Past Surgical History:   Procedure Laterality Date    COLONOSCOPY  12/22/2016    A SHALA MARLEY    DENTAL SURGERY  10 yrs ago    ENDOSCOPY, COLON, DIAGNOSTIC      EYE SURGERY      Lasik OU    KNEE ARTHROSCOPY Right     KNEE SURGERY Right 05/2016    Ray procedure    KNEE SURGERY      [place] NASAL SCOPY,OPEN MAXILL SINUS N/A 2/22/2018    SEPTOPLASTY MICRODEBRIDER ASSISTED TURBINOPLASTY AND OUT-FRACTURING BILATERAL NASAL ENDOSCOPY performed by Hershall Bosworth, MD at [facility]       Medications Prior to Admission:    Current Outpatient Medications   Medication Sig Dispense Refill    FLUoxetine (PROZAC) 40 MG capsule TAKE 1 CAPSULE BY MOUTH DAILY. 90 capsule 1    meloxicam (MOBIC) 15 MG tablet take 1 tablet by mouth once daily 30 tablet 5    rosuvastatin (CRESTOR) 40 MG tablet TAKE 1 TABLET BY MOUTH DAILY AT BEDTIME. 90 tablet 5    acetaminophen (TYLENOL) 500 MG tablet Take 500 mg by mouth every 6 hours as needed for Pain      gabapentin (NEURONTIN) 400 MG capsule take 1 capsule by mouth three times a day 90 capsule 2     No current facility-administered medications for this visit.        Allergies:    Alcohol, Benadryl [diphenhydramine], Demerol hcl [meperidine], and Sulfa antibiotics    Social History:   Social History     Socioeconomic History    Marital status:      Spouse name: Not on file    Number of children: 2    Years of education: 12    Highest education level: High school graduate   Occupational History    Not on file   Tobacco Use    Smoking status: Every Day     Packs/day: 2.00     Years: 52.00     Pack years: 104.00     Types: Cigars, Cigarettes     Start date: 1970    Smokeless tobacco: Never    Tobacco comments:     3-5 cigars qd   Vaping Use    Vaping Use: Never used   Substance and Sexual Activity    Alcohol use: No     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    Drug use: No    Sexual activity: Not Currently Partners: Female   Other Topics Concern    Not on file   Social History Narrative    Not on file     Social Determinants of Health     Financial Resource Strain: Low Risk     Difficulty of Paying Living Expenses: Not hard at all   Food Insecurity: No Food Insecurity    Worried About Running Out of Food in the Last Year: Never true    Ran Out of Food in the Last Year: Never true   Transportation Needs: Not on file   Physical Activity: Inactive    Days of Exercise per Week: 0 days    Minutes of Exercise per Session: 0 min   Stress: Not on file   Social Connections: Not on file   Intimate Partner Violence: Not on file   Housing Stability: Not on file       Family History:       Problem Relation Age of Onset    Cancer Mother         stomach    Arthritis Mother     Heart Disease Father 48    Cancer Father         bone    Cancer Maternal Grandmother         lung    Cancer Paternal Grandmother     Heart Disease Paternal Grandfather     No Known Problems Sister     Cancer Brother     Heart Disease Brother         hole in heart in 65 at 1 months age       Objective:   /77 (Site: Right Upper Arm) Comment (Site): right arm  Pulse 70   Temp 97.5 °F (36.4 °C) (Temporal)   Ht 5' 9\" (1.753 m)   Wt 142 lb 12.8 oz (64.8 kg)   SpO2 95%   BMI 21.09 kg/m²     Physical Exam  Constitutional:       General: He is not in acute distress. Appearance: Normal appearance. He is not ill-appearing. HENT:      Head: Normocephalic. Nose: Nose normal. No congestion or rhinorrhea. Mouth/Throat:      Mouth: Mucous membranes are moist.      Pharynx: Oropharynx is clear. No oropharyngeal exudate or posterior oropharyngeal erythema. Eyes:      Extraocular Movements: Extraocular movements intact. Pupils: Pupils are equal, round, and reactive to light. Cardiovascular:      Rate and Rhythm: Normal rate and regular rhythm. Pulses: Normal pulses. Heart sounds: Normal heart sounds.    Pulmonary:      Effort: Pulmonary effort is normal.      Breath sounds: Wheezing and rhonchi present. No rales. Abdominal:      General: Abdomen is flat. Bowel sounds are normal.      Palpations: Abdomen is soft. Tenderness: There is no abdominal tenderness. Skin:     General: Skin is warm and dry. Capillary Refill: Capillary refill takes less than 2 seconds. Comments: No swelling or erythema over the incision site   Neurological:      General: No focal deficit present. Mental Status: He is alert and oriented to person, place, and time. Radiographs and Laboratory Studies:   EKG:  Normal sinus rhythm  Laboratory Studies:   Lab Results   Component Value Date    WBC 10.5 07/26/2021    HGB 14.1 07/26/2021    HCT 41.5 (L) 07/26/2021    MCV 96.3 07/26/2021     07/26/2021     Lab Results   Component Value Date    SEDRATE 8 06/06/2021     No results found for: CRP    Assessment and Plan:      Diagnosis Orders   1. Pre-op chest exam  EKG 12 lead          Procedure: Left hand ring trigger release  Blood work and EKG was ordered and will be reviewed. I'll see them back 2 weeks postoperatively.     Rachelle Fisher PA-C  [facility] and Sports Medicine  [phone]

## 2022-10-28 NOTE — PATIENT INSTRUCTIONS
-please walk over to our lab for your blood work, located right outside our office near the elevators    -stop taking asprin mobic motrin until after your surgery. All blood thinners need to be stopped at least 5 days in advance prior to surgery. If you experiencing pain, the only medication you can take for that is tylenol 3-4x / day not to exceed 4000 mg.  -our surgery department will reach out the you the day before your surgery and let you know what time to arrive at the [address] [place] [address] Road (door B)  -no eating / drinking the after midnight the night before surgery.  Sips of water the morning of for medications is OK  -if you have any questions, please call our office and I will be happy to answer them

## 2022-10-31 ENCOUNTER — ANESTHESIA EVENT (OUTPATIENT)
Dept: OPERATING ROOM | Age: 67
End: 2022-10-31
Payer: MEDICARE

## 2022-10-31 ASSESSMENT — LIFESTYLE VARIABLES: SMOKING_STATUS: 1

## 2022-11-01 ENCOUNTER — HOSPITAL ENCOUNTER (OUTPATIENT)
Age: 67
Setting detail: OUTPATIENT SURGERY
Discharge: HOME | End: 2022-11-01
Attending: ORTHOPAEDIC SURGERY | Admitting: ORTHOPAEDIC SURGERY
Payer: MEDICARE

## 2022-11-01 ENCOUNTER — ANESTHESIA (OUTPATIENT)
Dept: OPERATING ROOM | Age: 67
End: 2022-11-01
Payer: MEDICARE

## 2022-11-01 VITALS
RESPIRATION RATE: 20 BRPM | HEART RATE: 68 BPM | SYSTOLIC BLOOD PRESSURE: 133 MMHG | DIASTOLIC BLOOD PRESSURE: 83 MMHG | TEMPERATURE: 97.7 F | OXYGEN SATURATION: 97 %

## 2022-11-01 PROBLEM — M65.342 TRIGGER RING FINGER OF LEFT HAND: Status: ACTIVE | Noted: 2022-11-01

## 2022-11-01 PROCEDURE — 2580000003 HC RX 258: Performed by: STUDENT IN AN ORGANIZED HEALTH CARE EDUCATION/TRAINING PROGRAM

## 2022-11-01 PROCEDURE — 26055 INCISE FINGER TENDON SHEATH: CPT | Performed by: ORTHOPAEDIC SURGERY

## 2022-11-01 PROCEDURE — A4217 STERILE WATER/SALINE, 500 ML: HCPCS | Performed by: ORTHOPAEDIC SURGERY

## 2022-11-01 PROCEDURE — 7100000010 HC PHASE II RECOVERY - FIRST 15 MIN: Performed by: ORTHOPAEDIC SURGERY

## 2022-11-01 PROCEDURE — 3600000013 HC SURGERY LEVEL 3 ADDTL 15MIN: Performed by: ORTHOPAEDIC SURGERY

## 2022-11-01 PROCEDURE — 3700000000 HC ANESTHESIA ATTENDED CARE: Performed by: ORTHOPAEDIC SURGERY

## 2022-11-01 PROCEDURE — 6360000002 HC RX W HCPCS: Performed by: ORTHOPAEDIC SURGERY

## 2022-11-01 PROCEDURE — 2580000003 HC RX 258: Performed by: NURSE ANESTHETIST, CERTIFIED REGISTERED

## 2022-11-01 PROCEDURE — 2709999900 HC NON-CHARGEABLE SUPPLY: Performed by: ORTHOPAEDIC SURGERY

## 2022-11-01 PROCEDURE — 2580000003 HC RX 258: Performed by: ORTHOPAEDIC SURGERY

## 2022-11-01 PROCEDURE — 6360000002 HC RX W HCPCS: Performed by: NURSE ANESTHETIST, CERTIFIED REGISTERED

## 2022-11-01 PROCEDURE — 3600000003 HC SURGERY LEVEL 3 BASE: Performed by: ORTHOPAEDIC SURGERY

## 2022-11-01 PROCEDURE — 2500000003 HC RX 250 WO HCPCS: Performed by: ORTHOPAEDIC SURGERY

## 2022-11-01 PROCEDURE — 7100000011 HC PHASE II RECOVERY - ADDTL 15 MIN: Performed by: ORTHOPAEDIC SURGERY

## 2022-11-01 PROCEDURE — 3700000001 HC ADD 15 MINUTES (ANESTHESIA): Performed by: ORTHOPAEDIC SURGERY

## 2022-11-01 PROCEDURE — 2500000003 HC RX 250 WO HCPCS: Performed by: NURSE ANESTHETIST, CERTIFIED REGISTERED

## 2022-11-01 RX ORDER — SODIUM CHLORIDE, SODIUM LACTATE, POTASSIUM CHLORIDE, CALCIUM CHLORIDE 600; 310; 30; 20 MG/100ML; MG/100ML; MG/100ML; MG/100ML
INJECTION, SOLUTION INTRAVENOUS CONTINUOUS
Status: DISCONTINUED | OUTPATIENT
Start: 2022-11-01 | End: 2022-11-01 | Stop reason: HOSPADM

## 2022-11-01 RX ORDER — SODIUM CHLORIDE, SODIUM LACTATE, POTASSIUM CHLORIDE, CALCIUM CHLORIDE 600; 310; 30; 20 MG/100ML; MG/100ML; MG/100ML; MG/100ML
INJECTION, SOLUTION INTRAVENOUS CONTINUOUS PRN
Status: DISCONTINUED | OUTPATIENT
Start: 2022-11-01 | End: 2022-11-01 | Stop reason: SDUPTHER

## 2022-11-01 RX ORDER — ONDANSETRON 4 MG/1
4 TABLET, ORALLY DISINTEGRATING ORAL EVERY 8 HOURS PRN
Status: DISCONTINUED | OUTPATIENT
Start: 2022-11-01 | End: 2022-11-01 | Stop reason: HOSPADM

## 2022-11-01 RX ORDER — OXYCODONE HYDROCHLORIDE 5 MG/1
5 TABLET ORAL PRN
Status: DISCONTINUED | OUTPATIENT
Start: 2022-11-01 | End: 2022-11-01 | Stop reason: HOSPADM

## 2022-11-01 RX ORDER — LIDOCAINE HYDROCHLORIDE 10 MG/ML
1 INJECTION, SOLUTION EPIDURAL; INFILTRATION; INTRACAUDAL; PERINEURAL
Status: DISCONTINUED | OUTPATIENT
Start: 2022-11-01 | End: 2022-11-01 | Stop reason: HOSPADM

## 2022-11-01 RX ORDER — MAGNESIUM HYDROXIDE 1200 MG/15ML
LIQUID ORAL CONTINUOUS PRN
Status: COMPLETED | OUTPATIENT
Start: 2022-11-01 | End: 2022-11-01

## 2022-11-01 RX ORDER — PROPOFOL 10 MG/ML
INJECTION, EMULSION INTRAVENOUS CONTINUOUS PRN
Status: DISCONTINUED | OUTPATIENT
Start: 2022-11-01 | End: 2022-11-01 | Stop reason: SDUPTHER

## 2022-11-01 RX ORDER — LIDOCAINE HYDROCHLORIDE 5 MG/ML
INJECTION, SOLUTION INFILTRATION; INTRAVENOUS PRN
Status: DISCONTINUED | OUTPATIENT
Start: 2022-11-01 | End: 2022-11-01 | Stop reason: SDUPTHER

## 2022-11-01 RX ORDER — BUPIVACAINE HYDROCHLORIDE 2.5 MG/ML
INJECTION, SOLUTION EPIDURAL; INFILTRATION; INTRACAUDAL PRN
Status: DISCONTINUED | OUTPATIENT
Start: 2022-11-01 | End: 2022-11-01 | Stop reason: ALTCHOICE

## 2022-11-01 RX ORDER — SODIUM CHLORIDE 0.9 % (FLUSH) 0.9 %
5-40 SYRINGE (ML) INJECTION PRN
Status: DISCONTINUED | OUTPATIENT
Start: 2022-11-01 | End: 2022-11-01 | Stop reason: HOSPADM

## 2022-11-01 RX ORDER — SODIUM CHLORIDE 9 MG/ML
INJECTION, SOLUTION INTRAVENOUS PRN
Status: DISCONTINUED | OUTPATIENT
Start: 2022-11-01 | End: 2022-11-01 | Stop reason: HOSPADM

## 2022-11-01 RX ORDER — ONDANSETRON 2 MG/ML
4 INJECTION INTRAMUSCULAR; INTRAVENOUS
Status: DISCONTINUED | OUTPATIENT
Start: 2022-11-01 | End: 2022-11-01 | Stop reason: HOSPADM

## 2022-11-01 RX ORDER — SODIUM CHLORIDE 0.9 % (FLUSH) 0.9 %
5-40 SYRINGE (ML) INJECTION EVERY 12 HOURS SCHEDULED
Status: DISCONTINUED | OUTPATIENT
Start: 2022-11-01 | End: 2022-11-01 | Stop reason: HOSPADM

## 2022-11-01 RX ORDER — MIDAZOLAM HYDROCHLORIDE 1 MG/ML
INJECTION INTRAMUSCULAR; INTRAVENOUS PRN
Status: DISCONTINUED | OUTPATIENT
Start: 2022-11-01 | End: 2022-11-01 | Stop reason: SDUPTHER

## 2022-11-01 RX ORDER — ONDANSETRON 2 MG/ML
4 INJECTION INTRAMUSCULAR; INTRAVENOUS EVERY 6 HOURS PRN
Status: DISCONTINUED | OUTPATIENT
Start: 2022-11-01 | End: 2022-11-01 | Stop reason: HOSPADM

## 2022-11-01 RX ORDER — PROPOFOL 10 MG/ML
INJECTION, EMULSION INTRAVENOUS PRN
Status: DISCONTINUED | OUTPATIENT
Start: 2022-11-01 | End: 2022-11-01 | Stop reason: SDUPTHER

## 2022-11-01 RX ORDER — OXYCODONE HYDROCHLORIDE 5 MG/1
10 TABLET ORAL PRN
Status: DISCONTINUED | OUTPATIENT
Start: 2022-11-01 | End: 2022-11-01 | Stop reason: HOSPADM

## 2022-11-01 RX ORDER — METOCLOPRAMIDE HYDROCHLORIDE 5 MG/ML
10 INJECTION INTRAMUSCULAR; INTRAVENOUS
Status: DISCONTINUED | OUTPATIENT
Start: 2022-11-01 | End: 2022-11-01 | Stop reason: HOSPADM

## 2022-11-01 RX ADMIN — SODIUM CHLORIDE, POTASSIUM CHLORIDE, SODIUM LACTATE AND CALCIUM CHLORIDE: 600; 310; 30; 20 INJECTION, SOLUTION INTRAVENOUS at 07:40

## 2022-11-01 RX ADMIN — PROPOFOL 20 MG: 10 INJECTION, EMULSION INTRAVENOUS at 08:02

## 2022-11-01 RX ADMIN — SODIUM CHLORIDE, POTASSIUM CHLORIDE, SODIUM LACTATE AND CALCIUM CHLORIDE 1000 ML: 600; 310; 30; 20 INJECTION, SOLUTION INTRAVENOUS at 06:28

## 2022-11-01 RX ADMIN — LIDOCAINE HYDROCHLORIDE 30 ML: 5 INJECTION, SOLUTION INFILTRATION at 07:50

## 2022-11-01 RX ADMIN — PROPOFOL 50 MG: 10 INJECTION, EMULSION INTRAVENOUS at 07:51

## 2022-11-01 RX ADMIN — MIDAZOLAM HYDROCHLORIDE 2 MG: 1 INJECTION, SOLUTION INTRAMUSCULAR; INTRAVENOUS at 07:49

## 2022-11-01 RX ADMIN — CEFAZOLIN 2000 MG: 10 INJECTION, POWDER, FOR SOLUTION INTRAVENOUS at 07:45

## 2022-11-01 RX ADMIN — PROPOFOL 50 MCG/KG/MIN: 10 INJECTION, EMULSION INTRAVENOUS at 07:51

## 2022-11-01 NOTE — OP NOTE
Operative Note      Patient: Brandon Hanna  YOB: 1955  MRN: [id number]    Date of Procedure: 11/1/2022    Pre-Op Diagnosis: LEFT HAND TRIGGER FINGER RING FINGER    Post-Op Diagnosis: Same       Procedure(s):  LEFT HAND TRIGGER FINGER RING FINGER RELEASE OF A1 PULLEY    Surgeon(s): Jay Ley MD    Assistant:   First Assistant: Samreen Hylton    Anesthesia: [place] Block    Estimated Blood Loss (mL): Minimal    Complications: None    Specimens:   * No specimens in log *  Implants:  * No implants in log *      Drains: * No LDAs found *    Findings: Trigger finger-left hand ring finger    Detailed Description of Procedure: Indications for surgery  Patient is a 77-year-old male who presents with intermittent triggering of the left ring finger. This has been persistent for the last few months. he had tried conservative measures and these had failed. However the triggering persisted and we discussed surgical intervention in the form of exploration and decompression of the A1 pulley. The risks and benefits of the surgical procedure have been discussed in detail, risks of anesthesia which is a Biers block. The risk of injury to the vessel, nerve, tendon. Postoperative complications such as hematoma formation, infection, continued pain in the thumb and finger, recurrence of the triggering, neuroma formation, possible need for further surgical intervention, have all been discussed. Further triggering could also happen in the fingers which have not decompressed. He understands all the risks and benefits and consents for the surgical procedure    Operative note  Patient was brought to the operating room and a Biers block was given by the anesthetic services. The left upper extremity was prepped and draped in usual sterile fashion using chlorhexidine. The A1 pulley to the ring finger was carefully palpated and this was noted to be thickened.   Patient had 2 g of Ancef prior to the start of the procedure. A timeout was called prior to the start of the procedure. A transverse incision was made at the level of the A1 pulley to the ring finger. Skin and subcutaneous tissue were carefully cut and retracted. Incision measured less than a centimeter. Procedure itself was done with magnifying loops. The A1 pulley was carefully identified after the soft tissues were dissected out and kept well out of the way blunt dissection was done and the digital nerve was particularly moved out of the way along the ulnar and radial aspect. The A1 pulley was noted to be thick and an incision using a 15 blade was made over the A1 pulley care was taken to avoid any damage to the underlying tendon blunt-tip scissors were then used the decompression was completed both proximally and distally there was thickening in the A1 pulley,no synovial inflammation was noted along the A1 pulley. A segment of the A1 pulley was carefully resected out. The tendon following the decompression was moving freely. There was no locking noted. Skin was infiltrated with . 25 % Marcain - 3 cc. The surgical sites were irrigated out with normal saline. An interrupted nylon sutures were placed. The patient surgical sites were then dressed with Xeroform, gauze, web roll and an Ace wrap. She made a satisfactory recovery. he will mobilize the finger as comfort allows and will be evaluated in orthopedic office in approximately 10 days, postoperative instructions have been provided    The role of the assistant was involved in prepping of the left upper extremity. During the procedure she was involved in retraction of the soft tissues. Following completion, she was involved in closure of the skin and subcutaneous tissue. Application of dressings.   There were no qualified resident available to assist in this case    Electronically signed by Kristen Cross MD on 11/1/2022 at 8:34 AM

## 2022-11-01 NOTE — ANESTHESIA PRE PROCEDURE
Department of Anesthesiology  Preprocedure Note       Name:  Ema Seo   Age:  69 y.o.  :  1955                                          MRN:  [id number]         Date:  2022      Surgeon: Wendy Gutiérrez): Helen Orellana MD    Procedure: Procedure(s):  LEFT HAND TRIGGER FINGER RING FINGER RELEASE OF A1 PULLEY (PAT WITH ADDIE 10/28)    Medications prior to admission:   Prior to Admission medications    Medication Sig Start Date End Date Taking? Authorizing Provider   FLUoxetine (PROZAC) 40 MG capsule TAKE 1 CAPSULE BY MOUTH DAILY. 10/9/22   Christel Opitz, APRN - CNP   gabapentin (NEURONTIN) 400 MG capsule take 1 capsule by mouth three times a day 22  Christel Opitz, APRN - CNP   meloxicam [facility]) 15 MG tablet take 1 tablet by mouth once daily 22   Christel Opitz, APRN - CNP   rosuvastatin (CRESTOR) 40 MG tablet TAKE 1 TABLET BY MOUTH DAILY AT BEDTIME. 22   Christel Opitz, APRN - CNP   cyclobenzaprine (FLEXERIL) 10 MG tablet Take 1 tablet by mouth 3 times daily as needed for Muscle spasms 21   Christel Opitz, APRN - CNP   acetaminophen (TYLENOL) 500 MG tablet Take 500 mg by mouth every 6 hours as needed for Pain    Historical Provider, MD       Current medications:    Current Facility-Administered Medications   Medication Dose Route Frequency Provider Last Rate Last Admin    ceFAZolin (ANCEF) 2000 mg in dextrose 5 % 100 mL IVPB  2,000 mg IntraVENous On Call to [id number] Miamipatsy Zacarias MD        lactated ringers infusion   IntraVENous Continuous Gela Mucthomase Trav,  mL/hr at 22 0628 1,000 mL at 22 5146       Allergies:     Allergies   Allergen Reactions    Alcohol Other (See Comments)     Sober 22 yrs    Benadryl [Diphenhydramine]      \"speeds him up\"    Demerol Hcl [Meperidine] Other (See Comments)     Aggressive behavior    Sulfa Antibiotics Other (See Comments)     Told by mother / patient unaware of reaction       Problem List:    Patient Active Problem List   Diagnosis Code    DNS (deviated nasal septum) J34.2    Allergic rhinitis J30.9    Hypertrophy of nasal turbinates J34.3    ARNAUD (obstructive sleep apnea) G47.33    Anxiety F41.9    Insomnia G47.00    Hyperlipidemia E78.5    Tobacco abuse Z72.0    Low back pain M54.50    Other intervertebral disc degeneration, lumbar region M51.36    Presence of right artificial knee joint Z96.651    Radiculopathy, lumbar region M54.16    Sprain of ligaments of lumbar spine S33. 5XXA    Strain of muscle, fascia and tendon of lower back, initial encounter S39.012A    Essential hypertension I10    Gastroesophageal reflux disease K21.9    Other emphysema ([address] J43.8    Seasonal affective disorder (HCC) F33.8    Other intervertebral disc displacement, lumbar region M51.26    Abnormal findings on dx imaging of heart and cor circ R93.1    Abnormal result of cardiovascular function study, unspecified R94.30    Palpitations R00.2       Past Medical History:        Diagnosis Date    Allergic rhinitis 2/19/2018    Anxiety     Chronic back pain     COPD (chronic obstructive pulmonary disease) ([facility])     Depression     Disorder of stomach     valve not closing properly    Emphysema lung ([address]     Essential hypertension 11/4/2019    Gastroesophageal reflux disease 10/4/2019    Hyperlipidemia     meds > 22 yrs    Low back pain 5/1/2018    ARNAUD (obstructive sleep apnea) 2/19/2018    Other emphysema ([address] 10/4/2019    Other intervertebral disc degeneration, lumbar region 3/23/2018    Radiculopathy, lumbar region 2/28/2018    Restless leg syndrome     Seasonal affective disorder ([address] 10/4/2019    Substance abuse ([address]     alcholic, quit 20 yrs        Past Surgical History:        Procedure Laterality Date    COLONOSCOPY  12/22/2016    DALIA LAST MD    DENTAL SURGERY  10 yrs ago    ENDOSCOPY, COLON, DIAGNOSTIC      EYE SURGERY      Lasik OU    KNEE ARTHROSCOPY Right     KNEE SURGERY Right 05/2016    Ray procedure    KNEE SURGERY      [place] NASAL SCOPY,OPEN MAXILL SINUS N/A 2/22/2018    SEPTOPLASTY MICRODEBRIDER ASSISTED TURBINOPLASTY AND OUT-FRACTURING BILATERAL NASAL ENDOSCOPY performed by Geoff Duncan MD at [place] [address] History:    Social History     Tobacco Use    Smoking status: Every Day     Packs/day: 2.00     Years: 52.00     Pack years: 104.00     Types: Cigars, Cigarettes     Start date: 1970    Smokeless tobacco: Never    Tobacco comments:     3-5 cigars qd   Substance Use Topics    Alcohol use: No     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                                Ready to quit: Not Answered  Counseling given: Not Answered  Tobacco comments: 3-5 cigars qd      Vital Signs (Current):   Vitals:    11/01/22 0555 11/01/22 0629   BP: (!) 145/70    Pulse: 75    Resp: 16    Temp: 97.2 °F (36.2 °C)    TempSrc: Temporal    SpO2:  96%                                              BP Readings from Last 3 Encounters:   11/01/22 (!) 145/70   10/28/22 101/77   05/05/22 130/72       NPO Status: Time of last liquid consumption: 0430 (half cup black coffee.)                        Time of last solid consumption: 2300                        Date of last liquid consumption: 11/01/22                        Date of last solid food consumption: 10/31/22    BMI:   Wt Readings from Last 3 Encounters:   10/28/22 142 lb 12.8 oz (64.8 kg)   10/04/22 148 lb (67.1 kg)   05/05/22 148 lb (67.1 kg)     There is no height or weight on file to calculate BMI.    CBC:   Lab Results   Component Value Date/Time    WBC 8.0 10/28/2022 02:26 PM    RBC 4.22 10/28/2022 02:26 PM    HGB 13.8 10/28/2022 02:26 PM    HCT 41.6 10/28/2022 02:26 PM    MCV 98.5 10/28/2022 02:26 PM    RDW 13.8 10/28/2022 02:26 PM     10/28/2022 02:26 PM       CMP:   Lab Results   Component Value Date/Time     10/28/2022 02:26 PM    K 3.8 10/28/2022 02:26 PM    CL 97 10/28/2022 02:26 PM    CO2 23 10/28/2022 02:26 PM    BUN 15 10/28/2022 02:26 PM    CREATININE 0.88 10/28/2022 02:26 PM    GFRAA >60.0 07/26/2021 08:57 AM    LABGLOM >60.0 10/28/2022 02:26 PM    GLUCOSE 81 10/28/2022 02:26 PM    PROT 6.3 07/26/2021 08:57 AM    CALCIUM 8.9 10/28/2022 02:26 PM    BILITOT <0.2 07/26/2021 08:57 AM    ALKPHOS 64 07/26/2021 08:57 AM    AST 13 07/26/2021 08:57 AM    ALT 13 07/26/2021 08:57 AM       POC Tests: No results for input(s): POCGLU, POCNA, POCK, POCCL, POCBUN, POCHEMO, POCHCT in the last 72 hours. Coags:   Lab Results   Component Value Date/Time    PROTIME 11.9 03/11/2021 08:00 AM    INR 0.9 03/11/2021 08:00 AM    APTT 29.1 03/11/2021 08:00 AM       HCG (If Applicable): No results found for: PREGTESTUR, PREGSERUM, HCG, HCGQUANT     ABGs: No results found for: PHART, PO2ART, HEN0NNB, WYM7MPV, BEART, G0WVTXND     Type & Screen (If Applicable):  No results found for: LABABO, LABRH    Drug/Infectious Status (If Applicable):  No results found for: HIV, HEPCAB    COVID-19 Screening (If Applicable):   Lab Results   Component Value Date/Time    COVID19 Not Detected 03/11/2021 09:48 AM           Anesthesia Evaluation  Patient summary reviewed and Nursing notes reviewed no history of anesthetic complications:   Airway: Mallampati: II  TM distance: >3 FB   Neck ROM: full  Mouth opening: > = 3 FB   Dental: normal exam         Pulmonary:normal exam    (+) COPD:  sleep apnea:  current smoker          Patient did not smoke on day of surgery. Cardiovascular:  Exercise tolerance: good (>4 METS),   (+) hypertension:, hyperlipidemia      ECG reviewed               Beta Blocker:  Not on Beta Blocker      ROS comment: Sinus  Rhythm     Neuro/Psych:   (+) neuromuscular disease:, psychiatric history:            GI/Hepatic/Renal:   (+) GERD:,           Endo/Other: Negative Endo/Other ROS             Pt had PAT visit. Abdominal:             Vascular: negative vascular ROS.          Other Findings:           Anesthesia Plan      MAC and [place] block ASA 3       Induction: intravenous. MIPS: Prophylactic antiemetics administered. Anesthetic plan and risks discussed with patient. Plan discussed with CRNA.     Attending anesthesiologist reviewed and agrees with Preprocedure content                [facility],    11/1/2022

## 2022-11-01 NOTE — DISCHARGE INSTRUCTIONS
Elevate hand  Mobilize fingers as comfort allows  Keep dressings clean and dry  Change dressings in 3 days, Apply Band aid to surgical site  No heavy lifting , pulling or pushing    Suture will need removal in 10 days - Call Orthopedic office for appointments

## 2022-11-01 NOTE — ANESTHESIA PROCEDURE NOTES
Peripheral Block    Patient location during procedure: OR  Reason for block: primary anesthetic and at surgeon's request  Start time: 11/1/2022 7:40 AM  Staffing  Performed: resident/CRNA   Anesthesiologist: Collette Ortiz DO  Resident/CRNA: [address]  Completed: patient identified, IV checked, site marked, risks and benefits discussed, surgical/procedural consents, equipment checked, pre-op evaluation, timeout performed, anesthesia consent given, oxygen available, monitors applied/VS acknowledged, fire risk safety assessment completed and verbalized and blood product R/B/A discussed and consented  Peripheral Block   Patient position: supine  Prep: alcohol swabs  Provider prep: mask  Patient monitoring: cardiac monitor, continuous pulse ox, continuous capnometry, frequent blood pressure checks, IV access, oxygen and responsive to questions  Block type: [place] block  Laterality: left  Injection technique: single-shot  Guidance: other  Local infiltration: lidocaine  Infiltration strength: 0.5 %  Local infiltration: lidocaine  Dose: 30 mL

## 2022-11-01 NOTE — ANESTHESIA POSTPROCEDURE EVALUATION
Department of Anesthesiology  Postprocedure Note    Patient: Kamilla Osborn  MRN: [id number]  YOB: 1955  Date of evaluation: 11/1/2022      Procedure Summary     Date: 11/01/22 Room / Location: [address] [place] [address]    Anesthesia Start: 0740 Anesthesia Stop: 0808    Procedure: LEFT HAND TRIGGER FINGER RING FINGER RELEASE OF A1 PULLEY (Left) Diagnosis:       Trigger ring finger of left hand      (LEFT HAND TRIGGER FINGER RING FINGER)    Surgeons: Shakira Golden MD Responsible Provider: Viki Corona DO    Anesthesia Type: MAC, [place] block ASA Status: 3          Anesthesia Type: No value filed.     Oanh Phase I: Oanh Score: 10    Oanh Phase II:        Anesthesia Post Evaluation    Patient location during evaluation: PACU  Patient participation: complete - patient participated  Level of consciousness: responsive to physical stimuli  Pain score: 0  Airway patency: patent  Nausea & Vomiting: no nausea and no vomiting  Complications: no  Cardiovascular status: blood pressure returned to baseline  Respiratory status: acceptable and room air  Hydration status: euvolemic

## 2022-11-01 NOTE — DISCHARGE SUMMARY
On 11/1/22  Patient underwent a Trigger finger release - Left Ring finger  The procedure was uneventful   He was discharged home the same day

## 2022-11-15 ENCOUNTER — OFFICE VISIT (OUTPATIENT)
Dept: ORTHOPEDIC SURGERY | Age: 67
End: 2022-11-15

## 2022-11-15 VITALS
BODY MASS INDEX: 21.03 KG/M2 | WEIGHT: 142 LBS | OXYGEN SATURATION: 90 % | HEIGHT: 69 IN | HEART RATE: 82 BPM | TEMPERATURE: 98.4 F

## 2022-11-15 DIAGNOSIS — M65.342 TRIGGER FINGER, LEFT RING FINGER: Primary | ICD-10-CM

## 2022-11-15 PROCEDURE — 99024 POSTOP FOLLOW-UP VISIT: CPT | Performed by: PHYSICIAN ASSISTANT

## 2022-11-15 NOTE — PROGRESS NOTES
[address] [place] [address] and Sports Medicine      Subjective:      Chief Complaint   Patient presents with    Post-Op Check     Left hand ring finger trigger release- Pt states his mobility is great. HPI: Kathi Coyle is a 69 y.o. male who is here 2 weeks postop trigger release. No erythema, discharge, swelling at the incision site. Some residual stiffness appreciated. Pain well managed.      Past Medical History:   Diagnosis Date    Allergic rhinitis 2/19/2018    Anxiety     Chronic back pain     COPD (chronic obstructive pulmonary disease) (HCC)     Depression     Disorder of stomach     valve not closing properly    Emphysema lung ([address]     Essential hypertension 11/4/2019    Gastroesophageal reflux disease 10/4/2019    Hyperlipidemia     meds > 22 yrs    Low back pain 5/1/2018    ARNAUD (obstructive sleep apnea) 2/19/2018    Other emphysema ([address] 10/4/2019    Other intervertebral disc degeneration, lumbar region 3/23/2018    Radiculopathy, lumbar region 2/28/2018    Restless leg syndrome     Seasonal affective disorder ([address] 10/4/2019    Substance abuse ([address]     alcholic, quit 20 yrs       Past Surgical History:   Procedure Laterality Date    COLONOSCOPY  12/22/2016    A SHALA MARLEY    DENTAL SURGERY  10 yrs ago    ENDOSCOPY, COLON, DIAGNOSTIC      EYE SURGERY      Lasik OU    FINGER TRIGGER RELEASE Left 11/1/2022    LEFT HAND TRIGGER FINGER RING FINGER RELEASE OF A1 PANFILO performed by Kelli Tavarez MD at [place] [address] ARTHROSCOPY Right     KNEE SURGERY Right 05/2016    Ray procedure    KNEE SURGERY      [place] NASAL SCOPY,OPEN MAXILL SINUS N/A 2/22/2018    SEPTOPLASTY MICRODEBRIDER ASSISTED TURBINOPLASTY AND OUT-FRACTURING BILATERAL NASAL ENDOSCOPY performed by David Reyes MD at [address] [place] [address] History     Socioeconomic History    Marital status:      Spouse name: Not on file    Number of children: 2    Years of education: 12    Highest education level: High school graduate Occupational History    Not on file   Tobacco Use    Smoking status: Every Day     Packs/day: 2.00     Years: 52.00     Pack years: 104.00     Types: Cigars, Cigarettes     Start date: 1970    Smokeless tobacco: Never    Tobacco comments:     3-5 cigars qd   Vaping Use    Vaping Use: Never used   Substance and Sexual Activity    Alcohol use: No     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    Drug use: No    Sexual activity: Not Currently     Partners: Female   Other Topics Concern    Not on file   Social History Narrative    Not on file     Social Determinants of Health     Financial Resource Strain: Low Risk     Difficulty of Paying Living Expenses: Not hard at all   Food Insecurity: No Food Insecurity    Worried About Running Out of Food in the Last Year: Never true    Ran Out of Food in the Last Year: Never true   Transportation Needs: Not on file   Physical Activity: Inactive    Days of Exercise per Week: 0 days    Minutes of Exercise per Session: 0 min   Stress: Not on file   Social Connections: Not on file   Intimate Partner Violence: Not on file   Housing Stability: Not on file     Family History   Problem Relation Age of Onset    Cancer Mother         stomach    Arthritis Mother     Heart Disease Father 48    Cancer Father         bone    Cancer Maternal Grandmother         lung    Cancer Paternal Grandmother     Heart Disease Paternal Grandfather     No Known Problems Sister     Cancer Brother     Heart Disease Brother         hole in heart in 65 at 1 months age     Allergies   Allergen Reactions    Alcohol Other (See Comments)     Sober 22 yrs    Benadryl [Diphenhydramine]      \"speeds him up\"    Demerol Hcl [Meperidine] Other (See Comments)     Aggressive behavior    Sulfa Antibiotics Other (See Comments)     Told by mother / patient unaware of reaction     Current Outpatient Medications on File Prior to Visit   Medication Sig Dispense Refill    FLUoxetine (PROZAC) 40 MG capsule TAKE 1 CAPSULE BY MOUTH DAILY.  80 capsule 1    meloxicam (MOBIC) 15 MG tablet take 1 tablet by mouth once daily 30 tablet 5    rosuvastatin (CRESTOR) 40 MG tablet TAKE 1 TABLET BY MOUTH DAILY AT BEDTIME. 90 tablet 5    acetaminophen (TYLENOL) 500 MG tablet Take 500 mg by mouth every 6 hours as needed for Pain      gabapentin (NEURONTIN) 400 MG capsule take 1 capsule by mouth three times a day 90 capsule 2    [DISCONTINUED] cyclobenzaprine (FLEXERIL) 10 MG tablet Take 1 tablet by mouth 3 times daily as needed for Muscle spasms 21 tablet 0     No current facility-administered medications on file prior to visit. Objective:   Pulse 82   Temp 98.4 °F (36.9 °C) (Temporal)   Ht 5' 9\" (1.753 m)   Wt 142 lb (64.4 kg)   SpO2 90%   BMI 20.97 kg/m²     General: WDWN, well appearing, in no distress   Skin: without breakdown, rash, normal in color    Well-healing incisions at the palmar aspect of the hand, no erythema or discharge or signs of infection. Minimal stiffness in all motion is appreciated but yet anticipated after this surgery      Radiographs and Laboratory Studies:       Laboratory Studies:   Lab Results   Component Value Date    WBC 8.0 10/28/2022    HGB 13.8 (L) 10/28/2022    HCT 41.6 (L) 10/28/2022    MCV 98.5 (H) 10/28/2022     10/28/2022     Lab Results   Component Value Date    SEDRATE 8 06/06/2021     No results found for: CRP    Assessment and Plan:      Diagnosis Orders   1. Trigger finger, left ring finger            Sutures were removed. Incision without erythema, discharge, swelling. Instructed to continue with range of motion exercises until any residual stiffness is resolved. If stiffness is still present in a week they were instructed to call our office and we will initiate therapy. No submerging incision under water for another week, however they can shower. In about 1 week they were instructed to massage the incision with vitamin E lotion to help with soreness and break up scar tissue.   If they are having any issues or signs of infection, they were instructed to call our office immediately and make a follow-up appointment. Otherwise we will see them back in 6 weeks if needed      No orders of the defined types were placed in this encounter. No orders of the defined types were placed in this encounter. No follow-ups on file.     Alison Pollack PA-C  [facility] and Sports Medicine  [phone]

## 2022-12-13 ENCOUNTER — OFFICE VISIT (OUTPATIENT)
Dept: FAMILY MEDICINE CLINIC | Age: 67
End: 2022-12-13
Payer: MEDICARE

## 2022-12-13 VITALS
DIASTOLIC BLOOD PRESSURE: 50 MMHG | HEIGHT: 69 IN | TEMPERATURE: 96.3 F | SYSTOLIC BLOOD PRESSURE: 100 MMHG | WEIGHT: 142 LBS | HEART RATE: 82 BPM | OXYGEN SATURATION: 96 % | BODY MASS INDEX: 21.03 KG/M2

## 2022-12-13 DIAGNOSIS — F11.11 OPIOID ABUSE, IN REMISSION (HCC): ICD-10-CM

## 2022-12-13 DIAGNOSIS — M47.817 LUMBOSACRAL SPONDYLOSIS WITHOUT MYELOPATHY: Primary | ICD-10-CM

## 2022-12-13 PROCEDURE — 99213 OFFICE O/P EST LOW 20 MIN: CPT | Performed by: STUDENT IN AN ORGANIZED HEALTH CARE EDUCATION/TRAINING PROGRAM

## 2022-12-13 PROCEDURE — 1123F ACP DISCUSS/DSCN MKR DOCD: CPT | Performed by: STUDENT IN AN ORGANIZED HEALTH CARE EDUCATION/TRAINING PROGRAM

## 2022-12-13 PROCEDURE — 3078F DIAST BP <80 MM HG: CPT | Performed by: STUDENT IN AN ORGANIZED HEALTH CARE EDUCATION/TRAINING PROGRAM

## 2022-12-13 PROCEDURE — 3074F SYST BP LT 130 MM HG: CPT | Performed by: STUDENT IN AN ORGANIZED HEALTH CARE EDUCATION/TRAINING PROGRAM

## 2022-12-13 ASSESSMENT — ENCOUNTER SYMPTOMS
SINUS PRESSURE: 0
SHORTNESS OF BREATH: 0
COUGH: 0
VOMITING: 0
ABDOMINAL PAIN: 0
SORE THROAT: 0
BACK PAIN: 1

## 2022-12-13 NOTE — PROGRESS NOTES
2022    Johanne Slaughter (:  1955) is a 69 y.o. male, here for evaluation of the following medical concerns:  Chief Complaint   Patient presents with    Lower Back Pain     Pt states that he has been struggling for awhile. Notes that 2 wks ago started getting worse. Pt questioning if sciatica. LT side     HPI  Back pain  Symptoms started 1 year ago  Pain radiates into the left leg to the knee  Taking tylenol, mobic and gabapentin   XR lumbar spine 2021 diffuse arthritis,   Tried PT in the past without improvement    Medrol pack does not help    Review of Systems   Constitutional:  Negative for chills and fever. HENT:  Negative for congestion, sinus pressure and sore throat. Respiratory:  Negative for cough and shortness of breath. Cardiovascular:  Negative for chest pain and palpitations. Gastrointestinal:  Negative for abdominal pain and vomiting. Musculoskeletal:  Positive for back pain. Negative for arthralgias and myalgias. Skin:  Negative for rash and wound. Neurological:  Negative for speech difficulty and light-headedness. Psychiatric/Behavioral:  Negative for suicidal ideas. The patient is not nervous/anxious. Prior to Visit Medications    Medication Sig Taking? Authorizing Provider   FLUoxetine (PROZAC) 40 MG capsule TAKE 1 CAPSULE BY MOUTH DAILY. Yes MANUELA Lopes CNP   gabapentin (NEURONTIN) 400 MG capsule take 1 capsule by mouth three times a day Yes MANUELA Lopes CNP   meloxicam (MOBIC) 15 MG tablet take 1 tablet by mouth once daily Yes MANUELA Lopes CNP   rosuvastatin (CRESTOR) 40 MG tablet TAKE 1 TABLET BY MOUTH DAILY AT BEDTIME.  Yes MANUELA Lopes CNP   acetaminophen (TYLENOL) 500 MG tablet Take 500 mg by mouth every 6 hours as needed for Pain Yes Historical Provider, MD   cyclobenzaprine (FLEXERIL) 10 MG tablet Take 1 tablet by mouth 3 times daily as needed for Muscle spasms  MANUELA Lopes CNP        There are no discontinued medications.     Allergies   Allergen Reactions    Alcohol Other (See Comments)     Sober 22 yrs    Benadryl [Diphenhydramine]      \"speeds him up\"    Demerol Hcl [Meperidine] Other (See Comments)     Aggressive behavior    Sulfa Antibiotics Other (See Comments)     Told by mother / patient unaware of reaction       Past Medical History:   Diagnosis Date    Allergic rhinitis 2/19/2018    Anxiety     Chronic back pain     COPD (chronic obstructive pulmonary disease) ([facility])     Depression     Disorder of stomach     valve not closing properly    Emphysema lung ([facility]     Essential hypertension 11/4/2019    Gastroesophageal reflux disease 10/4/2019    Hyperlipidemia     meds > 22 yrs    Low back pain 5/1/2018    ARNAUD (obstructive sleep apnea) 2/19/2018    Other emphysema ([facility] 10/4/2019    Other intervertebral disc degeneration, lumbar region 3/23/2018    Radiculopathy, lumbar region 2/28/2018    Restless leg syndrome     Seasonal affective disorder ([facility] 10/4/2019    Substance abuse ([facility]     alcholic, quit 20 yrs        Past Surgical History:   Procedure Laterality Date    COLONOSCOPY  12/22/2016    A SHALA MARLEY    DENTAL SURGERY  10 yrs ago    ENDOSCOPY, COLON, DIAGNOSTIC      EYE SURGERY      Lasik OU    FINGER TRIGGER RELEASE Left 11/1/2022    LEFT HAND TRIGGER FINGER RING FINGER RELEASE OF A1 PANFILO performed by Sarath Miranda MD at [place] [address] ARTHROSCOPY Right     KNEE SURGERY Right 05/2016    Ray procedure    KNEE SURGERY      [place] NASAL SCOPY,OPEN MAXILL SINUS N/A 2/22/2018    SEPTOPLASTY MICRODEBRIDER ASSISTED TURBINOPLASTY AND OUT-FRACTURING BILATERAL NASAL ENDOSCOPY performed by Levi Meneses MD at [facility] History     Socioeconomic History    Marital status:      Spouse name: Not on file    Number of children: 2    Years of education: 12    Highest education level: High school graduate   Occupational History    Not on file   Tobacco Use    Smoking status: Every Day     Packs/day: 2.00     Years: 52.00     Pack years: 104.00     Types: Cigars, Cigarettes     Start date: 1970    Smokeless tobacco: Never    Tobacco comments:     3-5 cigars qd   Vaping Use    Vaping Use: Never used   Substance and Sexual Activity    Alcohol use: No     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    Drug use: No    Sexual activity: Not Currently     Partners: Female   Other Topics Concern    Not on file   Social History Narrative    Not on file     Social Determinants of Health     Financial Resource Strain: Low Risk     Difficulty of Paying Living Expenses: Not hard at all   Food Insecurity: No Food Insecurity    Worried About Running Out of Food in the Last Year: Never true    Ran Out of Food in the Last Year: Never true   Transportation Needs: Not on file   Physical Activity: Inactive    Days of Exercise per Week: 0 days    Minutes of Exercise per Session: 0 min   Stress: Not on file   Social Connections: Not on file   Intimate Partner Violence: Not on file   Housing Stability: Not on file        Family History   Problem Relation Age of Onset    Cancer Mother         stomach    Arthritis Mother     Heart Disease Father 48    Cancer Father         bone    Cancer Maternal Grandmother         lung    Cancer Paternal Grandmother     Heart Disease Paternal Grandfather     No Known Problems Sister     Cancer Brother     Heart Disease Brother         hole in heart in 65 at 1 months age       Vitals:    12/13/22 1551   BP: (!) 100/50   Site: Left Upper Arm   Position: Sitting   Cuff Size: Medium Adult   Pulse: 82   Temp: (!) 96.3 °F (35.7 °C)   SpO2: 96%   Weight: 142 lb (64.4 kg)   Height: 5' 9\" (1.753 m)       Estimated body mass index is 20.97 kg/m² as calculated from the following:    Height as of this encounter: 5' 9\" (1.753 m). Weight as of this encounter: 142 lb (64.4 kg). No results for input(s): WBC, RBC, HGB, HCT, MCV, MCH, MCHC, RDW, PLT, MPV in the last 72 hours.     No results for input(s): NA, K, CL, CO2, BUN, CREATININE, GLUCOSE, CALCIUM, PROT, LABALBU, BILITOT, ALKPHOS, AST, ALT in the last 72 hours. No results found for: LABA1C    No results found. Physical Exam  Constitutional:       General: He is not in acute distress. Appearance: Normal appearance. HENT:      Head: Normocephalic and atraumatic. Eyes:      Extraocular Movements: Extraocular movements intact. Conjunctiva/sclera: Conjunctivae normal.   Musculoskeletal:         General: No deformity. Normal range of motion. Comments: Tenderness palpation across the lower lumbar paraspinal musculature, negative straight leg testing, strength 5 out of 5 in the bilateral lower extremities   Skin:     Findings: No lesion or rash. Neurological:      General: No focal deficit present. Mental Status: He is alert. Mental status is at baseline. Psychiatric:         Mood and Affect: Mood normal.         Behavior: Behavior normal.         Thought Content: Thought content normal.       ASSESSMENT/PLAN:  1. Lumbosacral spondylosis without myelopathy  Patient already on meloxicam, Tylenol, gabapentin and Flexeril  Patient declines physical therapy  States Medrol packs do not help  Discussed at this time is to follow-up with pain management for further assessment    - Georgina Bajwa MD, Pain Management, [place]    2. Opioid abuse, in remission ([facility]    There are no discontinued medications.    ---------------------------------------------------------------------  Side effects, adverse effects of the medication prescribed today, as well as treatment plan/ rationale and result expectations have been discussed with the patient who expresses understanding and desires to proceed. Close follow up to evaluate treatment results and for coordination of care. I have reviewed the patient's medical history in detail and updated the computerized patient record.      As always, patient is advised that if symptoms worsen in any way they will proceed to the nearest emergency room. --------------------------------------------------------------------    Return if symptoms worsen or fail to improve. An  electronic signature was used to authenticate this note.     --Bridgette Millan DO on 12/13/2022 at 4:17 PM

## 2022-12-15 ENCOUNTER — OFFICE VISIT (OUTPATIENT)
Dept: PAIN MANAGEMENT | Age: 67
End: 2022-12-15
Payer: MEDICARE

## 2022-12-15 VITALS
HEIGHT: 70 IN | WEIGHT: 142 LBS | BODY MASS INDEX: 20.33 KG/M2 | DIASTOLIC BLOOD PRESSURE: 78 MMHG | TEMPERATURE: 98.2 F | SYSTOLIC BLOOD PRESSURE: 122 MMHG

## 2022-12-15 DIAGNOSIS — M47.817 LUMBOSACRAL SPONDYLOSIS WITHOUT MYELOPATHY: Primary | ICD-10-CM

## 2022-12-15 PROCEDURE — 3074F SYST BP LT 130 MM HG: CPT | Performed by: PAIN MEDICINE

## 2022-12-15 PROCEDURE — 1123F ACP DISCUSS/DSCN MKR DOCD: CPT | Performed by: PAIN MEDICINE

## 2022-12-15 PROCEDURE — 99213 OFFICE O/P EST LOW 20 MIN: CPT | Performed by: PAIN MEDICINE

## 2022-12-15 PROCEDURE — 3078F DIAST BP <80 MM HG: CPT | Performed by: PAIN MEDICINE

## 2022-12-15 NOTE — PROGRESS NOTES
End of Shift Note: Medical    What matters most to the patient this shift: Patient  Will not say    Significant Events: New admission, very restless and agitated, keeping asking for a sandwich.and complaining of back and leg pain. Very emaciated, always in a fetal position. Offered oral fluids, refuses    Pain Management: Last Pain Score: Numeric Rating Scale 0-10: 10 (11/22/22 0100)     Pain medication:  NA.  Last given:  NA  Diet: Regular Diet      Bowel Function:  Due to void  LBM:    Unknown  Activity:  Activity: Head of bed elevation;Resting in bed (11/22/22 0427)  Mobility Assistive Device:     Level of Assistance:  Level of Assistance: Total assist (11/22/22 0427)  Positioning: Positioning: Knee/Chest (11/22/22 0427)  LDAs: peripheral IV, bryson      Expected Discharge Date: TBD  Expected Discharge Time:        [facility] ([facility]) Physicians  Neurosurgery and Pain [address] [facility] [place] [address]: [phone]  F: [phone]        Elda Szymanski  (1955)    12/15/2022    Subjective:     Elda Szymanski is 69 y.o. male who complains today of:     Chief Complaint   Patient presents with    Back Pain   Patient here today for follow-up. History of radiofrequency ablation done over 6 months ago which helped significantly. Pain is worse with bending standing walking achy sharp pain it affects his quality life. Plan: We discussed options. Questions answered chart was reviewed. We will put in for bilateral L3-4-5 radiofrequency ablation again as it helped him significantly.   He has failed conservative treatment in the past.      Allergies:  Alcohol, Benadryl [diphenhydramine], Demerol hcl [meperidine], and Sulfa antibiotics    Past Medical History:   Diagnosis Date    Allergic rhinitis 2/19/2018    Anxiety     Chronic back pain     COPD (chronic obstructive pulmonary disease) (HCC)     Depression     Disorder of stomach     valve not closing properly    Emphysema lung ([address]     Essential hypertension 11/4/2019    Gastroesophageal reflux disease 10/4/2019    Hyperlipidemia     meds > 22 yrs    Low back pain 5/1/2018    ARNAUD (obstructive sleep apnea) 2/19/2018    Other emphysema ([address] 10/4/2019    Other intervertebral disc degeneration, lumbar region 3/23/2018    Radiculopathy, lumbar region 2/28/2018    Restless leg syndrome     Seasonal affective disorder ([address] 10/4/2019    Substance abuse ([address]     alcholic, quit 20 yrs      Past Surgical History:   Procedure Laterality Date    COLONOSCOPY  12/22/2016    A SHALA MARLEY    DENTAL SURGERY  10 yrs ago    ENDOSCOPY, COLON, DIAGNOSTIC      EYE SURGERY      Lasik OU    FINGER TRIGGER RELEASE Left 11/1/2022    LEFT HAND TRIGGER FINGER RING FINGER RELEASE OF A1 PANFILO performed by Lisa Guy MD at [facility] KNEE ARTHROSCOPY Right     KNEE SURGERY Right 05/2016    Ray procedure    KNEE SURGERY      [place] NASAL SCOPY,OPEN MAXILL SINUS N/A 2/22/2018    SEPTOPLASTY MICRODEBRIDER ASSISTED TURBINOPLASTY AND OUT-FRACTURING BILATERAL NASAL ENDOSCOPY performed by Sara Pool MD at [address] History   Problem Relation Age of Onset    Cancer Mother         stomach    Arthritis Mother     Heart Disease Father 48    Cancer Father         bone    Cancer Maternal Grandmother         lung    Cancer Paternal Grandmother     Heart Disease Paternal Grandfather     No Known Problems Sister     Cancer Brother     Heart Disease Brother         hole in heart in 65 at 1 months age     Social History     Socioeconomic History    Marital status:      Spouse name: Not on file    Number of children: 2    Years of education: 12    Highest education level: High school graduate   Occupational History    Not on file   Tobacco Use    Smoking status: Every Day     Packs/day: 2.00     Years: 52.00     Pack years: 104.00     Types: Cigars, Cigarettes     Start date: 1970    Smokeless tobacco: Never    Tobacco comments:     3-5 cigars qd   Vaping Use    Vaping Use: Never used   Substance and Sexual Activity    Alcohol use: No     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    Drug use: No    Sexual activity: Not Currently     Partners: Female   Other Topics Concern    Not on file   Social History Narrative    Not on file     Social Determinants of Health     Financial Resource Strain: Low Risk     Difficulty of Paying Living Expenses: Not hard at all   Food Insecurity: No Food Insecurity    Worried About Running Out of Food in the Last Year: Never true    Ran Out of Food in the Last Year: Never true   Transportation Needs: Not on file   Physical Activity: Inactive    Days of Exercise per Week: 0 days    Minutes of Exercise per Session: 0 min   Stress: Not on file   Social Connections: Not on file   Intimate Partner Violence: Not on file   Housing Stability: Not on file       Current Outpatient Medications on File Prior to Visit   Medication Sig Dispense Refill    FLUoxetine (PROZAC) 40 MG capsule TAKE 1 CAPSULE BY MOUTH DAILY. 90 capsule 1    meloxicam (MOBIC) 15 MG tablet take 1 tablet by mouth once daily 30 tablet 5    rosuvastatin (CRESTOR) 40 MG tablet TAKE 1 TABLET BY MOUTH DAILY AT BEDTIME. 90 tablet 5    acetaminophen (TYLENOL) 500 MG tablet Take 500 mg by mouth every 6 hours as needed for Pain      gabapentin (NEURONTIN) 400 MG capsule take 1 capsule by mouth three times a day 90 capsule 2    [DISCONTINUED] cyclobenzaprine (FLEXERIL) 10 MG tablet Take 1 tablet by mouth 3 times daily as needed for Muscle spasms 21 tablet 0     No current facility-administered medications on file prior to visit. HPI    Review of Systems   All other systems reviewed and are negative. Objective:     Vitals:  /78 (Site: Left Upper Arm, Position: Sitting, Cuff Size: Large Adult)   Temp 98.2 °F (36.8 °C) (Temporal)   Ht 5' 10\" (1.778 m)   Wt 142 lb (64.4 kg)   BMI 20.37 kg/m² Pain Score:   5      Physical Exam  Patient alert and oriented times three, recent and remote memory intact, mood and affect normal, judgement and insight normal. Strength functional for ambulation. Balance and coordinational functional. Visualized skin intact. No visualized cyanosis, pulses intact. Cranial nerves 2-12 grossly intact. No obvious upper motor neuron signs seen. Sensation intact to light touch. SLR negative. Tender along lumber facet joints with pain and decreased ROM. Extension and rotation with muscle spasms. Assessment:      Diagnosis Orders   1.  Lumbosacral spondylosis without myelopathy  ND RADIOFREQUENCY NEUROTOMY LUMBAR OR SACRAL, W IMAGE GUIDANCE, SINGLE    ND RADIOFREQ NEUROTOMY LUMBAR OR SACRAL, W IMAGE GUIDE,EA ADDL LEVEL          Plan:

## 2022-12-16 ENCOUNTER — TELEPHONE (OUTPATIENT)
Dept: PAIN MANAGEMENT | Age: 67
End: 2022-12-16

## 2022-12-16 NOTE — TELEPHONE ENCOUNTER
PLEASE CONTACT PATIENT AND OBTAIN THE PERCENTAGE OF PAIN RELIEF PATIENT HAD FROM HIS PREVIOUS LUMBAR RFA

## 2022-12-19 ENCOUNTER — TELEPHONE (OUTPATIENT)
Dept: PAIN MANAGEMENT | Age: 67
End: 2022-12-19

## 2022-12-19 NOTE — TELEPHONE ENCOUNTER
BILAT L3,4,5 RFA    AUTH APPROVED FROM 12/17/22-6/15/23    OK to schedule procedure approved as above. Please note sides/levels approved and date range.    (If applicable, sides/levels approved may differ from those ordered)     [address]

## 2022-12-22 DIAGNOSIS — M47.817 LUMBOSACRAL SPONDYLOSIS WITHOUT MYELOPATHY: ICD-10-CM

## 2022-12-22 RX ORDER — GABAPENTIN 400 MG/1
CAPSULE ORAL
Qty: 90 CAPSULE | Refills: 0 | Status: SHIPPED | OUTPATIENT
Start: 2022-12-22 | End: 2023-01-21

## 2022-12-22 NOTE — TELEPHONE ENCOUNTER
Future Appointments    Encounter Information    Provider Department Appt Notes   2/22/2023 Lena Jaeger, APRN - [facility]     Past Visits    Date Provider Specialty Visit Type Primary Dx   12/13/2022 Lloyd Raphael DO Family Medicine Office Visit Lumbosacral spondylosis without myelopathy

## 2022-12-24 ENCOUNTER — HOSPITAL ENCOUNTER (EMERGENCY)
Age: 67
Discharge: HOME | End: 2022-12-24
Attending: FAMILY MEDICINE
Payer: MEDICARE

## 2022-12-24 ENCOUNTER — APPOINTMENT (OUTPATIENT)
Dept: GENERAL RADIOLOGY | Age: 67
End: 2022-12-24
Payer: MEDICARE

## 2022-12-24 VITALS
TEMPERATURE: 96 F | WEIGHT: 143 LBS | RESPIRATION RATE: 16 BRPM | BODY MASS INDEX: 21.18 KG/M2 | SYSTOLIC BLOOD PRESSURE: 99 MMHG | HEART RATE: 86 BPM | OXYGEN SATURATION: 93 % | HEIGHT: 69 IN | DIASTOLIC BLOOD PRESSURE: 84 MMHG

## 2022-12-24 DIAGNOSIS — J44.1 COPD EXACERBATION (HCC): Primary | ICD-10-CM

## 2022-12-24 PROCEDURE — 96372 THER/PROPH/DIAG INJ SC/IM: CPT

## 2022-12-24 PROCEDURE — 71046 X-RAY EXAM CHEST 2 VIEWS: CPT

## 2022-12-24 PROCEDURE — 6360000002 HC RX W HCPCS: Performed by: FAMILY MEDICINE

## 2022-12-24 PROCEDURE — 99284 EMERGENCY DEPT VISIT MOD MDM: CPT

## 2022-12-24 PROCEDURE — 6370000000 HC RX 637 (ALT 250 FOR IP): Performed by: FAMILY MEDICINE

## 2022-12-24 RX ORDER — PREDNISONE 20 MG/1
20 TABLET ORAL 2 TIMES DAILY
Qty: 10 TABLET | Refills: 0 | Status: SHIPPED | OUTPATIENT
Start: 2022-12-24 | End: 2022-12-29

## 2022-12-24 RX ORDER — ALBUTEROL SULFATE 90 UG/1
2 AEROSOL, METERED RESPIRATORY (INHALATION) 4 TIMES DAILY PRN
Qty: 18 G | Refills: 0 | Status: SHIPPED | OUTPATIENT
Start: 2022-12-24

## 2022-12-24 RX ORDER — METHYLPREDNISOLONE SODIUM SUCCINATE 125 MG/2ML
125 INJECTION, POWDER, LYOPHILIZED, FOR SOLUTION INTRAMUSCULAR; INTRAVENOUS ONCE
Status: COMPLETED | OUTPATIENT
Start: 2022-12-24 | End: 2022-12-24

## 2022-12-24 RX ORDER — IPRATROPIUM BROMIDE AND ALBUTEROL SULFATE 2.5; .5 MG/3ML; MG/3ML
1 SOLUTION RESPIRATORY (INHALATION)
Status: DISCONTINUED | OUTPATIENT
Start: 2022-12-24 | End: 2022-12-24 | Stop reason: HOSPADM

## 2022-12-24 RX ADMIN — IPRATROPIUM BROMIDE AND ALBUTEROL SULFATE 1 AMPULE: .5; 2.5 SOLUTION RESPIRATORY (INHALATION) at 17:42

## 2022-12-24 RX ADMIN — METHYLPREDNISOLONE SODIUM SUCCINATE 125 MG: 125 INJECTION, POWDER, FOR SOLUTION INTRAMUSCULAR; INTRAVENOUS at 17:22

## 2022-12-24 NOTE — ED PROVIDER NOTES
[facility] ED  eMERGENCY dEPARTMENT eNCOUnter      Pt Name: Kamilla Osborn  MRN: [id number]  [place] 1955  Date of evaluation: 12/24/2022  Provider: Chikis Lopez MD    CHIEF COMPLAINT       Chief Complaint   Patient presents with    Shortness of Breath     Inhaled drain cleaning fluids          HISTORY OF PRESENT ILLNESS   (Location/Symptom, Timing/Onset,Context/Setting, Quality, Duration, Modifying Factors, Severity)  Note limiting factors. Kamilla Osborn is a 69 y.o. male who presents to the emergency department sob     69years old with known COPD bronchitis and emphysema presented today after he with cleaning the drain and inhaled some drain cleaning fluid had increased shortness of breath and came to the ED for evaluation    The history is provided by the patient. NursingNotes were reviewed. REVIEW OF SYSTEMS    (2-9 systems for level 4, 10 or more for level 5)     Review of Systems    Except as noted above the remainder of the review of systems was reviewed and negative.        PAST MEDICAL HISTORY     Past Medical History:   Diagnosis Date    Allergic rhinitis 2/19/2018    Anxiety     Chronic back pain     COPD (chronic obstructive pulmonary disease) (HCC)     Depression     Disorder of stomach     valve not closing properly    Emphysema lung ([address]     Essential hypertension 11/4/2019    Gastroesophageal reflux disease 10/4/2019    Hyperlipidemia     meds > 22 yrs    Low back pain 5/1/2018    ARNAUD (obstructive sleep apnea) 2/19/2018    Other emphysema ([address] 10/4/2019    Other intervertebral disc degeneration, lumbar region 3/23/2018    Radiculopathy, lumbar region 2/28/2018    Restless leg syndrome     Seasonal affective disorder ([address] 10/4/2019    Substance abuse ([address]     alcholic, quit 20 yrs          SURGICALHISTORY       Past Surgical History:   Procedure Laterality Date    COLONOSCOPY  12/22/2016    DALIA LAST MD    DENTAL SURGERY  10 yrs ago    ENDOSCOPY, COLON, DIAGNOSTIC EYE SURGERY      Lasik OU    FINGER TRIGGER RELEASE Left 11/1/2022    LEFT HAND TRIGGER FINGER RING FINGER RELEASE OF A1 PANFILO performed by Nayeli Hand MD at [place] [address] ARTHROSCOPY Right     KNEE SURGERY Right 05/2016    Ray procedure    KNEE SURGERY      [place] NASAL SCOPY,OPEN MAXILL SINUS N/A 2/22/2018    SEPTOPLASTY MICRODEBRIDER ASSISTED TURBINOPLASTY AND OUT-FRACTURING BILATERAL NASAL ENDOSCOPY performed by Anabella Trammell MD at [facility]       Discharge Medication List as of 12/24/2022  6:31 PM        CONTINUE these medications which have NOT CHANGED    Details   gabapentin (NEURONTIN) 400 MG capsule take 1 capsule by mouth three times a day, Disp-90 capsule, R-0Normal      FLUoxetine (PROZAC) 40 MG capsule TAKE 1 CAPSULE BY MOUTH DAILY. , Disp-90 capsule, R-1Normal      meloxicam (MOBIC) 15 MG tablet take 1 tablet by mouth once daily, Disp-30 tablet, R-5Normal      rosuvastatin (CRESTOR) 40 MG tablet TAKE 1 TABLET BY MOUTH DAILY AT BEDTIME., Disp-90 tablet, R-5Normal      acetaminophen (TYLENOL) 500 MG tablet Take 500 mg by mouth every 6 hours as needed for PainHistorical Med             ALLERGIES     Alcohol, Benadryl [diphenhydramine], Demerol hcl [meperidine], and Sulfa antibiotics    FAMILY HISTORY       Family History   Problem Relation Age of Onset    Cancer Mother         stomach    Arthritis Mother     Heart Disease Father 48    Cancer Father         bone    Cancer Maternal Grandmother         lung    Cancer Paternal Grandmother     Heart Disease Paternal Grandfather     No Known Problems Sister     Cancer Brother     Heart Disease Brother         hole in heart in 65 at 1 months age          SOCIAL HISTORY       Social History     Socioeconomic History    Marital status:     Number of children: 2    Years of education: 12    Highest education level: High school graduate   Tobacco Use    Smoking status: Every Day     Packs/day: 2.00     Years: 52.00 Pack years: 104.00     Types: Cigars, Cigarettes     Start date: 1970    Smokeless tobacco: Never    Tobacco comments:     3-5 cigars qd   Vaping Use    Vaping Use: Never used   Substance and Sexual Activity    Alcohol use: No     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    Drug use: No    Sexual activity: Not Currently     Partners: Female     Social Determinants of Health     Financial Resource Strain: Low Risk     Difficulty of Paying Living Expenses: Not hard at all   Food Insecurity: No Food Insecurity    Worried About [employer] in the Last Year: Never true    Ran Out of Food in the Last Year: Never true   Physical Activity: Inactive    Days of Exercise per Week: 0 days    Minutes of Exercise per Session: 0 min       SCREENINGS    Sarah Coma Scale  Eye Opening: Spontaneous  Best Verbal Response: Oriented  Best Motor Response: Obeys commands  Sarah Coma Scale Score: 15 @FLOW(11122412)@      PHYSICAL EXAM    (up to 7 for level 4, 8 or more for level 5)     ED Triage Vitals [12/24/22 1536]   BP Temp Temp Source Heart Rate Resp SpO2 Height Weight   110/78 (!) 96 °F (35.6 °C) Temporal 86 16 98 % 5' 9\" (1.753 m) 143 lb (64.9 kg)       Physical Exam  Vitals and nursing note reviewed. Constitutional:       Appearance: He is well-developed. HENT:      Head: Normocephalic and atraumatic. Right Ear: External ear normal.      Left Ear: External ear normal.      Nose: Nose normal.   Eyes:      Pupils: Pupils are equal, round, and reactive to light. Cardiovascular:      Rate and Rhythm: Normal rate and regular rhythm. Heart sounds: Normal heart sounds. Pulmonary:      Effort: Pulmonary effort is normal. No respiratory distress. Breath sounds: Examination of the right-upper field reveals decreased breath sounds. Examination of the left-upper field reveals decreased breath sounds. Examination of the right-middle field reveals decreased breath sounds.  Examination of the left-middle field reveals decreased breath sounds. Examination of the right-lower field reveals decreased breath sounds. Examination of the left-lower field reveals decreased breath sounds. Decreased breath sounds and wheezing present. No rales. Chest:      Chest wall: No tenderness. Abdominal:      General: Bowel sounds are normal.      Palpations: Abdomen is soft. Musculoskeletal:         General: Normal range of motion. Cervical back: Normal range of motion and neck supple. Skin:     General: Skin is warm and dry. Neurological:      Mental Status: He is alert and oriented to person, place, and time. Cranial Nerves: No cranial nerve deficit. Sensory: No sensory deficit. Motor: No abnormal muscle tone. Coordination: Coordination normal.      Deep Tendon Reflexes: Reflexes normal.   Psychiatric:         Behavior: Behavior normal.         Thought Content: Thought content normal.         Judgment: Judgment normal.       DIAGNOSTIC RESULTS     EKG: All EKG's are interpreted by the Emergency Department Physician who either signs or Co-signsthis chart in the absence of a cardiologist.        RADIOLOGY:   Severino Leghorn such as CT, Ultrasound and MRI are read by the radiologist. Plain radiographic images are visualized and preliminarily interpreted by the emergency physician with the below findings:      Interpretation per the Radiologist below, if available at the time ofthis note:    XR CHEST (2 VW)   Final Result   No acute process. ED BEDSIDE ULTRASOUND:   Performed by ED Physician - none    LABS:  Labs Reviewed - No data to display    All other labs were within normal range or not returned as of this dictation.     EMERGENCY DEPARTMENT COURSE and DIFFERENTIAL DIAGNOSIS/MDM:   Vitals:    Vitals:    12/24/22 1536 12/24/22 1730 12/24/22 1738   BP: 110/78 99/84    Pulse: 86     Resp: 16     Temp: (!) 96 °F (35.6 °C)     TempSrc: Temporal     SpO2: 98% 93% 93%   Weight: 143 lb (64.9 kg) mis-transcribed.)    Gael Hunt MD (electronically signed)  Attending Emergency Physician        Tyler Eng MD  12/24/22 4464

## 2022-12-24 NOTE — ED TRIAGE NOTES
Patient states he has extreme poison oak all over his body even on his genitals patient is asking if he could come in for a shot for this or is Dr Bernardino Vásquez could call him in something for this to [facility] in [place]?  Please advise patient [phone] Pt states he was cleaning drains with draino at 1500. Pt states he inhaled chemical and started feeling sob.   Pt is A&OX4, skin intact, afebrile, breaths are labored and equal

## 2022-12-27 ENCOUNTER — CARE COORDINATION (OUTPATIENT)
Dept: CARE COORDINATION | Age: 67
End: 2022-12-27

## 2022-12-27 ENCOUNTER — OFFICE VISIT (OUTPATIENT)
Dept: PAIN MANAGEMENT | Age: 67
End: 2022-12-27
Payer: MEDICARE

## 2022-12-27 DIAGNOSIS — M47.817 LUMBOSACRAL SPONDYLOSIS WITHOUT MYELOPATHY: Primary | ICD-10-CM

## 2022-12-27 PROCEDURE — 64635 DESTROY LUMB/SAC FACET JNT: CPT | Performed by: PAIN MEDICINE

## 2022-12-27 PROCEDURE — 64636 DESTROY L/S FACET JNT ADDL: CPT | Performed by: PAIN MEDICINE

## 2022-12-27 RX ORDER — LIDOCAINE HYDROCHLORIDE 10 MG/ML
10 INJECTION, SOLUTION EPIDURAL; INFILTRATION; INTRACAUDAL; PERINEURAL ONCE
Status: COMPLETED | OUTPATIENT
Start: 2022-12-27 | End: 2022-12-27

## 2022-12-27 RX ORDER — BETAMETHASONE SODIUM PHOSPHATE AND BETAMETHASONE ACETATE 3; 3 MG/ML; MG/ML
6 INJECTION, SUSPENSION INTRA-ARTICULAR; INTRALESIONAL; INTRAMUSCULAR; SOFT TISSUE ONCE
Status: COMPLETED | OUTPATIENT
Start: 2022-12-27 | End: 2022-12-27

## 2022-12-27 RX ADMIN — LIDOCAINE HYDROCHLORIDE 10 MG: 10 INJECTION, SOLUTION EPIDURAL; INFILTRATION; INTRACAUDAL; PERINEURAL at 16:19

## 2022-12-27 RX ADMIN — BETAMETHASONE SODIUM PHOSPHATE AND BETAMETHASONE ACETATE 6 MG: 3; 3 INJECTION, SUSPENSION INTRA-ARTICULAR; INTRALESIONAL; INTRAMUSCULAR; SOFT TISSUE at 16:18

## 2022-12-27 NOTE — CARE COORDINATION
ACM called patient. Left message requesting a return call to introduce [facility] program.  Contact information supplied. Letter sent via [address] mail.

## 2022-12-27 NOTE — PROGRESS NOTES
[facility]) Physicians  Neurosurgery and Pain [address] [facility], [place] [address]: [phone]  F: [phone]      Lumbar Radio Frequency Ablation     Provider: Damaris Mas DO          Patient Name: Jc Hansen : 1955        Date: 2022      Jc Hansen is here today for interventional pain management. Standard ASIPP guidelines were followed and sterile technique used. Area was cleaned with Betadine x3. Informed consent was obtained. Fluoroscopic guidance was used for this procedure. Multiple views of fluoroscopy were used during procedure to assist with needle placement. Appropriate sized RF 10mm active tip needle was used and advance to appropriate anatomic location. There was appropriate multifidus contraction noted with motor stimulation at 2 Hz between 0.5-1.5 volts. No limb or gluteal contraction was noted taking it up to 3.5 volts. Prior to lesioning at 80 degrees Celsius for 90 seconds, approximately 0.75mg/1mg of Celestone and ½ cc of 1% preservative free Lidocaine was injected. Impedance was between 200-500 ohms during the procedure. Patient tolerated the procedure well, no obvious complications occurred during the procedure. Patient was appropriately monitored and discharged home in stable condition with their usual motor strength. Post Op instructions were given to patient.           [x] Bilateral [] T11 [] L1 [] S1     [] T12 [] L2 [] S2    [] Right  [x] L3 [] S3      [x] L4 [] S4    [] Left  [x] L5                              Damaris Mas DO

## 2022-12-27 NOTE — CARE COORDINATION
ACM received a return call from patient. He states he did have an incident with drain opener and that is why he went to the ER. He is 100% better at this time per patient. Patient reports he has no additional needs. Patient continues to decline care coordination services. Patient encouraged to reach out to PCP for any additional healthcare needs. Patient also encouraged to keep contact information if his needs change.

## 2023-01-09 ENCOUNTER — OFFICE VISIT (OUTPATIENT)
Dept: FAMILY MEDICINE CLINIC | Age: 68
End: 2023-01-09
Payer: MEDICARE

## 2023-01-09 VITALS
DIASTOLIC BLOOD PRESSURE: 60 MMHG | SYSTOLIC BLOOD PRESSURE: 114 MMHG | HEART RATE: 82 BPM | WEIGHT: 146.6 LBS | BODY MASS INDEX: 21.71 KG/M2 | OXYGEN SATURATION: 96 % | HEIGHT: 69 IN | TEMPERATURE: 97.6 F

## 2023-01-09 DIAGNOSIS — M47.817 LUMBOSACRAL SPONDYLOSIS WITHOUT MYELOPATHY: ICD-10-CM

## 2023-01-09 DIAGNOSIS — E78.5 HYPERLIPIDEMIA, UNSPECIFIED HYPERLIPIDEMIA TYPE: ICD-10-CM

## 2023-01-09 DIAGNOSIS — F39 UNSPECIFIED MOOD (AFFECTIVE) DISORDER (HCC): ICD-10-CM

## 2023-01-09 DIAGNOSIS — F11.11 OPIOID ABUSE, IN REMISSION (HCC): ICD-10-CM

## 2023-01-09 DIAGNOSIS — J44.9 CHRONIC OBSTRUCTIVE PULMONARY DISEASE, UNSPECIFIED COPD TYPE (HCC): Primary | ICD-10-CM

## 2023-01-09 PROCEDURE — 1123F ACP DISCUSS/DSCN MKR DOCD: CPT | Performed by: NURSE PRACTITIONER

## 2023-01-09 PROCEDURE — 3074F SYST BP LT 130 MM HG: CPT | Performed by: NURSE PRACTITIONER

## 2023-01-09 PROCEDURE — 3078F DIAST BP <80 MM HG: CPT | Performed by: NURSE PRACTITIONER

## 2023-01-09 PROCEDURE — 99214 OFFICE O/P EST MOD 30 MIN: CPT | Performed by: NURSE PRACTITIONER

## 2023-01-09 RX ORDER — ALBUTEROL SULFATE 90 UG/1
2 AEROSOL, METERED RESPIRATORY (INHALATION) 4 TIMES DAILY PRN
Qty: 18 G | Refills: 0 | Status: SHIPPED | OUTPATIENT
Start: 2023-01-09

## 2023-01-09 RX ORDER — BUDESONIDE AND FORMOTEROL FUMARATE DIHYDRATE 160; 4.5 UG/1; UG/1
2 AEROSOL RESPIRATORY (INHALATION) 2 TIMES DAILY
Qty: 10.2 G | Refills: 3 | Status: SHIPPED | OUTPATIENT
Start: 2023-01-09

## 2023-01-09 RX ORDER — METHYLPREDNISOLONE 4 MG/1
TABLET ORAL
Qty: 21 TABLET | Refills: 0 | Status: SHIPPED | OUTPATIENT
Start: 2023-01-09 | End: 2023-01-15

## 2023-01-09 ASSESSMENT — ENCOUNTER SYMPTOMS
COUGH: 0
SHORTNESS OF BREATH: 1
BACK PAIN: 1

## 2023-01-09 NOTE — PROGRESS NOTES
Subjective  Chief Complaint   Patient presents with    Follow-Up from Hospital     Accidental inhalation of chemicals, doing much better. Would like to continue inhaler    Back Pain     Pt states that he is still struggling with back pain and asking for temporary relief until seeing speacialist?       HPI    Pt recently at hospital for accidental inhalation of cleaning chemicals. States that he is doing much better. Has noticed breathing improved since taking the albuterol inhaler they gave him. Using it twice a day    Struggling with back. Had recent nerve ablation with pain management. Asking if there is anything else we can do to help him prior to going back to pain management.      Patient Active Problem List    Diagnosis Date Noted    Opioid abuse, in remission ([facility] 01/09/2023    Trigger ring finger of left hand 11/01/2022    Abnormal findings on dx imaging of heart and cor circ 03/15/2021    Abnormal result of cardiovascular function study, unspecified 03/15/2021    Palpitations 03/15/2021    Essential hypertension 11/04/2019    Tobacco abuse 10/04/2019    Gastroesophageal reflux disease 10/04/2019    Other emphysema ([address] 10/04/2019    Seasonal affective disorder ([address] 10/04/2019    Hyperlipidemia 01/24/2019    Anxiety 09/19/2018    Insomnia 09/19/2018    Low back pain 05/01/2018    Other intervertebral disc degeneration, lumbar region 03/23/2018    Other intervertebral disc displacement, lumbar region 03/23/2018    Presence of right artificial knee joint 02/28/2018    Radiculopathy, lumbar region 02/28/2018    Sprain of ligaments of lumbar spine 02/28/2018    Strain of muscle, fascia and tendon of lower back, initial encounter 02/28/2018    DNS (deviated nasal septum) 02/19/2018    Allergic rhinitis 02/19/2018    Hypertrophy of nasal turbinates 02/19/2018    ARNAUD (obstructive sleep apnea) 02/19/2018     Past Medical History:   Diagnosis Date    Allergic rhinitis 2/19/2018    Anxiety     Chronic back pain     COPD (chronic obstructive pulmonary disease) ([facility])     Depression     Disorder of stomach     valve not closing properly    Emphysema lung ([facility] [address]     Essential hypertension 11/4/2019    Gastroesophageal reflux disease 10/4/2019    Hyperlipidemia     meds > 22 yrs    Low back pain 5/1/2018    ARNAUD (obstructive sleep apnea) 2/19/2018    Other emphysema ([facility] [address] 10/4/2019    Other intervertebral disc degeneration, lumbar region 3/23/2018    Radiculopathy, lumbar region 2/28/2018    Restless leg syndrome     Seasonal affective disorder ([facility] [address] 10/4/2019    Substance abuse ([facility]     alcholic, quit 20 yrs      Past Surgical History:   Procedure Laterality Date    COLONOSCOPY  12/22/2016    A SHALA MARLEY    DENTAL SURGERY  10 yrs ago    ENDOSCOPY, COLON, DIAGNOSTIC      EYE SURGERY      Lasik OU    FINGER TRIGGER RELEASE Left 11/1/2022    LEFT HAND TRIGGER FINGER RING FINGER RELEASE OF A1 PANFILO performed by Judith Flores MD at [facility] ARTHROSCOPY Right     KNEE SURGERY Right 05/2016    Ray procedure    KNEE SURGERY      [place] NASAL/SINUS ENDOSCOPY W/MAXILLARY ANTROSTOMY N/A 2/22/2018    SEPTOPLASTY MICRODEBRIDER ASSISTED TURBINOPLASTY AND OUT-FRACTURING BILATERAL NASAL ENDOSCOPY performed by Hipolito Crump MD at [address] History   Problem Relation Age of Onset    Cancer Mother         stomach    Arthritis Mother     Heart Disease Father 48    Cancer Father         bone    Cancer Maternal Grandmother         lung    Cancer Paternal Grandmother     Heart Disease Paternal Grandfather     No Known Problems Sister     Cancer Brother     Heart Disease Brother         hole in heart in 65 at 1 months age     Social History     Socioeconomic History    Marital status:      Spouse name: None    Number of children: 2    Years of education: 12    Highest education level: High school graduate   Tobacco Use    Smoking status: Every Day     Packs/day: 2.00     Years: 52.00     Pack years: 104.00 Types: Cigars, Cigarettes     Start date: 1970    Smokeless tobacco: Never    Tobacco comments:     3-5 cigars qd   Vaping Use    Vaping Use: Never used   Substance and Sexual Activity    Alcohol use: No     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    Drug use: No    Sexual activity: Not Currently     Partners: Female     Social Determinants of Health     Financial Resource Strain: Low Risk     Difficulty of Paying Living Expenses: Not hard at all   Food Insecurity: No Food Insecurity    Worried About Running Out of Food in the Last Year: Never true    Ran Out of Food in the Last Year: Never true   Physical Activity: Inactive    Days of Exercise per Week: 0 days    Minutes of Exercise per Session: 0 min     Current Outpatient Medications on File Prior to Visit   Medication Sig Dispense Refill    albuterol sulfate HFA (VENTOLIN HFA) 108 (90 Base) MCG/ACT inhaler Inhale 2 puffs into the lungs 4 times daily as needed for Wheezing 18 g 0    gabapentin (NEURONTIN) 400 MG capsule take 1 capsule by mouth three times a day 90 capsule 0    FLUoxetine (PROZAC) 40 MG capsule TAKE 1 CAPSULE BY MOUTH DAILY. 90 capsule 1    meloxicam (MOBIC) 15 MG tablet take 1 tablet by mouth once daily 30 tablet 5    rosuvastatin (CRESTOR) 40 MG tablet TAKE 1 TABLET BY MOUTH DAILY AT BEDTIME. 90 tablet 5    acetaminophen (TYLENOL) 500 MG tablet Take 500 mg by mouth every 6 hours as needed for Pain      [DISCONTINUED] cyclobenzaprine (FLEXERIL) 10 MG tablet Take 1 tablet by mouth 3 times daily as needed for Muscle spasms 21 tablet 0     No current facility-administered medications on file prior to visit.      Allergies   Allergen Reactions    Alcohol Other (See Comments)     Sober 22 yrs    Benadryl [Diphenhydramine]      \"speeds him up\"    Demerol Hcl [Meperidine] Other (See Comments)     Aggressive behavior    Sulfa Antibiotics Other (See Comments)     Told by mother / patient unaware of reaction       Review of Systems   Constitutional:  Negative for fatigue. Respiratory:  Positive for shortness of breath. Negative for cough. Cardiovascular:  Negative for chest pain. Musculoskeletal:  Positive for back pain. Objective  Vitals:    01/09/23 1139   BP: 114/60   Pulse: 82   Temp: 97.6 °F (36.4 °C)   SpO2: 96%   Weight: 146 lb 9.6 oz (66.5 kg)   Height: 5' 9\" (1.753 m)     Physical Exam  Vitals and nursing note reviewed. Constitutional:       Appearance: Normal appearance. HENT:      Head: Normocephalic. Mouth/Throat:      Mouth: Mucous membranes are moist.      Pharynx: Oropharynx is clear. Eyes:      Extraocular Movements: Extraocular movements intact. Conjunctiva/sclera: Conjunctivae normal.      Pupils: Pupils are equal, round, and reactive to light. Cardiovascular:      Rate and Rhythm: Normal rate and regular rhythm. Pulses: Normal pulses. Heart sounds: Normal heart sounds. Pulmonary:      Effort: Pulmonary effort is normal.      Breath sounds: Normal breath sounds. Musculoskeletal:      Cervical back: Neck supple. Skin:     General: Skin is warm. Neurological:      General: No focal deficit present. Mental Status: He is alert and oriented to person, place, and time. Mental status is at baseline. Psychiatric:         Mood and Affect: Mood normal.         Behavior: Behavior normal.         Thought Content: Thought content normal.         Judgment: Judgment normal.       Assessment & Plan     Diagnosis Orders   1. Chronic obstructive pulmonary disease, unspecified COPD type (HCC)  albuterol sulfate HFA (VENTOLIN HFA) 108 (90 Base) MCG/ACT inhaler    budesonide-formoterol (SYMBICORT) 160-4.5 MCG/ACT AERO      2. Opioid abuse, in remission ([facility] [address]  Still in remission      3. Unspecified mood (affective) disorder (HCC)  Stable currently. 4. Lumbosacral spondylosis without myelopathy  methylPREDNISolone (MEDROL DOSEPACK) 4 MG tablet      5.  Hyperlipidemia, unspecified hyperlipidemia type  Lipid Panel Comprehensive Metabolic Panel          Orders Placed This Encounter   Procedures    Lipid Panel     Standing Status:   Future     Standing Expiration Date:   1/9/2024     Order Specific Question:   Is Patient Fasting?/# of Hours     Answer:   15     Order Specific Question:   Has the patient fasted? Answer:   Yes    Comprehensive Metabolic Panel     Standing Status:   Future     Standing Expiration Date:   1/9/2024       Orders Placed This Encounter   Medications    albuterol sulfate HFA (VENTOLIN HFA) 108 (90 Base) MCG/ACT inhaler     Sig: Inhale 2 puffs into the lungs 4 times daily as needed for Wheezing     Dispense:  18 g     Refill:  0    budesonide-formoterol (SYMBICORT) 160-4.5 MCG/ACT AERO     Sig: Inhale 2 puffs into the lungs 2 times daily     Dispense:  10.2 g     Refill:  3    methylPREDNISolone (MEDROL DOSEPACK) 4 MG tablet     Sig: Take by mouth. Dispense:  21 tablet     Refill:  0       There are no discontinued medications. No follow-ups on file. Side effects, adverse effects of the medication prescribed today, as well as treatment plan/ rationale and result expectations have been discussed with the patient who expresses understanding and desires to proceed. Close follow up to evaluate treatment results and for coordination of care. I have reviewed the patient's medical history in detail and updated the computerized patient record. As always, patient is advised that if symptoms worsen in any way they will proceed to the nearest emergency room.         MANUELA Lorenzo - CNP

## 2023-01-10 DIAGNOSIS — E78.5 HYPERLIPIDEMIA, UNSPECIFIED HYPERLIPIDEMIA TYPE: ICD-10-CM

## 2023-01-10 LAB
ALBUMIN SERPL-MCNC: 3.8 G/DL (ref 3.5–4.6)
ALP BLD-CCNC: 60 U/L (ref 35–104)
ALT SERPL-CCNC: 28 U/L (ref 0–41)
ANION GAP SERPL CALCULATED.3IONS-SCNC: 16 MEQ/L (ref 9–15)
AST SERPL-CCNC: 25 U/L (ref 0–40)
BILIRUB SERPL-MCNC: <0.2 MG/DL (ref 0.2–0.7)
BUN BLDV-MCNC: 17 MG/DL (ref 8–23)
CALCIUM SERPL-MCNC: 9.2 MG/DL (ref 8.5–9.9)
CHLORIDE BLD-SCNC: 99 MEQ/L (ref 95–107)
CHOLESTEROL, TOTAL: 125 MG/DL (ref 0–199)
CO2: 23 MEQ/L (ref 20–31)
CREAT SERPL-MCNC: 0.97 MG/DL (ref 0.7–1.2)
GFR SERPL CREATININE-BSD FRML MDRD: >60 ML/MIN/{1.73_M2}
GLOBULIN: 2.5 G/DL (ref 2.3–3.5)
GLUCOSE BLD-MCNC: 79 MG/DL (ref 70–99)
HDLC SERPL-MCNC: 57 MG/DL (ref 40–59)
LDL CHOLESTEROL CALCULATED: 57 MG/DL (ref 0–129)
POTASSIUM SERPL-SCNC: 4.8 MEQ/L (ref 3.4–4.9)
SODIUM BLD-SCNC: 138 MEQ/L (ref 135–144)
TOTAL PROTEIN: 6.3 G/DL (ref 6.3–8)
TRIGL SERPL-MCNC: 55 MG/DL (ref 0–150)

## 2023-01-13 ENCOUNTER — TELEPHONE (OUTPATIENT)
Dept: PAIN MANAGEMENT | Age: 68
End: 2023-01-13

## 2023-01-13 NOTE — TELEPHONE ENCOUNTER
Patient states he is in significant amount of pain and next available appt with Dr. Amy France is not until 1/26/22. Pt states the pain is in his low back and left knee. Pt has been taking tylenol, using lidocaine patches, and is on a medrol dosepak from PCP but is experiencing no relief. Pt asks if there is something that can be prescribed until appt to help?

## 2023-01-15 ENCOUNTER — HOSPITAL ENCOUNTER (EMERGENCY)
Age: 68
Discharge: HOME | End: 2023-01-15
Attending: EMERGENCY MEDICINE
Payer: MEDICARE

## 2023-01-15 ENCOUNTER — APPOINTMENT (OUTPATIENT)
Dept: CT IMAGING | Age: 68
End: 2023-01-15
Payer: MEDICARE

## 2023-01-15 ENCOUNTER — APPOINTMENT (OUTPATIENT)
Dept: GENERAL RADIOLOGY | Age: 68
End: 2023-01-15
Payer: MEDICARE

## 2023-01-15 ENCOUNTER — APPOINTMENT (OUTPATIENT)
Dept: MRI IMAGING | Age: 68
End: 2023-01-15
Payer: MEDICARE

## 2023-01-15 VITALS
HEIGHT: 69 IN | RESPIRATION RATE: 18 BRPM | BODY MASS INDEX: 21.48 KG/M2 | DIASTOLIC BLOOD PRESSURE: 85 MMHG | HEART RATE: 97 BPM | TEMPERATURE: 97.7 F | OXYGEN SATURATION: 96 % | SYSTOLIC BLOOD PRESSURE: 149 MMHG | WEIGHT: 145 LBS

## 2023-01-15 VITALS
HEIGHT: 69 IN | HEART RATE: 70 BPM | BODY MASS INDEX: 21.48 KG/M2 | DIASTOLIC BLOOD PRESSURE: 75 MMHG | SYSTOLIC BLOOD PRESSURE: 137 MMHG | TEMPERATURE: 98 F | WEIGHT: 145 LBS | OXYGEN SATURATION: 99 % | RESPIRATION RATE: 18 BRPM

## 2023-01-15 DIAGNOSIS — M25.562 LEFT KNEE PAIN, UNSPECIFIED CHRONICITY: Primary | ICD-10-CM

## 2023-01-15 DIAGNOSIS — M48.061 SPINAL STENOSIS OF LUMBAR REGION WITHOUT NEUROGENIC CLAUDICATION: Primary | ICD-10-CM

## 2023-01-15 LAB
ALBUMIN SERPL-MCNC: 3.8 G/DL (ref 3.5–4.6)
ALP BLD-CCNC: 59 U/L (ref 35–104)
ALT SERPL-CCNC: 29 U/L (ref 0–41)
AMPHETAMINE SCREEN, URINE: NORMAL
ANION GAP SERPL CALCULATED.3IONS-SCNC: 8 MEQ/L (ref 9–15)
APTT: 29.4 SEC (ref 24.4–36.8)
AST SERPL-CCNC: 19 U/L (ref 0–40)
BARBITURATE SCREEN URINE: NORMAL
BASOPHILS ABSOLUTE: 0.1 K/UL (ref 0–0.2)
BASOPHILS RELATIVE PERCENT: 1.1 %
BENZODIAZEPINE SCREEN, URINE: NORMAL
BILIRUB SERPL-MCNC: <0.2 MG/DL (ref 0.2–0.7)
BILIRUBIN URINE: NEGATIVE
BLOOD, URINE: NEGATIVE
BUN BLDV-MCNC: 22 MG/DL (ref 8–23)
CALCIUM SERPL-MCNC: 9.2 MG/DL (ref 8.5–9.9)
CANNABINOID SCREEN URINE: NORMAL
CHLORIDE BLD-SCNC: 101 MEQ/L (ref 95–107)
CLARITY: CLEAR
CO2: 28 MEQ/L (ref 20–31)
COCAINE METABOLITE SCREEN URINE: NORMAL
COLOR: YELLOW
CREAT SERPL-MCNC: 0.96 MG/DL (ref 0.7–1.2)
EOSINOPHILS ABSOLUTE: 0.4 K/UL (ref 0–0.7)
EOSINOPHILS RELATIVE PERCENT: 3.6 %
ETHANOL PERCENT: NORMAL G/DL
ETHANOL: <10 MG/DL (ref 0–0.08)
FENTANYL SCREEN, URINE: NORMAL
GFR SERPL CREATININE-BSD FRML MDRD: >60 ML/MIN/{1.73_M2}
GLOBULIN: 2 G/DL (ref 2.3–3.5)
GLUCOSE BLD-MCNC: 69 MG/DL (ref 70–99)
GLUCOSE URINE: NEGATIVE MG/DL
HCT VFR BLD CALC: 36.8 % (ref 42–52)
HEMOGLOBIN: 12.6 G/DL (ref 14–18)
INR BLD: 0.9
KETONES, URINE: NEGATIVE MG/DL
LEUKOCYTE ESTERASE, URINE: NEGATIVE
LYMPHOCYTES ABSOLUTE: 2.5 K/UL (ref 1–4.8)
LYMPHOCYTES RELATIVE PERCENT: 24.2 %
Lab: NORMAL
MAGNESIUM: 1.9 MG/DL (ref 1.7–2.4)
MCH RBC QN AUTO: 34 PG (ref 27–31.3)
MCHC RBC AUTO-ENTMCNC: 34.3 % (ref 33–37)
MCV RBC AUTO: 99.2 FL (ref 79–92.2)
METHADONE SCREEN, URINE: NORMAL
MONOCYTES ABSOLUTE: 0.6 K/UL (ref 0.2–0.8)
MONOCYTES RELATIVE PERCENT: 6 %
NEUTROPHILS ABSOLUTE: 6.7 K/UL (ref 1.4–6.5)
NEUTROPHILS RELATIVE PERCENT: 65.1 %
NITRITE, URINE: NEGATIVE
OPIATE SCREEN URINE: NORMAL
OXYCODONE URINE: NORMAL
PDW BLD-RTO: 14.6 % (ref 11.5–14.5)
PH UA: 6 (ref 5–9)
PHENCYCLIDINE SCREEN URINE: NORMAL
PLATELET # BLD: 270 K/UL (ref 130–400)
POC CREATININE WHOLE BLOOD: 1.2
POTASSIUM SERPL-SCNC: 4.6 MEQ/L (ref 3.4–4.9)
PROPOXYPHENE SCREEN: NORMAL
PROTEIN UA: NEGATIVE MG/DL
PROTHROMBIN TIME: 11.8 SEC (ref 12.3–14.9)
RBC # BLD: 3.71 M/UL (ref 4.7–6.1)
SODIUM BLD-SCNC: 137 MEQ/L (ref 135–144)
SPECIFIC GRAVITY UA: 1.02 (ref 1–1.03)
T4 FREE: 1.02 NG/DL (ref 0.84–1.68)
TOTAL CK: 341 U/L (ref 0–190)
TOTAL PROTEIN: 5.8 G/DL (ref 6.3–8)
TROPONIN: <0.01 NG/ML (ref 0–0.01)
TSH REFLEX: 4.39 UIU/ML (ref 0.44–3.86)
URINE REFLEX TO CULTURE: NORMAL
UROBILINOGEN, URINE: 0.2 E.U./DL
WBC # BLD: 10.3 K/UL (ref 4.8–10.8)

## 2023-01-15 PROCEDURE — 70450 CT HEAD/BRAIN W/O DYE: CPT

## 2023-01-15 PROCEDURE — 82550 ASSAY OF CK (CPK): CPT

## 2023-01-15 PROCEDURE — 73560 X-RAY EXAM OF KNEE 1 OR 2: CPT

## 2023-01-15 PROCEDURE — 6360000002 HC RX W HCPCS: Performed by: EMERGENCY MEDICINE

## 2023-01-15 PROCEDURE — 72148 MRI LUMBAR SPINE W/O DYE: CPT

## 2023-01-15 PROCEDURE — 84439 ASSAY OF FREE THYROXINE: CPT

## 2023-01-15 PROCEDURE — 96374 THER/PROPH/DIAG INJ IV PUSH: CPT

## 2023-01-15 PROCEDURE — 85610 PROTHROMBIN TIME: CPT

## 2023-01-15 PROCEDURE — 93005 ELECTROCARDIOGRAM TRACING: CPT | Performed by: EMERGENCY MEDICINE

## 2023-01-15 PROCEDURE — 84443 ASSAY THYROID STIM HORMONE: CPT

## 2023-01-15 PROCEDURE — 6360000004 HC RX CONTRAST MEDICATION: Performed by: EMERGENCY MEDICINE

## 2023-01-15 PROCEDURE — 81003 URINALYSIS AUTO W/O SCOPE: CPT

## 2023-01-15 PROCEDURE — 6370000000 HC RX 637 (ALT 250 FOR IP): Performed by: EMERGENCY MEDICINE

## 2023-01-15 PROCEDURE — 74174 CTA ABD&PLVS W/CONTRAST: CPT

## 2023-01-15 PROCEDURE — 84484 ASSAY OF TROPONIN QUANT: CPT

## 2023-01-15 PROCEDURE — 36415 COLL VENOUS BLD VENIPUNCTURE: CPT

## 2023-01-15 PROCEDURE — 99285 EMERGENCY DEPT VISIT HI MDM: CPT

## 2023-01-15 PROCEDURE — 80053 COMPREHEN METABOLIC PANEL: CPT

## 2023-01-15 PROCEDURE — 96372 THER/PROPH/DIAG INJ SC/IM: CPT

## 2023-01-15 PROCEDURE — 82077 ASSAY SPEC XCP UR&BREATH IA: CPT

## 2023-01-15 PROCEDURE — 85730 THROMBOPLASTIN TIME PARTIAL: CPT

## 2023-01-15 PROCEDURE — 85025 COMPLETE CBC W/AUTO DIFF WBC: CPT

## 2023-01-15 PROCEDURE — 99284 EMERGENCY DEPT VISIT MOD MDM: CPT

## 2023-01-15 PROCEDURE — 83735 ASSAY OF MAGNESIUM: CPT

## 2023-01-15 PROCEDURE — 80307 DRUG TEST PRSMV CHEM ANLYZR: CPT

## 2023-01-15 PROCEDURE — 71045 X-RAY EXAM CHEST 1 VIEW: CPT

## 2023-01-15 RX ORDER — ORPHENADRINE CITRATE 30 MG/ML
60 INJECTION INTRAMUSCULAR; INTRAVENOUS ONCE
Status: COMPLETED | OUTPATIENT
Start: 2023-01-15 | End: 2023-01-15

## 2023-01-15 RX ORDER — LORAZEPAM 2 MG/ML
1 INJECTION INTRAMUSCULAR ONCE
Status: COMPLETED | OUTPATIENT
Start: 2023-01-15 | End: 2023-01-15

## 2023-01-15 RX ORDER — LIDOCAINE 50 MG/G
1 PATCH TOPICAL DAILY
Qty: 30 PATCH | Refills: 0 | Status: SHIPPED | OUTPATIENT
Start: 2023-01-15 | End: 2023-01-17

## 2023-01-15 RX ORDER — KETOROLAC TROMETHAMINE 30 MG/ML
30 INJECTION, SOLUTION INTRAMUSCULAR; INTRAVENOUS ONCE
Status: COMPLETED | OUTPATIENT
Start: 2023-01-15 | End: 2023-01-15

## 2023-01-15 RX ORDER — ACETAMINOPHEN 325 MG/1
650 TABLET ORAL ONCE
Status: COMPLETED | OUTPATIENT
Start: 2023-01-15 | End: 2023-01-15

## 2023-01-15 RX ORDER — MELOXICAM 15 MG/1
15 TABLET ORAL DAILY PRN
Qty: 90 TABLET | Refills: 1 | Status: SHIPPED | OUTPATIENT
Start: 2023-01-15

## 2023-01-15 RX ORDER — LORAZEPAM 1 MG/1
1 TABLET ORAL ONCE
Status: COMPLETED | OUTPATIENT
Start: 2023-01-15 | End: 2023-01-15

## 2023-01-15 RX ADMIN — LORAZEPAM 1 MG: 2 INJECTION INTRAMUSCULAR; INTRAVENOUS at 10:36

## 2023-01-15 RX ADMIN — LORAZEPAM 1 MG: 1 TABLET ORAL at 05:51

## 2023-01-15 RX ADMIN — KETOROLAC TROMETHAMINE 30 MG: 30 INJECTION, SOLUTION INTRAMUSCULAR; INTRAVENOUS at 02:25

## 2023-01-15 RX ADMIN — IOPAMIDOL 100 ML: 612 INJECTION, SOLUTION INTRAVENOUS at 06:26

## 2023-01-15 RX ADMIN — ACETAMINOPHEN 650 MG: 325 TABLET ORAL at 05:28

## 2023-01-15 RX ADMIN — ORPHENADRINE CITRATE 60 MG: 30 INJECTION INTRAMUSCULAR; INTRAVENOUS at 02:25

## 2023-01-15 ASSESSMENT — PAIN SCALES - GENERAL
PAINLEVEL_OUTOF10: 7
PAINLEVEL_OUTOF10: 4
PAINLEVEL_OUTOF10: 10

## 2023-01-15 ASSESSMENT — PAIN DESCRIPTION - ORIENTATION
ORIENTATION: LEFT
ORIENTATION: LEFT

## 2023-01-15 ASSESSMENT — PAIN - FUNCTIONAL ASSESSMENT
PAIN_FUNCTIONAL_ASSESSMENT: NONE - DENIES PAIN
PAIN_FUNCTIONAL_ASSESSMENT: 0-10
PAIN_FUNCTIONAL_ASSESSMENT: 0-10

## 2023-01-15 ASSESSMENT — ENCOUNTER SYMPTOMS
VOMITING: 0
PHOTOPHOBIA: 0
ABDOMINAL PAIN: 0
SHORTNESS OF BREATH: 0
VOMITING: 0
ABDOMINAL PAIN: 0
BACK PAIN: 1

## 2023-01-15 ASSESSMENT — PAIN DESCRIPTION - LOCATION
LOCATION: BACK
LOCATION: BACK;LEG
LOCATION: KNEE

## 2023-01-15 ASSESSMENT — LIFESTYLE VARIABLES: HOW OFTEN DO YOU HAVE A DRINK CONTAINING ALCOHOL: NEVER

## 2023-01-15 NOTE — ED TRIAGE NOTES
Patient to ED for c/o lower back into left leg pain. 10/10. Patient states he is scheduled for an ablasion on his sciatic nerve soon, but \"I think I overdid it today\". Patient walking hunched with limp into triage. Skin p/w/d. Respirations even and unlabored. No acute distress noted at this time.

## 2023-01-15 NOTE — ED NOTES
Pt was unable to calm down in MRI so Dr Jamal Morin ordered 1 mg of Ativan, I went to MRI and gave the med.      Sheryle Caroline, RN  01/15/23 1037

## 2023-01-15 NOTE — ED NOTES
Dr. Melvin Arce at bedside. Patient confirms left knee pain. Denies any pain from back to knee down leg. Patient body jerking in shaking motion at times.        Samanta Atwood RN  01/15/23 8633

## 2023-01-15 NOTE — ED PROVIDER NOTES
[facility] ED  EMERGENCY DEPARTMENT ENCOUNTER      Pt Name: Marquez Miguel  MRN: [id number]  Birthdate 1955  Date of evaluation: 1/15/2023  Provider: Franco Prado MD    CHIEF COMPLAINT       Chief Complaint   Patient presents with    Knee Pain     Seen earlier for same         HISTORY OF PRESENT ILLNESS   (Location/Symptom, Timing/Onset, Context/Setting, Quality, Duration, Modifying Factors, Severity)  Note limiting factors.   Marquez Miguel is a 67 y.o. male who presents to the emergency department complaining of \" look I am shaking like I have Parkinson's\".  History of obstructive sleep apnea, anxiety, hyperlipidemia, tobacco abuse, hypertension, acid reflux, COPD, chronic back pain and radiculopathy.  He was just evaluated here for knee pain.  Reports that he has chronic low back pain that has been present for years that worsens with ambulation.  He continues to have left knee pain.  He says that he is having tingling in both of his feet and he feels very shaky.  He denies traumatic injury.  No reported headache, vision change, neck or jaw pain, chest pain or difficulty breathing, abdominal pain, saddle anesthesia, urinary changes, saddle anesthesia or focal numbness or weakness.  He says \"look at how shaky I am and both my feet are tingling\".  Patient says he does not want any IV narcotics or oral opiates.  He says he has not had alcohol in 27 years and denies any other coingestions.  He is prescribed gabapentin-he says he has been taking this  He reports that he sees Dr. Armas, cardiology and that he believes he had a cardiac catheterization 1 year ago that was unremarkable however I am unable to see these results  HPI  Chart review shows that yesterday he called pain management but was unable to see them until the end of the month.  Patient was requesting that other medication be prescribed before his appointment.  Nursing Notes were reviewed.    REVIEW OF SYSTEMS    (2-9 systems for level  4, 10 or more for level 5)     Review of Systems   Constitutional:  Negative for fever. \" Shaking tremor in legs my right is worse than my left\", tingling in both feet, left knee pain, chronic lower back pain   Eyes:  Negative for photophobia and visual disturbance. Respiratory:  Negative for shortness of breath. Cardiovascular:  Negative for chest pain. Gastrointestinal:  Negative for abdominal pain and vomiting. Genitourinary:  Negative for decreased urine volume, dysuria, flank pain and testicular pain. Musculoskeletal:  Positive for arthralgias and back pain. Negative for neck pain and neck stiffness. Skin:  Negative for rash. Neurological:  Negative for dizziness, syncope, facial asymmetry and headaches. Paresthesia   Psychiatric/Behavioral:  Negative for confusion. All other systems reviewed and are negative. Except as noted above the remainder of the review of systems was reviewed and negative.        PAST MEDICAL HISTORY     Past Medical History:   Diagnosis Date    Allergic rhinitis 2/19/2018    Anxiety     Chronic back pain     COPD (chronic obstructive pulmonary disease) ([facility])     Depression     Disorder of stomach     valve not closing properly    Emphysema lung ([address]     Essential hypertension 11/4/2019    Gastroesophageal reflux disease 10/4/2019    Hyperlipidemia     meds > 22 yrs    Low back pain 5/1/2018    ARNAUD (obstructive sleep apnea) 2/19/2018    Other emphysema ([address] 10/4/2019    Other intervertebral disc degeneration, lumbar region 3/23/2018    Radiculopathy, lumbar region 2/28/2018    Restless leg syndrome     Seasonal affective disorder ([address] 10/4/2019    Substance abuse ([address]     alcholic, quit 20 yrs          SURGICAL HISTORY       Past Surgical History:   Procedure Laterality Date    COLONOSCOPY  12/22/2016    A SHALA MARLEY    DENTAL SURGERY  10 yrs ago    ENDOSCOPY, COLON, DIAGNOSTIC      EYE SURGERY      Lasik OU    FINGER TRIGGER RELEASE Left 11/1/2022 LEFT HAND TRIGGER FINGER RING FINGER RELEASE OF A1 PANFILO performed by Natalia Chacon MD at [place] [address] ARTHROSCOPY Right     KNEE SURGERY Right 05/2016    Ray procedure    KNEE SURGERY      CA NASAL/SINUS ENDOSCOPY W/MAXILLARY ANTROSTOMY N/A 2/22/2018    SEPTOPLASTY MICRODEBRIDER ASSISTED TURBINOPLASTY AND OUT-FRACTURING BILATERAL NASAL ENDOSCOPY performed by Paula Ma MD at [address] [place] [address]       Previous Medications    ACETAMINOPHEN (TYLENOL) 500 MG TABLET    Take 500 mg by mouth every 6 hours as needed for Pain    ALBUTEROL SULFATE HFA (VENTOLIN HFA) 108 (90 BASE) MCG/ACT INHALER    Inhale 2 puffs into the lungs 4 times daily as needed for Wheezing    ALBUTEROL SULFATE HFA (VENTOLIN HFA) 108 (90 BASE) MCG/ACT INHALER    Inhale 2 puffs into the lungs 4 times daily as needed for Wheezing    BUDESONIDE-FORMOTEROL (SYMBICORT) 160-4.5 MCG/ACT AERO    Inhale 2 puffs into the lungs 2 times daily    FLUOXETINE (PROZAC) 40 MG CAPSULE    TAKE 1 CAPSULE BY MOUTH DAILY. GABAPENTIN (NEURONTIN) 400 MG CAPSULE    take 1 capsule by mouth three times a day    LIDOCAINE (LIDODERM) 5 %    Place 1 patch onto the skin daily 12 hours on, 12 hours off. MELOXICAM (MOBIC) 15 MG TABLET    take 1 tablet by mouth once daily    METHYLPREDNISOLONE (MEDROL DOSEPACK) 4 MG TABLET    Take by mouth. ROSUVASTATIN (CRESTOR) 40 MG TABLET    TAKE 1 TABLET BY MOUTH DAILY AT BEDTIME.        ALLERGIES     Alcohol, Benadryl [diphenhydramine], Demerol hcl [meperidine], and Sulfa antibiotics    FAMILY HISTORY       Family History   Problem Relation Age of Onset    Cancer Mother         stomach    Arthritis Mother     Heart Disease Father 48    Cancer Father         bone    Cancer Maternal Grandmother         lung    Cancer Paternal Grandmother     Heart Disease Paternal Grandfather     No Known Problems Sister     Cancer Brother     Heart Disease Brother         hole in heart in 65 at 1 months age SOCIAL HISTORY       Social History     Socioeconomic History    Marital status:     Number of children: 2    Years of education: 12    Highest education level: High school graduate   Tobacco Use    Smoking status: Every Day     Packs/day: 2.00     Years: 52.00     Pack years: 104.00     Types: Cigars, Cigarettes     Start date: 1970    Smokeless tobacco: Never    Tobacco comments:     3-5 cigars qd   Vaping Use    Vaping Use: Never used   Substance and Sexual Activity    Alcohol use: No     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    Drug use: No    Sexual activity: Not Currently     Partners: Female     Social Determinants of Health     Financial Resource Strain: Low Risk     Difficulty of Paying Living Expenses: Not hard at all   Food Insecurity: No Food Insecurity    Worried About Running Out of Food in the Last Year: Never true    Ran Out of Food in the Last Year: Never true   Physical Activity: Inactive    Days of Exercise per Week: 0 days    Minutes of Exercise per Session: 0 min       SCREENINGS   NIH Stroke Scale  Interval: Baseline  NIH Stroke Scale All Negative: Yes  Level of Consciousness (1a): Alert  LOC Questions (1b): Answers both correctly  LOC Commands (1c): Performs both tasks correctly  Best Gaze (2): Normal  Visual (3): No visual loss  Facial Palsy (4): Normal symmetrical movement  Motor Arm, Left (5a): No drift  Motor Arm, Right (5b): No drift  Motor Leg, Left (6a): No drift  Motor Leg, Right (6b):  No drift  Limb Ataxia (7): Absent  Sensory (8): Normal  Best Language (9): No aphasia  Dysarthria (10): Normal  Extinction and Inattention (11): No abnormality  Total: 0     [place] Coma Scale  Eye Opening: Spontaneous  Best Verbal Response: Oriented  Best Motor Response: Obeys commands  Sarah Coma Scale Score: 15                     CIWA Assessment  BP: (!) 141/70  Heart Rate: 72                 PHYSICAL EXAM    (up to 7 for level 4, 8 or more for level 5)     ED Triage Vitals   BP Temp Temp src Pulse Resp SpO2 Height Weight   -- -- -- -- -- -- -- --       Physical Exam  Vitals reviewed. Constitutional:       Appearance: He is not toxic-appearing or diaphoretic. HENT:      Head: Normocephalic and atraumatic. Nose: Nose normal.      Mouth/Throat:      Mouth: Mucous membranes are moist.   Eyes:      General: No scleral icterus. Extraocular Movements: Extraocular movements intact. Pupils: Pupils are equal, round, and reactive to light. Cardiovascular:      Rate and Rhythm: Normal rate and regular rhythm. Pulses: Normal pulses. Pulmonary:      Effort: Pulmonary effort is normal.      Breath sounds: Normal breath sounds. Abdominal:      General: There is no distension. Tenderness: There is no abdominal tenderness. There is no guarding or rebound. Genitourinary:     Comments: Normal rectal tone and perianal sensation  Musculoskeletal:         General: No tenderness. Normal range of motion. Cervical back: No tenderness. Skin:     Capillary Refill: Capillary refill takes less than 2 seconds. Findings: No rash. Neurological:      General: No focal deficit present. Mental Status: He is alert and oriented to person, place, and time. GCS: GCS eye subscore is 4. GCS verbal subscore is 5. GCS motor subscore is 6. Cranial Nerves: No cranial nerve deficit. Sensory: No sensory deficit. Motor: No weakness. Deep Tendon Reflexes: Reflexes normal.      Reflex Scores:       Patellar reflexes are 2+ on the right side and 2+ on the left side. Comments: No midline vertebral tenderness or overlying skin change. No step-off. No upper extremity pronator drift or limb ataxia on finger-to-nose. Psychiatric:      Comments: Anxious appearing, patient has both of his legs shaking, no tremor noted to upper extremities   No midline vertebral tenderness or step-off or overlying skin change.   Nontender left knee with no overlying erythema/warmth/induration or fluctuance. Full range of motion with bony crepitus. Pulses intact. Bilateral neurovascular intact lower extremities. Negative straight leg test.  Patient will intermittently have shaking of both of his lower extremities    DIAGNOSTIC RESULTS     EKG: All EKG's are interpreted by the Emergency Department Physician who either signs or Co-signs this chart in the absence of a cardiologist.    Normal sinus rhythm, rate 80, normal axis, lateral T wave inversions that are new compared to previous, normal intervals, , no STEMI. Baseline artifact present. RADIOLOGY:   Non-plain film images such as CT, Ultrasound and MRI are read by the radiologist. Plain radiographic images are visualized and preliminarily interpreted by the emergency physician with the below findings:        Interpretation per the Radiologist below, if available at the time of this note:    MRI LUMBAR SPINE WO CONTRAST   Preliminary Result   1. Severe spinal canal stenosis and severe bilateral neural foraminal   narrowing at L3-4 secondary to a disc bulge, facet arthropathy and thickening   of the ligamentum flavum. There is a concomitant left paracentral disc   extrusion demonstrating inferior migration effacing the left lateral recess. 2. Severe spinal canal stenosis, severe right and moderate left neural   foraminal narrowing at L4-5, as described above. 3. Severe bilateral neural foraminal narrowing at L5-S1, as described above. 4. Additional multilevel degenerative changes, as described above. CT HEAD WO CONTRAST   Final Result   No acute intracranial abnormality. CTA ABDOMEN PELVIS W WO CONTRAST   Final Result   No evidence for dissection with a nonaneurysmal patent abdominal aorta   demonstrating mild atherosclerotic disease.   However, celiac artery origin   from the aorta demonstrating a J-shaped configuration along with areas of at   least moderate stenosis and calcification best seen on sagittal series 602 image 110 through 112 concerning for median arcuate ligament syndrome in the   appropriate clinical configuration given overall appearance with at least   minimal poststenotic dilatation. Correlate with symptomatology      Opacifications at the right lung base primarily linear although minimally   confluent favoring atelectasis with minimal airspace disease not excluded   adjacent to an asymmetrically elevated right hemidiaphragm. XR CHEST PORTABLE   Final Result   No acute process. XR KNEE LEFT (1-2 VIEWS)   Final Result   No acute abnormality of the knee. ED BEDSIDE ULTRASOUND:   Performed by ED Physician - none    LABS:  Labs Reviewed   CBC WITH AUTO DIFFERENTIAL - Abnormal; Notable for the following components:       Result Value    RBC 3.71 (*)     Hemoglobin 12.6 (*)     Hematocrit 36.8 (*)     MCV 99.2 (*)     MCH 34.0 (*)     RDW 14.6 (*)     Neutrophils Absolute 6.7 (*)     All other components within normal limits   COMPREHENSIVE METABOLIC PANEL - Abnormal; Notable for the following components:    Anion Gap 8 (*)     Glucose 69 (*)     Total Protein 5.8 (*)     Globulin 2.0 (*)     All other components within normal limits   PROTIME-INR - Abnormal; Notable for the following components:    Protime 11.8 (*)     All other components within normal limits   TSH WITH REFLEX - Abnormal; Notable for the following components:    TSH 4.390 (*)     All other components within normal limits   CK - Abnormal; Notable for the following components: Total  (*)     All other components within normal limits   POCT CREATININE - Normal   MAGNESIUM   APTT   TROPONIN   ETHANOL   URINE DRUG SCREEN   URINALYSIS WITH REFLEX TO CULTURE   T4, FREE       All other labs were within normal range or not returned as of this dictation.     EMERGENCY DEPARTMENT COURSE and DIFFERENTIAL DIAGNOSIS/MDM:   Vitals:    Vitals:    01/15/23 0513 01/15/23 0630 01/15/23 0753 01/15/23 0910   BP:  134/78 133/84 (!) 141/70   Pulse: 84 98 84 72   Resp:  16 16 18   Temp:       TempSrc:       SpO2:  97% 97% 98%   Weight:       Height:             No leukocytosis or significant electrolyte derangement. Patient does have T wave inversions were not present on EKG however he has no chest pain. I doubt ACS. Troponin within normal limits. Medical Decision Making  Amount and/or Complexity of Data Reviewed  Labs: ordered. Radiology: ordered. ECG/medicine tests: ordered. Risk  OTC drugs. Prescription drug management. Patient presents emergency department with what he believes is a tremor. Cardiopulmonary and metabolic work-up pursued. CT of head ordered to rule out intracranial pathology. He has a NIH of 0, I am not highly suspicious of CVA. Nonfocal neurologic exam.  CT of the abdomen ordered to rule out vascular pathology including abdominal aortic aneurysm. He has no chest pain or shortness of breath, no upper back pain, I doubt aortic dissection. Chart review shows that he did have back injection for lumbar myopathy last month, given his current complaints, MRI ordered. Patient signed out to me by Dr. Minerva Miller. Pt is being evaluated for bilateral leg numbness and low back pain. MRI shows severe spinal stenosis. Pt did not want anything else for pain. Pt educated about spinal stenosis. Pt given prescription for mobic, given low back pain warning signs and will f/u with nsgy. Nsgy referral placed. Sarmad Torres MD      REASSESSMENT      Pt resting comfortably but states \"it feels like it might flare up again\"  Plan of care signed out to dr Ching Garcia with labs/imaging pending    CONSULTS:  None    PROCEDURES:  Unless otherwise noted below, none     Procedures      FINAL IMPRESSION      1. Spinal stenosis of lumbar region without neurogenic claudication          DISPOSITION/PLAN   DISPOSITION Decision To Discharge 01/15/2023 11:23:37 AM      PATIENT REFERRED TO:  No follow-up provider specified.     DISCHARGE MEDICATIONS:  New Prescriptions    No medications on file     Controlled Substances Monitoring:     RX Monitoring 5/17/2019   Attestation The Prescription Monitoring Report for this patient was reviewed today.    Periodic Controlled Substance Monitoring -       (Please note that portions of this note were completed with a voice recognition program.  Efforts were made to edit the dictations but occasionally words are mis-transcribed.)    Gracelyn Oppenheim, MD (electronically signed)  Attending Emergency Physician            Gracelyn Oppenheim, MD  01/15/23 1123

## 2023-01-15 NOTE — ED NOTES
Patient answered questions of MRI screening form himself. Patient verbalized understanding of all questions. No additional information requested.      Katelyn Bedolla RN  01/15/23 0448

## 2023-01-15 NOTE — ED PROVIDER NOTES
[facility] [facility] ED  EMERGENCY DEPARTMENT ENCOUNTER      Pt Name: Dexter Mills  MRN: [id number]  [place] 1955  Date of evaluation: 1/15/2023  Provider: Gregory Hoover, [address] [place] [address]       Chief Complaint   Patient presents with    Back Pain     Back and down left leg \"I think I overdid it today\"         HISTORY OF PRESENT ILLNESS   (Location/Symptom, Timing/Onset, Context/Setting, Quality, Duration, Modifying Factors, Severity)  Note limiting factors. Dexter Mills is a 69 y.o. male who presents to the emergency department with back pain. History of opioid abuse, obstructive sleep apnea, anxiety, hyperlipidemia, tobacco abuse, hypertension, acid reflux, COPD, chronic back pain and radiculopathy. Patient presents via private vehicle. He drove himself here. The patient says that he does have chronic lower back pain that has been present for years and worsens with ambulation and has not changed or evolved. When he refers to his sciatica he points to his left knee and says that he has a procedure in the future for nerve ablation. He says that he was moving around a lot today and he \"overworked it\". He says he took Tylenol without significant relief. He denies traumatic injury. No fever, neck or jaw pain, chest pain or difficulty breathing, abdominal pain, saddle anesthesia, urinary changes or incontinence, numbness or focal weakness. He points to his left knee and says that it hurts with ambulation. Denies IV drug use    HPI  No anticoagulation use  Previous CT imaging of the lumbar spine unremarkable  Had a borderline stress test 2 years ago  Previous x-ray of left knee showing arthritic changes  Patient refuses left knee x-ray  Nursing Notes were reviewed. REVIEW OF SYSTEMS    (2-9 systems for level 4, 10 or more for level 5)     Review of Systems   Constitutional:  Negative for fever.         Chronic lower back pain (unchanged, not here for this), recurrent left knee pain Gastrointestinal:  Negative for abdominal pain and vomiting. Genitourinary:  Negative for dysuria and flank pain. Neurological:  Negative for weakness and numbness. All other systems reviewed and are negative. Except as noted above the remainder of the review of systems was reviewed and negative.        PAST MEDICAL HISTORY     Past Medical History:   Diagnosis Date    Allergic rhinitis 2/19/2018    Anxiety     Chronic back pain     COPD (chronic obstructive pulmonary disease) (HCC)     Depression     Disorder of stomach     valve not closing properly    Emphysema lung ([facility] [address]     Essential hypertension 11/4/2019    Gastroesophageal reflux disease 10/4/2019    Hyperlipidemia     meds > 22 yrs    Low back pain 5/1/2018    ARNAUD (obstructive sleep apnea) 2/19/2018    Other emphysema ([address] 10/4/2019    Other intervertebral disc degeneration, lumbar region 3/23/2018    Radiculopathy, lumbar region 2/28/2018    Restless leg syndrome     Seasonal affective disorder ([address] 10/4/2019    Substance abuse ([facility] [address]     alcholic, quit 20 yrs          SURGICAL HISTORY       Past Surgical History:   Procedure Laterality Date    COLONOSCOPY  12/22/2016    A SHALA MARLEY    DENTAL SURGERY  10 yrs ago    ENDOSCOPY, COLON, DIAGNOSTIC      EYE SURGERY      Lasik OU    FINGER TRIGGER RELEASE Left 11/1/2022    LEFT HAND TRIGGER FINGER RING FINGER RELEASE OF A1 PANFILO performed by Kari Velez MD at [place] [address] ARTHROSCOPY Right     KNEE SURGERY Right 05/2016    Ray procedure    KNEE SURGERY      ND NASAL/SINUS ENDOSCOPY W/MAXILLARY ANTROSTOMY N/A 2/22/2018    SEPTOPLASTY MICRODEBRIDER ASSISTED TURBINOPLASTY AND OUT-FRACTURING BILATERAL NASAL ENDOSCOPY performed by Marc Silverman MD at [address]       Discharge Medication List as of 1/15/2023  2:41 AM        CONTINUE these medications which have NOT CHANGED    Details   !! albuterol sulfate HFA (VENTOLIN HFA) 108 (90 Base) MCG/ACT inhaler Inhale 2 puffs into the lungs 4 times daily as needed for Wheezing, Disp-18 g, R-0Normal      budesonide-formoterol (SYMBICORT) 160-4.5 MCG/ACT AERO Inhale 2 puffs into the lungs 2 times daily, Disp-10.2 g, R-3Normal      methylPREDNISolone (MEDROL DOSEPACK) 4 MG tablet Take by mouth., Disp-21 tablet, R-0Normal      !! albuterol sulfate HFA (VENTOLIN HFA) 108 (90 Base) MCG/ACT inhaler Inhale 2 puffs into the lungs 4 times daily as needed for Wheezing, Disp-18 g, R-0Normal      gabapentin (NEURONTIN) 400 MG capsule take 1 capsule by mouth three times a day, Disp-90 capsule, R-0Normal      FLUoxetine (PROZAC) 40 MG capsule TAKE 1 CAPSULE BY MOUTH DAILY. , Disp-90 capsule, R-1Normal      meloxicam (MOBIC) 15 MG tablet take 1 tablet by mouth once daily, Disp-30 tablet, R-5Normal      rosuvastatin (CRESTOR) 40 MG tablet TAKE 1 TABLET BY MOUTH DAILY AT BEDTIME., Disp-90 tablet, R-5Normal      acetaminophen (TYLENOL) 500 MG tablet Take 500 mg by mouth every 6 hours as needed for PainHistorical Med       !! - Potential duplicate medications found. Please discuss with provider.           ALLERGIES     Alcohol, Benadryl [diphenhydramine], Demerol hcl [meperidine], and Sulfa antibiotics    FAMILY HISTORY       Family History   Problem Relation Age of Onset    Cancer Mother         stomach    Arthritis Mother     Heart Disease Father 48    Cancer Father         bone    Cancer Maternal Grandmother         lung    Cancer Paternal Grandmother     Heart Disease Paternal Grandfather     No Known Problems Sister     Cancer Brother     Heart Disease Brother         hole in heart in 65 at 1 months age          SOCIAL HISTORY       Social History     Socioeconomic History    Marital status:     Number of children: 2    Years of education: 12    Highest education level: High school graduate   Tobacco Use    Smoking status: Every Day     Packs/day: 2.00     Years: 52.00     Pack years: 104.00     Types: Cigars, Cigarettes     Start date: 1970    Smokeless tobacco: Never    Tobacco comments:     3-5 cigars qd   Vaping Use    Vaping Use: Never used   Substance and Sexual Activity    Alcohol use: No     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    Drug use: No    Sexual activity: Not Currently     Partners: Female     Social Determinants of Health     Financial Resource Strain: Low Risk     Difficulty of Paying Living Expenses: Not hard at all   Food Insecurity: No Food Insecurity    Worried About [address] in the Last Year: Never true    Ran Out of Food in the Last Year: Never true   Physical Activity: Inactive    Days of Exercise per Week: 0 days    Minutes of Exercise per Session: 0 min       SCREENINGS         [place] Coma Scale  Eye Opening: Spontaneous  Best Verbal Response: Oriented  Best Motor Response: Obeys commands  [place] Coma Scale Score: 15                     CIWA Assessment  BP: (!) 149/85  Heart Rate: 97                 PHYSICAL EXAM    (up to 7 for level 4, 8 or more for level 5)     ED Triage Vitals [01/15/23 0132]   BP Temp Temp src Heart Rate Resp SpO2 Height Weight   (!) 149/85 97.7 °F (36.5 °C) -- 97 18 96 % 5' 9\" (1.753 m) 145 lb (65.8 kg)       Physical Exam  Vitals reviewed. Constitutional:       Appearance: He is not toxic-appearing or diaphoretic. HENT:      Head: Normocephalic. Nose: Nose normal.      Mouth/Throat:      Mouth: Mucous membranes are moist.   Eyes:      General: No scleral icterus. Neck:      Comments: No midline vertebral tenderness or step-off or overlying skin change  Cardiovascular:      Rate and Rhythm: Normal rate and regular rhythm. Pulses: Normal pulses. Pulmonary:      Effort: Pulmonary effort is normal.      Breath sounds: Normal breath sounds. Abdominal:      General: There is no distension. Palpations: There is no mass. Tenderness: There is no abdominal tenderness. There is no guarding. Comments: No palpable mass   Musculoskeletal:         General: No tenderness.  Normal range of motion. Cervical back: No tenderness. Right lower leg: No edema. Left lower leg: No edema. Comments: Patient has a nontender left knee with no overlying erythema/warmth/induration or fluctuance, no joint effusion, no calf tenderness or swelling. Full range of motion with bony crepitus. DP/PT intact, compartments soft, sensation intact, neurovascular intact left lower extremity. Skin:     Capillary Refill: Capillary refill takes less than 2 seconds. Findings: No rash. Neurological:      General: No focal deficit present. Mental Status: He is alert and oriented to person, place, and time. Cranial Nerves: No cranial nerve deficit. Sensory: No sensory deficit. Motor: No weakness. Psychiatric:         Mood and Affect: Mood normal.   Negative straight leg test    DIAGNOSTIC RESULTS     EKG: All EKG's are interpreted by the Emergency Department Physician who either signs or Co-signs this chart in the absence of a cardiologist.        RADIOLOGY:   Non-plain film images such as CT, Ultrasound and MRI are read by the radiologist. Plain radiographic images are visualized and preliminarily interpreted by the emergency physician with the below findings:        Interpretation per the Radiologist below, if available at the time of this note:    No orders to display         ED BEDSIDE ULTRASOUND:   Performed by ED Physician - none    LABS:  Labs Reviewed - No data to display    All other labs were within normal range or not returned as of this dictation. EMERGENCY DEPARTMENT COURSE and DIFFERENTIAL DIAGNOSIS/MDM:   Vitals:    Vitals:    01/15/23 0132   BP: (!) 149/85   Pulse: 97   Resp: 18   Temp: 97.7 °F (36.5 °C)   SpO2: 96%   Weight: 145 lb (65.8 kg)   Height: 5' 9\" (1.753 m)           Medical Decision Making  Patient with a history of left knee arthritis presents the emergency department with left knee pain that has been recurrent in the past similarly.   He is afebrile. He is ambulatory. His clinical exam is not highly suspicious of septic joint, cellulitis, arterial occlusion, DVT or compartment syndrome. No high suspicion for fracture. Patient declines x-ray. No indication for ultrasound. Given appropriate analgesia, he is appropriate follow-up. No indication for admission. CONSULTS:  None    PROCEDURES:  Unless otherwise noted below, none     Procedures        FINAL IMPRESSION      1. Left knee pain, unspecified chronicity          DISPOSITION/PLAN   DISPOSITION Decision To Discharge 01/15/2023 02:41:06 AM      PATIENT REFERRED TO:  MANUELA Ricardo - CNP  [address]  [facility]  [phone]          DISCHARGE MEDICATIONS:  Discharge Medication List as of 1/15/2023  2:41 AM        START taking these medications    Details   lidocaine (LIDODERM) 5 % Place 1 patch onto the skin daily 12 hours on, 12 hours off., Disp-30 patch, R-0Normal           Controlled Substances Monitoring:     RX Monitoring 5/17/2019   Attestation The Prescription Monitoring Report for this patient was reviewed today.    Periodic Controlled Substance Monitoring -       (Please note that portions of this note were completed with a voice recognition program.  Efforts were made to edit the dictations but occasionally words are mis-transcribed.)    Ken Wick MD (electronically signed)  Attending Emergency Physician            Ken Wick MD  01/15/23 [id number]

## 2023-01-16 ENCOUNTER — CARE COORDINATION (OUTPATIENT)
Dept: CARE COORDINATION | Age: 68
End: 2023-01-16

## 2023-01-16 LAB
EKG ATRIAL RATE: 80 BPM
EKG P AXIS: 53 DEGREES
EKG P-R INTERVAL: 124 MS
EKG Q-T INTERVAL: 382 MS
EKG QRS DURATION: 90 MS
EKG QTC CALCULATION (BAZETT): 440 MS
EKG R AXIS: 63 DEGREES
EKG T AXIS: 122 DEGREES
EKG VENTRICULAR RATE: 80 BPM

## 2023-01-17 ENCOUNTER — HOSPITAL ENCOUNTER (EMERGENCY)
Age: 68
Discharge: HOME | End: 2023-01-17
Attending: STUDENT IN AN ORGANIZED HEALTH CARE EDUCATION/TRAINING PROGRAM
Payer: MEDICARE

## 2023-01-17 ENCOUNTER — TELEPHONE (OUTPATIENT)
Dept: NEUROSURGERY | Age: 68
End: 2023-01-17

## 2023-01-17 ENCOUNTER — CARE COORDINATION (OUTPATIENT)
Dept: CARE COORDINATION | Age: 68
End: 2023-01-17

## 2023-01-17 VITALS
WEIGHT: 145 LBS | OXYGEN SATURATION: 97 % | RESPIRATION RATE: 20 BRPM | HEART RATE: 82 BPM | BODY MASS INDEX: 21.48 KG/M2 | HEIGHT: 69 IN | SYSTOLIC BLOOD PRESSURE: 134 MMHG | DIASTOLIC BLOOD PRESSURE: 76 MMHG | TEMPERATURE: 97.8 F

## 2023-01-17 DIAGNOSIS — G89.29 ACUTE EXACERBATION OF CHRONIC LOW BACK PAIN: Primary | ICD-10-CM

## 2023-01-17 DIAGNOSIS — M48.061 SPINAL STENOSIS OF LUMBAR REGION, UNSPECIFIED WHETHER NEUROGENIC CLAUDICATION PRESENT: ICD-10-CM

## 2023-01-17 DIAGNOSIS — M54.50 ACUTE EXACERBATION OF CHRONIC LOW BACK PAIN: Primary | ICD-10-CM

## 2023-01-17 PROCEDURE — 6370000000 HC RX 637 (ALT 250 FOR IP): Performed by: STUDENT IN AN ORGANIZED HEALTH CARE EDUCATION/TRAINING PROGRAM

## 2023-01-17 PROCEDURE — 99284 EMERGENCY DEPT VISIT MOD MDM: CPT

## 2023-01-17 PROCEDURE — 96372 THER/PROPH/DIAG INJ SC/IM: CPT

## 2023-01-17 PROCEDURE — 6360000002 HC RX W HCPCS: Performed by: STUDENT IN AN ORGANIZED HEALTH CARE EDUCATION/TRAINING PROGRAM

## 2023-01-17 RX ORDER — METHOCARBAMOL 500 MG/1
1000 TABLET, FILM COATED ORAL 4 TIMES DAILY PRN
Qty: 40 TABLET | Refills: 0 | Status: SHIPPED | OUTPATIENT
Start: 2023-01-17 | End: 2023-01-24

## 2023-01-17 RX ORDER — KETOROLAC TROMETHAMINE 30 MG/ML
30 INJECTION, SOLUTION INTRAMUSCULAR; INTRAVENOUS ONCE
Status: COMPLETED | OUTPATIENT
Start: 2023-01-17 | End: 2023-01-17

## 2023-01-17 RX ORDER — LIDOCAINE 4 G/G
1 PATCH TOPICAL DAILY
Qty: 30 PATCH | Refills: 0 | Status: SHIPPED | OUTPATIENT
Start: 2023-01-17 | End: 2023-02-16

## 2023-01-17 RX ORDER — ACETAMINOPHEN 500 MG
1000 TABLET ORAL EVERY 6 HOURS PRN
Qty: 60 TABLET | Refills: 0 | Status: SHIPPED | OUTPATIENT
Start: 2023-01-17

## 2023-01-17 RX ORDER — DEXAMETHASONE 6 MG/1
12 TABLET ORAL ONCE
Status: COMPLETED | OUTPATIENT
Start: 2023-01-17 | End: 2023-01-17

## 2023-01-17 RX ADMIN — KETOROLAC TROMETHAMINE 30 MG: 30 INJECTION, SOLUTION INTRAMUSCULAR; INTRAVENOUS at 02:33

## 2023-01-17 RX ADMIN — DEXAMETHASONE 12 MG: 6 TABLET ORAL at 02:33

## 2023-01-17 ASSESSMENT — PAIN SCALES - GENERAL
PAINLEVEL_OUTOF10: 6
PAINLEVEL_OUTOF10: 6

## 2023-01-17 ASSESSMENT — LIFESTYLE VARIABLES: HOW OFTEN DO YOU HAVE A DRINK CONTAINING ALCOHOL: NEVER

## 2023-01-17 NOTE — CARE COORDINATION
Anaid Josesophie returned my call   He tells me that he continues to have mulitple issues with his back   He adds that he was supposed to see Dr Harman Clifton but this has been changed to see Dr Elli Almaraz. He feels that this is just ongoing issues related to his chronic back pain. He denies the need for any further calls for care coordination.

## 2023-01-17 NOTE — ED TRIAGE NOTES
Patient present to the ED from home with bilateral knee pain and back pain. Patient report he has had this before, patient is awaiting pain management at this time.   Patient is A/O x 4 in triage

## 2023-01-17 NOTE — TELEPHONE ENCOUNTER
PT MISSED APPT THIS MORNING WITH DR. SALAS. PER ROSENDA HERNANDEZ/ [facility].   IF SLOT STILL AVAILABLE, CAN SEE PT NEXT Tuesday, January 24TH @ 4:00PM.

## 2023-01-17 NOTE — ED NOTES
Patient D/C instructions have been reviewed, patient is to follow up with PCP   Patient voiced understanding, no questions or concerns noted at this time.      [facility]  01/17/23 [id number]

## 2023-01-17 NOTE — ED PROVIDER NOTES
[facility] ED  eMERGENCY dEPARTMENT eNCOUnter      Pt Name: Vaughn Bales  MRN: [id number]  [place] 1955  Date of evaluation: 1/17/2023  Provider: Alex Duarte MD      HISTORY OF PRESENT ILLNESS      Chief Complaint   Patient presents with    Back Pain    Knee Pain       The history is provided by the Patient. Vaughn Bales is a 69 y.o. male with a PMH clinically significant for opioid abuse, HLD, HTN, ARNAUD, COPD, Anxiety, GERD, Lumbar radiculopathy, Chronic back pain presenting to the ED via PV c/o *** associated with ***. Characterizes ***. States ***prior to onset of symptoms. Denies any associated ***. States symptoms worse with ***. Improved with ***. States ***history of similar previous episodes***. States they have otherwise been feeling well***. Taking all medications as indicated***. Per Chart Review: PMH as noted above obtained via outpatient chart review.  ***    REVIEW OF SYSTEMS       Review of Systems    PAST MEDICAL HISTORY     Past Medical History:   Diagnosis Date    Allergic rhinitis 2/19/2018    Anxiety     Chronic back pain     COPD (chronic obstructive pulmonary disease) (HCC)     Depression     Disorder of stomach     valve not closing properly    Emphysema lung ([address]     Essential hypertension 11/4/2019    Gastroesophageal reflux disease 10/4/2019    Hyperlipidemia     meds > 22 yrs    Low back pain 5/1/2018    ARNAUD (obstructive sleep apnea) 2/19/2018    Other emphysema ([address] 10/4/2019    Other intervertebral disc degeneration, lumbar region 3/23/2018    Radiculopathy, lumbar region 2/28/2018    Restless leg syndrome     Seasonal affective disorder ([address] 10/4/2019    Substance abuse ([address]     alcholic, quit 20 yrs        SURGICAL HISTORY       Past Surgical History:   Procedure Laterality Date    COLONOSCOPY  12/22/2016    A SHALA MARLEY    DENTAL SURGERY  10 yrs ago    ENDOSCOPY, COLON, DIAGNOSTIC      EYE SURGERY      Lasik OU    FINGER TRIGGER RELEASE Left 11/1/2022    LEFT HAND TRIGGER FINGER RING FINGER RELEASE OF A1 PULLEY performed by Sanjuan Cockayne, MD at [address] ARTHROSCOPY Right     KNEE SURGERY Right 05/2016    Ray procedure    KNEE SURGERY      ID NASAL/SINUS ENDOSCOPY W/MAXILLARY ANTROSTOMY N/A 2/22/2018    SEPTOPLASTY MICRODEBRIDER ASSISTED TURBINOPLASTY AND OUT-FRACTURING BILATERAL NASAL ENDOSCOPY performed by Felicia Dale MD at [facility] HISTORY       Family History   Problem Relation Age of Onset    Cancer Mother         stomach    Arthritis Mother     Heart Disease Father 48    Cancer Father         bone    Cancer Maternal Grandmother         lung    Cancer Paternal Grandmother     Heart Disease Paternal Grandfather     No Known Problems Sister     Cancer Brother     Heart Disease Brother         hole in heart in 65 at 1 months age       SOCIAL HISTORY       Social History     Socioeconomic History    Marital status:     Number of children: 2    Years of education: 15    Highest education level: High school graduate   Tobacco Use    Smoking status: Every Day     Packs/day: 2.00     Years: 52.00     Pack years: 104.00     Types: Cigars, Cigarettes     Start date: 1970    Smokeless tobacco: Never    Tobacco comments:     3-5 cigars qd   Vaping Use    Vaping Use: Never used   Substance and Sexual Activity    Alcohol use: No     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    Drug use: No    Sexual activity: Not Currently     Partners: Female     Social Determinants of Health     Financial Resource Strain: Low Risk     Difficulty of Paying Living Expenses: Not hard at all   Food Insecurity: No Food Insecurity    Worried About Running Out of Food in the Last Year: Never true    Ivana of Food in the Last Year: Never true   Physical Activity: Inactive    Days of Exercise per Week: 0 days    Minutes of Exercise per Session: 0 min       CURRENT MEDICATIONS       Previous Medications    ACETAMINOPHEN (TYLENOL) 500 MG TABLET    Take 500 mg by mouth every 6 hours as needed for Pain    ALBUTEROL SULFATE HFA (VENTOLIN HFA) 108 (90 BASE) MCG/ACT INHALER    Inhale 2 puffs into the lungs 4 times daily as needed for Wheezing    ALBUTEROL SULFATE HFA (VENTOLIN HFA) 108 (90 BASE) MCG/ACT INHALER    Inhale 2 puffs into the lungs 4 times daily as needed for Wheezing    BUDESONIDE-FORMOTEROL (SYMBICORT) 160-4.5 MCG/ACT AERO    Inhale 2 puffs into the lungs 2 times daily    FLUOXETINE (PROZAC) 40 MG CAPSULE    TAKE 1 CAPSULE BY MOUTH DAILY. GABAPENTIN (NEURONTIN) 400 MG CAPSULE    take 1 capsule by mouth three times a day    LIDOCAINE (LIDODERM) 5 %    Place 1 patch onto the skin daily 12 hours on, 12 hours off. MELOXICAM (MOBIC) 15 MG TABLET    Take 1 tablet by mouth daily as needed for Pain    ROSUVASTATIN (CRESTOR) 40 MG TABLET    TAKE 1 TABLET BY MOUTH DAILY AT BEDTIME. ALLERGIES     Alcohol, Benadryl [diphenhydramine], Demerol hcl [meperidine], and Sulfa antibiotics      PHYSICAL EXAM       ED Triage Vitals [01/17/23 0015]   BP Temp Temp Source Heart Rate Resp SpO2 Height Weight   134/76 97.8 °F (36.6 °C) Oral 82 20 97 % 5' 9\" (1.753 m) 145 lb (65.8 kg)       Physical Exam    MDM:   Chart Reviewed: PMH and additional information as noted in HPI obtained from chart review    Vitals:    Vitals:    01/17/23 0015   BP: 134/76   Pulse: 82   Resp: 20   Temp: 97.8 °F (36.6 °C)   TempSrc: Oral   SpO2: 97%   Weight: 145 lb (65.8 kg)   Height: 5' 9\" (1.753 m)       PROCEDURES:  Unless otherwise noted below, none  Procedures    LABS:  Labs Reviewed - No data to display    No orders to display            69 y.o. male ***  Upon initial evaluation, Pt ***, but otherwise*** {NALMDM1:78382::\"Afebrile, HDS and in NAD\"}. PE as noted above. {NALMDM2:09881} interpreted by myself and as noted above. Given findings, clinical presentation most likely consistent w/ ***.    Pt was administered Medications - No data to display    Plan: {NALMDM3:19215::\"Patient understanding and amenable to the POC. \"}    CRITICAL CARE TIME   Total CriticalCare time was *** minutes, excluding separately reportable procedures. There was a high probability of clinically significant/life threatening deterioration in the patient's condition which required my urgent intervention. FINAL IMPRESSION    No diagnosis found.       DISPOSITION/PLAN   DISPOSITION        Current Discharge Medication List           Farheen Linder MD 1-2 beats of clonus bilaterally. Psychiatric:         Mood and Affect: Mood normal.         Behavior: Behavior normal.       MDM:   Chart Reviewed: PMH and additional information as noted in HPI obtained from chart review    Vitals:    Vitals:    01/17/23 0015   BP: 134/76   Pulse: 82   Resp: 20   Temp: 97.8 °F (36.6 °C)   TempSrc: Oral   SpO2: 97%   Weight: 145 lb (65.8 kg)   Height: 5' 9\" (1.753 m)       PROCEDURES:  Unless otherwise noted below, none  Procedures    LABS:  Labs Reviewed - No data to display    No orders to display            77 y.o. male with a PMH clinically significant for opioid abuse, HLD, HTN, ARNAUD, COPD, Anxiety, GERD, Lumbar radiculopathy, Chronic back pain presenting to the ED via PV c/o acute on chronic lower back pain and bilateral knee pain that has been ongoing over the past several days. Upon initial evaluation, Pt Afebrile, HDS and in NAD. PE as noted above. Given findings, clinical presentation most likely consistent w/ acute on chronic lower back pain in the setting of known lumbar spinal stenosis with recent MRI imaging. Already with appropriate follow-up with pain management. Not complaining of any change in characterization of the pain and without any new neurological deficits. No gross evidence of acute cauda equina at this time. Meds administered for symptomatic relief in the ED. Stable for further evaluation and management as an outpatient. Strongly encouraged follow-up with pain management as scheduled. No indications for admission or further imaging at this time. Pt was administered   Medications   dexamethasone (DECADRON) tablet 12 mg (12 mg Oral Given 1/17/23 0233)   ketorolac (TORADOL) injection 30 mg (30 mg IntraMUSCular Given 1/17/23 0233)       Plan: Discharge home in good condition with meds as noted below and instructions to follow up with PCPand Pain management. Pt stable and appropriate for further evaluation and management as an outpatient.  Standard anticipatory guidance and strict return precautions given if any new or worsening symptoms. and Patient understanding and amenable to the POC. CRITICAL CARE TIME   Total CriticalCare time was 0 minutes, excluding separately reportable procedures. There was a high probability of clinically significant/life threatening deterioration in the patient's condition which required my urgent intervention. FINAL IMPRESSION      1. Acute exacerbation of chronic low back pain    2.  Spinal stenosis of lumbar region, unspecified whether neurogenic claudication present          DISPOSITION/PLAN   DISPOSITION Decision To Discharge 01/17/2023 02:20:40 AM      Discharge Medication List as of 1/17/2023  2:23 AM        START taking these medications    Details   methocarbamol (ROBAXIN) 500 MG tablet Take 2 tablets by mouth 4 times daily as needed (muscular pain), Disp-40 tablet, R-0Normal      lidocaine 4 % external patch Place 1 patch onto the skin daily, TransDERmal, DAILY Starting Tue 1/17/2023, Until u 2/16/2023, For 30 days, Disp-30 patch, R-0, Normal              MD Deepali Leija MD  01/26/23 2293

## 2023-01-18 ENCOUNTER — OFFICE VISIT (OUTPATIENT)
Dept: PAIN MANAGEMENT | Age: 68
End: 2023-01-18
Payer: MEDICARE

## 2023-01-18 ENCOUNTER — CARE COORDINATION (OUTPATIENT)
Dept: CARE COORDINATION | Age: 68
End: 2023-01-18

## 2023-01-18 VITALS
WEIGHT: 145 LBS | DIASTOLIC BLOOD PRESSURE: 82 MMHG | BODY MASS INDEX: 20.76 KG/M2 | SYSTOLIC BLOOD PRESSURE: 138 MMHG | HEIGHT: 70 IN | TEMPERATURE: 97.6 F

## 2023-01-18 DIAGNOSIS — F10.11 HISTORY OF ALCOHOL ABUSE: ICD-10-CM

## 2023-01-18 DIAGNOSIS — M79.605 LEFT LEG PAIN: ICD-10-CM

## 2023-01-18 DIAGNOSIS — M48.062 SPINAL STENOSIS OF LUMBAR REGION WITH NEUROGENIC CLAUDICATION: ICD-10-CM

## 2023-01-18 DIAGNOSIS — M54.16 LUMBAR RADICULOPATHY: Primary | ICD-10-CM

## 2023-01-18 PROCEDURE — 1123F ACP DISCUSS/DSCN MKR DOCD: CPT | Performed by: PHYSICAL MEDICINE & REHABILITATION

## 2023-01-18 PROCEDURE — 3079F DIAST BP 80-89 MM HG: CPT | Performed by: PHYSICAL MEDICINE & REHABILITATION

## 2023-01-18 PROCEDURE — 3075F SYST BP GE 130 - 139MM HG: CPT | Performed by: PHYSICAL MEDICINE & REHABILITATION

## 2023-01-18 RX ORDER — LIDOCAINE HYDROCHLORIDE 10 MG/ML
6 INJECTION, SOLUTION INFILTRATION; PERINEURAL ONCE
Status: COMPLETED | OUTPATIENT
Start: 2023-01-18 | End: 2023-01-18

## 2023-01-18 RX ORDER — DEXAMETHASONE SODIUM PHOSPHATE 10 MG/ML
10 INJECTION, SOLUTION INTRAMUSCULAR; INTRAVENOUS ONCE
Status: COMPLETED | OUTPATIENT
Start: 2023-01-18 | End: 2023-01-18

## 2023-01-18 RX ORDER — SODIUM CHLORIDE 9 MG/ML
3 INJECTION INTRAVENOUS ONCE
Status: COMPLETED | OUTPATIENT
Start: 2023-01-18 | End: 2023-01-18

## 2023-01-18 RX ADMIN — LIDOCAINE HYDROCHLORIDE 6 ML: 10 INJECTION, SOLUTION INFILTRATION; PERINEURAL at 12:24

## 2023-01-18 RX ADMIN — SODIUM CHLORIDE 3 ML: 9 INJECTION INTRAVENOUS at 12:24

## 2023-01-18 RX ADMIN — DEXAMETHASONE SODIUM PHOSPHATE 10 MG: 10 INJECTION, SOLUTION INTRAMUSCULAR; INTRAVENOUS at 12:23

## 2023-01-18 RX ADMIN — Medication 0.5 MEQ: at 12:24

## 2023-01-18 ASSESSMENT — ENCOUNTER SYMPTOMS
NAUSEA: 0
DIARRHEA: 0
BACK PAIN: 1
CONSTIPATION: 0
SHORTNESS OF BREATH: 0

## 2023-01-18 NOTE — CARE COORDINATION
I called to check in on Lindsay Ortiz and to discuss care coordination  Lindsay Ortiz tells me that he is in Dr Gari Holstein office waiting for an injection  I have worked with Lindsay Ortiz in the past  He states that he is doing well with his overall health and he is working to have improvement with his back and sciatica nerve pain. He does have the number for care coordination Buitrago Stai) and I have asked him to call her with any questions or concerns.

## 2023-01-18 NOTE — PROGRESS NOTES
Frannie Meek  (1955)    1/18/2023    Subjective:     Frannie Meek is 69 y.o. male who complains today of:    Chief Complaint   Patient presents with    Back Pain     Lower back     Leg Pain     Numbness/ tingling from waist down        Presents for emergent unscheduled visit for severe pain. Normally follows with Dr Jaun Serrato, last underwent Bilateral Lumbar L3/4/5 radiofrequency ablation on 12/27/22 with greater than 50% pain relief. He notes severe pain in both sides of his low back with severe pain down into his left leg. His pain is severe that he has had to go to to the ER at least 2 times due to severe pain. Minimal relief with multiple rounds of steroids, gabapentin, and robaxin. History of alcohol use, no opioids have been recommended. Constant ache for over 1 year with minimal relief with physician directed home exercise program. His pain is severe and debilitating. He missed his Neurosurgery appointment as he was in the ER upon the advice of his care team. He has severe multilevel lumbar spinal canal stenosis. He feels weak in his left leg. There are no other associated symptoms or contextual factors. He denies any new weakness, saddle anesthesia, bowel or bladder dysfunction, or falls. He smokes 3-5 cigars/day.      Allergies:  Alcohol, Benadryl [diphenhydramine], Demerol hcl [meperidine], and Sulfa antibiotics    Past Medical History:   Diagnosis Date    Allergic rhinitis 2/19/2018    Anxiety     Chronic back pain     COPD (chronic obstructive pulmonary disease) (HCC)     Depression     Disorder of stomach     valve not closing properly    Emphysema lung ([address]     Essential hypertension 11/4/2019    Gastroesophageal reflux disease 10/4/2019    Hyperlipidemia     meds > 22 yrs    Low back pain 5/1/2018    ARNAUD (obstructive sleep apnea) 2/19/2018    Other emphysema ([address] 10/4/2019    Other intervertebral disc degeneration, lumbar region 3/23/2018    Radiculopathy, lumbar region 2/28/2018    Restless leg syndrome     Seasonal affective disorder ([facility] 10/4/2019    Substance abuse ([facility]     alcholic, quit 20 yrs      Past Surgical History:   Procedure Laterality Date    COLONOSCOPY  12/22/2016    A SHALA MARLEY    DENTAL SURGERY  10 yrs ago    ENDOSCOPY, COLON, DIAGNOSTIC      EYE SURGERY      Lasik OU    FINGER TRIGGER RELEASE Left 11/1/2022    LEFT HAND TRIGGER FINGER RING FINGER RELEASE OF A1 PULLEY performed by Jakob Xiao MD at [place] [address] ARTHROSCOPY Right     KNEE SURGERY Right 05/2016    Ray procedure    KNEE SURGERY      [place] NASAL/SINUS ENDOSCOPY W/MAXILLARY ANTROSTOMY N/A 2/22/2018    SEPTOPLASTY MICRODEBRIDER ASSISTED TURBINOPLASTY AND OUT-FRACTURING BILATERAL NASAL ENDOSCOPY performed by Kd Monreal MD at [address] History   Problem Relation Age of Onset    Cancer Mother         stomach    Arthritis Mother     Heart Disease Father 48    Cancer Father         bone    Cancer Maternal Grandmother         lung    Cancer Paternal Grandmother     Heart Disease Paternal Grandfather     No Known Problems Sister     Cancer Brother     Heart Disease Brother         hole in heart in 65 at 1 months age     Social History     Socioeconomic History    Marital status:      Spouse name: Not on file    Number of children: 2    Years of education: 12    Highest education level: High school graduate   Occupational History    Not on file   Tobacco Use    Smoking status: Every Day     Packs/day: 2.00     Years: 52.00     Pack years: 104.00     Types: Cigars, Cigarettes     Start date: 1970    Smokeless tobacco: Never    Tobacco comments:     3-5 cigars qd   Vaping Use    Vaping Use: Never used   Substance and Sexual Activity    Alcohol use: No     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    Drug use: No    Sexual activity: Not Currently     Partners: Female   Other Topics Concern    Not on file   Social History Narrative    Not on file     Social Determinants of Health Financial Resource Strain: Low Risk     Difficulty of Paying Living Expenses: Not hard at all   Food Insecurity: No Food Insecurity    Worried About Running Out of Food in the Last Year: Never true    Ran Out of Food in the Last Year: Never true   Transportation Needs: Not on file   Physical Activity: Inactive    Days of Exercise per Week: 0 days    Minutes of Exercise per Session: 0 min   Stress: Not on file   Social Connections: Not on file   Intimate Partner Violence: Not on file   Housing Stability: Not on file       Current Outpatient Medications on File Prior to Visit   Medication Sig Dispense Refill    acetaminophen (TYLENOL) 500 MG tablet Take 2 tablets by mouth every 6 hours as needed for Pain or Fever 60 tablet 0    methocarbamol (ROBAXIN) 500 MG tablet Take 2 tablets by mouth 4 times daily as needed (muscular pain) 40 tablet 0    meloxicam (MOBIC) 15 MG tablet Take 1 tablet by mouth daily as needed for Pain 90 tablet 1    albuterol sulfate HFA (VENTOLIN HFA) 108 (90 Base) MCG/ACT inhaler Inhale 2 puffs into the lungs 4 times daily as needed for Wheezing 18 g 0    budesonide-formoterol (SYMBICORT) 160-4.5 MCG/ACT AERO Inhale 2 puffs into the lungs 2 times daily 10.2 g 3    albuterol sulfate HFA (VENTOLIN HFA) 108 (90 Base) MCG/ACT inhaler Inhale 2 puffs into the lungs 4 times daily as needed for Wheezing 18 g 0    gabapentin (NEURONTIN) 400 MG capsule take 1 capsule by mouth three times a day 90 capsule 0    FLUoxetine (PROZAC) 40 MG capsule TAKE 1 CAPSULE BY MOUTH DAILY. 90 capsule 1    rosuvastatin (CRESTOR) 40 MG tablet TAKE 1 TABLET BY MOUTH DAILY AT BEDTIME. 90 tablet 5    lidocaine 4 % external patch Place 1 patch onto the skin daily (Patient not taking: Reported on 1/18/2023) 30 patch 0    [DISCONTINUED] cyclobenzaprine (FLEXERIL) 10 MG tablet Take 1 tablet by mouth 3 times daily as needed for Muscle spasms 21 tablet 0     No current facility-administered medications on file prior to visit. Review of Systems   Constitutional:  Negative for fever. HENT:  Negative for hearing loss. Respiratory:  Negative for shortness of breath. Gastrointestinal:  Negative for constipation, diarrhea and nausea. Genitourinary:  Negative for difficulty urinating. Musculoskeletal:  Positive for back pain. Negative for neck pain. Skin:  Negative for rash. Neurological:  Negative for headaches. Hematological:  Does not bruise/bleed easily. Psychiatric/Behavioral:  Negative for sleep disturbance. Objective:     Vitals:  /82 (Site: Right Upper Arm, Position: Sitting)   Temp 97.6 °F (36.4 °C)   Ht 5' 10\" (1.778 m)   Wt 145 lb (65.8 kg)   BMI 20.81 kg/m² Pain Score:  10 - Worst pain ever      Exam performed under coronavirus precautions  General: No acute distress  Eyes: No scleral icterus or lid lag appreciated bilaterally  ENMT: Moist mucous membranes. Hearing grossly intact bilaterally. No masses or lesions on ears or nose  Neck: Symmetric without any overt masses or lesions, trachea midline  Respiratory: Respirations are non-labored  Skin: Visualized skin is intact  Psych: Mood normal. Affect normal. Recent and remote memory intact. Judgment and insight normal.  Neurologic: Gait non antalgic, no assistive devices for ambulation. Pt is alert and appropriately interactive. Pleasant and cooperative with history and exam. No signs of excessive sedation. Responds promptly and appropriately to questions asked. No aberrant behaviors appreciated.     Sensation grossly intact in both legs except for left leg L3 and L4 paresthesias  Reflexes and strength functional for ambulation, no abnormal reflexes appreciated on exam today  Strength greater than 3/5 bilateral legs  Straight leg raise positive on exam today            Outside record review:  MRI LS Spine 1/15/23 with severe spinal canal stenosis L3/4 and L4/5  EMG B LE 1/31/22 reviewed    Component      Latest Ref Rng & Units 1/15/2023 5:30 AM   Amphetamine Screen, Urine      Negative <1000 ng/mL Neg   Barbiturate Screen, Ur      Negative < 200 ng/mL Neg   Benzodiazepine Screen, Urine      Negative < 200 ng/mL Neg   Cannabinoid Scrn, Ur      Negative < 50 ng/mL Neg   Cocaine Metabolite Screen, Urine      Negative < 300 ng/mL Neg   Opiate Scrn, Ur      Negative < 300 ng/mL Neg   PCP Screen, Urine      Negative < 25 ng/mL Neg   METHADONE SCREEN, URINE, 26551      Negative <300 ng/mL Neg   Propoxyphene Scrn, Ur      Negative <300 ng/mL Neg   Oxycodone Urine      Negative <100 ng/mL Neg   FENTANYL SCREEN, URINE      Negative < 50 ng/mL Neg     Component      Latest Ref Rng & Units 1/15/2023           5:15 AM   Platelet Count      699 - 400 K/uL 270     Component      Latest Ref Rng & Units 1/15/2023           5:15 AM   Creatinine      0.70 - 1.20 mg/dL 0.96     Component      Latest Ref Rng & Units 1/15/2023           5:52 AM   INR       0.9     Component      Latest Ref Rng & Units 1/15/2023           5:30 AM   Ethanol Lvl      mg/dL <10         Assessment:      Diagnosis Orders   1. Spinal stenosis of lumbar region with neurogenic claudication  Amb External Referral To Internal Medicine      2. Lumbar radiculopathy  [place] NJX AA&/STRD TFRML EPI LUMBAR/SACRAL 1 LEVEL      3. Left leg pain  EMG      4. History of alcohol abuse  Amb External Referral To Internal Medicine          Plan:     Periodic Controlled Substance Monitoring: Assessed functional status. , Potential drug abuse or diversion identified, see note documentation. Corinne Freeze, MD)    No orders of the defined types were placed in this encounter. Orders Placed This Encounter   Procedures    Amb External Referral To Internal Medicine     Referral Priority:   Routine     Requested Specialty:   Internal Medicine     Number of Visits Requested:   1    EMG     Standing Status:   Future     Standing Expiration Date:   1/18/2024     Order Specific Question:   Which body part? Answer:   Bilateral lower extremities    [place] NJX AA&/STRD TFRML EPI LUMBAR/SACRAL 1 LEVEL     Left Lumbar L3/4 and L4/5 transforaminal epidural steroid injection under XR with Dr Mina Seo. No anticoagulation, no antibiotics, no diabetes mellitus, no osteoporosis, no bleeding or platelet dysfunction, IV Dye okay, allergies reviewed, 30 minute procedure     Standing Status:   Future     Standing Expiration Date:   4/18/2023       -The patient has severe debilitating pain despite multiple rounds of steroids, nerve membrane stabilizer medications. He has gone to the emergency department multiple times due to severe pain. He is unable to get into Neurosurgery spine evaluation 2nd attempt until another 1-2 weeks. MRI LS Spine review shows severe multilevel lumbar spinal canal stenosis. He has surgical pathology that requires intervention.  -Continue Meloxicam and Gabapentin. Discuss Gabapentin dose with Leopold Dredge CNP to see if this can be adjusted. Heavy alcohol history noted. Medication options are limited for this patient. Refer to Suboxone providers. -EMG B LE eval left leg pain  -Continue rest of plan of care with Dr Amy France. Encourage follow up with Neurosurgery  -Reviewed MRI LS Spine 1/15/23 and EMG B LE 1/31/22  -Left Lumbar L3/4 and L4/5 transforaminal epidural steroid injection under XR with Dr Mina Soe. No anticoagulation, no antibiotics, no diabetes mellitus, no osteoporosis, no bleeding or platelet dysfunction, IV Dye okay, allergies reviewed, 30 minute procedure. Consider 10 mg dexamethasone, subpedicular approach. Procedure done today, see separate procedure note.      -Discussed his spinal canal narrowing. Advised him to avoid trauma, accidents, and sudden movements of the spine and to protect the spine at all times. Advised against contact sports, risky behavior, extreme activities such as bungee jumping and skydiving, and activities which may predispose him to falls or other accidents.  Advised him that if he develops any new numbness, tingling, weakness, bowel or bladder dysfunction, or any other concerning symptoms, he should call 911 or proceed to the nearest emergency department for emergent physician evaluation. Advised him of the risks of spinal canal stenosis and narrowing including worsening numbness, tingling, pain, the development of weakness and paralysis, risks of loss of use of the arms/legs, loss of control of bowel and bladder function, loss of sexual function, skin sores, sepsis, temporary and/or permanent disability, and even death. He expressed complete understanding and agreement.       Controlled Substance Monitoring:    Acute and Chronic Pain Monitoring:   RX Monitoring 1/18/2023   Attestation -   Periodic Controlled Substance Monitoring Assessed functional status.;Potential drug abuse or diversion identified, see note documentation.          Discussed the risks, side effects, and symptoms that would warrant urgent or emergent physician evaluation of all medications prescribed today. The patient was advised that NSAID-type medications have three important potential side effects: gastrointestinal irritation including hemorrhage, myocardial injury including possible infarction, and kidney injury. He was asked to take the medication with food, avoid any other NSAIDs or steroids, and discontinue if he develops any concerning symptoms. He should immediately stop the medication and proceed to the emergency department for chest pain, dark urine or difficulty with producing urine, vomiting, abdominal pain or black/tarry stools. The patient expresses understanding of these issues and all questions were answered.     Discussed the risks of the above recommended procedures including but not limited to bleeding, infection, worsened pain, damage to surrounding structures, side effects, toxicity, allergic reactions to medications used, immune and stress-response dysfunction, fat necrosis, skin pigmentation  changes, blood sugar elevation, headache, vision changes, need for surgery, as well as catastrophic injury such as vision loss, paralysis, stroke, spinal cord and/or plexus infarction or injury, intrathecal injection, spinal cord puncture, arachnoiditis, discitis, bowel or bladder incontinence, ventilator dependence, loss of use of the arms and/or legs, and death. Discussed off-label use of corticosteroids and how the Food and Drug Administration (FDA) has not approved corticosteroids for epidural use. Discussed the risks, benefits, alternative procedures, and alternatives to the procedure including no procedure at all. Discussed that we cannot undo any permanent neurologic damage or change the course of any underlying disease. The patient appears to be a good candidate for the above recommended procedures, but no guarantees expressed or implied are given regarding the outcome of any procedure. After thorough discussion, patient expressed understanding and willingness to proceed. Provided education and counseling regarding the diagnosis, prognosis, and treatment options. All questions were answered. Encouraged him to follow-up with his primary care physician and/or specialists as required for his overall health and management of his comorbidities as well as any new positive symptoms mentioned in review of systems above. Care was provided within the definitions and limitations of our specialty practice. Encouraged lifestyle interventions including healthy habits, lifestyle changes, regular aerobic exercise and appropriate weight maintenance as advised by their primary care physician or cardiovascular health provider. Discussed well care and disease prevention/maintenance. All recommendations for medications are meant to help decrease pain, improve function with activities of daily living, maintain compliance with home exercise program, and improve quality of life.  All recommendations for therapeutic injections are meant to help decrease pain, improve function with activities of daily living, maintain compliance with home exercise program, improve quality of life, and decrease reliance upon oral medications. Encouraged compliance with his home exercise program. Discussed the elevated risks of excessive sedation while on pain medications. Advised him against driving or operating heavy machinery or performing any activities where he may harm himself or others while on pain medications. Particular caution was emphasized especially during dose adjustments and medication changes. Discussed the elevated risks of respiratory depression and death while on opioid medications, especially when combined with other sedative substances. Discussed the risks of temporary disability, permanent disability, morbidity, and mortality with poorly-managed or undiagnosed medical conditions and comorbidities. Emphasized the importance of timely medical evaluation and treatment as previously recommended by us or other medical professionals. Risks of not pursing these recommendations were emphasized. The patient was offered a treatment at our facility. The physician and patient have discussed in detail the risk of exposure to and/or potential harm posed by the COVID-19 virus with having office visits and procedures at this time versus the risk of delaying the visits and procedures. It is not possible to know either the risk of delaying the visits or procedure or chance of getting an infection with perfect accuracy, but a joint decision was made between the patient and the physician to proceed at this time with the scheduled visits and procedures. Advised him that any lab testing, imaging, or other diagnostic test results are best discussed in person in the office so that we can provide a clear explanation of their significance and best treatment based upon these results.  It is his responsibility to make and keep a follow up appointment to discuss these test results in person to discuss the significance of the findings and appropriate follow-up steps. He expressed complete understanding and agreement with the entire plan as outlined above. Portions of this note may have been typed, auto-populated, dictated or transcribed by voice recognition resulting in errors, omissions, or close substitutions which may be missed despite careful proofreading. Please contact the author for any questions or concerns.     Follow up:  Return in about 2 months (around 3/18/2023) for reassessment of pain and symptoms, EMG Internal.    Ludmila Romero MD

## 2023-01-18 NOTE — PROGRESS NOTES
Lumbar Transforaminal Epidural Steroid Injection (TFESI)    Patient Name: Kelly Dai   : 1955  Date: 2023     Physician: Jaja Hernandez MD     INDICATIONS: Kelly Dai is a 69 y.o. male who presents with symptoms and physical exam findings consistent with lumbar radiculopathy. He has lumbosacral paresthesias with a positive straight leg raise on physical exam. He has had persistent pain that limits his activities of daily living. The pain is persistent despite conservative measures. He has significant functional and psychological impairment due to this condition. Given his symptoms, physical exam findings, impairment in activities of daily living, and lack of response to conservative measures, consideration for lumbar transforaminal epidural corticosteroid injection was given. Discussed the risks including but not limited to bleeding, infection, worsened pain, damage to surrounding structures, side effects, toxicity, allergic reactions to medications used, need for surgery, headache, vision changes, difficulty with chewing and/or swallowing, immune and stress-response dysfunction, fat necrosis, skin pigmentation changes, blood sugar elevation, as well as catastrophic injury such as vision loss/blindness, paralysis, spinal cord or plexus injury, cerebral brainstem or spinal cord infarction, intrathecal injection, spinal cord puncture, arachnoiditis, discitis, stroke, bowel/bladder/sexual dysfunction, ventilator dependence, loss of use of the arms and/or legs, and death. Discussed off-label use of corticosteroids and how the Food and Drug Administration (FDA) has not approved corticosteroids for epidural use. Discussed the risks, benefits, alternative procedures, and alternatives to the procedure including no procedure at all. Discussed that we cannot undo any permanent neurologic or orthopaedic damage or change the course of any underlying disease.  After thorough discussion, patient expressed understanding and willingness to proceed. Written informed consent was obtained and is in the chart. Verbal consent to proceed was obtained. PROCEDURE: Standard ASIPP guidelines were followed and sterile technique used. Area was cleaned with Betadine three times. Fluoroscopic guidance was used for this procedure. The L5/S1 disc space is taken to be the most caudal well-formed disc space visible on a lateral view. The lateral view suggests a lumbarized sacrum. The skin and subcutaneous tissue was anesthetized with 2 mL of 1% preservative-free lidocaine and 0.5 mEq sodium bicarbonate with a 27 gauge 1.5 inch needle. Then a 25 gauge 5 inch spinal needle was used for the remainder of the procedure. There was fair spread of contrast in the anterior epidural space and limited spread of contrast around the exiting nerve root at both levels. The 6 oclock position of the pedicle was identified. Multiple views of fluoroscopy including lateral were used to confirm accurate needle placement of depth. Injection of 2 mL of contrast dye was free of any subdural or vascular spread or any other aberrant uptake. Live fluoroscopy was used for contrast injection. Aspiration was negative throughout the procedure. An injectate containing 1 mL of 10 mg of Dexamethasone and 3 ml of 0.9% preservative-free normal saline was injected slowly and without difficulty and divided equally between the two sites. Patient tolerated the procedure well, no obvious complications occurred during the procedure. Patient was appropriately monitored and discharged home in stable condition with his usual motor strength. Post-procedure instructions were given to patient.             [] Bilateral [] T12-L1    [] L1-2   [] Right [] L2-3    [x] L3-4   [x] Left [x] L4-5    [] L5-S1               If the above assumption about L5/S1 disc height is not followed, today's procedure may be considered to have been done at L2/3 and L3/4.       Dilcia [address] [place] [address], [address]  Phone [phone]

## 2023-01-19 ENCOUNTER — PROCEDURE VISIT (OUTPATIENT)
Dept: PAIN MANAGEMENT | Age: 68
End: 2023-01-19

## 2023-01-19 DIAGNOSIS — M79.605 LEFT LEG PAIN: Primary | ICD-10-CM

## 2023-01-19 NOTE — PROGRESS NOTES
He feels 90% pain relief after his left L3/4 L4/5 transforaminal epidural steroid injection done on 1/18/23. He is pleased. Has surgery appointment at [facility] for his severe lumbar spinal canal stenosis.

## 2023-01-19 NOTE — PROGRESS NOTES
Electromyography (EMG)/Nerve conduction studies (NCS) Report: Lower Extremity    Name: Tita Albright   YOB: 1955  Date of Service: 1/19/2023   Provider: Lokesh Briones MD        INDICATIONS:  Tita Albright is a 69 y.o. male who presents for electrodiagnostic evaluation for left leg pain. He denies any history of diabetes mellitus, thyroid disease, or chemotherapy. Chart review shows a remote history of alcohol use. Both limbs are necessary to examine in order to evaluate for any evidence of systemic disease as well as establish normal baseline values from which to compare any abnormal unilateral findings. The study is explained and verbal consent to proceed is obtained. NERVE CONDUCTION STUDIES:    Sensory nerve conduction studies: Bilateral sural and superficial peroneal sensory nerve conduction studies demonstrate normal peak latencies and amplitudes. Waveforms are limited in all studies. Bilateral lower limb temperatures are normal.     Motor nerve conduction studies: Bilateral peroneal motor nerve conduction studies with pickup over the extensor digitorum brevis demonstrate normal distal latencies and borderline-normal amplitudes. Bilateral tibial motor nerve conduction studies with pickup over the abductor hallucis demonstrate normal distal latencies and borderline-normal amplitudes. H reflex: Bilateral H reflexes are symmetric and not prolonged, waveform is limited on the right. ELECTROMYOGRAPHY: A disposable monopolar needle is used to evaluate bilateral vastus medialis, tibialis anterior, extensor hallucis longus, flexor digitorum longus, peroneus longus, medial gastrocnemius, and lateral gastrocnemius. All of the muscles sampled are free of any increased insertional activity or any abnormal spontaneous activity. Motor unit recruitment is unremarkable.  Bilateral low lumbar paraspinal muscle sampling is free of any increased insertional activity or any abnormal spontaneous activity. SUMMARY: This study is limited, but normal. Although limited, there is no clear electrodiagnostic evidence for a generalized large fiber sensorimotor peripheral polyneuropathy. There is no current electrodiagnostic evidence for an active bilateral lumbosacral motor radiculopathy. RECOMMENDATIONS: Today's study does not explain the patient's left leg pain. When compared to the NCS/EMG from 1/31/22, today's study is essentially similar. The patient should follow up as previously instructed. If his symptoms persist or worsen, further electrodiagnostic evaluation may be considered if the patient is agreeable. Clinical correlation is recommended.

## 2023-01-20 DIAGNOSIS — M47.817 LUMBOSACRAL SPONDYLOSIS WITHOUT MYELOPATHY: ICD-10-CM

## 2023-01-20 NOTE — TELEPHONE ENCOUNTER
Future Appointments    Encounter Information    Provider Department Appt Notes   1/24/2023 Cele Segovia MD [facility] Neurosurgery per Dr Magi Kline Mri lumbar   2/22/2023 Jeanette Al 25 Primary Care AWV   3/16/2023 MANUELA Longoria - CNP [facility] Pain Management 2 month follow up per  after EMG, injections   6/6/2023 Clarissa Cooper, APRN - [address] Pain Management 6 month follow up to discuss rfa     Past Visits    Date Provider Specialty Visit Type Primary Dx   01/19/2023 Madi Calvo MD Pain Management Procedure visit Left leg pain   01/18/2023 Madi Calvo MD Pain Management Office Visit Lumbar radiculopathy   01/09/2023 MANUELA Al - CNP Family Medicine Office Visit Chronic obstructive pulmonary disease, unspecified COPD type ([facility] [address]

## 2023-01-21 ENCOUNTER — HOSPITAL ENCOUNTER (EMERGENCY)
Age: 68
Discharge: HOME | End: 2023-01-21
Payer: MEDICARE

## 2023-01-21 VITALS
HEART RATE: 75 BPM | TEMPERATURE: 97.8 F | DIASTOLIC BLOOD PRESSURE: 82 MMHG | SYSTOLIC BLOOD PRESSURE: 142 MMHG | OXYGEN SATURATION: 96 % | RESPIRATION RATE: 16 BRPM

## 2023-01-21 DIAGNOSIS — M54.50 ACUTE EXACERBATION OF CHRONIC LOW BACK PAIN: Primary | ICD-10-CM

## 2023-01-21 DIAGNOSIS — G89.29 ACUTE EXACERBATION OF CHRONIC LOW BACK PAIN: Primary | ICD-10-CM

## 2023-01-21 DIAGNOSIS — M54.32 LEFT SIDED SCIATICA: ICD-10-CM

## 2023-01-21 PROCEDURE — 99284 EMERGENCY DEPT VISIT MOD MDM: CPT

## 2023-01-21 PROCEDURE — 96372 THER/PROPH/DIAG INJ SC/IM: CPT

## 2023-01-21 PROCEDURE — 6360000002 HC RX W HCPCS

## 2023-01-21 RX ORDER — KETOROLAC TROMETHAMINE 30 MG/ML
30 INJECTION, SOLUTION INTRAMUSCULAR; INTRAVENOUS ONCE
Status: COMPLETED | OUTPATIENT
Start: 2023-01-21 | End: 2023-01-21

## 2023-01-21 RX ORDER — METHYLPREDNISOLONE SODIUM SUCCINATE 125 MG/2ML
60 INJECTION, POWDER, LYOPHILIZED, FOR SOLUTION INTRAMUSCULAR; INTRAVENOUS ONCE
Status: COMPLETED | OUTPATIENT
Start: 2023-01-21 | End: 2023-01-21

## 2023-01-21 RX ORDER — ORPHENADRINE CITRATE 30 MG/ML
60 INJECTION INTRAMUSCULAR; INTRAVENOUS ONCE
Status: COMPLETED | OUTPATIENT
Start: 2023-01-21 | End: 2023-01-21

## 2023-01-21 RX ADMIN — METHYLPREDNISOLONE SODIUM SUCCINATE 60 MG: 125 INJECTION, POWDER, FOR SOLUTION INTRAMUSCULAR; INTRAVENOUS at 06:01

## 2023-01-21 RX ADMIN — ORPHENADRINE CITRATE 60 MG: 30 INJECTION INTRAMUSCULAR; INTRAVENOUS at 06:01

## 2023-01-21 RX ADMIN — KETOROLAC TROMETHAMINE 30 MG: 30 INJECTION, SOLUTION INTRAMUSCULAR at 06:01

## 2023-01-21 ASSESSMENT — ENCOUNTER SYMPTOMS
COUGH: 0
BACK PAIN: 1
ABDOMINAL PAIN: 0
DIARRHEA: 0
NAUSEA: 0
PHOTOPHOBIA: 0
SHORTNESS OF BREATH: 0
VOMITING: 0

## 2023-01-21 ASSESSMENT — PAIN DESCRIPTION - LOCATION: LOCATION: BACK

## 2023-01-21 ASSESSMENT — PAIN - FUNCTIONAL ASSESSMENT
PAIN_FUNCTIONAL_ASSESSMENT: 0-10
PAIN_FUNCTIONAL_ASSESSMENT: NONE - DENIES PAIN

## 2023-01-21 ASSESSMENT — PAIN SCALES - GENERAL: PAINLEVEL_OUTOF10: 10

## 2023-01-21 ASSESSMENT — PAIN DESCRIPTION - ORIENTATION: ORIENTATION: LEFT;LOWER

## 2023-01-21 NOTE — ED PROVIDER NOTES
[address] [facility] ED  eMERGENCY dEPARTMENT eNCOUnter      Pt Name: Zohra Ryan  MRN: [id number]  Abiodungfzion 1955  Date of evaluation: 1/21/2023  Provider: LYNETTE Bach        HISTORY OF PRESENT ILLNESS    Zohra Ryan is a 69 y.o. male per chart review has ah/o HTN, GERD, HLD, anxiety, opioid abuse in remission, chronic low back pain with sciatica, ARNAUD, tobacco abuse. Patient presents to the emergency department for ongoing bilateral low back pain. States radiation down his posterior left leg. Patient with known history of spinal disease. He has been following with pain management for this and is starting to follow-up with orthopedic surgery Dr. Dheeraj Recinos. States he is scheduled for a procedure at the end of next month he is unsure exactly what this procedure is. Patient was here 3 days ago for the same problem. States had some relief while in the ED but was not lasting. He denies any recent trauma, injury, overuse. He denies midline pain. He denies saddle anesthesia, bowel or bladder incontinence. Denies foot drop. Denies lower extremity weakness. Denies lower extremity numbness. No fevers. Denies IV drug abuse. Patient has history of past alcohol abuse as well as opiate abuse. States has been sober from alcohol for 20 years and has not had narcotics in at least 3 years. Multiple lumbar imaging studies have been performed, most recently appreciate MRI lumbar spine done 1/15/2023, demonstrating multifocal areas of severe spinal canal stenosis and severe bilateral neuroforaminal narrowing. REVIEW OF SYSTEMS       Review of Systems   Constitutional:  Negative for chills and fever. HENT:  Negative for congestion. Eyes:  Negative for photophobia. Respiratory:  Negative for cough and shortness of breath. Cardiovascular:  Negative for chest pain. Gastrointestinal:  Negative for abdominal pain, diarrhea, nausea and vomiting.    Genitourinary:  Negative for difficulty urinating. Musculoskeletal:  Positive for back pain. Negative for gait problem and myalgias. Neurological:  Negative for weakness, numbness and headaches. Psychiatric/Behavioral:  Negative for confusion. Except as noted above the remainder of the review of systems was reviewed and negative.        PAST MEDICAL HISTORY     Past Medical History:   Diagnosis Date    Allergic rhinitis 2/19/2018    Anxiety     Chronic back pain     COPD (chronic obstructive pulmonary disease) (HCC)     Depression     Disorder of stomach     valve not closing properly    Emphysema lung ([facility] [address]     Essential hypertension 11/4/2019    Gastroesophageal reflux disease 10/4/2019    Hyperlipidemia     meds > 22 yrs    Low back pain 5/1/2018    ARNAUD (obstructive sleep apnea) 2/19/2018    Other emphysema ([address] 10/4/2019    Other intervertebral disc degeneration, lumbar region 3/23/2018    Radiculopathy, lumbar region 2/28/2018    Restless leg syndrome     Seasonal affective disorder ([address] 10/4/2019    Substance abuse ([facility] [address]     alcholic, quit 20 yrs          SURGICAL HISTORY       Past Surgical History:   Procedure Laterality Date    COLONOSCOPY  12/22/2016    DALIA LAST MD    DENTAL SURGERY  10 yrs ago    ENDOSCOPY, COLON, DIAGNOSTIC      EYE SURGERY      Lasik OU    FINGER TRIGGER RELEASE Left 11/1/2022    LEFT HAND TRIGGER FINGER RING FINGER RELEASE OF A1 PULLEY performed by Thomas Landa MD at [place] [address] ARTHROSCOPY Right     KNEE SURGERY Right 05/2016    Ray procedure    KNEE SURGERY      OK NASAL/SINUS ENDOSCOPY W/MAXILLARY ANTROSTOMY N/A 2/22/2018    SEPTOPLASTY MICRODEBRIDER ASSISTED TURBINOPLASTY AND OUT-FRACTURING BILATERAL NASAL ENDOSCOPY performed by Roman Cogan, MD at [address] [place] [address]       Discharge Medication List as of 1/21/2023  6:25 AM        CONTINUE these medications which have NOT CHANGED    Details   acetaminophen (TYLENOL) 500 MG tablet Take 2 tablets by mouth every 6 hours as needed for Pain or Fever, Disp-60 tablet, R-0Normal      methocarbamol (ROBAXIN) 500 MG tablet Take 2 tablets by mouth 4 times daily as needed (muscular pain), Disp-40 tablet, R-0Normal      lidocaine 4 % external patch Place 1 patch onto the skin daily, TransDERmal, DAILY Starting Tue 1/17/2023, Until u 2/16/2023, For 30 days, Disp-30 patch, R-0, Normal      meloxicam (MOBIC) 15 MG tablet Take 1 tablet by mouth daily as needed for Pain, Disp-90 tablet, R-1Print      !! albuterol sulfate HFA (VENTOLIN HFA) 108 (90 Base) MCG/ACT inhaler Inhale 2 puffs into the lungs 4 times daily as needed for Wheezing, Disp-18 g, R-0Normal      budesonide-formoterol (SYMBICORT) 160-4.5 MCG/ACT AERO Inhale 2 puffs into the lungs 2 times daily, Disp-10.2 g, R-3Normal      !! albuterol sulfate HFA (VENTOLIN HFA) 108 (90 Base) MCG/ACT inhaler Inhale 2 puffs into the lungs 4 times daily as needed for Wheezing, Disp-18 g, R-0Normal      gabapentin (NEURONTIN) 400 MG capsule take 1 capsule by mouth three times a day, Disp-90 capsule, R-0Normal      FLUoxetine (PROZAC) 40 MG capsule TAKE 1 CAPSULE BY MOUTH DAILY. , Disp-90 capsule, R-1Normal      rosuvastatin (CRESTOR) 40 MG tablet TAKE 1 TABLET BY MOUTH DAILY AT BEDTIME., Disp-90 tablet, R-5Normal       !! - Potential duplicate medications found. Please discuss with provider.           ALLERGIES     Alcohol, Benadryl [diphenhydramine], Demerol hcl [meperidine], and Sulfa antibiotics    FAMILY HISTORY       Family History   Problem Relation Age of Onset    Cancer Mother         stomach    Arthritis Mother     Heart Disease Father 48    Cancer Father         bone    Cancer Maternal Grandmother         lung    Cancer Paternal Grandmother     Heart Disease Paternal Grandfather     No Known Problems Sister     Cancer Brother     Heart Disease Brother         hole in heart in 65 at 1 months age          SOCIAL HISTORY       Social History     Socioeconomic History    Marital status:      Spouse name: None    Number of children: 2    Years of education: 12    Highest education level: High school graduate   Tobacco Use    Smoking status: Every Day     Packs/day: 2.00     Years: 52.00     Pack years: 104.00     Types: Cigars, Cigarettes     Start date: 1970    Smokeless tobacco: Never    Tobacco comments:     3-5 cigars qd   Vaping Use    Vaping Use: Never used   Substance and Sexual Activity    Alcohol use: No     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    Drug use: No    Sexual activity: Not Currently     Partners: Female     Social Determinants of Health     Physical Activity: Inactive    Days of Exercise per Week: 0 days    Minutes of Exercise per Session: 0 min         PHYSICAL EXAM        ED Triage Vitals [01/21/23 0533]   BP Temp Temp Source Heart Rate Resp SpO2 Height Weight   (!) 142/82 97.8 °F (36.6 °C) Oral 75 16 96 % -- --       Physical Exam  Constitutional:       General: He is not in acute distress. Appearance: Normal appearance. He is not ill-appearing, toxic-appearing or diaphoretic. HENT:      Head: Normocephalic and atraumatic. Right Ear: External ear normal.      Left Ear: External ear normal.      Nose: Nose normal.      Mouth/Throat:      Mouth: Mucous membranes are moist.      Pharynx: Oropharynx is clear. Eyes:      Extraocular Movements: Extraocular movements intact. Cardiovascular:      Rate and Rhythm: Normal rate and regular rhythm. Pulses:           Dorsalis pedis pulses are 2+ on the right side and 2+ on the left side. Posterior tibial pulses are 2+ on the right side and 2+ on the left side. Pulmonary:      Effort: Pulmonary effort is normal. No respiratory distress. Breath sounds: Normal breath sounds. Abdominal:      General: Bowel sounds are normal.      Palpations: Abdomen is soft. Tenderness: There is no abdominal tenderness. Musculoskeletal:         General: Normal range of motion. Cervical back: Normal range of motion. Back:       Comments: Tender across bilateral low back. No focal midline tenderness. No step-off. Skin:     General: Skin is warm. Capillary Refill: Capillary refill takes less than 2 seconds. Neurological:      Mental Status: He is alert and oriented to person, place, and time. Sensory: No sensory deficit (Denies sensation loss or inequalities of bilateral lower extremities). Motor: No weakness (5 out of 5 strength bilateral lower extremities). Coordination: Coordination normal.      Gait: Gait normal.      Comments: No clonus   Psychiatric:         Mood and Affect: Mood normal.         Behavior: Behavior normal.         LABS:  Labs Reviewed - No data to display      MDM:   Vitals:    Vitals:    01/21/23 0533   BP: (!) 142/82   Pulse: 75   Resp: 16   Temp: 97.8 °F (36.6 °C)   TempSrc: Oral   SpO2: 96%       69 y.o. male patient presents emergency department for acute on chronic low back pain. Radicular symptoms down his posterior left leg are present. This is a typical presentation for patient's back pain. He denies recent injury, trauma, overuse. No cord compression red flag symptoms are present. Patient is afebrile. VSS. Neurovascularly intact. Recent imaging studies are appreciated on chart review including recent MRI demonstrating multifocal areas of stenosis and bilateral neuroforaminal narrowing. Patient is following with pain management as well as orthopedic surgery for this problem. He is medicated in the ED with Solu-Medrol , Toradol, Norflex. Discussion was had with patient regarding short-term management of his symptoms from the ED today but stressed the importance of continued outpatient follow-up for long-term management and therapy, he demonstrates understanding. Will be signed out to Tarik Maldonado PA-C with likely plan for discharge. CRITICAL CARE TIME   Total CriticalCare time was 0 minutes, excluding separately reportable procedures.   There was a high probability of clinically significant/life threatening deterioration in the patient's condition which required my urgent intervention. PROCEDURES:  Unlessotherwise noted below, none      Procedures      FINAL IMPRESSION      1. Acute exacerbation of chronic low back pain    2.  Left sided sciatica          DISPOSITION/PLAN   DISPOSITION Decision To Discharge 01/21/2023 06:39:14 AM          LYNETTE Olson (electronically signed)  Attending Emergency Physician          Wilman Vargas, [address]  01/21/23 7737

## 2023-01-21 NOTE — ED NOTES
Pt states his extremities are uncontrollably shaking. LYNETTE Ponce notified. Pt is alert and oriented x4.       Cabrera Lamar  01/21/23 8219

## 2023-01-21 NOTE — ED TRIAGE NOTES
Pt to ED due to chronic lower back pain. Pt states he is in 10/10 pain at this time. Pt is alert and oriented x4. Skin is warm, dry, intact.  Resp are regular and equal.

## 2023-01-22 RX ORDER — GABAPENTIN 400 MG/1
CAPSULE ORAL
Qty: 90 CAPSULE | Refills: 0 | Status: SHIPPED | OUTPATIENT
Start: 2023-01-22 | End: 2023-02-21

## 2023-01-26 ENCOUNTER — OFFICE VISIT (OUTPATIENT)
Dept: PAIN MANAGEMENT | Age: 68
End: 2023-01-26
Payer: MEDICARE

## 2023-01-26 VITALS
HEIGHT: 69 IN | BODY MASS INDEX: 21.48 KG/M2 | WEIGHT: 145 LBS | SYSTOLIC BLOOD PRESSURE: 156 MMHG | DIASTOLIC BLOOD PRESSURE: 92 MMHG | TEMPERATURE: 97.5 F

## 2023-01-26 DIAGNOSIS — M79.605 LEFT LEG PAIN: Primary | ICD-10-CM

## 2023-01-26 DIAGNOSIS — F10.11 HISTORY OF ALCOHOL ABUSE: ICD-10-CM

## 2023-01-26 DIAGNOSIS — M47.817 LUMBOSACRAL SPONDYLOSIS WITHOUT MYELOPATHY: ICD-10-CM

## 2023-01-26 DIAGNOSIS — M48.062 SPINAL STENOSIS OF LUMBAR REGION WITH NEUROGENIC CLAUDICATION: ICD-10-CM

## 2023-01-26 DIAGNOSIS — M54.16 LUMBAR RADICULOPATHY: ICD-10-CM

## 2023-01-26 PROCEDURE — 1123F ACP DISCUSS/DSCN MKR DOCD: CPT | Performed by: NURSE PRACTITIONER

## 2023-01-26 PROCEDURE — 3080F DIAST BP >= 90 MM HG: CPT | Performed by: NURSE PRACTITIONER

## 2023-01-26 PROCEDURE — 99214 OFFICE O/P EST MOD 30 MIN: CPT | Performed by: NURSE PRACTITIONER

## 2023-01-26 PROCEDURE — 3077F SYST BP >= 140 MM HG: CPT | Performed by: NURSE PRACTITIONER

## 2023-01-26 ASSESSMENT — ENCOUNTER SYMPTOMS
COUGH: 0
ABDOMINAL PAIN: 0
BACK PAIN: 1
SHORTNESS OF BREATH: 0
EYE PAIN: 0
DIARRHEA: 0
SORE THROAT: 0
NAUSEA: 0
SHORTNESS OF BREATH: 0
BACK PAIN: 1
VOMITING: 0
BLOOD IN STOOL: 0
RHINORRHEA: 0
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
## 2023-01-29 ENCOUNTER — HOSPITAL ENCOUNTER (EMERGENCY)
Age: 68
Discharge: HOME | End: 2023-01-29
Payer: MEDICARE

## 2023-01-29 VITALS
WEIGHT: 140 LBS | HEART RATE: 97 BPM | DIASTOLIC BLOOD PRESSURE: 85 MMHG | TEMPERATURE: 97.8 F | OXYGEN SATURATION: 99 % | BODY MASS INDEX: 20.73 KG/M2 | SYSTOLIC BLOOD PRESSURE: 118 MMHG | HEIGHT: 69 IN | RESPIRATION RATE: 18 BRPM

## 2023-01-29 DIAGNOSIS — G89.29 CHRONIC PAIN OF LEFT KNEE: ICD-10-CM

## 2023-01-29 DIAGNOSIS — M54.50 ACUTE EXACERBATION OF CHRONIC LOW BACK PAIN: Primary | ICD-10-CM

## 2023-01-29 DIAGNOSIS — G89.29 ACUTE EXACERBATION OF CHRONIC LOW BACK PAIN: Primary | ICD-10-CM

## 2023-01-29 DIAGNOSIS — M25.562 CHRONIC PAIN OF LEFT KNEE: ICD-10-CM

## 2023-01-29 PROCEDURE — 96372 THER/PROPH/DIAG INJ SC/IM: CPT

## 2023-01-29 PROCEDURE — 6360000002 HC RX W HCPCS: Performed by: STUDENT IN AN ORGANIZED HEALTH CARE EDUCATION/TRAINING PROGRAM

## 2023-01-29 PROCEDURE — 99284 EMERGENCY DEPT VISIT MOD MDM: CPT

## 2023-01-29 RX ORDER — HYDROCODONE BITARTRATE AND ACETAMINOPHEN 5; 325 MG/1; MG/1
1 TABLET ORAL ONCE
Status: DISCONTINUED | OUTPATIENT
Start: 2023-01-29 | End: 2023-01-29

## 2023-01-29 RX ORDER — KETOROLAC TROMETHAMINE 30 MG/ML
30 INJECTION, SOLUTION INTRAMUSCULAR; INTRAVENOUS ONCE
Status: COMPLETED | OUTPATIENT
Start: 2023-01-29 | End: 2023-01-29

## 2023-01-29 RX ORDER — METHYLPREDNISOLONE ACETATE 80 MG/ML
80 INJECTION, SUSPENSION INTRA-ARTICULAR; INTRALESIONAL; INTRAMUSCULAR; SOFT TISSUE ONCE
Status: COMPLETED | OUTPATIENT
Start: 2023-01-29 | End: 2023-01-29

## 2023-01-29 RX ORDER — ORPHENADRINE CITRATE 30 MG/ML
60 INJECTION INTRAMUSCULAR; INTRAVENOUS ONCE
Status: COMPLETED | OUTPATIENT
Start: 2023-01-29 | End: 2023-01-29

## 2023-01-29 RX ADMIN — ORPHENADRINE CITRATE 60 MG: 30 INJECTION INTRAMUSCULAR; INTRAVENOUS at 04:45

## 2023-01-29 RX ADMIN — KETOROLAC TROMETHAMINE 30 MG: 30 INJECTION, SOLUTION INTRAMUSCULAR; INTRAVENOUS at 04:45

## 2023-01-29 RX ADMIN — METHYLPREDNISOLONE ACETATE 80 MG: 80 INJECTION, SUSPENSION INTRA-ARTICULAR; INTRALESIONAL; INTRAMUSCULAR; SOFT TISSUE at 04:44

## 2023-01-29 ASSESSMENT — PAIN DESCRIPTION - ONSET: ONSET: ON-GOING

## 2023-01-29 ASSESSMENT — PAIN DESCRIPTION - DESCRIPTORS: DESCRIPTORS: ACHING

## 2023-01-29 ASSESSMENT — ENCOUNTER SYMPTOMS
CHEST TIGHTNESS: 0
SORE THROAT: 0
SHORTNESS OF BREATH: 0
BACK PAIN: 1
DIARRHEA: 0
EYE PAIN: 0
NAUSEA: 0

## 2023-01-29 ASSESSMENT — LIFESTYLE VARIABLES
HOW MANY STANDARD DRINKS CONTAINING ALCOHOL DO YOU HAVE ON A TYPICAL DAY: PATIENT DOES NOT DRINK
HOW OFTEN DO YOU HAVE A DRINK CONTAINING ALCOHOL: NEVER

## 2023-01-29 ASSESSMENT — PAIN DESCRIPTION - LOCATION: LOCATION: BACK;KNEE

## 2023-01-29 ASSESSMENT — PAIN DESCRIPTION - FREQUENCY: FREQUENCY: INTERMITTENT

## 2023-01-29 ASSESSMENT — PAIN SCALES - GENERAL: PAINLEVEL_OUTOF10: 10

## 2023-01-29 ASSESSMENT — PAIN DESCRIPTION - ORIENTATION: ORIENTATION: LEFT

## 2023-01-29 ASSESSMENT — PAIN - FUNCTIONAL ASSESSMENT: PAIN_FUNCTIONAL_ASSESSMENT: 0-10

## 2023-01-29 ASSESSMENT — PAIN DESCRIPTION - PAIN TYPE: TYPE: ACUTE PAIN

## 2023-01-29 NOTE — ED TRIAGE NOTES
Patient arrived from home via self with complaint of chronic knee and back pain. Patient A&OX4. Skin pink, warm, and dry.

## 2023-01-29 NOTE — ED NOTES
Pt understands discharge instructions.   Pt instructed to follow up with PCP     Pt told to come back for new or worsening symptoms  No further questions         Hair Pastrana RN  01/29/23 2642

## 2023-02-03 ENCOUNTER — OFFICE VISIT (OUTPATIENT)
Dept: FAMILY MEDICINE CLINIC | Age: 68
End: 2023-02-03

## 2023-02-03 ENCOUNTER — TELEPHONE (OUTPATIENT)
Dept: PAIN MANAGEMENT | Age: 68
End: 2023-02-03

## 2023-02-03 VITALS
WEIGHT: 149.6 LBS | BODY MASS INDEX: 24.04 KG/M2 | HEIGHT: 66 IN | SYSTOLIC BLOOD PRESSURE: 132 MMHG | TEMPERATURE: 98 F | DIASTOLIC BLOOD PRESSURE: 78 MMHG | HEART RATE: 91 BPM | OXYGEN SATURATION: 96 %

## 2023-02-03 DIAGNOSIS — M54.50 ACUTE EXACERBATION OF CHRONIC LOW BACK PAIN: Primary | ICD-10-CM

## 2023-02-03 DIAGNOSIS — G89.29 CHRONIC PAIN OF LEFT KNEE: ICD-10-CM

## 2023-02-03 DIAGNOSIS — M47.817 LUMBOSACRAL SPONDYLOSIS WITHOUT MYELOPATHY: ICD-10-CM

## 2023-02-03 DIAGNOSIS — F11.11 OPIOID ABUSE, IN REMISSION (HCC): ICD-10-CM

## 2023-02-03 DIAGNOSIS — M25.562 CHRONIC PAIN OF LEFT KNEE: ICD-10-CM

## 2023-02-03 DIAGNOSIS — G89.29 ACUTE EXACERBATION OF CHRONIC LOW BACK PAIN: Primary | ICD-10-CM

## 2023-02-03 RX ORDER — GABAPENTIN 600 MG/1
600 TABLET ORAL 3 TIMES DAILY
Qty: 90 TABLET | Refills: 0 | Status: SHIPPED | OUTPATIENT
Start: 2023-02-03 | End: 2023-03-05

## 2023-02-03 SDOH — ECONOMIC STABILITY: FOOD INSECURITY: WITHIN THE PAST 12 MONTHS, YOU WORRIED THAT YOUR FOOD WOULD RUN OUT BEFORE YOU GOT MONEY TO BUY MORE.: NEVER TRUE

## 2023-02-03 SDOH — ECONOMIC STABILITY: HOUSING INSECURITY
IN THE LAST 12 MONTHS, WAS THERE A TIME WHEN YOU DID NOT HAVE A STEADY PLACE TO SLEEP OR SLEPT IN A SHELTER (INCLUDING NOW)?: NO

## 2023-02-03 SDOH — ECONOMIC STABILITY: INCOME INSECURITY: HOW HARD IS IT FOR YOU TO PAY FOR THE VERY BASICS LIKE FOOD, HOUSING, MEDICAL CARE, AND HEATING?: NOT HARD AT ALL

## 2023-02-03 SDOH — ECONOMIC STABILITY: FOOD INSECURITY: WITHIN THE PAST 12 MONTHS, THE FOOD YOU BOUGHT JUST DIDN'T LAST AND YOU DIDN'T HAVE MONEY TO GET MORE.: NEVER TRUE

## 2023-02-03 ASSESSMENT — PATIENT HEALTH QUESTIONNAIRE - PHQ9
3. TROUBLE FALLING OR STAYING ASLEEP: 0
SUM OF ALL RESPONSES TO PHQ QUESTIONS 1-9: 0
2. FEELING DOWN, DEPRESSED OR HOPELESS: 0
7. TROUBLE CONCENTRATING ON THINGS, SUCH AS READING THE NEWSPAPER OR WATCHING TELEVISION: 0
SUM OF ALL RESPONSES TO PHQ QUESTIONS 1-9: 0
10. IF YOU CHECKED OFF ANY PROBLEMS, HOW DIFFICULT HAVE THESE PROBLEMS MADE IT FOR YOU TO DO YOUR WORK, TAKE CARE OF THINGS AT HOME, OR GET ALONG WITH OTHER PEOPLE: 0
SUM OF ALL RESPONSES TO PHQ QUESTIONS 1-9: 0
6. FEELING BAD ABOUT YOURSELF - OR THAT YOU ARE A FAILURE OR HAVE LET YOURSELF OR YOUR FAMILY DOWN: 0
9. THOUGHTS THAT YOU WOULD BE BETTER OFF DEAD, OR OF HURTING YOURSELF: 0
4. FEELING TIRED OR HAVING LITTLE ENERGY: 0
SUM OF ALL RESPONSES TO PHQ QUESTIONS 1-9: 0
5. POOR APPETITE OR OVEREATING: 0

## 2023-02-03 ASSESSMENT — ENCOUNTER SYMPTOMS
VOMITING: 0
SINUS PRESSURE: 0
SHORTNESS OF BREATH: 0
SORE THROAT: 0
BACK PAIN: 1
ABDOMINAL PAIN: 0
COUGH: 0

## 2023-02-03 NOTE — PROGRESS NOTES
2/3/2023    Andrea Pierce (:  1955) is a 77 y.o. male, here for evaluation of the following medical concerns:  Chief Complaint   Patient presents with    Follow-up    Back Pain    Discuss Medications    Health Maintenance     Declined colorectal        HPI  ER follow-up  Patient was seen at [facility] EMERGENCY [facility] [place] [facility]  due to chronic knee and back pain  Given IM Toradol, IM Norflex and IM steroids with improvement in pain  Patient agreeable to discharge with his current medications and follow-up with Ortho and neurosurgery as scheduled    Today  Patient continued to have severe back pain and knee pain  States he needs something stronger for his pain  Patient specifically requesting Percocet as he was told by one of his friends who is a retired nurse that this would help with his pain    Review of Systems   Constitutional:  Negative for chills and fever. HENT:  Negative for congestion, sinus pressure and sore throat. Respiratory:  Negative for cough and shortness of breath. Cardiovascular:  Negative for chest pain and palpitations. Gastrointestinal:  Negative for abdominal pain and vomiting. Musculoskeletal:  Positive for back pain and gait problem. Negative for arthralgias and myalgias. Skin:  Negative for rash and wound. Neurological:  Negative for speech difficulty and light-headedness. Psychiatric/Behavioral:  Negative for suicidal ideas. The patient is not nervous/anxious. Prior to Visit Medications    Medication Sig Taking? Authorizing Provider   gabapentin (NEURONTIN) 600 MG tablet Take 1 tablet by mouth 3 times daily for 30 days.  Yes Lamar Fitzpatrick DO   acetaminophen (TYLENOL) 500 MG tablet Take 2 tablets by mouth every 6 hours as needed for Pain or Fever Yes Augustine Grijalva MD   lidocaine 4 % external patch Place 1 patch onto the skin daily Yes Augustine Grijalva MD   meloxicam (MOBIC) 15 MG tablet Take 1 tablet by mouth daily as needed for Pain Yes Wisam Hagan MD albuterol sulfate HFA (VENTOLIN HFA) 108 (90 Base) MCG/ACT inhaler Inhale 2 puffs into the lungs 4 times daily as needed for Wheezing Yes MANUELA Puentes CNP   budesonide-formoterol (SYMBICORT) 160-4.5 MCG/ACT AERO Inhale 2 puffs into the lungs 2 times daily Yes MANUELA Puentes CNP   albuterol sulfate HFA (VENTOLIN HFA) 108 (90 Base) MCG/ACT inhaler Inhale 2 puffs into the lungs 4 times daily as needed for Wheezing Yes Elijah Noonan MD   FLUoxetine (PROZAC) 40 MG capsule TAKE 1 CAPSULE BY MOUTH DAILY. Yes MANUELA Puentes CNP   rosuvastatin (CRESTOR) 40 MG tablet TAKE 1 TABLET BY MOUTH DAILY AT BEDTIME.  Yes MANUELA Puentes CNP   cyclobenzaprine (FLEXERIL) 10 MG tablet Take 1 tablet by mouth 3 times daily as needed for Muscle spasms  MANUELA Puentes CNP        Medications Discontinued During This Encounter   Medication Reason    gabapentin (NEURONTIN) 400 MG capsule DOSE ADJUSTMENT       Allergies   Allergen Reactions    Alcohol Other (See Comments)     Sober 22 yrs    Benadryl [Diphenhydramine]      \"speeds him up\"    Demerol Hcl [Meperidine] Other (See Comments)     Aggressive behavior    Sulfa Antibiotics Other (See Comments)     Told by mother / patient unaware of reaction       Past Medical History:   Diagnosis Date    Allergic rhinitis 2/19/2018    Anxiety     Chronic back pain     COPD (chronic obstructive pulmonary disease) ([address]     Depression     Disorder of stomach     valve not closing properly    Emphysema lung ([address]     Essential hypertension 11/4/2019    Gastroesophageal reflux disease 10/4/2019    Hyperlipidemia     meds > 22 yrs    Low back pain 5/1/2018    ARNAUD (obstructive sleep apnea) 2/19/2018    Other emphysema ([address] 10/4/2019    Other intervertebral disc degeneration, lumbar region 3/23/2018    Radiculopathy, lumbar region 2/28/2018    Restless leg syndrome     Seasonal affective disorder ([address] 10/4/2019    Substance abuse ([address]     alcholic, quit 20 yrs Past Surgical History:   Procedure Laterality Date    COLONOSCOPY  12/22/2016    DALIA LAST MD    DENTAL SURGERY  10 yrs ago    ENDOSCOPY, COLON, DIAGNOSTIC      EYE SURGERY      Lasik OU    FINGER TRIGGER RELEASE Left 11/1/2022    LEFT HAND TRIGGER FINGER RING FINGER RELEASE OF A1 PULLEY performed by Salina Leon MD at [facility] [address] ARTHROSCOPY Right     KNEE SURGERY Right 05/2016    Ray procedure    KNEE SURGERY      [place] NASAL/SINUS ENDOSCOPY W/MAXILLARY ANTROSTOMY N/A 2/22/2018    SEPTOPLASTY MICRODEBRIDER ASSISTED TURBINOPLASTY AND OUT-FRACTURING BILATERAL NASAL ENDOSCOPY performed by Elkin Vizcarra MD at [facility] History     Socioeconomic History    Marital status:      Spouse name: Not on file    Number of children: 2    Years of education: 12    Highest education level: High school graduate   Occupational History    Not on file   Tobacco Use    Smoking status: Every Day     Packs/day: 2.00     Years: 52.00     Pack years: 104.00     Types: Cigars, Cigarettes     Start date: 1970    Smokeless tobacco: Never    Tobacco comments:     3-5 cigars qd   Vaping Use    Vaping Use: Never used   Substance and Sexual Activity    Alcohol use: No     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    Drug use: No    Sexual activity: Not Currently     Partners: Female   Other Topics Concern    Not on file   Social History Narrative    Not on file     Social Determinants of Health     Financial Resource Strain: Low Risk     Difficulty of Paying Living Expenses: Not hard at all   Food Insecurity: No Food Insecurity    Worried About Running Out of Food in the Last Year: Never true    [address] in the Last Year: Never true   Transportation Needs: Unknown    Lack of Transportation (Medical): Not on file    Lack of Transportation (Non-Medical):  No   Physical Activity: Inactive    Days of Exercise per Week: 0 days    Minutes of Exercise per Session: 0 min   Stress: Not on file   Social Connections: Not on file   Intimate Partner Violence: Not on file   Housing Stability: Unknown    Unable to Pay for Housing in the Last Year: Not on file    Number of [place] in the Last Year: Not on file    Unstable Housing in the Last Year: No        Family History   Problem Relation Age of Onset    Cancer Mother         stomach    Arthritis Mother     Heart Disease Father 48    Cancer Father         bone    Cancer Maternal Grandmother         lung    Cancer Paternal Grandmother     Heart Disease Paternal Grandfather     No Known Problems Sister     Cancer Brother     Heart Disease Brother         hole in heart in 65 at 1 months age       [de-identified]:    02/03/23 1409   BP: 132/78   Pulse: 91   Temp: 98 °F (36.7 °C)   SpO2: 96%   Weight: 149 lb 9.6 oz (67.9 kg)   Height: 5' 6\" (1.676 m)       Estimated body mass index is 24.15 kg/m² as calculated from the following:    Height as of this encounter: 5' 6\" (1.676 m). Weight as of this encounter: 149 lb 9.6 oz (67.9 kg). No results for input(s): WBC, RBC, HGB, HCT, MCV, MCH, MCHC, RDW, PLT, MPV in the last 72 hours. No results for input(s): NA, K, CL, CO2, BUN, CREATININE, GLUCOSE, CALCIUM, PROT, LABALBU, BILITOT, ALKPHOS, AST, ALT in the last 72 hours. No results found for: LABA1C    XR KNEE LEFT (1-2 VIEWS)    Result Date: 1/15/2023  No acute abnormality of the knee. CT HEAD WO CONTRAST    Result Date: 1/15/2023  No acute intracranial abnormality. MRI LUMBAR SPINE WO CONTRAST    Result Date: 1/15/2023  1. Severe spinal canal stenosis and severe bilateral neural foraminal narrowing at L3-4 secondary to a disc bulge, facet arthropathy and thickening of the ligamentum flavum. There is a concomitant left paracentral disc extrusion demonstrating inferior migration effacing the left lateral recess. 2. Severe spinal canal stenosis, severe right and moderate left neural foraminal narrowing at L4-5, as described above.  3. Severe bilateral neural foraminal narrowing at L5-S1, as described above. 4. Additional multilevel degenerative changes, as described above. XR CHEST PORTABLE    Result Date: 1/15/2023  No acute process. CTA ABDOMEN PELVIS W WO CONTRAST    Result Date: 1/15/2023  No evidence for dissection with a nonaneurysmal patent abdominal aorta demonstrating mild atherosclerotic disease. 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 Correlate with symptomatology Opacifications at the right lung base primarily linear although minimally confluent favoring atelectasis with minimal airspace disease not excluded adjacent to an asymmetrically elevated right hemidiaphragm. Physical Exam  Constitutional:       General: He is not in acute distress. Appearance: Normal appearance. HENT:      Head: Normocephalic and atraumatic. Eyes:      Extraocular Movements: Extraocular movements intact. Conjunctiva/sclera: Conjunctivae normal.   Musculoskeletal:         General: No deformity. Normal range of motion. Skin:     Findings: No lesion or rash. Neurological:      General: No focal deficit present. Mental Status: He is alert. Mental status is at baseline. Gait: Gait abnormal.   Psychiatric:         Mood and Affect: Mood normal.         Behavior: Behavior normal.         Thought Content: Thought content normal.       ASSESSMENT/PLAN:  1.  Acute exacerbation of chronic low back pain  Reviewed ER notes, labs, imaging as well as last several pain management notes  Discussed with patient that I cannot prescribe any stronger medications like Percocet since he already follows up with pain management, Dr. Esperanza Draper  At this time I can slightly increase his gabapentin from 400 mg 3 times daily to 600 mg 3 times daily  Continue meloxicam 15 mg daily  Recommended patient call pain management office to schedule follow-up    - gabapentin (NEURONTIN) 600 MG tablet; Take 1 tablet by mouth 3 times daily for 30 days. Dispense: 90 tablet; Refill: 0    2. Chronic pain of left knee    - gabapentin (NEURONTIN) 600 MG tablet; Take 1 tablet by mouth 3 times daily for 30 days. Dispense: 90 tablet; Refill: 0    3. Opioid abuse, in remission ([facility]      4. Lumbosacral spondylosis without myelopathy      Medications Discontinued During This Encounter   Medication Reason    gabapentin (NEURONTIN) 400 MG capsule DOSE ADJUSTMENT       ---------------------------------------------------------------------  Side effects, adverse effects of the medication prescribed today, as well as treatment plan/ rationale and result expectations have been discussed with the patient who expresses understanding and desires to proceed. Close follow up to evaluate treatment results and for coordination of care. I have reviewed the patient's medical history in detail and updated the computerized patient record. As always, patient is advised that if symptoms worsen in any way they will proceed to the nearest emergency room. --------------------------------------------------------------------    No follow-ups on file. An  electronic signature was used to authenticate this note.     --Mateus Pizarro, DO on 2/3/2023 at 2:42 PM

## 2023-02-05 ENCOUNTER — HOSPITAL ENCOUNTER (EMERGENCY)
Age: 68
Discharge: HOME | End: 2023-02-05
Payer: MEDICARE

## 2023-02-05 VITALS
HEIGHT: 69 IN | RESPIRATION RATE: 20 BRPM | SYSTOLIC BLOOD PRESSURE: 108 MMHG | HEART RATE: 85 BPM | BODY MASS INDEX: 21.48 KG/M2 | WEIGHT: 145 LBS | TEMPERATURE: 98.6 F | DIASTOLIC BLOOD PRESSURE: 79 MMHG | OXYGEN SATURATION: 94 %

## 2023-02-05 DIAGNOSIS — G89.29 ACUTE EXACERBATION OF CHRONIC LOW BACK PAIN: Primary | ICD-10-CM

## 2023-02-05 DIAGNOSIS — M25.562 CHRONIC PAIN OF LEFT KNEE: ICD-10-CM

## 2023-02-05 DIAGNOSIS — M54.50 ACUTE EXACERBATION OF CHRONIC LOW BACK PAIN: Primary | ICD-10-CM

## 2023-02-05 DIAGNOSIS — G89.29 CHRONIC PAIN OF LEFT KNEE: ICD-10-CM

## 2023-02-05 PROCEDURE — 96372 THER/PROPH/DIAG INJ SC/IM: CPT

## 2023-02-05 PROCEDURE — 6360000002 HC RX W HCPCS: Performed by: PHYSICIAN ASSISTANT

## 2023-02-05 PROCEDURE — 99284 EMERGENCY DEPT VISIT MOD MDM: CPT

## 2023-02-05 RX ORDER — TIZANIDINE 4 MG/1
4 TABLET ORAL EVERY 8 HOURS PRN
Qty: 15 TABLET | Refills: 0 | Status: SHIPPED | OUTPATIENT
Start: 2023-02-05

## 2023-02-05 RX ORDER — ETODOLAC 400 MG/1
400 TABLET, FILM COATED ORAL 2 TIMES DAILY
Qty: 14 TABLET | Refills: 0 | Status: SHIPPED | OUTPATIENT
Start: 2023-02-05

## 2023-02-05 RX ORDER — KETOROLAC TROMETHAMINE 30 MG/ML
60 INJECTION, SOLUTION INTRAMUSCULAR; INTRAVENOUS ONCE
Status: COMPLETED | OUTPATIENT
Start: 2023-02-05 | End: 2023-02-05

## 2023-02-05 RX ORDER — METHYLPREDNISOLONE ACETATE 40 MG/ML
40 INJECTION, SUSPENSION INTRA-ARTICULAR; INTRALESIONAL; INTRAMUSCULAR; SOFT TISSUE ONCE
Status: COMPLETED | OUTPATIENT
Start: 2023-02-05 | End: 2023-02-05

## 2023-02-05 RX ORDER — ORPHENADRINE CITRATE 30 MG/ML
60 INJECTION INTRAMUSCULAR; INTRAVENOUS ONCE
Status: COMPLETED | OUTPATIENT
Start: 2023-02-05 | End: 2023-02-05

## 2023-02-05 RX ADMIN — KETOROLAC TROMETHAMINE 60 MG: 30 INJECTION, SOLUTION INTRAMUSCULAR at 22:45

## 2023-02-05 RX ADMIN — ORPHENADRINE CITRATE 60 MG: 30 INJECTION INTRAMUSCULAR; INTRAVENOUS at 22:46

## 2023-02-05 RX ADMIN — METHYLPREDNISOLONE ACETATE 40 MG: 40 INJECTION, SUSPENSION INTRA-ARTICULAR; INTRALESIONAL; INTRAMUSCULAR; INTRASYNOVIAL; SOFT TISSUE at 22:46

## 2023-02-05 ASSESSMENT — PAIN DESCRIPTION - DESCRIPTORS: DESCRIPTORS: ACHING;SHARP;SHOOTING;STABBING

## 2023-02-05 ASSESSMENT — ENCOUNTER SYMPTOMS
ABDOMINAL PAIN: 0
BACK PAIN: 1
ALLERGIC/IMMUNOLOGIC NEGATIVE: 1
COLOR CHANGE: 0
APNEA: 0
SHORTNESS OF BREATH: 0
EYE PAIN: 0
TROUBLE SWALLOWING: 0

## 2023-02-05 ASSESSMENT — LIFESTYLE VARIABLES: HOW OFTEN DO YOU HAVE A DRINK CONTAINING ALCOHOL: NEVER

## 2023-02-05 ASSESSMENT — PAIN DESCRIPTION - FREQUENCY: FREQUENCY: CONTINUOUS

## 2023-02-05 ASSESSMENT — PAIN - FUNCTIONAL ASSESSMENT: PAIN_FUNCTIONAL_ASSESSMENT: 0-10

## 2023-02-05 ASSESSMENT — PAIN DESCRIPTION - ORIENTATION: ORIENTATION: LEFT

## 2023-02-05 ASSESSMENT — PAIN SCALES - GENERAL: PAINLEVEL_OUTOF10: 10

## 2023-02-05 ASSESSMENT — PAIN DESCRIPTION - LOCATION: LOCATION: BACK;KNEE;SHOULDER

## 2023-02-05 ASSESSMENT — PAIN DESCRIPTION - PAIN TYPE: TYPE: ACUTE PAIN;CHRONIC PAIN

## 2023-02-06 ENCOUNTER — HOSPITAL ENCOUNTER (EMERGENCY)
Age: 68
Discharge: HOME | End: 2023-02-06
Payer: MEDICARE

## 2023-02-06 VITALS
SYSTOLIC BLOOD PRESSURE: 108 MMHG | DIASTOLIC BLOOD PRESSURE: 70 MMHG | OXYGEN SATURATION: 96 % | TEMPERATURE: 97 F | HEIGHT: 69 IN | BODY MASS INDEX: 21.48 KG/M2 | RESPIRATION RATE: 16 BRPM | WEIGHT: 145 LBS | HEART RATE: 88 BPM

## 2023-02-06 DIAGNOSIS — M54.16 LUMBAR RADICULOPATHY: Primary | ICD-10-CM

## 2023-02-06 PROCEDURE — 6370000000 HC RX 637 (ALT 250 FOR IP): Performed by: PERSONAL EMERGENCY RESPONSE ATTENDANT

## 2023-02-06 PROCEDURE — 99283 EMERGENCY DEPT VISIT LOW MDM: CPT

## 2023-02-06 RX ORDER — HYDROCODONE BITARTRATE AND ACETAMINOPHEN 5; 325 MG/1; MG/1
1 TABLET ORAL ONCE
Status: COMPLETED | OUTPATIENT
Start: 2023-02-06 | End: 2023-02-06

## 2023-02-06 RX ADMIN — HYDROCODONE BITARTRATE AND ACETAMINOPHEN 1 TABLET: 5; 325 TABLET ORAL at 03:02

## 2023-02-06 ASSESSMENT — ENCOUNTER SYMPTOMS
SHORTNESS OF BREATH: 0
BLOOD IN STOOL: 0
NAUSEA: 0
DIARRHEA: 0
VOMITING: 0
ABDOMINAL PAIN: 0
SORE THROAT: 0
BACK PAIN: 1
COUGH: 0
COLOR CHANGE: 0
RHINORRHEA: 0

## 2023-02-06 ASSESSMENT — PAIN DESCRIPTION - LOCATION: LOCATION: KNEE;BACK

## 2023-02-06 ASSESSMENT — PAIN - FUNCTIONAL ASSESSMENT: PAIN_FUNCTIONAL_ASSESSMENT: 0-10

## 2023-02-06 ASSESSMENT — PAIN SCALES - GENERAL: PAINLEVEL_OUTOF10: 10

## 2023-02-06 ASSESSMENT — PAIN DESCRIPTION - ORIENTATION: ORIENTATION: LEFT

## 2023-02-06 NOTE — ED TRIAGE NOTES
Pt states that he has chronic back issues and is waiting for an operation. Pt states that he is having left knee, back, and shoulder pain. Pt has taken 4 different types of pain mediation today with no relief.

## 2023-02-06 NOTE — ED NOTES
Patient presents to the er with complaints of left knee pain and left sided back pain   Chronic pain   Patient sees pain management  Patient was just seen in the ER a few hours ago and discharged   States the medication given to him earlier in the Er did not touch his pain      Kymberly Gamez RN  02/06/23 Willie Salter, HUSEYIN  02/06/23 0234

## 2023-02-06 NOTE — ED NOTES
Patient D/C instructions have been reviewed, patient is to follow up with PCP   Patient voiced understanding, no questions or concerns noted at this time.        Bennett Calderon RN  02/06/23 [id number]

## 2023-02-06 NOTE — ED PROVIDER NOTES
[facility] ED  eMERGENCY dEPARTMENT eNCOUnter      Pt Name: Alec Franco  MRN: [id number]  [place] 1955  Date of evaluation: 2/6/2023  Provider: LYNETTE Tabor      HISTORY OF PRESENT ILLNESS    Alec Franco is a 77 y.o. male with PMHx of allergic rhinitis, anxiety, chronic back pain, COPD, depression, emphysema, hypertension, GERD, hyperlipidemia, ARNAUD, lumbar radiculopathy, RLS, substance abuse presents to the emergency department with left knee and back pain. January 15 patient seen in the emergency room for left knee pain and sent home with lidocaine patch. Returned later that day with same complaints with left knee x-ray showing no abnormalities and MRI of lumbar spine showing severe spinal canal stenosis L3-S1. Return January 17 with bilateral knee and back pain and sent home with Tylenol, additional lidocaine patch, and Robaxin. He saw pain management January 18. Seen again in the emergency room January 21 and January 29. He does seem to get some improvement with Toradol, Norflex, steroids IM. Was seen in the emergency room yesterday and sent home with Zanaflex and Lodine. Pt states now medications arent helping. No specific injuries. He denies fevers, cough, chest pain, shortness of breath, abdominal pain, nausea, vomiting, urinary symptoms. No saddle paresthesia. No urinary or bowel dysfunction. Pain currently located in bilateral lower back and entire left knee. No IV drug use. No history of cancer. No weakness. HPI    Nursing Notes were reviewed. REVIEW OF SYSTEMS       Review of Systems   Constitutional:  Negative for appetite change, chills and fever. HENT:  Negative for congestion, rhinorrhea and sore throat. Respiratory:  Negative for cough and shortness of breath. Cardiovascular:  Negative for chest pain. Gastrointestinal:  Negative for abdominal pain, blood in stool, diarrhea, nausea and vomiting.    Genitourinary:  Negative for difficulty urinating. Musculoskeletal:  Positive for arthralgias and back pain. Negative for neck stiffness. Skin:  Negative for color change and rash. Neurological:  Negative for dizziness, syncope, weakness, light-headedness, numbness and headaches. All other systems reviewed and are negative.           PAST MEDICAL HISTORY     Past Medical History:   Diagnosis Date    Allergic rhinitis 2/19/2018    Anxiety     Chronic back pain     COPD (chronic obstructive pulmonary disease) (HCC)     Depression     Disorder of stomach     valve not closing properly    [facility] [address]     Essential hypertension 11/4/2019    Gastroesophageal reflux disease 10/4/2019    Hyperlipidemia     meds > 22 yrs    Low back pain 5/1/2018    ARNAUD (obstructive sleep apnea) 2/19/2018    Other emphysema ([address] 10/4/2019    Other intervertebral disc degeneration, lumbar region 3/23/2018    Radiculopathy, lumbar region 2/28/2018    Restless leg syndrome     Seasonal affective disorder ([address] 10/4/2019    Substance abuse ([facility] [address]     alcholic, quit 20 yrs          SURGICAL HISTORY       Past Surgical History:   Procedure Laterality Date    COLONOSCOPY  12/22/2016    A SHALA MARLEY    DENTAL SURGERY  10 yrs ago    ENDOSCOPY, COLON, DIAGNOSTIC      EYE SURGERY      Lasik OU    FINGER TRIGGER RELEASE Left 11/1/2022    LEFT HAND TRIGGER FINGER RING FINGER RELEASE OF A1 PANFILO performed by Jaja Kimball MD at [place] [address] ARTHROSCOPY Right     KNEE SURGERY Right 05/2016    Ray procedure    KNEE SURGERY      [place] NASAL/SINUS ENDOSCOPY W/MAXILLARY ANTROSTOMY N/A 2/22/2018    SEPTOPLASTY MICRODEBRIDER ASSISTED TURBINOPLASTY AND OUT-FRACTURING BILATERAL NASAL ENDOSCOPY performed by Nasreen Gonzales MD at [address] [place] [address]       Previous Medications    ACETAMINOPHEN (TYLENOL) 500 MG TABLET    Take 2 tablets by mouth every 6 hours as needed for Pain or Fever    ALBUTEROL SULFATE HFA (VENTOLIN HFA) 108 (90 BASE) MCG/ACT INHALER    Inhale 2 puffs into the lungs 4 times daily as needed for Wheezing    ALBUTEROL SULFATE HFA (VENTOLIN HFA) 108 (90 BASE) MCG/ACT INHALER    Inhale 2 puffs into the lungs 4 times daily as needed for Wheezing    BUDESONIDE-FORMOTEROL (SYMBICORT) 160-4.5 MCG/ACT AERO    Inhale 2 puffs into the lungs 2 times daily    ETODOLAC (LODINE) 400 MG TABLET    Take 1 tablet by mouth 2 times daily    FLUOXETINE (PROZAC) 40 MG CAPSULE    TAKE 1 CAPSULE BY MOUTH DAILY. GABAPENTIN (NEURONTIN) 600 MG TABLET    Take 1 tablet by mouth 3 times daily for 30 days. LIDOCAINE 4 % EXTERNAL PATCH    Place 1 patch onto the skin daily    MELOXICAM (MOBIC) 15 MG TABLET    Take 1 tablet by mouth daily as needed for Pain    ROSUVASTATIN (CRESTOR) 40 MG TABLET    TAKE 1 TABLET BY MOUTH DAILY AT BEDTIME.     TIZANIDINE (ZANAFLEX) 4 MG TABLET    Take 1 tablet by mouth every 8 hours as needed (pain/sapsm)       ALLERGIES     Alcohol, Benadryl [diphenhydramine], Demerol hcl [meperidine], and Sulfa antibiotics    FAMILY HISTORY       Family History   Problem Relation Age of Onset    Cancer Mother         stomach    Arthritis Mother     Heart Disease Father 48    Cancer Father         bone    Cancer Maternal Grandmother         lung    Cancer Paternal Grandmother     Heart Disease Paternal Grandfather     No Known Problems Sister     Cancer Brother     Heart Disease Brother         hole in heart in 65 at 1 months age          SOCIAL HISTORY       Social History     Socioeconomic History    Marital status:      Spouse name: None    Number of children: 2    Years of education: 12    Highest education level: High school graduate   Tobacco Use    Smoking status: Every Day     Packs/day: 2.00     Years: 52.00     Pack years: 104.00     Types: Cigars, Cigarettes     Start date: 1970    Smokeless tobacco: Never    Tobacco comments:     3-5 cigars qd   Vaping Use    Vaping Use: Never used   Substance and Sexual Activity    Alcohol use: No     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    Drug use: No    Sexual activity: Not Currently     Partners: Female     Social Determinants of Health     Financial Resource Strain: Low Risk     Difficulty of Paying Living Expenses: Not hard at all   Food Insecurity: No Food Insecurity    Worried About Running Out of Food in the Last Year: Never true    Ran Out of Food in the Last Year: Never true   Transportation Needs: Unknown    Lack of Transportation (Non-Medical): No   Physical Activity: Inactive    Days of Exercise per Week: 0 days    Minutes of Exercise per Session: 0 min   Housing Stability: Unknown    Unstable Housing in the Last Year: No         PHYSICAL EXAM         ED Triage Vitals [02/06/23 0230]   BP Temp Temp src Heart Rate Resp SpO2 Height Weight   108/70 97 °F (36.1 °C) -- 88 16 96 % 5' 9\" (1.753 m) 145 lb (65.8 kg)       Physical Exam  Constitutional:       Appearance: He is well-developed. HENT:      Head: Normocephalic and atraumatic. Eyes:      Conjunctiva/sclera: Conjunctivae normal.      Pupils: Pupils are equal, round, and reactive to light. Neck:      Trachea: No tracheal deviation. Cardiovascular:      Heart sounds: Normal heart sounds. Pulmonary:      Effort: Pulmonary effort is normal. No respiratory distress. Breath sounds: Normal breath sounds. No stridor. Abdominal:      General: Bowel sounds are normal. There is no distension. Palpations: Abdomen is soft. There is no mass. Tenderness: There is no abdominal tenderness. There is no guarding or rebound. Musculoskeletal:         General: Tenderness present. Normal range of motion. Cervical back: Normal range of motion and neck supple. Comments: No obvious TTP in SI joints bilaterally. No midline lumbar tenderness, no signs of trauma. Mild TTP in left knee with full ROM. No weakness. MSP intact distally     Skin:     General: Skin is warm and dry. Capillary Refill: Capillary refill takes less than 2 seconds.       Findings: No rash. Neurological:      Mental Status: He is alert and oriented to person, place, and time. Deep Tendon Reflexes: Reflexes are normal and symmetric. Psychiatric:         Behavior: Behavior normal.         Thought Content: Thought content normal.         Judgment: Judgment normal.       DIAGNOSTIC RESULTS     EKG:All EKG's are interpreted by the Emergency Department Physician who either signs or Co-signs this chart in the absence of a cardiologist.        RADIOLOGY:   Non-plain film images such as CT, Ultrasound and MRI are read by theradiologist. Plain radiographic images are visualized and preliminarily interpreted by the emergency physician with the below findings:    Interpretation per theRadiologist below, if available at the time of this note:    No orders to display           LABS:  Labs Reviewed - No data to display    All other labs were within normal range or not returned as of this dictation. EMERGENCY DEPARTMENT COURSE and DIFFERENTIAL DIAGNOSIS/MDM:   Vitals:    Vitals:    02/06/23 0230   BP: 108/70   Pulse: 88   Resp: 16   Temp: 97 °F (36.1 °C)   SpO2: 96%   Weight: 145 lb (65.8 kg)   Height: 5' 9\" (1.753 m)         MDM    Patient presents with chronic low back and left knee pain. She does see pain management. Takes gabapentin. Previous chart and imaging was reviewed. Patient does not require any further imaging. He will be given Norco to go to take when he gets home. Due to being in pain management he cannot receive prescription. He was made aware to call pain management. Standard anticipatory guidance given to patient upon discharge. Have given them a specific time frame in which to follow-up and who to follow-up with. I have also advised them that they should return to the emergency department if they get worse, or not getting better or develop any new or concerning symptoms. Patient demonstrates understanding.       CRITICAL CARE TIME   Total Critical Caretime was 0 minutes, excluding separately reportable procedures. There was a high probability of clinically significant/life threatening deterioration in the patient's condition which required my urgent intervention. Procedures    FINAL IMPRESSION      1. Lumbar radiculopathy          DISPOSITION/PLAN   DISPOSITION Decision To Discharge 02/06/2023 02:55:32 AM      PATIENT REFERRED TO:  Call pain management tomorrow          DISCHARGE MEDICATIONS:  New Prescriptions    No medications on file          (Please notethat portions of this note were completed with a voice recognition program.  Efforts were made to edit the dictations but occasionally words are mis-transcribed. )    LYNETTE Pfeiffer (electronically signed)  Emergency Physician Assistant         LYNETTE Carr  02/06/23 7996

## 2023-02-06 NOTE — ED PROVIDER NOTES
[address] [facility] ED  eMERGENCYdEPARTMENT eNCOUnter      Pt Name: Yeyo Reddy  MRN: [id number]  Abiodungfzion 1955  Date of evaluation: 2/5/2023  Provider:Franco Bryant PA-C    CHIEF COMPLAINT       Chief Complaint   Patient presents with    Back Pain    Knee Pain    Hip Pain         HISTORY OF PRESENT ILLNESS  (Location/Symptom, Timing/Onset, Context/Setting, Quality, Duration, Modifying Factors, Severity.)   Yeyo Reddy is a 77 y.o. male who presents to the emergency department with acute on chronic arthralgias to multiple sites. Patient states that primarily he is having pain in his left knee, left hip that radiates into the left ribs and left shoulder region. Patient does use a cane to walk and uses a cane on that side. Patient has had multiple emergency department visits for the same complaints. He has been referred to pain management but has not been able to see them yet. Patient was also seen neurosurgery in the past.  Patient denies any falls or blunt trauma. No saddle paresthesias or loss of bowel or bladder control    HPI    Nursing Notes were reviewed and I agree. REVIEW OF SYSTEMS    (2-9 systems for level 4, 10 or more for level 5)     Review of Systems   Constitutional:  Negative for diaphoresis and fever. HENT:  Negative for hearing loss and trouble swallowing. Eyes:  Negative for pain. Respiratory:  Negative for apnea and shortness of breath. Cardiovascular:  Negative for chest pain. Gastrointestinal:  Negative for abdominal pain. Endocrine: Negative. Genitourinary:  Negative for hematuria. Musculoskeletal:  Positive for arthralgias and back pain. Negative for neck pain and neck stiffness. Skin:  Negative for color change. Allergic/Immunologic: Negative. Neurological:  Negative for dizziness and numbness. Hematological: Negative. Psychiatric/Behavioral: Negative. All other systems reviewed and are negative.      Except as noted above the remainder of the review of systems was reviewed and negative.        PAST MEDICAL HISTORY     Past Medical History:   Diagnosis Date    Allergic rhinitis 2/19/2018    Anxiety     Chronic back pain     COPD (chronic obstructive pulmonary disease) (HCC)     Depression     Disorder of stomach     valve not closing properly    Emphysema lung ([facility] [address]     Essential hypertension 11/4/2019    Gastroesophageal reflux disease 10/4/2019    Hyperlipidemia     meds > 22 yrs    Low back pain 5/1/2018    ARNAUD (obstructive sleep apnea) 2/19/2018    Other emphysema ([address] 10/4/2019    Other intervertebral disc degeneration, lumbar region 3/23/2018    Radiculopathy, lumbar region 2/28/2018    Restless leg syndrome     Seasonal affective disorder ([facility] [address] 10/4/2019    Substance abuse ([facility] [address]     alcholic, quit 20 yrs          SURGICAL HISTORY       Past Surgical History:   Procedure Laterality Date    COLONOSCOPY  12/22/2016    A SHALA MARLEY    DENTAL SURGERY  10 yrs ago    ENDOSCOPY, COLON, DIAGNOSTIC      EYE SURGERY      Lasik OU    FINGER TRIGGER RELEASE Left 11/1/2022    LEFT HAND TRIGGER FINGER RING FINGER RELEASE OF A1 PULLEY performed by Laura Villafana MD at [place] [address] ARTHROSCOPY Right     KNEE SURGERY Right 05/2016    Ray procedure    KNEE SURGERY      OR NASAL/SINUS ENDOSCOPY W/MAXILLARY ANTROSTOMY N/A 2/22/2018    SEPTOPLASTY MICRODEBRIDER ASSISTED TURBINOPLASTY AND OUT-FRACTURING BILATERAL NASAL ENDOSCOPY performed by Leo Tinsley MD at [address] [place] [address]       Previous Medications    ACETAMINOPHEN (TYLENOL) 500 MG TABLET    Take 2 tablets by mouth every 6 hours as needed for Pain or Fever    ALBUTEROL SULFATE HFA (VENTOLIN HFA) 108 (90 BASE) MCG/ACT INHALER    Inhale 2 puffs into the lungs 4 times daily as needed for Wheezing    ALBUTEROL SULFATE HFA (VENTOLIN HFA) 108 (90 BASE) MCG/ACT INHALER    Inhale 2 puffs into the lungs 4 times daily as needed for Wheezing    BUDESONIDE-FORMOTEROL (SYMBICORT) 160-4.5 MCG/ACT AERO    Inhale 2 puffs into the lungs 2 times daily    FLUOXETINE (PROZAC) 40 MG CAPSULE    TAKE 1 CAPSULE BY MOUTH DAILY. GABAPENTIN (NEURONTIN) 600 MG TABLET    Take 1 tablet by mouth 3 times daily for 30 days. LIDOCAINE 4 % EXTERNAL PATCH    Place 1 patch onto the skin daily    MELOXICAM (MOBIC) 15 MG TABLET    Take 1 tablet by mouth daily as needed for Pain    ROSUVASTATIN (CRESTOR) 40 MG TABLET    TAKE 1 TABLET BY MOUTH DAILY AT BEDTIME.        ALLERGIES     Alcohol, Benadryl [diphenhydramine], Demerol hcl [meperidine], and Sulfa antibiotics    FAMILY HISTORY       Family History   Problem Relation Age of Onset    Cancer Mother         stomach    Arthritis Mother     Heart Disease Father 48    Cancer Father         bone    Cancer Maternal Grandmother         lung    Cancer Paternal Grandmother     Heart Disease Paternal Grandfather     No Known Problems Sister     Cancer Brother     Heart Disease Brother         hole in heart in 65 at 1 months age          SOCIAL HISTORY       Social History     Socioeconomic History    Marital status:      Spouse name: None    Number of children: 2    Years of education: 12    Highest education level: High school graduate   Tobacco Use    Smoking status: Every Day     Packs/day: 2.00     Years: 52.00     Pack years: 104.00     Types: Cigars, Cigarettes     Start date: 1970    Smokeless tobacco: Never    Tobacco comments:     3-5 cigars qd   Vaping Use    Vaping Use: Never used   Substance and Sexual Activity    Alcohol use: No     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    Drug use: No    Sexual activity: Not Currently     Partners: Female     Social Determinants of Health     Financial Resource Strain: Low Risk     Difficulty of Paying Living Expenses: Not hard at all   Food Insecurity: No Food Insecurity    Worried About Running Out of Food in the Last Year: Never true    Ran Out of Food in the Last Year: Never true   Transportation Needs: Unknown    Lack of Transportation (Non-Medical): No   Physical Activity: Inactive    Days of Exercise per Week: 0 days    Minutes of Exercise per Session: 0 min   Housing Stability: Unknown    Unstable Housing in the Last Year: No       SCREENINGS    Sarah Coma Scale  Eye Opening: Spontaneous  Best Verbal Response: Oriented  Best Motor Response: Obeys commands  [place] Coma Scale Score: 15      PHYSICAL EXAM    (up to 7 forlevel 4, 8 or more for level 5)     ED Triage Vitals [02/05/23 2217]   BP Temp Temp Source Heart Rate Resp SpO2 Height Weight   108/79 98.6 °F (37 °C) Oral 85 20 94 % 5' 9\" (1.753 m) 145 lb (65.8 kg)       Physical Exam  Vitals and nursing note reviewed. Constitutional:       General: He is not in acute distress. Appearance: He is well-developed. He is not diaphoretic. HENT:      Head: Normocephalic and atraumatic. Mouth/Throat:      Mouth: Mucous membranes are moist.      Pharynx: No oropharyngeal exudate. Eyes:      General: No scleral icterus. Conjunctiva/sclera: Conjunctivae normal.      Pupils: Pupils are equal, round, and reactive to light. Neck:      Trachea: No tracheal deviation. Cardiovascular:      Rate and Rhythm: Normal rate. Heart sounds: Normal heart sounds. Pulmonary:      Effort: Pulmonary effort is normal. No respiratory distress. Breath sounds: Normal breath sounds. Abdominal:      General: Bowel sounds are normal. There is no distension. Palpations: Abdomen is soft. Musculoskeletal:         General: Normal range of motion. Cervical back: Normal range of motion and neck supple. No rigidity or tenderness. Left hip: Tenderness present. No crepitus. Left knee: No swelling, deformity or effusion. Tenderness present. Skin:     General: Skin is warm and dry. Findings: No erythema or rash. Neurological:      Mental Status: He is alert and oriented to person, place, and time.       Cranial Nerves: No cranial nerve deficit. Motor: No abnormal muscle tone. Psychiatric:         Behavior: Behavior normal.         Thought Content: Thought content normal.         Judgment: Judgment normal.         DIAGNOSTIC RESULTS     RADIOLOGY:   Non-plain film images such as CT, Ultrasound and MRI are read by the radiologist. Plain radiographic images are visualized and preliminarilyinterpreted by Cady Tipton PA-C with the below findings:      Interpretation per the Radiologist below, if available at the time of this note:    No orders to display       LABS:  Labs Reviewed - No data to display    All other labs were within normal range or not returnedas of this dictation. EMERGENCYDEPARTMENT COURSE and DIFFERENTIAL DIAGNOSIS/MDM:   Vitals:    Vitals:    02/05/23 2217   BP: 108/79   Pulse: 85   Resp: 20   Temp: 98.6 °F (37 °C)   TempSrc: Oral   SpO2: 94%   Weight: 145 lb (65.8 kg)   Height: 5' 9\" (1.753 m)       REASSESSMENT        Patient presented in the emergency department with acute on chronic joint pain in multiple sites. Imaging including CTs and x-rays were considered however given the chronicity of this as well as no falls or blunt trauma, it does not seem warranted. Patient has had no systemic symptoms and no signs of erythema to the joint spaces to warrant laboratory testing. Patient will receive IM injections as well as p.o. meds prescriptions to help with symptoms but I referred him back to his pain management and neurosurgery for follow-up    MDM      PROCEDURES:    Procedures      FINAL IMPRESSION      1. Acute exacerbation of chronic low back pain    2.  Chronic pain of left knee          DISPOSITION/PLAN   DISPOSITION Discharge - Pending Orders Complete 02/05/2023 10:35:07 PM      PATIENT REFERRED TO:  MANUELA Joe - CNP  [address]  [facility] [phone]    Call in 2 days      Bea Byrd MD  [facility] [address]  [phone]    Call in 1 day      MD [address]  [phone]    Call in 1 day      DISCHARGE MEDICATIONS:  New Prescriptions    ETODOLAC (LODINE) 400 MG TABLET    Take 1 tablet by mouth 2 times daily    TIZANIDINE (ZANAFLEX) 4 MG TABLET    Take 1 tablet by mouth every 8 hours as needed (pain/sapsm)       (Please note that portions of this note were completed with a voice recognition program.  Efforts were made to edit the dictations but occasionally words are mis-transcribed.)    RAUL Wilcox PA-C  02/05/23 7637

## 2023-02-07 ENCOUNTER — TELEPHONE (OUTPATIENT)
Dept: NEUROSURGERY | Age: 68
End: 2023-02-07

## 2023-02-07 NOTE — TELEPHONE ENCOUNTER
PT HAS APPT 2/14 @ 11:00 AM WITH DR. Raymond Martinez  - Spinal stenosis of lumbar region without neurogenic claudication (M48.061 [ICD-10-CM])      PT CAN MOVE UP TO 2/9 @ 9AM WITH DR. SALAS. LM FOR PATIENT @ 9:30AM TO CALL IN TO SEE IF HE IS ABLE TO MOVE HIS APPT UP.

## 2023-02-12 ENCOUNTER — HOSPITAL ENCOUNTER (EMERGENCY)
Age: 68
Discharge: HOME | End: 2023-02-12
Attending: EMERGENCY MEDICINE
Payer: MEDICARE

## 2023-02-12 ENCOUNTER — APPOINTMENT (OUTPATIENT)
Dept: GENERAL RADIOLOGY | Age: 68
End: 2023-02-12
Payer: MEDICARE

## 2023-02-12 ENCOUNTER — APPOINTMENT (OUTPATIENT)
Dept: CT IMAGING | Age: 68
End: 2023-02-12
Payer: MEDICARE

## 2023-02-12 VITALS
RESPIRATION RATE: 18 BRPM | SYSTOLIC BLOOD PRESSURE: 173 MMHG | HEIGHT: 69 IN | WEIGHT: 145 LBS | HEART RATE: 102 BPM | TEMPERATURE: 97.6 F | OXYGEN SATURATION: 98 % | BODY MASS INDEX: 21.48 KG/M2 | DIASTOLIC BLOOD PRESSURE: 90 MMHG

## 2023-02-12 DIAGNOSIS — K76.9 LIVER LESION: ICD-10-CM

## 2023-02-12 DIAGNOSIS — R10.9 ABDOMINAL PAIN, UNSPECIFIED ABDOMINAL LOCATION: Primary | ICD-10-CM

## 2023-02-12 LAB
ALBUMIN SERPL-MCNC: 3.6 G/DL (ref 3.5–4.6)
ALP BLD-CCNC: 90 U/L (ref 35–104)
ALT SERPL-CCNC: 14 U/L (ref 0–41)
ANION GAP SERPL CALCULATED.3IONS-SCNC: 11 MEQ/L (ref 9–15)
AST SERPL-CCNC: 12 U/L (ref 0–40)
BASOPHILS ABSOLUTE: 0.1 K/UL (ref 0–0.2)
BASOPHILS RELATIVE PERCENT: 0.8 %
BILIRUB SERPL-MCNC: <0.2 MG/DL (ref 0.2–0.7)
BILIRUBIN URINE: NEGATIVE
BLOOD, URINE: NEGATIVE
BUN BLDV-MCNC: 24 MG/DL (ref 8–23)
CALCIUM SERPL-MCNC: 8.7 MG/DL (ref 8.5–9.9)
CHLORIDE BLD-SCNC: 96 MEQ/L (ref 95–107)
CLARITY: CLEAR
CO2: 25 MEQ/L (ref 20–31)
COLOR: YELLOW
CREAT SERPL-MCNC: 0.87 MG/DL (ref 0.7–1.2)
EOSINOPHILS ABSOLUTE: 0.2 K/UL (ref 0–0.7)
EOSINOPHILS RELATIVE PERCENT: 1.9 %
GFR SERPL CREATININE-BSD FRML MDRD: >60 ML/MIN/{1.73_M2}
GFR SERPL CREATININE-BSD FRML MDRD: >60 ML/MIN/{1.73_M2}
GLOBULIN: 2.4 G/DL (ref 2.3–3.5)
GLUCOSE BLD-MCNC: 94 MG/DL (ref 70–99)
GLUCOSE URINE: NEGATIVE MG/DL
HCT VFR BLD CALC: 35.5 % (ref 42–52)
HEMOGLOBIN: 12.2 G/DL (ref 14–18)
KETONES, URINE: NEGATIVE MG/DL
LEUKOCYTE ESTERASE, URINE: NEGATIVE
LIPASE: 24 U/L (ref 12–95)
LYMPHOCYTES ABSOLUTE: 2.1 K/UL (ref 1–4.8)
LYMPHOCYTES RELATIVE PERCENT: 18.3 %
MCH RBC QN AUTO: 33.5 PG (ref 27–31.3)
MCHC RBC AUTO-ENTMCNC: 34.2 % (ref 33–37)
MCV RBC AUTO: 97.8 FL (ref 79–92.2)
MONOCYTES ABSOLUTE: 0.9 K/UL (ref 0.2–0.8)
MONOCYTES RELATIVE PERCENT: 8.1 %
NEUTROPHILS ABSOLUTE: 8.3 K/UL (ref 1.4–6.5)
NEUTROPHILS RELATIVE PERCENT: 70.9 %
NITRITE, URINE: NEGATIVE
PDW BLD-RTO: 14.4 % (ref 11.5–14.5)
PERFORMED ON: NORMAL
PH UA: 6.5 (ref 5–9)
PLATELET # BLD: 364 K/UL (ref 130–400)
POC CREATININE WHOLE BLOOD: 1
POC CREATININE: 1 MG/DL (ref 0.8–1.3)
POC SAMPLE TYPE: NORMAL
POTASSIUM SERPL-SCNC: 4.4 MEQ/L (ref 3.4–4.9)
PROTEIN UA: NEGATIVE MG/DL
RBC # BLD: 3.63 M/UL (ref 4.7–6.1)
SODIUM BLD-SCNC: 132 MEQ/L (ref 135–144)
SPECIFIC GRAVITY UA: 1.03 (ref 1–1.03)
TOTAL PROTEIN: 6 G/DL (ref 6.3–8)
URINE REFLEX TO CULTURE: NORMAL
UROBILINOGEN, URINE: 1 E.U./DL
WBC # BLD: 11.7 K/UL (ref 4.8–10.8)

## 2023-02-12 PROCEDURE — 93005 ELECTROCARDIOGRAM TRACING: CPT | Performed by: EMERGENCY MEDICINE

## 2023-02-12 PROCEDURE — 96375 TX/PRO/DX INJ NEW DRUG ADDON: CPT

## 2023-02-12 PROCEDURE — A4216 STERILE WATER/SALINE, 10 ML: HCPCS | Performed by: EMERGENCY MEDICINE

## 2023-02-12 PROCEDURE — 96374 THER/PROPH/DIAG INJ IV PUSH: CPT

## 2023-02-12 PROCEDURE — 2500000003 HC RX 250 WO HCPCS: Performed by: EMERGENCY MEDICINE

## 2023-02-12 PROCEDURE — 2580000003 HC RX 258: Performed by: EMERGENCY MEDICINE

## 2023-02-12 PROCEDURE — 36415 COLL VENOUS BLD VENIPUNCTURE: CPT

## 2023-02-12 PROCEDURE — 99285 EMERGENCY DEPT VISIT HI MDM: CPT

## 2023-02-12 PROCEDURE — 74177 CT ABD & PELVIS W/CONTRAST: CPT

## 2023-02-12 PROCEDURE — 6360000002 HC RX W HCPCS: Performed by: EMERGENCY MEDICINE

## 2023-02-12 PROCEDURE — 6360000004 HC RX CONTRAST MEDICATION: Performed by: EMERGENCY MEDICINE

## 2023-02-12 PROCEDURE — 85025 COMPLETE CBC W/AUTO DIFF WBC: CPT

## 2023-02-12 PROCEDURE — 81003 URINALYSIS AUTO W/O SCOPE: CPT

## 2023-02-12 PROCEDURE — 83690 ASSAY OF LIPASE: CPT

## 2023-02-12 PROCEDURE — 80053 COMPREHEN METABOLIC PANEL: CPT

## 2023-02-12 PROCEDURE — 6370000000 HC RX 637 (ALT 250 FOR IP): Performed by: EMERGENCY MEDICINE

## 2023-02-12 PROCEDURE — 71045 X-RAY EXAM CHEST 1 VIEW: CPT

## 2023-02-12 RX ORDER — KETOROLAC TROMETHAMINE 15 MG/ML
15 INJECTION, SOLUTION INTRAMUSCULAR; INTRAVENOUS ONCE
Status: COMPLETED | OUTPATIENT
Start: 2023-02-12 | End: 2023-02-12

## 2023-02-12 RX ORDER — AZITHROMYCIN 250 MG/1
TABLET, FILM COATED ORAL
Qty: 1 PACKET | Refills: 0 | Status: SHIPPED | OUTPATIENT
Start: 2023-02-12 | End: 2023-02-16

## 2023-02-12 RX ORDER — FAMOTIDINE 20 MG/1
20 TABLET, FILM COATED ORAL 2 TIMES DAILY
Qty: 60 TABLET | Refills: 0 | Status: SHIPPED | OUTPATIENT
Start: 2023-02-12

## 2023-02-12 RX ORDER — POLYETHYLENE GLYCOL 3350 17 G/17G
17 POWDER, FOR SOLUTION ORAL DAILY
Qty: 510 G | Refills: 0 | Status: SHIPPED | OUTPATIENT
Start: 2023-02-12 | End: 2023-03-14

## 2023-02-12 RX ORDER — AMOXICILLIN AND CLAVULANATE POTASSIUM 875; 125 MG/1; MG/1
1 TABLET, FILM COATED ORAL 2 TIMES DAILY
Qty: 20 TABLET | Refills: 0 | Status: SHIPPED | OUTPATIENT
Start: 2023-02-12 | End: 2023-02-22

## 2023-02-12 RX ORDER — SUCRALFATE ORAL 1 G/10ML
1 SUSPENSION ORAL 4 TIMES DAILY
Qty: 1200 ML | Refills: 3 | Status: SHIPPED | OUTPATIENT
Start: 2023-02-12

## 2023-02-12 RX ADMIN — KETOROLAC TROMETHAMINE 15 MG: 15 INJECTION, SOLUTION INTRAMUSCULAR; INTRAVENOUS at 03:41

## 2023-02-12 RX ADMIN — Medication: at 03:35

## 2023-02-12 RX ADMIN — FAMOTIDINE 20 MG: 10 INJECTION, SOLUTION INTRAVENOUS at 03:42

## 2023-02-12 RX ADMIN — IOPAMIDOL 65 ML: 612 INJECTION, SOLUTION INTRAVENOUS at 04:13

## 2023-02-12 ASSESSMENT — PAIN DESCRIPTION - LOCATION: LOCATION: ABDOMEN

## 2023-02-12 ASSESSMENT — PAIN DESCRIPTION - DESCRIPTORS: DESCRIPTORS: PRESSURE

## 2023-02-12 ASSESSMENT — ENCOUNTER SYMPTOMS
SHORTNESS OF BREATH: 0
CONSTIPATION: 0
NAUSEA: 0
VOMITING: 0
ABDOMINAL PAIN: 1

## 2023-02-12 ASSESSMENT — PAIN - FUNCTIONAL ASSESSMENT: PAIN_FUNCTIONAL_ASSESSMENT: 0-10

## 2023-02-12 ASSESSMENT — LIFESTYLE VARIABLES: HOW OFTEN DO YOU HAVE A DRINK CONTAINING ALCOHOL: NEVER

## 2023-02-12 ASSESSMENT — PAIN DESCRIPTION - ORIENTATION: ORIENTATION: RIGHT;MID

## 2023-02-12 ASSESSMENT — PAIN SCALES - GENERAL: PAINLEVEL_OUTOF10: 8

## 2023-02-12 NOTE — ED PROVIDER NOTES
[address] [facility] ED  EMERGENCY DEPARTMENT ENCOUNTER      Pt Name: Kimmie Gale  MRN: [id number]  Abiodungfzion 1955  Date of evaluation: 2/12/2023  Provider: Glenn Perez [id number]       Chief Complaint   Patient presents with    Abdominal Pain     Since today. Started on right side moved to center abd         HISTORY OF PRESENT ILLNESS   (Location/Symptom, Timing/Onset, Context/Setting, Quality, Duration, Modifying Factors, Severity)  Note limiting factors. Kimmie Gale is a 77 y.o. male who presents to the emergency department for evaluation of abdominal pain. History of opioid abuse, obstructive sleep apnea, anxiety, hyperlipidemia, tobacco abuse, hypertension, acid reflux, COPD, chronic back pain and radiculopathy. No anticoagulation use. He denies traumatic injury. No previous intra-abdominal surgeries. Reports that earlier in the day he had some right upper quadrant minimal discomfort however points to his supraumbilical region and states that he felt like he got punched there. It is nonexertional, nonradiating. No fever, chest pain or difficulty breathing, flank pain, dysuria, testicular pain or swelling, new or worsening back pain. Continues to urinate well. Denies blood in stool. HPI  Chart review shows that last month he was evaluated and had CT head, angiogram chest, abdomen and pelvis, MRI lumbar spine showing canal stenosis. At that time had negative troponin. After the that he has had multiple repeat visits for low back pain  Chart review shows he has appointment with neurosurgery in 2 days. That they had offered to move his appointment up to 2/9 but patient missed appt  Patient had called pain management and he requested Percocet to be prescribed  Nursing Notes were reviewed. REVIEW OF SYSTEMS    (2-9 systems for level 4, 10 or more for level 5)     Review of Systems   Constitutional:  Negative for fever. Respiratory:  Negative for shortness of breath. Cardiovascular:  Negative for chest pain. Gastrointestinal:  Positive for abdominal pain. Negative for constipation, nausea and vomiting. Genitourinary:  Negative for dysuria, flank pain and hematuria. Musculoskeletal:  Negative for neck pain. Skin:  Negative for rash. Neurological:  Negative for syncope. All other systems reviewed and are negative. Except as noted above the remainder of the review of systems was reviewed and negative.        PAST MEDICAL HISTORY     Past Medical History:   Diagnosis Date    Allergic rhinitis 2/19/2018    Anxiety     Chronic back pain     COPD (chronic obstructive pulmonary disease) (HCC)     Depression     Disorder of stomach     valve not closing properly    Emphysema lung ([facility] [address]     Essential hypertension 11/4/2019    Gastroesophageal reflux disease 10/4/2019    Hyperlipidemia     meds > 22 yrs    Low back pain 5/1/2018    ARNAUD (obstructive sleep apnea) 2/19/2018    Other emphysema ([address] 10/4/2019    Other intervertebral disc degeneration, lumbar region 3/23/2018    Radiculopathy, lumbar region 2/28/2018    Restless leg syndrome     Seasonal affective disorder ([facility] [address] 10/4/2019    Substance abuse ([facility] [address]     alcholic, quit 20 yrs          SURGICAL HISTORY       Past Surgical History:   Procedure Laterality Date    COLONOSCOPY  12/22/2016    DALIA LAST MD    DENTAL SURGERY  10 yrs ago    ENDOSCOPY, COLON, DIAGNOSTIC      EYE SURGERY      Lasik OU    FINGER TRIGGER RELEASE Left 11/1/2022    LEFT HAND TRIGGER FINGER RING FINGER RELEASE OF A1 PULLEY performed by Sd Osuna MD at [place] [address] ARTHROSCOPY Right     KNEE SURGERY Right 05/2016    Ray procedure    KNEE SURGERY      [place] NASAL/SINUS ENDOSCOPY W/MAXILLARY ANTROSTOMY N/A 2/22/2018    SEPTOPLASTY MICRODEBRIDER ASSISTED TURBINOPLASTY AND OUT-FRACTURING BILATERAL NASAL ENDOSCOPY performed by Gordy Woodson MD at [address] [place] [address]       Previous Medications    ACETAMINOPHEN ([address]) 500 MG TABLET    Take 2 tablets by mouth every 6 hours as needed for Pain or Fever    ALBUTEROL SULFATE HFA (VENTOLIN HFA) 108 (90 BASE) MCG/ACT INHALER    Inhale 2 puffs into the lungs 4 times daily as needed for Wheezing    ALBUTEROL SULFATE HFA (VENTOLIN HFA) 108 (90 BASE) MCG/ACT INHALER    Inhale 2 puffs into the lungs 4 times daily as needed for Wheezing    BUDESONIDE-FORMOTEROL (SYMBICORT) 160-4.5 MCG/ACT AERO    Inhale 2 puffs into the lungs 2 times daily    ETODOLAC (LODINE) 400 MG TABLET    Take 1 tablet by mouth 2 times daily    FLUOXETINE (PROZAC) 40 MG CAPSULE    TAKE 1 CAPSULE BY MOUTH DAILY. GABAPENTIN (NEURONTIN) 600 MG TABLET    Take 1 tablet by mouth 3 times daily for 30 days. LIDOCAINE 4 % EXTERNAL PATCH    Place 1 patch onto the skin daily    MELOXICAM (MOBIC) 15 MG TABLET    Take 1 tablet by mouth daily as needed for Pain    ROSUVASTATIN (CRESTOR) 40 MG TABLET    TAKE 1 TABLET BY MOUTH DAILY AT BEDTIME.     TIZANIDINE (ZANAFLEX) 4 MG TABLET    Take 1 tablet by mouth every 8 hours as needed (pain/sapsm)       ALLERGIES     Alcohol, Benadryl [diphenhydramine], Demerol hcl [meperidine], and Sulfa antibiotics    FAMILY HISTORY       Family History   Problem Relation Age of Onset    Cancer Mother         stomach    Arthritis Mother     Heart Disease Father 48    Cancer Father         bone    Cancer Maternal Grandmother         lung    Cancer Paternal Grandmother     Heart Disease Paternal Grandfather     No Known Problems Sister     Cancer Brother     Heart Disease Brother         hole in heart in 65 at 1 months age          SOCIAL HISTORY       Social History     Socioeconomic History    Marital status:     Number of children: 2    Years of education: 12    Highest education level: High school graduate   Tobacco Use    Smoking status: Every Day     Packs/day: 2.00     Years: 52.00     Pack years: 104.00     Types: Cigars, Cigarettes     Start date: 1970    Smokeless tobacco: Never Tobacco comments:     3-5 cigars qd   Vaping Use    Vaping Use: Never used   Substance and Sexual Activity    Alcohol use: No     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    Drug use: No    Sexual activity: Not Currently     Partners: Female     Social Determinants of Health     Financial Resource Strain: Low Risk     Difficulty of Paying Living Expenses: Not hard at all   Food Insecurity: No Food Insecurity    Worried About Running Out of Food in the Last Year: Never true    Ran Out of Food in the Last Year: Never true   Transportation Needs: Unknown    Lack of Transportation (Non-Medical): No   Physical Activity: Inactive    Days of Exercise per Week: 0 days    Minutes of Exercise per Session: 0 min   Housing Stability: Unknown    Unstable Housing in the Last Year: No       SCREENINGS         [place] Coma Scale  Eye Opening: Spontaneous  Best Verbal Response: Oriented  Best Motor Response: Obeys commands  Sarah Coma Scale Score: 15                     CIWA Assessment  BP: (!) 173/90  Heart Rate: (!) 102                 PHYSICAL EXAM    (up to 7 for level 4, 8 or more for level 5)     ED Triage Vitals   BP Temp Temp src Pulse Resp SpO2 Height Weight   -- -- -- -- -- -- -- --       Physical Exam  Vitals and nursing note reviewed. Exam conducted with a chaperone present. Constitutional:       Appearance: He is not toxic-appearing or diaphoretic. HENT:      Head: Normocephalic and atraumatic. Nose: Nose normal.      Mouth/Throat:      Mouth: Mucous membranes are moist.   Eyes:      General: No scleral icterus. Cardiovascular:      Rate and Rhythm: Regular rhythm. Tachycardia present. Pulses: Normal pulses. Pulmonary:      Effort: Pulmonary effort is normal. No respiratory distress. Breath sounds: Normal breath sounds. No wheezing. Abdominal:      General: There is no distension. Tenderness: There is no abdominal tenderness. There is no right CVA tenderness, left CVA tenderness, guarding or rebound. Comments: No yousif sign   Genitourinary:     Comments: deferred  Musculoskeletal:      Cervical back: No tenderness. Right lower leg: No edema. Left lower leg: No edema. Skin:     Capillary Refill: Capillary refill takes less than 2 seconds. Findings: No rash. Neurological:      Mental Status: He is alert and oriented to person, place, and time. Psychiatric:         Mood and Affect: Mood normal.       DIAGNOSTIC RESULTS     EKG: All EKG's are interpreted by the Emergency Department Physician who either signs or Co-signs this chart in the absence of a cardiologist.    Sinus rhythm with sinus arrhythmia, rate 82, normal axis, QTc 443, no STEMI, no heart block    RADIOLOGY:   Non-plain film images such as CT, Ultrasound and MRI are read by the radiologist. Plain radiographic images are visualized and preliminarily interpreted by the emergency physician with the below findings:    No pneumonia-my interpretation/visualization    Interpretation per the Radiologist below, if available at the time of this note:    CT ABDOMEN PELVIS W IV CONTRAST Additional Contrast? None   Final Result   1. Patchy right basilar airspace disease, which may represent atelectasis or   pneumonia, though improving when compared to the previous study. 2. Atherosclerosis including coronary artery involvement. 3. Subtle low-attenuation nodule within the right hepatic lobe measuring 1.5   cm in size, with subtle peripheral nodular enhancement suggestive of a   hemangioma. 4. Nonobstructive left renal calculi. No hydroureteronephrosis. 5. Moderate stool volume throughout the colon without ileus, obstruction or   acute inflammatory bowel process. XR CHEST PORTABLE   Final Result   1. No acute abnormality is identified.                ED BEDSIDE ULTRASOUND:   Performed by ED Physician - none    LABS:  Labs Reviewed   CBC WITH AUTO DIFFERENTIAL - Abnormal; Notable for the following components:       Result Value WBC 11.7 (*)     RBC 3.63 (*)     Hemoglobin 12.2 (*)     Hematocrit 35.5 (*)     MCV 97.8 (*)     MCH 33.5 (*)     Neutrophils Absolute 8.3 (*)     Monocytes Absolute 0.9 (*)     All other components within normal limits   COMPREHENSIVE METABOLIC PANEL - Abnormal; Notable for the following components:    Sodium 132 (*)     BUN 24 (*)     Total Protein 6.0 (*)     All other components within normal limits   POCT CREATININE - Normal   LIPASE   URINALYSIS WITH REFLEX TO CULTURE       All other labs were within normal range or not returned as of this dictation. EMERGENCY DEPARTMENT COURSE and DIFFERENTIAL DIAGNOSIS/MDM:   Vitals:    Vitals:    02/12/23 0314   BP: (!) 173/90   Pulse: (!) 102   Resp: 18   Temp: 97.6 °F (36.4 °C)   SpO2: 98%   Weight: 145 lb (65.8 kg)   Height: 5' 9\" (1.753 m)       Nonischemic EKG, doubt cardiac etiology such as ACS  Chronic anemia. No significant electrolyte derangement or transaminitis. Medical Decision Making  Amount and/or Complexity of Data Reviewed  Labs: ordered. Radiology: ordered. ECG/medicine tests: ordered. Risk  Prescription drug management. Patient presents to the emergency department with upper abdominal discomfort. CT ordered. Patient given intravenous Pepcid, Toradol and GI cocktail. The patient has complaint of upper abdominal discomfort, possibly related to gastritis versus GERD versus ulcer as chart review shows that he has been evaluated numerous times in our emergency department and received Toradol, was recently prescribed mobic    I believe we can adequately explain the patient's presentation with gastritis. He did get some relief with Pepcid and GI cocktail    Patient will be treated for possible pneumonia however I do not think this requires admission. Afebrile. No cough. Saturating well on room air. Constipation also adequately explains presentation. Sent home with MiraLAX.   Advised to increase fiber    REASSESSMENT      Resting comfortably    CONSULTS:  None    PROCEDURES:  Unless otherwise noted below, none     Procedures      FINAL IMPRESSION      1. Abdominal pain, unspecified abdominal location          DISPOSITION/PLAN   DISPOSITION Decision To Discharge 02/12/2023 06:20:28 AM      PATIENT REFERRED TO:  Carlos Antonio MD  [address] [place] [id number]  [phone]          DISCHARGE MEDICATIONS:  New Prescriptions    AMOXICILLIN-CLAVULANATE (AUGMENTIN) 875-125 MG PER TABLET    Take 1 tablet by mouth 2 times daily for 10 days    AZITHROMYCIN (ZITHROMAX Z-ISABELLA) 250 MG TABLET    Take 2 tablets (500 mg) on Day 1, and then take 1 tablet (250 mg) on days 2 through 5. FAMOTIDINE (PEPCID) 20 MG TABLET    Take 1 tablet by mouth 2 times daily    POLYETHYLENE GLYCOL (GLYCOLAX) 17 GM/SCOOP POWDER    Take 17 g by mouth daily    SUCRALFATE (CARAFATE) 1 GM/10ML SUSPENSION    Take 10 mLs by mouth 4 times daily     Controlled Substances Monitoring:     RX Monitoring 1/26/2023   Attestation -   Periodic Controlled Substance Monitoring No signs of potential drug abuse or diversion identified.        (Please note that portions of this note were completed with a voice recognition program.  Efforts were made to edit the dictations but occasionally words are mis-transcribed.)    Vasyl López MD (electronically signed)  Attending Emergency Physician            Vasyl López MD  02/12/23 8540

## 2023-02-12 NOTE — ED NOTES
Discharge instructions, medications and follow up care reviewed. Pt verbalized understanding. Pt A&Ox4. Afebrile with skin warm and dry. Respirations and breathing equal and unlabored. Pt stable at time of departure.         Anitha Ortiz RN  02/12/23 7350

## 2023-02-12 NOTE — ED TRIAGE NOTES
Patient to ED for c/o abd pain since today. Patient states pain started out on right side and moved to cent of abd \"feels like someone punched me\". Patient rates pain 8/10 at this time. Skin p/w/d. Respirations even and unlabored. No acute distress noted at this time.

## 2023-02-12 NOTE — DISCHARGE INSTRUCTIONS
Return for worsening symptoms or concerns. Avoid NSAIDs. Medication as prescribed. Follow-up with your doctor. Thank you.

## 2023-02-13 DIAGNOSIS — Z74.09 IMMOBILITY: Primary | ICD-10-CM

## 2023-02-13 DIAGNOSIS — Z74.09 LIMITED MOBILITY: ICD-10-CM

## 2023-02-13 LAB
EKG ATRIAL RATE: 82 BPM
EKG P AXIS: 67 DEGREES
EKG P-R INTERVAL: 138 MS
EKG Q-T INTERVAL: 380 MS
EKG QRS DURATION: 98 MS
EKG QTC CALCULATION (BAZETT): 443 MS
EKG R AXIS: 39 DEGREES
EKG T AXIS: 76 DEGREES
EKG VENTRICULAR RATE: 82 BPM

## 2023-02-13 NOTE — TELEPHONE ENCOUNTER
Pt calling to see if provider can write a placard script, due to pt using walker and having a hard time walking wants to know if that is possible If so can pt have call back if it is written or ready or instructions on what they have to do in order to get one? Please and thank you.

## 2023-02-14 ENCOUNTER — TELEPHONE (OUTPATIENT)
Dept: PAIN MANAGEMENT | Age: 68
End: 2023-02-14

## 2023-02-14 ENCOUNTER — HOSPITAL ENCOUNTER (EMERGENCY)
Age: 68
Discharge: HOME | End: 2023-02-14
Payer: MEDICARE

## 2023-02-14 ENCOUNTER — TELEPHONE (OUTPATIENT)
Dept: NEUROSURGERY | Age: 68
End: 2023-02-14

## 2023-02-14 VITALS
RESPIRATION RATE: 16 BRPM | TEMPERATURE: 97.6 F | HEART RATE: 84 BPM | SYSTOLIC BLOOD PRESSURE: 178 MMHG | DIASTOLIC BLOOD PRESSURE: 94 MMHG | OXYGEN SATURATION: 100 %

## 2023-02-14 DIAGNOSIS — G89.29 CHRONIC MIDLINE LOW BACK PAIN WITHOUT SCIATICA: Primary | ICD-10-CM

## 2023-02-14 DIAGNOSIS — M54.50 CHRONIC MIDLINE LOW BACK PAIN WITHOUT SCIATICA: Primary | ICD-10-CM

## 2023-02-14 PROCEDURE — 99284 EMERGENCY DEPT VISIT MOD MDM: CPT

## 2023-02-14 PROCEDURE — 96372 THER/PROPH/DIAG INJ SC/IM: CPT

## 2023-02-14 PROCEDURE — 6360000002 HC RX W HCPCS: Performed by: PHYSICIAN ASSISTANT

## 2023-02-14 RX ORDER — KETOROLAC TROMETHAMINE 30 MG/ML
30 INJECTION, SOLUTION INTRAMUSCULAR; INTRAVENOUS ONCE
Status: COMPLETED | OUTPATIENT
Start: 2023-02-14 | End: 2023-02-14

## 2023-02-14 RX ORDER — DEXAMETHASONE SODIUM PHOSPHATE 4 MG/ML
10 INJECTION, SOLUTION INTRA-ARTICULAR; INTRALESIONAL; INTRAMUSCULAR; INTRAVENOUS; SOFT TISSUE ONCE
Status: COMPLETED | OUTPATIENT
Start: 2023-02-14 | End: 2023-02-14

## 2023-02-14 RX ADMIN — DEXAMETHASONE SODIUM PHOSPHATE 10 MG: 4 INJECTION, SOLUTION INTRAMUSCULAR; INTRAVENOUS at 08:29

## 2023-02-14 RX ADMIN — KETOROLAC TROMETHAMINE 30 MG: 30 INJECTION, SOLUTION INTRAMUSCULAR; INTRAVENOUS at 08:29

## 2023-02-14 ASSESSMENT — ENCOUNTER SYMPTOMS
EYE DISCHARGE: 0
ABDOMINAL DISTENTION: 0
COLOR CHANGE: 0
RHINORRHEA: 0
BACK PAIN: 1
SHORTNESS OF BREATH: 0
SORE THROAT: 0
ABDOMINAL PAIN: 0
CONSTIPATION: 0

## 2023-02-14 ASSESSMENT — PAIN DESCRIPTION - LOCATION
LOCATION: BACK
LOCATION: BACK

## 2023-02-14 ASSESSMENT — PAIN SCALES - GENERAL
PAINLEVEL_OUTOF10: 10
PAINLEVEL_OUTOF10: 10

## 2023-02-14 ASSESSMENT — PAIN DESCRIPTION - PAIN TYPE: TYPE: CHRONIC PAIN

## 2023-02-14 ASSESSMENT — PAIN DESCRIPTION - DESCRIPTORS: DESCRIPTORS: ACHING

## 2023-02-14 ASSESSMENT — PAIN - FUNCTIONAL ASSESSMENT: PAIN_FUNCTIONAL_ASSESSMENT: 0-10

## 2023-02-14 NOTE — ED PROVIDER NOTES
[facility] ED  eMERGENCY dEPARTMENT eNCOUnter      Pt Name: Janett Tolbert  MRN: [id number]  [place] 1955  Date of evaluation: 2/14/2023  Provider: Kemar Ozuna PA-C    CHIEF COMPLAINT       Chief Complaint   Patient presents with    Back Pain     Chronic back pain         HISTORY OF PRESENT ILLNESS   (Location/Symptom, Timing/Onset,Context/Setting, Quality, Duration, Modifying Factors, Severity)  Note limiting factors. Janett Tolbert is a 77 y.o. male who presents to the emergency department complaint of chronic low back pain which patient states been ongoing for years, he is currently sees pain management, and states that he does receive a pension, as well as injections in his back, he does have an appointment at 11a.m. today with Dr. Rai Tsai of neurosurgery, for his ongoing back pain. States that his pain management doctor is not controlling his pain, therefore he comes to the emergency department for injections intermittently. Patient denies any new injuries, no numbness or tingling, no bowel or bladder dysfunction, no paresthesias, no fevers, no weakness, patient did drive himself to the emergency department. Stating his current pain at this time is a 10 out of 10. Has medical history significant for obstructive sleep apnea, chronic low back pain, radicular pain, gastric reflux, emphysema, depression, anxiety, hyperlipidemia, hypertension, restless leg syndrome, substance abuse. HPI    NursingNotes were reviewed. REVIEW OF SYSTEMS    (2-9 systems for level 4, 10 or more for level 5)     Review of Systems   Constitutional:  Negative for activity change and appetite change. HENT:  Negative for congestion, ear discharge, ear pain, nosebleeds, rhinorrhea and sore throat. Eyes:  Negative for discharge. Respiratory:  Negative for shortness of breath. Cardiovascular:  Negative for chest pain, palpitations and leg swelling.    Gastrointestinal:  Negative for abdominal distention, abdominal pain and constipation. Genitourinary:  Negative for difficulty urinating and dysuria. Musculoskeletal:  Positive for back pain. Negative for arthralgias. Chronic low back pain   Skin:  Negative for color change. Neurological:  Negative for dizziness, syncope, numbness and headaches. Psychiatric/Behavioral:  Negative for agitation and confusion. Except as noted above the remainder of the review of systems was reviewed and negative.        PAST MEDICAL HISTORY     Past Medical History:   Diagnosis Date    Allergic rhinitis 2/19/2018    Anxiety     Chronic back pain     COPD (chronic obstructive pulmonary disease) ([facility])     Depression     Disorder of stomach     valve not closing properly    Emphysema lung ([address]     Essential hypertension 11/4/2019    Gastroesophageal reflux disease 10/4/2019    Hyperlipidemia     meds > 22 yrs    Low back pain 5/1/2018    ARNAUD (obstructive sleep apnea) 2/19/2018    Other emphysema ([address] 10/4/2019    Other intervertebral disc degeneration, lumbar region 3/23/2018    Radiculopathy, lumbar region 2/28/2018    Restless leg syndrome     Seasonal affective disorder ([address] 10/4/2019    Substance abuse ([facility]     alcholic, quit 20 yrs          SURGICALHISTORY       Past Surgical History:   Procedure Laterality Date    COLONOSCOPY  12/22/2016    A SHALA MARLEY    DENTAL SURGERY  10 yrs ago    ENDOSCOPY, COLON, DIAGNOSTIC      EYE SURGERY      Lasik OU    FINGER TRIGGER RELEASE Left 11/1/2022    LEFT HAND TRIGGER FINGER RING FINGER RELEASE OF A1 PANFILO performed by Cheryle Bair, MD at [place] [address] ARTHROSCOPY Right     KNEE SURGERY Right 05/2016    Ray procedure    KNEE SURGERY      [facility] NASAL/SINUS ENDOSCOPY W/MAXILLARY ANTROSTOMY N/A 2/22/2018    SEPTOPLASTY MICRODEBRIDER ASSISTED TURBINOPLASTY AND OUT-FRACTURING BILATERAL NASAL ENDOSCOPY performed by Emilee Luis MD at [address] [place] [address]       Previous Medications ACETAMINOPHEN (TYLENOL) 500 MG TABLET    Take 2 tablets by mouth every 6 hours as needed for Pain or Fever    ALBUTEROL SULFATE HFA (VENTOLIN HFA) 108 (90 BASE) MCG/ACT INHALER    Inhale 2 puffs into the lungs 4 times daily as needed for Wheezing    ALBUTEROL SULFATE HFA (VENTOLIN HFA) 108 (90 BASE) MCG/ACT INHALER    Inhale 2 puffs into the lungs 4 times daily as needed for Wheezing    AMOXICILLIN-CLAVULANATE (AUGMENTIN) 875-125 MG PER TABLET    Take 1 tablet by mouth 2 times daily for 10 days    AZITHROMYCIN (ZITHROMAX Z-ISABELLA) 250 MG TABLET    Take 2 tablets (500 mg) on Day 1, and then take 1 tablet (250 mg) on days 2 through 5. BUDESONIDE-FORMOTEROL (SYMBICORT) 160-4.5 MCG/ACT AERO    Inhale 2 puffs into the lungs 2 times daily    ETODOLAC (LODINE) 400 MG TABLET    Take 1 tablet by mouth 2 times daily    FAMOTIDINE (PEPCID) 20 MG TABLET    Take 1 tablet by mouth 2 times daily    FLUOXETINE (PROZAC) 40 MG CAPSULE    TAKE 1 CAPSULE BY MOUTH DAILY. GABAPENTIN (NEURONTIN) 600 MG TABLET    Take 1 tablet by mouth 3 times daily for 30 days. LIDOCAINE 4 % EXTERNAL PATCH    Place 1 patch onto the skin daily    MELOXICAM (MOBIC) 15 MG TABLET    Take 1 tablet by mouth daily as needed for Pain    POLYETHYLENE GLYCOL (GLYCOLAX) 17 GM/SCOOP POWDER    Take 17 g by mouth daily    ROSUVASTATIN (CRESTOR) 40 MG TABLET    TAKE 1 TABLET BY MOUTH DAILY AT BEDTIME.     SUCRALFATE (CARAFATE) 1 GM/10ML SUSPENSION    Take 10 mLs by mouth 4 times daily    TIZANIDINE (ZANAFLEX) 4 MG TABLET    Take 1 tablet by mouth every 8 hours as needed (pain/sapsm)       ALLERGIES     Alcohol, Benadryl [diphenhydramine], Demerol hcl [meperidine], and Sulfa antibiotics    FAMILY HISTORY       Family History   Problem Relation Age of Onset    Cancer Mother         stomach    Arthritis Mother     Heart Disease Father 48    Cancer Father         bone    Cancer Maternal Grandmother         lung    Cancer Paternal Grandmother     Heart Disease Paternal Grandfather     No Known Problems Sister     Cancer Brother     Heart Disease Brother         hole in heart in 65 at 1 months age          SOCIAL HISTORY       Social History     Socioeconomic History    Marital status:      Spouse name: None    Number of children: 2    Years of education: 12    Highest education level: High school graduate   Tobacco Use    Smoking status: Every Day     Packs/day: 2.00     Years: 52.00     Pack years: 104.00     Types: Cigars, Cigarettes     Start date: 1970    Smokeless tobacco: Never    Tobacco comments:     3-5 cigars qd   Vaping Use    Vaping Use: Never used   Substance and Sexual Activity    Alcohol use: No     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    Drug use: No    Sexual activity: Not Currently     Partners: Female     Social Determinants of Health     Financial Resource Strain: Low Risk     Difficulty of Paying Living Expenses: Not hard at all   Food Insecurity: No Food Insecurity    Worried About [employer] [employer] in the Last Year: Never true    [employer] in the Last Year: Never true   Transportation Needs: Unknown    Lack of Transportation (Non-Medical): No   Physical Activity: Inactive    Days of Exercise per Week: 0 days    Minutes of Exercise per Session: 0 min   Housing Stability: Unknown    Unstable Housing in the Last Year: No       SCREENINGS    [place] Coma Scale  Eye Opening: Spontaneous  Best Verbal Response: Oriented  Best Motor Response: Obeys commands  Sarah Coma Scale Score: 15 @FLOW([id number])@      PHYSICAL EXAM    (up to 7 for level 4, 8 or more for level 5)     ED Triage Vitals [02/14/23 0627]   BP Temp Temp src Heart Rate Resp SpO2 Height Weight   (!) 178/94 97.6 °F (36.4 °C) -- 84 16 100 % -- --       Physical Exam  Vitals and nursing note reviewed. Constitutional:       General: He is not in acute distress. Appearance: He is well-developed. He is not ill-appearing, toxic-appearing or diaphoretic. HENT:      Head: Normocephalic. Nose: No congestion. Mouth/Throat:      Mouth: Mucous membranes are moist.      Pharynx: No oropharyngeal exudate or posterior oropharyngeal erythema. Eyes:      Extraocular Movements: Extraocular movements intact. Conjunctiva/sclera: Conjunctivae normal.      Pupils: Pupils are equal, round, and reactive to light. Neck:      Vascular: No JVD. Trachea: No tracheal deviation. Cardiovascular:      Rate and Rhythm: Normal rate. Pulses: Normal pulses. Heart sounds: Normal heart sounds. No murmur heard. No friction rub. No gallop. Pulmonary:      Effort: Pulmonary effort is normal. No tachypnea, accessory muscle usage, respiratory distress or retractions. Breath sounds: No stridor. No wheezing, rhonchi or rales. Chest:      Chest wall: No tenderness. Abdominal:      General: Abdomen is flat. Bowel sounds are normal. There is no distension or abdominal bruit. Palpations: There is no shifting dullness, fluid wave, hepatomegaly, splenomegaly, mass or pulsatile mass. Tenderness: There is no abdominal tenderness. There is no right CVA tenderness, left CVA tenderness, guarding or rebound. Negative signs include Buitrago's sign, Rovsing's sign and McBurney's sign. Musculoskeletal:         General: No deformity. Cervical back: Normal range of motion and neck supple. No rigidity. Comments: Patient is able to stand and ambulate with slow steady upright gait walking with cane. No ataxia, no foot drop is noted. Patient has no tenderness on palpation to upper, mid, or low back. Skin:     General: Skin is warm and dry. Capillary Refill: Capillary refill takes less than 2 seconds. Coloration: Skin is not jaundiced. Neurological:      General: No focal deficit present. Mental Status: He is alert and oriented to person, place, and time. Mental status is at baseline. Cranial Nerves: No cranial nerve deficit. Sensory: No sensory deficit. Motor: No weakness. Coordination: Coordination normal.   Psychiatric:         Mood and Affect: Mood normal.       DIAGNOSTIC RESULTS     EKG: All EKG's are interpreted by the Emergency Department Physician who either signs or Co-signsthis chart in the absence of a cardiologist.        RADIOLOGY:   Cathy Hummer such as CT, Ultrasound and MRI are read by the radiologist. Plain radiographic images are visualized and preliminarily interpreted by the emergency physician with the below findings:    Interpretation per the Radiologist below, if available at the time ofthis note:    No orders to display         ED BEDSIDE ULTRASOUND:   Performed by ED Physician - none    LABS:  Labs Reviewed - No data to display    All other labs were within normal range or not returned as of this dictation. EMERGENCY DEPARTMENT COURSE and DIFFERENTIAL DIAGNOSIS/MDM:   Vitals:    Vitals:    02/14/23 0627   BP: (!) 178/94   Pulse: 84   Resp: 16   Temp: 97.6 °F (36.4 °C)   SpO2: 100%            MDM  Number of Diagnoses or Management Options  Chronic midline low back pain without sciatica  Diagnosis management comments: Patient has chronic low back pain, he states he comes to the emergency department whenever pain is not managed by his pain management doctor. He is requesting injections for pain control. Patient did drive himself to the emergency department, he was given a shot of Toradol, and Decadron, he does have an 11 AM appointment today with the neurosurgery Dr. Serina Recinos, he was advised to keep this appointment. He denies any new or acute issues, does stating that his pain is not controlled. Patient is able to stand and ambulate without a significant distress, he was vies to contact his pain management doctor for further adjustments or changes for pain control. CRITICAL CARE TIME   Total Critical Care time was 0 minutes, excluding separately reportableprocedures.   There was a high probability of clinicallysignificant/life threatening deterioration in the patient's condition which required my urgent intervention. CONSULTS:  None    PROCEDURES:  Unless otherwise noted below, none     Procedures    FINAL IMPRESSION      1.  Chronic midline low back pain without sciatica          DISPOSITION/PLAN   DISPOSITION Decision To Discharge 02/14/2023 08:29:37 AM      PATIENT REFERRED TO:  MANUELA Alfonso CNP  [address] [address], [address]  [address]          Myriam Antonio MD  [place] [id number] [address]  [phone]    Today  Keep scheduled appointment with Dr. Meeta Mistry for 11 AM today    DISCHARGE MEDICATIONS:  New Prescriptions    No medications on file          (Please note that portions of this note were completed with a voice recognition program.  Efforts were made to edit the dictations but occasionally words are mis-transcribed.)    Sara Crockett PA-C (electronically signed)  Attending Emergency Physician         Sara Crockett PA-C  02/14/23 [id number]

## 2023-02-14 NOTE — TELEPHONE ENCOUNTER
Per Dr. Ochoa Blow, please call pt to confirm pt's next appt for 2/28/23 since pt has been going to ER. Pt was called & lmvm with details of next appt.

## 2023-02-14 NOTE — TELEPHONE ENCOUNTER
Please check with Dr Ashely Irizarry if he would like to provide any medications.  I think no medications have been provided by Dr Ashely Irizarry and our office in the past.

## 2023-02-14 NOTE — ED TRIAGE NOTES
Pt to ED due to chronic back pain. Pt states pain is 10/10. Pt denies any new injuries. Pt is alert and oriented x4. Resp are regular and equal. Skin is warm, dry, intact.

## 2023-02-14 NOTE — TELEPHONE ENCOUNTER
Patient asks Dr. Ankit Hernandez if there is anything he can prescribe to help with the pain? Pt states he went to the emergency room this morning (2/14/23) and was given an injection of toradol and decadron. Pt was discharged with no medication but states his back is in \"dire pain. \"

## 2023-02-15 ENCOUNTER — HOSPITAL ENCOUNTER (EMERGENCY)
Age: 68
Discharge: HOME | End: 2023-02-15
Payer: MEDICARE

## 2023-02-15 ENCOUNTER — OFFICE VISIT (OUTPATIENT)
Dept: FAMILY MEDICINE CLINIC | Age: 68
End: 2023-02-15

## 2023-02-15 ENCOUNTER — TELEPHONE (OUTPATIENT)
Dept: FAMILY MEDICINE CLINIC | Age: 68
End: 2023-02-15

## 2023-02-15 ENCOUNTER — OFFICE VISIT (OUTPATIENT)
Dept: PAIN MANAGEMENT | Age: 68
End: 2023-02-15
Payer: MEDICARE

## 2023-02-15 VITALS
TEMPERATURE: 97.1 F | HEIGHT: 69 IN | BODY MASS INDEX: 21.48 KG/M2 | SYSTOLIC BLOOD PRESSURE: 138 MMHG | WEIGHT: 145 LBS | DIASTOLIC BLOOD PRESSURE: 80 MMHG

## 2023-02-15 VITALS
SYSTOLIC BLOOD PRESSURE: 145 MMHG | OXYGEN SATURATION: 98 % | WEIGHT: 145 LBS | DIASTOLIC BLOOD PRESSURE: 92 MMHG | HEIGHT: 69 IN | RESPIRATION RATE: 18 BRPM | HEART RATE: 86 BPM | BODY MASS INDEX: 21.48 KG/M2 | TEMPERATURE: 97.5 F

## 2023-02-15 VITALS
WEIGHT: 145 LBS | OXYGEN SATURATION: 93 % | SYSTOLIC BLOOD PRESSURE: 138 MMHG | BODY MASS INDEX: 21.41 KG/M2 | HEART RATE: 123 BPM | TEMPERATURE: 97.9 F | DIASTOLIC BLOOD PRESSURE: 70 MMHG

## 2023-02-15 DIAGNOSIS — M54.50 CHRONIC LOW BACK PAIN WITHOUT SCIATICA, UNSPECIFIED BACK PAIN LATERALITY: Primary | ICD-10-CM

## 2023-02-15 DIAGNOSIS — F10.11 HISTORY OF ALCOHOL ABUSE: ICD-10-CM

## 2023-02-15 DIAGNOSIS — G89.29 CHRONIC LOW BACK PAIN WITHOUT SCIATICA, UNSPECIFIED BACK PAIN LATERALITY: Primary | ICD-10-CM

## 2023-02-15 DIAGNOSIS — G89.29 ACUTE EXACERBATION OF CHRONIC LOW BACK PAIN: Primary | ICD-10-CM

## 2023-02-15 DIAGNOSIS — M46.1 SACROILIITIS (HCC): Primary | ICD-10-CM

## 2023-02-15 DIAGNOSIS — M54.16 RADICULOPATHY, LUMBAR REGION: ICD-10-CM

## 2023-02-15 DIAGNOSIS — M48.062 SPINAL STENOSIS OF LUMBAR REGION WITH NEUROGENIC CLAUDICATION: ICD-10-CM

## 2023-02-15 DIAGNOSIS — M54.50 ACUTE EXACERBATION OF CHRONIC LOW BACK PAIN: Primary | ICD-10-CM

## 2023-02-15 DIAGNOSIS — R20.2 PARESTHESIA OF BOTH FEET: ICD-10-CM

## 2023-02-15 PROCEDURE — 3075F SYST BP GE 130 - 139MM HG: CPT | Performed by: NURSE PRACTITIONER

## 2023-02-15 PROCEDURE — 99284 EMERGENCY DEPT VISIT MOD MDM: CPT

## 2023-02-15 PROCEDURE — 1123F ACP DISCUSS/DSCN MKR DOCD: CPT | Performed by: NURSE PRACTITIONER

## 2023-02-15 PROCEDURE — 6360000002 HC RX W HCPCS

## 2023-02-15 PROCEDURE — 96372 THER/PROPH/DIAG INJ SC/IM: CPT

## 2023-02-15 PROCEDURE — 99214 OFFICE O/P EST MOD 30 MIN: CPT | Performed by: NURSE PRACTITIONER

## 2023-02-15 PROCEDURE — 3078F DIAST BP <80 MM HG: CPT | Performed by: NURSE PRACTITIONER

## 2023-02-15 RX ORDER — GABAPENTIN 600 MG/1
TABLET ORAL
Qty: 90 TABLET | Refills: 0 | Status: SHIPPED | OUTPATIENT
Start: 2023-02-15 | End: 2023-03-15

## 2023-02-15 RX ORDER — KETOROLAC TROMETHAMINE 30 MG/ML
30 INJECTION, SOLUTION INTRAMUSCULAR; INTRAVENOUS ONCE
Status: COMPLETED | OUTPATIENT
Start: 2023-02-15 | End: 2023-02-15

## 2023-02-15 RX ORDER — GABAPENTIN 600 MG/1
TABLET ORAL
Qty: 90 TABLET | Refills: 0 | Status: SHIPPED | OUTPATIENT
Start: 2023-02-15 | End: 2023-02-15 | Stop reason: ALTCHOICE

## 2023-02-15 RX ADMIN — KETOROLAC TROMETHAMINE 30 MG: 30 INJECTION, SOLUTION INTRAMUSCULAR; INTRAVENOUS at 05:57

## 2023-02-15 ASSESSMENT — PAIN SCALES - GENERAL
PAINLEVEL_OUTOF10: 10
PAINLEVEL_OUTOF10: 10

## 2023-02-15 ASSESSMENT — PAIN - FUNCTIONAL ASSESSMENT: PAIN_FUNCTIONAL_ASSESSMENT: 0-10
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
## 2023-02-15 NOTE — ED PROVIDER NOTES
[facility] ED  eMERGENCY dEPARTMENT eNCOUnter      Pt Name: Marce Ratliff  MRN: [id number]  [place] 1955  Date of evaluation: 2/15/2023  Provider: LYNETTE Ray        HISTORY OF PRESENT ILLNESS    Marce Ratliff is a 77 y.o. male per chart review has ah/o hypertension, opioid abuse, hyperlipidemia, GERD, degenerative disc disease, tobacco use, radiculopathy. Patient to the ED for acute on chronic low back pain. Patient was here yesterday for the same complaint. Today he states he is here for \"the same thing that brought me in last night and the same thing that will probably bring me in tomorrow. \"  He states his presenting back pain feels similar to his ongoing back pain chronically. No new or different symptoms. He denies any trauma falls or injuries. Denies numbness, incontinence, foot drop, saddle anesthesia. States he walks with a walker at baseline he presents with his walker today. He does follow with pain management though he states they are not very helpful. He states he has an appointment with his orthopedic surgeon on Friday. REVIEW OF SYSTEMS       Review of Systems   Constitutional:  Negative for chills and fever. HENT:  Negative for congestion. Eyes:  Negative for photophobia. Respiratory:  Negative for cough and shortness of breath. Cardiovascular:  Negative for chest pain. Gastrointestinal:  Negative for abdominal pain, diarrhea, nausea and vomiting. Genitourinary:  Negative for decreased urine volume, difficulty urinating and urgency. Musculoskeletal:  Positive for back pain. Negative for gait problem, myalgias, neck pain and neck stiffness. Neurological:  Negative for weakness, numbness and headaches. Psychiatric/Behavioral:  Negative for confusion. Except as noted above the remainder of the review of systems was reviewed and negative.        PAST MEDICAL HISTORY     Past Medical History:   Diagnosis Date    Allergic rhinitis 2/19/2018 Anxiety     Chronic back pain     COPD (chronic obstructive pulmonary disease) ([facility])     Depression     Disorder of stomach     valve not closing properly    Emphysema lung ([facility]     Essential hypertension 11/4/2019    Gastroesophageal reflux disease 10/4/2019    Hyperlipidemia     meds > 22 yrs    Low back pain 5/1/2018    ARNAUD (obstructive sleep apnea) 2/19/2018    Other emphysema ([facility] 10/4/2019    Other intervertebral disc degeneration, lumbar region 3/23/2018    Radiculopathy, lumbar region 2/28/2018    Restless leg syndrome     Seasonal affective disorder ([facility] 10/4/2019    Substance abuse ([facility]     alcholic, quit 20 yrs          SURGICAL HISTORY       Past Surgical History:   Procedure Laterality Date    COLONOSCOPY  12/22/2016    A SHALA MARLEY    DENTAL SURGERY  10 yrs ago    ENDOSCOPY, COLON, DIAGNOSTIC      EYE SURGERY      Lasik OU    FINGER TRIGGER RELEASE Left 11/1/2022    LEFT HAND TRIGGER FINGER RING FINGER RELEASE OF A1 PANFILO performed by Jose Peace MD at [place] [address] ARTHROSCOPY Right     KNEE SURGERY Right 05/2016    Ray procedure    KNEE SURGERY      CA NASAL/SINUS ENDOSCOPY W/MAXILLARY ANTROSTOMY N/A 2/22/2018    SEPTOPLASTY MICRODEBRIDER ASSISTED TURBINOPLASTY AND OUT-FRACTURING BILATERAL NASAL ENDOSCOPY performed by Christiano Sanchez MD at [address] [place] [address]       Discharge Medication List as of 2/15/2023  5:47 AM        CONTINUE these medications which have NOT CHANGED    Details   Handicap [facility] Starting Tue 2/14/2023, Disp-1 each, R-0, PrintEx: 5 years 2/14/2028      famotidine (PEPCID) 20 MG tablet Take 1 tablet by mouth 2 times daily, Disp-60 tablet, R-0Normal      sucralfate (CARAFATE) 1 GM/10ML suspension Take 10 mLs by mouth 4 times daily, Disp-1200 mL, R-3Normal      amoxicillin-clavulanate (AUGMENTIN) 875-125 MG per tablet Take 1 tablet by mouth 2 times daily for 10 days, Disp-20 tablet, R-0Normal      azithromycin (ZITHROMAX Z-ISABELLA) 250 MG tablet Take 2 tablets (500 mg) on Day 1, and then take 1 tablet (250 mg) on days 2 through 5., Disp-1 packet, R-0Normal      polyethylene glycol (GLYCOLAX) 17 GM/SCOOP powder Take 17 g by mouth daily, Disp-510 g, R-0Normal      tiZANidine (ZANAFLEX) 4 MG tablet Take 1 tablet by mouth every 8 hours as needed (pain/sapsm), Disp-15 tablet, R-0Normal      etodolac (LODINE) 400 MG tablet Take 1 tablet by mouth 2 times daily, Disp-14 tablet, R-0Normal      gabapentin (NEURONTIN) 600 MG tablet Take 1 tablet by mouth 3 times daily for 30 days. , Disp-90 tablet, R-0Normal      acetaminophen (TYLENOL) 500 MG tablet Take 2 tablets by mouth every 6 hours as needed for Pain or Fever, Disp-60 tablet, R-0Normal      lidocaine 4 % external patch Place 1 patch onto the skin daily, TransDERmal, DAILY Starting Tue 1/17/2023, Until Thu 2/16/2023, For 30 days, Disp-30 patch, R-0, Normal      meloxicam (MOBIC) 15 MG tablet Take 1 tablet by mouth daily as needed for Pain, Disp-90 tablet, R-1Print      !! albuterol sulfate HFA (VENTOLIN HFA) 108 (90 Base) MCG/ACT inhaler Inhale 2 puffs into the lungs 4 times daily as needed for Wheezing, Disp-18 g, R-0Normal      budesonide-formoterol (SYMBICORT) 160-4.5 MCG/ACT AERO Inhale 2 puffs into the lungs 2 times daily, Disp-10.2 g, R-3Normal      !! albuterol sulfate HFA (VENTOLIN HFA) 108 (90 Base) MCG/ACT inhaler Inhale 2 puffs into the lungs 4 times daily as needed for Wheezing, Disp-18 g, R-0Normal      FLUoxetine (PROZAC) 40 MG capsule TAKE 1 CAPSULE BY MOUTH DAILY. , Disp-90 capsule, R-1Normal      rosuvastatin (CRESTOR) 40 MG tablet TAKE 1 TABLET BY MOUTH DAILY AT BEDTIME., Disp-90 tablet, R-5Normal       !! - Potential duplicate medications found. Please discuss with provider.           ALLERGIES     Alcohol, Benadryl [diphenhydramine], Demerol hcl [meperidine], and Sulfa antibiotics    FAMILY HISTORY       Family History   Problem Relation Age of Onset    Cancer Mother         stomach Arthritis Mother     Heart Disease Father 48    Cancer Father         bone    Cancer Maternal Grandmother         lung    Cancer Paternal Grandmother     Heart Disease Paternal Grandfather     No Known Problems Sister     Cancer Brother     Heart Disease Brother         hole in heart in 65 at 1 months age          SOCIAL HISTORY       Social History     Socioeconomic History    Marital status:     Number of children: 2    Years of education: 12    Highest education level: High school graduate   Tobacco Use    Smoking status: Every Day     Packs/day: 2.00     Years: 52.00     Pack years: 104.00     Types: Cigars, Cigarettes     Start date: 1970    Smokeless tobacco: Never    Tobacco comments:     3-5 cigars qd   Vaping Use    Vaping Use: Never used   Substance and Sexual Activity    Alcohol use: No     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    Drug use: No    Sexual activity: Not Currently     Partners: Female     Social Determinants of Health     Financial Resource Strain: Low Risk     Difficulty of Paying Living Expenses: Not hard at all   Food Insecurity: No Food Insecurity    Worried About [address] [place] [address] [place] in the Last Year: Never true    Ran Out of Food in the Last Year: Never true   Transportation Needs: Unknown    Lack of Transportation (Non-Medical): No   Physical Activity: Inactive    Days of Exercise per Week: 0 days    Minutes of Exercise per Session: 0 min   Housing Stability: Unknown    Unstable Housing in the Last Year: No         PHYSICAL EXAM        ED Triage Vitals [02/15/23 0409]   BP Temp Temp src Heart Rate Resp SpO2 Height Weight   (!) 145/92 97.5 °F (36.4 °C) -- 86 18 98 % 5' 9\" (1.753 m) 145 lb (65.8 kg)       Physical Exam  Constitutional:       General: He is not in acute distress. Appearance: Normal appearance. He is not ill-appearing, toxic-appearing or diaphoretic. HENT:      Head: Normocephalic and atraumatic.       Right Ear: External ear normal.      Left Ear: External ear normal. Nose: Nose normal.      Mouth/Throat:      Mouth: Mucous membranes are moist.      Pharynx: Oropharynx is clear. Eyes:      Extraocular Movements: Extraocular movements intact. Neck:      Comments: No midline C/T/L spinal tenderness, deformity, step-off. Cardiovascular:      Rate and Rhythm: Normal rate and regular rhythm. Pulmonary:      Effort: Pulmonary effort is normal. No respiratory distress. Breath sounds: Normal breath sounds. Abdominal:      General: Bowel sounds are normal.      Palpations: Abdomen is soft. Tenderness: There is no abdominal tenderness. Musculoskeletal:         General: No tenderness or deformity. Normal range of motion. Cervical back: Normal range of motion. No rigidity. Right lower leg: No edema. Left lower leg: No edema. Comments: 5 out of 5 BLE strength, intact BLE sensation equally. Observed to ambulate with a walker which he states is his baseline. Skin:     General: Skin is warm. Capillary Refill: Capillary refill takes less than 2 seconds. Neurological:      Mental Status: He is alert and oriented to person, place, and time. Psychiatric:         Mood and Affect: Mood normal.         Behavior: Behavior normal.         LABS:  Labs Reviewed - No data to display      MDM:   Vitals:    Vitals:    02/15/23 0409   BP: (!) 145/92   Pulse: 86   Resp: 18   Temp: 97.5 °F (36.4 °C)   SpO2: 98%   Weight: 145 lb (65.8 kg)   Height: 5' 9\" (1.753 m)       78-year-old male patient presents to the emergency department for acute on chronic back pain. Presents afebrile. VSS. Neurovascularly intact. Ambulates into the ED with a walker which is his baseline. No red flag symptoms. Patient has ongoing issues with his back pain and repeated ER visits for the same. Follows outpatient with orthopedics and pain management. Patient was here just yesterday and states Toradol was helpful. Given Toradol in the ED.   As he follows with pain management will defer outpatient Rx to them, educated regarding importance of hearing to long-term management strategies. He also has an appointment with orthopedics on Friday so we will follow-up there or return to the ED for specifically new or worsening symptoms. CRITICAL CARE TIME   Total CriticalCare time was 0 minutes, excluding separately reportable procedures. There was a high probability of clinically significant/life threatening deterioration in the patient's condition which required my urgent intervention. PROCEDURES:  Unlessotherwise noted below, none      Procedures      FINAL IMPRESSION      1.  Chronic low back pain without sciatica, unspecified back pain laterality          DISPOSITION/PLAN   DISPOSITION Decision To Discharge 02/15/2023 05:46:41 AM          LYNETTE Potter (electronically signed)  Attending Emergency Physician          Margarita Potter  02/19/23 9667

## 2023-02-15 NOTE — PROGRESS NOTES
Patient Name: Celena Ortez : 1955        Date: 2023      Type of Appt: Consult     Reason for appt: Spinal stenosis of lumbar region without neurogenic claudication (M48.061 [ICD-10-CM])    Referred by: Nikia Carrillo MD & Dr. Miriam Napier    Studies done: 23 Lower EMG by Dr. Odalis Rehman, 1-15-23 L/S MRI @ [facility] Tx tried: Pain Management injections with Dr. Miriam Napier, Lidocaine patch, Robaxin, Toradol, Norflex, Zanaflex, Gabapentin, Mobic    Smoking: Yes    REVIEW OF SYSTEMS:    Shortness of breath, Back Pain                       [facility]) Physicians  Neurosurgery and Pain Management [address] [place] [address] 82: [phone]  F: [phone]          Patient:  Celena Ortez  YOB: 1955  Date: 2023    The patient is a 77 y.o. male who presents today for evaluation of the following problems:     Chief Complaint   Patient presents with    Back Pain        Referred by No ref.  provider found      PAST MEDICAL, FAMILY AND SOCIAL HISTORY:  Past Medical History:   Diagnosis Date    Allergic rhinitis 2018    Anxiety     Chronic back pain     COPD (chronic obstructive pulmonary disease) ([facility])     Depression     Disorder of stomach     valve not closing properly    Emphysema lung ([address]     Essential hypertension 2019    Gastroesophageal reflux disease 10/4/2019    Hyperlipidemia     meds > 22 yrs    Low back pain 2018    ARNAUD (obstructive sleep apnea) 2018    Other emphysema ([address] 10/4/2019    Other intervertebral disc degeneration, lumbar region 3/23/2018    Radiculopathy, lumbar region 2018    Restless leg syndrome     Seasonal affective disorder ([address] 10/4/2019    Substance abuse ([address]     alcholic, quit 20 yrs      Past Surgical History:   Procedure Laterality Date    COLONOSCOPY  2016    DALIA LAST MD    DENTAL SURGERY  10 yrs ago    ENDOSCOPY, COLON, DIAGNOSTIC      EYE SURGERY      Lasik OU    FINGER TRIGGER RELEASE Left 11/1/2022    LEFT HAND TRIGGER FINGER RING FINGER RELEASE OF A1 PANFILO performed by Cheryle Bair, MD at [place] [address] ARTHROSCOPY Right     KNEE SURGERY Right 05/2016    Ray procedure    KNEE SURGERY      [place] NASAL/SINUS ENDOSCOPY W/MAXILLARY ANTROSTOMY N/A 2/22/2018    SEPTOPLASTY MICRODEBRIDER ASSISTED TURBINOPLASTY AND OUT-FRACTURING BILATERAL NASAL ENDOSCOPY performed by Emilee Luis MD at [address] History   Problem Relation Age of Onset    Cancer Mother         stomach    Arthritis Mother     Heart Disease Father 48    Cancer Father         bone    Cancer Maternal Grandmother         lung    Cancer Paternal Grandmother     Heart Disease Paternal Grandfather     No Known Problems Sister     Cancer Brother     Heart Disease Brother         hole in heart in 65 at 1 months age     Current Outpatient Medications on File Prior to Visit   Medication Sig Dispense Refill    gabapentin (NEURONTIN) 600 MG tablet Take one 600 mg tablet in morning Take one 600 mg tablet mid day Take two 600 mg tablets at night 90 tablet 0    Handicap Placard MISC by Does not apply route Ex: 5 years 2/14/2028 1 each 0    famotidine (PEPCID) 20 MG tablet Take 1 tablet by mouth 2 times daily 60 tablet 0    sucralfate (CARAFATE) 1 GM/10ML suspension Take 10 mLs by mouth 4 times daily 1200 mL 3    amoxicillin-clavulanate (AUGMENTIN) 875-125 MG per tablet Take 1 tablet by mouth 2 times daily for 10 days 20 tablet 0    azithromycin (ZITHROMAX Z-ISABELLA) 250 MG tablet Take 2 tablets (500 mg) on Day 1, and then take 1 tablet (250 mg) on days 2 through 5. 1 packet 0    polyethylene glycol (GLYCOLAX) 17 GM/SCOOP powder Take 17 g by mouth daily 510 g 0    tiZANidine (ZANAFLEX) 4 MG tablet Take 1 tablet by mouth every 8 hours as needed (pain/sapsm) 15 tablet 0    etodolac (LODINE) 400 MG tablet Take 1 tablet by mouth 2 times daily 14 tablet 0    acetaminophen (TYLENOL) 500 MG tablet Take 2 tablets by mouth every 6 hours as needed for Pain or Fever 60 tablet 0    lidocaine 4 % external patch Place 1 patch onto the skin daily 30 patch 0    meloxicam (MOBIC) 15 MG tablet Take 1 tablet by mouth daily as needed for Pain 90 tablet 1    albuterol sulfate HFA (VENTOLIN HFA) 108 (90 Base) MCG/ACT inhaler Inhale 2 puffs into the lungs 4 times daily as needed for Wheezing 18 g 0    budesonide-formoterol (SYMBICORT) 160-4.5 MCG/ACT AERO Inhale 2 puffs into the lungs 2 times daily 10.2 g 3    albuterol sulfate HFA (VENTOLIN HFA) 108 (90 Base) MCG/ACT inhaler Inhale 2 puffs into the lungs 4 times daily as needed for Wheezing 18 g 0    FLUoxetine (PROZAC) 40 MG capsule TAKE 1 CAPSULE BY MOUTH DAILY. 90 capsule 1    rosuvastatin (CRESTOR) 40 MG tablet TAKE 1 TABLET BY MOUTH DAILY AT BEDTIME. 90 tablet 5    [DISCONTINUED] cyclobenzaprine (FLEXERIL) 10 MG tablet Take 1 tablet by mouth 3 times daily as needed for Muscle spasms 21 tablet 0     Current Facility-Administered Medications on File Prior to Visit   Medication Dose Route Frequency Provider Last Rate Last Admin    iopamidol (ISOVUE-300) 61 % injection 2 mL  2 mL Other ONCE PRN Letitia Harley MD         Social History     Tobacco Use    Smoking status: Every Day     Packs/day: 2.00     Years: 52.00     Pack years: 104.00     Types: Cigars, Cigarettes     Start date: 1970    Smokeless tobacco: Never    Tobacco comments:     3-5 cigars qd   Vaping Use    Vaping Use: Never used   Substance Use Topics    Alcohol use: No     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    Drug use: No       ALLERGIES  Allergies   Allergen Reactions    Alcohol Other (See Comments)     Sober 22 yrs    Benadryl [Diphenhydramine]      \"speeds him up\"    Demerol Hcl [Meperidine] Other (See Comments)     Aggressive behavior    Sulfa Antibiotics Other (See Comments)     Told by mother / patient unaware of reaction         REVIEW OF SYSTEMS:  Review of Systems   Constitutional:  Negative for fever. HENT:  Negative for hearing loss. Eyes:  Negative for pain. Respiratory:  Positive for shortness of breath. Gastrointestinal:  Negative for nausea. Endocrine: Negative for heat intolerance. Genitourinary:  Negative for difficulty urinating. Musculoskeletal:  Positive for back pain. Negative for neck pain. Skin:  Negative for rash. Allergic/Immunologic: Negative for immunocompromised state. Neurological:  Negative for headaches. Psychiatric/Behavioral:  Negative for sleep disturbance. I have personally reviewed the PMH, PSH, family history, home medications, social history, allergies, ROS. Physical Exam:      Vitals:    02/16/23 1246   BP: 138/80   Site: Right Upper Arm   Temp: 97.8 °F (36.6 °C)   TempSrc: Temporal   Weight: 145 lb (65.8 kg)   Height: 5' 9\" (1.753 m)     Body mass index is 21.41 kg/m². Physical Exam  Vitals and nursing note reviewed. Constitutional:       Appearance: Normal appearance. HENT:      Head: Normocephalic and atraumatic. Right Ear: External ear normal.      Left Ear: External ear normal.      Nose: Nose normal.      Mouth/Throat:      Pharynx: Oropharynx is clear. Eyes:      Extraocular Movements: Extraocular movements intact. Cardiovascular:      Pulses: Normal pulses. Pulmonary:      Effort: Pulmonary effort is normal.      Comments: Decreased breath sounds secondary smoking  Abdominal:      Palpations: Abdomen is soft. Genitourinary:     Rectum: Normal.   Musculoskeletal:         General: Normal range of motion. Cervical back: Normal range of motion. Comments: Flat back. Limps to the left side mainly because of pain in his left knee. Range of motion hips and right knee normal.  He cannot squat because of the left knee pain. Ambulation is heel-to-toe can support his weight on toes and heels. Strength individual muscle strength testing intact. Sensation normal light touch lower extremities. Skin:     General: Skin is warm.    Neurological:      General: No focal deficit present.   Psychiatric:         Mood and Affect: Mood normal.        I have reviewed all laboratory studies, reports, data, and pertinent images.    EXAMINATION:   MRI OF THE LUMBAR SPINE WITHOUT CONTRAST, 1/15/2023 10:51 am       TECHNIQUE:   Multiplanar multisequence MRI of the lumbar spine was performed without the   administration of intravenous contrast.       COMPARISON:   None.       HISTORY:   ORDERING SYSTEM PROVIDED HISTORY: Low back pain, bilateral leg numbness   TECHNOLOGIST PROVIDED HISTORY:   Reason for exam:->Low back pain, bilateral leg numbness   Decision Support Exception - unselect if not a suspected or confirmed   emergency medical condition->Emergency Medical Condition (MA)   What reading provider will be dictating this exam?->CRC       FINDINGS:   Patient motion limits evaluation.       BONES/ALIGNMENT: There is grade 1 anterolisthesis of L4 relative to L5   measuring 5 mm.  No acute fracture is identified.  There is multilevel disc   desiccation and disc space narrowing throughout the lumbar spine.  Type 1   degenerative endplate changes are present at L3-4 and L4-5.  There is no   spondylolysis.       SPINAL CORD: The conus medullaris is normal in size and signal intensities   and terminates normally.       SOFT TISSUES: No paraspinal mass is identified.       L1-L2: There is a disc bulge and right paracentral disc extrusion   demonstrating superior migration.  There is mild effacement the right lateral   recess.  No significant spinal canal stenosis or neural foraminal narrowing   is present.       L2-L3: There is a disc bulge, facet arthropathy and thickening of the   ligamentum flavum.  There is mild spinal canal stenosis.  There is moderate   left and mild right neural foraminal narrowing.       L3-L4: There is a disc bulge, facet arthropathy and thickening of the   ligamentum flavum.  There is severe spinal canal stenosis and severe   bilateral neural foraminal narrowing.   There is a concomitant left   paracentral disc extrusion demonstrating inferior migration severely effacing   the left lateral recess. The disc extrusion measures 5 x 10 x 10 mm in AP by   transverse by craniocaudal dimension. L4-L5: There is a disc bulge, facet arthropathy and thickening of the   ligamentum flavum. There is severe spinal canal stenosis, severe right and   moderate left neural foraminal narrowing. L5-S1: There is a disc bulge and facet arthropathy. There is severe   bilateral neural foraminal narrowing. No significant spinal canal stenosis   is present. Impression   1. Severe spinal canal stenosis and severe bilateral neural foraminal   narrowing at L3-4 secondary to a disc bulge, facet arthropathy and thickening   of the ligamentum flavum. There is a concomitant left paracentral disc   extrusion demonstrating inferior migration effacing the left lateral recess. 2. Severe spinal canal stenosis, severe right and moderate left neural   foraminal narrowing at L4-5, as described above. 3. Severe bilateral neural foraminal narrowing at L5-S1, as described above. 4. Additional multilevel degenerative changes, as described above. HISTORY OF PRESENT ILLNESS:    1/15/2023 MRI lumbar spine shows severe canal stenosis L3-4-5. At L3-4 disc protrusion at the origin of the L4 nerve root and L3-4 L4-5 L5-S1 bilateral severe foraminal stenosis    Patient has a lifestyle that he is enjoying at this time and can do most of his activities he is per his problem is the pain in his left knee which has severe arthritis and is not can do anything about it. He has never been a walker or very active. His walking is from his house to his vehicle and then short walks after that. He is satisfied with his current lifestyle and he does not need to pursue any other aggressive treatment.   I showed the patient the above images explained he has moderately severe canal stenosis at L4-5 which may or may not require surgical decompression in the future. I discussed signs of inability ambulate increasing balance and symptoms of more pain especially in the lower extremities with activity. These occur interfering with his activity significantly we should consider compression at that time. At this time he is not a candidate for neuro spine surgery. Continue to treat his osteoarthritis lumbar spondylosis this includes heat range of motion anti-inflammatories physical therapy and so forth.   All questions answered    Misty Jon MD

## 2023-02-15 NOTE — TELEPHONE ENCOUNTER
Pt called perfect serve this am saying he needed to leave a message for us. Please call pt back and see what he needs.

## 2023-02-15 NOTE — ED TRIAGE NOTES
Patient to ED for c/o back pain, numbness, and legs shaking x \"awhile\". Patient recently seen in ER for problem. Patient states has doctor's appointment in a few days. Skin p/w/d. Respirations even and unlabored. No acute distress noted at this time.

## 2023-02-15 NOTE — PROGRESS NOTES
cedric Ozuna (:  1955) is a 77 y.o. male,Established patient, here for evaluation of the following chief complaint(s):  Follow-up (Pt was in ED 2/15/23 for back pain. Here for follow up for pain in back and leg shaking. )        SUBJECTIVE    History of present illness:     Low back pain    Chronic, has been going of for years  Localized to lumbar region  States he gets pain into his right lower extremities  Was in ED this morning and yesterday  Received dose of IV steroids and toradol  States did not help  Had jenny with neurosurgery today but rescheduled to Friday  Has also seen PM&R and pain management  On gabapentin, meloxicam, tylenol    Review of Symptoms: As per HPI.      Past Medical History:   Diagnosis Date    Allergic rhinitis 2018    Anxiety     Chronic back pain     COPD (chronic obstructive pulmonary disease) (HCC)     Depression     Disorder of stomach     valve not closing properly    Emphysema lung ([address]     Essential hypertension 2019    Gastroesophageal reflux disease 10/4/2019    Hyperlipidemia     meds > 22 yrs    Low back pain 2018    ARNAUD (obstructive sleep apnea) 2018    Other emphysema ([address] 10/4/2019    Other intervertebral disc degeneration, lumbar region 3/23/2018    Radiculopathy, lumbar region 2018    Restless leg syndrome     Seasonal affective disorder ([address] 10/4/2019    Substance abuse ([address]     alcholic, quit 20 yrs      Past Surgical History:   Procedure Laterality Date    COLONOSCOPY  2016    A SHALA MARLEY    DENTAL SURGERY  10 yrs ago    ENDOSCOPY, COLON, DIAGNOSTIC      EYE SURGERY      Lasik OU    FINGER TRIGGER RELEASE Left 2022    LEFT HAND TRIGGER FINGER RING FINGER RELEASE OF A1 PANFILO performed by Salud Henson MD at [place] [address] ARTHROSCOPY Right     KNEE SURGERY Right 2016    Ray procedure    KNEE SURGERY      SD NASAL/SINUS ENDOSCOPY W/MAXILLARY ANTROSTOMY N/A 2018    SEPTOPLASTY MICRODEBRIDER ASSISTED TURBINOPLASTY AND OUT-FRACTURING BILATERAL NASAL ENDOSCOPY performed by Suman Clark MD at [address] History   Problem Relation Age of Onset    Cancer Mother         stomach    Arthritis Mother     Heart Disease Father 48    Cancer Father         bone    Cancer Maternal Grandmother         lung    Cancer Paternal Grandmother     Heart Disease Paternal Grandfather     No Known Problems Sister     Cancer Brother     Heart Disease Brother         hole in heart in 65 at 1 months age     Social History     Socioeconomic History    Marital status:      Spouse name: Not on file    Number of children: 2    Years of education: 12    Highest education level: High school graduate   Occupational History    Not on file   Tobacco Use    Smoking status: Every Day     Packs/day: 2.00     Years: 52.00     Pack years: 104.00     Types: Cigars, Cigarettes     Start date: 1970    Smokeless tobacco: Never    Tobacco comments:     3-5 cigars qd   Vaping Use    Vaping Use: Never used   Substance and Sexual Activity    Alcohol use: No     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    Drug use: No    Sexual activity: Not Currently     Partners: Female   Other Topics Concern    Not on file   Social History Narrative    Not on file     Social Determinants of Health     Financial Resource Strain: Low Risk     Difficulty of Paying Living Expenses: Not hard at all   Food Insecurity: No Food Insecurity    Worried About Running Out of Food in the Last Year: Never true    [address] in the Last Year: Never true   Transportation Needs: Unknown    Lack of Transportation (Medical): Not on file    Lack of Transportation (Non-Medical):  No   Physical Activity: Inactive    Days of Exercise per Week: 0 days    Minutes of Exercise per Session: 0 min   Stress: Not on file   Social Connections: Not on file   Intimate Partner Violence: Not on file   Housing Stability: Unknown    Unable to Pay for Housing in the Last Year: Not on file    Number of Places Lived in the Last Year: Not on file    Unstable Housing in the Last Year: No     Alcohol, Benadryl [diphenhydramine], Demerol hcl [meperidine], and Sulfa antibiotics  Prior to Admission medications    Medication Sig Start Date End Date Taking? Authorizing Provider   gabapentin (NEURONTIN) 600 MG tablet Take two 600 mg tablets in the morning  Take one 600 mg tablet mid day  Take one 600 mg table at night 2/15/23 3/15/23 Yes Sarwat Muniz,    Handicap Placard MISC by Does not apply route Ex: 5 years 2/14/2028 2/14/23  Yes Inés Layton, APRN - CNP   famotidine (PEPCID) 20 MG tablet Take 1 tablet by mouth 2 times daily 2/12/23  Yes Be Carpenter MD   sucralfate (CARAFATE) 1 GM/10ML suspension Take 10 mLs by mouth 4 times daily 2/12/23  Yes Be Carpenter MD   amoxicillin-clavulanate (AUGMENTIN) 875-125 MG per tablet Take 1 tablet by mouth 2 times daily for 10 days 2/12/23 2/22/23 Yes Be Carpenter MD   azithromycin (ZITHROMAX Z-ISABELLA) 250 MG tablet Take 2 tablets (500 mg) on Day 1, and then take 1 tablet (250 mg) on days 2 through 5. 2/12/23 2/16/23 Yes Be Carpenter MD   polyethylene glycol (GLYCOLAX) 17 GM/SCOOP powder Take 17 g by mouth daily 2/12/23 3/14/23 Yes Be Carpenter MD   tiZANidine (ZANAFLEX) 4 MG tablet Take 1 tablet by mouth every 8 hours as needed (pain/sapsm) 2/5/23  Yes Franco Velasco PA-C   etodolac (LODINE) 400 MG tablet Take 1 tablet by mouth 2 times daily 2/5/23  Yes Franco Velasco PA-C   acetaminophen (TYLENOL) 500 MG tablet Take 2 tablets by mouth every 6 hours as needed for Pain or Fever 1/17/23  Yes Rai Galvan MD   lidocaine 4 % external patch Place 1 patch onto the skin daily 1/17/23 2/16/23 Yes Rai Galvan MD   meloxicam (MOBIC) 15 MG tablet Take 1 tablet by mouth daily as needed for Pain 1/15/23  Yes Franco Prado MD   albuterol sulfate HFA (VENTOLIN HFA) 108 (90 Base) MCG/ACT inhaler Inhale 2 puffs into the lungs 4 times daily as needed for Wheezing 1/9/23  Yes  MANUELA Joseph CNP   budesonide-formoterol [facility]) 160-4.5 MCG/ACT AERO Inhale 2 puffs into the lungs 2 times daily 1/9/23  Yes MANUELA Joseph CNP   albuterol sulfate HFA (VENTOLIN HFA) 108 (90 Base) MCG/ACT inhaler Inhale 2 puffs into the lungs 4 times daily as needed for Wheezing 12/24/22  Yes Henry Noonan MD   FLUoxetine (PROZAC) 40 MG capsule TAKE 1 CAPSULE BY MOUTH DAILY. 10/9/22  Yes MANUELA Joseph CNP   rosuvastatin (CRESTOR) 40 MG tablet TAKE 1 TABLET BY MOUTH DAILY AT BEDTIME. 5/17/22  Yes MANUELA Joseph CNP   cyclobenzaprine (FLEXERIL) 10 MG tablet Take 1 tablet by mouth 3 times daily as needed for Muscle spasms 9/13/21   MANUELA Joseph CNP       OBJECTIVE     /70 (Site: Right Upper Arm, Position: Sitting, Cuff Size: Medium Adult)   Pulse (!) 123   Temp 97.9 °F (36.6 °C) (Tympanic)   Wt 145 lb (65.8 kg)   SpO2 93%   BMI 21.41 kg/m²     General: alert and oriented, NAD  Head: normocephalic, atraumatic  Cardiovascular: No cyanosis, no edema  Respiratory: No respiratory distress, no accessory muscle use  Psychological: normal mood and affect    MSK:    Tenderness in right lower lumbar spine  AROM lumbar flexion and rotation  Lumbar extension causes pain     Neurological:     LE strength 5/5 bl proximally and distally   No loss of sensation to light touch  Achilles and Patellar DTRs 2/4 bl   Neg straight leg sign bl       ASSESSMENT/PLAN:    Lumbar radiculopathy  Acute on chronic  Add considerations include facet syndrome, central sensitization  Will increase gabapentin from 1800 mg per day total to 2400 mg per day total   Continue scheduled tylenol and meloxicam   Tizandine PRN  Offered OMT. Patient declined. Encouraged him to keep f/u with neurosurgery this Friday       Return if symptoms worsen or fail to improve. An electronic signature was used to authenticate this note.

## 2023-02-15 NOTE — ED NOTES
This patient was assessed by the doctor only. Nurse processed and completed the orders from this doctor ie labs, meds, and/or EKG.             Stephanie Franklin RN  02/15/23 [id number]

## 2023-02-16 ENCOUNTER — INITIAL CONSULT (OUTPATIENT)
Dept: NEUROSURGERY | Age: 68
End: 2023-02-16
Payer: MEDICARE

## 2023-02-16 ENCOUNTER — TELEPHONE (OUTPATIENT)
Dept: PAIN MANAGEMENT | Age: 68
End: 2023-02-16

## 2023-02-16 VITALS
BODY MASS INDEX: 21.48 KG/M2 | HEIGHT: 69 IN | TEMPERATURE: 97.8 F | DIASTOLIC BLOOD PRESSURE: 80 MMHG | SYSTOLIC BLOOD PRESSURE: 138 MMHG | WEIGHT: 145 LBS

## 2023-02-16 DIAGNOSIS — M40.30 FLAT BACK SYNDROME, ACQUIRED: ICD-10-CM

## 2023-02-16 DIAGNOSIS — M48.062 SPINAL STENOSIS OF LUMBAR REGION WITH NEUROGENIC CLAUDICATION: ICD-10-CM

## 2023-02-16 DIAGNOSIS — F17.200 SMOKER: Primary | ICD-10-CM

## 2023-02-16 PROCEDURE — 3079F DIAST BP 80-89 MM HG: CPT | Performed by: NEUROLOGICAL SURGERY

## 2023-02-16 PROCEDURE — 3075F SYST BP GE 130 - 139MM HG: CPT | Performed by: NEUROLOGICAL SURGERY

## 2023-02-16 PROCEDURE — 99203 OFFICE O/P NEW LOW 30 MIN: CPT | Performed by: NEUROLOGICAL SURGERY

## 2023-02-16 ASSESSMENT — ENCOUNTER SYMPTOMS
BACK PAIN: 1
SHORTNESS OF BREATH: 1
EYE PAIN: 0
NAUSEA: 0

## 2023-02-16 NOTE — TELEPHONE ENCOUNTER
REFERRAL NUMBER [id number]        LEFT SI JOINT INJ    AUTH FROM 2/17/23-8/15/23    OK to schedule procedure approved as above. Please note sides/levels approved and date range. (If applicable, sides/levels approved may differ from those ordered)    TO BE SCHEDULED WITH DR. Haris Somers

## 2023-02-18 ENCOUNTER — TELEPHONE (OUTPATIENT)
Dept: FAMILY MEDICINE CLINIC | Age: 68
End: 2023-02-18

## 2023-02-18 NOTE — TELEPHONE ENCOUNTER
Pt came into the office stating that he is very upset and states he no longer wants to see Dr. Sarai Reeves states he has no bedside manner and does not care about his patients. Was very upset and states he needs someone who wants to listen and help with his pain. Wrote a letter as well that is attached.

## 2023-02-19 ASSESSMENT — ENCOUNTER SYMPTOMS
BACK PAIN: 1
ABDOMINAL PAIN: 0
PHOTOPHOBIA: 0
COUGH: 0
VOMITING: 0
DIARRHEA: 0
SHORTNESS OF BREATH: 0
NAUSEA: 0

## 2023-02-20 ENCOUNTER — CARE COORDINATION (OUTPATIENT)
Dept: CARE COORDINATION | Age: 68
End: 2023-02-20

## 2023-02-20 ENCOUNTER — HOSPITAL ENCOUNTER (EMERGENCY)
Age: 68
Discharge: HOME | End: 2023-02-20
Attending: EMERGENCY MEDICINE
Payer: MEDICARE

## 2023-02-20 ENCOUNTER — TELEPHONE (OUTPATIENT)
Dept: FAMILY MEDICINE CLINIC | Age: 68
End: 2023-02-20

## 2023-02-20 VITALS
HEART RATE: 106 BPM | RESPIRATION RATE: 16 BRPM | DIASTOLIC BLOOD PRESSURE: 61 MMHG | OXYGEN SATURATION: 94 % | TEMPERATURE: 97.6 F | SYSTOLIC BLOOD PRESSURE: 96 MMHG

## 2023-02-20 DIAGNOSIS — G89.29 CHRONIC LOW BACK PAIN, UNSPECIFIED BACK PAIN LATERALITY, UNSPECIFIED WHETHER SCIATICA PRESENT: Primary | ICD-10-CM

## 2023-02-20 DIAGNOSIS — M54.50 CHRONIC LOW BACK PAIN, UNSPECIFIED BACK PAIN LATERALITY, UNSPECIFIED WHETHER SCIATICA PRESENT: Primary | ICD-10-CM

## 2023-02-20 PROCEDURE — 6360000002 HC RX W HCPCS: Performed by: EMERGENCY MEDICINE

## 2023-02-20 PROCEDURE — 96372 THER/PROPH/DIAG INJ SC/IM: CPT

## 2023-02-20 PROCEDURE — 99284 EMERGENCY DEPT VISIT MOD MDM: CPT

## 2023-02-20 RX ORDER — KETOROLAC TROMETHAMINE 10 MG/1
10 TABLET, FILM COATED ORAL EVERY 6 HOURS PRN
Qty: 20 TABLET | Refills: 0 | Status: SHIPPED | OUTPATIENT
Start: 2023-02-20

## 2023-02-20 RX ORDER — DEXAMETHASONE SODIUM PHOSPHATE 4 MG/ML
10 INJECTION, SOLUTION INTRA-ARTICULAR; INTRALESIONAL; INTRAMUSCULAR; INTRAVENOUS; SOFT TISSUE ONCE
Status: COMPLETED | OUTPATIENT
Start: 2023-02-20 | End: 2023-02-20

## 2023-02-20 RX ORDER — KETOROLAC TROMETHAMINE 30 MG/ML
30 INJECTION, SOLUTION INTRAMUSCULAR; INTRAVENOUS ONCE
Status: COMPLETED | OUTPATIENT
Start: 2023-02-20 | End: 2023-02-20

## 2023-02-20 RX ORDER — LIDOCAINE 50 MG/G
1 PATCH TOPICAL DAILY
Qty: 30 PATCH | Refills: 0 | Status: SHIPPED | OUTPATIENT
Start: 2023-02-20

## 2023-02-20 RX ADMIN — DEXAMETHASONE SODIUM PHOSPHATE 10 MG: 4 INJECTION, SOLUTION INTRAMUSCULAR; INTRAVENOUS at 04:23

## 2023-02-20 RX ADMIN — KETOROLAC TROMETHAMINE 30 MG: 30 INJECTION, SOLUTION INTRAMUSCULAR; INTRAVENOUS at 04:22

## 2023-02-20 ASSESSMENT — PAIN SCALES - GENERAL
PAINLEVEL_OUTOF10: 10
PAINLEVEL_OUTOF10: 10

## 2023-02-20 ASSESSMENT — PAIN - FUNCTIONAL ASSESSMENT: PAIN_FUNCTIONAL_ASSESSMENT: 0-10

## 2023-02-20 ASSESSMENT — PAIN DESCRIPTION - DESCRIPTORS: DESCRIPTORS: ACHING

## 2023-02-20 ASSESSMENT — ENCOUNTER SYMPTOMS
VOMITING: 0
ABDOMINAL PAIN: 0
BACK PAIN: 0
SHORTNESS OF BREATH: 0

## 2023-02-20 ASSESSMENT — PAIN DESCRIPTION - LOCATION
LOCATION: BACK
LOCATION: BACK

## 2023-02-20 ASSESSMENT — PAIN DESCRIPTION - ORIENTATION: ORIENTATION: LOWER;MID

## 2023-02-20 NOTE — ED PROVIDER NOTES
[address] [facility] ED  EMERGENCY DEPARTMENT ENCOUNTER      Pt Name: David Dominguez  MRN: [id number]  [place] 1955  Date of evaluation: 2/20/2023  Provider: Arita Kawasaki, MD    CHIEF COMPLAINT       Chief Complaint   Patient presents with    Back Pain     Chronic         HISTORY OF PRESENT ILLNESS   (Location/Symptom, Timing/Onset, Context/Setting, Quality, Duration, Modifying Factors, Severity)  Note limiting factors. David Dominguez is a 77 y.o. male who presents to the emergency department for evaluation of chronic low back pain. Patient drove himself here to the department and is ambulatory without assistance. History significant for opioid abuse, obstructive sleep apnea, anxiety, hyperlipidemia, tobacco abuse, hypertension, acid reflux, COPD, radiculopathy. No anticoagulation or IV drug use. Denies traumatic injury. This is chronic pain that has not evolved or changed. Patient frustrated that his pain management specialist is not prescribing adequate medication. Reports bandlike lower back pain. He does not endorse any saddle anesthesia, bowel or bladder dysfunction, numbness or weakness, fever, foot drop. No other acute complaints. HPI  Chart review shows patient was here for evaluation 1 week ago with similar complaint. He was also evaluated yesterday for the same complaint. He was evaluated in outpatient neurosurgery clinic 4 days ago. He does not think there is indication for surgical intervention. Neurosurgery recommended range of motion activities, heat, anti-inflammatories, physical therapy. Patient has had recent CT abdomen, CT chest and MRI lumbar spine. Nursing Notes were reviewed. REVIEW OF SYSTEMS    (2-9 systems for level 4, 10 or more for level 5)     Review of Systems   Constitutional:  Negative for fever. Respiratory:  Negative for shortness of breath. Cardiovascular:  Negative for chest pain. Gastrointestinal:  Negative for abdominal pain and vomiting. Genitourinary:  Negative for flank pain. Musculoskeletal:  Negative for back pain and neck pain. Neurological:  Negative for syncope, weakness, numbness and headaches. All other systems reviewed and are negative. Except as noted above the remainder of the review of systems was reviewed and negative.        PAST MEDICAL HISTORY     Past Medical History:   Diagnosis Date    Allergic rhinitis 2/19/2018    Anxiety     Chronic back pain     COPD (chronic obstructive pulmonary disease) (HCC)     Depression     Disorder of stomach     valve not closing properly    Emphy[address] ([facility]     Essential hypertension 11/4/2019    Gastroesophageal reflux disease 10/4/2019    Hyperlipidemia     meds > 22 yrs    Low back pain 5/1/2018    ARNAUD (obstructive sleep apnea) 2/19/2018    Other emphysema ([facility] 10/4/2019    Other intervertebral disc degeneration, lumbar region 3/23/2018    Radiculopathy, lumbar region 2/28/2018    Restless leg syndrome     Seasonal affective disorder ([address] 10/4/2019    Substance abuse ([facility]     alcholic, quit 20 yrs          SURGICAL HISTORY       Past Surgical History:   Procedure Laterality Date    COLONOSCOPY  12/22/2016    A SHALA MARLEY    DENTAL SURGERY  10 yrs ago    ENDOSCOPY, COLON, DIAGNOSTIC      EYE SURGERY      Lasik OU    FINGER TRIGGER RELEASE Left 11/1/2022    LEFT HAND TRIGGER FINGER RING FINGER RELEASE OF A1 PANFILO performed by Rhoda Carlos MD at [place] [address] ARTHROSCOPY Right     KNEE SURGERY Right 05/2016    Ray procedure    KNEE SURGERY      MA NASAL/SINUS ENDOSCOPY W/MAXILLARY ANTROSTOMY N/A 2/22/2018    SEPTOPLASTY MICRODEBRIDER ASSISTED TURBINOPLASTY AND OUT-FRACTURING BILATERAL NASAL ENDOSCOPY performed by Jacek Martell MD at [address] [place] [address]       Previous Medications    ACETAMINOPHEN (TYLENOL) 500 MG TABLET    Take 2 tablets by mouth every 6 hours as needed for Pain or Fever    ALBUTEROL SULFATE HFA (VENTOLIN HFA) 108 (90 BASE) MCG/ACT INHALER    Inhale 2 puffs into the lungs 4 times daily as needed for Wheezing    ALBUTEROL SULFATE HFA (VENTOLIN HFA) 108 (90 BASE) MCG/ACT INHALER    Inhale 2 puffs into the lungs 4 times daily as needed for Wheezing    AMOXICILLIN-CLAVULANATE (AUGMENTIN) 875-125 MG PER TABLET    Take 1 tablet by mouth 2 times daily for 10 days    BUDESONIDE-FORMOTEROL (SYMBICORT) 160-4.5 MCG/ACT AERO    Inhale 2 puffs into the lungs 2 times daily    ETODOLAC (LODINE) 400 MG TABLET    Take 1 tablet by mouth 2 times daily    FAMOTIDINE (PEPCID) 20 MG TABLET    Take 1 tablet by mouth 2 times daily    FLUOXETINE (PROZAC) 40 MG CAPSULE    TAKE 1 CAPSULE BY MOUTH DAILY. GABAPENTIN (NEURONTIN) 600 MG TABLET    Take one 600 mg tablet in morning Take one 600 mg tablet mid day Take two 600 mg tablets at night    HANDICAP PLACARD MISC    by Does not apply route Ex: 5 years 2/14/2028    MELOXICAM (MOBIC) 15 MG TABLET    Take 1 tablet by mouth daily as needed for Pain    POLYETHYLENE GLYCOL (GLYCOLAX) 17 GM/SCOOP POWDER    Take 17 g by mouth daily    ROSUVASTATIN (CRESTOR) 40 MG TABLET    TAKE 1 TABLET BY MOUTH DAILY AT BEDTIME.     SUCRALFATE (CARAFATE) 1 GM/10ML SUSPENSION    Take 10 mLs by mouth 4 times daily    TIZANIDINE (ZANAFLEX) 4 MG TABLET    Take 1 tablet by mouth every 8 hours as needed (pain/sapsm)       ALLERGIES     Alcohol, Benadryl [diphenhydramine], Demerol hcl [meperidine], and Sulfa antibiotics    FAMILY HISTORY       Family History   Problem Relation Age of Onset    Cancer Mother         stomach    Arthritis Mother     Heart Disease Father 48    Cancer Father         bone    Cancer Maternal Grandmother         lung    Cancer Paternal Grandmother     Heart Disease Paternal Grandfather     No Known Problems Sister     Cancer Brother     Heart Disease Brother         hole in heart in 65 at 1 months age          SOCIAL HISTORY       Social History     Socioeconomic History    Marital status:     Number of children: 2    Years of education: 12    Highest education level: High school graduate   Tobacco Use    Smoking status: Every Day     Packs/day: 2.00     Years: 52.00     Pack years: 104.00     Types: Cigars, Cigarettes     Start date: 5    Smokeless tobacco: Never    Tobacco comments:     3-5 cigars qd   Vaping Use    Vaping Use: Never used   Substance and Sexual Activity    Alcohol use: No     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    Drug use: No    Sexual activity: Not Currently     Partners: Female     Social Determinants of Health     Financial Resource Strain: Low Risk     Difficulty of Paying Living Expenses: Not hard at all   Food Insecurity: No Food Insecurity    Worried About Running Out of Food in the Last Year: Never true    Ran Out of Food in the Last Year: Never true   Transportation Needs: Unknown    Lack of Transportation (Non-Medical): No   Physical Activity: Inactive    Days of Exercise per Week: 0 days    Minutes of Exercise per Session: 0 min   Housing Stability: Unknown    Unstable Housing in the Last Year: No       SCREENINGS         [place] Coma Scale  Eye Opening: Spontaneous  Best Verbal Response: Oriented  Best Motor Response: Obeys commands  [place] Coma Scale Score: 15                     CIWA Assessment  BP: 96/61  Heart Rate: (!) 106                 PHYSICAL EXAM    (up to 7 for level 4, 8 or more for level 5)     ED Triage Vitals   BP Temp Temp src Pulse Resp SpO2 Height Weight   -- -- -- -- -- -- -- --       Physical Exam  Vitals and nursing note reviewed. Constitutional:       Appearance: He is not toxic-appearing or diaphoretic. HENT:      Head: Normocephalic and atraumatic. Nose: Nose normal.      Mouth/Throat:      Mouth: Mucous membranes are moist.   Eyes:      General: No scleral icterus. Extraocular Movements: Extraocular movements intact. Pupils: Pupils are equal, round, and reactive to light.    Neck:      Comments: No midline vertebral tenderness or step-off or overlying skin change. Cardiovascular:      Rate and Rhythm: Regular rhythm. Tachycardia present. Pulses: Normal pulses. Pulmonary:      Effort: Pulmonary effort is normal.      Breath sounds: Normal breath sounds. Abdominal:      General: There is no distension. Tenderness: There is no abdominal tenderness. There is no right CVA tenderness, left CVA tenderness, guarding or rebound. Musculoskeletal:         General: No tenderness. Cervical back: No tenderness. Right lower leg: No edema. Left lower leg: No edema. Skin:     Capillary Refill: Capillary refill takes less than 2 seconds. Findings: No rash. Neurological:      General: No focal deficit present. Mental Status: He is alert and oriented to person, place, and time. Sensory: No sensory deficit. Motor: No weakness or abnormal muscle tone. Deep Tendon Reflexes:      Reflex Scores:       Patellar reflexes are 2+ on the right side and 2+ on the left side. Psychiatric:         Mood and Affect: Mood normal.   Bilateral neurovascular intact lower extremities    DIAGNOSTIC RESULTS     EKG: All EKG's are interpreted by the Emergency Department Physician who either signs or Co-signs this chart in the absence of a cardiologist.    RADIOLOGY:   Non-plain film images such as CT, Ultrasound and MRI are read by the radiologist. Plain radiographic images are visualized and preliminarily interpreted by the emergency physician with the below findings:    Interpretation per the Radiologist below, if available at the time of this note:    No orders to display         ED BEDSIDE ULTRASOUND:   Performed by ED Physician - none    LABS:  Labs Reviewed - No data to display    All other labs were within normal range or not returned as of this dictation.     EMERGENCY DEPARTMENT COURSE and DIFFERENTIAL DIAGNOSIS/MDM:   Vitals:    Vitals:    02/20/23 0405 02/20/23 0418   BP:  96/61   Pulse: (!) 106    Resp: 16    Temp: 97.6 °F (36.4 °C) TempSrc: Oral    SpO2: 94%          Medical Decision Making  Patient with a known history of chronic low back pain presents to the emergency department requesting steroid shot and Toradol. This was administered. He is afebrile. He is neurovascular intact. His clinical presentation is not highly suspicious for acute cord compression, he has no red flags based on his history or physical to warrant labs or emergent imaging. Afebrile. Patient has good outpatient follow-up. We will send Lidoderm to pharmacy. No indication for admission. CONSULTS:  None    PROCEDURES:  Unless otherwise noted below, none     Procedures      FINAL IMPRESSION      1. Chronic low back pain, unspecified back pain laterality, unspecified whether sciatica present          DISPOSITION/PLAN   DISPOSITION Decision To Discharge 02/20/2023 04:31:08 AM      PATIENT REFERRED TO:  [facility] - [place] [address], [address]  [place] [address]  [phone]          DISCHARGE MEDICATIONS:  New Prescriptions    LIDOCAINE (LIDODERM) 5 %    Place 1 patch onto the skin daily 12 hours on, 12 hours off. Controlled Substances Monitoring:     RX Monitoring 2/15/2023   Attestation -   Periodic Controlled Substance Monitoring Possible medication side effects, risk of tolerance/dependence & alternative treatments discussed.        (Please note that portions of this note were completed with a voice recognition program.  Efforts were made to edit the dictations but occasionally words are mis-transcribed.)    Ele Gordillo MD (electronically signed)  Attending Emergency Physician            Ele Gordillo MD  02/20/23 2246

## 2023-02-20 NOTE — CARE COORDINATION
ACM called patient. Left message requesting a return call to discuss current health status and  care coordination. Patient has been previously in care coordination multiple times. ACM notes 6 ER visits in the month of February 2023. Introduction letter to [facility] program has been resent via [address] mail.

## 2023-02-20 NOTE — LETTER
2/20/2023    [address] [place] [address] [address]      Dear Adi Irizarry,    My name is Wei Dunlap RN and I am a registered nurse who partners with MANUELA Jj CNP to improve patients' health. MANUELA Jj CNP believes you would benefit from working with me. As a member of your health care team, I would work with other providers involved in your care, offer education for your specific health conditions, and connect you with additional resources as needed. I will collaborate with MANUELA Jj CNP to support you in following your treatment plan. The additional support I provide is no additional cost to you. My primary focus is to help you achieve specific goals and improve your health. We are committed to walk with you on this journey and look forward to working with you. Please call me to further discuss your healthcare needs. I am available by phone or for appointments at the office. You can reach me at [phone].     In good health,     Wei Dunlap RN

## 2023-02-20 NOTE — ED TRIAGE NOTES
Pt to ED due to chronic back pain. . Pt states he has severe back pain. Pt is alert and oriented x4. Skin is warm, dry, intact.  Resp are regular and equal

## 2023-02-20 NOTE — ED NOTES
Pt states \"I will not being picking up the medications from the pharmacy and I will see you again tonight\".       Shannon Starr  02/20/23 7907

## 2023-02-20 NOTE — TELEPHONE ENCOUNTER
Pt is calling to see if provider can prescribe something for the pain in the back it has gotten worse, wants to know if it can be done and be sent to the [facility] in [place].  And patient says Hello to the pharmacist please and thank you

## 2023-02-20 NOTE — ED NOTES
Discharge instructions reviewed with patient. Verbalized understanding. A&O x4. Skin p/w/d. Respirations even and unlabored. No acute distress noted at this time. Patient amb with steady gait on discharge.          Alex Hayes RN  02/20/23 9565

## 2023-02-22 ENCOUNTER — OFFICE VISIT (OUTPATIENT)
Dept: FAMILY MEDICINE CLINIC | Age: 68
End: 2023-02-22

## 2023-02-22 VITALS
HEART RATE: 75 BPM | DIASTOLIC BLOOD PRESSURE: 74 MMHG | SYSTOLIC BLOOD PRESSURE: 120 MMHG | OXYGEN SATURATION: 93 % | WEIGHT: 140 LBS | HEIGHT: 69 IN | BODY MASS INDEX: 20.73 KG/M2

## 2023-02-22 DIAGNOSIS — M47.817 LUMBOSACRAL SPONDYLOSIS WITHOUT MYELOPATHY: Primary | ICD-10-CM

## 2023-02-22 DIAGNOSIS — M48.062 SPINAL STENOSIS OF LUMBAR REGION WITH NEUROGENIC CLAUDICATION: ICD-10-CM

## 2023-02-22 DIAGNOSIS — Z87.891 PERSONAL HISTORY OF TOBACCO USE: ICD-10-CM

## 2023-02-22 RX ORDER — OXYCODONE HYDROCHLORIDE AND ACETAMINOPHEN 5; 325 MG/1; MG/1
TABLET ORAL
COMMUNITY
Start: 2023-02-20 | End: 2023-02-23 | Stop reason: SDUPTHER

## 2023-02-22 RX ORDER — MELOXICAM 15 MG/1
15 TABLET ORAL DAILY
COMMUNITY

## 2023-02-22 ASSESSMENT — ENCOUNTER SYMPTOMS
SHORTNESS OF BREATH: 0
COUGH: 0
BACK PAIN: 1

## 2023-02-22 NOTE — PATIENT INSTRUCTIONS

## 2023-02-22 NOTE — PROGRESS NOTES
Subjective  Chief Complaint   Patient presents with    Follow-Up from Hospital     Pt states back pain has been getting better        HPI    Pt is here for a follow up of chronic back pain  Seems to be improving. Went to [facility] a couple of days ago. Taking percocet prn which helps    I did a referral to different neurosurgeon for secondary opinion. Has gone through pain management at [facility] ([place], [place]). Wasn't very happy with them. Taking all medications as prescribed.     Patient Active Problem List    Diagnosis Date Noted    Spinal stenosis of lumbar region with neurogenic claudication 02/16/2023    Flat back syndrome, acquired 02/16/2023    Opioid abuse, in remission ([facility] 01/09/2023    Trigger ring finger of left hand 11/01/2022    Abnormal findings on dx imaging of heart and cor circ 03/15/2021    Abnormal result of cardiovascular function study, unspecified 03/15/2021    Palpitations 03/15/2021    Essential hypertension 11/04/2019    Smoker 10/04/2019    Gastroesophageal reflux disease 10/04/2019    Other emphysema ([facility] 10/04/2019    Seasonal affective disorder ([facility] 10/04/2019    Hyperlipidemia 01/24/2019    Anxiety 09/19/2018    Insomnia 09/19/2018    Low back pain 05/01/2018    Other intervertebral disc degeneration, lumbar region 03/23/2018    Other intervertebral disc displacement, lumbar region 03/23/2018    Presence of right artificial knee joint 02/28/2018    Radiculopathy, lumbar region 02/28/2018    Sprain of ligaments of lumbar spine 02/28/2018    Strain of muscle, fascia and tendon of lower back, initial encounter 02/28/2018    DNS (deviated nasal septum) 02/19/2018    Allergic rhinitis 02/19/2018    Hypertrophy of nasal turbinates 02/19/2018    ARNAUD (obstructive sleep apnea) 02/19/2018     Past Medical History:   Diagnosis Date    Allergic rhinitis 2/19/2018    Anxiety     Chronic back pain     COPD (chronic obstructive pulmonary disease) ([facility])     Depression     Disorder of stomach     valve not closing properly    Emphysema lung ([facility]     Essential hypertension 11/4/2019    Gastroesophageal reflux disease 10/4/2019    Hyperlipidemia     meds > 22 yrs    Low back pain 5/1/2018    ARNAUD (obstructive sleep apnea) 2/19/2018    Other emphysema ([facility] 10/4/2019    Other intervertebral disc degeneration, lumbar region 3/23/2018    Radiculopathy, lumbar region 2/28/2018    Restless leg syndrome     Seasonal affective disorder ([facility] 10/4/2019    Substance abuse ([facility]     alcholic, quit 20 yrs      Past Surgical History:   Procedure Laterality Date    COLONOSCOPY  12/22/2016    DALIA LAST MD    DENTAL SURGERY  10 yrs ago    ENDOSCOPY, COLON, DIAGNOSTIC      EYE SURGERY      Lasik OU    FINGER TRIGGER RELEASE Left 11/1/2022    LEFT HAND TRIGGER FINGER RING FINGER RELEASE OF A1 PANFILO performed by Sabina Castañeda MD at [place] [address] ARTHROSCOPY Right     KNEE SURGERY Right 05/2016    Ray procedure    KNEE SURGERY      NC NASAL/SINUS ENDOSCOPY W/MAXILLARY ANTROSTOMY N/A 2/22/2018    SEPTOPLASTY MICRODEBRIDER ASSISTED TURBINOPLASTY AND OUT-FRACTURING BILATERAL NASAL ENDOSCOPY performed by Sydney Lopez MD at [address] History   Problem Relation Age of Onset    Cancer Mother         stomach    Arthritis Mother     Heart Disease Father 48    Cancer Father         bone    Cancer Maternal Grandmother         lung    Cancer Paternal Grandmother     Heart Disease Paternal Grandfather     No Known Problems Sister     Cancer Brother     Heart Disease Brother         hole in heart in 65 at 1 months age     Social History     Socioeconomic History    Marital status:      Spouse name: None    Number of children: 2    Years of education: 12    Highest education level: High school graduate   Tobacco Use    Smoking status: Every Day     Packs/day: 2.00     Years: 52.00     Pack years: 104.00     Types: Cigars, Cigarettes     Start date: 1970    Smokeless tobacco: Never    Tobacco comments:     3-5 cigars qd Vaping Use    Vaping Use: Never used   Substance and Sexual Activity    Alcohol use: No     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    Drug use: No    Sexual activity: Not Currently     Partners: Female     Social Determinants of Health     Financial Resource Strain: Low Risk     Difficulty of Paying Living Expenses: Not hard at all   Food Insecurity: No Food Insecurity    Worried About [employer] [employer] in the Last Year: Never true    [address] [employer] in the Last Year: Never true   Transportation Needs: Unknown    Lack of Transportation (Non-Medical): No   Housing Stability: Unknown    Unstable Housing in the Last Year: No     Current Outpatient Medications on File Prior to Visit   Medication Sig Dispense Refill    oxyCODONE-acetaminophen (PERCOCET) 5-325 MG per tablet take 1 tablet by mouth every 6 hours if needed for pain      meloxicam (MOBIC) 15 MG tablet Take 15 mg by mouth daily      lidocaine (LIDODERM) 5 % Place 1 patch onto the skin daily 12 hours on, 12 hours off. 30 patch 0    ketorolac (TORADOL) 10 MG tablet Take 1 tablet by mouth every 6 hours as needed for Pain 20 tablet 0    gabapentin (NEURONTIN) 600 MG tablet Take one 600 mg tablet in morning Take one 600 mg tablet mid day Take two 600 mg tablets at night 90 tablet 0    Handicap Placard MISC by Does not apply route Ex: 5 years 2/14/2028 1 each 0    famotidine (PEPCID) 20 MG tablet Take 1 tablet by mouth 2 times daily 60 tablet 0    FLUoxetine (PROZAC) 40 MG capsule TAKE 1 CAPSULE BY MOUTH DAILY. 90 capsule 1    rosuvastatin (CRESTOR) 40 MG tablet TAKE 1 TABLET BY MOUTH DAILY AT BEDTIME.  90 tablet 5    [DISCONTINUED] cyclobenzaprine (FLEXERIL) 10 MG tablet Take 1 tablet by mouth 3 times daily as needed for Muscle spasms 21 tablet 0     Current Facility-Administered Medications on File Prior to Visit   Medication Dose Route Frequency Provider Last Rate Last Admin    iopamidol (ISOVUE-300) 61 % injection 2 mL  2 mL Other ONCE PRN Lawyer Farhat MD Allergies   Allergen Reactions    Alcohol Other (See Comments)     Sober 22 yrs    Benadryl [Diphenhydramine]      \"speeds him up\"    Demerol Hcl [Meperidine] Other (See Comments)     Aggressive behavior    Sulfa Antibiotics Other (See Comments)     Told by mother / patient unaware of reaction       Review of Systems   Respiratory:  Negative for cough and shortness of breath. Cardiovascular:  Negative for chest pain and leg swelling. Musculoskeletal:  Positive for back pain. Negative for gait problem. Objective  Vitals:    02/22/23 1402   BP: 120/74   Pulse: 75   SpO2: 93%   Weight: 140 lb (63.5 kg)   Height: 5' 9\" (1.753 m)     Physical Exam  Vitals and nursing note reviewed. Constitutional:       Appearance: Normal appearance. HENT:      Head: Normocephalic. Nose: Nose normal.      Mouth/Throat:      Mouth: Mucous membranes are moist.      Pharynx: Oropharynx is clear. Eyes:      Extraocular Movements: Extraocular movements intact. Conjunctiva/sclera: Conjunctivae normal.      Pupils: Pupils are equal, round, and reactive to light. Cardiovascular:      Rate and Rhythm: Normal rate and regular rhythm. Pulses: Normal pulses. Heart sounds: Normal heart sounds. Pulmonary:      Effort: Pulmonary effort is normal.      Breath sounds: Normal breath sounds. Musculoskeletal:      Cervical back: Neck supple. Skin:     General: Skin is warm. Neurological:      General: No focal deficit present. Mental Status: He is alert and oriented to person, place, and time. Mental status is at baseline. Psychiatric:         Mood and Affect: Mood normal.         Behavior: Behavior normal.         Thought Content: Thought content normal.         Judgment: Judgment normal.       Assessment & Plan     Diagnosis Orders   1. Lumbosacral spondylosis without myelopathy        2.  Personal history of tobacco use  SC VISIT TO DISCUSS LUNG CA SCREEN W LDCT    CT Lung Screen (Annual/Baseline) 3. Spinal stenosis of lumbar region with neurogenic claudication            Orders Placed This Encounter   Procedures    CT Lung Screen (Annual/Baseline)     Age: Patient is 77 y.o. Smoking History: Social History    Tobacco Use      Smoking status: Every Day        Packs/day: 2.00        Years: 52.00        Pack years: 104        Types: Cigars, Cigarettes        Start date:       Smokeless tobacco: Never      Tobacco comments: 3-5 cigars qd    Vaping Use      Vaping Use: Never used    Alcohol use: No      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    Drug use: No   Pack years: 104    Date of last lung cancer screenin2022     Standing Status:   Future     Standing Expiration Date:   2024     Order Specific Question:   Is there documentation of shared decision making? Answer:   Yes     Order Specific Question:   Is this a low dose CT or a routine CT? Answer:   Low Dose CT [1]     Order Specific Question:   Is this the first (baseline) CT or an annual exam?     Answer: Annual [2]     Order Specific Question:   Does the patient show any signs or symptoms of lung cancer? Answer:   No     Order Specific Question:   Smoking Status? Answer:   Every Day [1]     Order Specific Question:   Smoking packs per day? Answer:   2     Order Specific Question:   Years smoking? Answer:   46    UT VISIT TO DISCUSS LUNG CA SCREEN W LDCT       No orders of the defined types were placed in this encounter.       Medications Discontinued During This Encounter   Medication Reason    acetaminophen (TYLENOL) 500 MG tablet LIST CLEANUP    albuterol sulfate HFA (VENTOLIN HFA) 108 (90 Base) MCG/ACT inhaler LIST CLEANUP    albuterol sulfate HFA (VENTOLIN HFA) 108 (90 Base) MCG/ACT inhaler LIST CLEANUP    amoxicillin-clavulanate (AUGMENTIN) 875-125 MG per tablet LIST CLEANUP    budesonide-formoterol (SYMBICORT) 160-4.5 MCG/ACT AERO LIST CLEANUP    etodolac (LODINE) 400 MG tablet LIST CLEANUP    tiZANidine (ZANAFLEX) 4 MG tablet LIST CLEANUP    sucralfate (CARAFATE) 1 GM/10ML suspension LIST CLEANUP    polyethylene glycol (GLYCOLAX) 17 GM/SCOOP powder LIST CLEANUP      Side effects, adverse effects of the medication prescribed today, as well as treatment plan/ rationale and result expectations have been discussed with the patient who expresses understanding and desires to proceed. Close follow up to evaluate treatment results and for coordination of care. I have reviewed the patient's medical history in detail and updated the computerized patient record. As always, patient is advised that if symptoms worsen in any way they will proceed to the nearest emergency room. MANUELA Pritchett CNP  Discussed with the patient the current USPSTF guidelines released March 9, 2021 for screening for lung cancer. For adults aged 48 to [de-identified] years who have a 20 pack-year smoking history and currently smoke or have quit within the past 15 years the grade B recommendation is to:  Screen for lung cancer with low-dose computed tomography (LDCT) every year. Stop screening once a person has not smoked for 15 years or has a health problem that limits life expectancy or the ability to have lung surgery. The patient  reports that he has been smoking cigars and cigarettes. He started smoking about 53 years ago. He has a 104.00 pack-year smoking history. He has never used smokeless tobacco.. Discussed with patient the risks and benefits of screening, including over-diagnosis, false positive rate, and total radiation exposure. The patient currently exhibits no signs or symptoms suggestive of lung cancer. Discussed with patient the importance of compliance with yearly annual lung cancer screenings and willingness to undergo diagnosis and treatment if screening scan is positive. In addition, the patient was counseled regarding the importance of remaining smoke free and/or total smoking cessation.     Also reviewed the following if the patient has Medicare that as of February 10, 2022, Medicare only covers LDCT screening in patients aged 51-72 with at least a 20 pack-year smoking history who currently smoke or have quit in the last 15 years

## 2023-02-23 DIAGNOSIS — M47.817 LUMBOSACRAL SPONDYLOSIS WITHOUT MYELOPATHY: Primary | ICD-10-CM

## 2023-02-23 RX ORDER — OXYCODONE HYDROCHLORIDE AND ACETAMINOPHEN 5; 325 MG/1; MG/1
1 TABLET ORAL EVERY 6 HOURS PRN
Qty: 21 TABLET | Refills: 0 | Status: SHIPPED | OUTPATIENT
Start: 2023-02-23 | End: 2023-03-02

## 2023-02-23 NOTE — TELEPHONE ENCOUNTER
Comments:     Last Office Visit (last PCP visit):   2/22/2023    Next Visit Date:  Future Appointments   Date Time Provider Raphael Donna   2/28/2023  4:30 PM MANUELA Maloney CNP [facility]   3/7/2023 10:30 AM Wiley Jensen MD [address]   3/9/2023 11:00 AM MD GERDA Gutierrez [facility] [place]   3/16/2023 10:30 AM MANUELA Patel CNP [facility] EMERGENCY MED[place]   6/6/2023 11:00 AM MANUELA Santiago CNP [facility]ENC[facility] [place]   6/22/2023 10:15 AM MANUELA Maloney CNP [facility] [place]       **If hasn't been seen in over a year OR hasn't followed up according to last diabetes/ADHD visit, make appointment for patient before sending refill to provider.     Rx requested:  Requested Prescriptions     Pending Prescriptions Disp Refills    oxyCODONE-acetaminophen (PERCOCET) 5-325 MG per tablet

## 2023-02-23 NOTE — TELEPHONE ENCOUNTER
Called pt and spoke with him.  Informed him he will have to discuss medication with Liane Garcia on 02/28/2023 at 4:30 PM

## 2023-02-23 NOTE — TELEPHONE ENCOUNTER
Tony Llanos pt calling asking for a refill of this he was just seen in the [address] [facility] [address] 2/22

## 2023-02-24 ENCOUNTER — CARE COORDINATION (OUTPATIENT)
Dept: CARE COORDINATION | Age: 68
End: 2023-02-24

## 2023-02-24 NOTE — LETTER
2/24/2023     [address] [place] [address] [address]    Dear Neela Pereira    I recently attempted to contact you to discuss your healthcare needs. My name is Jesu Garibay RN and I am a registered nurse who partners with MANUELA Gonzalez CNP to improve patients health. As a member of your health care team, I would work with other providers involved in your care, offer education for your specific health conditions, and connect you with additional resources as needed. I will collaborate with MANUELA Harding CNP to support you in following your treatment plan. The additional support I provide is no additional cost to you. My primary focus is to help you achieve specific goals and improve your health. I look forward to working with you. Please contact me at your earliest convenience at [phone].     In good health,    Jesu Garibay RN

## 2023-02-24 NOTE — LETTER
2/24/2023    [address] [place] [address] [address]      Dear Katiana Muniz,    My name is Chico Barba RN and I am a registered nurse who partners with MANUELA Lara CNP to improve patients' health. MANUELA Lara CNP believes you would benefit from working with me. As a member of your health care team, I would work with other providers involved in your care, offer education for your specific health conditions, and connect you with additional resources as needed. I will collaborate with MANUELA Lara CNP to support you in following your treatment plan. The additional support I provide is no additional cost to you. My primary focus is to help you achieve specific goals and improve your health. We are committed to walk with you on this journey and look forward to working with you. Please call me to further discuss your healthcare needs. I am available by phone or for appointments at the office. You can reach me at [phone].     In good health,     Chico Barba RN

## 2023-02-24 NOTE — CARE COORDINATION
ACM called patient. Left message requesting a return call to introduce [facility] program.  Contact information supplied. Second letter sent via [facility].

## 2023-02-25 ENCOUNTER — CARE COORDINATION (OUTPATIENT)
Dept: CARE COORDINATION | Age: 68
End: 2023-02-25

## 2023-02-25 NOTE — CARE COORDINATION
ACM received return call from patient.  He is working forward on getting his back pain managed and evaluated.  Patient did see [facility] neurosurgery and per patient he will be having surgery in the upcoming months.  Patient also states that his pain is being currently managed with Percocet.  Patient states that he takes half tab in the morning and half tab in the afternoon and 1 full tab at night which is a total of 2 tabs daily.  Patient states this is managing pain well.  At this time patient feels he does not need any care coordination resources as he is following up on his current situation.  ACM discussed with patient multiple ER visits and the importance of staying connected with providers for his follow-up care.  Patient verbalized understanding.  Patient declined [facility] services at this time.  Patient encouraged to keep contact information if his needs change.

## 2023-02-28 ENCOUNTER — OFFICE VISIT (OUTPATIENT)
Dept: FAMILY MEDICINE CLINIC | Age: 68
End: 2023-02-28

## 2023-02-28 VITALS
SYSTOLIC BLOOD PRESSURE: 132 MMHG | HEIGHT: 69 IN | WEIGHT: 145 LBS | DIASTOLIC BLOOD PRESSURE: 72 MMHG | OXYGEN SATURATION: 93 % | HEART RATE: 97 BPM | BODY MASS INDEX: 21.48 KG/M2

## 2023-02-28 DIAGNOSIS — Z00.00 MEDICARE ANNUAL WELLNESS VISIT, SUBSEQUENT: Primary | ICD-10-CM

## 2023-02-28 ASSESSMENT — PATIENT HEALTH QUESTIONNAIRE - PHQ9
SUM OF ALL RESPONSES TO PHQ9 QUESTIONS 1 & 2: 0
2. FEELING DOWN, DEPRESSED OR HOPELESS: 0
9. THOUGHTS THAT YOU WOULD BE BETTER OFF DEAD, OR OF HURTING YOURSELF: 0
1. LITTLE INTEREST OR PLEASURE IN DOING THINGS: 0
SUM OF ALL RESPONSES TO PHQ QUESTIONS 1-9: 0
3. TROUBLE FALLING OR STAYING ASLEEP: 0
7. TROUBLE CONCENTRATING ON THINGS, SUCH AS READING THE NEWSPAPER OR WATCHING TELEVISION: 0
8. MOVING OR SPEAKING SO SLOWLY THAT OTHER PEOPLE COULD HAVE NOTICED. OR THE OPPOSITE, BEING SO FIGETY OR RESTLESS THAT YOU HAVE BEEN MOVING AROUND A LOT MORE THAN USUAL: 0
4. FEELING TIRED OR HAVING LITTLE ENERGY: 0
5. POOR APPETITE OR OVEREATING: 0
6. FEELING BAD ABOUT YOURSELF - OR THAT YOU ARE A FAILURE OR HAVE LET YOURSELF OR YOUR FAMILY DOWN: 0
10. IF YOU CHECKED OFF ANY PROBLEMS, HOW DIFFICULT HAVE THESE PROBLEMS MADE IT FOR YOU TO DO YOUR WORK, TAKE CARE OF THINGS AT HOME, OR GET ALONG WITH OTHER PEOPLE: 0
SUM OF ALL RESPONSES TO PHQ QUESTIONS 1-9: 0

## 2023-02-28 NOTE — PROGRESS NOTES
Medicare Annual Wellness Visit    Stacey Iniguez is here for Medicare AWV    Assessment & Plan   Medicare annual wellness visit, subsequent    Recommendations for Preventive Services Due: see orders and patient instructions/AVS.  Recommended screening schedule for the next 5-10 years is provided to the patient in written form: see Patient Instructions/AVS.     Return for Medicare Annual Wellness Visit in 1 year. Subjective   The following acute and/or chronic problems were also addressed today:  Getting nerve \"cut\" in back on March 9th. Patient's complete Health Risk Assessment and screening values have been reviewed and are found in Flowsheets. The following problems were reviewed today and where indicated follow up appointments were made and/or referrals ordered. Positive Risk Factor Screenings with Interventions:                Opioid Risk: (High risk score ?55) Opioid risk score: 95    Last PDMP Chu as Reviewed:  Review User Review Instant Review Result   Kaushik Shin 2/15/2023  2:54 PM @   Reviewed PDMP [1]     Last Controlled Substance Monitoring Documentation      [facility] Office Visit from 2/15/2023 in [place] [address] Pain Management   Periodic Controlled Substance Monitoring Possible medication side effects, risk of tolerance/dependence & alternative treatments discussed. filed at 02/15/2023 5226                Dentist Screen:  Have you seen the dentist within the past year?: (!) No    Intervention:  Patient declines any further evaluation or treatment      Safety:  Do you have working smoke detectors?: (!) No  Do you have either shower bars, grab bars, non-slip mats or non-slip surfaces in your shower or bathtub?: (!) No  Do you always fasten your seatbelt when you are in a car?: (!) No  Interventions:  Patient comments: Doesn't feel he needs smoke alarms, does have arm for shower, needs to get installed.        Tobacco Use:  Tobacco Use: High Risk    Smoking Tobacco Use: Every Day    Smokeless Tobacco Use: Never    Passive Exposure: Not on file     E-cigarette/Vaping       Questions Responses    E-cigarette/Vaping Use Never User    Start Date     Passive Exposure     Quit Date     Counseling Given     Comments         Interventions:  Patient declined any further intervention or treatment                Objective   Vitals:    02/28/23 1609   BP: 132/72   Pulse: 97   SpO2: 93%   Weight: 145 lb (65.8 kg)   Height: 5' 9\" (1.753 m)      Body mass index is 21.41 kg/m².       General Appearance: alert and oriented to person, place and time, well developed and well- nourished, in no acute distress  Skin: warm and dry, no rash or erythema  Head: normocephalic and atraumatic  Eyes: pupils equal, round, and reactive to light, extraocular eye movements intact, conjunctivae normal  ENT: tympanic membrane, external ear and ear canal normal bilaterally, nose without deformity, nasal mucosa and turbinates normal without polyps  Neck: supple and non-tender without mass, no thyromegaly or thyroid nodules, no cervical lymphadenopathy  Pulmonary/Chest: clear to auscultation bilaterally- no wheezes, rales or rhonchi, normal air movement, no respiratory distress  Cardiovascular: normal rate, regular rhythm, normal S1 and S2, no murmurs, rubs, clicks, or gallops, distal pulses intact, no carotid bruits  Abdomen: soft, non-tender, non-distended, normal bowel sounds, no masses or organomegaly  Extremities: no cyanosis, clubbing or edema  Musculoskeletal: normal range of motion, no joint swelling, deformity or tenderness  Neurologic: reflexes normal and symmetric, no cranial nerve deficit, gait, coordination and speech normal       Allergies   Allergen Reactions    Alcohol Other (See Comments)     Sober 22 yrs    Benadryl [Diphenhydramine]      \"speeds him up\"    Demerol Hcl [Meperidine] Other (See Comments)     Aggressive behavior    Sulfa Antibiotics Other (See Comments)     Told by mother / patient unaware of reaction     Prior to Visit Medications    Medication Sig Taking? Authorizing Provider   oxyCODONE-acetaminophen (PERCOCET) 5-325 MG per tablet Take 1 tablet by mouth every 6 hours as needed for Pain for up to 7 days. Max Daily Amount: 4 tablets Yes Vania Denton MD   meloxicam (MOBIC) 15 MG tablet Take 15 mg by mouth daily Yes Historical Provider, MD   lidocaine (LIDODERM) 5 % Place 1 patch onto the skin daily 12 hours on, 12 hours off. Yes Parth Torres MD   ketorolac (TORADOL) 10 MG tablet Take 1 tablet by mouth every 6 hours as needed for Pain Yes Parth Torres MD   gabapentin (NEURONTIN) 600 MG tablet Take one 600 mg tablet in morning Take one 600 mg tablet mid day Take two 600 mg tablets at night Yes Pete Hwang,    Handicap Placard MISC by Does not apply route Ex: 5 years 2/14/2028 Yes MANUELA Sun CNP   famotidine (PEPCID) 20 MG tablet Take 1 tablet by mouth 2 times daily Yes Abdoul Sorto MD   FLUoxetine (PROZAC) 40 MG capsule TAKE 1 CAPSULE BY MOUTH DAILY. Yes MANUELA Sun CNP   rosuvastatin (CRESTOR) 40 MG tablet TAKE 1 TABLET BY MOUTH DAILY AT BEDTIME.  Yes MANUELA Sun CNP   cyclobenzaprine (FLEXERIL) 10 MG tablet Take 1 tablet by mouth 3 times daily as needed for Muscle spasms  MANUELA Sun CNP       CareTeam (Including outside providers/suppliers regularly involved in providing care):   Patient Care Team:  MANUELA Sun CNP as PCP - General (Nurse Practitioner)  MANUELA Sun CNP as PCP - Empaneled Provider     Reviewed and updated this visit:  Tobacco  Allergies  Meds  Med Hx  Surg Hx  Soc Hx  Fam Hx             MANUELA Sun CNP

## 2023-02-28 NOTE — PATIENT INSTRUCTIONS
Learning About Dental Care for Older Adults  Dental care for older adults: Overview  Dental care for older people is much the same as for younger adults. But older adults do have concerns that younger adults do not. Older adults may have problems with gum disease and decay on the roots of their teeth. They may need missing teeth replaced or broken fillings fixed. Or they may have dentures that need to be cared for. Some older adults may have trouble holding a toothbrush. You can help remind the person you are caring for to brush and floss their teeth or to clean their dentures. In some cases, you may need to do the brushing and other dental care tasks. People who have trouble using their hands or who have dementia may need this extra help. How can you help with dental care? Normal dental care  To keep the teeth and gums healthy:  Brush the teeth with fluoride toothpaste twice a day--in the morning and at night--and floss at least once a day. Plaque can quickly build up on the teeth of older adults. Watch for the signs of gum disease. These signs include gums that bleed after brushing or after eating hard foods, such as apples. See a dentist regularly. Many experts recommend checkups every 6 months. Keep the dentist up to date on any new medications the person is taking. Encourage a balanced diet that includes whole grains, vegetables, and fruits, and that is low in saturated fat and sodium. Encourage the person you're caring for not to use tobacco products. They can affect dental and general health. Many older adults have a fixed income and feel that they can't afford dental care. But most [facility] and [facility] have programs in which dentists help older adults by lowering fees. Contact your area's public health offices or  for information about dental care in your area.   Using a toothbrush  Older adults with arthritis sometimes have trouble brushing their teeth because they can't easily hold the toothbrush. Their hands and fingers may be stiff, painful, or weak. If this is the case, you can: Offer an electric toothbrush. Enlarge the handle of a non-electric toothbrush by wrapping a sponge, an elastic bandage, or adhesive tape around it. Push the toothbrush handle through a ball made of rubber or soft foam.  Make the handle longer and thicker by taping Popsicle sticks or tongue depressors to it. You may also be able to buy special toothbrushes, toothpaste dispensers, and floss holders. Your doctor may recommend a soft-bristle toothbrush if the person you care for bleeds easily. Bleeding can happen because of a health problem or from certain medicines. A toothpaste for sensitive teeth may help if the person you care for has sensitive teeth. How do you brush and floss someone's teeth? If the person you are caring for has a hard time cleaning their teeth on their own, you may need to brush and floss their teeth for them. It may be easiest to have the person sit and face away from you, and to sit or stand behind them. That way you can steady their head against your arm as you reach around to floss and brush their teeth. Choose a place that has good lighting and is comfortable for both of you. Before you begin, gather your supplies. You will need gloves, floss, a toothbrush, and a container to hold water if you are not near a sink. Wash and dry your hands well and put on gloves. Start by flossing:  Gently work a piece of floss between each of the teeth toward the gums. A plastic flossing tool may make this easier, and they are available at most [facility]. Curve the floss around each tooth into a U-shape and gently slide it under the gum line. Move the floss firmly up and down several times to scrape off the plaque. After you've finished flossing, throw away the used floss and begin brushing:  Wet the brush and apply toothpaste. Place the brush at a 45-degree angle where the teeth meet the gums. Press firmly, and move the brush in small circles over the surface of the teeth.  Be careful not to brush too hard. Vigorous brushing can make the gums pull away from the teeth and can scratch the tooth enamel.  Brush all surfaces of the teeth, on the tongue side and on the cheek side. Pay special attention to the front teeth and all surfaces of the back teeth.  Brush chewing surfaces with short back-and-forth strokes.  After you've finished, help the person rinse the remaining toothpaste from their mouth.  Where can you learn more?  Go to https://www.Campanja.net/patientEd and enter F944 to learn more about \"Learning About Dental Care for Older Adults.\"  Current as of: June 16, 2022               Content Version: 13.5  © 2006-2022 [employer].   Care instructions adapted under license by [email or web address]. If you have questions about a medical condition or this instruction, always ask your healthcare professional. [employer] disclaims any warranty or liability for your use of this information.           Advance Directives: Care Instructions  Overview  An advance directive is a legal way to state your wishes at the end of your life. It tells your family and your doctor what to do if you can't say what you want.  There are two main types of advance directives. You can change them any time your wishes change.  Living will.  This form tells your family and your doctor your wishes about life support and other treatment. The form is also called a declaration.  Medical power of .  This form lets you name a person to make treatment decisions for you when you can't speak for yourself. This person is called a health care agent (health care proxy, health care surrogate). The form is also called a durable power of  for health care.  If you do not have an advance directive, decisions about your medical care may be made by a family member, or by a doctor or a  who doesn't know  you.  It may help to think of an advance directive as a gift to the people who care for you. If you have one, they won't have to make tough decisions by themselves. For more information, including forms for your state, see the [address] website (www.caringinfo.org/planning/advance-directives/). Follow-up care is a key part of your treatment and safety. Be sure to make and go to all appointments, and call your doctor if you are having problems. It's also a good idea to know your test results and keep a list of the medicines you take. What should you include in an advance directive? Many states have a unique advance directive form. (It may ask you to address specific issues.) Or you might use a universal form that's approved by many states. If your form doesn't tell you what to address, it may be hard to know what to include in your advance directive. Use the questions below to help you get started. Who do you want to make decisions about your medical care if you are not able to? What life-support measures do you want if you have a serious illness that gets worse over time or can't be cured? What are you most afraid of that might happen? (Maybe you're afraid of having pain, losing your independence, or being kept alive by machines.)  Where would you prefer to die? (Your home? A hospital? A nursing home?)  Do you want to donate your organs when you die? Do you want certain [employer] practices performed before you die? When should you call for help? Be sure to contact your doctor if you have any questions. Where can you learn more? Go to http://www.zuniga.com/ and enter R264 to learn more about \"Advance Directives: Care Instructions. \"  Current as of: June 16, 2022               Content Version: 13.5  © 0230-1796 Health[employer]. Care instructions adapted under license by [facility] ([facility]).  If you have questions about a medical condition or this instruction, always ask your healthcare professional. [address] any warranty or liability for your use of this information. A Healthy Heart: Care Instructions  Your Care Instructions     Coronary artery disease, also called heart disease, occurs when a substance called plaque builds up in the vessels that supply oxygen-rich blood to your heart muscle. This can narrow the blood vessels and reduce blood flow. A heart attack happens when blood flow is completely blocked. A high-fat diet, smoking, and other factors increase the risk of heart disease. Your doctor has found that you have a chance of having heart disease. You can do lots of things to keep your heart healthy. It may not be easy, but you can change your diet, exercise more, and quit smoking. These steps really work to lower your chance of heart disease. Follow-up care is a key part of your treatment and safety. Be sure to make and go to all appointments, and call your doctor if you are having problems. It's also a good idea to know your test results and keep a list of the medicines you take. How can you care for yourself at home? Diet    Use less salt when you cook and eat. This helps lower your blood pressure. Taste food before salting. Add only a little salt when you think you need it. With time, your taste buds will adjust to less salt.     Eat fewer snack items, fast foods, canned soups, and other high-salt, high-fat, processed foods.     Read food labels and try to avoid saturated and trans fats. They increase your risk of heart disease by raising cholesterol levels.     Limit the amount of solid fat-butter, margarine, and shortening-you eat. Use olive, peanut, or canola oil when you cook. Bake, broil, and steam foods instead of frying them.     Eat a variety of fruit and vegetables every day. Dark green, deep orange, red, or yellow fruits and vegetables are especially good for you.  Examples include spinach, carrots, peaches, and berries.     Foods high in fiber can reduce your cholesterol and provide important vitamins and minerals. High-fiber foods include whole-grain cereals and breads, oatmeal, beans, brown rice, citrus fruits, and apples.     Eat lean proteins. Heart-healthy proteins include seafood, lean meats and poultry, eggs, beans, peas, nuts, seeds, and soy products.     Limit drinks and foods with added sugar. These include candy, desserts, and soda pop. Lifestyle changes    If your doctor recommends it, get more exercise. Walking is a good choice. Bit by bit, increase the amount you walk every day. Try for at least 30 minutes on most days of the week. You also may want to swim, bike, or do other activities.     Do not smoke. If you need help quitting, talk to your doctor about stop-smoking programs and medicines. These can increase your chances of quitting for good. Quitting smoking may be the most important step you can take to protect your heart. It is never too late to quit.     Limit alcohol to 2 drinks a day for men and 1 drink a day for women. Too much alcohol can cause health problems.     Manage other health problems such as diabetes, high blood pressure, and high cholesterol. If you think you may have a problem with alcohol or drug use, talk to your doctor. Medicines    Take your medicines exactly as prescribed. Call your doctor if you think you are having a problem with your medicine.     If your doctor recommends aspirin, take the amount directed each day. Make sure you take aspirin and not another kind of pain reliever, such as acetaminophen (Tylenol). When should you call for help? Call 911 if you have symptoms of a heart attack.  These may include:    Chest pain or pressure, or a strange feeling in the chest.     Sweating.     Shortness of breath.     Pain, pressure, or a strange feeling in the back, neck, jaw, or upper belly or in one or both shoulders or arms.     Lightheadedness or sudden weakness.     A fast or irregular heartbeat. After you call 911, the  may tell you to chew 1 adult-strength or 2 to 4 low-dose aspirin. Wait for an ambulance. Do not try to drive yourself. Watch closely for changes in your health, and be sure to contact your doctor if you have any problems. Where can you learn more? Go to http://www.zuniga.com/ and enter F075 to learn more about \"A Healthy Heart: Care Instructions. \"  Current as of: September 7, 2022               Content Version: 13.5  © 9099-8760 [employer]. Care instructions adapted under license by [facility] ([facility]). If you have questions about a medical condition or this instruction, always ask your healthcare professional. [address] any warranty or liability for your use of this information. Personalized Preventive Plan for Marjorie Rosenbaum - 2/28/2023  Medicare offers a range of preventive health benefits. Some of the tests and screenings are paid in full while other may be subject to a deductible, co-insurance, and/or copay. Some of these benefits include a comprehensive review of your medical history including lifestyle, illnesses that may run in your family, and various assessments and screenings as appropriate. After reviewing your medical record and screening and assessments performed today your provider may have ordered immunizations, labs, imaging, and/or referrals for you. A list of these orders (if applicable) as well as your Preventive Care list are included within your After Visit Summary for your review. Other Preventive Recommendations:    A preventive eye exam performed by an eye specialist is recommended every 1-2 years to screen for glaucoma; cataracts, macular degeneration, and other eye disorders. A preventive dental visit is recommended every 6 months. Try to get at least 150 minutes of exercise per week or 10,000 steps per day on a pedometer .   Order or download the FREE \"Exercise & Physical Activity: Your Everyday Guide\" from [employer]. Call 1-139.643.4021 or search [employer] A[employer] online. You need 8678-8189 mg of calcium and 6663-7001 IU of vitamin D per day. It is possible to meet your calcium requirement with diet alone, but a vitamin D supplement is usually necessary to meet this goal.  When exposed to the sun, use a sunscreen that protects against both UVA and UVB radiation with an SPF of 30 or greater. Reapply every 2 to 3 hours or after sweating, drying off with a towel, or swimming. Always wear a seat belt when traveling in a car. Always wear a helmet when riding a bicycle or motorcycle.

## 2023-03-01 ENCOUNTER — TELEPHONE (OUTPATIENT)
Dept: FAMILY MEDICINE CLINIC | Age: 68
End: 2023-03-01

## 2023-03-01 DIAGNOSIS — M47.817 LUMBOSACRAL SPONDYLOSIS WITHOUT MYELOPATHY: ICD-10-CM

## 2023-03-01 RX ORDER — OXYCODONE HYDROCHLORIDE AND ACETAMINOPHEN 5; 325 MG/1; MG/1
1 TABLET ORAL EVERY 6 HOURS PRN
Qty: 21 TABLET | Refills: 0 | Status: SHIPPED | OUTPATIENT
Start: 2023-03-01 | End: 2023-03-08

## 2023-03-01 NOTE — TELEPHONE ENCOUNTER
Pt calling asking if you will refill the pain med has an appt on 3/9  Pt states that he has been trying to take 2 of these a day at times its 3 tabs a day     [place]       [phone]

## 2023-03-08 ENCOUNTER — HOSPITAL ENCOUNTER (EMERGENCY)
Age: 68
Discharge: HOME | End: 2023-03-08
Attending: EMERGENCY MEDICINE
Payer: MEDICARE

## 2023-03-08 VITALS
HEART RATE: 88 BPM | DIASTOLIC BLOOD PRESSURE: 89 MMHG | WEIGHT: 140 LBS | OXYGEN SATURATION: 96 % | RESPIRATION RATE: 16 BRPM | HEIGHT: 69 IN | BODY MASS INDEX: 20.73 KG/M2 | SYSTOLIC BLOOD PRESSURE: 127 MMHG | TEMPERATURE: 97.7 F

## 2023-03-08 DIAGNOSIS — R11.2 NAUSEA AND VOMITING, UNSPECIFIED VOMITING TYPE: Primary | ICD-10-CM

## 2023-03-08 LAB
ALBUMIN SERPL-MCNC: 3.7 G/DL (ref 3.5–4.6)
ALP BLD-CCNC: 108 U/L (ref 35–104)
ALT SERPL-CCNC: 9 U/L (ref 0–41)
ANION GAP SERPL CALCULATED.3IONS-SCNC: 13 MEQ/L (ref 9–15)
AST SERPL-CCNC: 11 U/L (ref 0–40)
BASOPHILS ABSOLUTE: 0.1 K/UL (ref 0–0.2)
BASOPHILS RELATIVE PERCENT: 0.8 %
BILIRUB SERPL-MCNC: <0.2 MG/DL (ref 0.2–0.7)
BUN BLDV-MCNC: 19 MG/DL (ref 8–23)
CALCIUM SERPL-MCNC: 9.5 MG/DL (ref 8.5–9.9)
CHLORIDE BLD-SCNC: 101 MEQ/L (ref 95–107)
CO2: 24 MEQ/L (ref 20–31)
CREAT SERPL-MCNC: 0.85 MG/DL (ref 0.7–1.2)
EOSINOPHILS ABSOLUTE: 0.1 K/UL (ref 0–0.7)
EOSINOPHILS RELATIVE PERCENT: 1 %
GFR SERPL CREATININE-BSD FRML MDRD: >60 ML/MIN/{1.73_M2}
GLOBULIN: 3.2 G/DL (ref 2.3–3.5)
GLUCOSE BLD-MCNC: 92 MG/DL (ref 70–99)
HCT VFR BLD CALC: 40.5 % (ref 42–52)
HEMOGLOBIN: 13.6 G/DL (ref 14–18)
LYMPHOCYTES ABSOLUTE: 1.5 K/UL (ref 1–4.8)
LYMPHOCYTES RELATIVE PERCENT: 11.4 %
MAGNESIUM: 2.3 MG/DL (ref 1.7–2.4)
MCH RBC QN AUTO: 33.1 PG (ref 27–31.3)
MCHC RBC AUTO-ENTMCNC: 33.6 % (ref 33–37)
MCV RBC AUTO: 98.4 FL (ref 79–92.2)
MONOCYTES ABSOLUTE: 0.6 K/UL (ref 0.2–0.8)
MONOCYTES RELATIVE PERCENT: 4.5 %
NEUTROPHILS ABSOLUTE: 11 K/UL (ref 1.4–6.5)
NEUTROPHILS RELATIVE PERCENT: 82.3 %
PDW BLD-RTO: 13.7 % (ref 11.5–14.5)
PLATELET # BLD: 438 K/UL (ref 130–400)
POTASSIUM SERPL-SCNC: 4.6 MEQ/L (ref 3.4–4.9)
RBC # BLD: 4.12 M/UL (ref 4.7–6.1)
SODIUM BLD-SCNC: 138 MEQ/L (ref 135–144)
TOTAL PROTEIN: 6.9 G/DL (ref 6.3–8)
WBC # BLD: 13.3 K/UL (ref 4.8–10.8)

## 2023-03-08 PROCEDURE — 80053 COMPREHEN METABOLIC PANEL: CPT

## 2023-03-08 PROCEDURE — 99283 EMERGENCY DEPT VISIT LOW MDM: CPT

## 2023-03-08 PROCEDURE — 36415 COLL VENOUS BLD VENIPUNCTURE: CPT

## 2023-03-08 PROCEDURE — 83735 ASSAY OF MAGNESIUM: CPT

## 2023-03-08 PROCEDURE — 6370000000 HC RX 637 (ALT 250 FOR IP): Performed by: EMERGENCY MEDICINE

## 2023-03-08 PROCEDURE — 85025 COMPLETE CBC W/AUTO DIFF WBC: CPT

## 2023-03-08 RX ORDER — ONDANSETRON 4 MG/1
4 TABLET, ORALLY DISINTEGRATING ORAL ONCE
Status: COMPLETED | OUTPATIENT
Start: 2023-03-08 | End: 2023-03-08

## 2023-03-08 RX ORDER — ONDANSETRON 4 MG/1
4 TABLET, ORALLY DISINTEGRATING ORAL 3 TIMES DAILY PRN
Qty: 15 TABLET | Refills: 0 | Status: SHIPPED | OUTPATIENT
Start: 2023-03-08

## 2023-03-08 RX ADMIN — ONDANSETRON 4 MG: 4 TABLET, ORALLY DISINTEGRATING ORAL at 12:39

## 2023-03-08 ASSESSMENT — PAIN DESCRIPTION - PAIN TYPE: TYPE: ACUTE PAIN

## 2023-03-08 ASSESSMENT — ENCOUNTER SYMPTOMS
VOMITING: 1
NAUSEA: 1

## 2023-03-08 ASSESSMENT — PAIN DESCRIPTION - FREQUENCY: FREQUENCY: CONTINUOUS

## 2023-03-08 ASSESSMENT — PAIN DESCRIPTION - LOCATION: LOCATION: GENERALIZED

## 2023-03-08 ASSESSMENT — PAIN DESCRIPTION - DESCRIPTORS: DESCRIPTORS: ACHING

## 2023-03-08 ASSESSMENT — LIFESTYLE VARIABLES: HOW OFTEN DO YOU HAVE A DRINK CONTAINING ALCOHOL: NEVER

## 2023-03-08 ASSESSMENT — PAIN - FUNCTIONAL ASSESSMENT: PAIN_FUNCTIONAL_ASSESSMENT: 0-10

## 2023-03-08 ASSESSMENT — PAIN SCALES - GENERAL: PAINLEVEL_OUTOF10: 8

## 2023-03-08 NOTE — ED TRIAGE NOTES
Pt states that he was vomiting and rolled onto the floor. Pt denies falling states that he did not hit his head and rolled off the couch to the floor to throw up. Per friend that found him pt has vomit on the floor. Pt states that he was recently around a child that had flu. Pt states that he began having N/V this morning but has had body aches x3 days.

## 2023-03-08 NOTE — ED PROVIDER NOTES
[id number] ED  EMERGENCY DEPARTMENT ENCOUNTER      Pt Name: Janett Tolbert  MRN: [id number]  [place] 1955  Date of evaluation: 3/8/2023  Provider: Jose Miguel Zuleta MD    [address]       Chief Complaint   Patient presents with    Emesis         HISTORY OF PRESENT ILLNESS   (Location/Symptom, Timing/Onset, Context/Setting, Quality, Duration, Modifying Factors, Severity)  Note limiting factors. 23-year-old male presenting with vomiting. Symptoms started this morning. He notes constant low back pain. This has been ongoing for years. No fevers. Denies any abdominal pain. No chest pain or shortness of breath. Notes two episodes of vomiting just prior to arrival.  No diarrhea. Nursing Notes were reviewed. REVIEW OF SYSTEMS    (2-9 systems for level 4, 10 or more for level 5)     Review of Systems   Gastrointestinal:  Positive for nausea and vomiting. All other systems reviewed and are negative. Except as noted above the remainder of the review of systems was reviewed and negative.        PAST MEDICAL HISTORY     Past Medical History:   Diagnosis Date    Allergic rhinitis 2/19/2018    Anxiety     Chronic back pain     COPD (chronic obstructive pulmonary disease) (HCC)     Depression     Disorder of stomach     valve not closing properly    Emphysema lung ([address]     Essential hypertension 11/4/2019    Gastroesophageal reflux disease 10/4/2019    Hyperlipidemia     meds > 22 yrs    Low back pain 5/1/2018    ARNAUD (obstructive sleep apnea) 2/19/2018    Other emphysema ([address] 10/4/2019    Other intervertebral disc degeneration, lumbar region 3/23/2018    Radiculopathy, lumbar region 2/28/2018    Restless leg syndrome     Seasonal affective disorder ([address] 10/4/2019    Substance abuse ([address]     alcholic, quit 20 yrs          SURGICAL HISTORY       Past Surgical History:   Procedure Laterality Date    COLONOSCOPY  12/22/2016    A SHALA MARLEY    DENTAL SURGERY  10 yrs ago    ENDOSCOPY, COLON, DIAGNOSTIC      EYE SURGERY      Lasik OU    FINGER TRIGGER RELEASE Left 11/1/2022    LEFT HAND TRIGGER FINGER RING FINGER RELEASE OF A1 PULLEY performed by Victorine Cranker, MD at [place] [address] ARTHROSCOPY Right     KNEE SURGERY Right 05/2016    Ray procedure    KNEE SURGERY      MO NASAL/SINUS ENDOSCOPY W/MAXILLARY ANTROSTOMY N/A 2/22/2018    SEPTOPLASTY MICRODEBRIDER ASSISTED TURBINOPLASTY AND OUT-FRACTURING BILATERAL NASAL ENDOSCOPY performed by Alirio Rodgers MD at [address] [place] [address]       Previous Medications    FAMOTIDINE (PEPCID) 20 MG TABLET    Take 1 tablet by mouth 2 times daily    FLUOXETINE (PROZAC) 40 MG CAPSULE    TAKE 1 CAPSULE BY MOUTH DAILY. GABAPENTIN (NEURONTIN) 600 MG TABLET    Take one 600 mg tablet in morning Take one 600 mg tablet mid day Take two 600 mg tablets at night    HANDICAP PLACARD MISC    by Does not apply route Ex: 5 years 2/14/2028    KETOROLAC (TORADOL) 10 MG TABLET    Take 1 tablet by mouth every 6 hours as needed for Pain    LIDOCAINE (LIDODERM) 5 %    Place 1 patch onto the skin daily 12 hours on, 12 hours off. MELOXICAM (MOBIC) 15 MG TABLET    Take 15 mg by mouth daily    OXYCODONE-ACETAMINOPHEN (PERCOCET) 5-325 MG PER TABLET    Take 1 tablet by mouth every 6 hours as needed for Pain for up to 7 days. Max Daily Amount: 4 tablets    ROSUVASTATIN (CRESTOR) 40 MG TABLET    TAKE 1 TABLET BY MOUTH DAILY AT BEDTIME.        ALLERGIES     Alcohol, Benadryl [diphenhydramine], Demerol hcl [meperidine], and Sulfa antibiotics    FAMILY HISTORY       Family History   Problem Relation Age of Onset    Cancer Mother         stomach    Arthritis Mother     Heart Disease Father 48    Cancer Father         bone    Cancer Maternal Grandmother         lung    Cancer Paternal Grandmother     Heart Disease Paternal Grandfather     No Known Problems Sister     Cancer Brother     Heart Disease Brother         hole in heart in 65 at 1 months age          SOCIAL HISTORY       Social History     Socioeconomic History    Marital status:      Spouse name: None    Number of children: 2    Years of education: 12    Highest education level: High school graduate   Tobacco Use    Smoking status: Every Day     Packs/day: 2.00     Years: 52.00     Pack years: 104.00     Types: Cigars, Cigarettes     Start date: 1970    Smokeless tobacco: Never    Tobacco comments:     3-5 cigars qd   Vaping Use    Vaping Use: Never used   Substance and Sexual Activity    Alcohol use: No     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    Drug use: No    Sexual activity: Not Currently     Partners: Female     Social Determinants of Health     Financial Resource Strain: Low Risk     Difficulty of Paying Living Expenses: Not hard at all   Food Insecurity: No Food Insecurity    Worried About [employer] in the Last Year: Never true    [address] [employer] in the Last Year: Never true   Transportation Needs: Unknown    Lack of Transportation (Non-Medical): No   Physical Activity: Insufficiently Active    Days of Exercise per Week: 7 days    Minutes of Exercise per Session: 20 min   Housing Stability: Unknown    Unstable Housing in the Last Year: No       SCREENINGS    [place] Coma Scale  Eye Opening: Spontaneous  Best Verbal Response: Oriented  Best Motor Response: Obeys commands  Sarah Coma Scale Score: 15          PHYSICAL EXAM    (up to 7 for level 4, 8 or more for level 5)     ED Triage Vitals [03/08/23 1146]   BP Temp Temp Source Heart Rate Resp SpO2 Height Weight   (!) 124/92 97.7 °F (36.5 °C) Oral 99 20 95 % 5' 9\" (1.753 m) 140 lb (63.5 kg)       Physical Exam  Vitals and nursing note reviewed. Constitutional:       General: He is not in acute distress. Appearance: Normal appearance. He is well-developed. He is not ill-appearing. HENT:      Head: Normocephalic and atraumatic. Mouth/Throat:      Mouth: Mucous membranes are moist.      Pharynx: Oropharynx is clear.    Eyes:      Extraocular Movements: Extraocular movements intact. Conjunctiva/sclera: Conjunctivae normal.   Cardiovascular:      Rate and Rhythm: Normal rate and regular rhythm. Pulmonary:      Effort: Pulmonary effort is normal.      Breath sounds: Normal breath sounds. Abdominal:      General: Bowel sounds are normal.      Palpations: Abdomen is soft. Tenderness: There is no abdominal tenderness. There is no guarding or rebound. Musculoskeletal:         General: No deformity. Normal range of motion. Cervical back: Normal range of motion and neck supple. Skin:     General: Skin is warm and dry. Capillary Refill: Capillary refill takes less than 2 seconds. Neurological:      General: No focal deficit present. Mental Status: He is alert and oriented to person, place, and time. Mental status is at baseline. Cranial Nerves: No cranial nerve deficit. Psychiatric:         Thought Content:  Thought content normal.       DIAGNOSTIC RESULTS     EKG: All EKG's are interpreted by the Emergency Department Physician who either signs or Co-signs this chart in the absence of a cardiologist.    RADIOLOGY:   Non-plain film images such as CT, Ultrasound and MRI are read by the radiologist. Plain radiographic images are visualized and preliminarily interpreted by the emergency physician with the below findings:    Interpretation per the Radiologist below, if available at the time of this note:    No orders to display       LABS:  Labs Reviewed   CBC WITH AUTO DIFFERENTIAL - Abnormal; Notable for the following components:       Result Value    WBC 13.3 (*)     RBC 4.12 (*)     Hemoglobin 13.6 (*)     Hematocrit 40.5 (*)     MCV 98.4 (*)     MCH 33.1 (*)     Platelets 442 (*)     Neutrophils Absolute 11.0 (*)     All other components within normal limits   COMPREHENSIVE METABOLIC PANEL - Abnormal; Notable for the following components:    Alkaline Phosphatase 108 (*)     All other components within normal limits MAGNESIUM       All other labs were within normal range or not returned as of this dictation. EMERGENCY DEPARTMENT COURSE and DIFFERENTIAL DIAGNOSIS/MDM:   Vitals:    Vitals:    03/08/23 1330 03/08/23 1345 03/08/23 1400 03/08/23 1445   BP: (!) 135/102   127/89   Pulse: 72   88   Resp: 17   16   Temp:       TempSrc:       SpO2: 95% 95% 96% 96%   Weight:       Height:           MDM  Number of Diagnoses or Management Options  Nausea and vomiting, unspecified vomiting type  Diagnosis management comments: Abdomen remains soft and nontender. Tolerating fluids prior to discharge. Work-up unremarkable and recommend supportive care. Rx for zofran. Patient will be discharged home in good condition. Patient has been hemodynamically stable throughout ED course and is appropriate for outpatient follow up. Patient should follow up with PCP in 2-3 days or return to ED immediately for any new or worsening symptoms. Patient is well appearing on discharge and agreeable with plan of care. Procedures    CRITICAL CARE TIME   Total Critical Care time was 0 minutes, excluding separately reportable procedures. There was a high probability of clinically significant/life threatening deterioration in the patient's condition which required my urgent intervention. FINAL IMPRESSION      1.  Nausea and vomiting, unspecified vomiting type          DISPOSITION/PLAN   DISPOSITION Decision To Discharge 03/08/2023 03:13:04 PM      (Please note that portions of this note were completed with a voice recognition program.  Efforts were made to edit the dictations but occasionally words are mis-transcribed.)    Victorina Echeverria MD (electronically signed)  Attending Emergency Physician        Victorina Echeverria MD  03/08/23 7396

## 2023-03-09 ENCOUNTER — PROCEDURE VISIT (OUTPATIENT)
Dept: PAIN MANAGEMENT | Age: 68
End: 2023-03-09

## 2023-03-09 DIAGNOSIS — M46.1 SACROILIITIS (HCC): ICD-10-CM

## 2023-03-09 DIAGNOSIS — M48.062 SPINAL STENOSIS OF LUMBAR REGION WITH NEUROGENIC CLAUDICATION: ICD-10-CM

## 2023-03-09 DIAGNOSIS — M99.04 SOMATIC DYSFUNCTION OF SACROILIAC JOINT: Primary | ICD-10-CM

## 2023-03-09 DIAGNOSIS — M47.817 LUMBOSACRAL SPONDYLOSIS WITHOUT MYELOPATHY: ICD-10-CM

## 2023-03-09 RX ORDER — LIDOCAINE HYDROCHLORIDE 10 MG/ML
5 INJECTION, SOLUTION INFILTRATION; PERINEURAL ONCE
Status: COMPLETED | OUTPATIENT
Start: 2023-03-09 | End: 2023-03-09

## 2023-03-09 RX ORDER — OXYCODONE HYDROCHLORIDE AND ACETAMINOPHEN 5; 325 MG/1; MG/1
1 TABLET ORAL EVERY 6 HOURS PRN
Qty: 21 TABLET | Refills: 0 | Status: SHIPPED | OUTPATIENT
Start: 2023-03-09 | End: 2023-03-16

## 2023-03-09 RX ORDER — BETAMETHASONE SODIUM PHOSPHATE AND BETAMETHASONE ACETATE 3; 3 MG/ML; MG/ML
3 INJECTION, SUSPENSION INTRA-ARTICULAR; INTRALESIONAL; INTRAMUSCULAR; SOFT TISSUE ONCE
Status: COMPLETED | OUTPATIENT
Start: 2023-03-09 | End: 2023-03-09

## 2023-03-09 RX ADMIN — Medication 0.5 MEQ: at 11:27

## 2023-03-09 RX ADMIN — LIDOCAINE HYDROCHLORIDE 5 ML: 10 INJECTION, SOLUTION INFILTRATION; PERINEURAL at 11:26

## 2023-03-09 RX ADMIN — BETAMETHASONE SODIUM PHOSPHATE AND BETAMETHASONE ACETATE 3 MG: 3; 3 INJECTION, SUSPENSION INTRA-ARTICULAR; INTRALESIONAL; INTRAMUSCULAR; SOFT TISSUE at 11:26

## 2023-03-09 NOTE — PROGRESS NOTES
SACROILIAC (SI) JOINT INJECTION      Patient Name: Ruth William  : 1955     Date:  3/9/2023      Physician: Brook Waldrop MD     Ruth William is here today for interventional pain management. Preoperatively, the patient presents with symptoms and physical exam findings consistent with sacroiliac (SI) joint-mediated pain. He has had persistent pain that limits his function and activities of daily living. The pain is persistent despite conservative measures. He has significant functional and psychological impairment due to this condition. Given his symptoms, physical exam findings, impairment in activities of daily living, and lack of response to conservative measures, consideration for SI joint corticosteroid injections was given. Discussed the risks of the procedure including, but not limited to, bleeding, infection, worsened pain, damage to surrounding structures, side effects, toxicity, allergic reactions to medications used, immune and stress-response dysfunction, fat necrosis, avascular necrosis, skin pigmentation changes, blood sugar elevation, headache, vision changes, need for surgery, as well as catastrophic injury such as vision loss, paralysis, stroke, bowel or bladder puncture, bowel or bladder incontinence, incontinence, loss of use of the legs, ventilator dependence, and death. Discussed the risks, benefits, alternative procedures, and alternatives to the procedure including no procedure at all. Discussed that we cannot undo any permanent neurologic damage or change the course of any underlying disease. After thorough discussion, patient expressed understanding and willingness to proceed. Written consent was obtained and is in the chart. Verbal consent to proceed was obtained. Standard ASI guidelines were followed and sterile technique used. Area was cleaned with Betadine three times. Informed consent was obtained. Fluoroscopic guidance was used for this procedure.     S.I. JOINT: Left  A 27 gauge 1.5 inch needle was used to anesthetize the skin and subcutaneous tissue with 1 mL of 1% preservative-free lidocaine and sodium bicarbonate. Then a 25 gauge 3.5 inch spinal needle was advanced to the sacroiliac joint. Oblique and lateral views were obtained. Negative aspiration was achieved. In total, approximately 0.5 mL of 3 mg of Betamethasone and 1.5 mL of 1% preservative free Lidocaine was injected without difficulty. Patient tolerated the procedure well, no obvious complications occurred during the procedure. Patient was appropriately monitored and discharged home in stable condition with their usual motor strength. Postoperative instructions were provided.            [address] [facility] [address]  Phone [phone]/[facility] [phone]

## 2023-03-09 NOTE — TELEPHONE ENCOUNTER
JUAN Cardoso referred him to Dr. Marvell Kehr. Rx request   Requested Prescriptions     Pending Prescriptions Disp Refills    oxyCODONE-acetaminophen (PERCOCET) 5-325 MG per tablet 21 tablet 0     Sig: Take 1 tablet by mouth every 6 hours as needed for Pain for up to 7 days.  Max Daily Amount: 4 tablets     LOV 2/28/2023  Next Visit Date:  Future Appointments   Date Time Provider Raphael Thompson   3/16/2023 10:30 AM MANUELA Jones CNP [facility] EMERGENCY MEDICAL [place] AT [place]   6/6/2023 11:00 AM MANUELA Miller CNP [facility] EMERGENCY [facility] AT [place]   6/22/2023 10:15 [facility] [place] [facility] [place]   3/4/2024 11:30 AM MANUELA Padilla [facility] EM[facility]GENC[facility] MED[facility] [place] [facility] [place]

## 2023-03-09 NOTE — PROGRESS NOTES
Reviewed Dr. [facility] note from 2/16/2023, no surgery recommended at this time.    -Refer to Dr Marycarmen Barros for evaluation for L3-4 severe spinal canal stenosis with concomitant left disc extrusion, severe L4-5 canal stenosis

## 2023-03-13 ENCOUNTER — TELEPHONE (OUTPATIENT)
Dept: FAMILY MEDICINE CLINIC | Age: 68
End: 2023-03-13

## 2023-03-13 DIAGNOSIS — M47.817 LUMBOSACRAL SPONDYLOSIS WITHOUT MYELOPATHY: ICD-10-CM

## 2023-03-13 RX ORDER — OXYCODONE HYDROCHLORIDE AND ACETAMINOPHEN 5; 325 MG/1; MG/1
1 TABLET ORAL EVERY 6 HOURS PRN
Qty: 21 TABLET | Refills: 0 | Status: SHIPPED | OUTPATIENT
Start: 2023-03-13 | End: 2023-03-20

## 2023-03-13 NOTE — TELEPHONE ENCOUNTER
Pt states his pain is up around his lungs? States it has nothing to do with his discs that are messed up.    Pt wanted you aware of this   Ph, [phone]

## 2023-03-13 NOTE — TELEPHONE ENCOUNTER
LOV 2/28  Sched 3/16  pt's appt with  Dr Rhoda Raines got switched to next Monday from today asking for a refill

## 2023-03-20 ENCOUNTER — TELEPHONE (OUTPATIENT)
Dept: PAIN MANAGEMENT | Age: 68
End: 2023-03-20

## 2023-03-20 ENCOUNTER — OFFICE VISIT (OUTPATIENT)
Dept: ORTHOPEDIC SURGERY | Age: 68
End: 2023-03-20
Payer: MEDICARE

## 2023-03-20 ENCOUNTER — HOSPITAL ENCOUNTER (OUTPATIENT)
Dept: ORTHOPEDIC SURGERY | Age: 68
Discharge: HOME | End: 2023-03-22
Payer: MEDICARE

## 2023-03-20 VITALS
OXYGEN SATURATION: 97 % | BODY MASS INDEX: 20.73 KG/M2 | HEART RATE: 75 BPM | TEMPERATURE: 96.9 F | WEIGHT: 140 LBS | HEIGHT: 69 IN

## 2023-03-20 DIAGNOSIS — M54.50 ACUTE LOW BACK PAIN, UNSPECIFIED BACK PAIN LATERALITY, UNSPECIFIED WHETHER SCIATICA PRESENT: ICD-10-CM

## 2023-03-20 DIAGNOSIS — M80.08XA AGE-RELATED OSTEOPOROSIS WITH CURRENT PATHOLOGICAL FRACTURE, VERTEBRA(E), INITIAL ENCOUNTER FOR FRACTURE (HCC): ICD-10-CM

## 2023-03-20 DIAGNOSIS — M25.511 RIGHT SHOULDER PAIN, UNSPECIFIED CHRONICITY: Primary | ICD-10-CM

## 2023-03-20 DIAGNOSIS — M48.062 SPINAL STENOSIS OF LUMBAR REGION WITH NEUROGENIC CLAUDICATION: ICD-10-CM

## 2023-03-20 DIAGNOSIS — M47.817 LUMBOSACRAL SPONDYLOSIS WITHOUT MYELOPATHY: ICD-10-CM

## 2023-03-20 PROCEDURE — 72110 X-RAY EXAM L-2 SPINE 4/>VWS: CPT | Performed by: ORTHOPAEDIC SURGERY

## 2023-03-20 PROCEDURE — 99214 OFFICE O/P EST MOD 30 MIN: CPT | Performed by: ORTHOPAEDIC SURGERY

## 2023-03-20 PROCEDURE — 72110 X-RAY EXAM L-2 SPINE 4/>VWS: CPT

## 2023-03-20 RX ORDER — SUCRALFATE ORAL 1 G/10ML
SUSPENSION ORAL
COMMUNITY
Start: 2023-03-10

## 2023-03-20 NOTE — TELEPHONE ENCOUNTER
Comments: pt has one tablet left. Last Office Visit (last PCP visit):   2/28/2023    Next Visit Date:  Future Appointments   Date Time Provider Raphael Thompson   3/31/2023  3:00 PM [place] [facility] RM 1 [facility] RAD   4/3/2023 10:30 AM Dianamaverick Kat,  [address]   6/6/2023 11:00 AM MANUELA Maguire - CNP [facility] EMERGENCY [facility] AT [place]   6/22/2023 10:15 AM MANUELA Swann CNP [facility] EMERGENCY MED[facility] AT [place]   3/4/2024 11:30 AM MANUELA Swann - CNP [facility] EMERGENCY [facility] AT [place]       **If hasn't been seen in over a year OR hasn't followed up according to last diabetes/ADHD visit, make appointment for patient before sending refill to provider. Rx requested:  Requested Prescriptions     Pending Prescriptions Disp Refills    oxyCODONE-acetaminophen (PERCOCET) 5-325 MG per tablet 21 tablet 0     Sig: Take 1 tablet by mouth every 6 hours as needed for Pain for up to 7 days.  Max Daily Amount: 4 tablets

## 2023-03-20 NOTE — TELEPHONE ENCOUNTER
Received call from [place] at Dr. Abrams [facility] office. Dr. Tony Murray asks Dr. Vandana Forrest at what level in patient's spine did an injection provide relief for pt?     Call back is [phone]

## 2023-03-20 NOTE — PROGRESS NOTES
Subjective:      Patient ID: Vangie Trimble is a 77 y.o. male who presents today for:  Chief Complaint   Patient presents with    New Patient     Pt states pain in low back left side, across whole back, radiate down leg. Loss of ROM and walking upstairs difficult. HPI:  Patient is a 66-year-old male who has had pain in his low back and legs for 1 year. He does describe neurogenic claudication. He has difficulty standing fully upright. Leaning forward does take away his symptoms. Leaning over a cart when walking through a shopping center also helps with his symptoms. He has predominantly left sided radicular complaints with regard to pain and paresthesias but also has paresthesias on the right. It goes down the side of his leg to the side of his calf to the top of his foot.     Past Medical History:   Diagnosis Date    Allergic rhinitis 2/19/2018    Anxiety     Chronic back pain     COPD (chronic obstructive pulmonary disease) ([facility])     Depression     Disorder of stomach     valve not closing properly    Emphysema lung ([address]     Essential hypertension 11/4/2019    Gastroesophageal reflux disease 10/4/2019    Hyperlipidemia     meds > 22 yrs    Low back pain 5/1/2018    ARNAUD (obstructive sleep apnea) 2/19/2018    Other emphysema ([address] 10/4/2019    Other intervertebral disc degeneration, lumbar region 3/23/2018    Radiculopathy, lumbar region 2/28/2018    Restless leg syndrome     Seasonal affective disorder ([address] 10/4/2019    Substance abuse ([address]     alcholic, quit 20 yrs      Past Surgical History:   Procedure Laterality Date    COLONOSCOPY  12/22/2016    A SHALA MARLEY    DENTAL SURGERY  10 yrs ago    ENDOSCOPY, COLON, DIAGNOSTIC      EYE SURGERY      Lasik OU    FINGER TRIGGER RELEASE Left 11/1/2022    LEFT HAND TRIGGER FINGER RING FINGER RELEASE OF A1 PULLEY performed by Erich Cabezas MD at [place] [address] ARTHROSCOPY Right     KNEE SURGERY Right 05/2016    Ray procedure    KNEE SURGERY

## 2023-03-20 NOTE — Clinical Note
He has a abdominal aorta that is calcified which should be addressed prior to any type of surgical intervention.

## 2023-03-21 ENCOUNTER — TELEPHONE (OUTPATIENT)
Dept: ORTHOPEDIC SURGERY | Age: 68
End: 2023-03-21

## 2023-03-21 RX ORDER — OXYCODONE HYDROCHLORIDE AND ACETAMINOPHEN 5; 325 MG/1; MG/1
1 TABLET ORAL EVERY 6 HOURS PRN
Qty: 21 TABLET | Refills: 0 | OUTPATIENT
Start: 2023-03-21 | End: 2023-03-28

## 2023-03-21 NOTE — TELEPHONE ENCOUNTER
He can go back to nurse practitioner Bj Waldrop for more pain medication. I could prescribe him a anti-inflammatory if he would like that however no narcotics until I do surgery on him if I do surgery on him.

## 2023-03-21 NOTE — TELEPHONE ENCOUNTER
I am not comfortable with continuing to prescribe this. It was supposed to be short term until seen by pain management and/or surgeon. You have now seen both!

## 2023-03-21 NOTE — TELEPHONE ENCOUNTER
The patient underwent a Left Lumbar L3/4 and L4/5 transforaminal epidural steroid injection on 1/18/23. There appears to be a component of transitional anatomy, which if present, could change the levels of the procedure.      Lateral images of procedure pasted below for Dr Anaid Santos

## 2023-03-21 NOTE — TELEPHONE ENCOUNTER
Called and informed pt and advised him that he does need to call pain management or surgeon for any future refills of this medication. He was not too happy he was not getting a refill today. Advised him he saw pain management and surgeon. He states he doesn't have surgery until the 3rd.  Advised him h still needs to contact them

## 2023-03-23 ENCOUNTER — HOSPITAL ENCOUNTER (EMERGENCY)
Age: 68
Discharge: HOME | End: 2023-03-23
Payer: MEDICARE

## 2023-03-23 VITALS
HEIGHT: 69 IN | TEMPERATURE: 97.5 F | SYSTOLIC BLOOD PRESSURE: 133 MMHG | BODY MASS INDEX: 20.73 KG/M2 | WEIGHT: 140 LBS | OXYGEN SATURATION: 99 % | HEART RATE: 94 BPM | DIASTOLIC BLOOD PRESSURE: 75 MMHG | RESPIRATION RATE: 16 BRPM

## 2023-03-23 DIAGNOSIS — G89.29 OTHER CHRONIC PAIN: Primary | ICD-10-CM

## 2023-03-23 PROCEDURE — 99283 EMERGENCY DEPT VISIT LOW MDM: CPT

## 2023-03-23 PROCEDURE — 6370000000 HC RX 637 (ALT 250 FOR IP): Performed by: PHYSICIAN ASSISTANT

## 2023-03-23 RX ORDER — OXYCODONE HYDROCHLORIDE AND ACETAMINOPHEN 5; 325 MG/1; MG/1
1 TABLET ORAL ONCE
Status: COMPLETED | OUTPATIENT
Start: 2023-03-23 | End: 2023-03-23

## 2023-03-23 RX ORDER — METHYLPREDNISOLONE 4 MG/1
TABLET ORAL
Qty: 21 TABLET | Refills: 0 | Status: SHIPPED | OUTPATIENT
Start: 2023-03-23 | End: 2023-03-29

## 2023-03-23 RX ADMIN — OXYCODONE AND ACETAMINOPHEN 1 TABLET: 5; 325 TABLET ORAL at 23:19

## 2023-03-23 ASSESSMENT — PAIN - FUNCTIONAL ASSESSMENT: PAIN_FUNCTIONAL_ASSESSMENT: 0-10

## 2023-03-23 ASSESSMENT — PAIN SCALES - GENERAL: PAINLEVEL_OUTOF10: 9

## 2023-03-23 ASSESSMENT — PAIN DESCRIPTION - LOCATION: LOCATION: KNEE;BACK

## 2023-03-23 ASSESSMENT — ENCOUNTER SYMPTOMS
SHORTNESS OF BREATH: 0
COLOR CHANGE: 0
ABDOMINAL PAIN: 0
TROUBLE SWALLOWING: 0
EYE PAIN: 0
ALLERGIC/IMMUNOLOGIC NEGATIVE: 1
APNEA: 0
BACK PAIN: 1

## 2023-03-24 ENCOUNTER — TELEPHONE (OUTPATIENT)
Dept: PAIN MANAGEMENT | Age: 68
End: 2023-03-24

## 2023-03-24 DIAGNOSIS — M47.817 LUMBOSACRAL SPONDYLOSIS WITHOUT MYELOPATHY: ICD-10-CM

## 2023-03-24 RX ORDER — OXYCODONE HYDROCHLORIDE AND ACETAMINOPHEN 5; 325 MG/1; MG/1
1 TABLET ORAL EVERY 6 HOURS PRN
Qty: 21 TABLET | Refills: 0 | OUTPATIENT
Start: 2023-03-24 | End: 2023-03-31

## 2023-03-24 NOTE — TELEPHONE ENCOUNTER
Patient went to the emergency room last night for back pain. Pt called [facility] office for a refill of Percocet and was instructed to call pain management. Pt asks if Dr. Kimberley Laboy will refill Percocet 5-325 every 6 hours? Pt states he usually only takes twice a day.

## 2023-03-24 NOTE — ED PROVIDER NOTES
Depression     Disorder of stomach     valve not closing properly    Em[facility]ma [facility]     Essential hypertension 11/4/2019    Gastroesophageal reflux disease 10/4/2019    Hyperlipidemia     meds > 22 yrs    Low back pain 5/1/2018    ARNAUD (obstructive sleep apnea) 2/19/2018    Other emphysema ([facility] 10/4/2019    Other intervertebral disc degeneration, lumbar region 3/23/2018    Radiculopathy, lumbar region 2/28/2018    Restless leg syndrome     Seasonal affective disorder ([facility] 10/4/2019    Substance abuse ([facility]     alcholic, quit 20 yrs          SURGICAL HISTORY       Past Surgical History:   Procedure Laterality Date    COLONOSCOPY  12/22/2016    A SHALA MARLEY    DENTAL SURGERY  10 yrs ago    ENDOSCOPY, COLON, DIAGNOSTIC      EYE SURGERY      Lasik OU    FINGER TRIGGER RELEASE Left 11/1/2022    LEFT HAND TRIGGER FINGER RING FINGER RELEASE OF A1 PANFILO performed by Cady Kendrick MD at [place] [address] ARTHROSCOPY Right     KNEE SURGERY Right 05/2016    Ray procedure    KNEE SURGERY      DE NASAL/SINUS ENDOSCOPY W/MAXILLARY ANTROSTOMY N/A 2/22/2018    SEPTOPLASTY MICRODEBRIDER ASSISTED TURBINOPLASTY AND OUT-FRACTURING BILATERAL NASAL ENDOSCOPY performed by Ema Rondon MD at [address] [place] [address]       Previous Medications    FAMOTIDINE (PEPCID) 20 MG TABLET    Take 1 tablet by mouth 2 times daily    FLUOXETINE (PROZAC) 40 MG CAPSULE    TAKE 1 CAPSULE BY MOUTH DAILY. GABAPENTIN (NEURONTIN) 600 MG TABLET    Take one 600 mg tablet in morning Take one 600 mg tablet mid day Take two 600 mg tablets at night    HANDICAP PLACARD MISC    by Does not apply route Ex: 5 years 2/14/2028    KETOROLAC (TORADOL) 10 MG TABLET    Take 1 tablet by mouth every 6 hours as needed for Pain    LIDOCAINE (LIDODERM) 5 %    Place 1 patch onto the skin daily 12 hours on, 12 hours off.     MELOXICAM (MOBIC) 15 MG TABLET    Take 15 mg by mouth daily    ONDANSETRON (ZOFRAN-ODT) 4 MG DISINTEGRATING TABLET    Take 1 tablet by mouth 3 times daily as needed for Nausea or Vomiting    ROSUVASTATIN (CRESTOR) 40 MG TABLET    TAKE 1 TABLET BY MOUTH DAILY AT BEDTIME. SUCRALFATE (CARAFATE) 1 GM/10ML SUSPENSION    TAKE 10 ML BY MOUTH 4 TIMES DAILY. ALLERGIES     Alcohol, Benadryl [diphenhydramine], Demerol hcl [meperidine], and Sulfa antibiotics    HISTORY       Family History   Problem Relation Age of Onset    Cancer Mother         stomach    Arthritis Mother     Heart Disease Father 48    Cancer Father         bone    Cancer Maternal Grandmother         lung    Cancer Paternal Grandmother     Heart Disease Paternal Grandfather     No Known Problems Sister     Cancer Brother     Heart Disease Brother         hole in heart in 65 at 1 months age          SOCIAL HISTORY       Social History     Socioeconomic History    Marital status:      Spouse name: None    Number of children: 2    Years of education: 12    Highest education level: High school graduate   Tobacco Use    Smoking status: Every Day     Packs/day: 2.00     Years: 52.00     Pack years: 104.00     Types: Cigars, Cigarettes     Start date: 1970    Smokeless tobacco: Never    Tobacco comments:     3-5 cigars qd   Vaping Use    Vaping Use: Never used   Substance and Sexual Activity    Alcohol use: No     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    Drug use: No    Sexual activity: Not Currently     Partners: Female     Social Determinants of Health     Financial Resource Strain: Low Risk     Difficulty of Paying Living Expenses: Not hard at all   Food Insecurity: No Food Insecurity    Worried About Running Out of Food in the Last Year: Never true    Ran Out of Food in the Last Year: Never true   Transportation Needs: Unknown    Lack of Transportation (Non-Medical):  No   Physical Activity: Insufficiently Active    Days of Exercise per Week: 7 days    Minutes of Exercise per Session: 20 min   Housing Stability: Unknown    Unstable Housing in the Last Year: No       SCREENINGS

## 2023-03-24 NOTE — TELEPHONE ENCOUNTER
Called pt again and advised him that these have to be filled by pain management as he was told on 3/20/23. [facility] Oates no longer fills this medication for pt.

## 2023-03-24 NOTE — TELEPHONE ENCOUNTER
Comments: WAS IN THE ER AND ER TOLD PATIENT TO CALL OFFICE TO GET REFILL SENT TO PHARMACY. PT STATES ONLY TAKE 2 A DAY AS NEEDED. Last Office Visit (last PCP visit):   2/28/2023    Next Visit Date:  Future Appointments   Date Time Provider Raphael Thompson   3/31/2023  3:00 PM PETERSON BONE DENSITY RM 1 [facility] Fac RAD   4/3/2023 10:30 AM Kam Cagle DO [address]   6/6/2023 11:00 AM MANUELA Maguire CNP [facility] EMERGENCY [facility] CENTER AT [place]   6/22/2023 10:15 AM MANUELA Coreas CNP [facility] EMERGENCY [facility] C[facility] AT [place]   3/4/2024 11:30 AM MANUELA Coreas CNP [facility] EMERGENCY [facility] CENTER AT [place]       **If hasn't been seen in over a year OR hasn't followed up according to last diabetes/ADHD visit, make appointment for patient before sending refill to provider. Rx requested:  Requested Prescriptions     Pending Prescriptions Disp Refills    oxyCODONE-acetaminophen (PERCOCET) 5-325 MG per tablet 21 tablet 0     Sig: Take 1 tablet by mouth every 6 hours as needed for Pain for up to 7 days.  Max Daily Amount: 4 tablets

## 2023-03-27 ENCOUNTER — TELEPHONE (OUTPATIENT)
Dept: FAMILY MEDICINE CLINIC | Age: 68
End: 2023-03-27

## 2023-03-27 NOTE — TELEPHONE ENCOUNTER
Pt calling asking for a script for the pain pills. Says he has an appt with Dr Park Robledo on the 3rd. He will not give him anything for pain until he has his procedure. Not sure when that is yet.    [facility]          [phone]

## 2023-03-28 NOTE — TELEPHONE ENCOUNTER
Have you been told a plan about surgery? At this point we may need to refer to a different pain management if you are not getting any relief from what they are doing. I am being put in a difficult situation.

## 2023-03-28 NOTE — TELEPHONE ENCOUNTER
PT WAS CALLED & LMVM. WHEN PT CALLS BACK, PLEASE INFORM PT OF DR. SAENZ'S RESPONSE. Sorry, no opioids from me at this time.

## 2023-03-28 NOTE — TELEPHONE ENCOUNTER
Pt returned call and lmvm. I returned pt's call again, but had to leave another message, which was detailed.

## 2023-03-28 NOTE — TELEPHONE ENCOUNTER
He see pain management on the 3rd for an injection he states he thought he called pain management. He states he knows that you can not fill him anymore pain meds.  I advised him that he called our office and he may want to call back pain managment

## 2023-03-29 NOTE — TELEPHONE ENCOUNTER
He had called both us and pain management. Pain management stated that they are not willing to do opiate therapy at this time.

## 2023-03-31 ENCOUNTER — HOSPITAL ENCOUNTER (OUTPATIENT)
Dept: WOMENS IMAGING | Age: 68
End: 2023-03-31
Payer: MEDICARE

## 2023-03-31 DIAGNOSIS — M80.08XA AGE-REL OSTEOPOR W CURRENT PATH FRACTURE, VERTEBRA(E), INIT (HCC): ICD-10-CM

## 2023-03-31 DIAGNOSIS — M54.50 ACUTE LOW BACK PAIN, UNSPECIFIED BACK PAIN LATERALITY, UNSPECIFIED WHETHER SCIATICA PRESENT: ICD-10-CM

## 2023-03-31 DIAGNOSIS — M80.08XA AGE-RELATED OSTEOPOROSIS WITH CURRENT PATHOLOGICAL FRACTURE, VERTEBRA(E), INITIAL ENCOUNTER FOR FRACTURE (HCC): ICD-10-CM

## 2023-03-31 DIAGNOSIS — M54.50 ACUTE LOW BACK PAIN, UNSPECIFIED BACK PAIN LATERALITY, UNSPECIFIED WHETHER SCIATICA PRESENT: Primary | ICD-10-CM

## 2023-03-31 PROCEDURE — 77081 DXA BONE DENSITY APPENDICULR: CPT

## 2023-03-31 PROCEDURE — 77080 DXA BONE DENSITY AXIAL: CPT

## 2023-04-03 ENCOUNTER — TELEPHONE (OUTPATIENT)
Dept: ORTHOPEDIC SURGERY | Age: 68
End: 2023-04-03

## 2023-04-03 ENCOUNTER — OFFICE VISIT (OUTPATIENT)
Dept: ORTHOPEDIC SURGERY | Age: 68
End: 2023-04-03

## 2023-04-03 VITALS
TEMPERATURE: 97.3 F | HEART RATE: 90 BPM | OXYGEN SATURATION: 98 % | HEIGHT: 69 IN | WEIGHT: 145 LBS | BODY MASS INDEX: 21.48 KG/M2

## 2023-04-03 DIAGNOSIS — M80.08XA AGE-RELATED OSTEOPOROSIS WITH CURRENT PATHOLOGICAL FRACTURE, VERTEBRA(E), INITIAL ENCOUNTER FOR FRACTURE (HCC): Primary | ICD-10-CM

## 2023-04-03 DIAGNOSIS — F41.9 ANXIETY: ICD-10-CM

## 2023-04-03 DIAGNOSIS — F32.A DEPRESSION, UNSPECIFIED DEPRESSION TYPE: ICD-10-CM

## 2023-04-03 DIAGNOSIS — M81.0 AGE-RELATED OSTEOPOROSIS WITHOUT CURRENT PATHOLOGICAL FRACTURE: ICD-10-CM

## 2023-04-03 DIAGNOSIS — I70.0 CALCIFICATION OF ABDOMINAL AORTA (HCC): Primary | ICD-10-CM

## 2023-04-03 DIAGNOSIS — M48.062 SPINAL STENOSIS OF LUMBAR REGION WITH NEUROGENIC CLAUDICATION: ICD-10-CM

## 2023-04-03 NOTE — PROGRESS NOTES
past treatments. Further guidance on treatment can be found at the  [facility] Osteoporosis Foundation's website [phone]. Impression: Osteopenia by WHO criteria. RECOMMENDATIONS:  1. All patients should optimize their calcium and vitamin D intake. 2. Consider FDA-approved medical therapies in postmenopausal women and men  aged 48 years and older, based on the following:    - A hip or vertebral (clinical or morphometric) fracture    - T-score less than or equal to -2.5 at the femoral neck or spine after  appropriate evaluation to exclude secondary causes    - Low bone density (T-score between -1.0 and -2.5 at the femoral neck or  spine) and a 10-year probability of a hip fracture greater than or equal to  3% or a 10-year probability of a major osteoporosis-related fracture greater  than or equal to 20% based on FRAX calculation.    - Clinician judgment and/or patient preferences may indicate treatment for  people with 10-year fracture probabilities above or below these levels    - Further guidance on treatment can be found at the National Osteoporosis  Foundation's website 659-433-5718.    3. Patients with diagnosis of osteoporosis or at high risk for fracture  should have regular bone mineral density tests. For patients eligible for  Medicare, routine testing is allowed once every 2 years. The testing  frequency can be increased to one year for patients who have rapidly  progressing disease, those who are receiving or discontinuing medical therapy  to restore bone mass or have additional risk factors. Template code:  [id number]_DX_dxa         Assessment:       Diagnosis Orders   1. Calcification of abdominal aorta (HCC)  BALJINDER Valadez MD, Vascular Surgery, [place]           Plan:      He does smoke 2 cigars a day. I would be inclined to only do decompression without trying to fuse him because of his significant smoking history.   Bone density test reviewed he is osteopenic at the

## 2023-04-04 DIAGNOSIS — M47.817 LUMBOSACRAL SPONDYLOSIS WITHOUT MYELOPATHY: ICD-10-CM

## 2023-04-04 RX ORDER — OXYCODONE HYDROCHLORIDE AND ACETAMINOPHEN 5; 325 MG/1; MG/1
1 TABLET ORAL EVERY 6 HOURS PRN
Qty: 21 TABLET | Refills: 0 | OUTPATIENT
Start: 2023-04-04 | End: 2023-04-11

## 2023-04-04 RX ORDER — MELOXICAM 15 MG/1
15 TABLET ORAL DAILY
Qty: 30 TABLET | Refills: 0 | Status: SHIPPED | OUTPATIENT
Start: 2023-04-04

## 2023-04-04 RX ORDER — FLUOXETINE HYDROCHLORIDE 40 MG/1
CAPSULE ORAL
Qty: 90 CAPSULE | Refills: 1 | Status: SHIPPED | OUTPATIENT
Start: 2023-04-04

## 2023-04-04 RX ORDER — KETOROLAC TROMETHAMINE 10 MG/1
10 TABLET, FILM COATED ORAL EVERY 6 HOURS PRN
Qty: 20 TABLET | Refills: 0 | OUTPATIENT
Start: 2023-04-04

## 2023-04-04 NOTE — TELEPHONE ENCOUNTER
Comments: completely out     Last Office Visit (last PCP visit):   2/28/2023    Next Visit Date:  Future Appointments   Date Time Provider Raphael Thompson   5/8/2023 10:30 AM Kee Long DO [address]   6/6/2023 11:00 AM MANUELA Valdez CNP [facility] EMERGENCY [facility] AT [place]   6/22/2023 10:15 AM MANUELA Pritchett CNP [facility] EMERGENCY [facility] AT [place]   3/4/2024 11:30 AM MANUELA Pritchett CNP [facility] EMERGENCY [facility] CENTER AT [place]       **If hasn't been seen in over a year OR hasn't followed up according to last diabetes/ADHD visit, make appointment for patient before sending refill to provider.     Rx requested:  Requested Prescriptions     Pending Prescriptions Disp Refills    meloxicam (MOBIC) 15 MG tablet 30 tablet      Sig: Take 1 tablet by mouth daily    ketorolac (TORADOL) 10 MG tablet 20 tablet 0     Sig: Take 1 tablet by mouth every 6 hours as needed for Pain

## 2023-04-04 NOTE — TELEPHONE ENCOUNTER
Requesting medication refill.  Please approve or deny this request.    Rx requested:  Requested Prescriptions     Pending Prescriptions Disp Refills    FLUoxetine (PROZAC) 40 MG capsule [Pharmacy Med Name: FLUOXETINE HCL 40 MG CAPSULE] 90 capsule 1     Sig: take 1 capsule by mouth once daily       Last Office Visit:   2/28/2023    Next Visit Date:  Future Appointments   Date Time Provider Raphael Thompson   5/8/2023 10:30 AM [facility]   6/6/2023 11:00 AM MANUELA Reina CNP [facility] EMERGENCY MEDICAL CENTER AT [place]   6/22/2023 10:15 AM MANUELA Garcias CNP [facility] EMERGENCY MEDICAL CENTER AT [place]   3/4/2024 11:30 AM MANUELA Garcias CNP [facility] EMERGENCY MEDICAL CENTER AT [place]

## 2023-04-06 NOTE — TELEPHONE ENCOUNTER
I have called pt multiple times in regards to narcotics. He is aware that you will not fill them, but keeps having them send in RX request for them. He states he meant to send it elsewhere.

## 2023-04-13 ENCOUNTER — HOSPITAL ENCOUNTER (EMERGENCY)
Age: 68
Discharge: HOME | End: 2023-04-13
Attending: EMERGENCY MEDICINE
Payer: MEDICARE

## 2023-04-13 VITALS
SYSTOLIC BLOOD PRESSURE: 141 MMHG | TEMPERATURE: 98 F | DIASTOLIC BLOOD PRESSURE: 72 MMHG | OXYGEN SATURATION: 98 % | RESPIRATION RATE: 18 BRPM | HEART RATE: 79 BPM | HEIGHT: 69 IN | WEIGHT: 145 LBS | BODY MASS INDEX: 21.48 KG/M2

## 2023-04-13 DIAGNOSIS — M54.42 CHRONIC LOW BACK PAIN WITH BILATERAL SCIATICA, UNSPECIFIED BACK PAIN LATERALITY: Primary | ICD-10-CM

## 2023-04-13 DIAGNOSIS — G89.29 CHRONIC LOW BACK PAIN WITH BILATERAL SCIATICA, UNSPECIFIED BACK PAIN LATERALITY: Primary | ICD-10-CM

## 2023-04-13 DIAGNOSIS — M54.41 CHRONIC LOW BACK PAIN WITH BILATERAL SCIATICA, UNSPECIFIED BACK PAIN LATERALITY: Primary | ICD-10-CM

## 2023-04-13 PROCEDURE — 6360000002 HC RX W HCPCS: Performed by: EMERGENCY MEDICINE

## 2023-04-13 PROCEDURE — 99284 EMERGENCY DEPT VISIT MOD MDM: CPT

## 2023-04-13 PROCEDURE — 96372 THER/PROPH/DIAG INJ SC/IM: CPT

## 2023-04-13 RX ORDER — KETOROLAC TROMETHAMINE 30 MG/ML
60 INJECTION, SOLUTION INTRAMUSCULAR; INTRAVENOUS ONCE
Status: COMPLETED | OUTPATIENT
Start: 2023-04-13 | End: 2023-04-13

## 2023-04-13 RX ADMIN — KETOROLAC TROMETHAMINE 60 MG: 30 INJECTION, SOLUTION INTRAMUSCULAR at 08:47

## 2023-04-13 ASSESSMENT — PAIN - FUNCTIONAL ASSESSMENT: PAIN_FUNCTIONAL_ASSESSMENT: 0-10

## 2023-04-13 ASSESSMENT — ENCOUNTER SYMPTOMS
BACK PAIN: 1
NAUSEA: 0
CHEST TIGHTNESS: 0
EYE PAIN: 0
VOMITING: 0
SORE THROAT: 0
SHORTNESS OF BREATH: 0
ABDOMINAL PAIN: 0

## 2023-04-13 ASSESSMENT — PAIN DESCRIPTION - ORIENTATION: ORIENTATION: MID

## 2023-04-13 ASSESSMENT — PAIN SCALES - GENERAL
PAINLEVEL_OUTOF10: 10
PAINLEVEL_OUTOF10: 10

## 2023-04-13 ASSESSMENT — PAIN DESCRIPTION - PAIN TYPE: TYPE: ACUTE PAIN

## 2023-04-13 ASSESSMENT — PAIN DESCRIPTION - DESCRIPTORS
DESCRIPTORS: ACHING
DESCRIPTORS: ACHING

## 2023-04-13 ASSESSMENT — PAIN DESCRIPTION - LOCATION
LOCATION: BACK
LOCATION: BACK

## 2023-04-13 ASSESSMENT — PAIN DESCRIPTION - FREQUENCY: FREQUENCY: CONTINUOUS

## 2023-04-13 NOTE — ED PROVIDER NOTES
[facility] ED  EMERGENCY DEPARTMENT ENCOUNTER      Pt Name: Vangie Trimble  MRN: [id number]  Ar[place]fzion 1955  Date of evaluation: 4/13/2023  Provider: Liz Nieto DO    CHIEF COMPLAINT       Chief Complaint   Patient presents with    Back Pain     Chronic           HISTORY OF PRESENT ILLNESS   (Location/Symptom, Timing/Onset, Context/Setting, Quality, Duration, Modifying Factors, Severity)  Note limiting factors. Vangie Trimble is a 77 y.o. male who presents to the emergency department. Patient with longstanding chronic back pain. Has an appointment coming up for some injections in his back by pain management. Patient states that he could not sleep all night secondary to his back pain. There is nothing new about it and he has not fallen. Patient takes gabapentin and Mobic. Patient drove to the ER today. Is able to ambulate and control bowel and bladder. HPI    Nursing Notes were reviewed. REVIEW OF SYSTEMS    (2-9 systems for level 4, 10 or more for level 5)     Review of Systems   Constitutional:  Negative for activity change, appetite change and fatigue. HENT:  Negative for congestion and sore throat. Eyes:  Negative for pain and visual disturbance. Respiratory:  Negative for chest tightness and shortness of breath. Cardiovascular:  Negative for chest pain. Gastrointestinal:  Negative for abdominal pain, nausea and vomiting. Endocrine: Negative for polydipsia. Genitourinary:  Negative for flank pain and urgency. Musculoskeletal:  Positive for back pain. Negative for gait problem and neck stiffness. Skin:  Negative for rash. Neurological:  Negative for weakness, light-headedness and headaches. Psychiatric/Behavioral:  Negative for confusion and sleep disturbance. Except as noted above the remainder of the review of systems was reviewed and negative.        PAST MEDICAL HISTORY     Past Medical History:   Diagnosis Date    Allergic rhinitis 2/19/2018

## 2023-04-19 ENCOUNTER — PROCEDURE VISIT (OUTPATIENT)
Dept: PAIN MANAGEMENT | Age: 68
End: 2023-04-19

## 2023-04-19 DIAGNOSIS — M54.16 LUMBAR RADICULOPATHY: Primary | ICD-10-CM

## 2023-04-19 RX ORDER — LIDOCAINE HYDROCHLORIDE 10 MG/ML
5 INJECTION, SOLUTION INFILTRATION; PERINEURAL ONCE
Status: COMPLETED | OUTPATIENT
Start: 2023-04-19 | End: 2023-04-19

## 2023-04-19 RX ORDER — SODIUM CHLORIDE 9 MG/ML
3 INJECTION INTRAVENOUS ONCE
Status: COMPLETED | OUTPATIENT
Start: 2023-04-19 | End: 2023-04-19

## 2023-04-19 RX ORDER — DEXAMETHASONE SODIUM PHOSPHATE 10 MG/ML
10 INJECTION, SOLUTION INTRAMUSCULAR; INTRAVENOUS ONCE
Status: COMPLETED | OUTPATIENT
Start: 2023-04-19 | End: 2023-04-19

## 2023-04-19 RX ADMIN — Medication 0.5 MEQ: at 17:02

## 2023-04-19 RX ADMIN — DEXAMETHASONE SODIUM PHOSPHATE 10 MG: 10 INJECTION, SOLUTION INTRAMUSCULAR; INTRAVENOUS at 17:02

## 2023-04-19 RX ADMIN — SODIUM CHLORIDE 3 ML: 9 INJECTION INTRAVENOUS at 17:03

## 2023-04-19 RX ADMIN — LIDOCAINE HYDROCHLORIDE 5 ML: 10 INJECTION, SOLUTION INFILTRATION; PERINEURAL at 17:02

## 2023-04-19 NOTE — PROGRESS NOTES
Lumbar Transforaminal Epidural Steroid Injection (TFESI)    Patient Name: La Page   : 1955  Date: 2023     Physician: Jairo Hirsch MD     INDICATIONS: La Page is a 77 y.o. male who presents with symptoms and physical exam findings consistent with lumbar radiculopathy. He has lumbosacral paresthesias with a positive straight leg raise on physical exam. He has had persistent pain that limits his activities of daily living. The pain is persistent despite conservative measures. He has significant functional and psychological impairment due to this condition. He has had greater than 50% pain relief for over three months from prior epidural steroid injections, the pain has returned in a similar character, distribution, and intensity necessitating repeat treatment. Given his symptoms, physical exam findings, impairment in activities of daily living, and lack of response to conservative measures, consideration for lumbar transforaminal epidural corticosteroid injection was given. Discussed the risks including but not limited to bleeding, infection, worsened pain, damage to surrounding structures, side effects, toxicity, allergic reactions to medications used, need for surgery, headache, vision changes, difficulty with chewing and/or swallowing, immune and stress-response dysfunction, fat necrosis, skin pigmentation changes, blood sugar elevation, as well as catastrophic injury such as vision loss/blindness, paralysis, spinal cord or plexus injury, cerebral brainstem or spinal cord infarction, intrathecal injection, spinal cord puncture, arachnoiditis, discitis, stroke, bowel/bladder/sexual dysfunction, ventilator dependence, loss of use of the arms and/or legs, and death. Discussed off-label use of corticosteroids and how the Food and Drug Administration (FDA) has not approved corticosteroids for epidural use.  Discussed the risks, benefits, alternative procedures, and alternatives to the

## 2023-04-22 ENCOUNTER — HOSPITAL ENCOUNTER (EMERGENCY)
Age: 68
Discharge: HOME | End: 2023-04-22
Payer: MEDICARE

## 2023-04-22 VITALS
RESPIRATION RATE: 20 BRPM | DIASTOLIC BLOOD PRESSURE: 81 MMHG | HEART RATE: 75 BPM | TEMPERATURE: 97.6 F | OXYGEN SATURATION: 97 % | SYSTOLIC BLOOD PRESSURE: 119 MMHG | WEIGHT: 145 LBS | BODY MASS INDEX: 21.41 KG/M2

## 2023-04-22 DIAGNOSIS — M48.062 SPINAL STENOSIS OF LUMBAR REGION WITH NEUROGENIC CLAUDICATION: ICD-10-CM

## 2023-04-22 DIAGNOSIS — M54.50 ACUTE EXACERBATION OF CHRONIC LOW BACK PAIN: Primary | ICD-10-CM

## 2023-04-22 DIAGNOSIS — G89.29 ACUTE EXACERBATION OF CHRONIC LOW BACK PAIN: Primary | ICD-10-CM

## 2023-04-22 DIAGNOSIS — M54.16 RADICULOPATHY, LUMBAR REGION: ICD-10-CM

## 2023-04-22 PROCEDURE — 96372 THER/PROPH/DIAG INJ SC/IM: CPT

## 2023-04-22 PROCEDURE — 99284 EMERGENCY DEPT VISIT MOD MDM: CPT

## 2023-04-22 PROCEDURE — 6360000002 HC RX W HCPCS: Performed by: PHYSICIAN ASSISTANT

## 2023-04-22 RX ORDER — TIZANIDINE 2 MG/1
2-4 TABLET ORAL 3 TIMES DAILY PRN
Qty: 30 TABLET | Refills: 0 | Status: SHIPPED | OUTPATIENT
Start: 2023-04-22

## 2023-04-22 RX ORDER — MELOXICAM 15 MG/1
15 TABLET ORAL DAILY
Qty: 30 TABLET | Refills: 0 | Status: SHIPPED | OUTPATIENT
Start: 2023-04-22

## 2023-04-22 RX ORDER — KETOROLAC TROMETHAMINE 30 MG/ML
30 INJECTION, SOLUTION INTRAMUSCULAR; INTRAVENOUS ONCE
Status: COMPLETED | OUTPATIENT
Start: 2023-04-22 | End: 2023-04-22

## 2023-04-22 RX ORDER — LIDOCAINE 4 G/G
1 PATCH TOPICAL ONCE
Status: DISCONTINUED | OUTPATIENT
Start: 2023-04-22 | End: 2023-04-22 | Stop reason: HOSPADM

## 2023-04-22 RX ORDER — GABAPENTIN 600 MG/1
TABLET ORAL
Qty: 90 TABLET | Refills: 0 | Status: SHIPPED | OUTPATIENT
Start: 2023-04-22 | End: 2023-06-08

## 2023-04-22 RX ADMIN — KETOROLAC TROMETHAMINE 30 MG: 30 INJECTION, SOLUTION INTRAMUSCULAR at 18:29

## 2023-04-22 ASSESSMENT — PAIN - FUNCTIONAL ASSESSMENT: PAIN_FUNCTIONAL_ASSESSMENT: 0-10

## 2023-04-22 ASSESSMENT — ENCOUNTER SYMPTOMS
COUGH: 0
ABDOMINAL PAIN: 0
NAUSEA: 0
SINUS PAIN: 0
SHORTNESS OF BREATH: 0
DIARRHEA: 0
SORE THROAT: 0
VOMITING: 0
RHINORRHEA: 0
BACK PAIN: 1
TROUBLE SWALLOWING: 0
PHOTOPHOBIA: 0
SINUS PRESSURE: 0

## 2023-04-22 ASSESSMENT — PAIN DESCRIPTION - LOCATION: LOCATION: BACK

## 2023-04-22 ASSESSMENT — LIFESTYLE VARIABLES
HOW OFTEN DO YOU HAVE A DRINK CONTAINING ALCOHOL: NEVER
HOW MANY STANDARD DRINKS CONTAINING ALCOHOL DO YOU HAVE ON A TYPICAL DAY: PATIENT DOES NOT DRINK

## 2023-04-22 ASSESSMENT — PAIN DESCRIPTION - ORIENTATION: ORIENTATION: LOWER

## 2023-04-22 ASSESSMENT — PAIN SCALES - GENERAL: PAINLEVEL_OUTOF10: 10

## 2023-04-22 ASSESSMENT — PAIN DESCRIPTION - DESCRIPTORS: DESCRIPTORS: ACHING

## 2023-05-02 ENCOUNTER — TELEPHONE (OUTPATIENT)
Dept: GASTROENTEROLOGY | Age: 68
End: 2023-05-02

## 2023-05-15 ENCOUNTER — HOSPITAL ENCOUNTER (EMERGENCY)
Age: 68
Discharge: HOME | End: 2023-05-15
Attending: GENERAL PRACTICE
Payer: MEDICARE

## 2023-05-15 VITALS
DIASTOLIC BLOOD PRESSURE: 88 MMHG | SYSTOLIC BLOOD PRESSURE: 156 MMHG | WEIGHT: 140 LBS | RESPIRATION RATE: 18 BRPM | HEIGHT: 69 IN | BODY MASS INDEX: 20.73 KG/M2 | HEART RATE: 66 BPM | OXYGEN SATURATION: 98 % | TEMPERATURE: 97.8 F

## 2023-05-15 DIAGNOSIS — M54.50 CHRONIC RIGHT-SIDED LOW BACK PAIN WITHOUT SCIATICA: ICD-10-CM

## 2023-05-15 DIAGNOSIS — G89.29 CHRONIC RIGHT-SIDED LOW BACK PAIN WITHOUT SCIATICA: ICD-10-CM

## 2023-05-15 DIAGNOSIS — M25.562 ACUTE PAIN OF LEFT KNEE: Primary | ICD-10-CM

## 2023-05-15 PROCEDURE — 99284 EMERGENCY DEPT VISIT MOD MDM: CPT

## 2023-05-15 PROCEDURE — 6360000002 HC RX W HCPCS: Performed by: GENERAL PRACTICE

## 2023-05-15 PROCEDURE — 96372 THER/PROPH/DIAG INJ SC/IM: CPT

## 2023-05-15 RX ORDER — ORPHENADRINE CITRATE 30 MG/ML
60 INJECTION INTRAMUSCULAR; INTRAVENOUS ONCE
Status: COMPLETED | OUTPATIENT
Start: 2023-05-15 | End: 2023-05-15

## 2023-05-15 RX ORDER — KETOROLAC TROMETHAMINE 30 MG/ML
30 INJECTION, SOLUTION INTRAMUSCULAR; INTRAVENOUS ONCE
Status: COMPLETED | OUTPATIENT
Start: 2023-05-15 | End: 2023-05-15

## 2023-05-15 RX ADMIN — ORPHENADRINE CITRATE 60 MG: 60 INJECTION INTRAMUSCULAR; INTRAVENOUS at 03:49

## 2023-05-15 RX ADMIN — KETOROLAC TROMETHAMINE 30 MG: 30 INJECTION, SOLUTION INTRAMUSCULAR; INTRAVENOUS at 03:47

## 2023-05-15 ASSESSMENT — PAIN - FUNCTIONAL ASSESSMENT: PAIN_FUNCTIONAL_ASSESSMENT: 0-10

## 2023-05-15 ASSESSMENT — PAIN DESCRIPTION - LOCATION: LOCATION: BACK

## 2023-05-15 ASSESSMENT — PAIN SCALES - GENERAL: PAINLEVEL_OUTOF10: 10

## 2023-05-15 NOTE — ED PROVIDER NOTES
[facility] ED  Emergency Department Encounter  EmergencyMedicine Resident     Pt Name:Marquez Michele  MRN: [id number]  Ab[place]fzion 1955  Date of evaluation: 5/15/23  PCP:  MANUELA Harding - CNP    CHIEF COMPLAINT       Chief Complaint   Patient presents with    Back Pain     X2days     Knee Pain     Left        HISTORY OF PRESENT ILLNESS  (Location/Symptom, Timing/Onset, Context/Setting, Quality, Duration, Modifying Factors, Severity.)      Neela Pereira is a 77 y.o. male who presents with chronic low back pain that has been present for the past several months. Patient states that he has not had any new injuries but states that NSAIDs at home are not helping and that he is tired of being shuffled from 1 position to another through his primary care. Patient is currently under the care of pain management. He has an appointment in several days but cannot recall the top of his head with any of the providers. He states that he is now expressing sharp pain on the right radiating down his back. No loss of bowel or bladder function    PAST MEDICAL / SURGICAL / SOCIAL / FAMILY HISTORY      has a past medical history of Allergic rhinitis, Anxiety, Chronic back pain, COPD (chronic obstructive pulmonary disease) ([address] Depression, Disorder of stomach, Emphysema lung ([address] Essential hypertension, Gastroesophageal reflux disease, Hyperlipidemia, Low back pain, ARNAUD (obstructive sleep apnea), Other emphysema ([address] Other intervertebral disc degeneration, lumbar region, Radiculopathy, lumbar region, Restless leg syndrome, Seasonal affective disorder ([address] and Substance abuse ([address] Denies further past medical hx     has a past surgical history that includes knee surgery (Right, 05/2016); Dental surgery (10 yrs ago); Colonoscopy (12/22/2016); eye surgery;  Endoscopy, colon, diagnostic; Knee arthroscopy (Right); pr nasal/sinus endoscopy w/maxillary antrostomy (N/A, 2/22/2018); knee surgery; and Finger

## 2023-05-15 NOTE — ED NOTES
Discharge instructions reviewed with pt. Pt verbalized understanding with no questions or concerns. Resps even, non labored. Skin p/w/d.      Pako Mack RN  05/15/23 1832

## 2023-06-28 ENCOUNTER — TELEPHONE (OUTPATIENT)
Dept: PAIN MANAGEMENT | Age: 68
End: 2023-06-28

## 2023-06-29 DIAGNOSIS — M47.817 LUMBOSACRAL SPONDYLOSIS WITHOUT MYELOPATHY: Primary | ICD-10-CM

## 2023-06-30 ENCOUNTER — TELEPHONE (OUTPATIENT)
Dept: PAIN MANAGEMENT | Age: 68
End: 2023-06-30

## 2023-07-12 ENCOUNTER — HOSPITAL ENCOUNTER (EMERGENCY)
Age: 68
Discharge: HOME | End: 2023-07-12
Payer: MEDICARE

## 2023-07-12 VITALS
OXYGEN SATURATION: 95 % | TEMPERATURE: 97.6 F | DIASTOLIC BLOOD PRESSURE: 85 MMHG | BODY MASS INDEX: 20.73 KG/M2 | HEIGHT: 69 IN | SYSTOLIC BLOOD PRESSURE: 151 MMHG | WEIGHT: 140 LBS | HEART RATE: 79 BPM | RESPIRATION RATE: 20 BRPM

## 2023-07-12 DIAGNOSIS — G89.29 ACUTE EXACERBATION OF CHRONIC LOW BACK PAIN: Primary | ICD-10-CM

## 2023-07-12 DIAGNOSIS — M54.50 ACUTE EXACERBATION OF CHRONIC LOW BACK PAIN: Primary | ICD-10-CM

## 2023-07-12 PROCEDURE — 6360000002 HC RX W HCPCS: Performed by: STUDENT IN AN ORGANIZED HEALTH CARE EDUCATION/TRAINING PROGRAM

## 2023-07-12 PROCEDURE — 96372 THER/PROPH/DIAG INJ SC/IM: CPT

## 2023-07-12 PROCEDURE — 99284 EMERGENCY DEPT VISIT MOD MDM: CPT

## 2023-07-12 PROCEDURE — 6370000000 HC RX 637 (ALT 250 FOR IP): Performed by: STUDENT IN AN ORGANIZED HEALTH CARE EDUCATION/TRAINING PROGRAM

## 2023-07-12 RX ORDER — METHYLPREDNISOLONE ACETATE 80 MG/ML
60 INJECTION, SUSPENSION INTRA-ARTICULAR; INTRALESIONAL; INTRAMUSCULAR; SOFT TISSUE ONCE
Status: COMPLETED | OUTPATIENT
Start: 2023-07-12 | End: 2023-07-12

## 2023-07-12 RX ORDER — ONDANSETRON 4 MG/1
4 TABLET, ORALLY DISINTEGRATING ORAL ONCE
Status: COMPLETED | OUTPATIENT
Start: 2023-07-12 | End: 2023-07-12

## 2023-07-12 RX ORDER — HYDROCODONE BITARTRATE AND ACETAMINOPHEN 5; 325 MG/1; MG/1
1 TABLET ORAL ONCE
Status: COMPLETED | OUTPATIENT
Start: 2023-07-12 | End: 2023-07-12

## 2023-07-12 RX ORDER — KETOROLAC TROMETHAMINE 15 MG/ML
15 INJECTION, SOLUTION INTRAMUSCULAR; INTRAVENOUS ONCE
Status: COMPLETED | OUTPATIENT
Start: 2023-07-12 | End: 2023-07-12

## 2023-07-12 RX ADMIN — HYDROCODONE BITARTRATE AND ACETAMINOPHEN 1 TABLET: 5; 325 TABLET ORAL at 15:37

## 2023-07-12 RX ADMIN — METHYLPREDNISOLONE ACETATE 60 MG: 80 INJECTION, SUSPENSION INTRA-ARTICULAR; INTRALESIONAL; INTRAMUSCULAR; SOFT TISSUE at 15:33

## 2023-07-12 RX ADMIN — KETOROLAC TROMETHAMINE 15 MG: 15 INJECTION, SOLUTION INTRAMUSCULAR; INTRAVENOUS at 15:33

## 2023-07-12 RX ADMIN — ONDANSETRON 4 MG: 4 TABLET, ORALLY DISINTEGRATING ORAL at 15:37

## 2023-07-12 ASSESSMENT — PAIN DESCRIPTION - PAIN TYPE: TYPE: ACUTE PAIN

## 2023-07-12 ASSESSMENT — PAIN DESCRIPTION - DESCRIPTORS: DESCRIPTORS: SHOOTING

## 2023-07-12 ASSESSMENT — PAIN SCALES - GENERAL
PAINLEVEL_OUTOF10: 10
PAINLEVEL_OUTOF10: 7
PAINLEVEL_OUTOF10: 7

## 2023-07-12 ASSESSMENT — PAIN DESCRIPTION - FREQUENCY: FREQUENCY: CONTINUOUS

## 2023-07-12 ASSESSMENT — PAIN DESCRIPTION - ORIENTATION: ORIENTATION: LOWER

## 2023-07-12 ASSESSMENT — PAIN DESCRIPTION - LOCATION: LOCATION: BACK

## 2023-07-12 ASSESSMENT — PAIN - FUNCTIONAL ASSESSMENT: PAIN_FUNCTIONAL_ASSESSMENT: 0-10

## 2023-07-14 ENCOUNTER — TELEPHONE (OUTPATIENT)
Dept: PAIN MANAGEMENT | Age: 68
End: 2023-07-14

## 2023-07-14 ENCOUNTER — TELEPHONE (OUTPATIENT)
Dept: FAMILY MEDICINE CLINIC | Age: 68
End: 2023-07-14

## 2023-07-14 ASSESSMENT — ENCOUNTER SYMPTOMS
COUGH: 0
SHORTNESS OF BREATH: 0
VOMITING: 0
ABDOMINAL PAIN: 0
PHOTOPHOBIA: 0
BACK PAIN: 1
WHEEZING: 0
NAUSEA: 0

## 2023-07-14 NOTE — ED PROVIDER NOTES
[facility] ED  EMERGENCY DEPARTMENT ENCOUNTER      Pt Name: Rhina Zuniga  MRN: [id number]  [address] [facility] [place] 1955  Date of evaluation: 7/12/2023  Provider: Lidia Quiles, [address]       Chief Complaint   Patient presents with    Back Pain     Lower back shooting down legs. HISTORY OF PRESENT ILLNESS   (Location/Symptom, Timing/Onset, Context/Setting, Quality, Duration, Modifying Factors, Severity)  Note limiting factors. Rhina Zuniga is a 77 y.o. male who per chart review has pmhx of HTN, GERD, chronic back pain, opioid abuse in remission, anxiety hyperlipidemia tobacco use seasonal affective disorder emphysema and COPD presents to the emergency department for evaluation of back pain. Patient states he has a history of chronic back pain. Pain has been worsening over the last week. He denies any recent falls or trauma to the back. He does follow with pain management for his chronic back pain. He follows with Dr. Elkin Atkinson. Patient states he has had a lumbar RFA in the past and recently called to schedule another procedure as it gave him significant relief. He states he has been taking Tylenol for the pain without relief. He states his current back pain feels similar to chronic back pain. It is diffuse across the low back and radiates down posterior bilateral lower extremities however seems to be slightly worse on the left. He is able to ambulate with a cane which is his baseline. He states he has some baseline tingling in the posterior left lower extremity which has not worsened from baseline. He is not able to describe the pain and words. He states pain is aggravated by certain movements palpation and walking. He denies intravenous drug use.   He denies fever chills chest pain shortness of breath headache dizziness abdominal pain nausea vomiting diarrhea constipation urinary retention numbness tingling weakness bowel or bladder incontinence saddle
Ana and Fly
Dr. Kim
Dr. Kim
Dr. Perrin
NS

## 2023-07-14 NOTE — TELEPHONE ENCOUNTER
Paged by the patient after hours on 7/14/23 at 6:58 pm. I returned the patient's call and encouraged him to keep his lumbar radiofrequency ablation as scheduled with Dr Bladimir Correa on 7/18/23. He expressed understanding and appreciation.

## 2023-07-17 RX ORDER — GABAPENTIN 600 MG/1
TABLET ORAL
Qty: 90 TABLET | Refills: 0 | Status: SHIPPED | OUTPATIENT
Start: 2023-07-17 | End: 2023-08-17

## 2023-07-17 NOTE — TELEPHONE ENCOUNTER
Comments: can you please fill for NL? Last Office Visit (last PCP visit):   2/28/2023    Next Visit Date:  Future Appointments   Date Time Provider [address]   7/18/2023 11:00 AM DO GERDA Marie [facility] EMERGENCY MEDICAL CENTER AT [place]   3/4/2024 11:30 AM MANUELA Sofia CNP [facility] EMERGENCY MEDICAL CENTER AT [place]       **If hasn't been seen in over a year OR hasn't followed up according to last diabetes/ADHD visit, make appointment for patient before sending refill to provider.     Rx requested:  Requested Prescriptions     Pending Prescriptions Disp Refills    gabapentin (NEURONTIN) 600 MG tablet [Pharmacy Med Name: GABAPENTIN 600 MG TABLET] 90 tablet 0     Sig: take 1 tablet by mouth every morning and take 1 tablet by mouth MID DAY and take 2 tablets by mouth AT NIGHT

## 2023-07-18 ENCOUNTER — OFFICE VISIT (OUTPATIENT)
Dept: PAIN MANAGEMENT | Age: 68
End: 2023-07-18

## 2023-07-18 DIAGNOSIS — M47.817 LUMBOSACRAL SPONDYLOSIS WITHOUT MYELOPATHY: ICD-10-CM

## 2023-07-18 RX ORDER — LIDOCAINE HYDROCHLORIDE 10 MG/ML
10 INJECTION, SOLUTION EPIDURAL; INFILTRATION; INTRACAUDAL; PERINEURAL ONCE
Status: COMPLETED | OUTPATIENT
Start: 2023-07-18 | End: 2023-07-18

## 2023-07-18 RX ORDER — BETAMETHASONE SODIUM PHOSPHATE AND BETAMETHASONE ACETATE 3; 3 MG/ML; MG/ML
6 INJECTION, SUSPENSION INTRA-ARTICULAR; INTRALESIONAL; INTRAMUSCULAR; SOFT TISSUE ONCE
Status: COMPLETED | OUTPATIENT
Start: 2023-07-18 | End: 2023-07-18

## 2023-07-18 RX ADMIN — LIDOCAINE HYDROCHLORIDE 10 MG: 10 INJECTION, SOLUTION EPIDURAL; INFILTRATION; INTRACAUDAL; PERINEURAL at 11:21

## 2023-07-18 RX ADMIN — BETAMETHASONE SODIUM PHOSPHATE AND BETAMETHASONE ACETATE 6 MG: 3; 3 INJECTION, SUSPENSION INTRA-ARTICULAR; INTRALESIONAL; INTRAMUSCULAR; SOFT TISSUE at 11:21

## 2023-07-21 ENCOUNTER — HOSPITAL ENCOUNTER (EMERGENCY)
Age: 68
Discharge: HOME | End: 2023-07-21
Payer: MEDICARE

## 2023-07-21 VITALS
DIASTOLIC BLOOD PRESSURE: 87 MMHG | TEMPERATURE: 98 F | WEIGHT: 140 LBS | BODY MASS INDEX: 21.22 KG/M2 | HEART RATE: 82 BPM | RESPIRATION RATE: 18 BRPM | SYSTOLIC BLOOD PRESSURE: 164 MMHG | HEIGHT: 68 IN | OXYGEN SATURATION: 98 %

## 2023-07-21 DIAGNOSIS — M54.50 ACUTE EXACERBATION OF CHRONIC LOW BACK PAIN: Primary | ICD-10-CM

## 2023-07-21 DIAGNOSIS — G89.29 ACUTE EXACERBATION OF CHRONIC LOW BACK PAIN: Primary | ICD-10-CM

## 2023-07-21 PROCEDURE — 6360000002 HC RX W HCPCS: Performed by: NURSE PRACTITIONER

## 2023-07-21 PROCEDURE — 99284 EMERGENCY DEPT VISIT MOD MDM: CPT

## 2023-07-21 PROCEDURE — 96372 THER/PROPH/DIAG INJ SC/IM: CPT

## 2023-07-21 RX ORDER — KETOROLAC TROMETHAMINE 30 MG/ML
30 INJECTION, SOLUTION INTRAMUSCULAR; INTRAVENOUS ONCE
Status: COMPLETED | OUTPATIENT
Start: 2023-07-21 | End: 2023-07-21

## 2023-07-21 RX ORDER — PREDNISONE 10 MG/1
50 TABLET ORAL DAILY
Qty: 25 TABLET | Refills: 0 | Status: SHIPPED | OUTPATIENT
Start: 2023-07-21 | End: 2023-07-26

## 2023-07-21 RX ADMIN — KETOROLAC TROMETHAMINE 30 MG: 30 INJECTION, SOLUTION INTRAMUSCULAR; INTRAVENOUS at 10:36

## 2023-07-21 ASSESSMENT — PAIN DESCRIPTION - LOCATION: LOCATION: BACK

## 2023-07-21 ASSESSMENT — PAIN SCALES - GENERAL
PAINLEVEL_OUTOF10: 10
PAINLEVEL_OUTOF10: 8

## 2023-07-21 ASSESSMENT — PAIN DESCRIPTION - DESCRIPTORS: DESCRIPTORS: ACHING

## 2023-07-21 ASSESSMENT — ENCOUNTER SYMPTOMS
SHORTNESS OF BREATH: 0
ABDOMINAL PAIN: 0
BACK PAIN: 1
COUGH: 0

## 2023-07-21 ASSESSMENT — PAIN DESCRIPTION - PAIN TYPE: TYPE: CHRONIC PAIN

## 2023-07-21 ASSESSMENT — PAIN - FUNCTIONAL ASSESSMENT: PAIN_FUNCTIONAL_ASSESSMENT: 0-10

## 2023-07-21 NOTE — ED PROVIDER NOTES
[facility] ED  eMERGENCY dEPARTMENT eNCOUnter      Pt Name: Jodee Wise  MRN: [id number]  [address] [facility] 1955  Date of evaluation: 2023  Provider: MANUELA Rivero CNP      HISTORY OF PRESENT ILLNESS    Jodee Wise is a 77 y.o. male who presents to the Emergency Department with acute on chronic low back pain that started yesterday. Patient is ambulating with a cane. He denies saddle anesthesia, foot drop or incontinence of bowel or bladder. Pain is moderate. He states his back flared up after standing too long at a  yesterday. REVIEW OF SYSTEMS       Review of Systems   Constitutional:  Negative for fever. HENT:  Negative for congestion. Respiratory:  Negative for cough and shortness of breath. Cardiovascular:  Negative for chest pain. Gastrointestinal:  Negative for abdominal pain. Genitourinary:  Negative for dysuria. Musculoskeletal:  Positive for back pain. Negative for arthralgias. Skin:  Negative for rash. All other systems reviewed and are negative.       PAST MEDICAL HISTORY     Past Medical History:   Diagnosis Date    Allergic rhinitis 2018    Anxiety     Chronic back pain     COPD (chronic obstructive pulmonary disease) ([facility])     Depression     Disorder of stomach     valve not closing properly    Emphysema lung ([address])     Essential hypertension 2019    Gastroesophageal reflux disease 10/4/2019    Hyperlipidemia     meds > 22 yrs    Low back pain 2018    ARNAUD (obstructive sleep apnea) 2018    Other emphysema ([address]) 10/4/2019    Other intervertebral disc degeneration, lumbar region 3/23/2018    Radiculopathy, lumbar region 2018    Restless leg syndrome     Seasonal affective disorder ([address]) 10/4/2019    Substance abuse ([address])     alcholic, quit 20 yrs          SURGICAL HISTORY       Past Surgical History:   Procedure Laterality Date    COLONOSCOPY  2016    A SHALA MARLEY    DENTAL SURGERY  10 yrs ago    ENDOSCOPY,

## 2023-07-28 ENCOUNTER — OFFICE VISIT (OUTPATIENT)
Dept: ORTHOPEDIC SURGERY | Age: 68
End: 2023-07-28

## 2023-07-28 VITALS
TEMPERATURE: 98.1 F | BODY MASS INDEX: 21.22 KG/M2 | OXYGEN SATURATION: 98 % | WEIGHT: 140 LBS | HEIGHT: 68 IN | HEART RATE: 84 BPM

## 2023-07-28 DIAGNOSIS — M48.062 SPINAL STENOSIS OF LUMBAR REGION WITH NEUROGENIC CLAUDICATION: Primary | ICD-10-CM

## 2023-07-28 NOTE — PROGRESS NOTES
or spine after  appropriate evaluation to exclude secondary causes    - Low bone density (T-score between -1.0 and -2.5 at the femoral neck or  spine) and a 10-year probability of a hip fracture greater than or equal to  3% or a 10-year probability of a major osteoporosis-related fracture greater  than or equal to 20% based on FRAX calculation.    - Clinician judgment and/or patient preferences may indicate treatment for  people with 10-year fracture probabilities above or below these levels    - Further guidance on treatment can be found at the National Osteoporosis  Foundation's website [phone].    3. Patients with diagnosis of osteoporosis or at high risk for fracture  should have regular bone mineral density tests. For patients eligible for  Medicare, routine testing is allowed once every 2 years. The testing  frequency can be increased to one year for patients who have rapidly  progressing disease, those who are receiving or discontinuing medical therapy  to restore bone mass or have additional risk factors. Template code:  RPnmNSD_DX_dxa         Assessment:       Diagnosis Orders   1. Spinal stenosis of lumbar region with neurogenic claudication  Amb External Referral To Vascular Surgery           Plan:      He does smoke 2 cigars a day. I would be inclined to only do decompression without trying to fuse him because of his significant smoking history. Bone density test reviewed he is osteopenic at the left femoral neck with a -1.9 T score. He has a abdominal aorta that is calcified which should be addressed prior to any type of surgical intervention. If I decompress him it would be at L3-4 and L4-5. He may need an XLIF with instrumentation at L3-4 due to the coronal collapse to the left. This would be with an interbody cage and posterior spinal instrumentation with pedicle screws and rods.   I also talked to him about doing the decompression at L3-4 and L4-5 and then follow him closely and if he

## 2023-08-01 RX ORDER — KETOROLAC TROMETHAMINE 10 MG/1
10 TABLET, FILM COATED ORAL EVERY 6 HOURS PRN
Qty: 20 TABLET | Refills: 0 | Status: SHIPPED | OUTPATIENT
Start: 2023-08-01

## 2023-08-01 NOTE — TELEPHONE ENCOUNTER
Comments:     Last Office Visit (last PCP visit):   2/28/2023    Next Visit Date:  Future Appointments   Date Time Provider [address]   3/4/2024 11:30 AM MANUELA James CNP [facility] AT [place]       **If hasn't been seen in over a year OR hasn't followed up according to last diabetes/ADHD visit, make appointment for patient before sending refill to provider.     Rx requested:  Requested Prescriptions     Pending Prescriptions Disp Refills    ketorolac (TORADOL) 10 MG tablet 20 tablet 0     Sig: Take 1 tablet by mouth every 6 hours as needed for Pain

## 2023-08-02 RX ORDER — ROSUVASTATIN CALCIUM 40 MG/1
TABLET, COATED ORAL
Qty: 90 TABLET | Refills: 5 | Status: SHIPPED | OUTPATIENT
Start: 2023-08-02

## 2023-08-02 NOTE — TELEPHONE ENCOUNTER
Requesting medication refill.  Please approve or deny this request.    Rx requested:  Requested Prescriptions     Pending Prescriptions Disp Refills    rosuvastatin (CRESTOR) 40 MG tablet [Pharmacy Med Name: ROSUVASTATIN CALCIUM 40 MG TAB] 90 tablet 5     Sig: take 1 tablet by mouth at bedtime       Last Office Visit:   2/28/2023    Next Visit Date:  Future Appointments   Date Time Provider [address]   3/4/2024 11:30 AM MANUELA Ac - CNP [facility] EMERGENCY [facility] AT [place]

## 2023-08-11 ENCOUNTER — HOSPITAL ENCOUNTER (EMERGENCY)
Age: 68
Discharge: HOME | End: 2023-08-12
Payer: MEDICARE

## 2023-08-11 VITALS
OXYGEN SATURATION: 97 % | HEART RATE: 80 BPM | TEMPERATURE: 97.8 F | HEIGHT: 68 IN | RESPIRATION RATE: 16 BRPM | SYSTOLIC BLOOD PRESSURE: 157 MMHG | WEIGHT: 145 LBS | DIASTOLIC BLOOD PRESSURE: 88 MMHG | BODY MASS INDEX: 21.98 KG/M2

## 2023-08-11 DIAGNOSIS — G89.29 ACUTE EXACERBATION OF CHRONIC LOW BACK PAIN: Primary | ICD-10-CM

## 2023-08-11 DIAGNOSIS — M25.562 LEFT KNEE PAIN, UNSPECIFIED CHRONICITY: ICD-10-CM

## 2023-08-11 DIAGNOSIS — M54.50 ACUTE EXACERBATION OF CHRONIC LOW BACK PAIN: Primary | ICD-10-CM

## 2023-08-11 PROCEDURE — 99284 EMERGENCY DEPT VISIT MOD MDM: CPT

## 2023-08-11 RX ORDER — DEXAMETHASONE SODIUM PHOSPHATE 4 MG/ML
8 INJECTION, SOLUTION INTRA-ARTICULAR; INTRALESIONAL; INTRAMUSCULAR; INTRAVENOUS; SOFT TISSUE ONCE
Status: COMPLETED | OUTPATIENT
Start: 2023-08-12 | End: 2023-08-12

## 2023-08-11 RX ORDER — GABAPENTIN 100 MG/1
400 CAPSULE ORAL ONCE
Status: COMPLETED | OUTPATIENT
Start: 2023-08-12 | End: 2023-08-12

## 2023-08-11 RX ORDER — CYCLOBENZAPRINE HCL 10 MG
10 TABLET ORAL ONCE
Status: COMPLETED | OUTPATIENT
Start: 2023-08-12 | End: 2023-08-12

## 2023-08-11 ASSESSMENT — PAIN DESCRIPTION - ONSET: ONSET: ON-GOING

## 2023-08-11 ASSESSMENT — PAIN DESCRIPTION - PAIN TYPE: TYPE: CHRONIC PAIN

## 2023-08-11 ASSESSMENT — PAIN DESCRIPTION - FREQUENCY: FREQUENCY: CONTINUOUS

## 2023-08-11 ASSESSMENT — PAIN DESCRIPTION - ORIENTATION: ORIENTATION: LEFT

## 2023-08-11 ASSESSMENT — LIFESTYLE VARIABLES: HOW MANY STANDARD DRINKS CONTAINING ALCOHOL DO YOU HAVE ON A TYPICAL DAY: PATIENT DOES NOT DRINK

## 2023-08-11 ASSESSMENT — PAIN DESCRIPTION - LOCATION: LOCATION: KNEE;BACK

## 2023-08-11 ASSESSMENT — PAIN SCALES - GENERAL: PAINLEVEL_OUTOF10: 10

## 2023-08-11 ASSESSMENT — PAIN - FUNCTIONAL ASSESSMENT: PAIN_FUNCTIONAL_ASSESSMENT: 0-10

## 2023-08-12 PROCEDURE — 6360000002 HC RX W HCPCS: Performed by: PHYSICIAN ASSISTANT

## 2023-08-12 PROCEDURE — 96372 THER/PROPH/DIAG INJ SC/IM: CPT

## 2023-08-12 PROCEDURE — 6370000000 HC RX 637 (ALT 250 FOR IP): Performed by: PHYSICIAN ASSISTANT

## 2023-08-12 RX ORDER — TIZANIDINE 2 MG/1
2-4 TABLET ORAL 3 TIMES DAILY PRN
Qty: 14 TABLET | Refills: 0 | Status: SHIPPED | OUTPATIENT
Start: 2023-08-12 | End: 2023-09-06

## 2023-08-12 RX ADMIN — GABAPENTIN 400 MG: 100 CAPSULE ORAL at 00:10

## 2023-08-12 RX ADMIN — DEXAMETHASONE SODIUM PHOSPHATE 8 MG: 4 INJECTION, SOLUTION INTRAMUSCULAR; INTRAVENOUS at 00:11

## 2023-08-12 RX ADMIN — CYCLOBENZAPRINE 10 MG: 10 TABLET, FILM COATED ORAL at 00:11

## 2023-08-12 ASSESSMENT — ENCOUNTER SYMPTOMS
VOICE CHANGE: 0
ANAL BLEEDING: 0
NAUSEA: 0
APNEA: 0
EYE DISCHARGE: 0
BACK PAIN: 1
ABDOMINAL DISTENTION: 0

## 2023-08-12 NOTE — ED NOTES
Discharge instructions reviewed with pt. Pt verbalized understanding with no questions or concerns. Resps even, non labored. Skin p/w/d. No acute distress noted. Pt is aware to  prescription from pharmacy located on discharge papers. Pt is aware to f/u with pain management and orthopedics. Pt is ambulatory - gait is steady.   A&Ox4  GCS 15     Rachelle Barnard, [address]  08/12/23 1471

## 2023-08-12 NOTE — ED PROVIDER NOTES
[facility] ED  eMERGENCY dEPARTMENT eNCOUnter      Pt Name: Lupis Gutiérrez  MRN: [id number]  [address] [facility] [address] 1955  Date of evaluation: 8/11/2023  Provider: Carroll Dumas PA-C    CHIEF COMPLAINT       Chief Complaint   Patient presents with    Back Pain    Knee Pain     Right knee         HISTORY OF PRESENT ILLNESS   (Location/Symptom, Timing/Onset,Context/Setting, Quality, Duration, Modifying Factors, Severity)  Note limiting factors. Lupis Gutiérrez is a 77 y.o. male who presents to the emergency department   he has chronic back pain denies any loss of bowel or bladder control denies perineal anesthesia remains ambulatory does have left knee pain his right knee has been replaced. He states pain has been present for a long time worsens with touch or motion. He has not followed up for this. Patient is out of his gabapentin today. Denies fever chills nausea vomiting rectal bleeding urinary bleeding urinary symptoms bleeding dysuria. Symptoms mild to moderate severity worse with touch and motion. HPI    NursingNotes were reviewed. REVIEW OF SYSTEMS    (2-9 systems for level 4, 10 or more for level 5)     Review of Systems   Constitutional:  Negative for activity change, appetite change, fever and unexpected weight change. HENT:  Negative for ear discharge, nosebleeds and voice change. Eyes:  Negative for discharge. Respiratory:  Negative for apnea. Cardiovascular:  Negative for chest pain. Gastrointestinal:  Negative for abdominal distention, anal bleeding and nausea. Genitourinary:  Negative for dysuria and hematuria. Musculoskeletal:  Positive for arthralgias and back pain. Negative for joint swelling and neck stiffness. Skin:  Negative for pallor. Neurological:  Negative for seizures and facial asymmetry. Hematological:  Does not bruise/bleed easily. Psychiatric/Behavioral:  Negative for behavioral problems, self-injury and sleep disturbance.     All other systems

## 2023-08-12 NOTE — ED TRIAGE NOTES
Patient present to the ED from home with chronic back pain and left knee pain that is new.   Patient denies any other S/S at this time/ Patient is A/O x 4 during triage

## 2023-08-14 ENCOUNTER — HOSPITAL ENCOUNTER (EMERGENCY)
Age: 68
Discharge: HOME | End: 2023-08-14
Attending: GENERAL PRACTICE
Payer: MEDICARE

## 2023-08-14 VITALS
WEIGHT: 145 LBS | TEMPERATURE: 97.9 F | BODY MASS INDEX: 21.98 KG/M2 | OXYGEN SATURATION: 97 % | DIASTOLIC BLOOD PRESSURE: 85 MMHG | HEIGHT: 68 IN | HEART RATE: 80 BPM | RESPIRATION RATE: 16 BRPM | SYSTOLIC BLOOD PRESSURE: 163 MMHG

## 2023-08-14 DIAGNOSIS — G89.29 ACUTE EXACERBATION OF CHRONIC LOW BACK PAIN: Primary | ICD-10-CM

## 2023-08-14 DIAGNOSIS — M54.50 ACUTE EXACERBATION OF CHRONIC LOW BACK PAIN: Primary | ICD-10-CM

## 2023-08-14 PROCEDURE — 99284 EMERGENCY DEPT VISIT MOD MDM: CPT

## 2023-08-14 PROCEDURE — 6360000002 HC RX W HCPCS: Performed by: GENERAL PRACTICE

## 2023-08-14 PROCEDURE — 96372 THER/PROPH/DIAG INJ SC/IM: CPT

## 2023-08-14 RX ORDER — GABAPENTIN 600 MG/1
TABLET ORAL
Qty: 90 TABLET | Refills: 0 | Status: SHIPPED | OUTPATIENT
Start: 2023-08-14 | End: 2023-08-15

## 2023-08-14 RX ORDER — KETOROLAC TROMETHAMINE 30 MG/ML
30 INJECTION, SOLUTION INTRAMUSCULAR; INTRAVENOUS ONCE
Status: COMPLETED | OUTPATIENT
Start: 2023-08-14 | End: 2023-08-14

## 2023-08-14 RX ORDER — ORPHENADRINE CITRATE 30 MG/ML
60 INJECTION INTRAMUSCULAR; INTRAVENOUS ONCE
Status: COMPLETED | OUTPATIENT
Start: 2023-08-14 | End: 2023-08-14

## 2023-08-14 RX ORDER — KETOROLAC TROMETHAMINE 10 MG/1
10 TABLET, FILM COATED ORAL EVERY 6 HOURS PRN
Qty: 20 TABLET | Refills: 0 | Status: SHIPPED | OUTPATIENT
Start: 2023-08-14

## 2023-08-14 RX ADMIN — KETOROLAC TROMETHAMINE 30 MG: 30 INJECTION, SOLUTION INTRAMUSCULAR; INTRAVENOUS at 05:54

## 2023-08-14 RX ADMIN — ORPHENADRINE CITRATE 60 MG: 60 INJECTION INTRAMUSCULAR; INTRAVENOUS at 05:54

## 2023-08-14 ASSESSMENT — PAIN - FUNCTIONAL ASSESSMENT
PAIN_FUNCTIONAL_ASSESSMENT: 0-10
PAIN_FUNCTIONAL_ASSESSMENT: 0-10

## 2023-08-14 ASSESSMENT — PAIN SCALES - GENERAL
PAINLEVEL_OUTOF10: 10
PAINLEVEL_OUTOF10: 10

## 2023-08-14 ASSESSMENT — PAIN DESCRIPTION - PAIN TYPE: TYPE: CHRONIC PAIN

## 2023-08-14 NOTE — ED NOTES
Pr provided with discharge instructions and f/u care instructions. Pt verbalizes understanding; denies questions. Pt ambulatory off unit in stable condition.      Manuel Rudd RN  08/14/23 9167

## 2023-08-14 NOTE — ED TRIAGE NOTES
Started having severe lower back and leg pain throughout the night. Cant sleep. Chronic.  Has appt next month to see a specialist

## 2023-08-14 NOTE — DISCHARGE INSTRUCTIONS
Please call your primary doctor as well as pain management physician and follow up as soon as possible

## 2023-08-14 NOTE — ED PROVIDER NOTES
[facility] ED  Emergency Department Encounter  Emergency Medicine Attending     Pt Name:Marquez Irvin  MRN: [id number]  [address] [facility] [place] 1955  Date of evaluation: 8/14/23  PCP:  MANUELA Luna - CNP    CHIEF COMPLAINT       Chief Complaint   Patient presents with    Back Pain       HISTORY OF PRESENT ILLNESS  (Location/Symptom, Timing/Onset, Context/Setting, Quality, Duration, Modifying Factors, Severity.)      Adolfo Willingham is a 77 y.o. male who presents with acute on chronic low back pain. Patient is seen in our emergency department multiple times for low back pain. He is already under the care of a pain management physician undergoing epidural steroid injections. Denies any new injury. States the pain is sharp and located in bilateral L3-L4 and L5 area and perished muscular areas. No fevers or chills. No loss of bowel or bladder, no saddle anesthesia    PAST MEDICAL / SURGICAL / SOCIAL / FAMILY HISTORY      has a past medical history of Allergic rhinitis, Anxiety, Chronic back pain, COPD (chronic obstructive pulmonary disease) ([address]), Depression, Disorder of stomach, Emphysema lung ([address]), Essential hypertension, Gastroesophageal reflux disease, Hyperlipidemia, Low back pain, ARNAUD (obstructive sleep apnea), Other emphysema ([address]), Other intervertebral disc degeneration, lumbar region, Radiculopathy, lumbar region, Restless leg syndrome, Seasonal affective disorder ([address]), and Substance abuse ([address]). Denies further past medical hx     has a past surgical history that includes knee surgery (Right, 05/2016); Dental surgery (10 yrs ago); Colonoscopy (12/22/2016); eye surgery; Endoscopy, colon, diagnostic; Knee arthroscopy (Right); pr nasal/sinus endoscopy w/maxillary antrostomy (N/A, 2/22/2018); knee surgery; and Finger trigger release (Left, 11/1/2022).   Denies further past surgical hx    Social History     Socioeconomic History    Marital status:      Spouse name: Not on file normocephalic. Eyes: Extraocular movements intact. No scleral icterus  Mouth: Oropharynx clear and moist.  No oral lesions  Neck: Supple. No lymphadenopathy. Pulmonary: Lungs clear to auscultation bilaterally. No wheezing, rales or rhonchi   Cardiovascular: Regular rate and rhythm, no murmurs   Abdomen: Soft, nontender, no guarding or rebound, normal bowel sounds  Neurology: GCS 15. Oriented to person place and time. moving all extremities   Skin: Warm, dry, well perfused  MSK: Pain with palpation of the paraspinal musculature of the lumbar spine. No midline tenderness. Straight leg raise negative bilaterally active and passive. DIFFERENTIAL  DIAGNOSIS     PLAN (LABS / IMAGING / EKG):  No orders of the defined types were placed in this encounter. MEDICATIONS ORDERED:  Orders Placed This Encounter   Medications    orphenadrine (NORFLEX) injection 60 mg    ketorolac (TORADOL) injection 30 mg           DIAGNOSTIC RESULTS / EMERGENCY DEPARTMENT COURSE / MDM     LABS:  No results found for this visit on 08/14/23. RADIOLOGY:  No orders to display        EKG  See MDM    All EKG's are interpreted by the Emergency Department Physician who either signs or Co-signs this chart in the absence of a cardiologist.    [address] MDM:  77 y.o. male who presents with acute on chronic low back pain. Patient is already under the care of a pain management physician. He has no focal deficits on exam other than discomfort. Straight leg raise is negative. No new injuries. No saddle anesthesia no signs of cauda equina or conus medullaris. Patient's last epidural steroid injection was on 7/18. X-rays were done on 3/20 showing degenerative changes retrolisthesis of L3-4.   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

## 2023-08-15 RX ORDER — KETOROLAC TROMETHAMINE 10 MG/1
10 TABLET, FILM COATED ORAL EVERY 6 HOURS PRN
Qty: 20 TABLET | Refills: 0 | OUTPATIENT
Start: 2023-08-15

## 2023-08-15 RX ORDER — GABAPENTIN 600 MG/1
TABLET ORAL
Qty: 90 TABLET | Refills: 0 | Status: SHIPPED | OUTPATIENT
Start: 2023-08-15 | End: 2023-09-14

## 2023-08-22 PROCEDURE — 96361 HYDRATE IV INFUSION ADD-ON: CPT

## 2023-08-22 PROCEDURE — 99285 EMERGENCY DEPT VISIT HI MDM: CPT

## 2023-08-22 PROCEDURE — 96374 THER/PROPH/DIAG INJ IV PUSH: CPT

## 2023-08-22 PROCEDURE — 96375 TX/PRO/DX INJ NEW DRUG ADDON: CPT

## 2023-08-23 ENCOUNTER — APPOINTMENT (OUTPATIENT)
Dept: CT IMAGING | Age: 68
DRG: 694 | End: 2023-08-23
Payer: MEDICARE

## 2023-08-23 ENCOUNTER — APPOINTMENT (OUTPATIENT)
Dept: GENERAL RADIOLOGY | Age: 68
DRG: 694 | End: 2023-08-23
Payer: MEDICARE

## 2023-08-23 ENCOUNTER — HOSPITAL ENCOUNTER (INPATIENT)
Age: 68
LOS: 1 days | Discharge: HOME | DRG: 694 | End: 2023-08-23
Attending: INTERNAL MEDICINE | Admitting: INTERNAL MEDICINE
Payer: MEDICARE

## 2023-08-23 VITALS
OXYGEN SATURATION: 93 % | RESPIRATION RATE: 16 BRPM | BODY MASS INDEX: 21.22 KG/M2 | HEIGHT: 68 IN | SYSTOLIC BLOOD PRESSURE: 99 MMHG | WEIGHT: 140 LBS | HEART RATE: 86 BPM | DIASTOLIC BLOOD PRESSURE: 65 MMHG | TEMPERATURE: 97.2 F

## 2023-08-23 DIAGNOSIS — R10.9 FLANK PAIN: Primary | ICD-10-CM

## 2023-08-23 DIAGNOSIS — N17.9 AKI (ACUTE KIDNEY INJURY) (HCC): ICD-10-CM

## 2023-08-23 DIAGNOSIS — N20.1 URETERIC STONE: ICD-10-CM

## 2023-08-23 DIAGNOSIS — N13.30 HYDRONEPHROSIS OF LEFT KIDNEY: ICD-10-CM

## 2023-08-23 LAB
ALBUMIN SERPL-MCNC: 3.4 G/DL (ref 3.5–4.6)
ALBUMIN SERPL-MCNC: 3.9 G/DL (ref 3.5–4.6)
ALP SERPL-CCNC: 50 U/L (ref 35–104)
ALP SERPL-CCNC: 61 U/L (ref 35–104)
ALT SERPL-CCNC: 9 U/L (ref 0–41)
ALT SERPL-CCNC: 9 U/L (ref 0–41)
AMPHET UR QL SCN: ABNORMAL
ANION GAP SERPL CALCULATED.3IONS-SCNC: 10 MEQ/L (ref 9–15)
ANION GAP SERPL CALCULATED.3IONS-SCNC: 8 MEQ/L (ref 9–15)
AST SERPL-CCNC: 11 U/L (ref 0–40)
AST SERPL-CCNC: 9 U/L (ref 0–40)
BACTERIA URNS QL MICRO: NEGATIVE /HPF
BARBITURATES UR QL SCN: ABNORMAL
BASOPHILS # BLD: 0.1 K/UL (ref 0–0.2)
BASOPHILS # BLD: 0.2 K/UL (ref 0–0.2)
BASOPHILS NFR BLD: 1.1 %
BASOPHILS NFR BLD: 1.4 %
BENZODIAZ UR QL SCN: ABNORMAL
BILIRUB SERPL-MCNC: <0.2 MG/DL (ref 0.2–0.7)
BILIRUB SERPL-MCNC: <0.2 MG/DL (ref 0.2–0.7)
BILIRUB UR QL STRIP: NEGATIVE
BUN SERPL-MCNC: 22 MG/DL (ref 8–23)
BUN SERPL-MCNC: 29 MG/DL (ref 8–23)
CALCIUM SERPL-MCNC: 8.3 MG/DL (ref 8.5–9.9)
CALCIUM SERPL-MCNC: 9.4 MG/DL (ref 8.5–9.9)
CANNABINOIDS UR QL SCN: ABNORMAL
CHLORIDE SERPL-SCNC: 101 MEQ/L (ref 95–107)
CHLORIDE SERPL-SCNC: 104 MEQ/L (ref 95–107)
CLARITY UR: CLEAR
CO2 SERPL-SCNC: 26 MEQ/L (ref 20–31)
CO2 SERPL-SCNC: 28 MEQ/L (ref 20–31)
COCAINE UR QL SCN: ABNORMAL
COLOR UR: YELLOW
CREAT SERPL-MCNC: 1.11 MG/DL (ref 0.7–1.2)
CREAT SERPL-MCNC: 1.4 MG/DL (ref 0.7–1.2)
DRUG SCREEN COMMENT UR-IMP: ABNORMAL
EOSINOPHIL # BLD: 0.2 K/UL (ref 0–0.7)
EOSINOPHIL # BLD: 0.3 K/UL (ref 0–0.7)
EOSINOPHIL NFR BLD: 1.7 %
EOSINOPHIL NFR BLD: 2.5 %
EPI CELLS #/AREA URNS AUTO: ABNORMAL /HPF (ref 0–5)
ERYTHROCYTE [DISTWIDTH] IN BLOOD BY AUTOMATED COUNT: 15.1 % (ref 11.5–14.5)
ERYTHROCYTE [DISTWIDTH] IN BLOOD BY AUTOMATED COUNT: 15.3 % (ref 11.5–14.5)
FENTANYL SCREEN, URINE: ABNORMAL
GLOBULIN SER CALC-MCNC: 2.3 G/DL (ref 2.3–3.5)
GLOBULIN SER CALC-MCNC: 2.4 G/DL (ref 2.3–3.5)
GLUCOSE SERPL-MCNC: 114 MG/DL (ref 70–99)
GLUCOSE SERPL-MCNC: 89 MG/DL (ref 70–99)
GLUCOSE UR STRIP-MCNC: NEGATIVE MG/DL
HCT VFR BLD AUTO: 34.8 % (ref 42–52)
HCT VFR BLD AUTO: 36.9 % (ref 42–52)
HGB BLD-MCNC: 11.9 G/DL (ref 14–18)
HGB BLD-MCNC: 12.7 G/DL (ref 14–18)
HGB UR QL STRIP: NEGATIVE
HYALINE CASTS #/AREA URNS AUTO: ABNORMAL /HPF (ref 0–5)
KETONES UR STRIP-MCNC: ABNORMAL MG/DL
LEUKOCYTE ESTERASE UR QL STRIP: ABNORMAL
LIPASE SERPL-CCNC: 21 U/L (ref 12–95)
LYMPHOCYTES # BLD: 2 K/UL (ref 1–4.8)
LYMPHOCYTES # BLD: 2.2 K/UL (ref 1–4.8)
LYMPHOCYTES NFR BLD: 19 %
LYMPHOCYTES NFR BLD: 20.3 %
MAGNESIUM SERPL-MCNC: 1.7 MG/DL (ref 1.7–2.4)
MCH RBC QN AUTO: 33.9 PG (ref 27–31.3)
MCH RBC QN AUTO: 34.1 PG (ref 27–31.3)
MCHC RBC AUTO-ENTMCNC: 34.1 % (ref 33–37)
MCHC RBC AUTO-ENTMCNC: 34.4 % (ref 33–37)
MCV RBC AUTO: 99 FL (ref 79–92.2)
MCV RBC AUTO: 99.6 FL (ref 79–92.2)
METHADONE UR QL SCN: ABNORMAL
MONOCYTES # BLD: 0.7 K/UL (ref 0.2–0.8)
MONOCYTES # BLD: 0.7 K/UL (ref 0.2–0.8)
MONOCYTES NFR BLD: 6.6 %
MONOCYTES NFR BLD: 6.8 %
NEUTROPHILS # BLD: 7.4 K/UL (ref 1.4–6.5)
NEUTROPHILS # BLD: 7.6 K/UL (ref 1.4–6.5)
NEUTS SEG NFR BLD: 69 %
NEUTS SEG NFR BLD: 71.6 %
NITRITE UR QL STRIP: NEGATIVE
OPIATES UR QL SCN: POSITIVE
OXYCODONE UR QL SCN: ABNORMAL
PCP UR QL SCN: ABNORMAL
PH UR STRIP: 5 [PH] (ref 5–9)
PLATELET # BLD AUTO: 286 K/UL (ref 130–400)
PLATELET # BLD AUTO: 331 K/UL (ref 130–400)
POTASSIUM SERPL-SCNC: 4.3 MEQ/L (ref 3.4–4.9)
POTASSIUM SERPL-SCNC: 4.7 MEQ/L (ref 3.4–4.9)
PROPOXYPH UR QL SCN: ABNORMAL
PROT SERPL-MCNC: 5.7 G/DL (ref 6.3–8)
PROT SERPL-MCNC: 6.3 G/DL (ref 6.3–8)
PROT UR STRIP-MCNC: ABNORMAL MG/DL
RBC # BLD AUTO: 3.5 M/UL (ref 4.7–6.1)
RBC # BLD AUTO: 3.73 M/UL (ref 4.7–6.1)
RBC #/AREA URNS HPF: ABNORMAL /HPF (ref 0–2)
SODIUM SERPL-SCNC: 138 MEQ/L (ref 135–144)
SODIUM SERPL-SCNC: 139 MEQ/L (ref 135–144)
SP GR UR STRIP: 1.04 (ref 1–1.03)
URINE REFLEX TO CULTURE: YES
UROBILINOGEN UR STRIP-ACNC: 1 E.U./DL
WBC # BLD AUTO: 10.7 K/UL (ref 4.8–10.8)
WBC # BLD AUTO: 10.7 K/UL (ref 4.8–10.8)
WBC #/AREA URNS AUTO: ABNORMAL /HPF (ref 0–5)

## 2023-08-23 PROCEDURE — 6370000000 HC RX 637 (ALT 250 FOR IP): Performed by: NURSE PRACTITIONER

## 2023-08-23 PROCEDURE — 6370000000 HC RX 637 (ALT 250 FOR IP): Performed by: INTERNAL MEDICINE

## 2023-08-23 PROCEDURE — 6360000002 HC RX W HCPCS: Performed by: PHYSICIAN ASSISTANT

## 2023-08-23 PROCEDURE — 6360000002 HC RX W HCPCS: Performed by: NURSE PRACTITIONER

## 2023-08-23 PROCEDURE — A4216 STERILE WATER/SALINE, 10 ML: HCPCS | Performed by: EMERGENCY MEDICINE

## 2023-08-23 PROCEDURE — 81001 URINALYSIS AUTO W/SCOPE: CPT

## 2023-08-23 PROCEDURE — 80053 COMPREHEN METABOLIC PANEL: CPT

## 2023-08-23 PROCEDURE — 2580000003 HC RX 258: Performed by: EMERGENCY MEDICINE

## 2023-08-23 PROCEDURE — 83690 ASSAY OF LIPASE: CPT

## 2023-08-23 PROCEDURE — 2580000003 HC RX 258: Performed by: PHYSICIAN ASSISTANT

## 2023-08-23 PROCEDURE — 6370000000 HC RX 637 (ALT 250 FOR IP): Performed by: EMERGENCY MEDICINE

## 2023-08-23 PROCEDURE — 87086 URINE CULTURE/COLONY COUNT: CPT

## 2023-08-23 PROCEDURE — 2500000003 HC RX 250 WO HCPCS: Performed by: EMERGENCY MEDICINE

## 2023-08-23 PROCEDURE — 99223 1ST HOSP IP/OBS HIGH 75: CPT | Performed by: UROLOGY

## 2023-08-23 PROCEDURE — 74176 CT ABD & PELVIS W/O CONTRAST: CPT

## 2023-08-23 PROCEDURE — 6360000002 HC RX W HCPCS: Performed by: EMERGENCY MEDICINE

## 2023-08-23 PROCEDURE — 2580000003 HC RX 258: Performed by: NURSE PRACTITIONER

## 2023-08-23 PROCEDURE — 80307 DRUG TEST PRSMV CHEM ANLYZR: CPT

## 2023-08-23 PROCEDURE — 36415 COLL VENOUS BLD VENIPUNCTURE: CPT

## 2023-08-23 PROCEDURE — 83735 ASSAY OF MAGNESIUM: CPT

## 2023-08-23 PROCEDURE — 85025 COMPLETE CBC W/AUTO DIFF WBC: CPT

## 2023-08-23 PROCEDURE — 1210000000 HC MED SURG R&B

## 2023-08-23 RX ORDER — KETOROLAC TROMETHAMINE 30 MG/ML
30 INJECTION, SOLUTION INTRAMUSCULAR; INTRAVENOUS ONCE
Status: COMPLETED | OUTPATIENT
Start: 2023-08-23 | End: 2023-08-23

## 2023-08-23 RX ORDER — PANTOPRAZOLE SODIUM 40 MG/1
40 TABLET, DELAYED RELEASE ORAL ONCE
Status: COMPLETED | OUTPATIENT
Start: 2023-08-23 | End: 2023-08-23

## 2023-08-23 RX ORDER — SODIUM CHLORIDE 0.9 % (FLUSH) 0.9 %
5-40 SYRINGE (ML) INJECTION PRN
Status: DISCONTINUED | OUTPATIENT
Start: 2023-08-23 | End: 2023-08-23 | Stop reason: HOSPADM

## 2023-08-23 RX ORDER — ONDANSETRON 4 MG/1
4 TABLET, FILM COATED ORAL EVERY 8 HOURS PRN
Qty: 20 TABLET | Refills: 0 | Status: SHIPPED | OUTPATIENT
Start: 2023-08-23

## 2023-08-23 RX ORDER — ONDANSETRON 2 MG/ML
4 INJECTION INTRAMUSCULAR; INTRAVENOUS ONCE
Status: COMPLETED | OUTPATIENT
Start: 2023-08-23 | End: 2023-08-23

## 2023-08-23 RX ORDER — DICYCLOMINE HYDROCHLORIDE 10 MG/1
10 CAPSULE ORAL 4 TIMES DAILY
Qty: 20 CAPSULE | Refills: 0 | Status: SHIPPED | OUTPATIENT
Start: 2023-08-23

## 2023-08-23 RX ORDER — GABAPENTIN 300 MG/1
600 CAPSULE ORAL 3 TIMES DAILY
Status: DISCONTINUED | OUTPATIENT
Start: 2023-08-23 | End: 2023-08-23 | Stop reason: HOSPADM

## 2023-08-23 RX ORDER — TRAMADOL HYDROCHLORIDE 50 MG/1
50 TABLET ORAL ONCE
Status: COMPLETED | OUTPATIENT
Start: 2023-08-23 | End: 2023-08-23

## 2023-08-23 RX ORDER — POLYETHYLENE GLYCOL 3350 17 G/17G
17 POWDER, FOR SOLUTION ORAL DAILY PRN
Status: DISCONTINUED | OUTPATIENT
Start: 2023-08-23 | End: 2023-08-23 | Stop reason: HOSPADM

## 2023-08-23 RX ORDER — TAMSULOSIN HYDROCHLORIDE 0.4 MG/1
0.4 CAPSULE ORAL DAILY
Qty: 14 CAPSULE | Refills: 0 | Status: SHIPPED | OUTPATIENT
Start: 2023-08-24 | End: 2023-09-07

## 2023-08-23 RX ORDER — ONDANSETRON 4 MG/1
4 TABLET, ORALLY DISINTEGRATING ORAL EVERY 8 HOURS PRN
Status: DISCONTINUED | OUTPATIENT
Start: 2023-08-23 | End: 2023-08-23 | Stop reason: HOSPADM

## 2023-08-23 RX ORDER — ACETAMINOPHEN 650 MG/1
650 SUPPOSITORY RECTAL EVERY 6 HOURS PRN
Status: DISCONTINUED | OUTPATIENT
Start: 2023-08-23 | End: 2023-08-23 | Stop reason: HOSPADM

## 2023-08-23 RX ORDER — SODIUM CHLORIDE 0.9 % (FLUSH) 0.9 %
5-40 SYRINGE (ML) INJECTION EVERY 12 HOURS SCHEDULED
Status: DISCONTINUED | OUTPATIENT
Start: 2023-08-23 | End: 2023-08-23 | Stop reason: HOSPADM

## 2023-08-23 RX ORDER — 0.9 % SODIUM CHLORIDE 0.9 %
500 INTRAVENOUS SOLUTION INTRAVENOUS ONCE
Status: COMPLETED | OUTPATIENT
Start: 2023-08-23 | End: 2023-08-23

## 2023-08-23 RX ORDER — FLUOXETINE HYDROCHLORIDE 20 MG/1
40 CAPSULE ORAL DAILY
Status: DISCONTINUED | OUTPATIENT
Start: 2023-08-23 | End: 2023-08-23 | Stop reason: HOSPADM

## 2023-08-23 RX ORDER — ROSUVASTATIN CALCIUM 40 MG/1
40 TABLET, COATED ORAL NIGHTLY
Status: DISCONTINUED | OUTPATIENT
Start: 2023-08-23 | End: 2023-08-23 | Stop reason: HOSPADM

## 2023-08-23 RX ORDER — MELOXICAM 7.5 MG/1
15 TABLET ORAL DAILY
Status: DISCONTINUED | OUTPATIENT
Start: 2023-08-23 | End: 2023-08-23 | Stop reason: HOSPADM

## 2023-08-23 RX ORDER — SODIUM CHLORIDE 9 MG/ML
INJECTION, SOLUTION INTRAVENOUS PRN
Status: DISCONTINUED | OUTPATIENT
Start: 2023-08-23 | End: 2023-08-23 | Stop reason: HOSPADM

## 2023-08-23 RX ORDER — ACETAMINOPHEN 325 MG/1
650 TABLET ORAL EVERY 6 HOURS PRN
Status: DISCONTINUED | OUTPATIENT
Start: 2023-08-23 | End: 2023-08-23 | Stop reason: HOSPADM

## 2023-08-23 RX ORDER — LIDOCAINE 4 G/G
1 PATCH TOPICAL ONCE
Status: COMPLETED | OUTPATIENT
Start: 2023-08-23 | End: 2023-08-23

## 2023-08-23 RX ORDER — TRAMADOL HYDROCHLORIDE 50 MG/1
50 TABLET ORAL EVERY 8 HOURS PRN
Qty: 12 TABLET | Refills: 0 | Status: SHIPPED | OUTPATIENT
Start: 2023-08-23 | End: 2023-09-04

## 2023-08-23 RX ORDER — 0.9 % SODIUM CHLORIDE 0.9 %
1000 INTRAVENOUS SOLUTION INTRAVENOUS ONCE
Status: COMPLETED | OUTPATIENT
Start: 2023-08-23 | End: 2023-08-23

## 2023-08-23 RX ORDER — TAMSULOSIN HYDROCHLORIDE 0.4 MG/1
0.4 CAPSULE ORAL DAILY
Status: DISCONTINUED | OUTPATIENT
Start: 2023-08-23 | End: 2023-08-23 | Stop reason: HOSPADM

## 2023-08-23 RX ORDER — SODIUM CHLORIDE 9 MG/ML
INJECTION, SOLUTION INTRAVENOUS CONTINUOUS
Status: DISCONTINUED | OUTPATIENT
Start: 2023-08-23 | End: 2023-08-23 | Stop reason: HOSPADM

## 2023-08-23 RX ORDER — CIPROFLOXACIN 2 MG/ML
400 INJECTION, SOLUTION INTRAVENOUS EVERY 12 HOURS
Status: DISCONTINUED | OUTPATIENT
Start: 2023-08-23 | End: 2023-08-23 | Stop reason: HOSPADM

## 2023-08-23 RX ORDER — MAGNESIUM HYDROXIDE/ALUMINUM HYDROXICE/SIMETHICONE 120; 1200; 1200 MG/30ML; MG/30ML; MG/30ML
30 SUSPENSION ORAL ONCE
Status: COMPLETED | OUTPATIENT
Start: 2023-08-23 | End: 2023-08-23

## 2023-08-23 RX ORDER — CEPHALEXIN 500 MG/1
500 CAPSULE ORAL 3 TIMES DAILY
Qty: 18 CAPSULE | Refills: 0 | Status: SHIPPED | OUTPATIENT
Start: 2023-08-23 | End: 2023-08-29

## 2023-08-23 RX ORDER — MORPHINE SULFATE 4 MG/ML
6 INJECTION, SOLUTION INTRAMUSCULAR; INTRAVENOUS ONCE
Status: COMPLETED | OUTPATIENT
Start: 2023-08-23 | End: 2023-08-23

## 2023-08-23 RX ORDER — ONDANSETRON 2 MG/ML
4 INJECTION INTRAMUSCULAR; INTRAVENOUS EVERY 6 HOURS PRN
Status: DISCONTINUED | OUTPATIENT
Start: 2023-08-23 | End: 2023-08-23 | Stop reason: HOSPADM

## 2023-08-23 RX ORDER — DIAZEPAM 5 MG/1
5 TABLET ORAL ONCE
Status: DISCONTINUED | OUTPATIENT
Start: 2023-08-23 | End: 2023-08-23

## 2023-08-23 RX ADMIN — MELOXICAM 15 MG: 7.5 TABLET ORAL at 09:23

## 2023-08-23 RX ADMIN — SODIUM CHLORIDE 1000 ML: 9 INJECTION, SOLUTION INTRAVENOUS at 03:10

## 2023-08-23 RX ADMIN — GABAPENTIN 600 MG: 300 CAPSULE ORAL at 09:23

## 2023-08-23 RX ADMIN — KETOROLAC TROMETHAMINE 30 MG: 30 INJECTION, SOLUTION INTRAMUSCULAR; INTRAVENOUS at 00:45

## 2023-08-23 RX ADMIN — TRAMADOL HYDROCHLORIDE 50 MG: 50 TABLET ORAL at 14:45

## 2023-08-23 RX ADMIN — SODIUM CHLORIDE 500 ML: 9 INJECTION, SOLUTION INTRAVENOUS at 01:33

## 2023-08-23 RX ADMIN — FAMOTIDINE 20 MG: 10 INJECTION, SOLUTION INTRAVENOUS at 03:04

## 2023-08-23 RX ADMIN — FLUOXETINE 40 MG: 20 CAPSULE ORAL at 09:23

## 2023-08-23 RX ADMIN — CIPROFLOXACIN 400 MG: 400 INJECTION, SOLUTION INTRAVENOUS at 06:00

## 2023-08-23 RX ADMIN — TAMSULOSIN HYDROCHLORIDE 0.4 MG: 0.4 CAPSULE ORAL at 06:01

## 2023-08-23 RX ADMIN — GABAPENTIN 600 MG: 300 CAPSULE ORAL at 14:45

## 2023-08-23 RX ADMIN — ONDANSETRON 4 MG: 2 INJECTION INTRAMUSCULAR; INTRAVENOUS at 00:45

## 2023-08-23 RX ADMIN — SODIUM CHLORIDE: 9 INJECTION, SOLUTION INTRAVENOUS at 05:56

## 2023-08-23 RX ADMIN — PANTOPRAZOLE SODIUM 40 MG: 40 TABLET, DELAYED RELEASE ORAL at 03:02

## 2023-08-23 RX ADMIN — ALUMINUM HYDROXIDE, MAGNESIUM HYDROXIDE, AND SIMETHICONE 30 ML: 200; 200; 20 SUSPENSION ORAL at 03:03

## 2023-08-23 RX ADMIN — MORPHINE SULFATE 6 MG: 4 INJECTION, SOLUTION INTRAMUSCULAR; INTRAVENOUS at 03:12

## 2023-08-23 ASSESSMENT — PAIN - FUNCTIONAL ASSESSMENT: PAIN_FUNCTIONAL_ASSESSMENT: ACTIVITIES ARE NOT PREVENTED

## 2023-08-23 ASSESSMENT — ENCOUNTER SYMPTOMS
COUGH: 0
ABDOMINAL PAIN: 1
BACK PAIN: 1
APNEA: 0
DIARRHEA: 0
EYE DISCHARGE: 0
VOICE CHANGE: 0
WHEEZING: 0
SHORTNESS OF BREATH: 0
BLOOD IN STOOL: 0
NAUSEA: 1
SORE THROAT: 0
PHOTOPHOBIA: 0
ABDOMINAL DISTENTION: 0
RHINORRHEA: 0
VOMITING: 1

## 2023-08-23 ASSESSMENT — PAIN DESCRIPTION - PAIN TYPE: TYPE: CHRONIC PAIN

## 2023-08-23 ASSESSMENT — PAIN DESCRIPTION - LOCATION
LOCATION: BACK;KNEE;RIB CAGE
LOCATION: KNEE
LOCATION: ABDOMEN;BACK
LOCATION: BACK;KNEE

## 2023-08-23 ASSESSMENT — PAIN SCALES - GENERAL
PAINLEVEL_OUTOF10: 7
PAINLEVEL_OUTOF10: 0
PAINLEVEL_OUTOF10: 10
PAINLEVEL_OUTOF10: 10
PAINLEVEL_OUTOF10: 1

## 2023-08-23 ASSESSMENT — PAIN DESCRIPTION - DESCRIPTORS
DESCRIPTORS: ACHING
DESCRIPTORS: ACHING
DESCRIPTORS: DISCOMFORT

## 2023-08-23 ASSESSMENT — PAIN DESCRIPTION - FREQUENCY: FREQUENCY: CONTINUOUS

## 2023-08-23 ASSESSMENT — PAIN DESCRIPTION - ORIENTATION
ORIENTATION: LEFT
ORIENTATION: RIGHT;LEFT;LOWER

## 2023-08-23 ASSESSMENT — PAIN DESCRIPTION - ONSET: ONSET: ON-GOING

## 2023-08-23 NOTE — CARE COORDINATION
The Patient and/or Patient Representative Agree with the Discharge Plan?  Yes    Chele Avendano RN  Case Management Department  Ph: [phone] Fax: [phone]

## 2023-08-23 NOTE — PROGRESS NOTES
Shift assessment complete, vss. Pt resting in bed, AM meds given as per MAR. Denies pain, nausea, CP, SOB. Awaiting urology recommendations. No further complaints, educated pt to use call bell if he needs anything.     Electronically signed by Zaid Barragan RN on 8/23/23 at 11:20 AM EDT

## 2023-08-23 NOTE — PROGRESS NOTES
Patient admission completed. Patient is Ax4, calm and cooperative, very pleasant at this time. Patient denies any flank pain, just chronic L knee pain 1/10. Patient denies nausea or sob. Dr Goldie Mathew that the [facility] home list is complete and came into see the patient. Skin assessment is negative. IV fluids begun per order and Cipro IV started. Patient oriented to 4W unit, plan of care and call light usage. Bed is low and locked and call light is in reach. Patient instructed to call for any assistance.

## 2023-08-23 NOTE — ED NOTES
Pt c/o rt sided rib cage pain, 0 injuries reported, Danilo del toro notified. Pt c/o nausea and reports 1x emesis at home pta to er.      Sherrie Foreman RN  08/23/23 6549

## 2023-08-23 NOTE — CONSULTS
kidney stones in the  past.  He has history of chronic back pain, emphysema, reflux disease,  hyperlipidemia. PAST SURGICAL HISTORY:  Includes colonoscopy, dental surgery. He has  had a knee arthroscopy. He has had lithotripsy with stent in the past  as well. FAMILY HISTORY:  He has no family history of kidney stones. SOCIAL HISTORY:  He smokes 5 cigars daily. He reports that quit  cigarette smoking, but has a 104-pack-year history of smoking per review  of the chart. MEDICATIONS:  Include Zofran, Bentyl, Neurontin, Zanaflex, Crestor,  Mobic, Prozac, Carafate, Pepcid. ALLERGIES:  He has allergies to ALCOHOL, BENADRYL, DEMEROL and SULFA  ANTIBIOTICS. REVIEW OF SYSTEMS:  He denies chest pain, shortness of breath, diarrhea,  fevers, chills. Currently has no nausea or vomiting. He has lower back  pain. PHYSICAL EXAMINATION:  GENERAL:  In general, he is a well-developed, well-nourished male lying  in bed in no obvious acute distress. VITAL SIGNS:  He has been afebrile since admission. His temperature is  36.1, heart rate 74, respiratory rate 16, and blood pressure 101/77. HEENT:  Atraumatic, normocephalic. His eyes showed normal conjunctivae. NECK:  His trachea is midline and supple. CHEST:  His lungs are clear anteriorly without rhonchi, wheezing, or  rales. CARDIOVASCULAR:  His heart has a regular rate and rhythm without any  murmurs. ABDOMEN:  Soft, nondistended, nontender. He had no palpable minimally. He had no palpable abdominal hernias. He had no CVA tenderness. BACK:  He is tender in his lower paraspinal region. EXTREMITIES:  Showed no cyanosis or edema. NEUROLOGIC:  He is alert and oriented. PSYCHIATRIC:  He had a normal affect. LABORATORY DATA:  He has a normal white count of 10.7 with hematocrit of  34.8 and platelet count of 696. His creatinine is 1.11 and normal today. His urinalysis Was negative  for bacteria and 0-2 red cells.   He was nitrite negative on

## 2023-08-23 NOTE — ACP (ADVANCE CARE PLANNING)
Advance Care Planning   Healthcare Decision Maker:    Primary Decision Maker: Antwan Francis Child - [phone]    Secondary Decision Maker: [place] Beaver - Brother/Sister - [phone]

## 2023-08-23 NOTE — ED NOTES
Dr. Ginny Rose, the [facility], was paged @3371  Requested by Dr. Cassidy Gomez    Return call @1175     Updoxgil Ip  08/23/23 [address] [place] [address] [address]  08/23/23 8931

## 2023-08-23 NOTE — ED PROVIDER NOTES
Patient signed out to me with imaging pending. He was reportedly in pain, got relief. I reviewed his labs. He is afebrile with no leukocytosis. No definitive concurrent UTI. That is all reassuring. However the patient is 77, has an EMILIA with an associated relatively large proximal stone. His pain came back. I think he can be admitted for IV fluid resuscitation, analgesia, expectant management, repeat labs and urology consultation for an 8mm obstructing UPJ stone with hydronephrosis. No gallbladder stones noted, no transaminitis. He was given IV Toradol prior to signout. I gave him further IV fluid bolus and morphine. The patient is hunched over in pain when he attempts to ambulate, prior to morphine administration. At this time there is no indication for transfer for emergent intervention.   Dr Judit Welch accepts     Halima Kim MD  08/23/23 1957
tablet Take 1 tablet by mouth daily  Qty: 30 tablet, Refills: 0      FLUoxetine (PROZAC) 40 MG capsule take 1 capsule by mouth once daily  Qty: 90 capsule, Refills: 1    Associated Diagnoses: Anxiety; Depression, unspecified depression type      Handicap Placard MISC by Does not apply route Ex: 5 years 2/14/2028  Qty: 1 each, Refills: 0    Associated Diagnoses: Limited mobility                  Alcohol, Benadryl [diphenhydramine], Demerol hcl [meperidine], and Sulfa antibiotics    FAMILY HISTORY       Family History   Problem Relation Age of Onset    Cancer Mother         stomach    Arthritis Mother     Heart Disease Father 48    Cancer Father         bone    Cancer Maternal Grandmother         lung    Cancer Paternal Grandmother     Heart Disease Paternal Grandfather     No Known Problems Sister     Cancer Brother     Heart Disease Brother         hole in heart in 65 at 1 months age          SOCIAL HISTORY       Social History     Socioeconomic History    Marital status:     Number of children: 2    Years of education: 12    Highest education level: High school graduate   Tobacco Use    Smoking status: Every Day     Packs/day: 2.00     Years: 52.00     Pack years: 104.00     Types: Cigars, Cigarettes     Start date: 1970    Smokeless tobacco: Never    Tobacco comments:     3-5 cigars qd   Vaping Use    Vaping Use: Never used   Substance and Sexual Activity    Alcohol use: No     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    Drug use: No    Sexual activity: Not Currently     Partners: Female     Social Determinants of Health     Financial Resource Strain: Low Risk     Difficulty of Paying Living Expenses: Not hard at all   Food Insecurity: No Food Insecurity    Worried About Running Out of Food in the Last Year: Never true    Ran Out of Food in the Last Year: Never true   Transportation Needs: Unknown    Lack of Transportation (Non-Medical):  No   Physical Activity: Insufficiently Active    Days of Exercise per Week: 7 days

## 2023-08-24 ENCOUNTER — CARE COORDINATION (OUTPATIENT)
Dept: CASE MANAGEMENT | Age: 68
End: 2023-08-24

## 2023-08-24 LAB — BACTERIA UR CULT: NORMAL

## 2023-08-24 NOTE — CARE COORDINATION
Care Transitions Outreach Attempt    Call within 2 business days of discharge: Yes     Attempted to reach the patient for initial Care Transition call post hospital discharge. HIPAA compliant message left with CTN's contact information requesting return phone call. Will attempt outreach again tomorrow. Noted HFU appt with pcp on 23. Patient: Nury Abarca Patient : 1955 MRN: <[id number]>    Last Discharge [address]       Date Complaint Diagnosis Description Type Department Provider    23 Back Pain Flank pain . .. ED to Hosp-Admission (Discharged) (ADMITTED) [facility] TONYA Clarkesusan Crain, DO; [address] .. Was this an external facility discharge?  No     Noted following upcoming appointments from discharge chart review:   [address] Tori Singleton [facility] follow up appointment(s):   Future Appointments   Date Time Provider [address]   2023  2:45 PM Chu Bae MD [address] [place] [address]   2023 11:30 AM MANUELA Sagastume CNP [facility] AT [place]   3/4/2024 11:30 AM MANUELA Sagastume CNP [facility] AT [facility] follow up appointment(s):

## 2023-08-25 ENCOUNTER — CARE COORDINATION (OUTPATIENT)
Dept: CASE MANAGEMENT | Age: 68
End: 2023-08-25

## 2023-08-25 NOTE — CARE COORDINATION
Care Transitions Outreach Attempt    Call within 2 business days of discharge: Yes     2nd attempt to reach the patient for initial Care Transition call post hospital discharge. HIPAA compliant message left with CTN's contact information requesting return phone call. No further outreaches will be attempted. If no return call by the end of today, CTN will  sign off and resolve CT episode    Noted pt has a HFU appt scheduled with his pcp on 23. Per chart review the patient is not  active on [facility], unable to reach letter pended and CTN will route to Melanie Ho, , to mail to the patients listed address. Patient: Yuki Ayala Patient : 1955 MRN: <[id number]>    Last Discharge [address]       Date Complaint Diagnosis Description Type Department Provider    23 Back Pain Flank pain . .. ED to Hosp-Admission (Discharged) (ADMITTED) [facility] MED TONYA Sana Galeano, DO; [address] .. Was this an external facility discharge?  No     Noted following upcoming appointments from discharge chart review:   [facility] follow up appointment(s):   Future Appointments   Date Time Provider [address]   2023  2:45 PM Jones Larsen MD [address] [place] [address]   2023 11:30 AM MANUELA Disla CNP [facility] EM[facility]Y [facility] AT [place]   3/4/2024 11:30 AM MANUELA Disla CNP [facility] AT [facility] follow up appointment(s):

## 2023-08-28 ENCOUNTER — APPOINTMENT (OUTPATIENT)
Dept: CT IMAGING | Age: 68
End: 2023-08-28
Payer: MEDICARE

## 2023-08-28 ENCOUNTER — HOSPITAL ENCOUNTER (EMERGENCY)
Age: 68
Discharge: HOME | End: 2023-08-28
Payer: MEDICARE

## 2023-08-28 VITALS
WEIGHT: 140 LBS | SYSTOLIC BLOOD PRESSURE: 142 MMHG | TEMPERATURE: 97 F | DIASTOLIC BLOOD PRESSURE: 93 MMHG | HEART RATE: 71 BPM | HEIGHT: 68 IN | RESPIRATION RATE: 18 BRPM | OXYGEN SATURATION: 95 % | BODY MASS INDEX: 21.22 KG/M2

## 2023-08-28 DIAGNOSIS — R10.9 FLANK PAIN: Primary | ICD-10-CM

## 2023-08-28 DIAGNOSIS — N20.1 URETEROLITHIASIS: ICD-10-CM

## 2023-08-28 LAB
ALBUMIN SERPL-MCNC: 4 G/DL (ref 3.5–4.6)
ALP SERPL-CCNC: 64 U/L (ref 35–104)
ALT SERPL-CCNC: 9 U/L (ref 0–41)
ANION GAP SERPL CALCULATED.3IONS-SCNC: 13 MEQ/L (ref 9–15)
AST SERPL-CCNC: 10 U/L (ref 0–40)
BASOPHILS # BLD: 0.1 K/UL (ref 0–0.2)
BASOPHILS NFR BLD: 1 %
BILIRUB SERPL-MCNC: <0.2 MG/DL (ref 0.2–0.7)
BILIRUB UR QL STRIP: NEGATIVE
BUN SERPL-MCNC: 22 MG/DL (ref 8–23)
CALCIUM SERPL-MCNC: 9.5 MG/DL (ref 8.5–9.9)
CHLORIDE SERPL-SCNC: 96 MEQ/L (ref 95–107)
CLARITY UR: CLEAR
CO2 SERPL-SCNC: 23 MEQ/L (ref 20–31)
COLOR UR: YELLOW
CREAT SERPL-MCNC: 1.1 MG/DL (ref 0.7–1.2)
EOSINOPHIL # BLD: 0.1 K/UL (ref 0–0.7)
EOSINOPHIL NFR BLD: 0.7 %
ERYTHROCYTE [DISTWIDTH] IN BLOOD BY AUTOMATED COUNT: 15.1 % (ref 11.5–14.5)
GLOBULIN SER CALC-MCNC: 3 G/DL (ref 2.3–3.5)
GLUCOSE SERPL-MCNC: 104 MG/DL (ref 70–99)
GLUCOSE UR STRIP-MCNC: NEGATIVE MG/DL
HCT VFR BLD AUTO: 34.5 % (ref 42–52)
HGB BLD-MCNC: 11.9 G/DL (ref 14–18)
HGB UR QL STRIP: NEGATIVE
KETONES UR STRIP-MCNC: ABNORMAL MG/DL
LEUKOCYTE ESTERASE UR QL STRIP: NEGATIVE
LYMPHOCYTES # BLD: 1.4 K/UL (ref 1–4.8)
LYMPHOCYTES NFR BLD: 13.2 %
MCH RBC QN AUTO: 34.3 PG (ref 27–31.3)
MCHC RBC AUTO-ENTMCNC: 34.3 % (ref 33–37)
MCV RBC AUTO: 99.9 FL (ref 79–92.2)
MONOCYTES # BLD: 0.7 K/UL (ref 0.2–0.8)
MONOCYTES NFR BLD: 6.3 %
NEUTROPHILS # BLD: 8.5 K/UL (ref 1.4–6.5)
NEUTS SEG NFR BLD: 78.8 %
NITRITE UR QL STRIP: NEGATIVE
PH UR STRIP: 5 [PH] (ref 5–9)
PLATELET # BLD AUTO: 316 K/UL (ref 130–400)
POTASSIUM SERPL-SCNC: 4 MEQ/L (ref 3.4–4.9)
PROT SERPL-MCNC: 7 G/DL (ref 6.3–8)
PROT UR STRIP-MCNC: NEGATIVE MG/DL
RBC # BLD AUTO: 3.46 M/UL (ref 4.7–6.1)
SODIUM SERPL-SCNC: 132 MEQ/L (ref 135–144)
SP GR UR STRIP: 1.04 (ref 1–1.03)
URINE REFLEX TO CULTURE: ABNORMAL
UROBILINOGEN UR STRIP-ACNC: 0.2 E.U./DL
WBC # BLD AUTO: 10.8 K/UL (ref 4.8–10.8)

## 2023-08-28 PROCEDURE — 99284 EMERGENCY DEPT VISIT MOD MDM: CPT

## 2023-08-28 PROCEDURE — 81003 URINALYSIS AUTO W/O SCOPE: CPT

## 2023-08-28 PROCEDURE — 2580000003 HC RX 258

## 2023-08-28 PROCEDURE — 6360000002 HC RX W HCPCS

## 2023-08-28 PROCEDURE — 85025 COMPLETE CBC W/AUTO DIFF WBC: CPT

## 2023-08-28 PROCEDURE — 96374 THER/PROPH/DIAG INJ IV PUSH: CPT

## 2023-08-28 PROCEDURE — 74150 CT ABDOMEN W/O CONTRAST: CPT

## 2023-08-28 PROCEDURE — 80053 COMPREHEN METABOLIC PANEL: CPT

## 2023-08-28 PROCEDURE — 36415 COLL VENOUS BLD VENIPUNCTURE: CPT

## 2023-08-28 PROCEDURE — 93005 ELECTROCARDIOGRAM TRACING: CPT | Performed by: EMERGENCY MEDICINE

## 2023-08-28 PROCEDURE — 96361 HYDRATE IV INFUSION ADD-ON: CPT

## 2023-08-28 PROCEDURE — 96376 TX/PRO/DX INJ SAME DRUG ADON: CPT

## 2023-08-28 PROCEDURE — 96375 TX/PRO/DX INJ NEW DRUG ADDON: CPT

## 2023-08-28 RX ORDER — MORPHINE SULFATE 4 MG/ML
4 INJECTION, SOLUTION INTRAMUSCULAR; INTRAVENOUS ONCE
Status: COMPLETED | OUTPATIENT
Start: 2023-08-28 | End: 2023-08-28

## 2023-08-28 RX ORDER — KETOROLAC TROMETHAMINE 15 MG/ML
15 INJECTION, SOLUTION INTRAMUSCULAR; INTRAVENOUS ONCE
Status: COMPLETED | OUTPATIENT
Start: 2023-08-28 | End: 2023-08-28

## 2023-08-28 RX ORDER — 0.9 % SODIUM CHLORIDE 0.9 %
1000 INTRAVENOUS SOLUTION INTRAVENOUS ONCE
Status: COMPLETED | OUTPATIENT
Start: 2023-08-28 | End: 2023-08-28

## 2023-08-28 RX ORDER — ONDANSETRON 2 MG/ML
4 INJECTION INTRAMUSCULAR; INTRAVENOUS ONCE
Status: COMPLETED | OUTPATIENT
Start: 2023-08-28 | End: 2023-08-28

## 2023-08-28 RX ADMIN — ONDANSETRON 4 MG: 2 INJECTION INTRAMUSCULAR; INTRAVENOUS at 19:26

## 2023-08-28 RX ADMIN — MORPHINE SULFATE 4 MG: 4 INJECTION, SOLUTION INTRAMUSCULAR; INTRAVENOUS at 19:26

## 2023-08-28 RX ADMIN — SODIUM CHLORIDE 1000 ML: 9 INJECTION, SOLUTION INTRAVENOUS at 19:26

## 2023-08-28 RX ADMIN — MORPHINE SULFATE 4 MG: 4 INJECTION, SOLUTION INTRAMUSCULAR; INTRAVENOUS at 22:29

## 2023-08-28 RX ADMIN — KETOROLAC TROMETHAMINE 15 MG: 15 INJECTION, SOLUTION INTRAMUSCULAR; INTRAVENOUS at 21:36

## 2023-08-28 ASSESSMENT — PAIN DESCRIPTION - PAIN TYPE: TYPE: CHRONIC PAIN

## 2023-08-28 ASSESSMENT — ENCOUNTER SYMPTOMS
COUGH: 0
NAUSEA: 1
DIARRHEA: 0
ABDOMINAL PAIN: 0
VOMITING: 1
SHORTNESS OF BREATH: 0
PHOTOPHOBIA: 0

## 2023-08-28 ASSESSMENT — PAIN DESCRIPTION - ORIENTATION
ORIENTATION_2: LEFT
ORIENTATION: RIGHT
ORIENTATION: MID

## 2023-08-28 ASSESSMENT — PAIN DESCRIPTION - ONSET: ONSET: ON-GOING

## 2023-08-28 ASSESSMENT — PAIN - FUNCTIONAL ASSESSMENT
PAIN_FUNCTIONAL_ASSESSMENT_SITE2: PREVENTS OR INTERFERES SOME ACTIVE ACTIVITIES AND ADLS
PAIN_FUNCTIONAL_ASSESSMENT: 0-10

## 2023-08-28 ASSESSMENT — PAIN DESCRIPTION - LOCATION
LOCATION: BACK
LOCATION: BACK;RIB CAGE
LOCATION_2: KNEE

## 2023-08-28 ASSESSMENT — PAIN DESCRIPTION - FREQUENCY
FREQUENCY: CONTINUOUS
FREQUENCY: CONTINUOUS

## 2023-08-28 ASSESSMENT — PAIN DESCRIPTION - DESCRIPTORS
DESCRIPTORS_2: ACHING
DESCRIPTORS: ACHING

## 2023-08-28 ASSESSMENT — PAIN DESCRIPTION - INTENSITY: RATING_2: 3

## 2023-08-28 ASSESSMENT — PAIN SCALES - GENERAL
PAINLEVEL_OUTOF10: 8
PAINLEVEL_OUTOF10: 10
PAINLEVEL_OUTOF10: 4

## 2023-08-28 NOTE — ED TRIAGE NOTES
Pt states that he is having flank pain that is radiating to leg and rib area  Pt has a kidney stone and has an appt for Dr. Tariq Vaughan for outpt treatment of stone  Pt states that he is urinating per baseline

## 2023-08-28 NOTE — ED PROVIDER NOTES
[facility] ED  eMERGENCY dEPARTMENT eNCOUnter      Pt Name: Alexandria Jean  MRN: [id number]  [address] [facility] [place] 1955  Date of evaluation: 8/28/2023  Provider: LYNETTE Mix        HISTORY OF PRESENT ILLNESS    Alexandria Jean is a 77 y.o. male per chart review has ah/o CAD, tobacco abuse, hyperlipidemia, hypertension, GERD, osteoporosis, opiate abuse, ARNAUD, seasonal affective disorder, ureteric stone, substance abuse. Patient presents to the emergency department for evaluation of flank pain rating into his abdomen, nausea with 1 episode of emesis earlier. Patient states pain has been persistent since being discharged from the hospital 2 days ago, he had been admitted for a 9 mm obstructing kidney stone to his left proximal ureter as well as hydronephrosis and EMILIA. Following with Dr. Louretta Boas. Has an appointment with him in 2 days. Patient was prescribed tramadol upon discharge and states he has been taking it as prescribed which has not been helpful. Patient states he cannot take the pain any longer. Patient denies fever, chills, dysuria, hematuria, decreased urination. REVIEW OF SYSTEMS       Review of Systems   Constitutional:  Negative for chills and fever. HENT:  Negative for congestion. Eyes:  Negative for photophobia. Respiratory:  Negative for cough and shortness of breath. Cardiovascular:  Negative for chest pain. Gastrointestinal:  Positive for nausea and vomiting. Negative for abdominal pain and diarrhea. Genitourinary:  Positive for flank pain. Negative for decreased urine volume, difficulty urinating, dysuria, hematuria and urgency. Musculoskeletal:  Negative for myalgias. Neurological:  Negative for headaches. Psychiatric/Behavioral:  Negative for confusion. Except as noted above the remainder of the review of systems was reviewed and negative.        PAST MEDICAL HISTORY     Past Medical History:   Diagnosis Date    Allergic rhinitis 2/19/2018    Anxiety

## 2023-08-29 ENCOUNTER — HOSPITAL ENCOUNTER (EMERGENCY)
Age: 68
Discharge: HOME | End: 2023-08-29
Payer: MEDICARE

## 2023-08-29 ENCOUNTER — APPOINTMENT (OUTPATIENT)
Dept: GENERAL RADIOLOGY | Age: 68
End: 2023-08-29
Payer: MEDICARE

## 2023-08-29 ENCOUNTER — TELEPHONE (OUTPATIENT)
Dept: FAMILY MEDICINE CLINIC | Age: 68
End: 2023-08-29

## 2023-08-29 VITALS
SYSTOLIC BLOOD PRESSURE: 112 MMHG | RESPIRATION RATE: 19 BRPM | HEART RATE: 77 BPM | OXYGEN SATURATION: 97 % | DIASTOLIC BLOOD PRESSURE: 95 MMHG | TEMPERATURE: 98.2 F

## 2023-08-29 DIAGNOSIS — N20.0 NEPHROLITHIASIS: Primary | ICD-10-CM

## 2023-08-29 LAB
ALBUMIN SERPL-MCNC: 3.9 G/DL (ref 3.5–4.6)
ALP SERPL-CCNC: 63 U/L (ref 35–104)
ALT SERPL-CCNC: 8 U/L (ref 0–41)
ANION GAP SERPL CALCULATED.3IONS-SCNC: 12 MEQ/L (ref 9–15)
AST SERPL-CCNC: 8 U/L (ref 0–40)
BACTERIA URNS QL MICRO: NEGATIVE /HPF
BASOPHILS # BLD: 0.1 K/UL (ref 0–0.2)
BASOPHILS NFR BLD: 0.7 %
BILIRUB SERPL-MCNC: <0.2 MG/DL (ref 0.2–0.7)
BILIRUB UR QL STRIP: NEGATIVE
BUN SERPL-MCNC: 19 MG/DL (ref 8–23)
CALCIUM SERPL-MCNC: 9.3 MG/DL (ref 8.5–9.9)
CHLORIDE SERPL-SCNC: 100 MEQ/L (ref 95–107)
CLARITY UR: CLEAR
CO2 SERPL-SCNC: 24 MEQ/L (ref 20–31)
COLOR UR: YELLOW
CREAT SERPL-MCNC: 1.05 MG/DL (ref 0.7–1.2)
EKG ATRIAL RATE: 74 BPM
EKG P AXIS: 71 DEGREES
EKG P-R INTERVAL: 134 MS
EKG Q-T INTERVAL: 394 MS
EKG QRS DURATION: 102 MS
EKG QTC CALCULATION (BAZETT): 437 MS
EKG R AXIS: 77 DEGREES
EKG T AXIS: 62 DEGREES
EKG VENTRICULAR RATE: 74 BPM
EOSINOPHIL # BLD: 0.1 K/UL (ref 0–0.7)
EOSINOPHIL NFR BLD: 0.7 %
EPI CELLS #/AREA URNS AUTO: ABNORMAL /HPF (ref 0–5)
ERYTHROCYTE [DISTWIDTH] IN BLOOD BY AUTOMATED COUNT: 15 % (ref 11.5–14.5)
GLOBULIN SER CALC-MCNC: 2.6 G/DL (ref 2.3–3.5)
GLUCOSE SERPL-MCNC: 115 MG/DL (ref 70–99)
GLUCOSE UR STRIP-MCNC: NEGATIVE MG/DL
HCT VFR BLD AUTO: 36 % (ref 42–52)
HGB BLD-MCNC: 12 G/DL (ref 14–18)
HGB UR QL STRIP: NEGATIVE
HYALINE CASTS #/AREA URNS AUTO: ABNORMAL /HPF (ref 0–5)
KETONES UR STRIP-MCNC: ABNORMAL MG/DL
LACTATE BLDV-SCNC: 1.7 MMOL/L (ref 0.5–2.2)
LEUKOCYTE ESTERASE UR QL STRIP: ABNORMAL
LYMPHOCYTES # BLD: 1.4 K/UL (ref 1–4.8)
LYMPHOCYTES NFR BLD: 11.1 %
MCH RBC QN AUTO: 33.5 PG (ref 27–31.3)
MCHC RBC AUTO-ENTMCNC: 33.4 % (ref 33–37)
MCV RBC AUTO: 100.3 FL (ref 79–92.2)
MONOCYTES # BLD: 0.7 K/UL (ref 0.2–0.8)
MONOCYTES NFR BLD: 5.7 %
NEUTROPHILS # BLD: 10.5 K/UL (ref 1.4–6.5)
NEUTS SEG NFR BLD: 81.8 %
NITRITE UR QL STRIP: NEGATIVE
PH UR STRIP: 5.5 [PH] (ref 5–9)
PLATELET # BLD AUTO: 328 K/UL (ref 130–400)
POTASSIUM SERPL-SCNC: 3.9 MEQ/L (ref 3.4–4.9)
PROT SERPL-MCNC: 6.5 G/DL (ref 6.3–8)
PROT UR STRIP-MCNC: ABNORMAL MG/DL
RBC # BLD AUTO: 3.59 M/UL (ref 4.7–6.1)
RBC #/AREA URNS HPF: ABNORMAL /HPF (ref 0–2)
SODIUM SERPL-SCNC: 136 MEQ/L (ref 135–144)
SP GR UR STRIP: 1.03 (ref 1–1.03)
URINE REFLEX TO CULTURE: YES
UROBILINOGEN UR STRIP-ACNC: 1 E.U./DL
WBC # BLD AUTO: 12.8 K/UL (ref 4.8–10.8)
WBC #/AREA URNS AUTO: ABNORMAL /HPF (ref 0–5)

## 2023-08-29 PROCEDURE — 96374 THER/PROPH/DIAG INJ IV PUSH: CPT

## 2023-08-29 PROCEDURE — 81001 URINALYSIS AUTO W/SCOPE: CPT

## 2023-08-29 PROCEDURE — 74018 RADEX ABDOMEN 1 VIEW: CPT

## 2023-08-29 PROCEDURE — 93010 ELECTROCARDIOGRAM REPORT: CPT | Performed by: INTERNAL MEDICINE

## 2023-08-29 PROCEDURE — 87086 URINE CULTURE/COLONY COUNT: CPT

## 2023-08-29 PROCEDURE — 6370000000 HC RX 637 (ALT 250 FOR IP)

## 2023-08-29 PROCEDURE — 85025 COMPLETE CBC W/AUTO DIFF WBC: CPT

## 2023-08-29 PROCEDURE — 83605 ASSAY OF LACTIC ACID: CPT

## 2023-08-29 PROCEDURE — 80053 COMPREHEN METABOLIC PANEL: CPT

## 2023-08-29 PROCEDURE — 99284 EMERGENCY DEPT VISIT MOD MDM: CPT

## 2023-08-29 PROCEDURE — 36415 COLL VENOUS BLD VENIPUNCTURE: CPT

## 2023-08-29 PROCEDURE — 6360000002 HC RX W HCPCS: Performed by: PHYSICIAN ASSISTANT

## 2023-08-29 RX ORDER — KETOROLAC TROMETHAMINE 30 MG/ML
30 INJECTION, SOLUTION INTRAMUSCULAR; INTRAVENOUS ONCE
Status: COMPLETED | OUTPATIENT
Start: 2023-08-29 | End: 2023-08-29

## 2023-08-29 RX ORDER — TRAMADOL HYDROCHLORIDE 50 MG/1
50 TABLET ORAL ONCE
Status: COMPLETED | OUTPATIENT
Start: 2023-08-29 | End: 2023-08-29

## 2023-08-29 RX ADMIN — TRAMADOL HYDROCHLORIDE 50 MG: 50 TABLET ORAL at 19:34

## 2023-08-29 RX ADMIN — KETOROLAC TROMETHAMINE 30 MG: 30 INJECTION, SOLUTION INTRAMUSCULAR; INTRAVENOUS at 16:56

## 2023-08-29 ASSESSMENT — ENCOUNTER SYMPTOMS
BACK PAIN: 1
COLOR CHANGE: 0
CONSTIPATION: 0
SHORTNESS OF BREATH: 0
SORE THROAT: 0
VOMITING: 0
ABDOMINAL PAIN: 0
NAUSEA: 0
EYE DISCHARGE: 0
RHINORRHEA: 0
ABDOMINAL DISTENTION: 0

## 2023-08-29 ASSESSMENT — PAIN - FUNCTIONAL ASSESSMENT
PAIN_FUNCTIONAL_ASSESSMENT: NONE - DENIES PAIN
PAIN_FUNCTIONAL_ASSESSMENT: 0-10

## 2023-08-29 ASSESSMENT — PAIN DESCRIPTION - ORIENTATION: ORIENTATION: RIGHT;LEFT

## 2023-08-29 ASSESSMENT — PAIN DESCRIPTION - LOCATION
LOCATION: ABDOMEN
LOCATION: BREAST;FLANK
LOCATION: RIB CAGE

## 2023-08-29 ASSESSMENT — PAIN SCALES - GENERAL
PAINLEVEL_OUTOF10: 10
PAINLEVEL_OUTOF10: 5
PAINLEVEL_OUTOF10: 8

## 2023-08-29 ASSESSMENT — PAIN DESCRIPTION - DESCRIPTORS: DESCRIPTORS: ACHING;SHOOTING

## 2023-08-29 NOTE — ED PROVIDER NOTES
instruction. Patient is agreeable to plan. Patient discharged home in stable condition. Problems Addressed:  Nephrolithiasis: acute illness or injury    Amount and/or Complexity of Data Reviewed  Labs: ordered. Radiology: ordered. Risk  Prescription drug management. Coding     CONSULTS:  None    PROCEDURES:  Unless otherwise noted below, none     Procedures    FINAL IMPRESSION      1. Nephrolithiasis          DISPOSITION/PLAN   DISPOSITION Decision To Discharge 08/29/2023 07:21:41 PM      PATIENT REFERRED TO:  MANUELA Valdivia - [facility]  [address] [facility], [address]  [address] [place] [id number]    In 1 day      Nila Harris MD  [address]    In 1 day        DISCHARGE MEDICATIONS:  Discharge Medication List as of 8/29/2023  7:28 PM             (Please note that portions of this note were completed with a voice recognition program.  Efforts were made to edit the dictations but occasionally words are mis-transcribed. )    HERMILA Ambrosio (electronically signed)    My attending physician is Dr. Simona Cr.      HERMILA Ambrosio  08/29/23 8284

## 2023-08-29 NOTE — ED TRIAGE NOTES
Pt states he was here for the same thing yesterday (rib pain)  Pt states his pain is a 10/10  Pt moaning in triage the whole time   Pt is afebrile   Pt is A&Ox4  Pt is hunched over (walking)

## 2023-08-29 NOTE — ED NOTES
Pt resting in bed, 0 c/o, 0 distress, a&ox4, skin w/d/pink, warm blanket to pt, pt genevieve. Well.      Shanae Reyna RN  08/29/23 7387

## 2023-08-29 NOTE — ED NOTES
Pt resting quietly. Pt states pain is unchanged.  Pt awaiting dispo      Jsoe Sweeney RN  08/28/23 2026

## 2023-08-29 NOTE — ED NOTES
Discharge instructions reviewed with patient bedside. Pt stable and in NAD for d/c. Pt ambulatory, respiration even and unlabored. All questions answered at this time.       Charity Ramirez RN  08/29/23 4777

## 2023-08-29 NOTE — TELEPHONE ENCOUNTER
Pt calling stating he is having severe back pain, says it is radiating to his ribs and is wanting a referral or advised to get a back surgery if possible. Pt does not want to see Dr. Troy Day. Pt also stated they are getting close to going back to the ED because of the pain.

## 2023-08-29 NOTE — ED NOTES
Patient D/C instructions have been reviewed, patient is to follow up with PCP   Patient voiced understanding, no questions or concerns noted at this time.          Regis Severe, [place]  08/28/23 9945

## 2023-08-30 ENCOUNTER — HOSPITAL ENCOUNTER (EMERGENCY)
Age: 68
Discharge: HOME | End: 2023-08-30
Payer: MEDICARE

## 2023-08-30 ENCOUNTER — APPOINTMENT (OUTPATIENT)
Dept: ULTRASOUND IMAGING | Age: 68
End: 2023-08-30
Payer: MEDICARE

## 2023-08-30 ENCOUNTER — OFFICE VISIT (OUTPATIENT)
Dept: UROLOGY | Age: 68
End: 2023-08-30
Payer: MEDICARE

## 2023-08-30 ENCOUNTER — HOSPITAL ENCOUNTER (EMERGENCY)
Age: 68
Discharge: HOME | End: 2023-08-30
Attending: EMERGENCY MEDICINE
Payer: MEDICARE

## 2023-08-30 ENCOUNTER — TELEPHONE (OUTPATIENT)
Dept: FAMILY MEDICINE CLINIC | Age: 68
End: 2023-08-30

## 2023-08-30 VITALS
SYSTOLIC BLOOD PRESSURE: 105 MMHG | HEART RATE: 97 BPM | WEIGHT: 140 LBS | RESPIRATION RATE: 17 BRPM | TEMPERATURE: 98.3 F | OXYGEN SATURATION: 97 % | HEIGHT: 68 IN | DIASTOLIC BLOOD PRESSURE: 68 MMHG | BODY MASS INDEX: 21.22 KG/M2

## 2023-08-30 VITALS
WEIGHT: 140 LBS | SYSTOLIC BLOOD PRESSURE: 131 MMHG | HEIGHT: 68 IN | RESPIRATION RATE: 16 BRPM | HEART RATE: 89 BPM | TEMPERATURE: 97.2 F | DIASTOLIC BLOOD PRESSURE: 82 MMHG | BODY MASS INDEX: 21.22 KG/M2 | OXYGEN SATURATION: 100 %

## 2023-08-30 DIAGNOSIS — M25.50 POLYARTHRALGIA: Primary | ICD-10-CM

## 2023-08-30 DIAGNOSIS — N20.0 KIDNEY STONE: Primary | ICD-10-CM

## 2023-08-30 DIAGNOSIS — R10.10 PAIN OF UPPER ABDOMEN: Primary | ICD-10-CM

## 2023-08-30 LAB — BACTERIA UR CULT: NORMAL

## 2023-08-30 PROCEDURE — 96372 THER/PROPH/DIAG INJ SC/IM: CPT

## 2023-08-30 PROCEDURE — 1123F ACP DISCUSS/DSCN MKR DOCD: CPT | Performed by: PHYSICIAN ASSISTANT

## 2023-08-30 PROCEDURE — 99284 EMERGENCY DEPT VISIT MOD MDM: CPT

## 2023-08-30 PROCEDURE — 6370000000 HC RX 637 (ALT 250 FOR IP): Performed by: EMERGENCY MEDICINE

## 2023-08-30 PROCEDURE — 76705 ECHO EXAM OF ABDOMEN: CPT

## 2023-08-30 PROCEDURE — 99203 OFFICE O/P NEW LOW 30 MIN: CPT | Performed by: PHYSICIAN ASSISTANT

## 2023-08-30 PROCEDURE — 6360000002 HC RX W HCPCS: Performed by: PERSONAL EMERGENCY RESPONSE ATTENDANT

## 2023-08-30 PROCEDURE — 6360000002 HC RX W HCPCS: Performed by: EMERGENCY MEDICINE

## 2023-08-30 RX ORDER — METHYLPREDNISOLONE ACETATE 80 MG/ML
80 INJECTION, SUSPENSION INTRA-ARTICULAR; INTRALESIONAL; INTRAMUSCULAR; SOFT TISSUE ONCE
Status: COMPLETED | OUTPATIENT
Start: 2023-08-30 | End: 2023-08-30

## 2023-08-30 RX ORDER — KETOROLAC TROMETHAMINE 30 MG/ML
60 INJECTION, SOLUTION INTRAMUSCULAR; INTRAVENOUS ONCE
Status: COMPLETED | OUTPATIENT
Start: 2023-08-30 | End: 2023-08-30

## 2023-08-30 RX ORDER — DICYCLOMINE HYDROCHLORIDE 10 MG/1
20 CAPSULE ORAL ONCE
Status: COMPLETED | OUTPATIENT
Start: 2023-08-30 | End: 2023-08-30

## 2023-08-30 RX ADMIN — METHYLPREDNISOLONE ACETATE 80 MG: 80 INJECTION, SUSPENSION INTRA-ARTICULAR; INTRALESIONAL; INTRAMUSCULAR; SOFT TISSUE at 14:38

## 2023-08-30 RX ADMIN — KETOROLAC TROMETHAMINE 60 MG: 30 INJECTION, SOLUTION INTRAMUSCULAR at 07:42

## 2023-08-30 RX ADMIN — DICYCLOMINE HYDROCHLORIDE 20 MG: 10 CAPSULE ORAL at 09:27

## 2023-08-30 ASSESSMENT — ENCOUNTER SYMPTOMS
SHORTNESS OF BREATH: 0
APNEA: 0
NAUSEA: 0
ABDOMINAL PAIN: 1
COLOR CHANGE: 0
EYE PAIN: 0
VOMITING: 0
BLOOD IN STOOL: 0
CHEST TIGHTNESS: 0
SORE THROAT: 0
ABDOMINAL PAIN: 0
COUGH: 0
NAUSEA: 0
SORE THROAT: 0
SHORTNESS OF BREATH: 0
VOMITING: 0
DIARRHEA: 0
RHINORRHEA: 0

## 2023-08-30 ASSESSMENT — PAIN SCALES - GENERAL
PAINLEVEL_OUTOF10: 5
PAINLEVEL_OUTOF10: 5
PAINLEVEL_OUTOF10: 10
PAINLEVEL_OUTOF10: 10

## 2023-08-30 ASSESSMENT — PAIN DESCRIPTION - FREQUENCY: FREQUENCY: CONTINUOUS

## 2023-08-30 ASSESSMENT — LIFESTYLE VARIABLES
HOW OFTEN DO YOU HAVE A DRINK CONTAINING ALCOHOL: NEVER
HOW MANY STANDARD DRINKS CONTAINING ALCOHOL DO YOU HAVE ON A TYPICAL DAY: PATIENT DOES NOT DRINK
HOW MANY STANDARD DRINKS CONTAINING ALCOHOL DO YOU HAVE ON A TYPICAL DAY: PATIENT DOES NOT DRINK
HOW OFTEN DO YOU HAVE A DRINK CONTAINING ALCOHOL: NEVER

## 2023-08-30 ASSESSMENT — PAIN DESCRIPTION - LOCATION: LOCATION: ABDOMEN;BACK

## 2023-08-30 ASSESSMENT — PAIN DESCRIPTION - PAIN TYPE: TYPE: ACUTE PAIN;CHRONIC PAIN

## 2023-08-30 ASSESSMENT — PAIN DESCRIPTION - ONSET: ONSET: ON-GOING

## 2023-08-30 ASSESSMENT — PAIN - FUNCTIONAL ASSESSMENT: PAIN_FUNCTIONAL_ASSESSMENT: 0-10

## 2023-08-30 NOTE — ED PROVIDER NOTES
[facility] ED  eMERGENCY dEPARTMENT eNCOUnter      Pt Name: Herve Whitman  MRN: [id number]  [address] [facility] [place] 1955  Date of evaluation: 8/30/2023  Provider: LYNETTE Lara    My attending is Dr. Mik Olivarez is a 77 y.o. male with PMHx of allergic rhinitis, anxiety, chronic back pain, COPD, depression, emphysema, hypertension, GERD, hyperlipidemia, ARNAUD, restless leg syndrome, previous alcoholism presents to the emergency department with joint pain. Pt says he is having neck pain and bilateral knee pain. He is upset because he was seen in the ED earlier today and wasn't given a \"pain shot\". I spoke to him about him being in pain management and he doesn't want to drive that far. He denies injuries, fevers, chills. Per chart review: This is patient's seventh visit to the emergency room this month for multiple pain complaints. He was seen in the emergency room this morning for right upper quadrant abdominal pain and received ultrasound showing no acute cholecystitis. Small benign gallbladder polyp noted. He then followed up with urology today for unchanged proximal left ureteral 9 mm calculus which was found on August 23. HPI    Nursing Notes were reviewed. REVIEW OF SYSTEMS       Review of Systems   Constitutional:  Negative for appetite change, chills and fever. HENT:  Negative for congestion, rhinorrhea and sore throat. Respiratory:  Negative for cough and shortness of breath. Cardiovascular:  Negative for chest pain. Gastrointestinal:  Negative for abdominal pain, blood in stool, diarrhea, nausea and vomiting. Genitourinary:  Negative for difficulty urinating. Musculoskeletal:  Positive for arthralgias and neck pain. Negative for neck stiffness. Skin:  Negative for color change and rash. Neurological:  Negative for dizziness, syncope, weakness, light-headedness, numbness and headaches.    All other systems reviewed and are

## 2023-08-30 NOTE — TELEPHONE ENCOUNTER
Pt calling yelling at me at me stating he has been to the ER five times in the last few days that he is in excruciating pain and no one cares.  Wants to come in to get a pain shoot

## 2023-08-30 NOTE — ED NOTES
Patient states that he is drinking water but not eating much, states BM is normal but decreased.       Antony Porter RN  08/30/23 3856

## 2023-08-30 NOTE — PROGRESS NOTES
Subjective:      Patient ID: Ricky Landaverde is a 77 y.o. male    HPI 77year old male who presents with an unchanged proximal left ureteral 9 mm calculus. Which was found on 8/23/23. He has had colicky pain since. Has been seen in the ED on three occasions for complaints of random pain through out his body. Only occasional left flank pain. Denies fever and chills. Positive for nausea.      Past Medical History:   Diagnosis Date    Allergic rhinitis 2/19/2018    Anxiety     Chronic back pain     COPD (chronic obstructive pulmonary disease) ([facility])     Depression     Disorder of stomach     valve not closing properly    Emphysema lung ([address])     Essential hypertension 11/4/2019    Gastroesophageal reflux disease 10/4/2019    Hydronephrosis of left kidney 8/23/2023    Hyperlipidemia     meds > 22 yrs    Low back pain 5/1/2018    ARNAUD (obstructive sleep apnea) 2/19/2018    Other emphysema ([address]) 10/4/2019    Other intervertebral disc degeneration, lumbar region 3/23/2018    Radiculopathy, lumbar region 2/28/2018    Restless leg syndrome     Seasonal affective disorder ([address]) 10/4/2019    Substance abuse ([address])     alcholic, quit 20 yrs      Past Surgical History:   Procedure Laterality Date    COLONOSCOPY  12/22/2016    A SHALA MARLEY    DENTAL SURGERY  10 yrs ago    ENDOSCOPY, COLON, DIAGNOSTIC      EYE SURGERY      Lasik [facility]    FINGER TRIGGER RELEASE Left 11/1/2022    LEFT HAND TRIGGER FINGER RING FINGER RELEASE OF A1 PULLEY performed by Cheyenne Flynn MD at [facility] ARTHROSCOPY Right     KNEE SURGERY Right 05/2016    Ray procedure    KNEE SURGERY      UT NASAL/SINUS ENDOSCOPY W/MAXILLARY ANTROSTOMY N/A 2/22/2018    SEPTOPLASTY MICRODEBRIDER ASSISTED TURBINOPLASTY AND OUT-FRACTURING BILATERAL NASAL ENDOSCOPY performed by Bandar Munoz MD at [address] [place] [address] History     Socioeconomic History    Marital status:     Number of children: 2    Years of education: 12    Highest education level: High school

## 2023-08-30 NOTE — TELEPHONE ENCOUNTER
Pt is in at urology office, having extreme back pain, ribs on the right side. Was at the ED earlier today 8/30/23    Kidney stones on his left side.     Pt is going to be advised to go back to the ER

## 2023-08-30 NOTE — ED PROVIDER NOTES
[facility] ED  EMERGENCY DEPARTMENT ENCOUNTER      Pt Name: Nila Stevenson  MRN: [id number]  [address] 1955  Date of evaluation: 8/30/2023  Provider: Kelly Landrum DO    CHIEF COMPLAINT       Chief Complaint   Patient presents with    Back Pain    Abdominal Pain     Pt states he has back pain that is causing him to hunch over and he is unsure if that is now causing abd pain right sided under ribs. HISTORY OF PRESENT ILLNESS   (Location/Symptom, Timing/Onset, Context/Setting, Quality, Duration, Modifying Factors, Severity)  Note limiting factors. Nila Stevenson is a 77 y.o. male who presents to the emergency department . Patient who has been to the ER several times this week and diagnosed with a kidney stone in the left proximal ureter presents with right upper quadrant abdominal pain. Patient has an appointment with urology at 2:00 today. Patient states he does not know why his abdomen is hurting. History of chronic back pain, COPD, hypertension GERD hyperlipidemia substance abuse. HPI    Nursing Notes were reviewed. REVIEW OF SYSTEMS    (2-9 systems for level 4, 10 or more for level 5)     Review of Systems   Constitutional:  Negative for activity change, appetite change and fatigue. HENT:  Negative for congestion and sore throat. Eyes:  Negative for pain and visual disturbance. Respiratory:  Negative for chest tightness and shortness of breath. Cardiovascular:  Negative for chest pain. Gastrointestinal:  Positive for abdominal pain. Negative for nausea and vomiting. Endocrine: Negative for polydipsia. Genitourinary:  Negative for flank pain and urgency. Musculoskeletal:  Negative for gait problem and neck stiffness. Skin:  Negative for rash. Neurological:  Negative for weakness, light-headedness and headaches. Psychiatric/Behavioral:  Negative for confusion and sleep disturbance.       Except as noted above the remainder of the review of systems was

## 2023-08-30 NOTE — ED NOTES
US completed, patient resting on his L side at this time     Antony Porter, [address]  08/30/23 5262

## 2023-08-31 ENCOUNTER — OFFICE VISIT (OUTPATIENT)
Dept: FAMILY MEDICINE CLINIC | Age: 68
End: 2023-08-31
Payer: MEDICARE

## 2023-08-31 VITALS
HEIGHT: 68 IN | OXYGEN SATURATION: 98 % | HEART RATE: 87 BPM | WEIGHT: 140 LBS | SYSTOLIC BLOOD PRESSURE: 100 MMHG | BODY MASS INDEX: 21.22 KG/M2 | DIASTOLIC BLOOD PRESSURE: 80 MMHG

## 2023-08-31 DIAGNOSIS — M47.817 LUMBOSACRAL SPONDYLOSIS WITHOUT MYELOPATHY: Primary | ICD-10-CM

## 2023-08-31 DIAGNOSIS — M54.16 RADICULOPATHY, LUMBAR REGION: ICD-10-CM

## 2023-08-31 DIAGNOSIS — M48.00 CENTRAL STENOSIS OF SPINAL CANAL: ICD-10-CM

## 2023-08-31 DIAGNOSIS — M48.062 SPINAL STENOSIS OF LUMBAR REGION WITH NEUROGENIC CLAUDICATION: ICD-10-CM

## 2023-08-31 PROCEDURE — 3079F DIAST BP 80-89 MM HG: CPT | Performed by: NURSE PRACTITIONER

## 2023-08-31 PROCEDURE — 99214 OFFICE O/P EST MOD 30 MIN: CPT | Performed by: NURSE PRACTITIONER

## 2023-08-31 PROCEDURE — 3074F SYST BP LT 130 MM HG: CPT | Performed by: NURSE PRACTITIONER

## 2023-08-31 PROCEDURE — 1123F ACP DISCUSS/DSCN MKR DOCD: CPT | Performed by: NURSE PRACTITIONER

## 2023-08-31 RX ORDER — KETOROLAC TROMETHAMINE 10 MG/1
10 TABLET, FILM COATED ORAL EVERY 6 HOURS PRN
Qty: 20 TABLET | Refills: 0 | Status: SHIPPED | OUTPATIENT
Start: 2023-08-31

## 2023-08-31 ASSESSMENT — ENCOUNTER SYMPTOMS
COUGH: 0
BACK PAIN: 1
SHORTNESS OF BREATH: 0

## 2023-08-31 NOTE — TELEPHONE ENCOUNTER
Pt calling stating they were in pain in the abdomen and was instructed if severe to go to the ED or call an ambulance and was also informed his medication was ready at the pharmacy after pt asked

## 2023-08-31 NOTE — PROGRESS NOTES
result expectations have been discussed with the patient who expresses understanding and desires to proceed. Close follow up to evaluate treatment results and for coordination of care. I have reviewed the patient's medical history in detail and updated the computerized patient record. As always, patient is advised that if symptoms worsen in any way they will proceed to the nearest emergency room.         Mukund Orosco, APRN - CNP

## 2023-09-01 ENCOUNTER — APPOINTMENT (OUTPATIENT)
Dept: GENERAL RADIOLOGY | Age: 68
End: 2023-09-01
Payer: MEDICARE

## 2023-09-01 ENCOUNTER — TELEPHONE (OUTPATIENT)
Dept: FAMILY MEDICINE CLINIC | Age: 68
End: 2023-09-01

## 2023-09-01 ENCOUNTER — APPOINTMENT (OUTPATIENT)
Dept: ULTRASOUND IMAGING | Age: 68
End: 2023-09-01
Payer: MEDICARE

## 2023-09-01 ENCOUNTER — HOSPITAL ENCOUNTER (EMERGENCY)
Age: 68
Discharge: HOME | End: 2023-09-01
Payer: MEDICARE

## 2023-09-01 VITALS
OXYGEN SATURATION: 100 % | HEART RATE: 94 BPM | RESPIRATION RATE: 18 BRPM | HEIGHT: 68 IN | WEIGHT: 130 LBS | DIASTOLIC BLOOD PRESSURE: 127 MMHG | SYSTOLIC BLOOD PRESSURE: 147 MMHG | TEMPERATURE: 98 F | BODY MASS INDEX: 19.7 KG/M2

## 2023-09-01 DIAGNOSIS — F32.A DEPRESSION, UNSPECIFIED DEPRESSION TYPE: ICD-10-CM

## 2023-09-01 DIAGNOSIS — M54.50 BILATERAL LOW BACK PAIN, UNSPECIFIED CHRONICITY, UNSPECIFIED WHETHER SCIATICA PRESENT: Primary | ICD-10-CM

## 2023-09-01 DIAGNOSIS — M47.817 LUMBOSACRAL SPONDYLOSIS WITHOUT MYELOPATHY: Primary | ICD-10-CM

## 2023-09-01 DIAGNOSIS — R07.89 CHEST WALL PAIN: ICD-10-CM

## 2023-09-01 DIAGNOSIS — M48.00 CENTRAL STENOSIS OF SPINAL CANAL: ICD-10-CM

## 2023-09-01 DIAGNOSIS — N20.0 KIDNEY STONE: ICD-10-CM

## 2023-09-01 DIAGNOSIS — Z74.09 LIMITED MOBILITY: ICD-10-CM

## 2023-09-01 LAB
ALBUMIN SERPL-MCNC: 3.8 G/DL (ref 3.5–4.6)
ALP SERPL-CCNC: 71 U/L (ref 35–104)
ALT SERPL-CCNC: 10 U/L (ref 0–41)
ANION GAP SERPL CALCULATED.3IONS-SCNC: 14 MEQ/L (ref 9–15)
AST SERPL-CCNC: 12 U/L (ref 0–40)
BACTERIA URNS QL MICRO: ABNORMAL /HPF
BASOPHILS # BLD: 0.1 K/UL (ref 0–0.2)
BASOPHILS NFR BLD: 0.8 %
BILIRUB SERPL-MCNC: <0.2 MG/DL (ref 0.2–0.7)
BILIRUB UR QL STRIP: NEGATIVE
BUN SERPL-MCNC: 34 MG/DL (ref 8–23)
CALCIUM SERPL-MCNC: 9.1 MG/DL (ref 8.5–9.9)
CHLORIDE SERPL-SCNC: 96 MEQ/L (ref 95–107)
CLARITY UR: CLEAR
CO2 SERPL-SCNC: 22 MEQ/L (ref 20–31)
COLOR UR: YELLOW
CREAT SERPL-MCNC: 0.85 MG/DL (ref 0.7–1.2)
EOSINOPHIL # BLD: 0 K/UL (ref 0–0.7)
EOSINOPHIL NFR BLD: 0.3 %
EPI CELLS #/AREA URNS AUTO: ABNORMAL /HPF (ref 0–5)
ERYTHROCYTE [DISTWIDTH] IN BLOOD BY AUTOMATED COUNT: 15 % (ref 11.5–14.5)
GLOBULIN SER CALC-MCNC: 2.5 G/DL (ref 2.3–3.5)
GLUCOSE SERPL-MCNC: 99 MG/DL (ref 70–99)
GLUCOSE UR STRIP-MCNC: NEGATIVE MG/DL
HCT VFR BLD AUTO: 32 % (ref 42–52)
HGB BLD-MCNC: 11 G/DL (ref 14–18)
HGB UR QL STRIP: NEGATIVE
HYALINE CASTS #/AREA URNS AUTO: ABNORMAL /HPF (ref 0–5)
KETONES UR STRIP-MCNC: 15 MG/DL
LEUKOCYTE ESTERASE UR QL STRIP: ABNORMAL
LYMPHOCYTES # BLD: 1.3 K/UL (ref 1–4.8)
LYMPHOCYTES NFR BLD: 9.5 %
MCH RBC QN AUTO: 33.4 PG (ref 27–31.3)
MCHC RBC AUTO-ENTMCNC: 34.3 % (ref 33–37)
MCV RBC AUTO: 97.3 FL (ref 79–92.2)
MONOCYTES # BLD: 0.9 K/UL (ref 0.2–0.8)
MONOCYTES NFR BLD: 6.3 %
NEUTROPHILS # BLD: 11.4 K/UL (ref 1.4–6.5)
NEUTS SEG NFR BLD: 83.1 %
NITRITE UR QL STRIP: NEGATIVE
PH UR STRIP: 6 [PH] (ref 5–9)
PLATELET # BLD AUTO: 368 K/UL (ref 130–400)
POTASSIUM SERPL-SCNC: 3.7 MEQ/L (ref 3.4–4.9)
PROT SERPL-MCNC: 6.3 G/DL (ref 6.3–8)
PROT UR STRIP-MCNC: ABNORMAL MG/DL
RBC # BLD AUTO: 3.29 M/UL (ref 4.7–6.1)
RBC #/AREA URNS AUTO: ABNORMAL /HPF (ref 0–5)
SODIUM SERPL-SCNC: 132 MEQ/L (ref 135–144)
SP GR UR STRIP: 1.02 (ref 1–1.03)
TROPONIN T SERPL-MCNC: <0.01 NG/ML (ref 0–0.01)
URINE REFLEX TO CULTURE: YES
UROBILINOGEN UR STRIP-ACNC: 1 E.U./DL
WBC # BLD AUTO: 13.7 K/UL (ref 4.8–10.8)
WBC #/AREA URNS AUTO: >100 /HPF (ref 0–5)
YEAST URNS QL MICRO: PRESENT /HPF

## 2023-09-01 PROCEDURE — 87086 URINE CULTURE/COLONY COUNT: CPT

## 2023-09-01 PROCEDURE — 96375 TX/PRO/DX INJ NEW DRUG ADDON: CPT

## 2023-09-01 PROCEDURE — 81003 URINALYSIS AUTO W/O SCOPE: CPT

## 2023-09-01 PROCEDURE — 76775 US EXAM ABDO BACK WALL LIM: CPT

## 2023-09-01 PROCEDURE — 93005 ELECTROCARDIOGRAM TRACING: CPT

## 2023-09-01 PROCEDURE — 71045 X-RAY EXAM CHEST 1 VIEW: CPT

## 2023-09-01 PROCEDURE — 84484 ASSAY OF TROPONIN QUANT: CPT

## 2023-09-01 PROCEDURE — 6360000002 HC RX W HCPCS: Performed by: PHYSICIAN ASSISTANT

## 2023-09-01 PROCEDURE — 96374 THER/PROPH/DIAG INJ IV PUSH: CPT

## 2023-09-01 PROCEDURE — 80053 COMPREHEN METABOLIC PANEL: CPT

## 2023-09-01 PROCEDURE — 85025 COMPLETE CBC W/AUTO DIFF WBC: CPT

## 2023-09-01 PROCEDURE — 36415 COLL VENOUS BLD VENIPUNCTURE: CPT

## 2023-09-01 PROCEDURE — 99285 EMERGENCY DEPT VISIT HI MDM: CPT

## 2023-09-01 PROCEDURE — 6370000000 HC RX 637 (ALT 250 FOR IP): Performed by: PHYSICIAN ASSISTANT

## 2023-09-01 PROCEDURE — 2580000003 HC RX 258: Performed by: PHYSICIAN ASSISTANT

## 2023-09-01 RX ORDER — 0.9 % SODIUM CHLORIDE 0.9 %
500 INTRAVENOUS SOLUTION INTRAVENOUS ONCE
Status: COMPLETED | OUTPATIENT
Start: 2023-09-01 | End: 2023-09-01

## 2023-09-01 RX ORDER — MORPHINE SULFATE 4 MG/ML
4 INJECTION, SOLUTION INTRAMUSCULAR; INTRAVENOUS ONCE
Status: COMPLETED | OUTPATIENT
Start: 2023-09-01 | End: 2023-09-01

## 2023-09-01 RX ORDER — ONDANSETRON 2 MG/ML
4 INJECTION INTRAMUSCULAR; INTRAVENOUS ONCE
Status: COMPLETED | OUTPATIENT
Start: 2023-09-01 | End: 2023-09-01

## 2023-09-01 RX ORDER — HYDROCODONE BITARTRATE AND ACETAMINOPHEN 5; 325 MG/1; MG/1
1 TABLET ORAL ONCE
Status: COMPLETED | OUTPATIENT
Start: 2023-09-01 | End: 2023-09-01

## 2023-09-01 RX ADMIN — HYDROCODONE BITARTRATE AND ACETAMINOPHEN 1 TABLET: 5; 325 TABLET ORAL at 20:19

## 2023-09-01 RX ADMIN — SODIUM CHLORIDE 500 ML: 9 INJECTION, SOLUTION INTRAVENOUS at 16:15

## 2023-09-01 RX ADMIN — ONDANSETRON 4 MG: 2 INJECTION INTRAMUSCULAR; INTRAVENOUS at 16:19

## 2023-09-01 RX ADMIN — MORPHINE SULFATE 4 MG: 4 INJECTION, SOLUTION INTRAMUSCULAR; INTRAVENOUS at 16:16

## 2023-09-01 ASSESSMENT — PAIN DESCRIPTION - LOCATION
LOCATION: GENERALIZED
LOCATION: ABDOMEN;BACK;LEG;KNEE

## 2023-09-01 ASSESSMENT — ENCOUNTER SYMPTOMS
COUGH: 0
BACK PAIN: 1
ANAL BLEEDING: 0
ABDOMINAL PAIN: 1
ABDOMINAL DISTENTION: 0
VOICE CHANGE: 0
EYE DISCHARGE: 0

## 2023-09-01 ASSESSMENT — PAIN - FUNCTIONAL ASSESSMENT
PAIN_FUNCTIONAL_ASSESSMENT: 0-10
PAIN_FUNCTIONAL_ASSESSMENT: ACTIVITIES ARE NOT PREVENTED

## 2023-09-01 ASSESSMENT — PAIN DESCRIPTION - DESCRIPTORS
DESCRIPTORS: ACHING

## 2023-09-01 ASSESSMENT — PAIN DESCRIPTION - PAIN TYPE
TYPE: ACUTE PAIN
TYPE: ACUTE PAIN
TYPE: CHRONIC PAIN

## 2023-09-01 ASSESSMENT — PAIN DESCRIPTION - FREQUENCY
FREQUENCY: CONTINUOUS

## 2023-09-01 ASSESSMENT — PAIN SCALES - GENERAL
PAINLEVEL_OUTOF10: 5
PAINLEVEL_OUTOF10: 10
PAINLEVEL_OUTOF10: 6
PAINLEVEL_OUTOF10: 10

## 2023-09-01 NOTE — ED PROVIDER NOTES
[facility] ED  eMERGENCY dEPARTMENT eNCOUnter      Pt Name: Baljit Guerin  MRN: [id number]  [address] [facility] [place] 1955  Date of evaluation: 9/1/2023  Provider: Frazier Cockayne, PA-C    CHIEF COMPLAINT       Chief Complaint   Patient presents with    Back Pain         HISTORY OF PRESENT ILLNESS   (Location/Symptom, Timing/Onset,Context/Setting, Quality, Duration, Modifying Factors, Severity)  Note limiting factors. Baljit Guerin is a 77 y.o. male who presents to the emergency department patient here with back pain chest pain abdominal pain states he is had decreased food and fluid intake. Patient has known kidney stone. Symptoms mild to moderate severity worse with touch or motion. Nothing improves his symptoms he does take his medications as prescribed has been seen by primary care and urology recently. HPI    NursingNotes were reviewed. REVIEW OF SYSTEMS    (2-9 systems for level 4, 10 or more for level 5)     Review of Systems   Constitutional:  Positive for appetite change. Negative for activity change. HENT:  Negative for ear discharge, nosebleeds and voice change. Eyes:  Negative for discharge. Respiratory:  Negative for cough. Cardiovascular:  Positive for chest pain. Gastrointestinal:  Positive for abdominal pain. Negative for abdominal distention and anal bleeding. Genitourinary:  Positive for decreased urine volume. Musculoskeletal:  Positive for arthralgias and back pain. Negative for joint swelling. Skin:  Negative for pallor. Neurological:  Negative for seizures and facial asymmetry. Hematological:  Does not bruise/bleed easily. Psychiatric/Behavioral:  Negative for behavioral problems, self-injury and suicidal ideas. All other systems reviewed and are negative. Except as noted above the remainder of the review of systems was reviewed and negative.        PAST MEDICAL HISTORY     Past Medical History:   Diagnosis Date    Allergic rhinitis 2/19/2018 motion and neck supple. Skin:     General: Skin is warm. Findings: No erythema. Neurological:      Mental Status: He is alert and oriented to person, place, and time. RESULTS     EKG: All EKG's are interpreted by the Emergency Department Physician who either signs or Co-signsthis chart in the absence of a cardiologist.         RADIOLOGY:   Zada Rear such as CT, Ultrasound and MRI are read by the radiologist. Plain radiographic images are visualized and preliminarily interpreted by the emergency physician with the below findings:         Interpretation per the Radiologist below, if available at the time ofthis note:    [facility]   Final Result   Moderate left hydronephrosis with obstructing 11 mm UPJ calculus, which   appears similar to recent CT. Additional left nephrolithiasis. XR CHEST PORTABLE   Final Result   1. Hazy opacity in the medial left upper lobe could represent scarring, or   possibly pneumonia in the proper clinical setting. 2.  No evidence of pneumothorax, no pleural fluid.       RECOMMENDATION:   If there are no signs or symptoms of infection, consider noncontrast CT chest.               ED BEDSIDE ULTRASOUND:   Performed by ED Physician - none    LABS:  Labs Reviewed   CBC WITH AUTO DIFFERENTIAL - Abnormal; Notable for the following components:       Result Value    WBC 13.7 (*)     RBC 3.29 (*)     Hemoglobin 11.0 (*)     Hematocrit 32.0 (*)     MCV 97.3 (*)     MCH 33.4 (*)     RDW 15.0 (*)     Neutrophils Absolute 11.4 (*)     Monocytes Absolute 0.9 (*)     All other components within normal limits   COMPREHENSIVE METABOLIC PANEL - Abnormal; Notable for the following components:    Sodium 132 (*)     BUN 34 (*)     All other components within normal limits   URINALYSIS WITH REFLEX TO CULTURE - Abnormal; Notable for the following components:    Ketones, Urine 15 (*)     Protein, UA TRACE (*)     Leukocyte Esterase, Urine SMALL (*)     All

## 2023-09-01 NOTE — TELEPHONE ENCOUNTER
Pt's neighbor  Marilee Records calling stating that she was checking in on the pt. Stating that he was having tremors and not looking well, they are asking for home health care to be ordered. Pt is not able to stand well, cook for himself. Please advise. Pt phone number is [phone]. Advised to have pt go to the ER due to the conditions that he had.

## 2023-09-01 NOTE — ED TRIAGE NOTES
Pt has co chronic back, knee, leg and abd pain. Pt states he has been seen multiple times for this and can not handle the pain. Pt is restless and anxious in bed .

## 2023-09-01 NOTE — ED NOTES
Pt yelling out in pain all over, nurse unable to redirect. Provider aware.       Adama Garner RN  09/01/23 0579

## 2023-09-02 LAB
EKG ATRIAL RATE: 94 BPM
EKG P AXIS: 82 DEGREES
EKG P-R INTERVAL: 120 MS
EKG Q-T INTERVAL: 338 MS
EKG QRS DURATION: 98 MS
EKG QTC CALCULATION (BAZETT): 422 MS
EKG R AXIS: 80 DEGREES
EKG T AXIS: 87 DEGREES
EKG VENTRICULAR RATE: 94 BPM

## 2023-09-03 LAB — BACTERIA UR CULT: NORMAL

## 2023-09-05 NOTE — TELEPHONE ENCOUNTER
Does pt want home health?  They would come in take vitals and have an aid come in to help with ADL's etc.

## 2023-09-06 ENCOUNTER — TELEPHONE (OUTPATIENT)
Dept: FAMILY MEDICINE CLINIC | Age: 68
End: 2023-09-06

## 2023-09-06 ENCOUNTER — OFFICE VISIT (OUTPATIENT)
Dept: UROLOGY | Age: 68
End: 2023-09-06
Payer: MEDICARE

## 2023-09-06 ENCOUNTER — HOSPITAL ENCOUNTER (OUTPATIENT)
Dept: GENERAL RADIOLOGY | Age: 68
Discharge: HOME | End: 2023-09-08
Payer: MEDICARE

## 2023-09-06 VITALS
HEART RATE: 83 BPM | SYSTOLIC BLOOD PRESSURE: 100 MMHG | HEIGHT: 68 IN | DIASTOLIC BLOOD PRESSURE: 66 MMHG | WEIGHT: 135 LBS | BODY MASS INDEX: 20.46 KG/M2

## 2023-09-06 DIAGNOSIS — N20.0 KIDNEY STONE: Primary | ICD-10-CM

## 2023-09-06 DIAGNOSIS — N20.0 KIDNEY STONE: ICD-10-CM

## 2023-09-06 PROCEDURE — 99203 OFFICE O/P NEW LOW 30 MIN: CPT | Performed by: PHYSICIAN ASSISTANT

## 2023-09-06 PROCEDURE — 74018 RADEX ABDOMEN 1 VIEW: CPT

## 2023-09-06 PROCEDURE — 1123F ACP DISCUSS/DSCN MKR DOCD: CPT | Performed by: PHYSICIAN ASSISTANT

## 2023-09-06 PROCEDURE — 3078F DIAST BP <80 MM HG: CPT | Performed by: PHYSICIAN ASSISTANT

## 2023-09-06 PROCEDURE — 3074F SYST BP LT 130 MM HG: CPT | Performed by: PHYSICIAN ASSISTANT

## 2023-09-06 ASSESSMENT — ENCOUNTER SYMPTOMS: APNEA: 0

## 2023-09-06 NOTE — TELEPHONE ENCOUNTER
Graciela Mejia with [facility] calling stating they can start home care services no later than 9/12.          Graciela Mejia  [phone]

## 2023-09-06 NOTE — PROGRESS NOTES
Subjective:      Patient ID: Jodee Wise is a 77 y.o. male    HPI 77year old male who presents after being diagnosed with a 7 mm stone at the left UPJ on /8/28/23 for which he was seen in the ED. Rates the pain at 4/10 intermittently. States that the pain is only on occasion. Has a chronic issue with low back pain. The patient has a sulfa drug allergy.     Past Medical History:   Diagnosis Date    Allergic rhinitis 2/19/2018    Anxiety     Chronic back pain     COPD (chronic obstructive pulmonary disease) ([facility])     Depression     Disorder of stomach     valve not closing properly    Emphysema lung ([address])     Essential hypertension 11/4/2019    Gastroesophageal reflux disease 10/4/2019    Hydronephrosis of left kidney 8/23/2023    Hyperlipidemia     meds > 22 yrs    Low back pain 5/1/2018    ARNAUD (obstructive sleep apnea) 2/19/2018    Other emphysema ([address]) 10/4/2019    Other intervertebral disc degeneration, lumbar region 3/23/2018    Radiculopathy, lumbar region 2/28/2018    Restless leg syndrome     Seasonal affective disorder ([address]) 10/4/2019    Substance abuse ([address])     alcholic, quit 20 yrs      Past Surgical History:   Procedure Laterality Date    COLONOSCOPY  12/22/2016    A SHALA MARLEY    DENTAL SURGERY  10 yrs ago    ENDOSCOPY, COLON, DIAGNOSTIC      EYE SURGERY      Lasik OU    FINGER TRIGGER RELEASE Left 11/1/2022    LEFT HAND TRIGGER FINGER RING FINGER RELEASE OF A1 PANFILO performed by Lina Qiu MD at [facility] ARTHROSCOPY Right     KNEE SURGERY Right 05/2016    Ray procedure    KNEE SURGERY      [place] NASAL/SINUS ENDOSCOPY W/MAXILLARY ANTROSTOMY N/A 2/22/2018    SEPTOPLASTY MICRODEBRIDER ASSISTED TURBINOPLASTY AND OUT-FRACTURING BILATERAL NASAL ENDOSCOPY performed by Charlee Miranda MD at [address] [place] [address] History     Socioeconomic History    Marital status:      Spouse name: None    Number of children: 2    Years of education: 12    Highest education level: High school

## 2023-09-10 ENCOUNTER — HOSPITAL ENCOUNTER (INPATIENT)
Age: 68
LOS: 1 days | Discharge: HOME | DRG: 661 | End: 2023-09-11
Attending: FAMILY MEDICINE | Admitting: INTERNAL MEDICINE
Payer: MEDICARE

## 2023-09-10 ENCOUNTER — APPOINTMENT (OUTPATIENT)
Dept: CT IMAGING | Age: 68
DRG: 661 | End: 2023-09-10
Attending: FAMILY MEDICINE
Payer: MEDICARE

## 2023-09-10 DIAGNOSIS — E86.0 DEHYDRATION: ICD-10-CM

## 2023-09-10 DIAGNOSIS — N20.1 OBSTRUCTION OF RIGHT URETEROPELVIC JUNCTION (UPJ) DUE TO STONE: Primary | ICD-10-CM

## 2023-09-10 DIAGNOSIS — N39.0 ACUTE UTI (URINARY TRACT INFECTION): ICD-10-CM

## 2023-09-10 DIAGNOSIS — D64.9 ANEMIA, UNSPECIFIED TYPE: ICD-10-CM

## 2023-09-10 LAB
ALBUMIN SERPL-MCNC: 3.5 G/DL (ref 3.5–4.6)
ALP SERPL-CCNC: 63 U/L (ref 35–104)
ALT SERPL-CCNC: 11 U/L (ref 0–41)
ANION GAP SERPL CALCULATED.3IONS-SCNC: 12 MEQ/L (ref 9–15)
AST SERPL-CCNC: 10 U/L (ref 0–40)
BACTERIA URNS QL MICRO: NEGATIVE /HPF
BASOPHILS # BLD: 0.1 K/UL (ref 0–0.2)
BASOPHILS NFR BLD: 0.8 %
BILIRUB SERPL-MCNC: <0.2 MG/DL (ref 0.2–0.7)
BILIRUB UR QL STRIP: NEGATIVE
BUN SERPL-MCNC: 17 MG/DL (ref 8–23)
CALCIUM SERPL-MCNC: 8.5 MG/DL (ref 8.5–9.9)
CHLORIDE SERPL-SCNC: 98 MEQ/L (ref 95–107)
CLARITY UR: CLEAR
CO2 SERPL-SCNC: 23 MEQ/L (ref 20–31)
COLOR UR: YELLOW
CREAT SERPL-MCNC: 0.85 MG/DL (ref 0.7–1.2)
EOSINOPHIL # BLD: 0.2 K/UL (ref 0–0.7)
EOSINOPHIL NFR BLD: 1.2 %
EPI CELLS #/AREA URNS AUTO: ABNORMAL /HPF (ref 0–5)
ERYTHROCYTE [DISTWIDTH] IN BLOOD BY AUTOMATED COUNT: 14.7 % (ref 11.5–14.5)
GLOBULIN SER CALC-MCNC: 2.4 G/DL (ref 2.3–3.5)
GLUCOSE SERPL-MCNC: 89 MG/DL (ref 70–99)
GLUCOSE UR STRIP-MCNC: NEGATIVE MG/DL
HCT VFR BLD AUTO: 26.1 % (ref 42–52)
HGB BLD-MCNC: 8.8 G/DL (ref 14–18)
HGB UR QL STRIP: NEGATIVE
HYALINE CASTS #/AREA URNS AUTO: ABNORMAL /HPF (ref 0–5)
KETONES UR STRIP-MCNC: ABNORMAL MG/DL
LEUKOCYTE ESTERASE UR QL STRIP: ABNORMAL
LYMPHOCYTES # BLD: 1.9 K/UL (ref 1–4.8)
LYMPHOCYTES NFR BLD: 15.9 %
MCH RBC QN AUTO: 33.1 PG (ref 27–31.3)
MCHC RBC AUTO-ENTMCNC: 33.9 % (ref 33–37)
MCV RBC AUTO: 97.6 FL (ref 79–92.2)
MONOCYTES # BLD: 0.6 K/UL (ref 0.2–0.8)
MONOCYTES NFR BLD: 5.1 %
NEUTROPHILS # BLD: 9.4 K/UL (ref 1.4–6.5)
NEUTS SEG NFR BLD: 77 %
NITRITE UR QL STRIP: NEGATIVE
PH UR STRIP: 6.5 [PH] (ref 5–9)
PLATELET # BLD AUTO: 543 K/UL (ref 130–400)
POTASSIUM SERPL-SCNC: 4.9 MEQ/L (ref 3.4–4.9)
PROT SERPL-MCNC: 5.9 G/DL (ref 6.3–8)
PROT UR STRIP-MCNC: NEGATIVE MG/DL
RBC # BLD AUTO: 2.67 M/UL (ref 4.7–6.1)
RBC #/AREA URNS AUTO: ABNORMAL /HPF (ref 0–5)
SODIUM SERPL-SCNC: 133 MEQ/L (ref 135–144)
SP GR UR STRIP: 1.02 (ref 1–1.03)
URINE REFLEX TO CULTURE: YES
UROBILINOGEN UR STRIP-ACNC: 0.2 E.U./DL
WBC # BLD AUTO: 12.2 K/UL (ref 4.8–10.8)
WBC #/AREA URNS AUTO: ABNORMAL /HPF (ref 0–5)

## 2023-09-10 PROCEDURE — BT1F1ZZ FLUOROSCOPY OF LEFT KIDNEY, URETER AND BLADDER USING LOW OSMOLAR CONTRAST: ICD-10-PCS | Performed by: UROLOGY

## 2023-09-10 PROCEDURE — 0T778DZ DILATION OF LEFT URETER WITH INTRALUMINAL DEVICE, VIA NATURAL OR ARTIFICIAL OPENING ENDOSCOPIC: ICD-10-PCS | Performed by: UROLOGY

## 2023-09-10 PROCEDURE — 96375 TX/PRO/DX INJ NEW DRUG ADDON: CPT

## 2023-09-10 PROCEDURE — 96365 THER/PROPH/DIAG IV INF INIT: CPT

## 2023-09-10 PROCEDURE — 36415 COLL VENOUS BLD VENIPUNCTURE: CPT

## 2023-09-10 PROCEDURE — 81001 URINALYSIS AUTO W/SCOPE: CPT

## 2023-09-10 PROCEDURE — 85014 HEMATOCRIT: CPT

## 2023-09-10 PROCEDURE — 80053 COMPREHEN METABOLIC PANEL: CPT

## 2023-09-10 PROCEDURE — 99285 EMERGENCY DEPT VISIT HI MDM: CPT

## 2023-09-10 PROCEDURE — 2580000003 HC RX 258: Performed by: FAMILY MEDICINE

## 2023-09-10 PROCEDURE — 87086 URINE CULTURE/COLONY COUNT: CPT

## 2023-09-10 PROCEDURE — 96361 HYDRATE IV INFUSION ADD-ON: CPT

## 2023-09-10 PROCEDURE — 6370000000 HC RX 637 (ALT 250 FOR IP): Performed by: NURSE PRACTITIONER

## 2023-09-10 PROCEDURE — 6360000002 HC RX W HCPCS: Performed by: FAMILY MEDICINE

## 2023-09-10 PROCEDURE — 85018 HEMOGLOBIN: CPT

## 2023-09-10 PROCEDURE — 74176 CT ABD & PELVIS W/O CONTRAST: CPT

## 2023-09-10 PROCEDURE — 2580000003 HC RX 258: Performed by: NURSE PRACTITIONER

## 2023-09-10 PROCEDURE — 1210000000 HC MED SURG R&B

## 2023-09-10 PROCEDURE — 6360000002 HC RX W HCPCS: Performed by: NURSE PRACTITIONER

## 2023-09-10 PROCEDURE — 85025 COMPLETE CBC W/AUTO DIFF WBC: CPT

## 2023-09-10 RX ORDER — 0.9 % SODIUM CHLORIDE 0.9 %
1000 INTRAVENOUS SOLUTION INTRAVENOUS ONCE
Status: COMPLETED | OUTPATIENT
Start: 2023-09-10 | End: 2023-09-10

## 2023-09-10 RX ORDER — SODIUM CHLORIDE 9 MG/ML
INJECTION, SOLUTION INTRAVENOUS PRN
Status: DISCONTINUED | OUTPATIENT
Start: 2023-09-10 | End: 2023-09-11 | Stop reason: HOSPADM

## 2023-09-10 RX ORDER — FLUOXETINE HYDROCHLORIDE 20 MG/1
40 CAPSULE ORAL DAILY
Status: DISCONTINUED | OUTPATIENT
Start: 2023-09-11 | End: 2023-09-11 | Stop reason: HOSPADM

## 2023-09-10 RX ORDER — LIDOCAINE 4 G/G
2 PATCH TOPICAL DAILY
Status: DISCONTINUED | OUTPATIENT
Start: 2023-09-10 | End: 2023-09-11 | Stop reason: HOSPADM

## 2023-09-10 RX ORDER — ONDANSETRON 4 MG/1
4 TABLET, ORALLY DISINTEGRATING ORAL EVERY 8 HOURS PRN
Status: DISCONTINUED | OUTPATIENT
Start: 2023-09-10 | End: 2023-09-11 | Stop reason: HOSPADM

## 2023-09-10 RX ORDER — SODIUM CHLORIDE 0.9 % (FLUSH) 0.9 %
5-40 SYRINGE (ML) INJECTION PRN
Status: DISCONTINUED | OUTPATIENT
Start: 2023-09-10 | End: 2023-09-11 | Stop reason: HOSPADM

## 2023-09-10 RX ORDER — MORPHINE SULFATE 2 MG/ML
2 INJECTION, SOLUTION INTRAMUSCULAR; INTRAVENOUS
Status: DISCONTINUED | OUTPATIENT
Start: 2023-09-10 | End: 2023-09-11 | Stop reason: HOSPADM

## 2023-09-10 RX ORDER — GABAPENTIN 300 MG/1
600 CAPSULE ORAL 2 TIMES DAILY
Status: DISCONTINUED | OUTPATIENT
Start: 2023-09-11 | End: 2023-09-11 | Stop reason: HOSPADM

## 2023-09-10 RX ORDER — TAMSULOSIN HYDROCHLORIDE 0.4 MG/1
0.4 CAPSULE ORAL DAILY
Status: DISCONTINUED | OUTPATIENT
Start: 2023-09-11 | End: 2023-09-11 | Stop reason: HOSPADM

## 2023-09-10 RX ORDER — SODIUM CHLORIDE 9 MG/ML
INJECTION, SOLUTION INTRAVENOUS CONTINUOUS
Status: DISCONTINUED | OUTPATIENT
Start: 2023-09-10 | End: 2023-09-11 | Stop reason: HOSPADM

## 2023-09-10 RX ORDER — ONDANSETRON 2 MG/ML
4 INJECTION INTRAMUSCULAR; INTRAVENOUS EVERY 6 HOURS PRN
Status: DISCONTINUED | OUTPATIENT
Start: 2023-09-10 | End: 2023-09-11 | Stop reason: HOSPADM

## 2023-09-10 RX ORDER — MORPHINE SULFATE 4 MG/ML
4 INJECTION, SOLUTION INTRAMUSCULAR; INTRAVENOUS ONCE
Status: COMPLETED | OUTPATIENT
Start: 2023-09-10 | End: 2023-09-10

## 2023-09-10 RX ORDER — ACETAMINOPHEN 650 MG/1
650 SUPPOSITORY RECTAL EVERY 6 HOURS PRN
Status: DISCONTINUED | OUTPATIENT
Start: 2023-09-10 | End: 2023-09-11 | Stop reason: HOSPADM

## 2023-09-10 RX ORDER — SODIUM CHLORIDE 0.9 % (FLUSH) 0.9 %
5-40 SYRINGE (ML) INJECTION EVERY 12 HOURS SCHEDULED
Status: DISCONTINUED | OUTPATIENT
Start: 2023-09-10 | End: 2023-09-11 | Stop reason: HOSPADM

## 2023-09-10 RX ORDER — ROSUVASTATIN CALCIUM 40 MG/1
40 TABLET, COATED ORAL NIGHTLY
Status: DISCONTINUED | OUTPATIENT
Start: 2023-09-10 | End: 2023-09-11 | Stop reason: HOSPADM

## 2023-09-10 RX ORDER — ACETAMINOPHEN 325 MG/1
650 TABLET ORAL EVERY 6 HOURS PRN
Status: DISCONTINUED | OUTPATIENT
Start: 2023-09-10 | End: 2023-09-11 | Stop reason: HOSPADM

## 2023-09-10 RX ORDER — CIPROFLOXACIN 2 MG/ML
400 INJECTION, SOLUTION INTRAVENOUS ONCE
Status: COMPLETED | OUTPATIENT
Start: 2023-09-10 | End: 2023-09-10

## 2023-09-10 RX ORDER — POLYETHYLENE GLYCOL 3350 17 G/17G
17 POWDER, FOR SOLUTION ORAL DAILY PRN
Status: DISCONTINUED | OUTPATIENT
Start: 2023-09-10 | End: 2023-09-11 | Stop reason: HOSPADM

## 2023-09-10 RX ADMIN — CIPROFLOXACIN 400 MG: 2 INJECTION, SOLUTION INTRAVENOUS at 18:37

## 2023-09-10 RX ADMIN — MORPHINE SULFATE 4 MG: 4 INJECTION, SOLUTION INTRAMUSCULAR; INTRAVENOUS at 18:35

## 2023-09-10 RX ADMIN — SODIUM CHLORIDE: 9 INJECTION, SOLUTION INTRAVENOUS at 23:33

## 2023-09-10 RX ADMIN — ROSUVASTATIN CALCIUM 40 MG: 40 TABLET, FILM COATED ORAL at 23:39

## 2023-09-10 RX ADMIN — MORPHINE SULFATE 2 MG: 2 INJECTION, SOLUTION INTRAMUSCULAR; INTRAVENOUS at 23:34

## 2023-09-10 RX ADMIN — SODIUM CHLORIDE, PRESERVATIVE FREE 10 ML: 5 INJECTION INTRAVENOUS at 23:39

## 2023-09-10 RX ADMIN — CEFTRIAXONE SODIUM 1000 MG: 1 INJECTION, POWDER, FOR SOLUTION INTRAMUSCULAR; INTRAVENOUS at 23:43

## 2023-09-10 RX ADMIN — SODIUM CHLORIDE 1000 ML: 9 INJECTION, SOLUTION INTRAVENOUS at 17:14

## 2023-09-10 ASSESSMENT — PAIN DESCRIPTION - LOCATION
LOCATION: BACK
LOCATION: FLANK
LOCATION: BACK

## 2023-09-10 ASSESSMENT — PAIN SCALES - GENERAL
PAINLEVEL_OUTOF10: 10
PAINLEVEL_OUTOF10: 2
PAINLEVEL_OUTOF10: 7
PAINLEVEL_OUTOF10: 7

## 2023-09-10 ASSESSMENT — PAIN DESCRIPTION - FREQUENCY: FREQUENCY: CONTINUOUS

## 2023-09-10 ASSESSMENT — ENCOUNTER SYMPTOMS
SHORTNESS OF BREATH: 0
NAUSEA: 1
PHOTOPHOBIA: 0
BACK PAIN: 1
ABDOMINAL PAIN: 1
RHINORRHEA: 0
COUGH: 0
DIARRHEA: 0
ANAL BLEEDING: 0
VOMITING: 1
SORE THROAT: 0
WHEEZING: 0

## 2023-09-10 ASSESSMENT — PAIN DESCRIPTION - ORIENTATION
ORIENTATION: RIGHT;LOWER
ORIENTATION: RIGHT;LEFT

## 2023-09-10 ASSESSMENT — PAIN - FUNCTIONAL ASSESSMENT
PAIN_FUNCTIONAL_ASSESSMENT: 0-10
PAIN_FUNCTIONAL_ASSESSMENT: 0-10

## 2023-09-10 ASSESSMENT — PAIN DESCRIPTION - ONSET: ONSET: ON-GOING

## 2023-09-10 NOTE — ED NOTES
Returned. Pt had to have BM. Amb to bathroom with steady gait.      Severiano Marion RN  09/10/23 1430

## 2023-09-10 NOTE — ED NOTES
Pt had BM. Back into bed. Will hang IV. Visitor at bedside. Aware we are waiting on results.      Pete Emerson RN  09/10/23 7852

## 2023-09-10 NOTE — ED TRIAGE NOTES
The patient returned to the ED for left/right sided flank pain. Pt states he was diagnosed with kidney stones on 9/1/23. Respirations even and unlabored. Pt states the pain is 10/10 and continuous. Pt states he has been taking his medications as prescribed.

## 2023-09-10 NOTE — ED PROVIDER NOTES
normal.   Eyes:      Pupils: Pupils are equal, round, and reactive to light. Cardiovascular:      Rate and Rhythm: Normal rate and regular rhythm. Heart sounds: Normal heart sounds. Pulmonary:      Effort: Pulmonary effort is normal. No respiratory distress. Breath sounds: Normal breath sounds. No stridor. No wheezing, rhonchi or rales. Chest:      Chest wall: No tenderness. Abdominal:      General: Bowel sounds are normal.      Palpations: Abdomen is soft. Musculoskeletal:         General: Normal range of motion. Cervical back: Normal range of motion and neck supple. Skin:     General: Skin is warm and dry. Neurological:      Mental Status: He is alert and oriented to person, place, and time. Cranial Nerves: No cranial nerve deficit. Sensory: No sensory deficit. Motor: No abnormal muscle tone. Coordination: Coordination normal.      Deep Tendon Reflexes: Reflexes normal.   Psychiatric:         Behavior: Behavior normal.         Thought Content: Thought content normal.         Judgment: Judgment normal.             RESULTS     EKG: All EKG's are interpreted by the Emergency Department Physician who either signs or Co-signsthis chart in the absence of a cardiologist.        RADIOLOGY:   Munger Rapp such as CT, Ultrasound and MRI are read by the radiologist. Plain radiographic images are visualized and preliminarily interpreted by the emergency physician with the below findings:      Interpretation per the Radiologist below, if available at the time ofthis note:    CT ABDOMEN PELVIS WO CONTRAST Additional Contrast? None   Final Result   1. There is a 9 mm calculus at the left utero pelvic junction causing   high-grade stenosis and hydroureter. 2.  Bilateral urolithiasis      3. Dense appearance of the liver.                ED BEDSIDE ULTRASOUND:   Performed by ED Physician - none    LABS:  Labs Reviewed   CBC WITH AUTO DIFFERENTIAL - Abnormal; Notable for

## 2023-09-10 NOTE — ED NOTES
CT tech here for patient. Will hang NS after he returns. To CT via cart.       Erin Waldron RN  09/10/23 8556

## 2023-09-11 ENCOUNTER — APPOINTMENT (OUTPATIENT)
Dept: GENERAL RADIOLOGY | Age: 68
DRG: 661 | End: 2023-09-11
Payer: MEDICARE

## 2023-09-11 ENCOUNTER — ANESTHESIA EVENT (OUTPATIENT)
Dept: OPERATING ROOM | Age: 68
DRG: 661 | End: 2023-09-11
Payer: MEDICARE

## 2023-09-11 ENCOUNTER — ANESTHESIA (OUTPATIENT)
Dept: OPERATING ROOM | Age: 68
DRG: 661 | End: 2023-09-11
Payer: MEDICARE

## 2023-09-11 VITALS
BODY MASS INDEX: 20.46 KG/M2 | OXYGEN SATURATION: 94 % | HEART RATE: 94 BPM | HEIGHT: 68 IN | TEMPERATURE: 97.9 F | WEIGHT: 135 LBS | RESPIRATION RATE: 22 BRPM | DIASTOLIC BLOOD PRESSURE: 87 MMHG | SYSTOLIC BLOOD PRESSURE: 136 MMHG

## 2023-09-11 LAB
ALBUMIN SERPL-MCNC: 3.1 G/DL (ref 3.5–4.6)
ALP SERPL-CCNC: 58 U/L (ref 35–104)
ALT SERPL-CCNC: 11 U/L (ref 0–41)
ANION GAP SERPL CALCULATED.3IONS-SCNC: 12 MEQ/L (ref 9–15)
AST SERPL-CCNC: 12 U/L (ref 0–40)
BASOPHILS # BLD: 0.1 K/UL (ref 0–0.2)
BASOPHILS NFR BLD: 0.8 %
BILIRUB SERPL-MCNC: <0.2 MG/DL (ref 0.2–0.7)
BUN SERPL-MCNC: 15 MG/DL (ref 8–23)
CALCIUM SERPL-MCNC: 8.4 MG/DL (ref 8.5–9.9)
CHLORIDE SERPL-SCNC: 105 MEQ/L (ref 95–107)
CO2 SERPL-SCNC: 22 MEQ/L (ref 20–31)
CREAT SERPL-MCNC: 0.8 MG/DL (ref 0.7–1.2)
EOSINOPHIL # BLD: 0.2 K/UL (ref 0–0.7)
EOSINOPHIL NFR BLD: 2.1 %
ERYTHROCYTE [DISTWIDTH] IN BLOOD BY AUTOMATED COUNT: 14.9 % (ref 11.5–14.5)
GLOBULIN SER CALC-MCNC: 2.3 G/DL (ref 2.3–3.5)
GLUCOSE SERPL-MCNC: 86 MG/DL (ref 70–99)
HCT VFR BLD AUTO: 22.4 % (ref 42–52)
HCT VFR BLD AUTO: 22.7 % (ref 42–52)
HCT VFR BLD AUTO: 23.9 % (ref 42–52)
HCT VFR BLD AUTO: 24.7 % (ref 42–52)
HGB BLD-MCNC: 7.6 G/DL (ref 14–18)
HGB BLD-MCNC: 7.9 G/DL (ref 14–18)
HGB BLD-MCNC: 8.2 G/DL (ref 14–18)
HGB BLD-MCNC: 8.5 G/DL (ref 14–18)
LYMPHOCYTES # BLD: 2.1 K/UL (ref 1–4.8)
LYMPHOCYTES NFR BLD: 20.2 %
MAGNESIUM SERPL-MCNC: 2 MG/DL (ref 1.7–2.4)
MCH RBC QN AUTO: 34.2 PG (ref 27–31.3)
MCHC RBC AUTO-ENTMCNC: 34.6 % (ref 33–37)
MCV RBC AUTO: 98.9 FL (ref 79–92.2)
MONOCYTES # BLD: 0.7 K/UL (ref 0.2–0.8)
MONOCYTES NFR BLD: 6.7 %
NEUTROPHILS # BLD: 7.5 K/UL (ref 1.4–6.5)
NEUTS SEG NFR BLD: 70.2 %
PLATELET # BLD AUTO: 477 K/UL (ref 130–400)
POTASSIUM SERPL-SCNC: 3.9 MEQ/L (ref 3.4–4.9)
PROT SERPL-MCNC: 5.4 G/DL (ref 6.3–8)
RBC # BLD AUTO: 2.3 M/UL (ref 4.7–6.1)
SODIUM SERPL-SCNC: 139 MEQ/L (ref 135–144)
WBC # BLD AUTO: 10.6 K/UL (ref 4.8–10.8)

## 2023-09-11 PROCEDURE — 85018 HEMOGLOBIN: CPT

## 2023-09-11 PROCEDURE — C1758 CATHETER, URETERAL: HCPCS | Performed by: UROLOGY

## 2023-09-11 PROCEDURE — C2617 STENT, NON-COR, TEM W/O DEL: HCPCS | Performed by: UROLOGY

## 2023-09-11 PROCEDURE — 99223 1ST HOSP IP/OBS HIGH 75: CPT | Performed by: UROLOGY

## 2023-09-11 PROCEDURE — 83735 ASSAY OF MAGNESIUM: CPT

## 2023-09-11 PROCEDURE — 2580000003 HC RX 258: Performed by: STUDENT IN AN ORGANIZED HEALTH CARE EDUCATION/TRAINING PROGRAM

## 2023-09-11 PROCEDURE — 2709999900 HC NON-CHARGEABLE SUPPLY: Performed by: UROLOGY

## 2023-09-11 PROCEDURE — 36415 COLL VENOUS BLD VENIPUNCTURE: CPT

## 2023-09-11 PROCEDURE — 80053 COMPREHEN METABOLIC PANEL: CPT

## 2023-09-11 PROCEDURE — 85025 COMPLETE CBC W/AUTO DIFF WBC: CPT

## 2023-09-11 PROCEDURE — 6360000002 HC RX W HCPCS: Performed by: STUDENT IN AN ORGANIZED HEALTH CARE EDUCATION/TRAINING PROGRAM

## 2023-09-11 PROCEDURE — 52332 CYSTOSCOPY AND TREATMENT: CPT | Performed by: UROLOGY

## 2023-09-11 PROCEDURE — C1769 GUIDE WIRE: HCPCS | Performed by: UROLOGY

## 2023-09-11 PROCEDURE — 2580000003 HC RX 258: Performed by: UROLOGY

## 2023-09-11 PROCEDURE — 3700000000 HC ANESTHESIA ATTENDED CARE: Performed by: UROLOGY

## 2023-09-11 PROCEDURE — 3600000004 HC SURGERY LEVEL 4 BASE: Performed by: UROLOGY

## 2023-09-11 PROCEDURE — 3600000014 HC SURGERY LEVEL 4 ADDTL 15MIN: Performed by: UROLOGY

## 2023-09-11 PROCEDURE — 7100000001 HC PACU RECOVERY - ADDTL 15 MIN: Performed by: UROLOGY

## 2023-09-11 PROCEDURE — 3700000001 HC ADD 15 MINUTES (ANESTHESIA): Performed by: UROLOGY

## 2023-09-11 PROCEDURE — 85014 HEMATOCRIT: CPT

## 2023-09-11 PROCEDURE — 52330 CYSTOSCOPY AND TREATMENT: CPT | Performed by: UROLOGY

## 2023-09-11 PROCEDURE — 2580000003 HC RX 258: Performed by: NURSE PRACTITIONER

## 2023-09-11 PROCEDURE — 6360000004 HC RX CONTRAST MEDICATION: Performed by: UROLOGY

## 2023-09-11 PROCEDURE — 6370000000 HC RX 637 (ALT 250 FOR IP): Performed by: UROLOGY

## 2023-09-11 PROCEDURE — 7100000000 HC PACU RECOVERY - FIRST 15 MIN: Performed by: UROLOGY

## 2023-09-11 PROCEDURE — 74420 UROGRAPHY RTRGR +-KUB: CPT | Performed by: UROLOGY

## 2023-09-11 DEVICE — STENT URET 6FR L26CM POLYMER BLEND PH FREE COAT GRADUAL TAPR: Type: IMPLANTABLE DEVICE | Site: URETHRA | Status: FUNCTIONAL

## 2023-09-11 RX ORDER — SODIUM CHLORIDE 0.9 % (FLUSH) 0.9 %
5-40 SYRINGE (ML) INJECTION PRN
Status: DISCONTINUED | OUTPATIENT
Start: 2023-09-11 | End: 2023-09-11 | Stop reason: HOSPADM

## 2023-09-11 RX ORDER — ROSUVASTATIN CALCIUM 40 MG/1
40 TABLET, COATED ORAL NIGHTLY
Qty: 90 TABLET | Refills: 5 | Status: ON HOLD | OUTPATIENT
Start: 2023-09-11

## 2023-09-11 RX ORDER — METOCLOPRAMIDE HYDROCHLORIDE 5 MG/ML
10 INJECTION INTRAMUSCULAR; INTRAVENOUS
Status: DISCONTINUED | OUTPATIENT
Start: 2023-09-11 | End: 2023-09-11 | Stop reason: HOSPADM

## 2023-09-11 RX ORDER — CIPROFLOXACIN 500 MG/1
500 TABLET, FILM COATED ORAL 2 TIMES DAILY
Qty: 8 TABLET | Refills: 0 | Status: SHIPPED | OUTPATIENT
Start: 2023-09-11 | End: 2023-09-15

## 2023-09-11 RX ORDER — GABAPENTIN 600 MG/1
TABLET ORAL
Qty: 90 TABLET | Refills: 0 | Status: ON HOLD | OUTPATIENT
Start: 2023-09-11 | End: 2023-10-08

## 2023-09-11 RX ORDER — LIDOCAINE HYDROCHLORIDE 20 MG/ML
JELLY TOPICAL PRN
Status: DISCONTINUED | OUTPATIENT
Start: 2023-09-11 | End: 2023-09-11 | Stop reason: HOSPADM

## 2023-09-11 RX ORDER — SODIUM CHLORIDE 0.9 % (FLUSH) 0.9 %
5-40 SYRINGE (ML) INJECTION EVERY 12 HOURS SCHEDULED
Status: DISCONTINUED | OUTPATIENT
Start: 2023-09-11 | End: 2023-09-11 | Stop reason: HOSPADM

## 2023-09-11 RX ORDER — MAGNESIUM HYDROXIDE 1200 MG/15ML
LIQUID ORAL PRN
Status: DISCONTINUED | OUTPATIENT
Start: 2023-09-11 | End: 2023-09-11 | Stop reason: HOSPADM

## 2023-09-11 RX ORDER — PROPOFOL 10 MG/ML
INJECTION, EMULSION INTRAVENOUS PRN
Status: DISCONTINUED | OUTPATIENT
Start: 2023-09-11 | End: 2023-09-11 | Stop reason: SDUPTHER

## 2023-09-11 RX ORDER — GLYCOPYRROLATE 1 MG/5 ML
SYRINGE (ML) INTRAVENOUS PRN
Status: DISCONTINUED | OUTPATIENT
Start: 2023-09-11 | End: 2023-09-11 | Stop reason: SDUPTHER

## 2023-09-11 RX ORDER — SODIUM CHLORIDE 9 MG/ML
INJECTION, SOLUTION INTRAVENOUS PRN
Status: DISCONTINUED | OUTPATIENT
Start: 2023-09-11 | End: 2023-09-11 | Stop reason: HOSPADM

## 2023-09-11 RX ORDER — FENTANYL CITRATE 0.05 MG/ML
50 INJECTION, SOLUTION INTRAMUSCULAR; INTRAVENOUS EVERY 10 MIN PRN
Status: DISCONTINUED | OUTPATIENT
Start: 2023-09-11 | End: 2023-09-11 | Stop reason: HOSPADM

## 2023-09-11 RX ORDER — OXYCODONE HYDROCHLORIDE 5 MG/1
5 TABLET ORAL
Status: DISCONTINUED | OUTPATIENT
Start: 2023-09-11 | End: 2023-09-11 | Stop reason: HOSPADM

## 2023-09-11 RX ORDER — SODIUM CHLORIDE, SODIUM LACTATE, POTASSIUM CHLORIDE, CALCIUM CHLORIDE 600; 310; 30; 20 MG/100ML; MG/100ML; MG/100ML; MG/100ML
INJECTION, SOLUTION INTRAVENOUS CONTINUOUS PRN
Status: DISCONTINUED | OUTPATIENT
Start: 2023-09-11 | End: 2023-09-11 | Stop reason: SDUPTHER

## 2023-09-11 RX ORDER — PHENAZOPYRIDINE HYDROCHLORIDE 100 MG/1
100 TABLET, FILM COATED ORAL EVERY 6 HOURS PRN
Status: DISCONTINUED | OUTPATIENT
Start: 2023-09-11 | End: 2023-09-11 | Stop reason: HOSPADM

## 2023-09-11 RX ORDER — LIDOCAINE HYDROCHLORIDE 10 MG/ML
1 INJECTION, SOLUTION EPIDURAL; INFILTRATION; INTRACAUDAL; PERINEURAL
Status: DISCONTINUED | OUTPATIENT
Start: 2023-09-11 | End: 2023-09-11 | Stop reason: HOSPADM

## 2023-09-11 RX ORDER — ONDANSETRON 2 MG/ML
4 INJECTION INTRAMUSCULAR; INTRAVENOUS
Status: DISCONTINUED | OUTPATIENT
Start: 2023-09-11 | End: 2023-09-11 | Stop reason: HOSPADM

## 2023-09-11 RX ORDER — GABAPENTIN 400 MG/1
1200 CAPSULE ORAL NIGHTLY
Status: DISCONTINUED | OUTPATIENT
Start: 2023-09-11 | End: 2023-09-11 | Stop reason: HOSPADM

## 2023-09-11 RX ADMIN — PROPOFOL 150 MG: 10 INJECTION, EMULSION INTRAVENOUS at 09:02

## 2023-09-11 RX ADMIN — PHENYLEPHRINE HYDROCHLORIDE 100 MCG: 10 INJECTION INTRAVENOUS at 09:07

## 2023-09-11 RX ADMIN — Medication 0.2 MG: at 09:08

## 2023-09-11 RX ADMIN — SODIUM CHLORIDE, POTASSIUM CHLORIDE, SODIUM LACTATE AND CALCIUM CHLORIDE: 600; 310; 30; 20 INJECTION, SOLUTION INTRAVENOUS at 08:28

## 2023-09-11 RX ADMIN — SODIUM CHLORIDE: 9 INJECTION, SOLUTION INTRAVENOUS at 08:51

## 2023-09-11 RX ADMIN — SODIUM CHLORIDE: 9 INJECTION, SOLUTION INTRAVENOUS at 09:58

## 2023-09-11 ASSESSMENT — LIFESTYLE VARIABLES: SMOKING_STATUS: 1

## 2023-09-11 ASSESSMENT — PAIN SCALES - GENERAL
PAINLEVEL_OUTOF10: 6
PAINLEVEL_OUTOF10: 0

## 2023-09-11 ASSESSMENT — PAIN - FUNCTIONAL ASSESSMENT: PAIN_FUNCTIONAL_ASSESSMENT: 0-10

## 2023-09-11 ASSESSMENT — PAIN DESCRIPTION - LOCATION: LOCATION: PENIS

## 2023-09-11 ASSESSMENT — PAIN DESCRIPTION - DESCRIPTORS: DESCRIPTORS: PRESSURE

## 2023-09-11 NOTE — ACP (ADVANCE CARE PLANNING)
Advance Care Planning     Advance Care Planning Activator (Inpatient)  Conversation Note      Date of ACP Conversation: 9/10/2023     Conversation Conducted with: Patient with Decision Making Capacity    ACP Activator: Elridge Sicard, RN    Pt states that he has a living will and that it is current with his wishes. Health Care Decision Maker:     Current Designated Health Care Decision Maker:     Primary Decision Maker: Adarsh Napier Child - [phone]    Secondary Decision Maker: Yesenia Shetty - Brother/Sister - [phone]    Care Preferences    Ventilation: \"If you were in your present state of health and suddenly became very ill and were unable to breathe on your own, what would your preference be about the use of a ventilator (breathing machine) if it were available to you? \"      Would the patient desire the use of ventilator (breathing machine)?: yes    \"If your health worsens and it becomes clear that your chance of recovery is unlikely, what would your preference be about the use of a ventilator (breathing machine) if it were available to you? \"     Would the patient desire the use of ventilator (breathing machine)?: No      Resuscitation  \"CPR works best to restart the heart when there is a sudden event, like a heart attack, in someone who is otherwise healthy. Unfortunately, CPR does not typically restart the heart for people who have serious health conditions or who are very sick. \"    \"In the event your heart stopped as a result of an underlying serious health condition, would you want attempts to be made to restart your heart (answer \"yes\" for attempt to resuscitate) or would you prefer a natural death (answer \"no\" for do not attempt to resuscitate)? \" yes       [x] Yes   [] No   Educated Patient / Ellis Arpit regarding differences between Advance Directives and portable DNR orders.     Length of ACP Conversation in minutes:  10    Conversation Outcomes:  ACP discussion completed    Follow-up

## 2023-09-11 NOTE — CONSULTS
[facility] Gerardo Roche, [address] [facility]                                  CONSULTATION    PATIENT NAME: Lluvia Munoz                   :        1955  MED REC NO:   [id number]                            ROOM:  ACCOUNT NO:   [de-identified]                           ADMIT DATE: 09/10/2023  PROVIDER:     Nila Harris MD    CONSULT DATE:  2023    PERSON REQUESTING CONSULT:  Dr. Gerardo Dejesus. REASON FOR CONSULTATION:  Left proximal ureteral stone. HISTORY OF PRESENT ILLNESS:  This is a 25-year-old male well known to  Urology with history of chronic back pain, COPD, depression,  hypertension, reflux disease, hyperlipidemia, obstructive sleep apnea,  prior history of substance abuse who has prior history of kidney stones  as well who was seen through the emergency room within the last few  weeks with an obstructing proximal left ureteral stone. He was  eventually managed with oral pain medications in expulsive therapy. However, he was returned on several visits with abdominal pain and lower  back pain, was admitted yesterday with significant pain, nausea and  vomiting and has been having difficulty with weight loss related to the  nausea and inability to eat and urologic consultation was requested. CT  scan showed the persistent presence of an 8-9 mm left proximal ureteral  stone with hydroureteronephrosis. He reports his pain is under  reasonable control currently and he has no current nausea or vomiting. He has no hematuria, no dysuria. No fevers, no chills. No chest pain  or shortness of breath or other current urologic complaints. He reports  that he voids well. PAST MEDICAL HISTORY:  Includes anxiety, chronic back pain, COPD,  depression, emphysema, hypertension, hyperlipidemia, kidney stones,  obstructive sleep apnea, restless leg, history of substance abuse.     PAST SURGICAL HISTORY:  Includes colonoscopy, dental

## 2023-09-11 NOTE — DISCHARGE SUMMARY
signs of ascites. There are calcifications within the abdominal aorta compatible with a chronic remote dissection. This is unchanged when compared the study of 08/28/2023 Bones/Soft Tissues: There signs of multilevel disc disease with disc height loss and vacuum phenomena best seen near the L1 through L5 levels. No osteolytic or blastic lesions are seen. 1.  There is a 9 mm calculus at the left utero pelvic junction causing high-grade stenosis and hydroureter. 2.  Bilateral urolithiasis 3. Dense appearance of the liver. Discharge Medications:         Medication List        START taking these medications      ciprofloxacin 500 MG tablet  Commonly known as: CIPRO  Take 1 tablet by mouth 2 times daily for 4 days            CHANGE how you take these medications      rosuvastatin 40 MG tablet  Commonly known as: CRESTOR  Take 1 tablet by mouth nightly at bedtime. What changed: when to take this            CONTINUE taking these medications      FLUoxetine 40 MG capsule  Commonly known as: PROzac  take 1 capsule by mouth once daily     gabapentin 600 MG tablet  Commonly known as: NEURONTIN  take 1 tablet by mouth every morning and take 1 tablet by mouth MID DAY and take 2 tablets by mouth AT NIGHT     Handicap Placard Misc  by Does not apply route Ex: 5 years 2/14/2028     meloxicam 15 MG tablet  Commonly known as: MOBIC  Take 1 tablet by mouth daily               Where to Get Your Medications        These medications were sent to [address] [place], [place] [address] , [address] [place] [address], [facility] [address]      Phone: [phone]   ciprofloxacin 500 MG tablet  gabapentin 600 MG tablet  rosuvastatin 40 MG tablet         Disposition:   If discharged to Home, [facility] needs that were indicated and/or required as been addressed and set up by Social Work.      Condition at discharge: good     Activity: activity as tolerated    Total time taken for

## 2023-09-11 NOTE — ANESTHESIA POSTPROCEDURE EVALUATION
Department of Anesthesiology  Postprocedure Note    Patient: Shahnaz Ochoa  MRN: [id number]  YOB: 1955  Date of evaluation: 9/11/2023      Procedure Summary     Date: 09/11/23 Room / Location: [facility]    Anesthesia Start: 1075 Anesthesia Stop:     Procedure: CYSTOSCOPY RETROGRADE PYELOGRAM AND LEFT DOUBLE J STENT INSERTION (Left: Urethra) Diagnosis:       Hydronephrosis of left kidney      (Hydronephrosis of left kidney [N13.30])    Surgeons: Emily Schulz MD Responsible Provider: Lake Villar DO    Anesthesia Type: general ASA Status: 3          Anesthesia Type: Gen    Oanh Phase I: Oanh Score: 10    Oanh Phase II:        Anesthesia Post Evaluation    Patient location during evaluation: bedside  Patient participation: complete - patient participated  Level of consciousness: awake and awake and alert  Pain score: 0  Airway patency: patent  Nausea & Vomiting: no nausea and no vomiting  Complications: no  Cardiovascular status: blood pressure returned to baseline and hemodynamically stable  Respiratory status: acceptable  Hydration status: euvolemic  Pain management: adequate

## 2023-09-11 NOTE — BRIEF OP NOTE
Brief Postoperative Note      Patient: Nathan Jung  YOB: 1955  MRN: [id number]    Date of Procedure: 9/11/2023    Pre-Op Diagnosis Codes: * Hydronephrosis of left kidney [N13.30]    Post-Op Diagnosis: Lt proximal ureteral stone with hydronephrosis       Procedure(s):  LEFT CYSTOSCOPY RETROGRADE PYELOGRAM AND LEFT DOUBLE J STENT INSERTION. ROOM 282 and Lt stone manipulation without removal    Surgeon(s):  Leonel Ornelas MD    Assistant: DIAZ Canales    Anesthesia: General/local    Estimated Blood Loss (mL): Minimal    Complications: None    Specimens: None    Implants: 6 fr x 26 cm stent on lt      Drains: None    Findings: normal bladder. 9 mm stone at UPJ moved back into Lt renal pelvis and 6 x 26 cm stent placed.  Hydro noted proximal to stone      Electronically signed by Leonel Ornelas MD on 9/11/2023 at 9:00 AM

## 2023-09-11 NOTE — ED NOTES
Pt made aware of bed assignment but that it is still dirty. Pt without further complaints. States he feels better.      Nadia Nguyễn RN  09/10/23 2046

## 2023-09-11 NOTE — OP NOTE
[place] [address] [facility]                                OPERATIVE REPORT    PATIENT NAME: Niranjan Hickey                   :        1955  MED REC NO:   [id number]                            ROOM:       W282  ACCOUNT NO:   [de-identified]                           ADMIT DATE: 09/10/2023  PROVIDER:     Preston Wood MD    DATE OF PROCEDURE:  2023    PREOPERATIVE DIAGNOSIS:  Left 9 mm proximal ureteral UPJ stone. POSTOPERATIVE DIAGNOSIS:  Left 9 mm proximal ureteral UPJ stone. PROCEDURE:  Cystoscopy with left retrograde pyelogram, left stone  manipulation back into renal pelvis without removal, left 6-[place] x 26  cm double-J stent insertion. SURGEON:  Marcin Rico MD    ANESTHESIA:  General with 2% lidocaine local per urethra. ESTIMATED BLOOD LOSS:  Minimal.    COMPLICATIONS:  None. CONDITION:  Stable to recovery room. BRIEF CLINICAL NOTE:  This is a 57-year-old male with a history of COPD,  history of chronic back pain, history of depression, obstructive sleep  apnea, who was seen recently within the last few weeks for obstructing  left proximal ureteral stone. He has intermittently presented to the  emergency room with pain despite attempts at expulsive therapy with  Flomax. The stone has not migrated distally, was seen through the  emergency room yesterday complaining of nausea, vomiting, weight loss  and lower back pain and was admitted for further evaluation. He has a  persistent stone on CT scan of the proximal ureter with this significant  amount of hydroureteronephrosis. The patient was made n.p.o. after  midnight. He was started on IV Rocephin. Treatment options were  discussed. The patient elected to proceed with cystoscopy and stent  insertion today.   He understood the risk and benefits of this procedure  as outlined preoperatively and is willing to proceed as

## 2023-09-11 NOTE — DISCHARGE INSTRUCTIONS
Discharge instructions:     -Be sure to follow up with your primary care doctor to discuss your ongoing anemia.  I highly recommend pursuing cancer screening as one of the initial steps  -I have sent an antibiotic called ciprofloxacin to your local pharmacy that you should take twice daily until your bottle is empty

## 2023-09-11 NOTE — H&P
[facility]    HISTORY AND PHYSICAL EXAM    PATIENT NAME:  Tal Somers    MRN:  [id number]  SERVICE DATE:  9/10/2023   SERVICE TIME:  9:01 PM    Primary Care Physician: MANUELA De Anda CNP         SUBJECTIVE  CHIEF COMPLAINT: Back Pain    HPI:    Patient being admitted for obstructing kidney stone with hydronephrosis. Patient is an alert and oriented x1 78-year-old  male. Patient reports that he has chronic back pain yet his pain was much more severe today. Additionally, patient states that he also had nausea and vomiting a day ago and did not eat any food because of the vomiting. However he states that he has not had vomiting today. Patient denies any fever, dysuria, or difficulty urinating. Patient denies any hematuria, hematochezia, or obvious signs of bleeding. Patient states that he has dark stools but adds that he always has dark stools and this is not new. At this time, patient states that his back pain and abdominal pain have improved significantly with medications received in the ED. Patient's past medical history includes chronic back pain, HLD, depression with anxiety, and GERD.     PAST MEDICAL HISTORY:    Past Medical History:   Diagnosis Date    Allergic rhinitis 2/19/2018    Anxiety     Chronic back pain     COPD (chronic obstructive pulmonary disease) ([address])     Depression     Disorder of stomach     valve not closing properly    Emphysema lung ([address])     Essential hypertension 11/4/2019    Gastroesophageal reflux disease 10/4/2019    Hydronephrosis of left kidney 8/23/2023    Hyperlipidemia     meds > 22 yrs    Low back pain 5/1/2018    ARNAUD (obstructive sleep apnea) 2/19/2018    Other emphysema ([address]) 10/4/2019    Other intervertebral disc degeneration, lumbar region 3/23/2018    Radiculopathy, lumbar region 2/28/2018    Restless leg syndrome     Seasonal affective disorder ([address]) 10/4/2019    Substance abuse ([address])     alcholic, quit 20 yrs      PAST

## 2023-09-11 NOTE — CARE COORDINATION
Case Management Assessment  Initial Evaluation    Date/Time of Evaluation: 9/10/2023 9:33 PM  Assessment Completed by: Tonia Yeboah RN    If patient is discharged prior to next notation, then this note serves as note for discharge by case management. Patient Name: Jodee Wise                   YOB: 1955  Diagnosis: Hydronephrosis of left kidney [N13.30]                   Date / Time: 9/10/2023  3:40 PM    Patient Admission Status: Inpatient   Readmission Risk (Low < 19, Mod (19-27), High > 27): Readmission Risk Score: 16.4    Current PCP: MANUELA Nayak CNP  PCP verified by CM? (P) Yes    Chart Reviewed: Yes      History Provided by: Patient  Patient Orientation: Alert and Oriented, Person, Place, Situation, Self    Patient Cognition: Alert    Hospitalization in the last 30 days (Readmission):  No    If yes, Readmission Assessment in CM Navigator will be completed.     Advance Directives:      Code Status: Prior   Patient's Primary Decision Maker is: (P) Legal Next of Kin    Primary Decision MakeOlive Doty Child - [phone]    Secondary Decision Maker: Dayanna Boykin - Brother/Sister - [phone]    Discharge Planning:    Patient lives with: (P) Alone Type of Home: (P) House  Primary Care Giver: Self  Patient Support Systems include: Children   Current Financial resources: (P) Medicare, Medicaid  Current community resources: (P) None  Current services prior to admission: (P) None            Current DME:              Type of Home Care services:  (P) None    ADLS  Prior functional level: (P) Independent in ADLs/IADLs  Current functional level: (P) Independent in ADLs/IADLs    PT AM-PAC:   /24  OT AM-PAC:   /24    Family can provide assistance at DC: (P) Yes  Would you like Case Management to discuss the discharge plan with any other family members/significant others, and if so, who? (P) Yes (son Jane Kirk and sister Dax Martinez if needed)  Plans to Return to Present Housing: (P)

## 2023-09-12 ENCOUNTER — APPOINTMENT (OUTPATIENT)
Dept: CT IMAGING | Age: 68
End: 2023-09-12
Payer: MEDICARE

## 2023-09-12 ENCOUNTER — OFFICE VISIT (OUTPATIENT)
Dept: PAIN MANAGEMENT | Age: 68
End: 2023-09-12
Payer: MEDICARE

## 2023-09-12 ENCOUNTER — HOSPITAL ENCOUNTER (EMERGENCY)
Age: 68
Discharge: HOME | End: 2023-09-12
Payer: MEDICARE

## 2023-09-12 VITALS — HEIGHT: 68 IN | BODY MASS INDEX: 20.46 KG/M2 | TEMPERATURE: 97.1 F | WEIGHT: 135 LBS

## 2023-09-12 VITALS
RESPIRATION RATE: 16 BRPM | HEART RATE: 83 BPM | WEIGHT: 130 LBS | BODY MASS INDEX: 19.7 KG/M2 | HEIGHT: 68 IN | SYSTOLIC BLOOD PRESSURE: 133 MMHG | DIASTOLIC BLOOD PRESSURE: 61 MMHG | OXYGEN SATURATION: 97 % | TEMPERATURE: 97.3 F

## 2023-09-12 DIAGNOSIS — G89.29 ACUTE EXACERBATION OF CHRONIC LOW BACK PAIN: Primary | ICD-10-CM

## 2023-09-12 DIAGNOSIS — M54.16 LUMBAR RADICULOPATHY: Primary | ICD-10-CM

## 2023-09-12 DIAGNOSIS — R20.2 PARESTHESIA OF BOTH FEET: ICD-10-CM

## 2023-09-12 DIAGNOSIS — F10.11 HISTORY OF ALCOHOL ABUSE: ICD-10-CM

## 2023-09-12 DIAGNOSIS — M48.062 SPINAL STENOSIS OF LUMBAR REGION WITH NEUROGENIC CLAUDICATION: ICD-10-CM

## 2023-09-12 DIAGNOSIS — M47.817 LUMBOSACRAL SPONDYLOSIS WITHOUT MYELOPATHY: ICD-10-CM

## 2023-09-12 DIAGNOSIS — M46.1 SACROILIITIS (HCC): ICD-10-CM

## 2023-09-12 DIAGNOSIS — M54.50 ACUTE EXACERBATION OF CHRONIC LOW BACK PAIN: Primary | ICD-10-CM

## 2023-09-12 LAB
ALBUMIN SERPL-MCNC: 3.8 G/DL (ref 3.5–4.6)
ALP SERPL-CCNC: 66 U/L (ref 35–104)
ALT SERPL-CCNC: 10 U/L (ref 0–41)
ANION GAP SERPL CALCULATED.3IONS-SCNC: 9 MEQ/L (ref 9–15)
AST SERPL-CCNC: 11 U/L (ref 0–40)
BACTERIA UR CULT: NORMAL
BACTERIA URNS QL MICRO: NEGATIVE /HPF
BASOPHILS # BLD: 0.1 K/UL (ref 0–0.2)
BASOPHILS NFR BLD: 0.8 %
BILIRUB SERPL-MCNC: <0.2 MG/DL (ref 0.2–0.7)
BILIRUB UR QL STRIP: NEGATIVE
BUN SERPL-MCNC: 17 MG/DL (ref 8–23)
CALCIUM SERPL-MCNC: 8.9 MG/DL (ref 8.5–9.9)
CHLORIDE SERPL-SCNC: 105 MEQ/L (ref 95–107)
CLARITY UR: CLEAR
CO2 SERPL-SCNC: 24 MEQ/L (ref 20–31)
COLOR UR: YELLOW
CREAT SERPL-MCNC: 0.85 MG/DL (ref 0.7–1.2)
EOSINOPHIL # BLD: 0 K/UL (ref 0–0.7)
EOSINOPHIL NFR BLD: 0.3 %
EPI CELLS #/AREA URNS AUTO: ABNORMAL /HPF (ref 0–5)
ERYTHROCYTE [DISTWIDTH] IN BLOOD BY AUTOMATED COUNT: 15 % (ref 11.5–14.5)
GLOBULIN SER CALC-MCNC: 2.3 G/DL (ref 2.3–3.5)
GLUCOSE SERPL-MCNC: 100 MG/DL (ref 70–99)
GLUCOSE UR STRIP-MCNC: NEGATIVE MG/DL
HCT VFR BLD AUTO: 25 % (ref 42–52)
HGB BLD-MCNC: 8.4 G/DL (ref 14–18)
HGB UR QL STRIP: ABNORMAL
HYALINE CASTS #/AREA URNS AUTO: ABNORMAL /HPF (ref 0–5)
KETONES UR STRIP-MCNC: 15 MG/DL
LACTATE BLDV-SCNC: 0.8 MMOL/L (ref 0.5–2.2)
LEUKOCYTE ESTERASE UR QL STRIP: ABNORMAL
LIPASE SERPL-CCNC: 17 U/L (ref 12–95)
LYMPHOCYTES # BLD: 1.3 K/UL (ref 1–4.8)
LYMPHOCYTES NFR BLD: 11.1 %
MCH RBC QN AUTO: 33.4 PG (ref 27–31.3)
MCHC RBC AUTO-ENTMCNC: 33.4 % (ref 33–37)
MCV RBC AUTO: 99.9 FL (ref 79–92.2)
MONOCYTES # BLD: 0.5 K/UL (ref 0.2–0.8)
MONOCYTES NFR BLD: 4.2 %
NEUTROPHILS # BLD: 9.7 K/UL (ref 1.4–6.5)
NEUTS SEG NFR BLD: 83.6 %
NITRITE UR QL STRIP: NEGATIVE
PH UR STRIP: 6 [PH] (ref 5–9)
PLATELET # BLD AUTO: 513 K/UL (ref 130–400)
POTASSIUM SERPL-SCNC: 3.8 MEQ/L (ref 3.4–4.9)
PROT SERPL-MCNC: 6.1 G/DL (ref 6.3–8)
PROT UR STRIP-MCNC: 100 MG/DL
RBC # BLD AUTO: 2.5 M/UL (ref 4.7–6.1)
RBC #/AREA URNS AUTO: >100 /HPF (ref 0–5)
SODIUM SERPL-SCNC: 138 MEQ/L (ref 135–144)
SP GR UR STRIP: 1.01 (ref 1–1.03)
URINE REFLEX TO CULTURE: YES
UROBILINOGEN UR STRIP-ACNC: 0.2 E.U./DL
WBC # BLD AUTO: 11.7 K/UL (ref 4.8–10.8)
WBC #/AREA URNS AUTO: ABNORMAL /HPF (ref 0–5)

## 2023-09-12 PROCEDURE — 6360000002 HC RX W HCPCS: Performed by: STUDENT IN AN ORGANIZED HEALTH CARE EDUCATION/TRAINING PROGRAM

## 2023-09-12 PROCEDURE — 1123F ACP DISCUSS/DSCN MKR DOCD: CPT | Performed by: NURSE PRACTITIONER

## 2023-09-12 PROCEDURE — 81001 URINALYSIS AUTO W/SCOPE: CPT

## 2023-09-12 PROCEDURE — 96361 HYDRATE IV INFUSION ADD-ON: CPT

## 2023-09-12 PROCEDURE — 87086 URINE CULTURE/COLONY COUNT: CPT

## 2023-09-12 PROCEDURE — 96374 THER/PROPH/DIAG INJ IV PUSH: CPT

## 2023-09-12 PROCEDURE — 36415 COLL VENOUS BLD VENIPUNCTURE: CPT

## 2023-09-12 PROCEDURE — 72131 CT LUMBAR SPINE W/O DYE: CPT

## 2023-09-12 PROCEDURE — 99214 OFFICE O/P EST MOD 30 MIN: CPT | Performed by: NURSE PRACTITIONER

## 2023-09-12 PROCEDURE — 96375 TX/PRO/DX INJ NEW DRUG ADDON: CPT

## 2023-09-12 PROCEDURE — 80053 COMPREHEN METABOLIC PANEL: CPT

## 2023-09-12 PROCEDURE — 74150 CT ABDOMEN W/O CONTRAST: CPT

## 2023-09-12 PROCEDURE — 85025 COMPLETE CBC W/AUTO DIFF WBC: CPT

## 2023-09-12 PROCEDURE — 83690 ASSAY OF LIPASE: CPT

## 2023-09-12 PROCEDURE — 2580000003 HC RX 258: Performed by: STUDENT IN AN ORGANIZED HEALTH CARE EDUCATION/TRAINING PROGRAM

## 2023-09-12 PROCEDURE — 83605 ASSAY OF LACTIC ACID: CPT

## 2023-09-12 PROCEDURE — 99284 EMERGENCY DEPT VISIT MOD MDM: CPT

## 2023-09-12 RX ORDER — ONDANSETRON 2 MG/ML
4 INJECTION INTRAMUSCULAR; INTRAVENOUS ONCE
Status: COMPLETED | OUTPATIENT
Start: 2023-09-12 | End: 2023-09-12

## 2023-09-12 RX ORDER — MORPHINE SULFATE 4 MG/ML
4 INJECTION, SOLUTION INTRAMUSCULAR; INTRAVENOUS ONCE
Status: COMPLETED | OUTPATIENT
Start: 2023-09-12 | End: 2023-09-12

## 2023-09-12 RX ORDER — 0.9 % SODIUM CHLORIDE 0.9 %
1000 INTRAVENOUS SOLUTION INTRAVENOUS ONCE
Status: COMPLETED | OUTPATIENT
Start: 2023-09-12 | End: 2023-09-12

## 2023-09-12 RX ADMIN — ONDANSETRON 4 MG: 2 INJECTION INTRAMUSCULAR; INTRAVENOUS at 14:23

## 2023-09-12 RX ADMIN — SODIUM CHLORIDE 1000 ML: 9 INJECTION, SOLUTION INTRAVENOUS at 14:27

## 2023-09-12 RX ADMIN — MORPHINE SULFATE 4 MG: 4 INJECTION INTRAVENOUS at 14:22

## 2023-09-12 ASSESSMENT — ENCOUNTER SYMPTOMS
RESPIRATORY NEGATIVE: 1
COUGH: 0
BACK PAIN: 1
DIARRHEA: 0
SHORTNESS OF BREATH: 0
VOMITING: 0
CONSTIPATION: 0
BACK PAIN: 1
ABDOMINAL PAIN: 0
NAUSEA: 0
WHEEZING: 0
PHOTOPHOBIA: 0

## 2023-09-12 ASSESSMENT — PAIN DESCRIPTION - LOCATION
LOCATION: BACK
LOCATION: BACK

## 2023-09-12 ASSESSMENT — PAIN SCALES - GENERAL
PAINLEVEL_OUTOF10: 10
PAINLEVEL_OUTOF10: 7
PAINLEVEL_OUTOF10: 7

## 2023-09-12 ASSESSMENT — PAIN DESCRIPTION - ORIENTATION: ORIENTATION: LOWER

## 2023-09-12 ASSESSMENT — PAIN DESCRIPTION - DESCRIPTORS: DESCRIPTORS: DISCOMFORT

## 2023-09-12 ASSESSMENT — PAIN - FUNCTIONAL ASSESSMENT: PAIN_FUNCTIONAL_ASSESSMENT: 0-10

## 2023-09-12 NOTE — ED PROVIDER NOTES
possible for a visit in 2 days      [facility]) ED  [address] [facility] [address]  [phone]  Go to   As needed, If symptoms worsen    Armando Rao MD  [facility] [address]    Schedule an appointment as soon as possible for a visit in 1 week        DISCHARGE MEDICATIONS:  Discharge Medication List as of 9/12/2023  2:52 PM        Controlled Substances Monitoring:     RX Monitoring Periodic Controlled Substance Monitoring   2/15/2023   2:54 PM Possible medication side effects, risk of tolerance/dependence & alternative treatments discussed.        (Please note that portions of this note were completed with a voice recognition program.  Efforts were made to edit the dictations but occasionally words are mis-transcribed.)    Davin Davila PA-C (electronically signed)             Davin Davila PA-C  09/12/23 [id number]

## 2023-09-13 ENCOUNTER — CARE COORDINATION (OUTPATIENT)
Dept: CARE COORDINATION | Age: 68
End: 2023-09-13

## 2023-09-13 DIAGNOSIS — N20.0 KIDNEY STONE: ICD-10-CM

## 2023-09-13 DIAGNOSIS — N13.30 HYDRONEPHROSIS OF LEFT KIDNEY: ICD-10-CM

## 2023-09-13 DIAGNOSIS — R10.9 FLANK PAIN: Primary | ICD-10-CM

## 2023-09-13 DIAGNOSIS — N20.1 LEFT URETERAL STONE: ICD-10-CM

## 2023-09-13 LAB — BACTERIA UR CULT: NORMAL

## 2023-09-13 PROCEDURE — 1111F DSCHRG MED/CURRENT MED MERGE: CPT | Performed by: NURSE PRACTITIONER

## 2023-09-13 NOTE — CARE COORDINATION
[facility] Care Transitions Initial Follow Up Call    Call within 2 business days of discharge: Yes    Patient Current Location:  Home: [address] [place] [address] [address]    Care Transition Nurse contacted the patient by telephone to perform post hospital discharge assessment. Verified name and  with patient as identifiers. Provided introduction to self, and explanation of the Care Transition Nurse role. Patient: Leon Arrieta Patient : 1955   MRN: [id number]  Reason for Admission: MLOZ 9/10-23 Hydronephrosis of Left kidney, Left Ureteral Stone, S/P Left Cystoscopy Retrograde Pyelogram and Left Double J Stent Insertion  Discharge Date: 23 RARS: Readmission Risk Score: 16.4    Last Discharge [address] [facility]       Date Complaint Diagnosis Description Type Department Provider    23 Back Pain Acute exacerbation of chronic low back pain ED (DISCHARGE) [address] [facility]           Was this an external facility discharge? No Discharge Facility: [facility]    Challenges to be reviewed by the provider   Additional needs identified to be addressed with provider: No  none               Method of communication with provider: none. Pt reports flank/back pain has subsided, but has not completely resolved. Pt denies abdominal pain, n/v, hematuria, dysuria. Pt reports urinating without difficulty. Pt reports decreased appetite. Pt encouraged to eat small nutritious meals and to drink plenty of fluids. Pt v/u. Pt had Pain Management F/U appt yesterday. Pain Mgmt sent him to the ER for evaluation d/t \" When I entered the room he was lying on the exam table. He did not know he had surgery yesterday for stents kidney stones. He could not answer any of the doctors he is seen or treatment plan. \" \"He declines ambulance to go to hospital I advised him to go the emergency room since he said he only urinates dribbles at a time. He also has a lot of pain and is not a candidate for narcotics. \" Pt given Morphine 4 mg x 1 in ER;

## 2023-09-15 ENCOUNTER — HOSPITAL ENCOUNTER (INPATIENT)
Age: 68
LOS: 2 days | Discharge: HOME HEALTH | DRG: 312 | End: 2023-09-19
Attending: INTERNAL MEDICINE | Admitting: INTERNAL MEDICINE
Payer: MEDICARE

## 2023-09-15 ENCOUNTER — APPOINTMENT (OUTPATIENT)
Dept: CT IMAGING | Age: 68
DRG: 312 | End: 2023-09-15
Payer: MEDICARE

## 2023-09-15 ENCOUNTER — APPOINTMENT (OUTPATIENT)
Dept: GENERAL RADIOLOGY | Age: 68
DRG: 312 | End: 2023-09-15
Payer: MEDICARE

## 2023-09-15 DIAGNOSIS — R55 SYNCOPE AND COLLAPSE: ICD-10-CM

## 2023-09-15 DIAGNOSIS — E87.6 HYPOKALEMIA: ICD-10-CM

## 2023-09-15 DIAGNOSIS — R31.9 HEMATURIA, UNSPECIFIED TYPE: ICD-10-CM

## 2023-09-15 DIAGNOSIS — M48.062 SPINAL STENOSIS OF LUMBAR REGION WITH NEUROGENIC CLAUDICATION: ICD-10-CM

## 2023-09-15 DIAGNOSIS — R55 SYNCOPE, UNSPECIFIED SYNCOPE TYPE: Primary | ICD-10-CM

## 2023-09-15 DIAGNOSIS — K92.2 GI BLEEDING: ICD-10-CM

## 2023-09-15 DIAGNOSIS — D64.9 ANEMIA, UNSPECIFIED TYPE: ICD-10-CM

## 2023-09-15 LAB
ALBUMIN SERPL-MCNC: 3.3 G/DL (ref 3.5–4.6)
ALP SERPL-CCNC: 50 U/L (ref 35–104)
ALT SERPL-CCNC: 9 U/L (ref 0–41)
AMPHET UR QL SCN: NORMAL
ANION GAP SERPL CALCULATED.3IONS-SCNC: 10 MEQ/L (ref 9–15)
AST SERPL-CCNC: 11 U/L (ref 0–40)
BARBITURATES UR QL SCN: NORMAL
BASOPHILS # BLD: 0.1 K/UL (ref 0–0.2)
BASOPHILS NFR BLD: 1.1 %
BENZODIAZ UR QL SCN: NORMAL
BILIRUB SERPL-MCNC: <0.2 MG/DL (ref 0.2–0.7)
BUN SERPL-MCNC: 27 MG/DL (ref 8–23)
CALCIUM SERPL-MCNC: 8.3 MG/DL (ref 8.5–9.9)
CANNABINOIDS UR QL SCN: NORMAL
CHLORIDE SERPL-SCNC: 105 MEQ/L (ref 95–107)
CHP ED QC CHECK: NORMAL
CO2 SERPL-SCNC: 24 MEQ/L (ref 20–31)
COCAINE UR QL SCN: NORMAL
CREAT SERPL-MCNC: 1.14 MG/DL (ref 0.7–1.2)
DRUG SCREEN COMMENT UR-IMP: NORMAL
EOSINOPHIL # BLD: 0.1 K/UL (ref 0–0.7)
EOSINOPHIL NFR BLD: 1.3 %
ERYTHROCYTE [DISTWIDTH] IN BLOOD BY AUTOMATED COUNT: 15.8 % (ref 11.5–14.5)
ETHANOL PERCENT: NORMAL G/DL
ETHANOLAMINE SERPL-MCNC: <10 MG/DL (ref 0–0.08)
FENTANYL SCREEN, URINE: NORMAL
GLOBULIN SER CALC-MCNC: 1.9 G/DL (ref 2.3–3.5)
GLUCOSE BLD-MCNC: 114 MG/DL
GLUCOSE BLD-MCNC: 115 MG/DL (ref 70–99)
GLUCOSE SERPL-MCNC: 107 MG/DL (ref 70–99)
HCT VFR BLD AUTO: 21.5 % (ref 42–52)
HGB BLD-MCNC: 7.1 G/DL (ref 14–18)
LYMPHOCYTES # BLD: 1 K/UL (ref 1–4.8)
LYMPHOCYTES NFR BLD: 8.9 %
MAGNESIUM SERPL-MCNC: 1.8 MG/DL (ref 1.7–2.4)
MCH RBC QN AUTO: 33 PG (ref 27–31.3)
MCHC RBC AUTO-ENTMCNC: 33.2 % (ref 33–37)
MCV RBC AUTO: 99.6 FL (ref 79–92.2)
METHADONE UR QL SCN: NORMAL
MONOCYTES # BLD: 0.5 K/UL (ref 0.2–0.8)
MONOCYTES NFR BLD: 4.4 %
NEUTROPHILS # BLD: 9.1 K/UL (ref 1.4–6.5)
NEUTS SEG NFR BLD: 84.3 %
OPIATES UR QL SCN: NORMAL
OXYCODONE UR QL SCN: NORMAL
PCP UR QL SCN: NORMAL
PERFORMED ON: ABNORMAL
PLATELET # BLD AUTO: 429 K/UL (ref 130–400)
POTASSIUM SERPL-SCNC: 3.2 MEQ/L (ref 3.4–4.9)
PROPOXYPH UR QL SCN: NORMAL
PROT SERPL-MCNC: 5.2 G/DL (ref 6.3–8)
RBC # BLD AUTO: 2.15 M/UL (ref 4.7–6.1)
SODIUM SERPL-SCNC: 139 MEQ/L (ref 135–144)
TROPONIN T SERPL-MCNC: <0.01 NG/ML (ref 0–0.01)
TSH REFLEX: 2.6 UIU/ML (ref 0.44–3.86)
WBC # BLD AUTO: 10.8 K/UL (ref 4.8–10.8)

## 2023-09-15 PROCEDURE — 85025 COMPLETE CBC W/AUTO DIFF WBC: CPT

## 2023-09-15 PROCEDURE — 93005 ELECTROCARDIOGRAM TRACING: CPT | Performed by: PERSONAL EMERGENCY RESPONSE ATTENDANT

## 2023-09-15 PROCEDURE — 6370000000 HC RX 637 (ALT 250 FOR IP): Performed by: PERSONAL EMERGENCY RESPONSE ATTENDANT

## 2023-09-15 PROCEDURE — 82077 ASSAY SPEC XCP UR&BREATH IA: CPT

## 2023-09-15 PROCEDURE — 83735 ASSAY OF MAGNESIUM: CPT

## 2023-09-15 PROCEDURE — 96375 TX/PRO/DX INJ NEW DRUG ADDON: CPT

## 2023-09-15 PROCEDURE — 2580000003 HC RX 258: Performed by: PERSONAL EMERGENCY RESPONSE ATTENDANT

## 2023-09-15 PROCEDURE — 99285 EMERGENCY DEPT VISIT HI MDM: CPT

## 2023-09-15 PROCEDURE — 96361 HYDRATE IV INFUSION ADD-ON: CPT

## 2023-09-15 PROCEDURE — 84484 ASSAY OF TROPONIN QUANT: CPT

## 2023-09-15 PROCEDURE — 80053 COMPREHEN METABOLIC PANEL: CPT

## 2023-09-15 PROCEDURE — 36415 COLL VENOUS BLD VENIPUNCTURE: CPT

## 2023-09-15 PROCEDURE — 81001 URINALYSIS AUTO W/SCOPE: CPT

## 2023-09-15 PROCEDURE — 84443 ASSAY THYROID STIM HORMONE: CPT

## 2023-09-15 PROCEDURE — 71045 X-RAY EXAM CHEST 1 VIEW: CPT

## 2023-09-15 PROCEDURE — 6360000002 HC RX W HCPCS: Performed by: PERSONAL EMERGENCY RESPONSE ATTENDANT

## 2023-09-15 PROCEDURE — 96374 THER/PROPH/DIAG INJ IV PUSH: CPT

## 2023-09-15 PROCEDURE — 80307 DRUG TEST PRSMV CHEM ANLYZR: CPT

## 2023-09-15 PROCEDURE — 70450 CT HEAD/BRAIN W/O DYE: CPT

## 2023-09-15 PROCEDURE — 71250 CT THORAX DX C-: CPT

## 2023-09-15 RX ORDER — POTASSIUM CHLORIDE 20 MEQ/1
40 TABLET, EXTENDED RELEASE ORAL ONCE
Status: COMPLETED | OUTPATIENT
Start: 2023-09-15 | End: 2023-09-15

## 2023-09-15 RX ORDER — 0.9 % SODIUM CHLORIDE 0.9 %
1000 INTRAVENOUS SOLUTION INTRAVENOUS ONCE
Status: COMPLETED | OUTPATIENT
Start: 2023-09-15 | End: 2023-09-15

## 2023-09-15 RX ORDER — ONDANSETRON 2 MG/ML
4 INJECTION INTRAMUSCULAR; INTRAVENOUS ONCE
Status: COMPLETED | OUTPATIENT
Start: 2023-09-15 | End: 2023-09-15

## 2023-09-15 RX ORDER — MORPHINE SULFATE 4 MG/ML
4 INJECTION, SOLUTION INTRAMUSCULAR; INTRAVENOUS ONCE
Status: COMPLETED | OUTPATIENT
Start: 2023-09-15 | End: 2023-09-15

## 2023-09-15 RX ADMIN — MORPHINE SULFATE 4 MG: 4 INJECTION, SOLUTION INTRAMUSCULAR; INTRAVENOUS at 21:36

## 2023-09-15 RX ADMIN — POTASSIUM CHLORIDE 40 MEQ: 1500 TABLET, EXTENDED RELEASE ORAL at 21:30

## 2023-09-15 RX ADMIN — ONDANSETRON 4 MG: 2 INJECTION INTRAMUSCULAR; INTRAVENOUS at 21:36

## 2023-09-15 RX ADMIN — SODIUM CHLORIDE 1000 ML: 9 INJECTION, SOLUTION INTRAVENOUS at 19:25

## 2023-09-15 ASSESSMENT — ENCOUNTER SYMPTOMS
VOMITING: 0
BLOOD IN STOOL: 0
SORE THROAT: 0
ABDOMINAL PAIN: 0
COUGH: 0
SHORTNESS OF BREATH: 0
RHINORRHEA: 0
COLOR CHANGE: 0
DIARRHEA: 0
NAUSEA: 0

## 2023-09-15 ASSESSMENT — PAIN DESCRIPTION - LOCATION: LOCATION: BACK

## 2023-09-15 ASSESSMENT — PAIN DESCRIPTION - DESCRIPTORS: DESCRIPTORS: STABBING

## 2023-09-15 ASSESSMENT — PAIN DESCRIPTION - ORIENTATION: ORIENTATION: LOWER

## 2023-09-15 NOTE — ED PROVIDER NOTES
[facility] ED  eMERGENCY dEPARTMENT eNCOUnter      Pt Name: Nila Stevenson  MRN: [id number]  [address] [place] 1955  Date of evaluation: 9/15/2023  Provider: LYNETTE Shay    My attending is Dr. Zev Gonzalesise is a 77 y.o. male with PMHx of allergic rhinitis, anxiety, chronic back pain, COPD, depression, emphysema, hypertension, GERD, hyperlipidemia, ARNAUD, restless leg syndrome, previous alcoholism presents to the emergency department with possible syncopal episode. Pt says he was sitting at the kitchen table when he apparently passed out. Witnessed by neighbor. Patient denies any symptoms prior to, and did not fall out of the chair. Unsure what happened. When ambulance arrived patient was still sitting in the chair but responsive. Patient states he has had a decreased oral intake. States he has dark urine from dehydration. He has chronic lightheadedness. He denies any headaches, neck pain, back pain, upper respiratory symptoms, fevers, cough, chest pain, shortness of breath, abdominal pain, nausea, vomiting, diarrhea, constipation, dysuria. No blood thinner use. Patient denies any recent falls or right-sided rib pain. HPI    Nursing Notes were reviewed. REVIEW OF SYSTEMS       Review of Systems   Constitutional:  Positive for appetite change. Negative for chills and fever. HENT:  Negative for congestion, rhinorrhea and sore throat. Respiratory:  Negative for cough and shortness of breath. Cardiovascular:  Negative for chest pain. Gastrointestinal:  Negative for abdominal pain, blood in stool, diarrhea, nausea and vomiting. Genitourinary:  Negative for difficulty urinating. Musculoskeletal:  Negative for neck stiffness. Skin:  Negative for color change and rash. Neurological:  Positive for syncope and light-headedness. Negative for dizziness, weakness, numbness and headaches.    All other systems reviewed and are

## 2023-09-15 NOTE — ED TRIAGE NOTES
The patient was brought to the ED for syncope while sitting in a chair. EMS states the incident was witnessed by the neighbor who states the patient did not fall.

## 2023-09-16 ENCOUNTER — APPOINTMENT (OUTPATIENT)
Dept: ULTRASOUND IMAGING | Age: 68
DRG: 312 | End: 2023-09-16
Payer: MEDICARE

## 2023-09-16 PROBLEM — E44.0 MODERATE MALNUTRITION (HCC): Status: ACTIVE | Noted: 2023-09-16

## 2023-09-16 LAB
ABO + RH BLD: NORMAL
BACTERIA URNS QL MICRO: NEGATIVE /HPF
BILIRUB UR QL STRIP: NEGATIVE
BLD GP AB SCN SERPL QL: NORMAL
CLARITY UR: CLEAR
COLOR UR: YELLOW
EPI CELLS #/AREA URNS AUTO: ABNORMAL /HPF (ref 0–5)
GLUCOSE UR STRIP-MCNC: NEGATIVE MG/DL
HGB UR QL STRIP: ABNORMAL
HYALINE CASTS #/AREA URNS AUTO: ABNORMAL /HPF (ref 0–5)
KETONES UR STRIP-MCNC: ABNORMAL MG/DL
LEUKOCYTE ESTERASE UR QL STRIP: ABNORMAL
NITRITE UR QL STRIP: NEGATIVE
PH UR STRIP: 5.5 [PH] (ref 5–9)
PROT UR STRIP-MCNC: 100 MG/DL
RBC #/AREA URNS HPF: ABNORMAL /HPF (ref 0–2)
SP GR UR STRIP: 1.02 (ref 1–1.03)
URINE REFLEX TO CULTURE: YES
UROBILINOGEN UR STRIP-ACNC: 0.2 E.U./DL
WBC #/AREA URNS AUTO: ABNORMAL /HPF (ref 0–5)

## 2023-09-16 PROCEDURE — 86901 BLOOD TYPING SEROLOGIC RH(D): CPT

## 2023-09-16 PROCEDURE — 82607 VITAMIN B-12: CPT

## 2023-09-16 PROCEDURE — G0378 HOSPITAL OBSERVATION PER HR: HCPCS

## 2023-09-16 PROCEDURE — 99222 1ST HOSP IP/OBS MODERATE 55: CPT | Performed by: PSYCHIATRY & NEUROLOGY

## 2023-09-16 PROCEDURE — 82728 ASSAY OF FERRITIN: CPT

## 2023-09-16 PROCEDURE — 83550 IRON BINDING TEST: CPT

## 2023-09-16 PROCEDURE — 86850 RBC ANTIBODY SCREEN: CPT

## 2023-09-16 PROCEDURE — 6370000000 HC RX 637 (ALT 250 FOR IP): Performed by: INTERNAL MEDICINE

## 2023-09-16 PROCEDURE — 82746 ASSAY OF FOLIC ACID SERUM: CPT

## 2023-09-16 PROCEDURE — 87086 URINE CULTURE/COLONY COUNT: CPT

## 2023-09-16 PROCEDURE — 86900 BLOOD TYPING SEROLOGIC ABO: CPT

## 2023-09-16 PROCEDURE — 2580000003 HC RX 258: Performed by: INTERNAL MEDICINE

## 2023-09-16 PROCEDURE — 83540 ASSAY OF IRON: CPT

## 2023-09-16 PROCEDURE — 93880 EXTRACRANIAL BILAT STUDY: CPT

## 2023-09-16 PROCEDURE — 36415 COLL VENOUS BLD VENIPUNCTURE: CPT

## 2023-09-16 RX ORDER — GABAPENTIN 300 MG/1
600 CAPSULE ORAL DAILY
Status: DISCONTINUED | OUTPATIENT
Start: 2023-09-16 | End: 2023-09-19 | Stop reason: HOSPADM

## 2023-09-16 RX ORDER — ONDANSETRON 4 MG/1
4 TABLET, ORALLY DISINTEGRATING ORAL EVERY 8 HOURS PRN
Status: DISCONTINUED | OUTPATIENT
Start: 2023-09-16 | End: 2023-09-19 | Stop reason: HOSPADM

## 2023-09-16 RX ORDER — ASPIRIN 81 MG/1
81 TABLET, CHEWABLE ORAL DAILY
Status: DISCONTINUED | OUTPATIENT
Start: 2023-09-16 | End: 2023-09-19 | Stop reason: HOSPADM

## 2023-09-16 RX ORDER — GABAPENTIN 400 MG/1
1200 CAPSULE ORAL NIGHTLY
Status: DISCONTINUED | OUTPATIENT
Start: 2023-09-16 | End: 2023-09-19 | Stop reason: HOSPADM

## 2023-09-16 RX ORDER — FLUOXETINE HYDROCHLORIDE 20 MG/1
40 CAPSULE ORAL DAILY
Status: DISCONTINUED | OUTPATIENT
Start: 2023-09-16 | End: 2023-09-19 | Stop reason: HOSPADM

## 2023-09-16 RX ORDER — ATORVASTATIN CALCIUM 40 MG/1
40 TABLET, FILM COATED ORAL NIGHTLY
Status: DISCONTINUED | OUTPATIENT
Start: 2023-09-16 | End: 2023-09-19 | Stop reason: HOSPADM

## 2023-09-16 RX ORDER — ONDANSETRON 2 MG/ML
4 INJECTION INTRAMUSCULAR; INTRAVENOUS EVERY 6 HOURS PRN
Status: DISCONTINUED | OUTPATIENT
Start: 2023-09-16 | End: 2023-09-19 | Stop reason: HOSPADM

## 2023-09-16 RX ORDER — ENOXAPARIN SODIUM 100 MG/ML
40 INJECTION SUBCUTANEOUS DAILY
Status: DISCONTINUED | OUTPATIENT
Start: 2023-09-16 | End: 2023-09-19 | Stop reason: HOSPADM

## 2023-09-16 RX ORDER — POLYETHYLENE GLYCOL 3350 17 G/17G
17 POWDER, FOR SOLUTION ORAL DAILY PRN
Status: DISCONTINUED | OUTPATIENT
Start: 2023-09-16 | End: 2023-09-19 | Stop reason: HOSPADM

## 2023-09-16 RX ORDER — POTASSIUM CHLORIDE 20 MEQ/1
40 TABLET, EXTENDED RELEASE ORAL ONCE
Status: COMPLETED | OUTPATIENT
Start: 2023-09-16 | End: 2023-09-16

## 2023-09-16 RX ORDER — SODIUM CHLORIDE 0.9 % (FLUSH) 0.9 %
5-40 SYRINGE (ML) INJECTION EVERY 12 HOURS SCHEDULED
Status: DISCONTINUED | OUTPATIENT
Start: 2023-09-16 | End: 2023-09-19 | Stop reason: HOSPADM

## 2023-09-16 RX ORDER — SODIUM CHLORIDE 0.9 % (FLUSH) 0.9 %
5-40 SYRINGE (ML) INJECTION PRN
Status: DISCONTINUED | OUTPATIENT
Start: 2023-09-16 | End: 2023-09-19 | Stop reason: HOSPADM

## 2023-09-16 RX ORDER — PANTOPRAZOLE SODIUM 40 MG/1
40 TABLET, DELAYED RELEASE ORAL
Status: DISCONTINUED | OUTPATIENT
Start: 2023-09-17 | End: 2023-09-18

## 2023-09-16 RX ORDER — 0.9 % SODIUM CHLORIDE 0.9 %
1000 INTRAVENOUS SOLUTION INTRAVENOUS ONCE
Status: COMPLETED | OUTPATIENT
Start: 2023-09-16 | End: 2023-09-16

## 2023-09-16 RX ORDER — SODIUM CHLORIDE 9 MG/ML
INJECTION, SOLUTION INTRAVENOUS PRN
Status: DISCONTINUED | OUTPATIENT
Start: 2023-09-16 | End: 2023-09-19 | Stop reason: HOSPADM

## 2023-09-16 RX ORDER — LIDOCAINE 4 G/G
2 PATCH TOPICAL DAILY PRN
Status: DISCONTINUED | OUTPATIENT
Start: 2023-09-16 | End: 2023-09-16

## 2023-09-16 RX ORDER — ACETAMINOPHEN 325 MG/1
650 TABLET ORAL EVERY 6 HOURS PRN
Status: DISCONTINUED | OUTPATIENT
Start: 2023-09-16 | End: 2023-09-19 | Stop reason: HOSPADM

## 2023-09-16 RX ORDER — ACETAMINOPHEN 650 MG/1
650 SUPPOSITORY RECTAL EVERY 6 HOURS PRN
Status: DISCONTINUED | OUTPATIENT
Start: 2023-09-16 | End: 2023-09-19 | Stop reason: HOSPADM

## 2023-09-16 RX ADMIN — GABAPENTIN 1200 MG: 400 CAPSULE ORAL at 20:22

## 2023-09-16 RX ADMIN — SODIUM CHLORIDE 1000 ML: 9 INJECTION, SOLUTION INTRAVENOUS at 08:12

## 2023-09-16 RX ADMIN — ATORVASTATIN CALCIUM 40 MG: 40 TABLET, FILM COATED ORAL at 01:53

## 2023-09-16 RX ADMIN — POTASSIUM CHLORIDE 40 MEQ: 1500 TABLET, EXTENDED RELEASE ORAL at 08:09

## 2023-09-16 RX ADMIN — ATORVASTATIN CALCIUM 40 MG: 40 TABLET, FILM COATED ORAL at 20:23

## 2023-09-16 RX ADMIN — ACETAMINOPHEN 650 MG: 325 TABLET ORAL at 01:52

## 2023-09-16 RX ADMIN — FLUOXETINE 40 MG: 20 CAPSULE ORAL at 08:09

## 2023-09-16 RX ADMIN — GABAPENTIN 600 MG: 300 CAPSULE ORAL at 12:05

## 2023-09-16 RX ADMIN — ACETAMINOPHEN 650 MG: 325 TABLET ORAL at 20:23

## 2023-09-16 RX ADMIN — ACETAMINOPHEN 650 MG: 325 TABLET ORAL at 15:48

## 2023-09-16 RX ADMIN — Medication 10 ML: at 23:17

## 2023-09-16 RX ADMIN — Medication 10 ML: at 08:09

## 2023-09-16 ASSESSMENT — PAIN SCALES - GENERAL
PAINLEVEL_OUTOF10: 4
PAINLEVEL_OUTOF10: 6
PAINLEVEL_OUTOF10: 6
PAINLEVEL_OUTOF10: 8

## 2023-09-16 ASSESSMENT — ENCOUNTER SYMPTOMS
COLOR CHANGE: 0
BACK PAIN: 0
PHOTOPHOBIA: 0
VOMITING: 0
NAUSEA: 0
SHORTNESS OF BREATH: 0
CHOKING: 0
TROUBLE SWALLOWING: 0

## 2023-09-16 ASSESSMENT — PAIN DESCRIPTION - LOCATION
LOCATION: BACK

## 2023-09-16 ASSESSMENT — PAIN DESCRIPTION - ORIENTATION
ORIENTATION: MID;LOWER
ORIENTATION: MID;LOWER
ORIENTATION: MID

## 2023-09-16 ASSESSMENT — PAIN DESCRIPTION - DESCRIPTORS
DESCRIPTORS: TENDER
DESCRIPTORS: ACHING
DESCRIPTORS: CRAMPING
DESCRIPTORS: ACHING

## 2023-09-16 NOTE — FLOWSHEET NOTE
Pt admitted to room 490 via cot  Transferred to bed using cane   Alert and oriented, VSS, passed swallow assessment, home belongings and medications documented in Epic system.    Pt co pain in mid lower back 6/10   Medicated per [facility]

## 2023-09-16 NOTE — H&P
emphysema, no pneumothorax or other acute finding.   Patient has no other new/acute complaints at time of exam.         PAST MEDICAL HISTORY:    Past Medical History:   Diagnosis Date    Allergic rhinitis 2/19/2018    Anxiety     Chronic back pain     COPD (chronic obstructive pulmonary disease) ([facility])     Depression     Disorder of stomach     valve not closing properly    Emphysema lung ([address])     Essential hypertension 11/4/2019    Gastroesophageal reflux disease 10/4/2019    Hydronephrosis of left kidney 8/23/2023    Hyperlipidemia     meds > 22 yrs    Low back pain 5/1/2018    ARNAUD (obstructive sleep apnea) 2/19/2018    Other emphysema ([address]) 10/4/2019    Other intervertebral disc degeneration, lumbar region 3/23/2018    Radiculopathy, lumbar region 2/28/2018    Restless leg syndrome     Seasonal affective disorder ([address]) 10/4/2019    Substance abuse ([address])     alcholic, quit 20 yrs      PAST SURGICAL HISTORY:    Past Surgical History:   Procedure Laterality Date    BLADDER SURGERY Left 9/11/2023    CYSTOSCOPY RETROGRADE PYELOGRAM AND LEFT DOUBLE J STENT INSERTION performed by Kamari Diaz MD at [address] [place] [address]  12/22/2016    A Bethanie Soares MD    DENTAL SURGERY  10 yrs ago    ENDOSCOPY, COLON, DIAGNOSTIC      EYE SURGERY      Lasik OU    FINGER TRIGGER RELEASE Left 11/1/2022    LEFT HAND TRIGGER FINGER RING FINGER RELEASE OF A1 PANFILO performed by Lamin Hansen MD at [facility] ARTHROSCOPY Right     KNEE SURGERY Right 05/2016    Ray procedure    KNEE SURGERY      OR NASAL/SINUS ENDOSCOPY W/MAXILLARY ANTROSTOMY N/A 2/22/2018    SEPTOPLASTY MICRODEBRIDER ASSISTED TURBINOPLASTY AND OUT-FRACTURING BILATERAL NASAL ENDOSCOPY performed by Lorena Alfred MD at [facility] OR     FAMILY HISTORY:    Family History   Problem Relation Age of Onset    Cancer Mother         stomach    Arthritis Mother     Heart Disease Father 48    Cancer Father         bone    Cancer Maternal Grandmother         lung    Cancer

## 2023-09-16 NOTE — PLAN OF CARE
Problem: Discharge Planning  Goal: Discharge to home or other facility with appropriate resources  Outcome: Progressing  Flowsheets  Taken 9/16/2023 0140  Discharge to home or other facility with appropriate resources:   Identify barriers to discharge with patient and caregiver   Arrange for needed discharge resources and transportation as appropriate   Identify discharge learning needs (meds, wound care, etc)   Arrange for interpreters to assist at discharge as needed   Refer to discharge planning if patient needs post-hospital services based on physician order or complex needs related to functional status, cognitive ability or social support system  Taken 9/16/2023 0111  Discharge to home or other facility with appropriate resources: Identify barriers to discharge with patient and caregiver

## 2023-09-16 NOTE — ED NOTES
Hospitalist, Dr. Xu Bae was paged @[phone]  Requested by Kwabena OJEDA    Return call @[phone]     Maxi Hastings  09/16/23 8953

## 2023-09-16 NOTE — ED NOTES
Report called to [facility], and called monitor station for portable.      Burt Chavira RN  09/16/23 1131

## 2023-09-16 NOTE — PLAN OF CARE
Nutrition Problem #1: Moderate malnutrition, In context of chronic illness  Intervention: Food and/or Nutrient Delivery: Continue Current Diet, Start Oral Nutrition Supplement  Nutritional

## 2023-09-17 LAB
ANION GAP SERPL CALCULATED.3IONS-SCNC: 8 MEQ/L (ref 9–15)
BACTERIA UR CULT: NORMAL
BASOPHILS # BLD: 0.1 K/UL (ref 0–0.2)
BASOPHILS NFR BLD: 1.1 %
BUN SERPL-MCNC: 18 MG/DL (ref 8–23)
CALCIUM SERPL-MCNC: 8.5 MG/DL (ref 8.5–9.9)
CHLORIDE SERPL-SCNC: 107 MEQ/L (ref 95–107)
CO2 SERPL-SCNC: 24 MEQ/L (ref 20–31)
CREAT SERPL-MCNC: 0.89 MG/DL (ref 0.7–1.2)
EKG ATRIAL RATE: 71 BPM
EKG P AXIS: 82 DEGREES
EKG P-R INTERVAL: 132 MS
EKG Q-T INTERVAL: 342 MS
EKG QRS DURATION: 104 MS
EKG QTC CALCULATION (BAZETT): 371 MS
EKG R AXIS: 88 DEGREES
EKG T AXIS: 88 DEGREES
EKG VENTRICULAR RATE: 71 BPM
EOSINOPHIL # BLD: 0.3 K/UL (ref 0–0.7)
EOSINOPHIL NFR BLD: 4.1 %
ERYTHROCYTE [DISTWIDTH] IN BLOOD BY AUTOMATED COUNT: 15.7 % (ref 11.5–14.5)
FERRITIN: 50 NG/ML (ref 30–400)
FOLATE: 7.1 NG/ML
GLUCOSE SERPL-MCNC: 88 MG/DL (ref 70–99)
HCT VFR BLD AUTO: 20.7 % (ref 42–52)
HGB BLD-MCNC: 7.1 G/DL (ref 14–18)
IRON SATURATION: 8 % (ref 20–55)
IRON: 21 UG/DL (ref 59–158)
LYMPHOCYTES # BLD: 1.4 K/UL (ref 1–4.8)
LYMPHOCYTES NFR BLD: 16.3 %
MCH RBC QN AUTO: 34.2 PG (ref 27–31.3)
MCHC RBC AUTO-ENTMCNC: 34.4 % (ref 33–37)
MCV RBC AUTO: 99.3 FL (ref 79–92.2)
MONOCYTES # BLD: 0.5 K/UL (ref 0.2–0.8)
MONOCYTES NFR BLD: 6.5 %
NEUTROPHILS # BLD: 6.1 K/UL (ref 1.4–6.5)
NEUTS SEG NFR BLD: 72 %
PLATELET # BLD AUTO: 387 K/UL (ref 130–400)
POTASSIUM SERPL-SCNC: 4.5 MEQ/L (ref 3.4–4.9)
RBC # BLD AUTO: 2.09 M/UL (ref 4.7–6.1)
SODIUM SERPL-SCNC: 139 MEQ/L (ref 135–144)
TOTAL IRON BINDING CAPACITY: 270 UG/DL (ref 250–450)
UNSATURATED IRON BINDING CAPACITY: 249 UG/DL (ref 112–347)
VITAMIN B-12: 216 PG/ML (ref 232–1245)
WBC # BLD AUTO: 8.4 K/UL (ref 4.8–10.8)

## 2023-09-17 PROCEDURE — G0378 HOSPITAL OBSERVATION PER HR: HCPCS

## 2023-09-17 PROCEDURE — 6370000000 HC RX 637 (ALT 250 FOR IP): Performed by: INTERNAL MEDICINE

## 2023-09-17 PROCEDURE — 2580000003 HC RX 258: Performed by: INTERNAL MEDICINE

## 2023-09-17 PROCEDURE — 36415 COLL VENOUS BLD VENIPUNCTURE: CPT

## 2023-09-17 PROCEDURE — 2580000003 HC RX 258: Performed by: SPECIALIST

## 2023-09-17 PROCEDURE — 93010 ELECTROCARDIOGRAM REPORT: CPT | Performed by: INTERNAL MEDICINE

## 2023-09-17 PROCEDURE — 85025 COMPLETE CBC W/AUTO DIFF WBC: CPT

## 2023-09-17 PROCEDURE — 6370000000 HC RX 637 (ALT 250 FOR IP): Performed by: SPECIALIST

## 2023-09-17 PROCEDURE — 99232 SBSQ HOSP IP/OBS MODERATE 35: CPT | Performed by: PSYCHIATRY & NEUROLOGY

## 2023-09-17 PROCEDURE — 1210000000 HC MED SURG R&B

## 2023-09-17 PROCEDURE — 80048 BASIC METABOLIC PNL TOTAL CA: CPT

## 2023-09-17 RX ORDER — SODIUM CHLORIDE 9 MG/ML
25 INJECTION, SOLUTION INTRAVENOUS PRN
Status: DISCONTINUED | OUTPATIENT
Start: 2023-09-17 | End: 2023-09-18 | Stop reason: HOSPADM

## 2023-09-17 RX ORDER — SODIUM CHLORIDE 0.9 % (FLUSH) 0.9 %
5-40 SYRINGE (ML) INJECTION PRN
Status: DISCONTINUED | OUTPATIENT
Start: 2023-09-17 | End: 2023-09-18 | Stop reason: HOSPADM

## 2023-09-17 RX ORDER — SODIUM CHLORIDE 0.9 % (FLUSH) 0.9 %
5-40 SYRINGE (ML) INJECTION EVERY 12 HOURS SCHEDULED
Status: DISCONTINUED | OUTPATIENT
Start: 2023-09-17 | End: 2023-09-18 | Stop reason: HOSPADM

## 2023-09-17 RX ADMIN — Medication 10 ML: at 21:41

## 2023-09-17 RX ADMIN — GABAPENTIN 600 MG: 300 CAPSULE ORAL at 12:21

## 2023-09-17 RX ADMIN — ACETAMINOPHEN 650 MG: 325 TABLET ORAL at 15:42

## 2023-09-17 RX ADMIN — Medication 10 ML: at 07:30

## 2023-09-17 RX ADMIN — ACETAMINOPHEN 650 MG: 325 TABLET ORAL at 21:19

## 2023-09-17 RX ADMIN — ACETAMINOPHEN 650 MG: 325 TABLET ORAL at 01:51

## 2023-09-17 RX ADMIN — FLUOXETINE 40 MG: 20 CAPSULE ORAL at 07:29

## 2023-09-17 RX ADMIN — Medication 10 ML: at 21:42

## 2023-09-17 RX ADMIN — ATORVASTATIN CALCIUM 40 MG: 40 TABLET, FILM COATED ORAL at 19:38

## 2023-09-17 RX ADMIN — PANTOPRAZOLE SODIUM 40 MG: 40 TABLET, DELAYED RELEASE ORAL at 06:00

## 2023-09-17 RX ADMIN — GABAPENTIN 1200 MG: 400 CAPSULE ORAL at 19:38

## 2023-09-17 ASSESSMENT — PAIN DESCRIPTION - LOCATION
LOCATION: BACK
LOCATION: HEAD;BACK
LOCATION: BACK
LOCATION: BACK

## 2023-09-17 ASSESSMENT — PAIN SCALES - GENERAL
PAINLEVEL_OUTOF10: 3
PAINLEVEL_OUTOF10: 8
PAINLEVEL_OUTOF10: 8
PAINLEVEL_OUTOF10: 3

## 2023-09-17 ASSESSMENT — PAIN - FUNCTIONAL ASSESSMENT: PAIN_FUNCTIONAL_ASSESSMENT: ACTIVITIES ARE NOT PREVENTED

## 2023-09-17 ASSESSMENT — PAIN DESCRIPTION - DESCRIPTORS
DESCRIPTORS: ACHING
DESCRIPTORS: ACHING

## 2023-09-17 ASSESSMENT — PAIN DESCRIPTION - ONSET: ONSET: ON-GOING

## 2023-09-17 ASSESSMENT — PAIN DESCRIPTION - ORIENTATION: ORIENTATION: MID

## 2023-09-17 ASSESSMENT — PAIN DESCRIPTION - PAIN TYPE: TYPE: ACUTE PAIN

## 2023-09-17 NOTE — ACP (ADVANCE CARE PLANNING)
Advance Care Planning   Healthcare Decision Maker:    Primary Decision Maker: Crystal Vazquez Child - [phone]    Secondary Decision Maker: Bijal Samuel - Brother/Sister - [phone]    Click here to complete Healthcare Decision Makers including selection of the Healthcare Decision Maker Relationship (ie \"Primary\"). Today we documented Decision Maker(s) consistent with Legal Next of Kin hierarchy.        If the relationship to the patient does NOT follow our state's Next of Kin hierarchy, the patient MUST complete an ACP Document to allow him/her to act on the patient's behalf. :      Electronically signed by KIARA De La Rosa on 9/17/2023 at 10:56 AM

## 2023-09-18 ENCOUNTER — CARE COORDINATION (OUTPATIENT)
Dept: CARE COORDINATION | Age: 68
End: 2023-09-18

## 2023-09-18 ENCOUNTER — PREP FOR PROCEDURE (OUTPATIENT)
Dept: GASTROENTEROLOGY | Age: 68
End: 2023-09-18

## 2023-09-18 ENCOUNTER — ANESTHESIA (OUTPATIENT)
Dept: ENDOSCOPY | Age: 68
DRG: 312 | End: 2023-09-18
Payer: MEDICARE

## 2023-09-18 ENCOUNTER — ANESTHESIA EVENT (OUTPATIENT)
Dept: ENDOSCOPY | Age: 68
DRG: 312 | End: 2023-09-18
Payer: MEDICARE

## 2023-09-18 ENCOUNTER — APPOINTMENT (OUTPATIENT)
Dept: MRI IMAGING | Age: 68
DRG: 312 | End: 2023-09-18
Payer: MEDICARE

## 2023-09-18 PROBLEM — K92.2 GI BLEEDING: Status: ACTIVE | Noted: 2023-09-15

## 2023-09-18 LAB
ANION GAP SERPL CALCULATED.3IONS-SCNC: 11 MEQ/L (ref 9–15)
BASOPHILS # BLD: 0.1 K/UL (ref 0–0.2)
BASOPHILS NFR BLD: 1.3 %
BUN SERPL-MCNC: 10 MG/DL (ref 8–23)
CALCIUM SERPL-MCNC: 8.7 MG/DL (ref 8.5–9.9)
CHLORIDE SERPL-SCNC: 100 MEQ/L (ref 95–107)
CO2 SERPL-SCNC: 24 MEQ/L (ref 20–31)
CREAT SERPL-MCNC: 0.79 MG/DL (ref 0.7–1.2)
EOSINOPHIL # BLD: 0.3 K/UL (ref 0–0.7)
EOSINOPHIL NFR BLD: 4.3 %
ERYTHROCYTE [DISTWIDTH] IN BLOOD BY AUTOMATED COUNT: 15.4 % (ref 11.5–14.5)
GLUCOSE SERPL-MCNC: 91 MG/DL (ref 70–99)
HCT VFR BLD AUTO: 21.5 % (ref 42–52)
HGB BLD-MCNC: 7.5 G/DL (ref 14–18)
LYMPHOCYTES # BLD: 1.3 K/UL (ref 1–4.8)
LYMPHOCYTES NFR BLD: 17.7 %
MCH RBC QN AUTO: 34.3 PG (ref 27–31.3)
MCHC RBC AUTO-ENTMCNC: 34.9 % (ref 33–37)
MCV RBC AUTO: 98.3 FL (ref 79–92.2)
MONOCYTES # BLD: 0.4 K/UL (ref 0.2–0.8)
MONOCYTES NFR BLD: 6.3 %
NEUTROPHILS # BLD: 5 K/UL (ref 1.4–6.5)
NEUTS SEG NFR BLD: 70.4 %
PLATELET # BLD AUTO: 403 K/UL (ref 130–400)
POTASSIUM SERPL-SCNC: 4 MEQ/L (ref 3.4–4.9)
RBC # BLD AUTO: 2.18 M/UL (ref 4.7–6.1)
SODIUM SERPL-SCNC: 135 MEQ/L (ref 135–144)
WBC # BLD AUTO: 7.2 K/UL (ref 4.8–10.8)

## 2023-09-18 PROCEDURE — 2580000003 HC RX 258: Performed by: SPECIALIST

## 2023-09-18 PROCEDURE — 88305 TISSUE EXAM BY PATHOLOGIST: CPT

## 2023-09-18 PROCEDURE — 97535 SELF CARE MNGMENT TRAINING: CPT

## 2023-09-18 PROCEDURE — 6370000000 HC RX 637 (ALT 250 FOR IP): Performed by: SPECIALIST

## 2023-09-18 PROCEDURE — 0DB58ZX EXCISION OF ESOPHAGUS, VIA NATURAL OR ARTIFICIAL OPENING ENDOSCOPIC, DIAGNOSTIC: ICD-10-PCS | Performed by: INTERNAL MEDICINE

## 2023-09-18 PROCEDURE — 85025 COMPLETE CBC W/AUTO DIFF WBC: CPT

## 2023-09-18 PROCEDURE — 3700000001 HC ADD 15 MINUTES (ANESTHESIA): Performed by: SPECIALIST

## 2023-09-18 PROCEDURE — 7100000010 HC PHASE II RECOVERY - FIRST 15 MIN: Performed by: SPECIALIST

## 2023-09-18 PROCEDURE — 2500000003 HC RX 250 WO HCPCS

## 2023-09-18 PROCEDURE — 2580000003 HC RX 258: Performed by: INTERNAL MEDICINE

## 2023-09-18 PROCEDURE — 0DB68ZX EXCISION OF STOMACH, VIA NATURAL OR ARTIFICIAL OPENING ENDOSCOPIC, DIAGNOSTIC: ICD-10-PCS | Performed by: INTERNAL MEDICINE

## 2023-09-18 PROCEDURE — 6370000000 HC RX 637 (ALT 250 FOR IP): Performed by: INTERNAL MEDICINE

## 2023-09-18 PROCEDURE — 97162 PT EVAL MOD COMPLEX 30 MIN: CPT

## 2023-09-18 PROCEDURE — 2709999900 HC NON-CHARGEABLE SUPPLY: Performed by: SPECIALIST

## 2023-09-18 PROCEDURE — 72148 MRI LUMBAR SPINE W/O DYE: CPT

## 2023-09-18 PROCEDURE — 80048 BASIC METABOLIC PNL TOTAL CA: CPT

## 2023-09-18 PROCEDURE — 3609012400 HC EGD TRANSORAL BIOPSY SINGLE/MULTIPLE: Performed by: SPECIALIST

## 2023-09-18 PROCEDURE — 7100000011 HC PHASE II RECOVERY - ADDTL 15 MIN: Performed by: SPECIALIST

## 2023-09-18 PROCEDURE — 36415 COLL VENOUS BLD VENIPUNCTURE: CPT

## 2023-09-18 PROCEDURE — 3700000000 HC ANESTHESIA ATTENDED CARE: Performed by: SPECIALIST

## 2023-09-18 PROCEDURE — 1210000000 HC MED SURG R&B

## 2023-09-18 PROCEDURE — 88342 IMHCHEM/IMCYTCHM 1ST ANTB: CPT

## 2023-09-18 PROCEDURE — 6360000002 HC RX W HCPCS

## 2023-09-18 RX ORDER — PANTOPRAZOLE SODIUM 40 MG/1
40 TABLET, DELAYED RELEASE ORAL
Status: DISCONTINUED | OUTPATIENT
Start: 2023-09-18 | End: 2023-09-19 | Stop reason: HOSPADM

## 2023-09-18 RX ORDER — EPHEDRINE SULFATE/0.9% NACL/PF 50 MG/5 ML
SYRINGE (ML) INTRAVENOUS
Status: DISPENSED
Start: 2023-09-18 | End: 2023-09-18

## 2023-09-18 RX ORDER — SODIUM CHLORIDE 9 MG/ML
INJECTION, SOLUTION INTRAVENOUS PRN
Status: CANCELLED | OUTPATIENT
Start: 2023-09-18

## 2023-09-18 RX ORDER — PROPOFOL 10 MG/ML
INJECTION, EMULSION INTRAVENOUS PRN
Status: DISCONTINUED | OUTPATIENT
Start: 2023-09-18 | End: 2023-09-18 | Stop reason: SDUPTHER

## 2023-09-18 RX ORDER — SODIUM CHLORIDE 9 MG/ML
INJECTION, SOLUTION INTRAVENOUS CONTINUOUS
Status: CANCELLED | OUTPATIENT
Start: 2023-09-18

## 2023-09-18 RX ORDER — EPHEDRINE SULFATE 50 MG/ML
INJECTION, SOLUTION INTRAVENOUS PRN
Status: DISCONTINUED | OUTPATIENT
Start: 2023-09-18 | End: 2023-09-18 | Stop reason: SDUPTHER

## 2023-09-18 RX ORDER — MAGNESIUM HYDROXIDE 1200 MG/15ML
LIQUID ORAL PRN
Status: DISCONTINUED | OUTPATIENT
Start: 2023-09-18 | End: 2023-09-18 | Stop reason: ALTCHOICE

## 2023-09-18 RX ORDER — LIDOCAINE HYDROCHLORIDE 20 MG/ML
INJECTION, SOLUTION INFILTRATION; PERINEURAL PRN
Status: DISCONTINUED | OUTPATIENT
Start: 2023-09-18 | End: 2023-09-18 | Stop reason: SDUPTHER

## 2023-09-18 RX ORDER — PANTOPRAZOLE SODIUM 40 MG/1
40 TABLET, DELAYED RELEASE ORAL
Qty: 30 TABLET | Refills: 3 | Status: SHIPPED | OUTPATIENT
Start: 2023-09-18

## 2023-09-18 RX ORDER — PANTOPRAZOLE SODIUM 40 MG/1
40 TABLET, DELAYED RELEASE ORAL
Qty: 30 TABLET | Refills: 3 | Status: SHIPPED | OUTPATIENT
Start: 2023-09-19 | End: 2023-09-18 | Stop reason: HOSPADM

## 2023-09-18 RX ORDER — SODIUM CHLORIDE 9 MG/ML
INJECTION, SOLUTION INTRAVENOUS CONTINUOUS
Status: DISCONTINUED | OUTPATIENT
Start: 2023-09-18 | End: 2023-09-18

## 2023-09-18 RX ORDER — SODIUM CHLORIDE 0.9 % (FLUSH) 0.9 %
5-40 SYRINGE (ML) INJECTION EVERY 12 HOURS SCHEDULED
Status: CANCELLED | OUTPATIENT
Start: 2023-09-18

## 2023-09-18 RX ORDER — SIMETHICONE 20 MG/.3ML
EMULSION ORAL
Status: DISPENSED
Start: 2023-09-18 | End: 2023-09-18

## 2023-09-18 RX ORDER — SIMETHICONE 20 MG/.3ML
EMULSION ORAL PRN
Status: DISCONTINUED | OUTPATIENT
Start: 2023-09-18 | End: 2023-09-18 | Stop reason: ALTCHOICE

## 2023-09-18 RX ADMIN — Medication 10 ML: at 20:58

## 2023-09-18 RX ADMIN — PROPOFOL 50 MG: 10 INJECTION, EMULSION INTRAVENOUS at 08:08

## 2023-09-18 RX ADMIN — GABAPENTIN 600 MG: 300 CAPSULE ORAL at 10:57

## 2023-09-18 RX ADMIN — PHENYLEPHRINE HYDROCHLORIDE 200 MCG: 10 INJECTION INTRAVENOUS at 08:47

## 2023-09-18 RX ADMIN — PANTOPRAZOLE SODIUM 40 MG: 40 TABLET, DELAYED RELEASE ORAL at 17:00

## 2023-09-18 RX ADMIN — ATORVASTATIN CALCIUM 40 MG: 40 TABLET, FILM COATED ORAL at 20:58

## 2023-09-18 RX ADMIN — PROPOFOL 50 MG: 10 INJECTION, EMULSION INTRAVENOUS at 08:16

## 2023-09-18 RX ADMIN — ACETAMINOPHEN 650 MG: 325 TABLET ORAL at 05:35

## 2023-09-18 RX ADMIN — LIDOCAINE HYDROCHLORIDE 3 ML: 20 INJECTION, SOLUTION INFILTRATION; PERINEURAL at 08:08

## 2023-09-18 RX ADMIN — PROPOFOL 50 MG: 10 INJECTION, EMULSION INTRAVENOUS at 08:11

## 2023-09-18 RX ADMIN — Medication 10 ML: at 10:59

## 2023-09-18 RX ADMIN — PANTOPRAZOLE SODIUM 40 MG: 40 TABLET, DELAYED RELEASE ORAL at 05:35

## 2023-09-18 RX ADMIN — SODIUM CHLORIDE: 9 INJECTION, SOLUTION INTRAVENOUS at 07:54

## 2023-09-18 RX ADMIN — EPHEDRINE SULFATE 20 MG: 50 INJECTION, SOLUTION INTRAVENOUS at 08:31

## 2023-09-18 RX ADMIN — ACETAMINOPHEN 650 MG: 325 TABLET ORAL at 18:20

## 2023-09-18 RX ADMIN — GABAPENTIN 1200 MG: 400 CAPSULE ORAL at 20:58

## 2023-09-18 RX ADMIN — FLUOXETINE 40 MG: 20 CAPSULE ORAL at 10:57

## 2023-09-18 RX ADMIN — PHENYLEPHRINE HYDROCHLORIDE 200 MCG: 10 INJECTION INTRAVENOUS at 08:40

## 2023-09-18 RX ADMIN — PROPOFOL 50 MG: 10 INJECTION, EMULSION INTRAVENOUS at 08:20

## 2023-09-18 RX ADMIN — EPHEDRINE SULFATE 30 MG: 50 INJECTION, SOLUTION INTRAVENOUS at 08:35

## 2023-09-18 ASSESSMENT — PAIN DESCRIPTION - ORIENTATION
ORIENTATION: LOWER
ORIENTATION: LOWER

## 2023-09-18 ASSESSMENT — PAIN DESCRIPTION - DESCRIPTORS
DESCRIPTORS: ACHING;SORE
DESCRIPTORS: ACHING

## 2023-09-18 ASSESSMENT — PAIN SCALES - GENERAL
PAINLEVEL_OUTOF10: 0
PAINLEVEL_OUTOF10: 0
PAINLEVEL_OUTOF10: 5
PAINLEVEL_OUTOF10: 0
PAINLEVEL_OUTOF10: 6
PAINLEVEL_OUTOF10: 0

## 2023-09-18 ASSESSMENT — PAIN DESCRIPTION - LOCATION
LOCATION: BACK
LOCATION: BACK;FOOT;LEG

## 2023-09-18 ASSESSMENT — COPD QUESTIONNAIRES: CAT_SEVERITY: MODERATE

## 2023-09-18 NOTE — ADDENDUM NOTE
Addendum  created 09/18/23 0849 by MANUELA Mayo CRNA    Intraprocedure Event edited, Intraprocedure Meds edited, Orders acknowledged in Narrator

## 2023-09-18 NOTE — CARE COORDINATION
CTN chart review and noting pt readmission 9/15/23 for S&C. Was initially outreached by Lalo Ospina LPN, on 8/63/25 and passed to this CTN.  CTN s/o.  //SP, RN CTN

## 2023-09-18 NOTE — DISCHARGE INSTRUCTIONS
Follow up with primary care physician in the next 7 days or sooner if needed. If you do not have a Primary care physician, please schedule an appointment with one. Please ask prior to discharge about a list of local providers. You have a referral in place for physical therapy. Feel free to call your [employer] site that you requested to schedule an appointment. Please return to ER or call 911 if you develop any significant signs or symptoms. I may not have addressed all of your medical illnesses or the abnormal blood work or imaging therefore please ask your PCP to obtain [facility] record to follow up on all of the abnormal labs, imaging and findings that I have and have not addressed during your hospitalization. Discharging you from the hospital does not mean that your medical care ends here and now. You may still need additional work up, investigation, monitoring, and treatment to be handled from this point on by outside providers including your PCP, Specialists and other healthcare providers. For medication questions, contact your retail pharmacy and your PCP. Your medical team at [facility] ([facility]) appreciates the opportunity to work with you to get well!     Brandy Moore, DO  12:39 PM

## 2023-09-18 NOTE — ANESTHESIA POSTPROCEDURE EVALUATION
Department of Anesthesiology  Postprocedure Note    Patient: Marie Gann  MRN: [id number]  YOB: 1955  Date of evaluation: 9/18/2023      Procedure Summary     Date: 09/18/23 Room / Location: [facility] OR 01 / [facility]    Anesthesia Start: [place] Anesthesia Stop: Eran Castillo    Procedure: EGD BIOPSY Diagnosis:       GI bleeding      (GI bleeding [K92.2])    Surgeons: Emy Perry MD Responsible Provider: MANUELA Kimble CRNA    Anesthesia Type: MAC ASA Status: 3          Anesthesia Type: No value filed.     Oanh Phase I:      Oanh Phase II:        Anesthesia Post Evaluation    Patient location during evaluation: bedside  Patient participation: complete - patient participated  Level of consciousness: awake and awake and alert  Airway patency: patent  Nausea & Vomiting: no nausea and no vomiting  Complications: no  Cardiovascular status: blood pressure returned to baseline and hemodynamically stable  Respiratory status: acceptable  Hydration status: euvolemic  Pain management: adequate

## 2023-09-19 VITALS
RESPIRATION RATE: 16 BRPM | BODY MASS INDEX: 17.78 KG/M2 | HEIGHT: 68 IN | DIASTOLIC BLOOD PRESSURE: 55 MMHG | OXYGEN SATURATION: 100 % | SYSTOLIC BLOOD PRESSURE: 123 MMHG | TEMPERATURE: 97.5 F | WEIGHT: 117.3 LBS | HEART RATE: 78 BPM

## 2023-09-19 LAB
ANION GAP SERPL CALCULATED.3IONS-SCNC: 7 MEQ/L (ref 9–15)
BASOPHILS # BLD: 0.1 K/UL (ref 0–0.2)
BASOPHILS NFR BLD: 0.6 %
BUN SERPL-MCNC: 14 MG/DL (ref 8–23)
CALCIUM SERPL-MCNC: 8.4 MG/DL (ref 8.5–9.9)
CHLORIDE SERPL-SCNC: 102 MEQ/L (ref 95–107)
CO2 SERPL-SCNC: 26 MEQ/L (ref 20–31)
CREAT SERPL-MCNC: 0.9 MG/DL (ref 0.7–1.2)
EOSINOPHIL # BLD: 0.3 K/UL (ref 0–0.7)
EOSINOPHIL NFR BLD: 3.2 %
ERYTHROCYTE [DISTWIDTH] IN BLOOD BY AUTOMATED COUNT: 14.6 % (ref 11.5–14.5)
GLUCOSE SERPL-MCNC: 91 MG/DL (ref 70–99)
HCT VFR BLD AUTO: 21.2 % (ref 42–52)
HGB BLD-MCNC: 7 G/DL (ref 14–18)
LYMPHOCYTES # BLD: 1.4 K/UL (ref 1–4.8)
LYMPHOCYTES NFR BLD: 16.3 %
MCH RBC QN AUTO: 33 PG (ref 27–31.3)
MCHC RBC AUTO-ENTMCNC: 33 % (ref 33–37)
MCV RBC AUTO: 100 FL (ref 79–92.2)
MONOCYTES # BLD: 0.5 K/UL (ref 0.2–0.8)
MONOCYTES NFR BLD: 6 %
NEUTROPHILS # BLD: 6.1 K/UL (ref 1.4–6.5)
NEUTS SEG NFR BLD: 73.3 %
PLATELET # BLD AUTO: 381 K/UL (ref 130–400)
POTASSIUM SERPL-SCNC: 3.8 MEQ/L (ref 3.4–4.9)
RBC # BLD AUTO: 2.12 M/UL (ref 4.7–6.1)
SODIUM SERPL-SCNC: 135 MEQ/L (ref 135–144)
WBC # BLD AUTO: 8.4 K/UL (ref 4.8–10.8)

## 2023-09-19 PROCEDURE — 6370000000 HC RX 637 (ALT 250 FOR IP): Performed by: INTERNAL MEDICINE

## 2023-09-19 PROCEDURE — 2580000003 HC RX 258: Performed by: INTERNAL MEDICINE

## 2023-09-19 PROCEDURE — 80048 BASIC METABOLIC PNL TOTAL CA: CPT

## 2023-09-19 PROCEDURE — 99232 SBSQ HOSP IP/OBS MODERATE 35: CPT | Performed by: PSYCHIATRY & NEUROLOGY

## 2023-09-19 PROCEDURE — 85025 COMPLETE CBC W/AUTO DIFF WBC: CPT

## 2023-09-19 PROCEDURE — 97116 GAIT TRAINING THERAPY: CPT

## 2023-09-19 PROCEDURE — APPSS30 APP SPLIT SHARED TIME 16-30 MINUTES: Performed by: NURSE PRACTITIONER

## 2023-09-19 PROCEDURE — 36415 COLL VENOUS BLD VENIPUNCTURE: CPT

## 2023-09-19 RX ADMIN — GABAPENTIN 600 MG: 300 CAPSULE ORAL at 09:06

## 2023-09-19 RX ADMIN — Medication 10 ML: at 09:07

## 2023-09-19 RX ADMIN — ACETAMINOPHEN 650 MG: 325 TABLET ORAL at 00:34

## 2023-09-19 RX ADMIN — PANTOPRAZOLE SODIUM 40 MG: 40 TABLET, DELAYED RELEASE ORAL at 05:35

## 2023-09-19 RX ADMIN — FLUOXETINE 40 MG: 20 CAPSULE ORAL at 09:06

## 2023-09-19 ASSESSMENT — PAIN SCALES - GENERAL: PAINLEVEL_OUTOF10: 3

## 2023-09-19 ASSESSMENT — ENCOUNTER SYMPTOMS
NAUSEA: 0
TROUBLE SWALLOWING: 0
VOMITING: 0
BACK PAIN: 1
SHORTNESS OF BREATH: 0
COUGH: 0
ABDOMINAL DISTENTION: 0
WHEEZING: 0
ABDOMINAL PAIN: 0
COLOR CHANGE: 0
EYE PAIN: 0
CHEST TIGHTNESS: 0

## 2023-09-19 ASSESSMENT — PAIN DESCRIPTION - ORIENTATION: ORIENTATION: MID;LOWER

## 2023-09-19 ASSESSMENT — PAIN DESCRIPTION - DESCRIPTORS: DESCRIPTORS: ACHING

## 2023-09-19 ASSESSMENT — PAIN - FUNCTIONAL ASSESSMENT: PAIN_FUNCTIONAL_ASSESSMENT: ACTIVITIES ARE NOT PREVENTED

## 2023-09-19 ASSESSMENT — PAIN DESCRIPTION - LOCATION: LOCATION: BACK

## 2023-09-19 NOTE — CONSULTS
Consults    Patient Name: Paula Morton Date: 9/15/2023  6:25 PM  MR #: [id number]  : 1955    Attending Physician: Wali Mejia DO  Reason for consult: Anemia rule out GI blood loss. History of Presenting Illness:      Adolfo Willingham is a 77 y.o. male on hospital day 0 with a history of syncope, patient was found to be anemic, he has a history of hiatal hernia and has been taking ibuprofen occasionally. He also has history of constipation. He reports no history of hematemesis melena or rectal bleeding. Patient reports having some abdominal discomfort after a meal , he thinks he may have lost some weight, no history of dysphagia, no prior history of ulcer disease.  History Obtained From:  patient      History:      Past Medical History:   Diagnosis Date    Allergic rhinitis 2018    Anxiety     Chronic back pain     COPD (chronic obstructive pulmonary disease) ([facility])     Depression     Disorder of stomach     valve not closing properly    Emphysema lung ([address])     Essential hypertension 2019    Gastroesophageal reflux disease 10/4/2019    Hydronephrosis of left kidney 2023    Hyperlipidemia     meds > 22 yrs    Low back pain 2018    ARNAUD (obstructive sleep apnea) 2018    Other emphysema ([address]) 10/4/2019    Other intervertebral disc degeneration, lumbar region 3/23/2018    Radiculopathy, lumbar region 2018    Restless leg syndrome     Seasonal affective disorder ([address]) 10/4/2019    Substance abuse ([address])     alcholic, quit 20 yrs      Past Surgical History:   Procedure Laterality Date    BLADDER SURGERY Left 2023    CYSTOSCOPY RETROGRADE PYELOGRAM AND LEFT DOUBLE J STENT INSERTION performed by Richi Ring MD at [address] [place] [address]  2016    DALIA Monzon MD    DENTAL SURGERY  10 yrs ago    ENDOSCOPY, COLON, DIAGNOSTIC      EYE SURGERY      Lasik OU    FINGER TRIGGER RELEASE Left 2022    LEFT HAND TRIGGER FINGER RING FINGER RELEASE OF A1 PULLEY
need to be done at this hospitalization as he is not having any significant neurologic symptoms or significant back or leg pain at this time. He may be discharged from hospital from my standpoint and continue conservative therapy such as physical therapy and may return to see me in office in 4 to 6 weeks for further discussions of surgical options. I will sign off, please call with questions. 1. Syncope, unspecified syncope type    2. Anemia, unspecified type    3. Hypokalemia    4. Hematuria, unspecified type    5. Syncope and collapse  - Vascular duplex carotid bilateral; Standing  - Vascular duplex carotid bilateral    6. GI bleeding  - Surgical Pathology; Standing  - Surgical Pathology    7.  Spinal stenosis of lumbar region with neurogenic claudication  - External Referral to Physical Therapy    Orders Placed This Encounter   Medications    sodium chloride 0.9 % bolus 1,000 mL    potassium chloride (KLOR-CON M) extended release tablet 40 mEq    ondansetron (ZOFRAN) injection 4 mg    morphine sulfate (PF) injection 4 mg    sodium chloride flush 0.9 % injection 5-40 mL    sodium chloride flush 0.9 % injection 5-40 mL    0.9 % sodium chloride infusion    enoxaparin (LOVENOX) injection 40 mg     Order Specific Question:   Indication of Use     Answer:   Prophylaxis-DVT/PE    OR Linked Order Group     ondansetron (ZOFRAN-ODT) disintegrating tablet 4 mg     ondansetron (ZOFRAN) injection 4 mg    polyethylene glycol (GLYCOLAX) packet 17 g    OR Linked Order Group     acetaminophen (TYLENOL) tablet 650 mg     acetaminophen (TYLENOL) suppository 650 mg    aspirin chewable tablet 81 mg    atorvastatin (LIPITOR) tablet 40 mg    DISCONTD: lidocaine 4 % external patch 2 patch    sodium chloride 0.9 % bolus 1,000 mL    FLUoxetine (PROZAC) capsule 40 mg    gabapentin (NEURONTIN) capsule 600 mg    gabapentin (NEURONTIN) capsule 1,200 mg    potassium chloride (KLOR-CON M) extended release tablet 40 mEq    DISCONTD:
based adjustment of the mA/kV was utilized to reduce the radiation dose to as low as reasonably achievable. COMPARISON: 01/15/2023 HISTORY: ORDERING SYSTEM PROVIDED HISTORY: ?syncope TECHNOLOGIST PROVIDED HISTORY: Reason for exam:->?syncope Has a \"code stroke\" or \"stroke alert\" been called? ->No Decision Support Exception - unselect if not a suspected or confirmed emergency medical condition->Emergency Medical Condition (MA) What reading provider will be dictating this exam?->CRC FINDINGS: BRAIN/VENTRICLES: Generalized atrophy identified the brain. Minimal low-attenuation areas seen in the periventricular and subcortical white matter to suggest chronic small vessel ischemic change. There is no acute intracranial hemorrhage, mass effect or midline shift. No abnormal extra-axial fluid collection. The gray-white differentiation is maintained without evidence of an acute infarct. There is no evidence of hydrocephalus. ORBITS: The visualized portion of the orbits demonstrate no acute abnormality. SINUSES: The visualized paranasal sinuses and mastoid air cells demonstrate no acute abnormality. SOFT TISSUES/SKULL:  No acute abnormality of the visualized skull or soft tissues. Stable atrophy and chronic changes seen within the brain with no acute intracranial abnormality. XR CHEST PORTABLE    Result Date: 9/15/2023  EXAMINATION: ONE XRAY VIEW OF THE CHEST 9/15/2023 7:17 pm COMPARISON: 09/01/2023 HISTORY: ORDERING SYSTEM PROVIDED HISTORY: lightheaded TECHNOLOGIST PROVIDED HISTORY: Reason for exam:->lightheaded What reading provider will be dictating this exam?->CRC FINDINGS: Single frontal view of the chest demonstrates a moderate amount of air in the colon. There is no evidence of free intraperitoneal air. The lungs are hyperexpanded with questionable small pneumothorax on the right. The cardiac silhouette appears unremarkable. Questionable small right-sided pneumothorax. Nonspecific bowel gas pattern.
face

## 2023-09-19 NOTE — PLAN OF CARE
Problem: Discharge Planning  Goal: Discharge to home or other facility with appropriate resources  Outcome: Adequate for Discharge  Flowsheets (Taken 9/19/2023 6777)  Discharge to home or other facility with appropriate resources: Identify barriers to discharge with patient and caregiver     Problem: Safety - Adult  Goal: Free from fall injury  Outcome: Adequate for Discharge     Problem: Pain  Goal: Verbalizes/displays adequate comfort level or baseline comfort level  Outcome: Adequate for Discharge     Problem: Skin/Tissue Integrity  Goal: Absence of new skin breakdown  Description: 1. Monitor for areas of redness and/or skin breakdown  2. Assess vascular access sites hourly  3. Every 4-6 hours minimum:  Change oxygen saturation probe site  4. Every 4-6 hours:  If on nasal continuous positive airway pressure, respiratory therapy assess nares and determine need for appliance change or resting period.   Outcome: Adequate for Discharge     Problem: Nutrition Deficit:  Goal: Optimize nutritional status  Outcome: Adequate for Discharge

## 2023-09-19 NOTE — DISCHARGE INSTR - COC
Continuity of Care Form    Patient Name: Tal Somers   :  1955  MRN:  [id number]    Admit date:  9/15/2023  Discharge date:  ***    Code Status Order: Full Code   Advance Directives:     Admitting Physician:  Usha Ahumada DO  PCP: MANUELA De Anda CNP    Discharging Nurse: [facility] Unit/Room#: X324/U960-87  Discharging Unit Phone Number: ***    Emergency Contact:   Extended Emergency Contact Information  Primary Emergency Contact: [facility] Phone: [phone]  Relation: Brother/Sister   needed? No  Secondary Emergency Contact: Sean Miguel  Home Phone: [phone]  Relation: Child   needed?  No    Past Surgical History:  Past Surgical History:   Procedure Laterality Date    BLADDER SURGERY Left 2023    CYSTOSCOPY RETROGRADE PYELOGRAM AND LEFT DOUBLE J STENT INSERTION performed by Odalys Narvaez MD at [address] [place] [address]  2016    A [address] [facility]    DENTAL SURGERY  10 yrs ago    ENDOSCOPY, COLON, DIAGNOSTIC      EYE SURGERY      Lasik OU    FINGER TRIGGER RELEASE Left 2022    LEFT HAND TRIGGER FINGER RING FINGER RELEASE OF A1 PULLEY performed by Jose Harris MD at [facility] ARTHROSCOPY Right     KNEE SURGERY Right 2016    Ray procedure    KNEE SURGERY      WI NASAL/SINUS ENDOSCOPY W/MAXILLARY ANTROSTOMY N/A 2018    SEPTOPLASTY MICRODEBRIDER ASSISTED TURBINOPLASTY AND OUT-FRACTURING BILATERAL NASAL ENDOSCOPY performed by Tyree Trammell MD at [address] [place] [address] 2023    EGD BIOPSY performed by Faisal Camacho MD at [facility]       Immunization History:   Immunization History   Administered Date(s) Administered    Influenza Vaccine, unspecified formulation 10/17/2016, 10/05/2018    Influenza Virus Vaccine 2017, 2018    Influenza, FLUAD, (age 72 y+), Adjuvanted, 0.5mL 10/02/2020, 2021, 2022    Influenza, High Dose (Fluzone 65 yrs and older)

## 2023-09-19 NOTE — CARE COORDINATION
Case Management Assessment  Initial Evaluation    Date/Time of Evaluation: 9/17/2023 10:56 AM  Assessment Completed by: KIARA Teran    If patient is discharged prior to next notation, then this note serves as note for discharge by case management. Patient Name: Montez Rainey                   YOB: 1955  Diagnosis: Syncope and collapse [R55]  Hypokalemia [E87.6]  Syncope, unspecified syncope type [R55]  Anemia, unspecified type [D64.9]  Hematuria, unspecified type [R31.9]                   Date / Time: 9/15/2023  6:25 PM    Patient Admission Status: Inpatient   Readmission Risk (Low < 19, Mod (19-27), High > 27): Readmission Risk Score: 24.4    Current PCP: MANUELA Jacinto CNP  PCP verified by CM? Yes    Chart Reviewed: Yes      History Provided by: Patient, Medical Record  Patient Orientation: Alert and Oriented, Person, Place, Situation, Self    Patient Cognition: Alert    Hospitalization in the last 30 days (Readmission):  No    If yes, Readmission Assessment in CM Navigator will be completed. Advance Directives:      Code Status: Full Code   Patient's Primary Decision Maker is: Legal Next of Kin    Primary Decision MakerSue Richie Aguirre - [phone]    Secondary Decision Maker: Garrett Hughes - Brother/Sister - [phone]    Discharge Planning:    Patient lives with: Alone Type of Home: House  Primary Care Giver: Self  Patient Support Systems include: Children, Family Members   Current Financial resources: Medicare  Current community resources: None  Current services prior to admission: None            Current DME:              Type of Home Care services:  None    ADLS  Prior functional level: Independent in ADLs/IADLs  Current functional level: Other (see comment) (NEED PT/OT)    PT AM-PAC:   /24  OT AM-PAC:   /24    Family can provide assistance at DC:  Yes  Would you like Case Management to discuss the discharge plan with any other family members/significant others,
MET WITH PT IS VERY ANXIOUS TO GET HOME TODAY DOES NEED TO SEE NEUROSURGERY TODAY (NEW CONSULT)  BEFORE HE CAN ANY POSSIBLE DISCHARGE PT IS AWARE PT WOULD LIKE \" A NURSE TO COME TO MY HOUSE\" FREEDOM OF CHOICE GIVEN Justus Mireles WAS CHOSEN, DR RODRIGUEZ UPDATED AND A REQUEST FOR [address] REFERRAL DONE CALLED Justus Mireles SPOKE TO RENETTA PT ALSO REQUESTING TRANSPORTATION AT TIME OF DISCHARGE KINA FONTANEZ UPDATED
Tammy Victoria LIST DUE TO PT TELLING MD HE NOW WANTS OUPT PT SERVICES
This LSW met with Patient at bedside this afternoon. Patient and I discussed discharge plans. Patient plans to return home, denies needs. LSW/CM to follow.   Electronically signed by DMITRY Bond, KIARA on 9/18/23 at 4:20 PM EDT
no fever and no chills.

## 2023-09-19 NOTE — DISCHARGE SUMMARY
Support Exception - unselect if not a suspected or confirmed emergency medical condition->Emergency Medical Condition (MA) What reading provider will be dictating this exam?->CRC FINDINGS: Mediastinum: Thyroid is homogeneous in appearance. Vascular calcifications seen within the thoracic aorta. Calcifications seen within the great vessels. Cardiac chambers are within normal limits. No pericardial effusion. Coronary artery calcifications identified. No bulky hilar or axillary lymphadenopathy. Lungs/pleura: Severe emphysematous and chronic changes in lung fields bilaterally. Some scarring at the lung apices. Mild bronchiectatic changes identified centrally within the lung fields. No definite pleural effusion or pneumothorax. Calcified granuloma identified within the right middle lobe. Upper Abdomen: Visualized upper abdomen is unremarkable. Small hiatal hernia. Soft Tissues/Bones: Multilevel degenerative changes identified diffusely throughout the thoracic spine. No evidence of acute chest wall abnormality. Severe emphysematous and chronic changes seen throughout the lung fields bilaterally. No acute intrathoracic abnormality. CT Head W/O Contrast    Result Date: 9/16/2023  EXAMINATION: CT OF THE HEAD WITHOUT CONTRAST  9/15/2023 8:16 pm TECHNIQUE: CT of the head was performed without the administration of intravenous contrast. Automated exposure control, iterative reconstruction, and/or weight based adjustment of the mA/kV was utilized to reduce the radiation dose to as low as reasonably achievable. COMPARISON: 01/15/2023 HISTORY: ORDERING SYSTEM PROVIDED HISTORY: ?syncope TECHNOLOGIST PROVIDED HISTORY: Reason for exam:->?syncope Has a \"code stroke\" or \"stroke alert\" been called? ->No Decision Support Exception - unselect if not a suspected or confirmed emergency medical condition->Emergency Medical Condition (MA) What reading provider will be dictating this exam?->CRC FINDINGS: BRAIN/VENTRICLES:
AVENUE - P [phone] - [facility]      Phone: [phone]   pantoprazole 40 MG tablet         Disposition:   If discharged to Home, Any [address] needs that were indicated and/or required as been addressed and set up by Social Work. Condition at discharge: good     Activity: activity as tolerated    Total time taken for discharging this patient: 40 minutes. Greater than 70% of time was spent focused exclusively on this patient. Time was taken to review chart, discuss plans with consultants, reconciling medications, discussing plan answering questions with patient.      Sarah Corrigan DO  9/18/2023, 1:01 PM  ----------------------------------------------------------------------------------------------------------------------    Trevor Cisse,

## 2023-09-20 ENCOUNTER — CARE COORDINATION (OUTPATIENT)
Dept: CARE COORDINATION | Age: 68
End: 2023-09-20

## 2023-09-20 NOTE — CARE COORDINATION
[facility] Care Transitions Initial Follow Up Call    Call within 2 business days of discharge: Yes    -First attempt to reach the patient for initial Care Transition call post hospital discharge. HIPAA compliant message left with CTN's contact information requesting return phone call. Patient: Jodee Wise Patient : 1955   MRN: [id number]  Reason for Admission: syncope and collapse  Discharge Date: 23 RARS: Readmission Risk Score: 25.1      Last Discharge [address] [facility],[address] [facility]       Date Complaint Diagnosis Description Type Department Provider    9/15/23 Loss of Consciousness Syncope, unspecified syncope type . .. ED to Hosp-Admission (Discharged) (ADMITTED) MLOZ MED TONYA Anya Martell MD; Kia Maradiaga. ..                 Follow Up  Future Appointments   Date Time Provider [address]   2023  1:30 PM Chante Luis MD [facility] Georgina Osorio   2023  2:00 PM MANUELA Nayak CNP [facility] EMERGENCY [facility] CENTER AT [place]   2023  1:15 PM Saeed Hernandez, [place] [facility]   10/11/2023  4:00 PM Estrella Chowdhury MD [address] [place] [address]   2023  1:45 PM Nasreen Coretz MD [facility] [address] [place] [address] Neurology -   3/4/2024 11:30 AM MANUELA Nayak CNP, [address] [place] [address]

## 2023-09-21 ENCOUNTER — CARE COORDINATION (OUTPATIENT)
Dept: CARE COORDINATION | Age: 68
End: 2023-09-21

## 2023-09-21 NOTE — CARE COORDINATION
[address] Tori Singleton [facility] 250 Care Transitions Initial Follow Up Call    Call within 2 business days of discharge: Yes    -Second attempt to reach the patient for initial Care Transition call post hospital discharge. HIPAA compliant message left with CTN's contact information requesting return phone call. -PCP TCM/HFU 23 as noted below.  -Per chart review the patient is not active on [facility], CTN will route unable to reach letter to  to mail to the patient's  listed address. Patient: Rhina Zuniga Patient : 1955   MRN: [id number]  Reason for Admission: syncope and collapse  Discharge Date: 23 RARS: Readmission Risk Score: 25.1      Last Discharge [address] [facility]       Date Complaint Diagnosis Description Type Department Provider    9/15/23 Loss of Consciousness Syncope, unspecified syncope type . .. ED to Hosp-Admission (Discharged) (ADMITTED) MLOZ MED TONYA Kaila Lawton MD; [place]. ..                 Follow Up  Future Appointments   Date Time Provider [facility] [address]   2023  1:30 PM Ry Jones MD [facility] Georgina Osorio   2023  2:00 PM MANUELA Borrego CNPTER[facility] EMERGENCY [facility] CENTER AT [place]   2023  1:15 PM Jaime Montejo, [place] 205 [facility]   10/11/2023  4:00 PM Lena Beckwith MD [address] [place] [address]   2023  1:45 PM Enrique Alba MD [facility] [address] [place] [address] Neurology -   3/4/2024 11:30 AM MANUELA Borrego CNP [facility] Richard See, [address] [place] [address]

## 2023-09-23 ENCOUNTER — HOSPITAL ENCOUNTER (EMERGENCY)
Age: 68
Discharge: HOME | End: 2023-09-24
Attending: EMERGENCY MEDICINE
Payer: MEDICARE

## 2023-09-23 DIAGNOSIS — N20.0 NEPHROLITHIASIS: ICD-10-CM

## 2023-09-23 DIAGNOSIS — G89.29 CHRONIC RIGHT-SIDED LOW BACK PAIN WITHOUT SCIATICA: Primary | ICD-10-CM

## 2023-09-23 DIAGNOSIS — M54.50 CHRONIC RIGHT-SIDED LOW BACK PAIN WITHOUT SCIATICA: Primary | ICD-10-CM

## 2023-09-23 PROCEDURE — 82077 ASSAY SPEC XCP UR&BREATH IA: CPT

## 2023-09-23 PROCEDURE — 96375 TX/PRO/DX INJ NEW DRUG ADDON: CPT

## 2023-09-23 PROCEDURE — 81001 URINALYSIS AUTO W/SCOPE: CPT

## 2023-09-23 PROCEDURE — 80307 DRUG TEST PRSMV CHEM ANLYZR: CPT

## 2023-09-23 PROCEDURE — 83690 ASSAY OF LIPASE: CPT

## 2023-09-23 PROCEDURE — 85025 COMPLETE CBC W/AUTO DIFF WBC: CPT

## 2023-09-23 PROCEDURE — 80053 COMPREHEN METABOLIC PANEL: CPT

## 2023-09-23 PROCEDURE — 86901 BLOOD TYPING SEROLOGIC RH(D): CPT

## 2023-09-23 PROCEDURE — 36415 COLL VENOUS BLD VENIPUNCTURE: CPT

## 2023-09-23 PROCEDURE — 86850 RBC ANTIBODY SCREEN: CPT

## 2023-09-23 PROCEDURE — 96365 THER/PROPH/DIAG IV INF INIT: CPT

## 2023-09-23 PROCEDURE — 99284 EMERGENCY DEPT VISIT MOD MDM: CPT

## 2023-09-23 PROCEDURE — 86900 BLOOD TYPING SEROLOGIC ABO: CPT

## 2023-09-23 PROCEDURE — 87086 URINE CULTURE/COLONY COUNT: CPT

## 2023-09-23 RX ORDER — 0.9 % SODIUM CHLORIDE 0.9 %
1000 INTRAVENOUS SOLUTION INTRAVENOUS ONCE
Status: COMPLETED | OUTPATIENT
Start: 2023-09-23 | End: 2023-09-24

## 2023-09-23 RX ORDER — MORPHINE SULFATE 4 MG/ML
6 INJECTION, SOLUTION INTRAMUSCULAR; INTRAVENOUS ONCE
Status: COMPLETED | OUTPATIENT
Start: 2023-09-23 | End: 2023-09-24

## 2023-09-23 ASSESSMENT — PAIN SCALES - GENERAL: PAINLEVEL_OUTOF10: 7

## 2023-09-23 ASSESSMENT — PATIENT HEALTH QUESTIONNAIRE - PHQ9
SUM OF ALL RESPONSES TO PHQ9 QUESTIONS 1 & 2: 0
SUM OF ALL RESPONSES TO PHQ QUESTIONS 1-9: 0
2. FEELING DOWN, DEPRESSED OR HOPELESS: 0
SUM OF ALL RESPONSES TO PHQ QUESTIONS 1-9: 0
SUM OF ALL RESPONSES TO PHQ QUESTIONS 1-9: 0
1. LITTLE INTEREST OR PLEASURE IN DOING THINGS: 0
SUM OF ALL RESPONSES TO PHQ QUESTIONS 1-9: 0

## 2023-09-23 ASSESSMENT — PAIN DESCRIPTION - ORIENTATION: ORIENTATION: RIGHT;LEFT

## 2023-09-23 ASSESSMENT — ENCOUNTER SYMPTOMS
ABDOMINAL PAIN: 0
PHOTOPHOBIA: 0
BACK PAIN: 1
VOMITING: 0

## 2023-09-23 ASSESSMENT — PAIN DESCRIPTION - LOCATION: LOCATION: FLANK

## 2023-09-23 ASSESSMENT — PAIN - FUNCTIONAL ASSESSMENT: PAIN_FUNCTIONAL_ASSESSMENT: 0-10

## 2023-09-24 ENCOUNTER — APPOINTMENT (OUTPATIENT)
Dept: GENERAL RADIOLOGY | Age: 68
End: 2023-09-24
Payer: MEDICARE

## 2023-09-24 ENCOUNTER — APPOINTMENT (OUTPATIENT)
Dept: CT IMAGING | Age: 68
End: 2023-09-24
Payer: MEDICARE

## 2023-09-24 VITALS
OXYGEN SATURATION: 95 % | RESPIRATION RATE: 16 BRPM | HEIGHT: 68 IN | SYSTOLIC BLOOD PRESSURE: 135 MMHG | DIASTOLIC BLOOD PRESSURE: 70 MMHG | BODY MASS INDEX: 19.7 KG/M2 | WEIGHT: 130 LBS | TEMPERATURE: 97.9 F | HEART RATE: 76 BPM

## 2023-09-24 LAB
ABO + RH BLD: NORMAL
ALBUMIN SERPL-MCNC: 3.5 G/DL (ref 3.5–4.6)
ALP SERPL-CCNC: 54 U/L (ref 35–104)
ALT SERPL-CCNC: 9 U/L (ref 0–41)
AMPHET UR QL SCN: ABNORMAL
ANION GAP SERPL CALCULATED.3IONS-SCNC: 7 MEQ/L (ref 9–15)
AST SERPL-CCNC: 12 U/L (ref 0–40)
BACTERIA URNS QL MICRO: NEGATIVE /HPF
BARBITURATES UR QL SCN: ABNORMAL
BASOPHILS # BLD: 0.1 K/UL (ref 0–0.2)
BASOPHILS NFR BLD: 0.6 %
BENZODIAZ UR QL SCN: ABNORMAL
BILIRUB SERPL-MCNC: <0.2 MG/DL (ref 0.2–0.7)
BILIRUB UR QL STRIP: NEGATIVE
BLD GP AB SCN SERPL QL: NORMAL
BUN SERPL-MCNC: 21 MG/DL (ref 8–23)
CALCIUM SERPL-MCNC: 8.1 MG/DL (ref 8.5–9.9)
CANNABINOIDS UR QL SCN: ABNORMAL
CHLORIDE SERPL-SCNC: 104 MEQ/L (ref 95–107)
CLARITY UR: ABNORMAL
CO2 SERPL-SCNC: 24 MEQ/L (ref 20–31)
COCAINE UR QL SCN: ABNORMAL
COLOR UR: ABNORMAL
CREAT SERPL-MCNC: 1.12 MG/DL (ref 0.7–1.2)
DRUG SCREEN COMMENT UR-IMP: ABNORMAL
EOSINOPHIL # BLD: 0.3 K/UL (ref 0–0.7)
EOSINOPHIL NFR BLD: 3.3 %
EPI CELLS #/AREA URNS AUTO: ABNORMAL /HPF (ref 0–5)
ERYTHROCYTE [DISTWIDTH] IN BLOOD BY AUTOMATED COUNT: 14.6 % (ref 11.5–14.5)
ETHANOL PERCENT: NORMAL G/DL
ETHANOLAMINE SERPL-MCNC: <10 MG/DL (ref 0–0.08)
FENTANYL SCREEN, URINE: ABNORMAL
GLOBULIN SER CALC-MCNC: 2.1 G/DL (ref 2.3–3.5)
GLUCOSE SERPL-MCNC: 91 MG/DL (ref 70–99)
GLUCOSE UR STRIP-MCNC: NEGATIVE MG/DL
HCT VFR BLD AUTO: 23.7 % (ref 42–52)
HGB BLD-MCNC: 7.4 G/DL (ref 14–18)
HGB UR QL STRIP: ABNORMAL
HYALINE CASTS #/AREA URNS AUTO: ABNORMAL /HPF (ref 0–5)
KETONES UR STRIP-MCNC: NEGATIVE MG/DL
LEUKOCYTE ESTERASE UR QL STRIP: ABNORMAL
LIPASE SERPL-CCNC: 26 U/L (ref 12–95)
LYMPHOCYTES # BLD: 1.5 K/UL (ref 1–4.8)
LYMPHOCYTES NFR BLD: 16.8 %
MCH RBC QN AUTO: 32.2 PG (ref 27–31.3)
MCHC RBC AUTO-ENTMCNC: 31.2 % (ref 33–37)
MCV RBC AUTO: 103 FL (ref 79–92.2)
METHADONE UR QL SCN: ABNORMAL
MONOCYTES # BLD: 0.6 K/UL (ref 0.2–0.8)
MONOCYTES NFR BLD: 6.4 %
NEUTROPHILS # BLD: 6.5 K/UL (ref 1.4–6.5)
NEUTS SEG NFR BLD: 72.7 %
NITRITE UR QL STRIP: NEGATIVE
OPIATES UR QL SCN: POSITIVE
OXYCODONE UR QL SCN: ABNORMAL
PCP UR QL SCN: ABNORMAL
PH UR STRIP: 6.5 [PH] (ref 5–9)
PLATELET # BLD AUTO: 402 K/UL (ref 130–400)
POTASSIUM SERPL-SCNC: 3.6 MEQ/L (ref 3.4–4.9)
PROPOXYPH UR QL SCN: ABNORMAL
PROT SERPL-MCNC: 5.6 G/DL (ref 6.3–8)
PROT UR STRIP-MCNC: 100 MG/DL
RBC # BLD AUTO: 2.3 M/UL (ref 4.7–6.1)
RBC #/AREA URNS AUTO: >100 /HPF (ref 0–5)
SODIUM SERPL-SCNC: 135 MEQ/L (ref 135–144)
SP GR UR STRIP: 1.02 (ref 1–1.03)
URINE REFLEX TO CULTURE: YES
UROBILINOGEN UR STRIP-ACNC: 0.2 E.U./DL
WBC # BLD AUTO: 8.9 K/UL (ref 4.8–10.8)
WBC #/AREA URNS AUTO: ABNORMAL /HPF (ref 0–5)

## 2023-09-24 PROCEDURE — 71045 X-RAY EXAM CHEST 1 VIEW: CPT

## 2023-09-24 PROCEDURE — 6360000002 HC RX W HCPCS: Performed by: EMERGENCY MEDICINE

## 2023-09-24 PROCEDURE — 74176 CT ABD & PELVIS W/O CONTRAST: CPT

## 2023-09-24 PROCEDURE — 6370000000 HC RX 637 (ALT 250 FOR IP): Performed by: EMERGENCY MEDICINE

## 2023-09-24 PROCEDURE — 2580000003 HC RX 258: Performed by: EMERGENCY MEDICINE

## 2023-09-24 RX ORDER — CEPHALEXIN 250 MG/1
500 CAPSULE ORAL 3 TIMES DAILY
Qty: 42 CAPSULE | Refills: 0 | Status: SHIPPED | OUTPATIENT
Start: 2023-09-24 | End: 2023-10-01

## 2023-09-24 RX ORDER — DIAZEPAM 5 MG/1
5 TABLET ORAL ONCE
Status: COMPLETED | OUTPATIENT
Start: 2023-09-24 | End: 2023-09-24

## 2023-09-24 RX ADMIN — SODIUM CHLORIDE 1000 ML: 9 INJECTION, SOLUTION INTRAVENOUS at 00:03

## 2023-09-24 RX ADMIN — CEFTRIAXONE SODIUM 1000 MG: 1 INJECTION, POWDER, FOR SOLUTION INTRAMUSCULAR; INTRAVENOUS at 04:36

## 2023-09-24 RX ADMIN — MORPHINE SULFATE 6 MG: 4 INJECTION, SOLUTION INTRAMUSCULAR; INTRAVENOUS at 00:03

## 2023-09-24 RX ADMIN — DIAZEPAM 5 MG: 5 TABLET ORAL at 04:25

## 2023-09-24 ASSESSMENT — PAIN SCALES - GENERAL: PAINLEVEL_OUTOF10: 10

## 2023-09-24 ASSESSMENT — PAIN DESCRIPTION - ORIENTATION: ORIENTATION: RIGHT;LEFT

## 2023-09-24 ASSESSMENT — PAIN DESCRIPTION - LOCATION: LOCATION: FLANK

## 2023-09-24 NOTE — ED PROVIDER NOTES
[facility] ED  EMERGENCY DEPARTMENT ENCOUNTER      Pt Name: Corey Francis  MRN: [id number]  [address] 1955  Date of evaluation: 9/23/2023  Provider: Deanne Sorto       Chief Complaint   Patient presents with    Flank Pain    Hematuria         HISTORY OF PRESENT ILLNESS   (Location/Symptom, Timing/Onset, Context/Setting, Quality, Duration, Modifying Factors, Severity)  Note limiting factors. Corey Francis is a 77 y.o. male who presents to the emergency department for evaluation of lower back pain. He says that earlier he had left flank pain,  it resolved. when he twists or bends he also has reporting current right lumbar paraspinal pain. It is reproducible, nonradiating. He says that he had hematuria prior to arrival.  Denies traumatic injury. No fever, neck or jaw pain, chest pain or difficulty breathing, abdominal pain, testicular pain or swelling, saddle anesthesia, bladder incontinence, new numbness/weakness. Of note the patient was admitted 12 days ago and had successful urologic intervention to relieve left ureteral obstruction (retrograde pyelogram, stone manipulation back into renal pelvis, stent insertion)  HPI  Chart review shows history of chronic back pain, hyperlipidemia, depression, anxiety, GERD  Patient was also admitted to the hospital and discharged 5 days ago, at that time noted history of COPD, alcoholism, chronic anemia, admitted for syncope, neurology and cardiology were consulted and they thought it was vasovagal/orthostatic. He had EGD showing nonbleeding duodenal ulcer and refused colonoscopy. He also was evaluated by neurosurgery and had lumbar MRI showing stable lumbar stenosis without acute cord compression and is set to follow-up on an outpatient basis  Nursing Notes were reviewed. REVIEW OF SYSTEMS    (2-9 systems for level 4, 10 or more for level 5)     Review of Systems   Constitutional:  Negative for fever.    Eyes:  Negative for

## 2023-09-24 NOTE — ED TRIAGE NOTES
[place]. Flank pain that started yesteday. Today blood in urine. Called EMS. Only one time hematuria. Chronic back pain and arthritis.  Hx of kidney stone 20 years ago

## 2023-09-25 ENCOUNTER — INITIAL CONSULT (OUTPATIENT)
Dept: NEUROSURGERY | Age: 68
End: 2023-09-25
Payer: MEDICARE

## 2023-09-25 VITALS
BODY MASS INDEX: 20.46 KG/M2 | DIASTOLIC BLOOD PRESSURE: 66 MMHG | HEIGHT: 68 IN | WEIGHT: 135 LBS | SYSTOLIC BLOOD PRESSURE: 110 MMHG | TEMPERATURE: 97.6 F

## 2023-09-25 DIAGNOSIS — F41.9 ANXIETY: ICD-10-CM

## 2023-09-25 DIAGNOSIS — M48.062 SPINAL STENOSIS OF LUMBAR REGION WITH NEUROGENIC CLAUDICATION: Primary | ICD-10-CM

## 2023-09-25 DIAGNOSIS — F32.A DEPRESSION, UNSPECIFIED DEPRESSION TYPE: ICD-10-CM

## 2023-09-25 DIAGNOSIS — M43.16 SPONDYLOLISTHESIS OF LUMBAR REGION: ICD-10-CM

## 2023-09-25 LAB — BACTERIA UR CULT: NORMAL

## 2023-09-25 PROCEDURE — 99214 OFFICE O/P EST MOD 30 MIN: CPT | Performed by: NEUROLOGICAL SURGERY

## 2023-09-25 PROCEDURE — 3074F SYST BP LT 130 MM HG: CPT | Performed by: NEUROLOGICAL SURGERY

## 2023-09-25 PROCEDURE — 3078F DIAST BP <80 MM HG: CPT | Performed by: NEUROLOGICAL SURGERY

## 2023-09-25 PROCEDURE — 1123F ACP DISCUSS/DSCN MKR DOCD: CPT | Performed by: NEUROLOGICAL SURGERY

## 2023-09-25 RX ORDER — FLUOXETINE HYDROCHLORIDE 40 MG/1
CAPSULE ORAL
Qty: 90 CAPSULE | Refills: 1 | Status: SHIPPED | OUTPATIENT
Start: 2023-09-25

## 2023-09-25 ASSESSMENT — ENCOUNTER SYMPTOMS
SHORTNESS OF BREATH: 0
TROUBLE SWALLOWING: 0
BACK PAIN: 1
ABDOMINAL DISTENTION: 0
EYE PAIN: 0
COUGH: 0
ABDOMINAL PAIN: 0

## 2023-09-25 NOTE — TELEPHONE ENCOUNTER
\    Last Office Visit (last PCP visit):   8/31/2023    Next Visit Date:  Future Appointments   Date Time Provider [address]   9/26/2023  2:00 PM MANUELA Ball CNP [facility] EMERGENCY MEDICAL CENTER AT [place]   9/27/2023  1:15 PM LYNETTE Corbett 205 [facility]   10/11/2023  4:00 PM Megan De Oliveira, Gloria Apple   11/14/2023  1:45 PM Amauri Bello MD [address] -   3/4/2024 11:30 AM MANUELA Ball CNP [facility] EMERGENCY MEDICAL CENTER AT [place]       **If hasn't been seen in over a year OR hasn't followed up according to last diabetes/ADHD visit, make appointment for patient before sending refill to provider.     Rx requested:  Requested Prescriptions     Pending Prescriptions Disp Refills    FLUoxetine (PROZAC) 40 MG capsule [Pharmacy Med Name: FLUOXETINE HCL 40 MG CAPSULE] 90 capsule 1     Sig: take 1 capsule by mouth once daily

## 2023-09-25 NOTE — PROGRESS NOTES
Patient Name: Corey Francis : 1955        Date: 2023      Type of Appt: Consult    Reason for appt: STUDIES  AT [facility], Lumbosacral spondylosis without myelopathy  M48.062 (ICD-10-CM) - Spinal stenosis of lumbar region with neurogenic claudication  M54.16 (ICD-10-CM) - Radiculopathy, lumbar region  M48.00 (ICD-10-CM) - Central stenosis of spinal canal,     Referred by: Danny Lares, CNP    Pt last seen by Dr. Sara Dodge on: 23    Studies done:   23- CT L/S WO Contrast @ [facility]   3/31/23- Dexa Bone Density @ [facility]   3/20/23- Xray L/S  @ [facility]   1/15/23- MRI L/S @ [facility]     Conservative Tx tried : Pain mgmt Injections    Smoking: Yes     REVIEW OF SYSTEMS:    Rash   Difficulty Urinating Nausea     Fever   Headaches  Bruising/Bleeding Easily     Hearing loss  Constipation  Sleep Disturbance    Shortness of breath Diarrhea  Neck Pain                Back Pain
11/1/2022    LEFT HAND TRIGGER FINGER RING FINGER RELEASE OF A1 PANFILO performed by Mitra Huff MD at [facility] ARTHROSCOPY Right     KNEE SURGERY Right 05/2016    Ray procedure    KNEE SURGERY      [place] NASAL/SINUS ENDOSCOPY W/MAXILLARY ANTROSTOMY N/A 2/22/2018    SEPTOPLASTY MICRODEBRIDER ASSISTED TURBINOPLASTY AND OUT-FRACTURING BILATERAL NASAL ENDOSCOPY performed by Laura Duncan MD at [address] [place] [address] 9/18/2023    EGD BIOPSY performed by Sd Ruiz MD at [facility] Medications:   Prior to Admission medications    Medication Sig Start Date End Date Taking? Authorizing Provider   cephALEXin (KEFLEX) 250 MG capsule Take 2 capsules by mouth 3 times daily for 7 days 9/24/23 10/1/23 Yes Paulina Medina MD   pantoprazole (PROTONIX) 40 MG tablet Take 1 tablet by mouth 2 times daily (before meals) 9/18/23  Yes Jacky Stuart DO   gabapentin (NEURONTIN) 600 MG tablet take 1 tablet by mouth every morning and take 1 tablet by mouth MID DAY and take 2 tablets by mouth AT NIGHT 9/11/23 10/8/23 Yes Merlinda Look,    rosuvastatin (CRESTOR) 40 MG tablet Take 1 tablet by mouth nightly at bedtime. 9/11/23  Yes Merlinda Look, DO   FLUoxetine (PROZAC) 40 MG capsule take 1 capsule by mouth once daily 4/4/23  Yes MANUELA Perry CNP   Handicap Placard MISC by Does not apply route Ex: 5 years 2/14/2028 2/14/23  Yes MANUELA Perry CNP           Allergies: Alcohol, Benadryl [diphenhydramine], Demerol hcl [meperidine], and Sulfa antibiotics    Social History:      TOBACCO:   reports that he has been smoking cigars and cigarettes. He started smoking about 53 years ago. He has a 104.00 pack-year smoking history. He has never used smokeless tobacco.  ETOH:   reports no history of alcohol use.   RECREATIONAL DRUG USE:   Social History     Substance and Sexual Activity   Drug Use No       Family History:           Problem Relation Age of Onset

## 2023-09-26 ENCOUNTER — OFFICE VISIT (OUTPATIENT)
Dept: FAMILY MEDICINE CLINIC | Age: 68
End: 2023-09-26

## 2023-09-26 VITALS
HEART RATE: 84 BPM | HEIGHT: 68 IN | BODY MASS INDEX: 18.79 KG/M2 | SYSTOLIC BLOOD PRESSURE: 100 MMHG | TEMPERATURE: 97.5 F | WEIGHT: 124 LBS | DIASTOLIC BLOOD PRESSURE: 50 MMHG | OXYGEN SATURATION: 94 %

## 2023-09-26 DIAGNOSIS — N20.1 LEFT URETERAL STONE: ICD-10-CM

## 2023-09-26 DIAGNOSIS — D64.9 ANEMIA, UNSPECIFIED TYPE: ICD-10-CM

## 2023-09-26 DIAGNOSIS — Z09 HOSPITAL DISCHARGE FOLLOW-UP: ICD-10-CM

## 2023-09-26 DIAGNOSIS — M48.062 SPINAL STENOSIS OF LUMBAR REGION WITH NEUROGENIC CLAUDICATION: ICD-10-CM

## 2023-09-26 DIAGNOSIS — D64.9 ANEMIA, UNSPECIFIED TYPE: Primary | ICD-10-CM

## 2023-09-26 LAB
BASOPHILS # BLD: 0.1 K/UL (ref 0–0.2)
BASOPHILS NFR BLD: 0.7 %
EOSINOPHIL # BLD: 0.3 K/UL (ref 0–0.7)
EOSINOPHIL NFR BLD: 3.5 %
ERYTHROCYTE [DISTWIDTH] IN BLOOD BY AUTOMATED COUNT: 15 % (ref 11.5–14.5)
HCT VFR BLD AUTO: 22.8 % (ref 42–52)
HGB BLD-MCNC: 7.1 G/DL (ref 14–18)
LYMPHOCYTES # BLD: 1.9 K/UL (ref 1–4.8)
LYMPHOCYTES NFR BLD: 23.3 %
MCH RBC QN AUTO: 32 PG (ref 27–31.3)
MCHC RBC AUTO-ENTMCNC: 31.1 % (ref 33–37)
MCV RBC AUTO: 102.7 FL (ref 79–92.2)
MONOCYTES # BLD: 0.5 K/UL (ref 0.2–0.8)
MONOCYTES NFR BLD: 6.5 %
NEUTROPHILS # BLD: 5.5 K/UL (ref 1.4–6.5)
NEUTS SEG NFR BLD: 65.8 %
PLATELET # BLD AUTO: 401 K/UL (ref 130–400)
RBC # BLD AUTO: 2.22 M/UL (ref 4.7–6.1)
WBC # BLD AUTO: 8.3 K/UL (ref 4.8–10.8)

## 2023-09-26 PROCEDURE — 99283 EMERGENCY DEPT VISIT LOW MDM: CPT

## 2023-09-26 RX ORDER — CIPROFLOXACIN 500 MG/1
500 TABLET, FILM COATED ORAL 2 TIMES DAILY
COMMUNITY

## 2023-09-26 RX ORDER — ONDANSETRON 4 MG/1
4 TABLET, FILM COATED ORAL EVERY 8 HOURS PRN
COMMUNITY

## 2023-09-26 RX ORDER — MELOXICAM 15 MG/1
15 TABLET ORAL DAILY PRN
COMMUNITY

## 2023-09-26 ASSESSMENT — PAIN - FUNCTIONAL ASSESSMENT: PAIN_FUNCTIONAL_ASSESSMENT: 0-10

## 2023-09-26 ASSESSMENT — ENCOUNTER SYMPTOMS
COUGH: 0
SHORTNESS OF BREATH: 0
BACK PAIN: 1

## 2023-09-26 ASSESSMENT — PAIN DESCRIPTION - PAIN TYPE: TYPE: CHRONIC PAIN

## 2023-09-26 ASSESSMENT — PAIN DESCRIPTION - DESCRIPTORS: DESCRIPTORS: ACHING

## 2023-09-26 ASSESSMENT — PAIN SCALES - GENERAL: PAINLEVEL_OUTOF10: 9

## 2023-09-26 ASSESSMENT — LIFESTYLE VARIABLES: HOW OFTEN DO YOU HAVE A DRINK CONTAINING ALCOHOL: NEVER

## 2023-09-26 ASSESSMENT — PAIN DESCRIPTION - LOCATION: LOCATION: BACK

## 2023-09-26 NOTE — PROGRESS NOTES
effort is normal.      Breath sounds: Normal breath sounds. Musculoskeletal:      Cervical back: Neck supple. Skin:     General: Skin is warm. Neurological:      General: No focal deficit present. Mental Status: He is alert and oriented to person, place, and time. Mental status is at baseline. Gait: Gait abnormal.   Psychiatric:         Mood and Affect: Mood normal.         Behavior: Behavior normal.         Thought Content: Thought content normal.         Judgment: Judgment normal.         An electronic signature was used to authenticate this note.   --MANUELA Rubalcava - CNP

## 2023-09-27 ENCOUNTER — HOSPITAL ENCOUNTER (EMERGENCY)
Age: 68
Discharge: HOME | End: 2023-09-27
Payer: MEDICARE

## 2023-09-27 VITALS
DIASTOLIC BLOOD PRESSURE: 69 MMHG | OXYGEN SATURATION: 99 % | HEIGHT: 68 IN | WEIGHT: 125 LBS | RESPIRATION RATE: 16 BRPM | SYSTOLIC BLOOD PRESSURE: 122 MMHG | HEART RATE: 79 BPM | TEMPERATURE: 97.7 F | BODY MASS INDEX: 18.94 KG/M2

## 2023-09-27 DIAGNOSIS — M54.50 ACUTE EXACERBATION OF CHRONIC LOW BACK PAIN: Primary | ICD-10-CM

## 2023-09-27 DIAGNOSIS — G89.29 ACUTE EXACERBATION OF CHRONIC LOW BACK PAIN: Primary | ICD-10-CM

## 2023-09-27 LAB
FOLATE: 3.9 NG/ML
IRON SATURATION: 5 % (ref 20–55)
IRON: 16 UG/DL (ref 59–158)
TOTAL IRON BINDING CAPACITY: 331 UG/DL (ref 250–450)
UNSATURATED IRON BINDING CAPACITY: 315 UG/DL (ref 112–347)
VITAMIN B-12: 249 PG/ML (ref 232–1245)

## 2023-09-27 PROCEDURE — 6370000000 HC RX 637 (ALT 250 FOR IP)

## 2023-09-27 RX ORDER — OXYCODONE HYDROCHLORIDE AND ACETAMINOPHEN 5; 325 MG/1; MG/1
1 TABLET ORAL ONCE
Status: COMPLETED | OUTPATIENT
Start: 2023-09-27 | End: 2023-09-27

## 2023-09-27 RX ADMIN — OXYCODONE HYDROCHLORIDE AND ACETAMINOPHEN 1 TABLET: 5; 325 TABLET ORAL at 00:58

## 2023-09-27 ASSESSMENT — ENCOUNTER SYMPTOMS
COUGH: 0
SHORTNESS OF BREATH: 0
NAUSEA: 0
VOMITING: 0
BACK PAIN: 1
ABDOMINAL PAIN: 0
DIARRHEA: 0
PHOTOPHOBIA: 0

## 2023-09-29 ENCOUNTER — HOSPITAL ENCOUNTER (EMERGENCY)
Age: 68
Discharge: HOME | End: 2023-09-30
Payer: MEDICARE

## 2023-09-29 VITALS
HEART RATE: 98 BPM | SYSTOLIC BLOOD PRESSURE: 118 MMHG | TEMPERATURE: 98.5 F | DIASTOLIC BLOOD PRESSURE: 65 MMHG | RESPIRATION RATE: 18 BRPM | OXYGEN SATURATION: 94 %

## 2023-09-29 DIAGNOSIS — M54.50 ACUTE EXACERBATION OF CHRONIC LOW BACK PAIN: Primary | ICD-10-CM

## 2023-09-29 DIAGNOSIS — G89.29 ACUTE EXACERBATION OF CHRONIC LOW BACK PAIN: Primary | ICD-10-CM

## 2023-09-29 PROCEDURE — 99283 EMERGENCY DEPT VISIT LOW MDM: CPT

## 2023-09-29 RX ORDER — CYCLOBENZAPRINE HCL 10 MG
10 TABLET ORAL ONCE
Status: COMPLETED | OUTPATIENT
Start: 2023-09-29 | End: 2023-09-30

## 2023-09-29 RX ORDER — TRAMADOL HYDROCHLORIDE 50 MG/1
50 TABLET ORAL ONCE
Status: COMPLETED | OUTPATIENT
Start: 2023-09-29 | End: 2023-09-30

## 2023-09-29 ASSESSMENT — PAIN SCALES - GENERAL: PAINLEVEL_OUTOF10: 8

## 2023-09-29 ASSESSMENT — PAIN DESCRIPTION - LOCATION: LOCATION: BACK

## 2023-09-29 ASSESSMENT — PAIN DESCRIPTION - ORIENTATION: ORIENTATION: LOWER

## 2023-09-29 ASSESSMENT — PAIN DESCRIPTION - ONSET: ONSET: ON-GOING

## 2023-09-29 ASSESSMENT — PAIN DESCRIPTION - FREQUENCY: FREQUENCY: CONTINUOUS

## 2023-09-29 ASSESSMENT — PAIN DESCRIPTION - DESCRIPTORS: DESCRIPTORS: ACHING;DISCOMFORT

## 2023-09-30 PROCEDURE — 6370000000 HC RX 637 (ALT 250 FOR IP): Performed by: PHYSICIAN ASSISTANT

## 2023-09-30 RX ADMIN — TRAMADOL HYDROCHLORIDE 50 MG: 50 TABLET ORAL at 00:09

## 2023-09-30 RX ADMIN — CYCLOBENZAPRINE 10 MG: 10 TABLET, FILM COATED ORAL at 00:09

## 2023-10-01 ENCOUNTER — HOSPITAL ENCOUNTER (EMERGENCY)
Age: 68
Discharge: HOME | End: 2023-10-01
Payer: MEDICARE

## 2023-10-01 VITALS
TEMPERATURE: 98.1 F | SYSTOLIC BLOOD PRESSURE: 100 MMHG | DIASTOLIC BLOOD PRESSURE: 69 MMHG | RESPIRATION RATE: 16 BRPM | HEART RATE: 95 BPM | OXYGEN SATURATION: 97 %

## 2023-10-01 DIAGNOSIS — G89.29 ACUTE EXACERBATION OF CHRONIC LOW BACK PAIN: Primary | ICD-10-CM

## 2023-10-01 DIAGNOSIS — M54.50 ACUTE EXACERBATION OF CHRONIC LOW BACK PAIN: Primary | ICD-10-CM

## 2023-10-01 PROCEDURE — 99283 EMERGENCY DEPT VISIT LOW MDM: CPT

## 2023-10-01 PROCEDURE — 6370000000 HC RX 637 (ALT 250 FOR IP): Performed by: PHYSICIAN ASSISTANT

## 2023-10-01 RX ORDER — TRAMADOL HYDROCHLORIDE 50 MG/1
50 TABLET ORAL ONCE
Status: COMPLETED | OUTPATIENT
Start: 2023-10-01 | End: 2023-10-01

## 2023-10-01 RX ORDER — CYCLOBENZAPRINE HCL 10 MG
10 TABLET ORAL ONCE
Status: COMPLETED | OUTPATIENT
Start: 2023-10-01 | End: 2023-10-01

## 2023-10-01 RX ADMIN — CYCLOBENZAPRINE 10 MG: 10 TABLET, FILM COATED ORAL at 23:27

## 2023-10-01 RX ADMIN — TRAMADOL HYDROCHLORIDE 50 MG: 50 TABLET ORAL at 23:27

## 2023-10-01 ASSESSMENT — PAIN SCALES - GENERAL: PAINLEVEL_OUTOF10: 9

## 2023-10-01 ASSESSMENT — PAIN DESCRIPTION - ORIENTATION: ORIENTATION: LOWER

## 2023-10-01 ASSESSMENT — PAIN DESCRIPTION - PAIN TYPE: TYPE: CHRONIC PAIN

## 2023-10-01 ASSESSMENT — PAIN DESCRIPTION - ONSET: ONSET: ON-GOING

## 2023-10-01 ASSESSMENT — PAIN DESCRIPTION - LOCATION: LOCATION: BACK

## 2023-10-01 ASSESSMENT — PAIN DESCRIPTION - FREQUENCY: FREQUENCY: CONTINUOUS

## 2023-10-01 ASSESSMENT — ENCOUNTER SYMPTOMS: BACK PAIN: 1

## 2023-10-02 ENCOUNTER — HOSPITAL ENCOUNTER (EMERGENCY)
Age: 68
Discharge: HOME | End: 2023-10-02
Payer: MEDICARE

## 2023-10-02 VITALS
DIASTOLIC BLOOD PRESSURE: 62 MMHG | HEART RATE: 96 BPM | OXYGEN SATURATION: 98 % | SYSTOLIC BLOOD PRESSURE: 106 MMHG | TEMPERATURE: 98.1 F | RESPIRATION RATE: 18 BRPM

## 2023-10-02 DIAGNOSIS — M48.062 SPINAL STENOSIS OF LUMBAR REGION WITH NEUROGENIC CLAUDICATION: ICD-10-CM

## 2023-10-02 DIAGNOSIS — G89.29 ACUTE EXACERBATION OF CHRONIC LOW BACK PAIN: Primary | ICD-10-CM

## 2023-10-02 DIAGNOSIS — M54.50 ACUTE EXACERBATION OF CHRONIC LOW BACK PAIN: Primary | ICD-10-CM

## 2023-10-02 PROBLEM — I70.0 ATHEROSCLEROSIS OF AORTA (HCC): Status: ACTIVE | Noted: 2023-05-10

## 2023-10-02 PROBLEM — I65.23 BILATERAL CAROTID ARTERY STENOSIS: Status: ACTIVE | Noted: 2023-05-10

## 2023-10-02 LAB
BACTERIA URNS QL MICRO: NEGATIVE /HPF
BILIRUB UR QL STRIP: NEGATIVE
CLARITY UR: CLEAR
COLOR UR: YELLOW
EPI CELLS #/AREA URNS AUTO: ABNORMAL /HPF (ref 0–5)
GLUCOSE UR STRIP-MCNC: NEGATIVE MG/DL
HGB UR QL STRIP: ABNORMAL
HYALINE CASTS #/AREA URNS AUTO: ABNORMAL /HPF (ref 0–5)
KETONES UR STRIP-MCNC: NEGATIVE MG/DL
LEUKOCYTE ESTERASE UR QL STRIP: ABNORMAL
NITRITE UR QL STRIP: NEGATIVE
PH UR STRIP: 8.5 [PH] (ref 5–9)
PROT UR STRIP-MCNC: 30 MG/DL
RBC #/AREA URNS AUTO: ABNORMAL /HPF (ref 0–5)
SP GR UR STRIP: 1.02 (ref 1–1.03)
UROBILINOGEN UR STRIP-ACNC: 0.2 E.U./DL
WBC #/AREA URNS AUTO: ABNORMAL /HPF (ref 0–5)

## 2023-10-02 PROCEDURE — 81001 URINALYSIS AUTO W/SCOPE: CPT

## 2023-10-02 PROCEDURE — 6360000002 HC RX W HCPCS: Performed by: PHYSICIAN ASSISTANT

## 2023-10-02 PROCEDURE — 99284 EMERGENCY DEPT VISIT MOD MDM: CPT

## 2023-10-02 PROCEDURE — 6360000002 HC RX W HCPCS: Performed by: EMERGENCY MEDICINE

## 2023-10-02 PROCEDURE — 6370000000 HC RX 637 (ALT 250 FOR IP): Performed by: PHYSICIAN ASSISTANT

## 2023-10-02 PROCEDURE — 96372 THER/PROPH/DIAG INJ SC/IM: CPT

## 2023-10-02 RX ORDER — LIDOCAINE 4 G/G
1 PATCH TOPICAL ONCE
Status: DISCONTINUED | OUTPATIENT
Start: 2023-10-02 | End: 2023-10-02 | Stop reason: HOSPADM

## 2023-10-02 RX ORDER — METHOCARBAMOL 500 MG/1
500 TABLET, FILM COATED ORAL 4 TIMES DAILY
Qty: 28 TABLET | Refills: 0 | Status: SHIPPED | OUTPATIENT
Start: 2023-10-02

## 2023-10-02 RX ORDER — KETOROLAC TROMETHAMINE 30 MG/ML
30 INJECTION, SOLUTION INTRAMUSCULAR; INTRAVENOUS ONCE
Status: COMPLETED | OUTPATIENT
Start: 2023-10-02 | End: 2023-10-02

## 2023-10-02 RX ORDER — METOCLOPRAMIDE HYDROCHLORIDE 5 MG/ML
10 INJECTION INTRAMUSCULAR; INTRAVENOUS ONCE
Status: COMPLETED | OUTPATIENT
Start: 2023-10-02 | End: 2023-10-02

## 2023-10-02 RX ADMIN — METOCLOPRAMIDE HYDROCHLORIDE 10 MG: 5 INJECTION INTRAMUSCULAR; INTRAVENOUS at 03:15

## 2023-10-02 RX ADMIN — KETOROLAC TROMETHAMINE 30 MG: 30 INJECTION, SOLUTION INTRAMUSCULAR at 02:52

## 2023-10-02 ASSESSMENT — PAIN DESCRIPTION - FREQUENCY: FREQUENCY: CONTINUOUS

## 2023-10-02 ASSESSMENT — ENCOUNTER SYMPTOMS
SHORTNESS OF BREATH: 0
DIARRHEA: 0
COUGH: 0
NAUSEA: 1
COLOR CHANGE: 0
VOMITING: 0
ABDOMINAL PAIN: 0
BACK PAIN: 1

## 2023-10-02 ASSESSMENT — PAIN DESCRIPTION - ORIENTATION
ORIENTATION: LOWER
ORIENTATION: LOWER

## 2023-10-02 ASSESSMENT — PAIN SCALES - GENERAL
PAINLEVEL_OUTOF10: 4
PAINLEVEL_OUTOF10: 4

## 2023-10-02 ASSESSMENT — PAIN DESCRIPTION - LOCATION
LOCATION: BACK
LOCATION: BACK

## 2023-10-02 ASSESSMENT — PAIN DESCRIPTION - ONSET: ONSET: ON-GOING

## 2023-10-02 ASSESSMENT — PAIN DESCRIPTION - DESCRIPTORS
DESCRIPTORS: ACHING
DESCRIPTORS: DISCOMFORT

## 2023-10-02 NOTE — ED PROVIDER NOTES
[facility] ED  eMERGENCYdEPARTMENT eNCOUnter        Pt Name: Nila Stevenson  MRN: [id number]  [address] [facility] 1955[facility] evaluation: 10/1/2023  Provider:Franco Roberts PA-C    CHIEF COMPLAINT       Chief Complaint   Patient presents with    Back Pain         HISTORY OF PRESENT ILLNESS  (Location/Symptom, Timing/Onset, Context/Setting, Quality, Duration, Modifying Factors, Severity.)   Nila Stevenson is a 77 y.o. male who presents to the emergency department with 70 acute exacerbation of chronic lower back pain. Patient has been seen in the emergency department 2 other times in the past 3 days for the same complaints. Patient denies any falls or blunt trauma to the area. Patient denies any saddle paresthesias or loss of bowel or bladder control. Patient does admit that this has been an ongoing and long-term issue that he has had in the medicine he was prescribed at home does not help with    HPI    Nursing Notes were reviewed and I agree. REVIEW OF SYSTEMS    (2-9 systems for level 4, 10 or more for level 5)     Review of Systems   Constitutional:  Negative for fever. Musculoskeletal:  Positive for back pain. Negative for gait problem and neck pain. Neurological:  Negative for weakness and numbness. All other systems reviewed and are negative. as noted above the remainder of the review of systems was reviewed and negative.        PAST MEDICAL HISTORY     Past Medical History:   Diagnosis Date    Allergic rhinitis 02/19/2018    Anxiety     Chronic back pain     Colon polyp     COPD (chronic obstructive pulmonary disease) (HCC)     Depression     Disorder of stomach     valve not closing properly    Emphysema lung ([address])     Essential hypertension 11/04/2019    Gastroesophageal reflux disease 10/04/2019    Hydronephrosis of left kidney 08/23/2023    Hyperlipidemia     meds > 22 yrs    Low back pain 05/01/2018    ARNAUD (obstructive sleep apnea) 02/19/2018    Other emphysema ([address]) 10/04/2019

## 2023-10-02 NOTE — ED NOTES
Discharge instructions reviewed with pt. Pt verbalized understanding with no questions or concerns. Resps even, non labored. Skin p/w/d.   Pt is aware to f/u with PCP     Analilia Cortez RN  10/01/23 2931

## 2023-10-02 NOTE — ED PROVIDER NOTES
HPI:        Kari Khan is a 77y.o. year old male with multiple chronic medical comorbidities, specifically multiple ED visits for back pain, flank pain, hematuria (followed by pain management and urology), who presents with the chief complaint of back pain, present for several years, worsening tonight. The patient was seen for this concern earlier this evening. The pain is located in lumbar region, midline over the spine, right lateral side, and left lateral side described as aching, throbbing, stabbing, sharp, and tender, and rated as severe. The patient states that the pain started spontaneously, without antecedent trauma or heavy lifting injury. Symptoms include buttock and leg pain . The patient denies saddle anesthesia, bowel/bladder incontinence, IVDU, prolonged steroid usage, progressive weakness of lower extremities, significant trauma or recent illness. He states that after he was discharged tonight, he went home and still had some discomfort, and thought it may have been his kidneys, as he reports hx of nephrolithiasis and then stated he noticed blood occasionally in his urine.          PAST MEDICAL HISTORY     Past Medical History:   Diagnosis Date    Allergic rhinitis 02/19/2018    Anxiety     Bilateral carotid artery stenosis 5/10/2023    Chronic back pain     Colon polyp     COPD (chronic obstructive pulmonary disease) (HCC)     Depression     Disorder of stomach     valve not closing properly    Emphysema lung ([address])     Essential hypertension 11/04/2019    Gastroesophageal reflux disease 10/04/2019    Hydronephrosis of left kidney 08/23/2023    Hyperlipidemia     meds > 22 yrs    Low back pain 05/01/2018    ARNAUD (obstructive sleep apnea) 02/19/2018    Other emphysema ([address]) 10/04/2019    Other intervertebral disc degeneration, lumbar region 03/23/2018    Radiculopathy, lumbar region 02/28/2018    Restless leg syndrome     Seasonal affective disorder ([address]) 10/04/2019    Substance abuse ([address])

## 2023-10-03 ENCOUNTER — OFFICE VISIT (OUTPATIENT)
Dept: PAIN MANAGEMENT | Age: 68
End: 2023-10-03
Payer: MEDICARE

## 2023-10-03 VITALS
DIASTOLIC BLOOD PRESSURE: 64 MMHG | WEIGHT: 125 LBS | SYSTOLIC BLOOD PRESSURE: 110 MMHG | HEIGHT: 68 IN | BODY MASS INDEX: 18.94 KG/M2 | TEMPERATURE: 97.6 F

## 2023-10-03 DIAGNOSIS — R20.2 PARESTHESIA OF BOTH FEET: ICD-10-CM

## 2023-10-03 DIAGNOSIS — M48.062 SPINAL STENOSIS OF LUMBAR REGION WITH NEUROGENIC CLAUDICATION: Primary | ICD-10-CM

## 2023-10-03 DIAGNOSIS — F10.11 HISTORY OF ALCOHOL ABUSE: ICD-10-CM

## 2023-10-03 PROCEDURE — 1123F ACP DISCUSS/DSCN MKR DOCD: CPT | Performed by: NURSE PRACTITIONER

## 2023-10-03 PROCEDURE — 99213 OFFICE O/P EST LOW 20 MIN: CPT | Performed by: NURSE PRACTITIONER

## 2023-10-03 PROCEDURE — 3078F DIAST BP <80 MM HG: CPT | Performed by: NURSE PRACTITIONER

## 2023-10-03 PROCEDURE — 3074F SYST BP LT 130 MM HG: CPT | Performed by: NURSE PRACTITIONER

## 2023-10-03 ASSESSMENT — ENCOUNTER SYMPTOMS
DIARRHEA: 0
CONSTIPATION: 0
BACK PAIN: 1
RESPIRATORY NEGATIVE: 1

## 2023-10-03 NOTE — PROGRESS NOTES
Patient: Cammie Parikh  YOB: 1955  Date: 10/3/23        Subjective:     Cammie Parikh is a 77 y.o. male who complains today of:    Chief Complaint   Patient presents with    Back Pain     Left lower         Allergies:  Alcohol, Benadryl [diphenhydramine], Demerol hcl [meperidine], and Sulfa antibiotics    Past Medical History:   Diagnosis Date    Allergic rhinitis 02/19/2018    Anxiety     Bilateral carotid artery stenosis 5/10/2023    Chronic back pain     Colon polyp     COPD (chronic obstructive pulmonary disease) ([address])     Depression     Disorder of stomach     valve not closing properly    Emphysema lung ([address])     Essential hypertension 11/04/2019    Gastroesophageal reflux disease 10/04/2019    Hydronephrosis of left kidney 08/23/2023    Hyperlipidemia     meds > 22 yrs    Low back pain 05/01/2018    ARNAUD (obstructive sleep apnea) 02/19/2018    Other emphysema ([address]) 10/04/2019    Other intervertebral disc degeneration, lumbar region 03/23/2018    Radiculopathy, lumbar region 02/28/2018    Restless leg syndrome     Seasonal affective disorder ([address]) 10/04/2019    Substance abuse ([address])     alcholic, quit 20 yrs      Past Surgical History:   Procedure Laterality Date    BLADDER SURGERY Left 9/11/2023    CYSTOSCOPY RETROGRADE PYELOGRAM AND LEFT DOUBLE J STENT INSERTION performed by Gurwinder Ortez MD at [address] [place] [address]  12/22/2016    A [address] [facility] MD    DENTAL SURGERY  10 yrs ago    ENDOSCOPY, COLON, DIAGNOSTIC      EYE SURGERY      Lasik OU    FINGER TRIGGER RELEASE Left 11/1/2022    LEFT HAND TRIGGER FINGER RING FINGER RELEASE OF A1 PANFILO performed by Brittany Page MD at [facility] ARTHROSCOPY Right     KNEE SURGERY Right 05/2016    Ray procedure    KNEE SURGERY      [place] NASAL/SINUS ENDOSCOPY W/MAXILLARY ANTROSTOMY N/A 2/22/2018    SEPTOPLASTY MICRODEBRIDER ASSISTED TURBINOPLASTY AND OUT-FRACTURING BILATERAL NASAL ENDOSCOPY performed by Bib Viveros MD at [address] [place] [address]

## 2023-10-04 ENCOUNTER — TELEPHONE (OUTPATIENT)
Dept: FAMILY MEDICINE CLINIC | Age: 68
End: 2023-10-04

## 2023-10-04 DIAGNOSIS — Z01.818 PRE-OPERATIVE CLEARANCE: ICD-10-CM

## 2023-10-04 DIAGNOSIS — D50.9 IRON DEFICIENCY ANEMIA, UNSPECIFIED IRON DEFICIENCY ANEMIA TYPE: Primary | ICD-10-CM

## 2023-10-05 ENCOUNTER — TELEPHONE (OUTPATIENT)
Dept: FAMILY MEDICINE CLINIC | Age: 68
End: 2023-10-05

## 2023-10-05 DIAGNOSIS — D50.9 IRON DEFICIENCY ANEMIA, UNSPECIFIED IRON DEFICIENCY ANEMIA TYPE: Primary | ICD-10-CM

## 2023-10-05 NOTE — TELEPHONE ENCOUNTER
Pt stopped in said that he knows you put a referral in for a colonoscopy, He said he is not doing that. Said that his blood in the stool is from gall stones. Refusing to do even see gastro. Wants to know what other way he can find out what is causing his labs to be off?

## 2023-10-05 NOTE — TELEPHONE ENCOUNTER
Bottom line is that he is going to end up back in the hospital unless we find the source of his anemia. Colonoscopy would be required to do this.

## 2023-10-06 ENCOUNTER — HOSPITAL ENCOUNTER (EMERGENCY)
Age: 68
Discharge: HOME | End: 2023-10-06
Payer: MEDICARE

## 2023-10-06 ENCOUNTER — HOSPITAL ENCOUNTER (EMERGENCY)
Age: 68
Discharge: HOME | End: 2023-10-06
Attending: GENERAL PRACTICE
Payer: MEDICARE

## 2023-10-06 VITALS
OXYGEN SATURATION: 98 % | RESPIRATION RATE: 16 BRPM | TEMPERATURE: 97.7 F | HEART RATE: 89 BPM | DIASTOLIC BLOOD PRESSURE: 65 MMHG | SYSTOLIC BLOOD PRESSURE: 93 MMHG

## 2023-10-06 VITALS
WEIGHT: 125 LBS | RESPIRATION RATE: 17 BRPM | HEIGHT: 68 IN | BODY MASS INDEX: 18.94 KG/M2 | DIASTOLIC BLOOD PRESSURE: 68 MMHG | OXYGEN SATURATION: 98 % | TEMPERATURE: 98.1 F | HEART RATE: 89 BPM | SYSTOLIC BLOOD PRESSURE: 103 MMHG

## 2023-10-06 DIAGNOSIS — M54.42 CHRONIC BILATERAL LOW BACK PAIN WITH BILATERAL SCIATICA: ICD-10-CM

## 2023-10-06 DIAGNOSIS — G89.29 CHRONIC PAIN OF LEFT KNEE: Primary | ICD-10-CM

## 2023-10-06 DIAGNOSIS — Z71.1 NO PROBLEM, FEARED COMPLAINT UNFOUNDED: Primary | ICD-10-CM

## 2023-10-06 DIAGNOSIS — G89.29 CHRONIC BILATERAL LOW BACK PAIN WITH BILATERAL SCIATICA: ICD-10-CM

## 2023-10-06 DIAGNOSIS — D64.9 ANEMIA, UNSPECIFIED TYPE: ICD-10-CM

## 2023-10-06 DIAGNOSIS — M25.562 CHRONIC PAIN OF LEFT KNEE: Primary | ICD-10-CM

## 2023-10-06 DIAGNOSIS — M54.41 CHRONIC BILATERAL LOW BACK PAIN WITH BILATERAL SCIATICA: ICD-10-CM

## 2023-10-06 LAB
ALBUMIN SERPL-MCNC: 3.6 G/DL (ref 3.5–4.6)
ALP SERPL-CCNC: 72 U/L (ref 35–104)
ALT SERPL-CCNC: <5 U/L (ref 0–41)
ANION GAP SERPL CALCULATED.3IONS-SCNC: 11 MEQ/L (ref 9–15)
AST SERPL-CCNC: 11 U/L (ref 0–40)
BASOPHILS # BLD: 0.1 K/UL (ref 0–0.2)
BASOPHILS NFR BLD: 0.5 %
BILIRUB SERPL-MCNC: <0.2 MG/DL (ref 0.2–0.7)
BUN SERPL-MCNC: 25 MG/DL (ref 8–23)
CALCIUM SERPL-MCNC: 8.6 MG/DL (ref 8.5–9.9)
CHLORIDE SERPL-SCNC: 106 MEQ/L (ref 95–107)
CO2 SERPL-SCNC: 24 MEQ/L (ref 20–31)
CONTROL: ABNORMAL
CREAT SERPL-MCNC: 1.03 MG/DL (ref 0.7–1.2)
EOSINOPHIL # BLD: 0.5 K/UL (ref 0–0.7)
EOSINOPHIL NFR BLD: 5.2 %
ERYTHROCYTE [DISTWIDTH] IN BLOOD BY AUTOMATED COUNT: 15.9 % (ref 11.5–14.5)
FECAL BLOOD IMMUNOCHEMICAL TEST: ABNORMAL
GLOBULIN SER CALC-MCNC: 2.7 G/DL (ref 2.3–3.5)
GLUCOSE SERPL-MCNC: 86 MG/DL (ref 70–99)
HCT VFR BLD AUTO: 22.6 % (ref 42–52)
HGB BLD-MCNC: 7.1 G/DL (ref 14–18)
LYMPHOCYTES # BLD: 1.2 K/UL (ref 1–4.8)
LYMPHOCYTES NFR BLD: 12.5 %
MCH RBC QN AUTO: 31 PG (ref 27–31.3)
MCHC RBC AUTO-ENTMCNC: 31.4 % (ref 33–37)
MCV RBC AUTO: 98.7 FL (ref 79–92.2)
MONOCYTES # BLD: 0.8 K/UL (ref 0.2–0.8)
MONOCYTES NFR BLD: 8.3 %
NEUTROPHILS # BLD: 7.3 K/UL (ref 1.4–6.5)
NEUTS SEG NFR BLD: 73.2 %
PLATELET # BLD AUTO: 462 K/UL (ref 130–400)
POTASSIUM SERPL-SCNC: 5.3 MEQ/L (ref 3.4–4.9)
PROT SERPL-MCNC: 6.3 G/DL (ref 6.3–8)
RBC # BLD AUTO: 2.29 M/UL (ref 4.7–6.1)
SODIUM SERPL-SCNC: 141 MEQ/L (ref 135–144)
WBC # BLD AUTO: 9.9 K/UL (ref 4.8–10.8)

## 2023-10-06 PROCEDURE — 80053 COMPREHEN METABOLIC PANEL: CPT

## 2023-10-06 PROCEDURE — 36415 COLL VENOUS BLD VENIPUNCTURE: CPT

## 2023-10-06 PROCEDURE — 85025 COMPLETE CBC W/AUTO DIFF WBC: CPT

## 2023-10-06 PROCEDURE — 99283 EMERGENCY DEPT VISIT LOW MDM: CPT

## 2023-10-06 PROCEDURE — 6370000000 HC RX 637 (ALT 250 FOR IP): Performed by: GENERAL PRACTICE

## 2023-10-06 RX ORDER — IBUPROFEN 800 MG/1
800 TABLET ORAL ONCE
Status: COMPLETED | OUTPATIENT
Start: 2023-10-06 | End: 2023-10-06

## 2023-10-06 RX ADMIN — IBUPROFEN 800 MG: 800 TABLET, FILM COATED ORAL at 05:02

## 2023-10-06 ASSESSMENT — ENCOUNTER SYMPTOMS
EYE DISCHARGE: 0
CONSTIPATION: 0
SHORTNESS OF BREATH: 0
COLOR CHANGE: 0
BLOOD IN STOOL: 1
RHINORRHEA: 0
SORE THROAT: 0
BACK PAIN: 1
ABDOMINAL PAIN: 0
ABDOMINAL DISTENTION: 0

## 2023-10-06 ASSESSMENT — PAIN SCALES - GENERAL
PAINLEVEL_OUTOF10: 9
PAINLEVEL_OUTOF10: 9
PAINLEVEL_OUTOF10: 10

## 2023-10-06 ASSESSMENT — PAIN DESCRIPTION - LOCATION
LOCATION: BACK
LOCATION: BACK;KNEE

## 2023-10-06 ASSESSMENT — PAIN - FUNCTIONAL ASSESSMENT
PAIN_FUNCTIONAL_ASSESSMENT: 0-10
PAIN_FUNCTIONAL_ASSESSMENT: 0-10

## 2023-10-06 ASSESSMENT — PAIN DESCRIPTION - ORIENTATION: ORIENTATION: LEFT;LOWER

## 2023-10-06 NOTE — ED PROVIDER NOTES
[facility] ED  Emergency Department Encounter  Emergency Medicine Attending     Pt Name:Michael Kaylee Lefort  MRN: [id number]  [address] [facility] [place] 1955  Date of evaluation: 10/6/23  PCP:  MANUELA Ac - EMERITA    CHIEF COMPLAINT       Chief Complaint   Patient presents with    Back Pain    Knee Pain       HISTORY OF PRESENT ILLNESS  (Location/Symptom, Timing/Onset, Context/Setting, Quality, Duration, Modifying Factors, Severity.)      Vu Gerard is a 77 y.o. male who presents with chronic knee and back pain. Patient is well-known to our department. He has had multiple visits over the last several months for the same. No trauma. PAST MEDICAL / SURGICAL / SOCIAL / FAMILY HISTORY      has a past medical history of Allergic rhinitis, Anxiety, Bilateral carotid artery stenosis, Chronic back pain, Colon polyp, COPD (chronic obstructive pulmonary disease) ([address]), Depression, Disorder of stomach, Emphysema lung ([address]), Essential hypertension, Gastroesophageal reflux disease, Hydronephrosis of left kidney, Hyperlipidemia, Low back pain, ARNAUD (obstructive sleep apnea), Other emphysema ([address]), Other intervertebral disc degeneration, lumbar region, Radiculopathy, lumbar region, Restless leg syndrome, Seasonal affective disorder ([address]), and Substance abuse ([address]). Denies further past medical hx     has a past surgical history that includes knee surgery (Right, 05/2016); Dental surgery (10 yrs ago); Colonoscopy (12/22/2016); eye surgery; Endoscopy, colon, diagnostic; Knee arthroscopy (Right); pr nasal/sinus endoscopy w/maxillary antrostomy (N/A, 2/22/2018); knee surgery; Finger trigger release (Left, 11/1/2022); Bladder surgery (Left, 9/11/2023); and Upper gastrointestinal endoscopy (N/A, 9/18/2023).   Denies further past surgical hx    Social History     Socioeconomic History    Marital status:      Spouse name: Not on file    Number of children: 2    Years of education: 12    Highest education level:

## 2023-10-06 NOTE — TELEPHONE ENCOUNTER
Pt was in on Wed and I had a long conversation with him regarding referral, surgery and recent labs. Per Daryl Mark it is extremely important that we continue to work on blood count before we can allow surgery. It is very dangerous at this time for a surgery to take place. Pt then came in today and spoke to Juli, [address] reintegrated the importance of further testing. Pt will be stopping today to drop off stool sample.

## 2023-10-06 NOTE — TELEPHONE ENCOUNTER
Pt calling said that he was at the entrance of the ER. Georanalia Days that he has to have someone help him so that he can get this back surgery. I told pt he would have to see Zeus Brought to try and get a sooner appt, he said he did the stool test so he doesn't have to do a colonoscopy.  He said he was driving to the [facility] side of the hospital then calling back here

## 2023-10-06 NOTE — TELEPHONE ENCOUNTER
FYI     Referral for colonoscopy was not done, he was given a FIT ( IFOB ) test to complete at home.  We believe that is what e is referring to but at this point he Is at the hospital.

## 2023-10-06 NOTE — ED TRIAGE NOTES
Pt arrives from home. Reports lower back pain and left knee pain. Reports \"its affecting my sciatica nerve\". Pt reports taking home medications without relief.

## 2023-10-06 NOTE — TELEPHONE ENCOUNTER
Pt calling for results, was told positive per Christoph. Pt is aware and going to ER due to pain and wanting to get all necessary tests done to hopefully get back surgery.

## 2023-10-06 NOTE — ED PROVIDER NOTES
[facility] ED  EMERGENCY DEPARTMENT ENCOUNTER      Pt Name: Vu Gerard  MRN: [id number]  [address] 1955  Date of evaluation: 10/6/2023  Provider: Joshua Red PA-C    CHIEF COMPLAINT       Chief Complaint   Patient presents with    Back Pain    Rectal Bleeding         HISTORY OF PRESENT ILLNESS   (Location/Symptom, Timing/Onset, Context/Setting, Quality, Duration, Modifying Factors, Severity)  Note limiting factors. Vu Gerard is a 77 y.o. male who presents to the emergency department patient presented to the emerged part with chronic ongoing low back pain, he was seen in the emergency department last evening for the same issue, he was referred back to his family provider who has been managing his care for his ongoing chronic low back pain, he denies any new or acute injuries, there is no change in his pain. He is able to stand and ambulate without any acute distress, there is no bowel or bladder dysfunction, there is no paresthesias. Patient states he is just getting the run around from the emergency department, and he wants to be treated for his chronic pain. Patient also complains of rectal bleeding which she states he noticed this morning. Patient well-known to the emergency department, with 7 visits this month for back pain. Patient states no rectal bleeding was noted on his visit last evening, and had a bowel movement earlier today that he states was dark. He states that he had gone to his primary doctor this morning, and provide a stool specimen, he states it was positive for blood, he was advised to come to the ER to be evaluated. He denies any chest pain shortness of breath or dizziness. Patient is not currently on any anticoagulation. HPI    Nursing Notes were reviewed. REVIEW OF SYSTEMS    (2-9 systems for level 4, 10 or more for level 5)     Review of Systems   Constitutional:  Negative for activity change and appetite change.    HENT:  Negative for chronic low back pain which she states has had for over 5 years, patient had recent admission to hospital including MRI of the lumbar spine, and follow-up with neurosurgery Dr. Vijay Carl (9/25/2023). After reviewing previous charting Dr. Vijay Carl had recommended surgery for this patient, but required medical clearance prior to proceeding. Patient had presented for concerns of rectal bleeding, he states he did have blood in his stool this morning, which she states was tested by his primary doctor, and advised to come to the ED, on examination here, stool is light brown in color, and did test guaiac negative, I do not see any signs of hemorrhoids, or feel any internal masses. He is anemic with a hemoglobin of 7.1, but this is at his baseline for him. And unchanged from previous studies. He was advised to contact his regular family provider for follow-up, and also advised to follow-up with Dr. Vijay Carl of neurosurgery for his ongoing chronic back pain. Amount and/or Complexity of Data Reviewed  Labs: ordered. REASSESSMENT          CRITICAL CARE TIME   Total Critical Care time was  minutes, excluding separately reportable procedures. There was a high probability of clinically significant/life threatening deterioration in the patient's condition which required my urgent intervention. CONSULTS:  None    PROCEDURES:  Unless otherwise noted below, none     Procedures        FINAL IMPRESSION      1. No problem, feared complaint unfounded    2. Chronic bilateral low back pain with bilateral sciatica    3.  Anemia, unspecified type          DISPOSITION/PLAN   DISPOSITION        PATIENT REFERRED TO:  Shay Fontenot MD  [address]  [address]  [phone]    In 3 days      [facility]  [address] [place] [address], [address]  [address] [place] [address]  [phone]    In 3 days        DISCHARGE MEDICATIONS:  New Prescriptions    No medications on file

## 2023-10-10 PROCEDURE — 96374 THER/PROPH/DIAG INJ IV PUSH: CPT

## 2023-10-10 PROCEDURE — 99284 EMERGENCY DEPT VISIT MOD MDM: CPT

## 2023-10-10 PROCEDURE — 96375 TX/PRO/DX INJ NEW DRUG ADDON: CPT

## 2023-10-10 ASSESSMENT — PAIN - FUNCTIONAL ASSESSMENT: PAIN_FUNCTIONAL_ASSESSMENT: 0-10

## 2023-10-10 ASSESSMENT — PAIN DESCRIPTION - LOCATION: LOCATION: BACK

## 2023-10-10 ASSESSMENT — PAIN SCALES - GENERAL: PAINLEVEL_OUTOF10: 7

## 2023-10-10 ASSESSMENT — PAIN DESCRIPTION - PAIN TYPE: TYPE: CHRONIC PAIN

## 2023-10-11 ENCOUNTER — HOSPITAL ENCOUNTER (EMERGENCY)
Age: 68
Discharge: LEFT BEFORE DISCHARGE | End: 2023-10-11
Payer: MEDICARE

## 2023-10-11 ENCOUNTER — APPOINTMENT (OUTPATIENT)
Dept: GENERAL RADIOLOGY | Age: 68
End: 2023-10-11
Payer: MEDICARE

## 2023-10-11 ENCOUNTER — APPOINTMENT (OUTPATIENT)
Dept: CT IMAGING | Age: 68
End: 2023-10-11
Payer: MEDICARE

## 2023-10-11 ENCOUNTER — CARE COORDINATION (OUTPATIENT)
Dept: CARE COORDINATION | Age: 68
End: 2023-10-11

## 2023-10-11 VITALS
RESPIRATION RATE: 20 BRPM | DIASTOLIC BLOOD PRESSURE: 81 MMHG | TEMPERATURE: 97.6 F | OXYGEN SATURATION: 98 % | BODY MASS INDEX: 19.7 KG/M2 | HEIGHT: 68 IN | WEIGHT: 130 LBS | HEART RATE: 81 BPM | SYSTOLIC BLOOD PRESSURE: 118 MMHG

## 2023-10-11 DIAGNOSIS — N30.00 ACUTE CYSTITIS WITHOUT HEMATURIA: ICD-10-CM

## 2023-10-11 DIAGNOSIS — N17.9 AKI (ACUTE KIDNEY INJURY) (HCC): Primary | ICD-10-CM

## 2023-10-11 LAB
ALBUMIN SERPL-MCNC: 3.9 G/DL (ref 3.5–4.6)
ALP SERPL-CCNC: 75 U/L (ref 35–104)
ALT SERPL-CCNC: 8 U/L (ref 0–41)
ANION GAP SERPL CALCULATED.3IONS-SCNC: 12 MEQ/L (ref 9–15)
AST SERPL-CCNC: 10 U/L (ref 0–40)
BACTERIA URNS QL MICRO: NEGATIVE /HPF
BASOPHILS # BLD: 0.1 K/UL (ref 0–0.2)
BASOPHILS NFR BLD: 0.6 %
BILIRUB SERPL-MCNC: <0.2 MG/DL (ref 0.2–0.7)
BILIRUB UR QL STRIP: NEGATIVE
BUN SERPL-MCNC: 30 MG/DL (ref 8–23)
CALCIUM SERPL-MCNC: 9.2 MG/DL (ref 8.5–9.9)
CHLORIDE SERPL-SCNC: 102 MEQ/L (ref 95–107)
CLARITY UR: ABNORMAL
CO2 SERPL-SCNC: 23 MEQ/L (ref 20–31)
COLOR UR: YELLOW
CREAT SERPL-MCNC: 1.55 MG/DL (ref 0.7–1.2)
CRYSTALS URNS MICRO: ABNORMAL /HPF
EOSINOPHIL # BLD: 0.3 K/UL (ref 0–0.7)
EOSINOPHIL NFR BLD: 3.3 %
EPI CELLS #/AREA URNS AUTO: ABNORMAL /HPF (ref 0–5)
ERYTHROCYTE [DISTWIDTH] IN BLOOD BY AUTOMATED COUNT: 16.4 % (ref 11.5–14.5)
GLOBULIN SER CALC-MCNC: 2.8 G/DL (ref 2.3–3.5)
GLUCOSE SERPL-MCNC: 94 MG/DL (ref 70–99)
GLUCOSE UR STRIP-MCNC: NEGATIVE MG/DL
HCT VFR BLD AUTO: 26.4 % (ref 42–52)
HGB BLD-MCNC: 8.1 G/DL (ref 14–18)
HGB UR QL STRIP: ABNORMAL
HYALINE CASTS #/AREA URNS AUTO: ABNORMAL /HPF (ref 0–5)
KETONES UR STRIP-MCNC: NEGATIVE MG/DL
LEUKOCYTE ESTERASE UR QL STRIP: ABNORMAL
LIPASE SERPL-CCNC: 25 U/L (ref 12–95)
LYMPHOCYTES # BLD: 2.3 K/UL (ref 1–4.8)
LYMPHOCYTES NFR BLD: 26.5 %
MCH RBC QN AUTO: 29.6 PG (ref 27–31.3)
MCHC RBC AUTO-ENTMCNC: 30.7 % (ref 33–37)
MCV RBC AUTO: 96.4 FL (ref 79–92.2)
MONOCYTES # BLD: 0.5 K/UL (ref 0.2–0.8)
MONOCYTES NFR BLD: 5.9 %
NEUTROPHILS # BLD: 5.4 K/UL (ref 1.4–6.5)
NEUTS SEG NFR BLD: 63.5 %
NITRITE UR QL STRIP: NEGATIVE
PH UR STRIP: 5 [PH] (ref 5–9)
PLATELET # BLD AUTO: 547 K/UL (ref 130–400)
POTASSIUM SERPL-SCNC: 3.9 MEQ/L (ref 3.4–4.9)
PROT SERPL-MCNC: 6.7 G/DL (ref 6.3–8)
PROT UR STRIP-MCNC: 100 MG/DL
RBC # BLD AUTO: 2.74 M/UL (ref 4.7–6.1)
RBC #/AREA URNS AUTO: ABNORMAL /HPF (ref 0–5)
SODIUM SERPL-SCNC: 137 MEQ/L (ref 135–144)
SP GR UR STRIP: 1.03 (ref 1–1.03)
URINE REFLEX TO CULTURE: YES
UROBILINOGEN UR STRIP-ACNC: 0.2 E.U./DL
WBC # BLD AUTO: 8.5 K/UL (ref 4.8–10.8)
WBC #/AREA URNS AUTO: >100 /HPF (ref 0–5)

## 2023-10-11 PROCEDURE — 6360000002 HC RX W HCPCS: Performed by: PERSONAL EMERGENCY RESPONSE ATTENDANT

## 2023-10-11 PROCEDURE — 85025 COMPLETE CBC W/AUTO DIFF WBC: CPT

## 2023-10-11 PROCEDURE — 80053 COMPREHEN METABOLIC PANEL: CPT

## 2023-10-11 PROCEDURE — 2580000003 HC RX 258: Performed by: PERSONAL EMERGENCY RESPONSE ATTENDANT

## 2023-10-11 PROCEDURE — 74018 RADEX ABDOMEN 1 VIEW: CPT

## 2023-10-11 PROCEDURE — 6370000000 HC RX 637 (ALT 250 FOR IP): Performed by: PERSONAL EMERGENCY RESPONSE ATTENDANT

## 2023-10-11 PROCEDURE — 87086 URINE CULTURE/COLONY COUNT: CPT

## 2023-10-11 PROCEDURE — 74176 CT ABD & PELVIS W/O CONTRAST: CPT

## 2023-10-11 PROCEDURE — 81001 URINALYSIS AUTO W/SCOPE: CPT

## 2023-10-11 PROCEDURE — 36415 COLL VENOUS BLD VENIPUNCTURE: CPT

## 2023-10-11 PROCEDURE — 83690 ASSAY OF LIPASE: CPT

## 2023-10-11 RX ORDER — 0.9 % SODIUM CHLORIDE 0.9 %
1000 INTRAVENOUS SOLUTION INTRAVENOUS ONCE
Status: COMPLETED | OUTPATIENT
Start: 2023-10-11 | End: 2023-10-11

## 2023-10-11 RX ORDER — METOCLOPRAMIDE HYDROCHLORIDE 5 MG/ML
10 INJECTION INTRAMUSCULAR; INTRAVENOUS ONCE
Status: COMPLETED | OUTPATIENT
Start: 2023-10-11 | End: 2023-10-11

## 2023-10-11 RX ORDER — ONDANSETRON 2 MG/ML
4 INJECTION INTRAMUSCULAR; INTRAVENOUS ONCE
Status: COMPLETED | OUTPATIENT
Start: 2023-10-11 | End: 2023-10-11

## 2023-10-11 RX ORDER — CEPHALEXIN 500 MG/1
1000 CAPSULE ORAL 2 TIMES DAILY
Qty: 28 CAPSULE | Refills: 0 | Status: SHIPPED | OUTPATIENT
Start: 2023-10-11 | End: 2023-10-18

## 2023-10-11 RX ORDER — DICYCLOMINE HYDROCHLORIDE 10 MG/1
10 CAPSULE ORAL ONCE
Status: COMPLETED | OUTPATIENT
Start: 2023-10-11 | End: 2023-10-11

## 2023-10-11 RX ADMIN — DICYCLOMINE HYDROCHLORIDE 10 MG: 10 CAPSULE ORAL at 01:09

## 2023-10-11 RX ADMIN — METOCLOPRAMIDE HYDROCHLORIDE 10 MG: 5 INJECTION INTRAMUSCULAR; INTRAVENOUS at 02:59

## 2023-10-11 RX ADMIN — SODIUM CHLORIDE 1000 ML: 9 INJECTION, SOLUTION INTRAVENOUS at 01:07

## 2023-10-11 RX ADMIN — ONDANSETRON 4 MG: 2 INJECTION INTRAMUSCULAR; INTRAVENOUS at 01:09

## 2023-10-11 ASSESSMENT — PAIN DESCRIPTION - LOCATION: LOCATION: BACK

## 2023-10-11 ASSESSMENT — ENCOUNTER SYMPTOMS
SHORTNESS OF BREATH: 0
SORE THROAT: 0
NAUSEA: 1
VOMITING: 1
BLOOD IN STOOL: 0
COLOR CHANGE: 0
COUGH: 0
DIARRHEA: 0
ABDOMINAL PAIN: 1
RHINORRHEA: 0

## 2023-10-11 ASSESSMENT — PAIN SCALES - GENERAL: PAINLEVEL_OUTOF10: 8

## 2023-10-11 NOTE — ED NOTES
This RN assessed pt   Resting comfortably in bed at this time  Denies n/v/d, chest pain, SOB      Sabiha Sandhu  10/11/23 2952

## 2023-10-11 NOTE — ED NOTES
Pt requesting to leave, pt signed AMA paperwork. Patient provided discharge prescriptions and information, denies any further questions. Pt ambulatory to the lobby, gait steady and even. NAD noted.       Marquez Morrell RN  10/11/23 4241

## 2023-10-11 NOTE — CARE COORDINATION
EDNAM called patient. DAVIDE is familiar with this patient he has had care coordination in the past he has declined care coordination services in the past.  DAVIDE notes multiple ER visits for pain most recent is today for abdominal pain noted DKA left AGAINST MEDICAL ADVICE. DAVIDE tried to discuss his most recent pain management appointment patient was confused and says he has not seen his pain management doctor. Patient was agitated blaming healthcare his doctors for not caring for him and that he is going to keep going to the ER until someone fixes him and gives him some medication patient disconnected the call.

## 2023-10-11 NOTE — ED TRIAGE NOTES
Patient walk into ED. Patient states pain to back and emesis that started this evening when laying down. Patient is alert and oriented at this time. Patient respirations are even and unlabored at this time. Patient skin p,w,d. Patient showing no signs of distress at this time.

## 2023-10-12 ENCOUNTER — HOSPITAL ENCOUNTER (EMERGENCY)
Age: 68
Discharge: HOME | End: 2023-10-13
Attending: EMERGENCY MEDICINE
Payer: MEDICARE

## 2023-10-12 ENCOUNTER — OFFICE VISIT (OUTPATIENT)
Dept: GASTROENTEROLOGY | Age: 68
End: 2023-10-12
Payer: MEDICARE

## 2023-10-12 ENCOUNTER — TELEPHONE (OUTPATIENT)
Dept: FAMILY MEDICINE CLINIC | Age: 68
End: 2023-10-12

## 2023-10-12 ENCOUNTER — PREP FOR PROCEDURE (OUTPATIENT)
Dept: GASTROENTEROLOGY | Age: 68
End: 2023-10-12

## 2023-10-12 VITALS — HEART RATE: 60 BPM | OXYGEN SATURATION: 99 % | WEIGHT: 119 LBS | BODY MASS INDEX: 18.04 KG/M2 | HEIGHT: 68 IN

## 2023-10-12 VITALS
RESPIRATION RATE: 18 BRPM | TEMPERATURE: 98.5 F | OXYGEN SATURATION: 100 % | HEART RATE: 97 BPM | DIASTOLIC BLOOD PRESSURE: 60 MMHG | SYSTOLIC BLOOD PRESSURE: 96 MMHG

## 2023-10-12 DIAGNOSIS — G89.29 ACUTE EXACERBATION OF CHRONIC LOW BACK PAIN: Primary | ICD-10-CM

## 2023-10-12 DIAGNOSIS — K31.1 GASTRIC OUTLET OBSTRUCTION: ICD-10-CM

## 2023-10-12 DIAGNOSIS — R11.2 NAUSEA AND VOMITING, UNSPECIFIED VOMITING TYPE: ICD-10-CM

## 2023-10-12 DIAGNOSIS — K26.9 DUODENAL ULCER: Primary | ICD-10-CM

## 2023-10-12 DIAGNOSIS — K26.9 DUODENAL ULCER: ICD-10-CM

## 2023-10-12 DIAGNOSIS — M54.50 ACUTE EXACERBATION OF CHRONIC LOW BACK PAIN: Primary | ICD-10-CM

## 2023-10-12 LAB — BACTERIA UR CULT: NORMAL

## 2023-10-12 PROCEDURE — 6370000000 HC RX 637 (ALT 250 FOR IP): Performed by: EMERGENCY MEDICINE

## 2023-10-12 PROCEDURE — 99283 EMERGENCY DEPT VISIT LOW MDM: CPT

## 2023-10-12 PROCEDURE — 1123F ACP DISCUSS/DSCN MKR DOCD: CPT | Performed by: SPECIALIST

## 2023-10-12 PROCEDURE — 99213 OFFICE O/P EST LOW 20 MIN: CPT | Performed by: SPECIALIST

## 2023-10-12 RX ORDER — HYDROCODONE BITARTRATE AND ACETAMINOPHEN 5; 325 MG/1; MG/1
1 TABLET ORAL ONCE
Status: DISCONTINUED | OUTPATIENT
Start: 2023-10-12 | End: 2023-10-12

## 2023-10-12 RX ORDER — IBUPROFEN 600 MG/1
600 TABLET ORAL ONCE
Status: COMPLETED | OUTPATIENT
Start: 2023-10-12 | End: 2023-10-12

## 2023-10-12 RX ADMIN — IBUPROFEN 600 MG: 600 TABLET, FILM COATED ORAL at 23:55

## 2023-10-12 ASSESSMENT — PAIN SCALES - GENERAL
PAINLEVEL_OUTOF10: 7
PAINLEVEL_OUTOF10: 7

## 2023-10-12 ASSESSMENT — ENCOUNTER SYMPTOMS
ANAL BLEEDING: 0
BLOOD IN STOOL: 0
CONSTIPATION: 0
RECTAL PAIN: 0
GASTROINTESTINAL NEGATIVE: 1
EYES NEGATIVE: 1
PHOTOPHOBIA: 0
ABDOMINAL PAIN: 0
NAUSEA: 0
VOMITING: 0
RESPIRATORY NEGATIVE: 1
SORE THROAT: 0
BACK PAIN: 1
COUGH: 0
EYE DISCHARGE: 0
SHORTNESS OF BREATH: 0
CHEST TIGHTNESS: 0
DIARRHEA: 0
VOMITING: 0
ABDOMINAL DISTENTION: 0
WHEEZING: 0
ABDOMINAL DISTENTION: 0
ABDOMINAL PAIN: 0

## 2023-10-12 ASSESSMENT — PAIN DESCRIPTION - FREQUENCY: FREQUENCY: CONTINUOUS

## 2023-10-12 ASSESSMENT — PAIN DESCRIPTION - DESCRIPTORS: DESCRIPTORS: DISCOMFORT

## 2023-10-12 ASSESSMENT — PAIN DESCRIPTION - LOCATION: LOCATION: BACK

## 2023-10-12 ASSESSMENT — PAIN DESCRIPTION - ONSET: ONSET: ON-GOING

## 2023-10-12 ASSESSMENT — PAIN DESCRIPTION - ORIENTATION: ORIENTATION: LOWER

## 2023-10-12 NOTE — PROGRESS NOTES
General: Skin is warm and dry. Neurological:      Mental Status: He is alert. Laboratory, Pathology, Radiology reviewed in detail with relevantimportant investigations summarized below:    Recent Labs     10/11/23  0100 10/06/23  1513 09/26/23  1439   WBC 8.5 9.9 8.3   HGB 8.1* 7.1* 7.1*   HCT 26.4* 22.6* 22.8*   MCV 96.4* 98.7* 102.7*   * 462* 401*     Lab Results   Component Value Date    ALT 8 10/11/2023    AST 10 10/11/2023    ALKPHOS 75 10/11/2023    BILITOT <0.2 10/11/2023     CT ABDOMEN PELVIS WO CONTRAST Additional Contrast? None    Result Date: 10/11/2023  EXAMINATION: CT OF THE ABDOMEN AND PELVIS WITHOUT CONTRAST 10/11/2023 2:34 am TECHNIQUE: CT of the abdomen and pelvis was performed without the administration of intravenous contrast. Multiplanar reformatted images are provided for review. Automated exposure control, iterative reconstruction, and/or weight based adjustment of the mA/kV was utilized to reduce the radiation dose to as low as reasonably achievable. COMPARISON: September 24, 2023 HISTORY: ORDERING SYSTEM PROVIDED HISTORY: jeremy TECHNOLOGIST PROVIDED HISTORY: Reason for exam:->jeremy Additional Contrast?->None Decision Support Exception - unselect if not a suspected or confirmed emergency medical condition->Emergency Medical Condition (MA) What reading provider will be dictating this exam?->CRC FINDINGS: Lower Chest: Emphysema. No infiltrate or effusion. Organs: Left nephroureteral stent with good positioning. Nonobstructing 8 mm left lower pole nephrolithiasis and 3 mm right lower pole nephrolithiasis. Otherwise, the Liver, gallbladder, biliary tree, bilateral adrenal glands, bilateral kidneys, spleen and pancreas are normal. GI/Bowel: No ileus or obstruction. No inflammatory bowel process. Appendix not visible. No pericecal inflammatory change to suggest occult appendicitis. Pelvis: No pelvic adenopathy or ascites.  Peritoneum/Retroperitoneum: Sclerotic aorta without

## 2023-10-13 ENCOUNTER — HOSPITAL ENCOUNTER (EMERGENCY)
Facility: HOSPITAL | Age: 68
Discharge: HOME | End: 2023-10-13
Payer: MEDICARE

## 2023-10-13 ENCOUNTER — TELEPHONE (OUTPATIENT)
Dept: FAMILY MEDICINE CLINIC | Age: 68
End: 2023-10-13

## 2023-10-13 ENCOUNTER — TELEPHONE (OUTPATIENT)
Dept: PAIN MANAGEMENT | Age: 68
End: 2023-10-13

## 2023-10-13 VITALS
TEMPERATURE: 97.5 F | HEIGHT: 68 IN | WEIGHT: 115 LBS | OXYGEN SATURATION: 97 % | SYSTOLIC BLOOD PRESSURE: 151 MMHG | HEART RATE: 94 BPM | DIASTOLIC BLOOD PRESSURE: 68 MMHG | RESPIRATION RATE: 20 BRPM | BODY MASS INDEX: 17.43 KG/M2

## 2023-10-13 DIAGNOSIS — G89.29 ACUTE EXACERBATION OF CHRONIC LOW BACK PAIN: Primary | ICD-10-CM

## 2023-10-13 DIAGNOSIS — M54.50 ACUTE EXACERBATION OF CHRONIC LOW BACK PAIN: Primary | ICD-10-CM

## 2023-10-13 PROCEDURE — 2500000004 HC RX 250 GENERAL PHARMACY W/ HCPCS (ALT 636 FOR OP/ED): Performed by: PHYSICIAN ASSISTANT

## 2023-10-13 PROCEDURE — 99283 EMERGENCY DEPT VISIT LOW MDM: CPT

## 2023-10-13 RX ORDER — METHYLPREDNISOLONE 4 MG/1
TABLET ORAL
Qty: 1 KIT | Refills: 0 | Status: SHIPPED | OUTPATIENT
Start: 2023-10-13 | End: 2023-10-19

## 2023-10-13 RX ORDER — PREDNISONE 50 MG/1
50 TABLET ORAL DAILY
Qty: 5 TABLET | Refills: 0 | Status: SHIPPED | OUTPATIENT
Start: 2023-10-13 | End: 2023-10-18

## 2023-10-13 RX ORDER — DEXAMETHASONE 4 MG/1
8 TABLET ORAL ONCE
Status: COMPLETED | OUTPATIENT
Start: 2023-10-13 | End: 2023-10-13

## 2023-10-13 RX ORDER — METHOCARBAMOL 500 MG/1
500 TABLET, FILM COATED ORAL 2 TIMES DAILY
Qty: 20 TABLET | Refills: 0 | Status: SHIPPED | OUTPATIENT
Start: 2023-10-13 | End: 2023-10-23

## 2023-10-13 RX ADMIN — DEXAMETHASONE 8 MG: 4 TABLET ORAL at 03:57

## 2023-10-13 ASSESSMENT — COLUMBIA-SUICIDE SEVERITY RATING SCALE - C-SSRS
1. IN THE PAST MONTH, HAVE YOU WISHED YOU WERE DEAD OR WISHED YOU COULD GO TO SLEEP AND NOT WAKE UP?: NO
6. HAVE YOU EVER DONE ANYTHING, STARTED TO DO ANYTHING, OR PREPARED TO DO ANYTHING TO END YOUR LIFE?: NO
2. HAVE YOU ACTUALLY HAD ANY THOUGHTS OF KILLING YOURSELF?: NO

## 2023-10-13 ASSESSMENT — LIFESTYLE VARIABLES
EVER FELT BAD OR GUILTY ABOUT YOUR DRINKING: NO
HAVE YOU EVER FELT YOU SHOULD CUT DOWN ON YOUR DRINKING: NO
EVER HAD A DRINK FIRST THING IN THE MORNING TO STEADY YOUR NERVES TO GET RID OF A HANGOVER: NO
HAVE PEOPLE ANNOYED YOU BY CRITICIZING YOUR DRINKING: NO
REASON UNABLE TO ASSESS: NO

## 2023-10-13 ASSESSMENT — PAIN DESCRIPTION - PROGRESSION: CLINICAL_PROGRESSION: NOT CHANGED

## 2023-10-13 ASSESSMENT — PAIN DESCRIPTION - PAIN TYPE: TYPE: CHRONIC PAIN

## 2023-10-13 ASSESSMENT — PAIN DESCRIPTION - LOCATION: LOCATION: BACK

## 2023-10-13 ASSESSMENT — PAIN SCALES - GENERAL: PAINLEVEL_OUTOF10: 8

## 2023-10-13 ASSESSMENT — PAIN - FUNCTIONAL ASSESSMENT: PAIN_FUNCTIONAL_ASSESSMENT: 0-10

## 2023-10-13 ASSESSMENT — PAIN DESCRIPTION - ORIENTATION: ORIENTATION: LOWER;MID

## 2023-10-13 NOTE — TELEPHONE ENCOUNTER
Patient stopped in the office today. Wanting Surgery today for his back pain. I explained to him that I wouldn't be able to get him surgery today. He stated that he has been awake for the last 2 days in pain and no one can or will help him. I asked him if he needed me to call an ambulance for him to take him to the ED. He refused stated he had already been there and they couldn't help. Called and was able to get him an appt with Pain Management for 10/17 @3:15pm.    Patient asked if we could send something in for him until then. Advised we could not send controlled medications in for him. Contacted Pain again sending a message to Dr. Darrell Kurtz to see if something can be sent in for him until them.

## 2023-10-13 NOTE — TELEPHONE ENCOUNTER
PATIENT IS WONDERING IF  WILL PRESCRIBE A NON-NARCOTIC MEDICATION TO HOLD HIM OVER UNTIL HIS APPOINTMENT WITH [address] [place] [address] ON 10/17/23?

## 2023-10-13 NOTE — ED PROVIDER NOTES
[facility] ED  eMERGENCY dEPARTMENT eNCOUnter      Pt Name: Casey Potter  MRN: [id number]  [address] [facility] [place] 1955  Date of evaluation: 10/12/2023  Provider: Emily Hampton MD    CHIEF COMPLAINT       Chief Complaint   Patient presents with    Back Pain         HISTORY OF PRESENT ILLNESS   (Location/Symptom, Timing/Onset,Context/Setting, Quality, Duration, Modifying Factors, Severity)  Note limiting factors. Casey Potter is a 77 y.o. male who presents to the emergency department pain. Patient with chronic back pain is post to see pain management but has not followed up. He did see his gastroenterologist today. He was seen here yesterday for his back pain and had a essentially negative work-up besides a mild urinary tract infection. He left AMA before all the testing was completed yesterday. He is back today saying he hurts all the time. He only has Tylenol at home for pain. No recent falls or new injuries. His pain is in his lower back moderate in nature. HPI    NursingNotes were reviewed. REVIEW OF SYSTEMS    (2-9 systems for level 4, 10 or more for level 5)     Review of Systems   Constitutional:  Negative for chills and diaphoresis. HENT:  Negative for congestion, ear pain, mouth sores and sore throat. Eyes:  Negative for photophobia and discharge. Respiratory:  Negative for cough, chest tightness, shortness of breath and wheezing. Cardiovascular:  Negative for chest pain and palpitations. Gastrointestinal:  Negative for abdominal distention, abdominal pain and vomiting. Endocrine: Negative for cold intolerance. Genitourinary:  Negative for difficulty urinating. Musculoskeletal:  Positive for back pain. Negative for arthralgias. Skin:  Negative for pallor and rash. Allergic/Immunologic: Negative for immunocompromised state. Neurological:  Negative for dizziness and syncope. Hematological:  Negative for adenopathy.    Psychiatric/Behavioral:  Negative for agitation and hallucinations. The patient is not nervous/anxious. All other systems reviewed and are negative. Except as noted above the remainder of the review of systems was reviewed and negative.        PAST MEDICAL HISTORY     Past Medical History:   Diagnosis Date    Allergic rhinitis 02/19/2018    Anxiety     Bilateral carotid artery stenosis 5/10/2023    Chronic back pain     Colon polyp     COPD (chronic obstructive pulmonary disease) (HCC)     Depression     Disorder of stomach     valve not closing properly    Emphysema lung ([address])     Essential hypertension 11/04/2019    Gastroesophageal reflux disease 10/04/2019    Hydronephrosis of left kidney 08/23/2023    Hyperlipidemia     meds > 22 yrs    Low back pain 05/01/2018    ARNAUD (obstructive sleep apnea) 02/19/2018    Other emphysema ([address]) 10/04/2019    Other intervertebral disc degeneration, lumbar region 03/23/2018    Radiculopathy, lumbar region 02/28/2018    Restless leg syndrome     Seasonal affective disorder ([address]) 10/04/2019    Substance abuse ([address])     alcholic, quit 20 yrs          SURGICALHISTORY       Past Surgical History:   Procedure Laterality Date    BLADDER SURGERY Left 9/11/2023    CYSTOSCOPY RETROGRADE PYELOGRAM AND LEFT DOUBLE J STENT INSERTION performed by Caro Salcedo MD at [address] [place] [address]  12/22/2016    A [address] [facility] MD    DENTAL SURGERY  10 yrs ago    ENDOSCOPY, COLON, DIAGNOSTIC      EYE SURGERY      Lasik OU    FINGER TRIGGER RELEASE Left 11/1/2022    LEFT HAND TRIGGER FINGER RING FINGER RELEASE OF A1 PANFILO performed by Andrea Mota MD at [facility] ARTHROSCOPY Right     KNEE SURGERY Right 05/2016    Ray procedure    KNEE SURGERY      MD NASAL/SINUS ENDOSCOPY W/MAXILLARY ANTROSTOMY N/A 2/22/2018    SEPTOPLASTY MICRODEBRIDER ASSISTED TURBINOPLASTY AND OUT-FRACTURING BILATERAL NASAL ENDOSCOPY performed by Vanessa Bang MD at [address] [place] [address] 9/18/2023    EGD BIOPSY

## 2023-10-13 NOTE — ED NOTES
"Per patient, \"Depending on what Julianne Milligan says, I'll probably see you jayla denis.\"     Shady De Paz RN  10/13/23 0306    "

## 2023-10-13 NOTE — ED PROVIDER NOTES
"HPI   Chief Complaint   Patient presents with   • Back Pain       This is a 68-year-old male with a history of chronic low back pain presents to the emergency room with a chief complaint of an acute exacerbation chronic low back pain the patient states he was seen twice at [facility] yesterday and reports that they did nothing for him.  I did review the patient's note from his ED visit and the reads that he is supposed to be seeing pain management but has not made the appointment yet.  Patient states that this is a chronic issue, he denies any recent falls or injuries, he denies any bladder or bowel dysfunction or saddle paresthesia, he states his pain is a constant 9 out of a 10.  He does admit to seeing pain management the past and states \"all they did was give me a shot and send me home\".  Patient is on Neurontin Mobic and Robaxin but states needs something stronger for pain.  He denies any abdominal pain, chest pain, fever, chills, alcohol abuse, IV drug use or recent epidural injections.  The pain is localized to his lower back with pain that radiates into the right and left hip.  He drove himself to the hospital this evening in his jeep.  He is able to ambulate using a cane.  He denies any headache, blurry vision, sore throat, dysphagia, cough or shortness of breath, nausea, vomiting, diarrhea.  He denies any other complaints.  I did review his past medical history which includes allergic rhinitis anxiety, chronic back pain, colon polyps, COPD, bilateral carotid artery stenosis, depression, disorder of the stomach, emphysema, hypertension, GERD, hydronephrosis of left kidney, hyperlipidemia, lumbar radiculopathy, restless leg syndrome, seasonal affective disorder, substance abuse and former alcohol abuse quit 20 years ago.  I did review the patient's OARRS report which shows an overdose for score 310 narcotic 340 sedative was 440 the patient is on chronic gabapentin, he has had tramadol and oxycodone in " the past.      History provided by:  Patient   used: No                        No data recorded                Patient History   No past medical history on file.  Past Surgical History:   Procedure Laterality Date   • CT ABDOMEN PELVIS ANGIOGRAM W AND/OR WO IV CONTRAST  1/15/2023    CT ABDOMEN PELVIS ANGIOGRAM W AND/OR WO IV CONTRAST 1/15/2023   • CT HEAD ANGIO W AND WO IV CONTRAST  2/2/2021    CT HEAD ANGIO W AND WO IV CONTRAST 2/2/2021   • CT NECK ANGIO W AND WO IV CONTRAST  2/2/2021    CT NECK ANGIO W AND WO IV CONTRAST 2/2/2021   • OTHER SURGICAL HISTORY  06/14/2022    Colonoscopy   • OTHER SURGICAL HISTORY  06/14/2022    Knee surgery     No family history on file.  Social History     Tobacco Use   • Smoking status: Not on file   • Smokeless tobacco: Not on file   Substance Use Topics   • Alcohol use: Not on file   • Drug use: Not on file       Physical Exam   ED Triage Vitals   Temp Pulse Resp BP   -- -- -- --      SpO2 Temp src Heart Rate Source Patient Position   -- -- -- --      BP Location FiO2 (%)     -- --       Physical Exam  Vitals and nursing note reviewed.   Constitutional:       General: He is not in acute distress.     Appearance: Normal appearance. He is normal weight. He is not toxic-appearing.      Comments: Ambulates with a cane   HENT:      Head: Normocephalic and atraumatic.      Right Ear: External ear normal.      Left Ear: External ear normal.      Nose: Nose normal.      Mouth/Throat:      Mouth: Mucous membranes are moist.      Pharynx: Oropharynx is clear.   Eyes:      Conjunctiva/sclera: Conjunctivae normal.      Pupils: Pupils are equal, round, and reactive to light.   Cardiovascular:      Rate and Rhythm: Normal rate and regular rhythm.      Pulses: Normal pulses.      Heart sounds: Normal heart sounds.   Pulmonary:      Effort: Pulmonary effort is normal.      Breath sounds: Normal breath sounds. No wheezing or rhonchi.   Abdominal:      General: Abdomen is flat.  Bowel sounds are normal.      Palpations: Abdomen is soft.      Tenderness: There is no abdominal tenderness. There is no guarding.   Musculoskeletal:      Cervical back: Normal, normal range of motion and neck supple.      Thoracic back: Normal.      Lumbar back: Spasms present. No bony tenderness.        Back:       Comments: Lower paraspinal spasm with tenderness, no midline tenderness no erythema swelling or signs of trauma, he does walk with a cane he is able to flex and extend at the lower back, no crepitation noted.   Skin:     General: Skin is warm and dry.      Capillary Refill: Capillary refill takes less than 2 seconds.   Neurological:      General: No focal deficit present.      Mental Status: He is alert and oriented to person, place, and time. Mental status is at baseline.   Psychiatric:         Mood and Affect: Mood normal.         Behavior: Behavior normal.         Thought Content: Thought content normal.         Judgment: Judgment normal.         ED Course & MDM   Diagnoses as of 10/13/23 0350   Acute exacerbation of chronic low back pain       Medical Decision Making  Patient's vital signs temperature 36 4 pulse 94 respiration 20 /68 pulse ox was 97% on room air  The patient was asking for something stronger for pain and I explained to the patient that he is in pain management and that he has been prescribed medication from his pain management doctor and if he wanted something stronger he needs to contact that physician to discuss other treatment options.  He states this is his chronic back pain and expresses frustration that nobody will take care of it.  I did discuss [facility] and he is in agreement with this plan.  The patient drove here and I explained that I was unable to acquiesce to his request to give him something stronger for pain other than steroids and a topical Lidoderm patch.  The patient reports that he does not like the  patches as they cause his skin to burn.  He does agree to take the steroids.  He was discharged home with prescription for prednisone and Robaxin which is a muscle laxer he is taking in the past.  At this point time given his description of chronic low back pain and this is his typical presentation, I do not suspect a AAA cauda equina syndrome or epidural abscess.  He denies any bladder or bowel dysfunction or saddle paresthesias.  He is able to ambulate with the use of a cane.  He was encouraged to return back to the ER sooner should he have any concerns or worsening of symptoms all questions answered prior to discharge    Amount and/or Complexity of Data Reviewed  External Data Reviewed: notes.        Procedure  Procedures     Ghulam Singer PA-C  10/13/23 0351

## 2023-10-16 ENCOUNTER — OFFICE VISIT (OUTPATIENT)
Dept: FAMILY MEDICINE CLINIC | Age: 68
End: 2023-10-16
Payer: MEDICARE

## 2023-10-16 ENCOUNTER — HOSPITAL ENCOUNTER (EMERGENCY)
Age: 68
Discharge: HOME | End: 2023-10-16
Attending: EMERGENCY MEDICINE
Payer: MEDICARE

## 2023-10-16 ENCOUNTER — OFFICE VISIT (OUTPATIENT)
Dept: PAIN MANAGEMENT | Age: 68
End: 2023-10-16
Payer: MEDICARE

## 2023-10-16 VITALS
HEIGHT: 68 IN | DIASTOLIC BLOOD PRESSURE: 71 MMHG | RESPIRATION RATE: 18 BRPM | OXYGEN SATURATION: 98 % | WEIGHT: 130 LBS | SYSTOLIC BLOOD PRESSURE: 101 MMHG | BODY MASS INDEX: 19.7 KG/M2 | TEMPERATURE: 97.6 F | HEART RATE: 73 BPM

## 2023-10-16 VITALS
SYSTOLIC BLOOD PRESSURE: 138 MMHG | HEIGHT: 68 IN | OXYGEN SATURATION: 99 % | HEART RATE: 91 BPM | DIASTOLIC BLOOD PRESSURE: 84 MMHG | WEIGHT: 130 LBS | BODY MASS INDEX: 19.7 KG/M2

## 2023-10-16 VITALS
WEIGHT: 105 LBS | BODY MASS INDEX: 15.91 KG/M2 | DIASTOLIC BLOOD PRESSURE: 70 MMHG | SYSTOLIC BLOOD PRESSURE: 102 MMHG | HEIGHT: 68 IN | TEMPERATURE: 97.5 F

## 2023-10-16 VITALS
DIASTOLIC BLOOD PRESSURE: 73 MMHG | TEMPERATURE: 97.8 F | OXYGEN SATURATION: 96 % | RESPIRATION RATE: 18 BRPM | SYSTOLIC BLOOD PRESSURE: 136 MMHG | HEART RATE: 92 BPM

## 2023-10-16 VITALS
HEART RATE: 90 BPM | BODY MASS INDEX: 16.67 KG/M2 | TEMPERATURE: 98 F | HEIGHT: 68 IN | DIASTOLIC BLOOD PRESSURE: 74 MMHG | OXYGEN SATURATION: 100 % | WEIGHT: 110 LBS | RESPIRATION RATE: 20 BRPM | SYSTOLIC BLOOD PRESSURE: 143 MMHG

## 2023-10-16 DIAGNOSIS — M51.36 DDD (DEGENERATIVE DISC DISEASE), LUMBAR: ICD-10-CM

## 2023-10-16 DIAGNOSIS — G89.4 CHRONIC PAIN SYNDROME: Primary | ICD-10-CM

## 2023-10-16 DIAGNOSIS — M47.817 LUMBOSACRAL SPONDYLOSIS WITHOUT MYELOPATHY: ICD-10-CM

## 2023-10-16 DIAGNOSIS — M48.062 SPINAL STENOSIS OF LUMBAR REGION WITH NEUROGENIC CLAUDICATION: ICD-10-CM

## 2023-10-16 DIAGNOSIS — M54.50 CHRONIC LOW BACK PAIN, UNSPECIFIED BACK PAIN LATERALITY, UNSPECIFIED WHETHER SCIATICA PRESENT: Primary | ICD-10-CM

## 2023-10-16 DIAGNOSIS — G89.29 CHRONIC LOW BACK PAIN, UNSPECIFIED BACK PAIN LATERALITY, UNSPECIFIED WHETHER SCIATICA PRESENT: Primary | ICD-10-CM

## 2023-10-16 DIAGNOSIS — M51.26 OTHER INTERVERTEBRAL DISC DISPLACEMENT, LUMBAR REGION: ICD-10-CM

## 2023-10-16 DIAGNOSIS — G89.29 OTHER CHRONIC PAIN: Primary | ICD-10-CM

## 2023-10-16 DIAGNOSIS — M54.16 LUMBAR RADICULOPATHY: ICD-10-CM

## 2023-10-16 DIAGNOSIS — G89.29 OTHER CHRONIC PAIN: ICD-10-CM

## 2023-10-16 PROCEDURE — 99282 EMERGENCY DEPT VISIT SF MDM: CPT

## 2023-10-16 PROCEDURE — 3078F DIAST BP <80 MM HG: CPT | Performed by: PAIN MEDICINE

## 2023-10-16 PROCEDURE — 1123F ACP DISCUSS/DSCN MKR DOCD: CPT | Performed by: PAIN MEDICINE

## 2023-10-16 PROCEDURE — 3074F SYST BP LT 130 MM HG: CPT | Performed by: NURSE PRACTITIONER

## 2023-10-16 PROCEDURE — 96372 THER/PROPH/DIAG INJ SC/IM: CPT

## 2023-10-16 PROCEDURE — 3078F DIAST BP <80 MM HG: CPT | Performed by: NURSE PRACTITIONER

## 2023-10-16 PROCEDURE — 99284 EMERGENCY DEPT VISIT MOD MDM: CPT

## 2023-10-16 PROCEDURE — 3074F SYST BP LT 130 MM HG: CPT | Performed by: PAIN MEDICINE

## 2023-10-16 PROCEDURE — 99214 OFFICE O/P EST MOD 30 MIN: CPT | Performed by: PAIN MEDICINE

## 2023-10-16 PROCEDURE — 6360000002 HC RX W HCPCS: Performed by: EMERGENCY MEDICINE

## 2023-10-16 PROCEDURE — 99212 OFFICE O/P EST SF 10 MIN: CPT | Performed by: NURSE PRACTITIONER

## 2023-10-16 PROCEDURE — 1123F ACP DISCUSS/DSCN MKR DOCD: CPT | Performed by: NURSE PRACTITIONER

## 2023-10-16 RX ORDER — KETOROLAC TROMETHAMINE 15 MG/ML
15 INJECTION, SOLUTION INTRAMUSCULAR; INTRAVENOUS ONCE
Status: COMPLETED | OUTPATIENT
Start: 2023-10-16 | End: 2023-10-16

## 2023-10-16 RX ORDER — TRAMADOL HYDROCHLORIDE 50 MG/1
50 TABLET ORAL 2 TIMES DAILY PRN
Qty: 60 TABLET | Refills: 0 | Status: SHIPPED | OUTPATIENT
Start: 2023-10-16 | End: 2023-11-15

## 2023-10-16 RX ORDER — LIDOCAINE 50 MG/G
1 PATCH TOPICAL DAILY
Qty: 30 PATCH | Refills: 0 | Status: SHIPPED | OUTPATIENT
Start: 2023-10-16

## 2023-10-16 RX ORDER — PREDNISONE 50 MG/1
50 TABLET ORAL
COMMUNITY
Start: 2023-10-13 | End: 2023-10-18

## 2023-10-16 RX ORDER — ORPHENADRINE CITRATE 30 MG/ML
60 INJECTION INTRAMUSCULAR; INTRAVENOUS ONCE
Status: COMPLETED | OUTPATIENT
Start: 2023-10-16 | End: 2023-10-16

## 2023-10-16 RX ADMIN — ORPHENADRINE CITRATE 60 MG: 60 INJECTION INTRAMUSCULAR; INTRAVENOUS at 04:35

## 2023-10-16 RX ADMIN — KETOROLAC TROMETHAMINE 15 MG: 15 INJECTION, SOLUTION INTRAMUSCULAR; INTRAVENOUS at 01:32

## 2023-10-16 ASSESSMENT — PATIENT HEALTH QUESTIONNAIRE - PHQ9
SUM OF ALL RESPONSES TO PHQ9 QUESTIONS 1 & 2: 0
SUM OF ALL RESPONSES TO PHQ QUESTIONS 1-9: 0
9. THOUGHTS THAT YOU WOULD BE BETTER OFF DEAD, OR OF HURTING YOURSELF: 0
8. MOVING OR SPEAKING SO SLOWLY THAT OTHER PEOPLE COULD HAVE NOTICED. OR THE OPPOSITE, BEING SO FIGETY OR RESTLESS THAT YOU HAVE BEEN MOVING AROUND A LOT MORE THAN USUAL: 0
SUM OF ALL RESPONSES TO PHQ QUESTIONS 1-9: 0
SUM OF ALL RESPONSES TO PHQ QUESTIONS 1-9: 0
6. FEELING BAD ABOUT YOURSELF - OR THAT YOU ARE A FAILURE OR HAVE LET YOURSELF OR YOUR FAMILY DOWN: 0
1. LITTLE INTEREST OR PLEASURE IN DOING THINGS: 0
3. TROUBLE FALLING OR STAYING ASLEEP: 0
SUM OF ALL RESPONSES TO PHQ QUESTIONS 1-9: 0
SUM OF ALL RESPONSES TO PHQ9 QUESTIONS 1 & 2: 0
5. POOR APPETITE OR OVEREATING: 0
SUM OF ALL RESPONSES TO PHQ QUESTIONS 1-9: 0
4. FEELING TIRED OR HAVING LITTLE ENERGY: 0
2. FEELING DOWN, DEPRESSED OR HOPELESS: 0
7. TROUBLE CONCENTRATING ON THINGS, SUCH AS READING THE NEWSPAPER OR WATCHING TELEVISION: 0
SUM OF ALL RESPONSES TO PHQ QUESTIONS 1-9: 0
2. FEELING DOWN, DEPRESSED OR HOPELESS: 0
SUM OF ALL RESPONSES TO PHQ QUESTIONS 1-9: 0
SUM OF ALL RESPONSES TO PHQ QUESTIONS 1-9: 0
1. LITTLE INTEREST OR PLEASURE IN DOING THINGS: 0
10. IF YOU CHECKED OFF ANY PROBLEMS, HOW DIFFICULT HAVE THESE PROBLEMS MADE IT FOR YOU TO DO YOUR WORK, TAKE CARE OF THINGS AT HOME, OR GET ALONG WITH OTHER PEOPLE: 0

## 2023-10-16 ASSESSMENT — ENCOUNTER SYMPTOMS
SHORTNESS OF BREATH: 0
ABDOMINAL PAIN: 0
EYE PAIN: 0
VOMITING: 0
NAUSEA: 0
CHEST TIGHTNESS: 0
SORE THROAT: 0
BACK PAIN: 1
BACK PAIN: 1
ABDOMINAL PAIN: 0
SHORTNESS OF BREATH: 0
BACK PAIN: 1
VOMITING: 0
BACK PAIN: 1

## 2023-10-16 ASSESSMENT — PAIN SCALES - GENERAL
PAINLEVEL_OUTOF10: 9
PAINLEVEL_OUTOF10: 10
PAINLEVEL_OUTOF10: 10

## 2023-10-16 ASSESSMENT — PAIN - FUNCTIONAL ASSESSMENT
PAIN_FUNCTIONAL_ASSESSMENT: 0-10
PAIN_FUNCTIONAL_ASSESSMENT: 0-10

## 2023-10-16 ASSESSMENT — PAIN DESCRIPTION - LOCATION
LOCATION: BACK
LOCATION: BACK

## 2023-10-16 ASSESSMENT — PAIN DESCRIPTION - DESCRIPTORS: DESCRIPTORS: ACHING

## 2023-10-16 ASSESSMENT — PAIN DESCRIPTION - PAIN TYPE: TYPE: CHRONIC PAIN

## 2023-10-16 NOTE — ED TRIAGE NOTES
Pt presents c/o back pain. Pt was seen earlier today for same issue. Pt upset pain has not subsided. A&Ox4, ABCs intact, GCS15.

## 2023-10-16 NOTE — PROGRESS NOTES
lidocaine (LIDODERM) 5 % Place 1 patch onto the skin daily 12 hours on, 12 hours off. 30 patch 0    methylPREDNISolone (MEDROL DOSEPACK) 4 MG tablet Take by mouth. 1 kit 0    cephALEXin (KEFLEX) 500 MG capsule Take 2 capsules by mouth 2 times daily for 7 days 28 capsule 0    methocarbamol (ROBAXIN) 500 MG tablet Take 1 tablet by mouth 4 times daily 28 tablet 0    ciprofloxacin (CIPRO) 500 MG tablet Take 1 tablet by mouth 2 times daily      meloxicam (MOBIC) 15 MG tablet Take 1 tablet by mouth daily as needed for Pain      ondansetron (ZOFRAN) 4 MG tablet Take 1 tablet by mouth every 8 hours as needed for Nausea or Vomiting      FLUoxetine (PROZAC) 40 MG capsule take 1 capsule by mouth once daily 90 capsule 1    pantoprazole (PROTONIX) 40 MG tablet Take 1 tablet by mouth 2 times daily (before meals) 30 tablet 3    gabapentin (NEURONTIN) 600 MG tablet take 1 tablet by mouth every morning and take 1 tablet by mouth MID DAY and take 2 tablets by mouth AT NIGHT 90 tablet 0    rosuvastatin (CRESTOR) 40 MG tablet Take 1 tablet by mouth nightly at bedtime. 90 tablet 5    Handicap Placard MISC by Does not apply route Ex: 5 years 2/14/2028 1 each 0    predniSONE (DELTASONE) 50 MG tablet Take 1 tablet by mouth (Patient not taking: Reported on 10/16/2023)       No current facility-administered medications for this visit. PMH, Surgical Hx, Family Hx, and Social Hx reviewed and updated. Health Maintenance reviewed. Objective  Vitals:    10/16/23 0957   BP: 138/84   Site: Left Upper Arm   Position: Sitting   Cuff Size: Medium Adult   Pulse: 91   SpO2: 99%   Weight: 130 lb (59 kg)   Height: 5' 8\" (1.727 m)     BP Readings from Last 3 Encounters:   10/16/23 138/84   10/16/23 136/73   10/16/23 101/71     Wt Readings from Last 3 Encounters:   10/16/23 130 lb (59 kg)   10/16/23 130 lb (59 kg)   10/12/23 119 lb (54 kg)     Physical Exam  Vitals and nursing note reviewed.    Constitutional:       General: He is not in acute

## 2023-10-16 NOTE — ED PROVIDER NOTES
[facility] ED  EMERGENCY DEPARTMENT ENCOUNTER      Pt Name: Herminia Nicholson  MRN: [id number]  [address] 1955  Date of evaluation: 10/16/2023  Provider: Lety Angeles MD    CHIEF COMPLAINT       Chief Complaint   Patient presents with    Back Pain         HISTORY OF PRESENT ILLNESS   (Location/Symptom, Timing/Onset, Context/Setting, Quality, Duration, Modifying Factors, Severity)  Note limiting factors. Herminia Nicholson is a 77 y.o. male who presents to the emergency department requesting a muscle relaxer. He says that he continues to have low back pain. He denies anticoagulation use or drug use. Denies traumatic injury. He denies any change in bowel or bladder function, saddle anesthesia, new numbness or weakness. The patient is well-known to the department. He was here earlier and received IM Toradol. Please refer to my previous note. HPI    Nursing Notes were reviewed. REVIEW OF SYSTEMS    (2-9 systems for level 4, 10 or more for level 5)     Review of Systems   Musculoskeletal:  Positive for back pain. All other systems reviewed and are negative. Except as noted above the remainder of the review of systems was reviewed and negative.        PAST MEDICAL HISTORY     Past Medical History:   Diagnosis Date    Allergic rhinitis 02/19/2018    Anxiety     Bilateral carotid artery stenosis 5/10/2023    Chronic back pain     Colon polyp     COPD (chronic obstructive pulmonary disease) (HCC)     Depression     Disorder of stomach     valve not closing properly    Emphysema lung ([address])     Essential hypertension 11/04/2019    Gastroesophageal reflux disease 10/04/2019    Hydronephrosis of left kidney 08/23/2023    Hyperlipidemia     meds > 22 yrs    Low back pain 05/01/2018    ARNAUD (obstructive sleep apnea) 02/19/2018    Other emphysema ([address]) 10/04/2019    Other intervertebral disc degeneration, lumbar region 03/23/2018    Radiculopathy, lumbar region 02/28/2018    Restless leg syndrome

## 2023-10-16 NOTE — ED PROVIDER NOTES
[facility] ED  EMERGENCY DEPARTMENT ENCOUNTER      Pt Name: Cedric Oliveira  MRN: [id number]  [address] [facility] [address] 1955  Date of evaluation: 10/16/2023  Provider: Ilda Mendez DO    CHIEF COMPLAINT       Chief Complaint   Patient presents with    Back Pain     And legs are numb. Pt wants admitted         HISTORY OF PRESENT ILLNESS   (Location/Symptom, Timing/Onset, Context/Setting, Quality, Duration, Modifying Factors, Severity)  Note limiting factors. Cedric Oliveira is a 77 y.o. male who presents to the emergency department . Patient has history of chronic back pain and has recently been evaluated by neurosurgery. It was recommended that he have a multilevel fusion. Because patient has possible duodenal ulcer he has to have upper endoscopy before he can be cleared to have surgery. Patient is in pain management and is on gabapentin and tramadol. This is his third visit in less than 12 hours to the emergency department. Patient comes to the emergency department multiple times a week for the same chronic back pain. He is telling me that pain management sent him to the ER. It certainly does not states that in their note. HPI    Nursing Notes were reviewed. REVIEW OF SYSTEMS    (2-9 systems for level 4, 10 or more for level 5)     Review of Systems   Constitutional:  Negative for activity change, appetite change and fatigue. HENT:  Negative for congestion and sore throat. Eyes:  Negative for pain and visual disturbance. Respiratory:  Negative for chest tightness and shortness of breath. Cardiovascular:  Negative for chest pain. Gastrointestinal:  Negative for abdominal pain, nausea and vomiting. Endocrine: Negative for polydipsia. Genitourinary:  Negative for flank pain and urgency. Musculoskeletal:  Positive for back pain. Negative for gait problem and neck stiffness. Skin:  Negative for rash. Neurological:  Negative for weakness, light-headedness and headaches.

## 2023-10-16 NOTE — ED PROVIDER NOTES
[facility] ED  EMERGENCY DEPARTMENT ENCOUNTER      Pt Name: Masood Arrieta  MRN: [id number]  [address] 1955  Date of evaluation: 10/16/2023  Provider: Halima Kim MD    CHIEF COMPLAINT       Chief Complaint   Patient presents with    Back Pain         HISTORY OF PRESENT ILLNESS   (Location/Symptom, Timing/Onset, Context/Setting, Quality, Duration, Modifying Factors, Severity)  Note limiting factors. Masood Arrieta is a 77 y.o. male who presents to the emergency department via private vehicle for evaluation of back pain. He drove himself here. He says that his gabapentin has not been helping. Reports bandlike lower back pain. He has had episodes of this before. It is chronic. Denies evolution or change in symptoms. This is not new. It is present at rest, reproducible, nonradiating, worsens with twisting and bending. Denies traumatic injury, anticoagulation use, IV drug use, saddle anesthesia, change in bowel or bladder function, numbness or weakness. Chart review notes that he was evaluated at [facility] 2 days ago for chronic low back pain. He was sent home on prednisone. Patient has pain management appointment tomorrow  HPI  Chart review notes history of chronic back pain, tobacco abuse, obstructive sleep apnea, hypertension, hyperlipidemia, depression, anxiety, GERD, COPD, alcoholism, chronic anemia, ureteral obstruction  Nursing Notes were reviewed. REVIEW OF SYSTEMS    (2-9 systems for level 4, 10 or more for level 5)     Review of Systems   Constitutional:  Negative for fever. Respiratory:  Negative for shortness of breath. Cardiovascular:  Negative for chest pain. Gastrointestinal:  Negative for abdominal pain and vomiting. Genitourinary:  Negative for flank pain, hematuria and scrotal swelling. Musculoskeletal:  Positive for back pain. Skin:  Negative for wound. Neurological:  Negative for syncope, weakness and numbness.    All other systems reviewed and are

## 2023-10-16 NOTE — ED TRIAGE NOTES
Clostridium Difficile Infection   WHAT YOU NEED TO KNOW:   Clostridium difficile infection, or C  difficile, is an infection in your colon caused by bacteria  Different types of bacteria live inside the colon, creating a healthy balance between good and bad bacteria  If the C  difficile bacteria grow rapidly, this can disrupt the healthy balance of the colon  This can cause the lining of the colon to swell, which leads to an infection  DISCHARGE INSTRUCTIONS:   Medicines:   · Antibiotics: This medicine is given to keep the C  difficile bacteria from growing and allow the normal bacteria in your intestines to grow  Always take your antibiotics exactly as ordered by your healthcare provider  Do not stop taking your medicine unless directed by your healthcare provider  Never save antibiotics or take leftover antibiotics that were given to you for another illness  · Take your medicine as directed  Contact your healthcare provider if you think your medicine is not helping or if you have side effects  Tell him or her if you are allergic to any medicine  Keep a list of the medicines, vitamins, and herbs you take  Include the amounts, and when and why you take them  Bring the list or the pill bottles to follow-up visits  Carry your medicine list with you in case of an emergency  Follow up with your healthcare provider within 1 to 2 days:  Write down your questions so you remember to ask them during your visits  Self care:   · Drink liquids to prevent dehydration:  Ask how much liquid you should drink to prevent dehydration caused by diarrhea  Most adults should drink between 9 and 13 eight-ounce cups of liquid every day  For most people, good liquids to drink are water, juice, and broth  · Wash your hands:  Wash your hands often with germ-killing soap and warm, running water  Alcohol-based hand rubs do not kill C  difficile bacteria   Always wash your hands well after you use the toilet, diaper a child, and Patient c/o chronic back pain. before you prepare or serve food  Tell anyone who touches you to wear gloves and wash their hands  · Clean surfaces with bleach:  Clean tabletops, desks, and other surfaces before anyone else touches or uses them  Clean with chlorine-based disinfectants, such as household bleach  · Avoid the spread of C  difficile:  Do not share any items with other people  Use as many disposable items, such as paper plates, as you can  Do this until your diarrhea has gone away  Contact your healthcare provider if:   · You have a fever  · Your signs and symptoms do not go away, or they come back, even after treatment  · You have questions or concerns about your condition or care  Return to the emergency department if:   · Your diarrhea and stomach cramps get worse  · Your abdomen is hard or feels swollen  · You have black or bright red stools  · You vomit blood  · You cannot eat or drink  · You are short of breath, or feel like you are going to faint  · You have 1 or more of the following signs of dehydration:     ¨ Dizziness or weakness, or extreme sleepiness    ¨ Dry mouth, cracked lips, or you feel very thirsty    ¨ Fast heartbeat or rapid breathing    ¨ More sleepy than usual    ¨ Very little urine or no urine    ¨ Sunken eyes     ¨ A child may be more irritable or fussy than usual  The soft spot on a baby's head may look sunken in   © 2017 [address] is for End User's use only and may not be sold, redistributed or otherwise used for commercial purposes  All illustrations and images included in CareNotes® are the copyrighted property of A D A M [employer]  or Edouard Neri  The above information is an  only  It is not intended as medical advice for individual conditions or treatments  Talk to your doctor, nurse or pharmacist before following any medical regimen to see if it is safe and effective for you

## 2023-10-16 NOTE — PROGRESS NOTES
[facility]) Physicians  Neurosurgery and Pain [facility]  [address] [address] [facility], [address] [place] [address] [place]: [phone]  F: [phone]        Afua Cohen  (1955)    10/16/2023    Subjective:     Afua Cohen is 77 y.o. male who complains today of:     Chief Complaint   Patient presents with    Back Pain   Patient here today for urgent follow-up. He has chronic pain syndrome significant pathology lower lumbar spine. He did see Dr. Dennis Shaw in September 25 who did recommend a multilevel fusion. Patient has multiple medical problems and apparently is being worked up for a duodenal ulcer. Patient is not the best historian. He has pain in his back and his legs standing walking bending are difficult he has been to the emergency room multiple times. He is on gabapentin OARRS was reviewed. He says he has been sober for over 20 years. He does smoke. He says the pain is significant cannot sleep is frustrated. Pain medications are not really helping him much at all. Achy sharp pain with spasms. He brings in his medications today in a bag. Plan: We discussed options in detail. His case is tricky because he has been to the emergency room several times he has with treatment pathology need surgery but apparently has medical problems including a duodenal ulcer that needs to be worked up. He says he been clean for over 20 years with alcohol. We will check oral drug swab. He will sign a narcotic drug policy. He denies any illicit drug use. Given tramadol twice a day #60 for his significant pain hopefully this helps him. He has gabapentin. Options are otherwise limited. Questions answered chart was reviewed. He has failed conservative treatment including therapy and injections in the past.  He has failed steroid packs as well. Is on meloxicam as well. Patient apparently is quite anemic and is waiting on a GI scope from Dr. Gama Eaton.   Patient should

## 2023-10-16 NOTE — ED TRIAGE NOTES
Pt comes to er c/o severe back  pain and numbness to his legs. Pt states he wants admitted to see a dr. Fuad Arcos was supposed to make an appointment with dr Dottie Rodriguez but has not as of yet. He states that \"they\" were supposed to set it up with him. Pt is very agitated in triage.

## 2023-10-20 ENCOUNTER — HOSPITAL ENCOUNTER (EMERGENCY)
Age: 68
Discharge: HOME | End: 2023-10-20
Payer: MEDICARE

## 2023-10-20 VITALS
DIASTOLIC BLOOD PRESSURE: 75 MMHG | TEMPERATURE: 97.2 F | SYSTOLIC BLOOD PRESSURE: 110 MMHG | OXYGEN SATURATION: 97 % | RESPIRATION RATE: 14 BRPM | HEART RATE: 94 BPM

## 2023-10-20 VITALS
HEIGHT: 68 IN | OXYGEN SATURATION: 100 % | HEART RATE: 86 BPM | WEIGHT: 130 LBS | SYSTOLIC BLOOD PRESSURE: 122 MMHG | TEMPERATURE: 97.8 F | BODY MASS INDEX: 19.7 KG/M2 | DIASTOLIC BLOOD PRESSURE: 83 MMHG | RESPIRATION RATE: 18 BRPM

## 2023-10-20 DIAGNOSIS — M54.9 CHRONIC BACK PAIN, UNSPECIFIED BACK LOCATION, UNSPECIFIED BACK PAIN LATERALITY: Primary | ICD-10-CM

## 2023-10-20 DIAGNOSIS — G89.29 CHRONIC BACK PAIN, UNSPECIFIED BACK LOCATION, UNSPECIFIED BACK PAIN LATERALITY: Primary | ICD-10-CM

## 2023-10-20 PROCEDURE — 6370000000 HC RX 637 (ALT 250 FOR IP)

## 2023-10-20 PROCEDURE — 99283 EMERGENCY DEPT VISIT LOW MDM: CPT

## 2023-10-20 RX ORDER — ACETAMINOPHEN 325 MG/1
650 TABLET ORAL ONCE
Status: COMPLETED | OUTPATIENT
Start: 2023-10-20 | End: 2023-10-20

## 2023-10-20 RX ADMIN — ACETAMINOPHEN 650 MG: 325 TABLET ORAL at 16:47

## 2023-10-20 ASSESSMENT — PAIN SCALES - GENERAL
PAINLEVEL_OUTOF10: 10
PAINLEVEL_OUTOF10: 6
PAINLEVEL_OUTOF10: 7

## 2023-10-20 ASSESSMENT — PAIN DESCRIPTION - PAIN TYPE: TYPE: ACUTE PAIN

## 2023-10-20 ASSESSMENT — PAIN - FUNCTIONAL ASSESSMENT
PAIN_FUNCTIONAL_ASSESSMENT: 0-10
PAIN_FUNCTIONAL_ASSESSMENT: 0-10

## 2023-10-20 ASSESSMENT — PAIN DESCRIPTION - LOCATION
LOCATION: BACK
LOCATION: BACK

## 2023-10-20 NOTE — ED TRIAGE NOTES
Pt to ED due to chronic back pain. Pt states he has been seen for the same several times. Pt is alert and oriented x4. Skin is warm, dry, intact.  Resp are regular and equal.

## 2023-10-20 NOTE — ED PROVIDER NOTES
[facility] ED  eMERGENCYdEPARTMENT eNCOUnter      Pt Name: Tatyana Andujar  MRN: [id number]  [address] [facility] 1955[facility]: 10/20/2023  Provider:MANUELA Art CNP    CHIEF COMPLAINT       Chief Complaint   Patient presents with    Back Pain     Chronic         HISTORY OF PRESENT ILLNESS  (Location/Symptom, Timing/Onset, Context/Setting, Quality, Duration, Modifying Factors, Severity.)   Tatyana Andujar is a 77 y.o. male who presents to the emergency department  with chronic back pain. Patient has known history of chronic back pain and has recently been evaluated by neurosurgery. He is in pain management and is on gabapentin and tramadol. This is 15th visit in 23 days. HPI    Nursing Notes were reviewed and I agree. REVIEW OF SYSTEMS    (2-9 systems for level 4, 10 or more for level 5)     Review of Systems   Constitutional:  Positive for activity change. HENT:  Negative for trouble swallowing and voice change. Respiratory:  Negative for cough and shortness of breath. Cardiovascular:  Negative for chest pain, palpitations and leg swelling. Genitourinary:  Negative for difficulty urinating and frequency. Musculoskeletal:  Positive for back pain. Negative for gait problem, neck pain and neck stiffness. Skin:  Negative for color change. Neurological:  Negative for dizziness and weakness. as noted above the remainder of the review of systems was reviewed and negative.        PAST MEDICAL HISTORY     Past Medical History:   Diagnosis Date    Allergic rhinitis 02/19/2018    Anxiety     Bilateral carotid artery stenosis 5/10/2023    Chronic back pain     Colon polyp     COPD (chronic obstructive pulmonary disease) (HCC)     Depression     Disorder of stomach     valve not closing properly    Emphysema lung ([address])     Essential hypertension 11/04/2019    Gastroesophageal reflux disease 10/04/2019    Hydronephrosis of left kidney 08/23/2023    Hyperlipidemia     meds > 20 Cancer Father         bone    No Known Problems Sister     Cancer Brother     Heart Disease Brother         hole in heart in 65 at 1 months age    Cancer Maternal Grandmother         lung    Cancer Paternal Grandmother     Heart Disease Paternal Grandfather     Colon Cancer Neg Hx           SOCIAL HISTORY       Social History     Socioeconomic History    Marital status:      Spouse name: None    Number of children: 2    Years of education: 12    Highest education level: High school graduate   Tobacco Use    Smoking status: Every Day     Packs/day: 2.00     Years: 52.00     Additional pack years: 0.00     Total pack years: 104.00     Types: Cigars, Cigarettes     Start date: 1970    Smokeless tobacco: Never    Tobacco comments:     3-5 cigars qd   Vaping Use    Vaping Use: Never used   Substance and Sexual Activity    Alcohol use: No     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    Drug use: No    Sexual activity: Not Currently     Partners: Female     Social Determinants of Health     Financial Resource Strain: Low Risk  (2/3/2023)    Overall Financial Resource Strain (CARDIA)     Difficulty of Paying Living Expenses: Not hard at all   Food Insecurity: No Food Insecurity (2/3/2023)    Hunger Vital Sign     Worried About Running Out of Food in the Last Year: Never true     Ran Out of Food in the Last Year: Never true   Transportation Needs: Unknown (2/3/2023)    PRAPARE - Transportation     Lack of Transportation (Non-Medical):  No   Physical Activity: Insufficiently Active (2/28/2023)    Exercise Vital Sign     Days of Exercise per Week: 7 days     Minutes of Exercise per Session: 20 min   Housing Stability: Unknown (2/3/2023)    Housing Stability Vital Sign     Unstable Housing in the Last Year: No       SCREENINGS    Sarah Coma Scale  Eye Opening: Spontaneous  Best Verbal Response: Oriented  Best Motor Response: Obeys commands  Sarah Coma Scale Score: 15      PHYSICAL EXAM    (up to 7 forlevel 4, 8 or more for level

## 2023-10-21 ENCOUNTER — HOSPITAL ENCOUNTER (EMERGENCY)
Age: 68
Discharge: HOME | End: 2023-10-21
Payer: MEDICARE

## 2023-10-21 ENCOUNTER — HOSPITAL ENCOUNTER (EMERGENCY)
Age: 68
Discharge: LEFT BEFORE DISCHARGE | End: 2023-10-21
Payer: MEDICARE

## 2023-10-21 ENCOUNTER — APPOINTMENT (OUTPATIENT)
Dept: GENERAL RADIOLOGY | Age: 68
End: 2023-10-21
Payer: MEDICARE

## 2023-10-21 VITALS
HEART RATE: 69 BPM | BODY MASS INDEX: 19.7 KG/M2 | WEIGHT: 130 LBS | DIASTOLIC BLOOD PRESSURE: 69 MMHG | HEIGHT: 68 IN | RESPIRATION RATE: 18 BRPM | SYSTOLIC BLOOD PRESSURE: 156 MMHG | TEMPERATURE: 97.6 F | OXYGEN SATURATION: 100 %

## 2023-10-21 DIAGNOSIS — R07.89 ATYPICAL CHEST PAIN: Primary | ICD-10-CM

## 2023-10-21 LAB
EKG ATRIAL RATE: 76 BPM
EKG P AXIS: 74 DEGREES
EKG P-R INTERVAL: 128 MS
EKG Q-T INTERVAL: 376 MS
EKG QRS DURATION: 98 MS
EKG QTC CALCULATION (BAZETT): 423 MS
EKG R AXIS: 89 DEGREES
EKG T AXIS: 63 DEGREES
EKG VENTRICULAR RATE: 76 BPM

## 2023-10-21 PROCEDURE — 71045 X-RAY EXAM CHEST 1 VIEW: CPT

## 2023-10-21 PROCEDURE — 93005 ELECTROCARDIOGRAM TRACING: CPT | Performed by: PHYSICIAN ASSISTANT

## 2023-10-21 PROCEDURE — 6370000000 HC RX 637 (ALT 250 FOR IP): Performed by: PHYSICIAN ASSISTANT

## 2023-10-21 PROCEDURE — 99284 EMERGENCY DEPT VISIT MOD MDM: CPT

## 2023-10-21 RX ORDER — ACETAMINOPHEN 325 MG/1
650 TABLET ORAL ONCE
Status: COMPLETED | OUTPATIENT
Start: 2023-10-21 | End: 2023-10-21

## 2023-10-21 RX ADMIN — ACETAMINOPHEN 650 MG: 325 TABLET ORAL at 04:31

## 2023-10-21 ASSESSMENT — ENCOUNTER SYMPTOMS
SHORTNESS OF BREATH: 0
BACK PAIN: 1
COUGH: 1

## 2023-10-21 ASSESSMENT — PAIN SCALES - GENERAL: PAINLEVEL_OUTOF10: 10

## 2023-10-21 NOTE — ED PROVIDER NOTES
Basic Information   Time Seen: 10:56 PM   Primary Care Provider: MANUELA Felix CNP   No chief complaint on file. HPI   Stew Pizano is a 77 yrs male who presents with complaint of chronic neck pain. Patient was seen in the emergency department and received Tylenol but had told nursing staff upon discharge that he wanted morphine. Physical Exam     BP      Temp      Pulse     Resp      SpO2         General: Awake and Alert, no acute distress   CV: RRR, S1, S2   Resp: LCTAB, even and non labored   Other:   Impression and Plan   Labs Reviewed - No data to display     No orders to display      Final Impression   I have performed a medical screening exam on Stew Pizano. Based on this patient's chief complaint/symptoms of No chief complaint on file.    and my focused exam, their care will be started and transitioned to provider when room is available        Vic Richardson PA-C  10/20/23 7020

## 2023-10-21 NOTE — ED PROVIDER NOTES
[facility] ED  EMERGENCY DEPARTMENT ENCOUNTER      Pt Name: Janelle Joshi  MRN: [id number]  [address] [facility] 1955  Date of evaluation: 10/21/2023  Provider: Kofi Magana PA-C    CHIEF COMPLAINT       Chief Complaint   Patient presents with    Chest Pain     Patient states chest pain started a few minutes ago and on both sides. HISTORY OF PRESENT ILLNESS   (Location/Symptom, Timing/Onset, Context/Setting, Quality, Duration, Modifying Factors, Severity)  Note limiting factors. Janelle Joshi is a 77 y.o. male who presents to the emergency department with complaints of diffuse chest pain that began while sitting in the waiting room awaiting a ride home. Patient had sudden earlier today for back pain but became frustrated that he was not given a bed in a timely fashion and stated he was going to leave. Patient was also seen emergency department this morning for his back pain and given Tylenol at that time. HPI    Nursing Notes were reviewed. REVIEW OF SYSTEMS    (2-9 systems for level 4, 10 or more for level 5)     Review of Systems   Constitutional:  Negative for fever. Respiratory:  Positive for cough. Negative for shortness of breath. Cardiovascular:  Positive for chest pain. Musculoskeletal:  Positive for back pain. All other systems reviewed and are negative. Except as noted above the remainder of the review of systems was reviewed and negative.        PAST MEDICAL HISTORY     Past Medical History:   Diagnosis Date    Allergic rhinitis 02/19/2018    Anxiety     Bilateral carotid artery stenosis 5/10/2023    Chronic back pain     Colon polyp     COPD (chronic obstructive pulmonary disease) (HCC)     Depression     Disorder of stomach     valve not closing properly    Emphysema lung ([address])     Essential hypertension 11/04/2019    Gastroesophageal reflux disease 10/04/2019    Hydronephrosis of left kidney 08/23/2023    Hyperlipidemia     meds > 22 yrs    Low back pain

## 2023-10-21 NOTE — ED TRIAGE NOTES
Patient in waiting room after checking out of being seen earlier this evening. Patient states he started having chest pains. Patient states his chest hurts on both the left and right side. Patient is alert and oriented at this time. Patient respirations are even and unlabored at this time. Patient skin p,w,d. Patient states to writer, \"isnt there a shot of pain medicine you can give me\".

## 2023-10-21 NOTE — ED NOTES
Patient alert and oriented at time. Patient states \"Can I get some morphine before I go\". Missie Skiff PA states no new orders at this time. Patient skin p,w,d. Patients respirations are even and unlabored at this time. Discharge instructions reviewed with patient. Patient verbalized understanding and states he is no signing anything until he gets a ride home. Writer informed patient we are attempting to get him a ride.       Mary Levi RN  10/21/23 4602

## 2023-10-21 NOTE — ED TRIAGE NOTES
Patient to ED via EMS. Patient states his back is hurting and has chronic back pain and he spoke to his Doctor and he stated he is not able to do anything to help him. Patient states he was seen here earlier today and was given no pain relief. Patient states he needs \"lots and lots of morphine\". Patient is alert and oriented at this time. Patient respirations are even and unlabored at this time. Patient skin p,w,d. Patient ambulatory into triage with assistance of pushing a wheelchair. Patient stated he did not want to sit in wheelchair at that time.

## 2023-10-23 ENCOUNTER — HOSPITAL ENCOUNTER (EMERGENCY)
Age: 68
Discharge: LEFT BEFORE DISCHARGE | End: 2023-10-24
Attending: EMERGENCY MEDICINE
Payer: MEDICARE

## 2023-10-23 ENCOUNTER — TELEPHONE (OUTPATIENT)
Dept: PAIN MANAGEMENT | Age: 68
End: 2023-10-23

## 2023-10-23 ENCOUNTER — OFFICE VISIT (OUTPATIENT)
Dept: FAMILY MEDICINE CLINIC | Age: 68
End: 2023-10-23
Payer: MEDICARE

## 2023-10-23 VITALS
WEIGHT: 116.2 LBS | BODY MASS INDEX: 17.61 KG/M2 | HEART RATE: 95 BPM | SYSTOLIC BLOOD PRESSURE: 114 MMHG | OXYGEN SATURATION: 94 % | DIASTOLIC BLOOD PRESSURE: 74 MMHG | HEIGHT: 68 IN

## 2023-10-23 VITALS
DIASTOLIC BLOOD PRESSURE: 64 MMHG | SYSTOLIC BLOOD PRESSURE: 104 MMHG | RESPIRATION RATE: 18 BRPM | HEART RATE: 83 BPM | TEMPERATURE: 97.9 F | OXYGEN SATURATION: 98 %

## 2023-10-23 DIAGNOSIS — R79.89 ELEVATED TROPONIN: ICD-10-CM

## 2023-10-23 DIAGNOSIS — D50.9 IRON DEFICIENCY ANEMIA, UNSPECIFIED IRON DEFICIENCY ANEMIA TYPE: ICD-10-CM

## 2023-10-23 DIAGNOSIS — M54.16 RADICULOPATHY, LUMBAR REGION: Primary | ICD-10-CM

## 2023-10-23 DIAGNOSIS — R07.9 CHEST PAIN, UNSPECIFIED TYPE: Primary | ICD-10-CM

## 2023-10-23 PROCEDURE — 99214 OFFICE O/P EST MOD 30 MIN: CPT | Performed by: NURSE PRACTITIONER

## 2023-10-23 PROCEDURE — 93005 ELECTROCARDIOGRAM TRACING: CPT | Performed by: EMERGENCY MEDICINE

## 2023-10-23 PROCEDURE — 3078F DIAST BP <80 MM HG: CPT | Performed by: NURSE PRACTITIONER

## 2023-10-23 PROCEDURE — 1123F ACP DISCUSS/DSCN MKR DOCD: CPT | Performed by: NURSE PRACTITIONER

## 2023-10-23 PROCEDURE — 99285 EMERGENCY DEPT VISIT HI MDM: CPT

## 2023-10-23 PROCEDURE — 93010 ELECTROCARDIOGRAM REPORT: CPT | Performed by: INTERNAL MEDICINE

## 2023-10-23 PROCEDURE — 3074F SYST BP LT 130 MM HG: CPT | Performed by: NURSE PRACTITIONER

## 2023-10-23 RX ORDER — GABAPENTIN 600 MG/1
TABLET ORAL
Qty: 90 TABLET | Refills: 0 | Status: SHIPPED | OUTPATIENT
Start: 2023-10-23 | End: 2023-12-04

## 2023-10-23 ASSESSMENT — ENCOUNTER SYMPTOMS
BLOOD IN STOOL: 0
COLOR CHANGE: 0
ABDOMINAL PAIN: 0
BACK PAIN: 1
SHORTNESS OF BREATH: 0
VOMITING: 0
DIARRHEA: 0
CHEST TIGHTNESS: 0
NAUSEA: 0

## 2023-10-23 ASSESSMENT — PAIN DESCRIPTION - PAIN TYPE: TYPE: ACUTE PAIN

## 2023-10-23 ASSESSMENT — PAIN SCALES - GENERAL: PAINLEVEL_OUTOF10: 9

## 2023-10-23 ASSESSMENT — PAIN DESCRIPTION - LOCATION: LOCATION: BACK

## 2023-10-23 ASSESSMENT — PAIN - FUNCTIONAL ASSESSMENT: PAIN_FUNCTIONAL_ASSESSMENT: 0-10

## 2023-10-23 NOTE — PROGRESS NOTES
Subjective  Luisana Vega, 77 y.o. male presents today with:  Chief Complaint   Patient presents with    Back Pain     Patient states that he had appt with pain management. C/o lower back pain no changes. I reviewed staff HPI/chief complaint and do agree with above    Patient presents in office today for concerns of chronic lower back pain with radiculopathy. He did recently see pain management and was started on tramadol which she states is mildly helping pain. Does continue on gabapentin and Tylenol also. Patient does have her medications with him in office today which were reviewed with patient, he does have a bottle of Advil and meloxicam in bag and states he is currently still taking these medications. Does have a repeat EGD on 11/1 to reassess duodenal ulcer. He currently denies any noted dark coffee-ground colored stools or emesis, or any bleeding noted with stools. Review of Systems   Constitutional:  Negative for chills, fatigue, fever and unexpected weight change. Respiratory:  Negative for chest tightness and shortness of breath. Cardiovascular:  Negative for chest pain, palpitations and leg swelling. Gastrointestinal:  Negative for abdominal pain, blood in stool, diarrhea, nausea and vomiting. Musculoskeletal:  Positive for back pain and gait problem (Due to pain). Negative for joint swelling, myalgias and neck stiffness. Skin:  Negative for color change and rash. Neurological:  Negative for dizziness, weakness, light-headedness and headaches. Psychiatric/Behavioral:  Positive for agitation (Frustrated due to chronic pain). Negative for suicidal ideas.         Past Medical History:   Diagnosis Date    Allergic rhinitis 02/19/2018    Anxiety     Bilateral carotid artery stenosis 5/10/2023    Chronic back pain     Colon polyp     COPD (chronic obstructive pulmonary disease) (HCC)     Depression     Disorder of stomach     valve not closing properly

## 2023-10-23 NOTE — TELEPHONE ENCOUNTER
Called by the patient on Friday, 10/20/2023 at 8:21 PM as well as Friday, 10/20/2023 at 9:13 PM.  This is a late entry note due to lack of access to electronic medical record. Returned the patient's call back both times at [phone]. The patient has severe pain. His pain is poorly controlled with his current medications. The patient is requesting an injection for pain. We discussed how we have to try and continue to try interventions to help with his pain. He has significant surgical pathology for which surgery has been recommended he is undergoing medical evaluation clearance and diagnostic testing for other medical comorbidities. We discussed the limitations and risks involved in repeat injections in his prior treatment and response to injections. The patient on the second phone call requested that I meet him at the hospital to do an injection. I discussed how unfortunately was not available to help him with this Friday evening after 9 PM.  Furthermore discussed the specialized equipment needs to perform such injections. This did not satisfy the patient due to the severe pain. I discussed that he should try to call the office during business hours to try to schedule an earlier follow-up to discuss his needs and response to his current treatments. More importantly I encouraged him to keep his appointments with his other providers to stabilize himself medically to undergo potentially spine surgery to help him with his underlying surgical spine lumbar spine pathology. The patient was not satisfied with this response but he expressed understanding. He will follow-up with our office during business hours. He will keep his appointments with us and all other primary care and medical specialists as previously recommended. Advised him to call 911 or go to the emergency room for severe pain.   Any bowel bladder dysfunction saddle anesthesia worsening numbness or tingling or any other concerning

## 2023-10-24 ENCOUNTER — TELEPHONE (OUTPATIENT)
Dept: FAMILY MEDICINE CLINIC | Age: 68
End: 2023-10-24

## 2023-10-24 ENCOUNTER — APPOINTMENT (OUTPATIENT)
Dept: GENERAL RADIOLOGY | Age: 68
End: 2023-10-24
Payer: MEDICARE

## 2023-10-24 LAB
ALBUMIN SERPL-MCNC: 3.9 G/DL (ref 3.5–4.6)
ALP SERPL-CCNC: 67 U/L (ref 35–104)
ALT SERPL-CCNC: 7 U/L (ref 0–41)
ANION GAP SERPL CALCULATED.3IONS-SCNC: 9 MEQ/L (ref 9–15)
APTT PPP: 30 SEC (ref 24.4–36.8)
AST SERPL-CCNC: 13 U/L (ref 0–40)
BASOPHILS # BLD: 0.1 K/UL (ref 0–0.2)
BASOPHILS NFR BLD: 0.5 %
BILIRUB SERPL-MCNC: <0.2 MG/DL (ref 0.2–0.7)
BNP BLD-MCNC: 967 PG/ML
BUN SERPL-MCNC: 26 MG/DL (ref 8–23)
CALCIUM SERPL-MCNC: 9.1 MG/DL (ref 8.5–9.9)
CHLORIDE SERPL-SCNC: 104 MEQ/L (ref 95–107)
CO2 SERPL-SCNC: 26 MEQ/L (ref 20–31)
CREAT SERPL-MCNC: 0.98 MG/DL (ref 0.7–1.2)
D DIMER PPP FEU-MCNC: 0.44 MG/L FEU (ref 0–0.5)
EKG ATRIAL RATE: 68 BPM
EKG P AXIS: 79 DEGREES
EKG P-R INTERVAL: 100 MS
EKG Q-T INTERVAL: 388 MS
EKG QRS DURATION: 100 MS
EKG QTC CALCULATION (BAZETT): 412 MS
EKG R AXIS: 72 DEGREES
EKG T AXIS: 90 DEGREES
EKG VENTRICULAR RATE: 68 BPM
EOSINOPHIL # BLD: 0.3 K/UL (ref 0–0.7)
EOSINOPHIL NFR BLD: 2.7 %
ERYTHROCYTE [DISTWIDTH] IN BLOOD BY AUTOMATED COUNT: 16.7 % (ref 11.5–14.5)
GLOBULIN SER CALC-MCNC: 2.5 G/DL (ref 2.3–3.5)
GLUCOSE SERPL-MCNC: 90 MG/DL (ref 70–99)
HCT VFR BLD AUTO: 28.3 % (ref 42–52)
HGB BLD-MCNC: 8.6 G/DL (ref 14–18)
INR PPP: 1
LIPASE SERPL-CCNC: 17 U/L (ref 12–95)
LYMPHOCYTES # BLD: 2 K/UL (ref 1–4.8)
LYMPHOCYTES NFR BLD: 21.4 %
MCH RBC QN AUTO: 28.1 PG (ref 27–31.3)
MCHC RBC AUTO-ENTMCNC: 30.4 % (ref 33–37)
MCV RBC AUTO: 92.5 FL (ref 79–92.2)
MONOCYTES # BLD: 0.6 K/UL (ref 0.2–0.8)
MONOCYTES NFR BLD: 6.4 %
NEUTROPHILS # BLD: 6.3 K/UL (ref 1.4–6.5)
NEUTS SEG NFR BLD: 68.6 %
PLATELET # BLD AUTO: 396 K/UL (ref 130–400)
POTASSIUM SERPL-SCNC: 4.2 MEQ/L (ref 3.4–4.9)
PROT SERPL-MCNC: 6.4 G/DL (ref 6.3–8)
PROTHROMBIN TIME: 13.3 SEC (ref 12.3–14.9)
RBC # BLD AUTO: 3.06 M/UL (ref 4.7–6.1)
SODIUM SERPL-SCNC: 139 MEQ/L (ref 135–144)
TROPONIN, HIGH SENSITIVITY: 78 NG/L (ref 0–19)
TROPONIN, HIGH SENSITIVITY: 78 NG/L (ref 0–19)
WBC # BLD AUTO: 9.2 K/UL (ref 4.8–10.8)

## 2023-10-24 PROCEDURE — 85730 THROMBOPLASTIN TIME PARTIAL: CPT

## 2023-10-24 PROCEDURE — 85025 COMPLETE CBC W/AUTO DIFF WBC: CPT

## 2023-10-24 PROCEDURE — 80053 COMPREHEN METABOLIC PANEL: CPT

## 2023-10-24 PROCEDURE — 85610 PROTHROMBIN TIME: CPT

## 2023-10-24 PROCEDURE — 83880 ASSAY OF NATRIURETIC PEPTIDE: CPT

## 2023-10-24 PROCEDURE — 71046 X-RAY EXAM CHEST 2 VIEWS: CPT

## 2023-10-24 PROCEDURE — 83690 ASSAY OF LIPASE: CPT

## 2023-10-24 PROCEDURE — 85379 FIBRIN DEGRADATION QUANT: CPT

## 2023-10-24 PROCEDURE — 6370000000 HC RX 637 (ALT 250 FOR IP): Performed by: EMERGENCY MEDICINE

## 2023-10-24 PROCEDURE — 84484 ASSAY OF TROPONIN QUANT: CPT

## 2023-10-24 PROCEDURE — 36415 COLL VENOUS BLD VENIPUNCTURE: CPT

## 2023-10-24 RX ORDER — ACETAMINOPHEN 325 MG/1
650 TABLET ORAL ONCE
Status: COMPLETED | OUTPATIENT
Start: 2023-10-24 | End: 2023-10-24

## 2023-10-24 RX ORDER — ASPIRIN 81 MG/1
324 TABLET, CHEWABLE ORAL ONCE
Status: COMPLETED | OUTPATIENT
Start: 2023-10-24 | End: 2023-10-24

## 2023-10-24 RX ORDER — LIDOCAINE 4 G/G
1 PATCH TOPICAL ONCE
Status: DISCONTINUED | OUTPATIENT
Start: 2023-10-24 | End: 2023-10-24 | Stop reason: HOSPADM

## 2023-10-24 RX ADMIN — ASPIRIN 324 MG: 81 TABLET, CHEWABLE ORAL at 01:34

## 2023-10-24 RX ADMIN — ACETAMINOPHEN 650 MG: 325 TABLET ORAL at 00:21

## 2023-10-24 ASSESSMENT — ENCOUNTER SYMPTOMS
VOMITING: 0
BACK PAIN: 1
SHORTNESS OF BREATH: 0
ABDOMINAL PAIN: 0

## 2023-10-24 ASSESSMENT — HEART SCORE: ECG: 0

## 2023-10-24 NOTE — DISCHARGE INSTRUCTIONS
You have left AGAINST MEDICAL ADVICE. This may result in permanent disability or death. Follow-up with cardiology. Return for any changes. Thank you.

## 2023-10-24 NOTE — TELEPHONE ENCOUNTER
FYI-Pt calling stating that he was to call [facility] first thing in the morning, to schedule his back surgery. Advised that [facility] does not do this, would have to see pain management. Pt was told by a friend to call her this morning to get this scheduled. Pt was advised to call pain management, pt stated that I was passing him onto someone else. Pt hung up.

## 2023-10-24 NOTE — ED NOTES
Pt refusing to change into gown. Pt refusing for vitals to be taken. Pt refusing to be placed on the cardiac monitor.        Gilmer Urbina RN  10/24/23 2285

## 2023-10-24 NOTE — ED NOTES
Pt refusing to sign AMA form. Pt stating \"I'm leaving this place, this place is not a hospital\". [facility] PD escorted pt out to lobby.       Philippe Sheth RN  10/24/23 9914

## 2023-10-24 NOTE — ED NOTES
Pt lowered himself on to his knees in the hallway outside of his room. Pt states \"I want to stay out here, I'm not a child\". Pt stating \"I want to go out to the lobby\", this nurse informed pt that if he would like to leave he can leave AMA and his IV will be removed. Pt returned to room.       Anais Redd RN  10/24/23 0124

## 2023-10-24 NOTE — ED NOTES
Pt stating \"I want something stronger for pain\". Pt informed that he received tylenol and he refused the lidocaine patch that was ordered.       David Mcginnis RN  10/24/23 0126

## 2023-10-24 NOTE — ED TRIAGE NOTES
Pt to ED due to chronic back pain. Pt states he has had chest pain due to breathing in cold air. Pt states his back pain is the same as his chronic back pain. Pt is alert and oriented x4. Skin is warm, dry, intact.  Resp are regular and equal.

## 2023-10-24 NOTE — ED PROVIDER NOTES
[facility] ED  EMERGENCY DEPARTMENT ENCOUNTER      Pt Name: Adolfo Willingham  MRN: [id number]  [address] [facility] [address] 1955  Date of evaluation: 10/23/2023  Provider: Danii Singh MD  12:10 AM EDT    CHIEF COMPLAINT       Chief Complaint   Patient presents with    Back Pain     Chronic         HISTORY OF PRESENT ILLNESS   (Location/Symptom, Timing/Onset, Context/Setting, Quality, Duration, Modifying Factors, Severity)  Note limiting factors. Adolfo Willingham is a 77 y.o. male who presents to the emergency department for evaluation of chest pain. Presents via private vehicle. He says that he has chronic back pain. He drove himself here. He takes gabapentin and tramadol. Has bandlike lower back pain, chronic. No evolution or change. Present at rest, reproducible, worsens with twisting and bending. No reported traumatic injury, anticoagulation use, IV drug use, saddle anesthesia, bladder incontinence, new numbness or weakness. He states that today he has had left chest wall pain that is reproducible. He denies traumatic injury. No fever, neck or jaw pain, shortness of breath but states \" breathing in cold air does not help\". No recent surgeries, history of DVT or PE, hemoptysis, exogenous hormone use. Chart review notes history of frequent utilization of the emergency department  HPI  Chart review notes history of chronic back pain, tobacco abuse, obstructive sleep apnea, hypertension, hyperlipidemia, depression/anxiety, GERD, COPD, alcoholism, chronic anemia, nephrolithiasis  Nursing Notes were reviewed. REVIEW OF SYSTEMS    (2-9 systems for level 4, 10 or more for level 5)     Review of Systems   Constitutional:  Negative for fever. Respiratory:  Negative for shortness of breath. Cardiovascular:  Positive for chest pain (L ribs). Gastrointestinal:  Negative for abdominal pain and vomiting. Genitourinary:  Negative for dysuria and hematuria.    Musculoskeletal:  Positive for back pain

## 2023-10-25 ASSESSMENT — ENCOUNTER SYMPTOMS
BACK PAIN: 1
COUGH: 0
SHORTNESS OF BREATH: 0
TROUBLE SWALLOWING: 0
VOICE CHANGE: 0
COLOR CHANGE: 0

## 2023-10-30 ENCOUNTER — HOSPITAL ENCOUNTER (EMERGENCY)
Age: 68
Discharge: HOME | End: 2023-10-30
Payer: MEDICARE

## 2023-10-30 VITALS
WEIGHT: 116 LBS | TEMPERATURE: 97.8 F | HEART RATE: 74 BPM | BODY MASS INDEX: 17.58 KG/M2 | HEIGHT: 68 IN | OXYGEN SATURATION: 97 % | RESPIRATION RATE: 18 BRPM | SYSTOLIC BLOOD PRESSURE: 102 MMHG | DIASTOLIC BLOOD PRESSURE: 80 MMHG

## 2023-10-30 DIAGNOSIS — M54.50 CHRONIC LOW BACK PAIN, UNSPECIFIED BACK PAIN LATERALITY, UNSPECIFIED WHETHER SCIATICA PRESENT: Primary | ICD-10-CM

## 2023-10-30 DIAGNOSIS — G89.29 CHRONIC LOW BACK PAIN, UNSPECIFIED BACK PAIN LATERALITY, UNSPECIFIED WHETHER SCIATICA PRESENT: Primary | ICD-10-CM

## 2023-10-30 PROCEDURE — 99283 EMERGENCY DEPT VISIT LOW MDM: CPT

## 2023-10-30 PROCEDURE — 6370000000 HC RX 637 (ALT 250 FOR IP): Performed by: PHYSICIAN ASSISTANT

## 2023-10-30 RX ORDER — SODIUM CHLORIDE 0.9 % (FLUSH) 0.9 %
5-40 SYRINGE (ML) INJECTION EVERY 12 HOURS SCHEDULED
Status: CANCELLED | OUTPATIENT
Start: 2023-10-30

## 2023-10-30 RX ORDER — NAPROXEN 250 MG/1
500 TABLET ORAL ONCE
Status: COMPLETED | OUTPATIENT
Start: 2023-10-30 | End: 2023-10-30

## 2023-10-30 RX ORDER — SODIUM CHLORIDE 9 MG/ML
INJECTION, SOLUTION INTRAVENOUS PRN
Status: CANCELLED | OUTPATIENT
Start: 2023-10-30

## 2023-10-30 RX ORDER — SODIUM CHLORIDE 9 MG/ML
INJECTION, SOLUTION INTRAVENOUS CONTINUOUS
Status: CANCELLED | OUTPATIENT
Start: 2023-10-30

## 2023-10-30 RX ADMIN — NAPROXEN 500 MG: 250 TABLET ORAL at 01:03

## 2023-10-30 ASSESSMENT — PAIN SCALES - GENERAL
PAINLEVEL_OUTOF10: 10
PAINLEVEL_OUTOF10: 10

## 2023-10-30 ASSESSMENT — PAIN DESCRIPTION - LOCATION: LOCATION: BACK

## 2023-10-30 ASSESSMENT — ENCOUNTER SYMPTOMS: BACK PAIN: 1

## 2023-10-30 ASSESSMENT — PAIN DESCRIPTION - PAIN TYPE: TYPE: CHRONIC PAIN

## 2023-10-30 ASSESSMENT — PAIN - FUNCTIONAL ASSESSMENT
PAIN_FUNCTIONAL_ASSESSMENT: 0-10
PAIN_FUNCTIONAL_ASSESSMENT: 0-10

## 2023-10-30 NOTE — ED TRIAGE NOTES
Pt with chronic back pain, reports back is hurting today. Pt walked to EMS stretcher with steady gait using cane from home. Pt reports he took acetaminophen PTA.

## 2023-10-30 NOTE — ED PROVIDER NOTES
ALLERGIES     Alcohol, Benadryl [diphenhydramine], Demerol hcl [meperidine], and Sulfa antibiotics    HISTORY       Family History   Problem Relation Age of Onset    Cancer Mother         stomach    Arthritis Mother     Heart Disease Father 48    Cancer Father         bone    No Known Problems Sister     Cancer Brother     Heart Disease Brother         hole in heart in 65 at 1 months age    Cancer Maternal Grandmother         lung    Cancer Paternal Grandmother     Heart Disease Paternal Grandfather     Colon Cancer Neg Hx           SOCIAL HISTORY       Social History     Socioeconomic History    Marital status:      Spouse name: None    Number of children: 2    Years of education: 12    Highest education level: High school graduate   Tobacco Use    Smoking status: Every Day     Packs/day: 2.00     Years: 52.00     Additional pack years: 0.00     Total pack years: 104.00     Types: Cigars, Cigarettes     Start date: 1970    Smokeless tobacco: Never    Tobacco comments:     3-5 cigars qd   Vaping Use    Vaping Use: Never used   Substance and Sexual Activity    Alcohol use: No     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    Drug use: No    Sexual activity: Not Currently     Partners: Female     Social Determinants of Health     Financial Resource Strain: Low Risk  (2/3/2023)    Overall Financial Resource Strain (CARDIA)     Difficulty of Paying Living Expenses: Not hard at all   Food Insecurity: No Food Insecurity (2/3/2023)    Hunger Vital Sign     Worried About Running Out of Food in the Last Year: Never true     Ran Out of Food in the Last Year: Never true   Transportation Needs: Unknown (2/3/2023)    PRAPARE - Transportation     Lack of Transportation (Non-Medical):  No   Physical Activity: Insufficiently Active (2/28/2023)    Exercise Vital Sign     Days of Exercise per Week: 7 days     Minutes of Exercise per Session: 20 min   Housing Stability: Unknown (2/3/2023)    Housing Stability Vital Sign     Unstable

## 2023-10-31 ENCOUNTER — TELEPHONE (OUTPATIENT)
Dept: FAMILY MEDICINE CLINIC | Age: 68
End: 2023-10-31

## 2023-10-31 DIAGNOSIS — M54.50 CHRONIC LOW BACK PAIN, UNSPECIFIED BACK PAIN LATERALITY, UNSPECIFIED WHETHER SCIATICA PRESENT: ICD-10-CM

## 2023-10-31 DIAGNOSIS — G89.29 CHRONIC LOW BACK PAIN, UNSPECIFIED BACK PAIN LATERALITY, UNSPECIFIED WHETHER SCIATICA PRESENT: ICD-10-CM

## 2023-10-31 DIAGNOSIS — M54.16 RADICULOPATHY, LUMBAR REGION: Primary | ICD-10-CM

## 2023-10-31 DIAGNOSIS — M51.26 OTHER INTERVERTEBRAL DISC DISPLACEMENT, LUMBAR REGION: ICD-10-CM

## 2023-10-31 NOTE — TELEPHONE ENCOUNTER
Pt calling on the status of the home health care order. Pt is asking for someone to come to his home to help him with his home he is not able to take care of himself. He wants someone from the office to come to his home he does not want an ambulance to come to his home. He wants a provider or nurse to come and help him. Pt is laying on the floor, I asked pt if he fell or if he had laided himself on the floor, and if he is able to get up. Pt stated that he laided himself on the floor, and is able to get up but will be in a lot of pain. Pt stated that he wanted a provider or nurse from the office to come to him home immediately. Pt hung up on me.

## 2023-11-01 NOTE — TELEPHONE ENCOUNTER
Preston Mukherjee from [facility] home care calling she went to pt;'s house to start home care was thrown out after she explained why she was there.  When she called pt he was so nice then 2 hrs later didn't know why he needed home care        Ruth Lunsford

## 2023-11-01 NOTE — TELEPHONE ENCOUNTER
We are not legally able to come to his home from the office. I put in a home health order. Has he ever thought about seeing if he can get into a nursing facility? Does he have any family that he would like us to call?

## 2023-11-02 ENCOUNTER — APPOINTMENT (OUTPATIENT)
Dept: GENERAL RADIOLOGY | Age: 68
End: 2023-11-02
Payer: MEDICARE

## 2023-11-02 ENCOUNTER — HOSPITAL ENCOUNTER (EMERGENCY)
Age: 68
Discharge: HOME | End: 2023-11-02
Payer: MEDICARE

## 2023-11-02 VITALS
OXYGEN SATURATION: 96 % | HEART RATE: 94 BPM | DIASTOLIC BLOOD PRESSURE: 84 MMHG | BODY MASS INDEX: 17.58 KG/M2 | HEIGHT: 68 IN | TEMPERATURE: 97.6 F | SYSTOLIC BLOOD PRESSURE: 128 MMHG | WEIGHT: 116 LBS | RESPIRATION RATE: 21 BRPM

## 2023-11-02 DIAGNOSIS — J44.9 CHRONIC OBSTRUCTIVE PULMONARY DISEASE, UNSPECIFIED COPD TYPE (HCC): Primary | ICD-10-CM

## 2023-11-02 LAB
ALBUMIN SERPL-MCNC: 3.5 G/DL (ref 3.5–4.6)
ALP SERPL-CCNC: 76 U/L (ref 35–104)
ALT SERPL-CCNC: 7 U/L (ref 0–41)
ANION GAP SERPL CALCULATED.3IONS-SCNC: 11 MEQ/L (ref 9–15)
AST SERPL-CCNC: 12 U/L (ref 0–40)
BASOPHILS # BLD: 0 K/UL (ref 0–0.2)
BASOPHILS NFR BLD: 0.3 %
BILIRUB SERPL-MCNC: <0.2 MG/DL (ref 0.2–0.7)
BUN SERPL-MCNC: 31 MG/DL (ref 8–23)
CALCIUM SERPL-MCNC: 9.1 MG/DL (ref 8.5–9.9)
CHLORIDE SERPL-SCNC: 104 MEQ/L (ref 95–107)
CO2 SERPL-SCNC: 24 MEQ/L (ref 20–31)
CREAT SERPL-MCNC: 0.95 MG/DL (ref 0.7–1.2)
D DIMER PPP FEU-MCNC: 0.4 MG/L FEU (ref 0–0.5)
EOSINOPHIL # BLD: 0 K/UL (ref 0–0.7)
EOSINOPHIL NFR BLD: 0.3 %
ERYTHROCYTE [DISTWIDTH] IN BLOOD BY AUTOMATED COUNT: 17 % (ref 11.5–14.5)
GLOBULIN SER CALC-MCNC: 2.7 G/DL (ref 2.3–3.5)
GLUCOSE SERPL-MCNC: 96 MG/DL (ref 70–99)
HCT VFR BLD AUTO: 29.2 % (ref 42–52)
HGB BLD-MCNC: 9.1 G/DL (ref 14–18)
LYMPHOCYTES # BLD: 1.6 K/UL (ref 1–4.8)
LYMPHOCYTES NFR BLD: 22.4 %
MAGNESIUM SERPL-MCNC: 2.1 MG/DL (ref 1.7–2.4)
MCH RBC QN AUTO: 27.7 PG (ref 27–31.3)
MCHC RBC AUTO-ENTMCNC: 31.2 % (ref 33–37)
MCV RBC AUTO: 88.8 FL (ref 79–92.2)
MONOCYTES # BLD: 0.4 K/UL (ref 0.2–0.8)
MONOCYTES NFR BLD: 5.3 %
NEUTROPHILS # BLD: 5.2 K/UL (ref 1.4–6.5)
NEUTS SEG NFR BLD: 71.4 %
PLATELET # BLD AUTO: 317 K/UL (ref 130–400)
POTASSIUM SERPL-SCNC: 3.9 MEQ/L (ref 3.4–4.9)
PROT SERPL-MCNC: 6.2 G/DL (ref 6.3–8)
RBC # BLD AUTO: 3.29 M/UL (ref 4.7–6.1)
SODIUM SERPL-SCNC: 139 MEQ/L (ref 135–144)
TROPONIN, HIGH SENSITIVITY: 24 NG/L (ref 0–19)
TROPONIN, HIGH SENSITIVITY: 26 NG/L (ref 0–19)
TROPONIN, HIGH SENSITIVITY: 29 NG/L (ref 0–19)
WBC # BLD AUTO: 7.3 K/UL (ref 4.8–10.8)

## 2023-11-02 PROCEDURE — 84484 ASSAY OF TROPONIN QUANT: CPT

## 2023-11-02 PROCEDURE — 80053 COMPREHEN METABOLIC PANEL: CPT

## 2023-11-02 PROCEDURE — 6360000002 HC RX W HCPCS: Performed by: PHYSICIAN ASSISTANT

## 2023-11-02 PROCEDURE — 36415 COLL VENOUS BLD VENIPUNCTURE: CPT

## 2023-11-02 PROCEDURE — 85379 FIBRIN DEGRADATION QUANT: CPT

## 2023-11-02 PROCEDURE — 6370000000 HC RX 637 (ALT 250 FOR IP): Performed by: PHYSICIAN ASSISTANT

## 2023-11-02 PROCEDURE — 93005 ELECTROCARDIOGRAM TRACING: CPT | Performed by: STUDENT IN AN ORGANIZED HEALTH CARE EDUCATION/TRAINING PROGRAM

## 2023-11-02 PROCEDURE — 83735 ASSAY OF MAGNESIUM: CPT

## 2023-11-02 PROCEDURE — 96365 THER/PROPH/DIAG IV INF INIT: CPT

## 2023-11-02 PROCEDURE — 71045 X-RAY EXAM CHEST 1 VIEW: CPT

## 2023-11-02 PROCEDURE — 94640 AIRWAY INHALATION TREATMENT: CPT

## 2023-11-02 PROCEDURE — 96375 TX/PRO/DX INJ NEW DRUG ADDON: CPT

## 2023-11-02 PROCEDURE — 99285 EMERGENCY DEPT VISIT HI MDM: CPT

## 2023-11-02 PROCEDURE — 94761 N-INVAS EAR/PLS OXIMETRY MLT: CPT

## 2023-11-02 PROCEDURE — 85025 COMPLETE CBC W/AUTO DIFF WBC: CPT

## 2023-11-02 RX ORDER — ALBUTEROL SULFATE 2.5 MG/3ML
2.5 SOLUTION RESPIRATORY (INHALATION) EVERY 6 HOURS PRN
Qty: 1 EACH | Refills: 0 | Status: SHIPPED | OUTPATIENT
Start: 2023-11-02 | End: 2023-12-02

## 2023-11-02 RX ORDER — IPRATROPIUM BROMIDE AND ALBUTEROL SULFATE 2.5; .5 MG/3ML; MG/3ML
1 SOLUTION RESPIRATORY (INHALATION)
Status: DISCONTINUED | OUTPATIENT
Start: 2023-11-02 | End: 2023-11-03 | Stop reason: HOSPADM

## 2023-11-02 RX ORDER — MAGNESIUM SULFATE 1 G/100ML
1000 INJECTION INTRAVENOUS ONCE
Status: COMPLETED | OUTPATIENT
Start: 2023-11-02 | End: 2023-11-02

## 2023-11-02 RX ADMIN — MAGNESIUM SULFATE HEPTAHYDRATE 1000 MG: 1 INJECTION, SOLUTION INTRAVENOUS at 15:47

## 2023-11-02 RX ADMIN — METHYLPREDNISOLONE SODIUM SUCCINATE 125 MG: 125 INJECTION, POWDER, FOR SOLUTION INTRAMUSCULAR; INTRAVENOUS at 15:47

## 2023-11-02 RX ADMIN — IPRATROPIUM BROMIDE AND ALBUTEROL SULFATE 1 DOSE: 2.5; .5 SOLUTION RESPIRATORY (INHALATION) at 15:10

## 2023-11-02 ASSESSMENT — ENCOUNTER SYMPTOMS
SHORTNESS OF BREATH: 1
CONSTIPATION: 0
COLOR CHANGE: 0
BACK PAIN: 1
EYE DISCHARGE: 0
ABDOMINAL PAIN: 0
NAUSEA: 0
VOMITING: 0
RHINORRHEA: 0
ABDOMINAL DISTENTION: 0
SORE THROAT: 0

## 2023-11-02 ASSESSMENT — PAIN DESCRIPTION - LOCATION: LOCATION: BACK

## 2023-11-02 ASSESSMENT — PAIN DESCRIPTION - DESCRIPTORS: DESCRIPTORS: ACHING

## 2023-11-02 ASSESSMENT — PAIN - FUNCTIONAL ASSESSMENT: PAIN_FUNCTIONAL_ASSESSMENT: 0-10

## 2023-11-02 ASSESSMENT — PAIN SCALES - GENERAL: PAINLEVEL_OUTOF10: 10

## 2023-11-02 NOTE — ED NOTES
Pt up to bedside and is able to stand without assistance  Pt then decided to sit in chair at bedside      Jerome Albert RN  11/02/23 4438

## 2023-11-02 NOTE — ED TRIAGE NOTES
Pt arrived by Ems with report of generalized weakness and shortness of breathe     Pt is on RA     Pt has unlabored breathing, and can breathe without distress     Pt has decreased appetite      Per report \"pts neighbors concerned that pt is not caring for him self and declining in health\"     Pt is alert and oriented     Pt states \"its hurts his rib cages when he breathes\"     Pt vomiting on arrival

## 2023-11-02 NOTE — CARE COORDINATION
This nurse spoke with the patient. He states \"they're jerking me around I want to get out of here. \" I did review his d/c needs with him. He states that he lives alone, he gets food from the [employer] although he states \"when I go\" he states that he does drive. He states that he sees Cristian Valle NP and was seen at her office 10/23/23. He is also in a pain management program. He states that he lives in a 1 story house with no steps. He states that he has a wheeled walker, 2 canes: 1 quad cane and 1 straight. He states that he has a regular wheel chair. He states that with regard to a living will \"that's between me and my \" but did state that he would accept cpr/vent in case of an emergency.

## 2023-11-02 NOTE — ED NOTES
Spoke to Raymon and states that the phlebotomist that shun lab at [phone] states that she sent to lab. Call placed to Lab at this time and updated that phlebotomist states that the lab was sent.  states that it shows collected but that it was not received.        Saurabh Wiley RN  11/02/23 1939

## 2023-11-02 NOTE — ED NOTES
Taking over the care of pt. Pt resting in bed, a&ox4, skin w/d/slightly pale, resp. Even and non-labored.      Bhavana Robertson RN  11/02/23 1947

## 2023-11-02 NOTE — ED NOTES
Pt states that he has food at home but that he does not feel like eating   Pt states \"I can't make myself eat\".       Julia Adamson RN  11/02/23 5194

## 2023-11-02 NOTE — ED NOTES
Call placed to lab and spoke with Sahil Munroe states that they never received it      Trell Dooley RN  11/02/23 1935

## 2023-11-02 NOTE — ED NOTES
Pt sitting up in chair at this time      Skylar Chery, RN  11/02/23 2000 Bradley Arteaga, [address], RN  11/02/23 0687

## 2023-11-02 NOTE — ED NOTES
Pt previously refused lab draw  Pt states that he wants to leave to provider and then states \"ok Ill stay\"   Lab at beside at this time        Noel Cabezas RN  11/02/23 1918

## 2023-11-02 NOTE — ED PROVIDER NOTES
medical doctor, as well as pulmonologist.  Patient advised return to the ED if there is any worsening or change in symptoms. Amount and/or Complexity of Data Reviewed  Labs: ordered. Radiology: ordered. Risk  Prescription drug management. REASSESSMENT     ED Course as of 11/03/23 1137   Thu Nov 02, 2023   1816 Delay in 2nd troponin collection 2/2 patient refusing additional lab draw by phlebotomy. Care signed out to me at 1800, went in to speak with the patient and he states he will now let labs be drawn. Phlebotomy notified. [CA]      ED Course User Index  [CA] Trish Delgadillo         CRITICAL CARE TIME   Total Critical Care time was  minutes, excluding separately reportable procedures. There was a high probability of clinically significant/life threatening deterioration in the patient's condition which required my urgent intervention. CONSULTS:  None    PROCEDURES:  Unless otherwise noted below, none     Procedures        FINAL IMPRESSION      1. Chronic obstructive pulmonary disease, unspecified COPD type [facility])          DISPOSITION/PLAN   DISPOSITION Decision To Discharge 11/02/2023 09:43:44 PM      PATIENT REFERRED TO:  Maggie Morris, APRN - CNP  [address] [phone]    In 3 days      [address] [place] [address]  [address]  [phone]    In 1 week        DISCHARGE MEDICATIONS:  Discharge Medication List as of 11/2/2023  9:43 PM        START taking these medications    Details   albuterol (PROVENTIL) (2.5 MG/3ML) 0.083% nebulizer solution Take 3 mLs by nebulization every 6 hours as needed for Wheezing, Disp-1 each, R-0Normal           Controlled Substances Monitoring:     RX Monitoring Periodic Controlled Substance Monitoring   10/3/2023   2:15 PM Possible medication side effects, risk of tolerance/dependence & alternative treatments discussed.        (Please note that portions of this note were completed with a

## 2023-11-02 NOTE — ED NOTES
Unable ot obtain labs from IV   1530 attempted to contact phlebotomist and unable to will reattempt  [phone] lab at bedside at this time        Lauren Key RN  11/02/23 [address] [place] [address]

## 2023-11-03 ENCOUNTER — HOSPITAL ENCOUNTER (EMERGENCY)
Age: 68
Discharge: HOME | End: 2023-11-03
Payer: MEDICARE

## 2023-11-03 ENCOUNTER — HOSPITAL ENCOUNTER (EMERGENCY)
Age: 68
Discharge: HOME | End: 2023-11-03
Attending: EMERGENCY MEDICINE
Payer: MEDICARE

## 2023-11-03 VITALS
OXYGEN SATURATION: 98 % | HEART RATE: 97 BPM | DIASTOLIC BLOOD PRESSURE: 72 MMHG | WEIGHT: 116 LBS | SYSTOLIC BLOOD PRESSURE: 100 MMHG | HEIGHT: 68 IN | BODY MASS INDEX: 17.58 KG/M2 | TEMPERATURE: 97.3 F | RESPIRATION RATE: 16 BRPM

## 2023-11-03 VITALS
HEART RATE: 94 BPM | HEIGHT: 68 IN | WEIGHT: 116 LBS | BODY MASS INDEX: 17.58 KG/M2 | DIASTOLIC BLOOD PRESSURE: 76 MMHG | SYSTOLIC BLOOD PRESSURE: 137 MMHG | TEMPERATURE: 97.7 F | OXYGEN SATURATION: 100 % | RESPIRATION RATE: 18 BRPM

## 2023-11-03 DIAGNOSIS — R11.2 NAUSEA AND VOMITING, UNSPECIFIED VOMITING TYPE: Primary | ICD-10-CM

## 2023-11-03 DIAGNOSIS — G89.29 CHRONIC LOW BACK PAIN, UNSPECIFIED BACK PAIN LATERALITY, UNSPECIFIED WHETHER SCIATICA PRESENT: Primary | ICD-10-CM

## 2023-11-03 DIAGNOSIS — M54.50 CHRONIC LOW BACK PAIN, UNSPECIFIED BACK PAIN LATERALITY, UNSPECIFIED WHETHER SCIATICA PRESENT: Primary | ICD-10-CM

## 2023-11-03 DIAGNOSIS — G89.29 ACUTE EXACERBATION OF CHRONIC LOW BACK PAIN: ICD-10-CM

## 2023-11-03 DIAGNOSIS — M54.50 ACUTE EXACERBATION OF CHRONIC LOW BACK PAIN: ICD-10-CM

## 2023-11-03 LAB
EKG ATRIAL RATE: 103 BPM
EKG P AXIS: 79 DEGREES
EKG P-R INTERVAL: 142 MS
EKG Q-T INTERVAL: 370 MS
EKG QRS DURATION: 100 MS
EKG QTC CALCULATION (BAZETT): 484 MS
EKG R AXIS: 86 DEGREES
EKG T AXIS: 94 DEGREES
EKG VENTRICULAR RATE: 103 BPM

## 2023-11-03 PROCEDURE — 99283 EMERGENCY DEPT VISIT LOW MDM: CPT

## 2023-11-03 PROCEDURE — 6370000000 HC RX 637 (ALT 250 FOR IP): Performed by: EMERGENCY MEDICINE

## 2023-11-03 RX ORDER — ONDANSETRON 4 MG/1
4 TABLET, ORALLY DISINTEGRATING ORAL ONCE
Status: COMPLETED | OUTPATIENT
Start: 2023-11-03 | End: 2023-11-03

## 2023-11-03 RX ORDER — ACETAMINOPHEN 500 MG
1000 TABLET ORAL ONCE
Status: COMPLETED | OUTPATIENT
Start: 2023-11-03 | End: 2023-11-03

## 2023-11-03 RX ADMIN — ONDANSETRON 4 MG: 4 TABLET, ORALLY DISINTEGRATING ORAL at 06:06

## 2023-11-03 RX ADMIN — ACETAMINOPHEN 1000 MG: 325 TABLET ORAL at 06:05

## 2023-11-03 ASSESSMENT — ENCOUNTER SYMPTOMS
SHORTNESS OF BREATH: 0
SORE THROAT: 0
BACK PAIN: 1
VOMITING: 1
NAUSEA: 1
EYE DISCHARGE: 0
CHEST TIGHTNESS: 0
WHEEZING: 0
PHOTOPHOBIA: 0
ABDOMINAL DISTENTION: 0
COUGH: 0
BACK PAIN: 1
ABDOMINAL PAIN: 0

## 2023-11-03 ASSESSMENT — PAIN DESCRIPTION - LOCATION: LOCATION: BACK

## 2023-11-03 ASSESSMENT — LIFESTYLE VARIABLES
HOW MANY STANDARD DRINKS CONTAINING ALCOHOL DO YOU HAVE ON A TYPICAL DAY: PATIENT DOES NOT DRINK
HOW MANY STANDARD DRINKS CONTAINING ALCOHOL DO YOU HAVE ON A TYPICAL DAY: PATIENT DOES NOT DRINK
HOW OFTEN DO YOU HAVE A DRINK CONTAINING ALCOHOL: NEVER
HOW OFTEN DO YOU HAVE A DRINK CONTAINING ALCOHOL: NEVER

## 2023-11-03 ASSESSMENT — PAIN - FUNCTIONAL ASSESSMENT: PAIN_FUNCTIONAL_ASSESSMENT: 0-10

## 2023-11-03 ASSESSMENT — PAIN DESCRIPTION - PAIN TYPE: TYPE: CHRONIC PAIN

## 2023-11-03 ASSESSMENT — PAIN SCALES - GENERAL: PAINLEVEL_OUTOF10: 10

## 2023-11-03 NOTE — ED TRIAGE NOTES
Patient to ED via [employer]. Patient states he is having back pain and states he has been vomiting since he ate a couple [employer] cheeseburgers. Patient is alert and oriented at this time. Patient skin p,w,d. Patient respirations are even and unlabored at this time. Patient stated he  Garnetta Lorie me some good narcotics and it will help\".

## 2023-11-03 NOTE — ED NOTES
Patient alert and oriented at this time. Patient skin p,w,d. Patient respirations are even and unlabored at this time. Patient assisted into wheelchair on discharge. Patient stated to writer \"I will not be signing that discharge paperwork until you give me something to take the pain away\". Writer informed the patient that Dr. Miguel Angel Bañuelos saw him, assessed him and prescribed tylenol which was given to the patient. Patient then ripped the discharge instructions in half and stated he was refusing to leave. Writer informed patient that he is discharged and if he does not allow us to clear the room and move him to the waiting room [address] [place] [address] PD would be called. Patient was then complaint with getting in the wheelchair and assisted to the waiting room. Writer took the patient to the phone in the waiting room where the patient stated to the writer to dial the phone for him. Writer showed the patient how to call his insurance for a ride and handed the patient the phone. The patient then stated \"dial it you bitch\".           Preethi Martines RN  11/03/23 3992

## 2023-11-03 NOTE — ED NOTES
Pt awake, a&ox4, skin w/d/slightly pale, pt resting in ed, 0 c/o, 0 distress at this time, pt had sandwich and fluids po, genevieve. Well.       Tuan Zuleta RN  11/02/23 2110

## 2023-11-03 NOTE — ED PROVIDER NOTES
[facility] ED  eMERGENCYdEPARTMENT eNCOUnter        Pt Name: Afua Cohen  MRN: [id number]  [address] [facility] [id number] evaluation: 11/3/2023  Provider:Zaina Aviles PA-C  3:37 PM EDT    CHIEF COMPLAINT       Chief Complaint   Patient presents with    Back Pain     Left lower back pain for a month. Seen here for same thing multiple times. HISTORY OF PRESENT ILLNESS  (Location/Symptom, Timing/Onset, Context/Setting, Quality, Duration, Modifying Factors, Severity.)   Afua Cohen is a 77 y.o. male who presents to the emergency department chronic low back pain. Patient states he has an appointment scheduled with pain management but he is in a lot of pain currently. Patient is known to come to the emergency department for chronic back pain, last seen this morning by Dr. Mariana Urena. States Tylenol is not controlling pain at this time. Has not tried Ibuprofen. Denies all other complaints. HPI    Nursing Notes were reviewed and I agree. REVIEW OF SYSTEMS    (2-9 systems for level 4, 10 or more for level 5)     Review of Systems   Musculoskeletal:  Positive for back pain. All other systems reviewed and are negative. as noted above the remainder of the review of systems was reviewed and negative.        PAST MEDICAL HISTORY     Past Medical History:   Diagnosis Date    Allergic rhinitis 02/19/2018    Anxiety     Bilateral carotid artery stenosis 5/10/2023    Chronic back pain     Colon polyp     COPD (chronic obstructive pulmonary disease) (HCC)     Depression     Disorder of stomach     valve not closing properly    Emphysema lung ([address])     Essential hypertension 11/04/2019    Gastroesophageal reflux disease 10/04/2019    Hydronephrosis of left kidney 08/23/2023    Hyperlipidemia     meds > 22 yrs    Low back pain 05/01/2018    ARNAUD (obstructive sleep apnea) 02/19/2018    Other emphysema ([address]) 10/04/2019    Other intervertebral disc degeneration, lumbar region 03/23/2018    Radiculopathy, lumbar region 02/28/2018    Restless leg syndrome     Seasonal affective disorder ([address]) 10/04/2019    Substance abuse ([address])     alcholic, quit 20 yrs          SURGICAL HISTORY       Past Surgical History:   Procedure Laterality Date    BLADDER SURGERY Left 9/11/2023    CYSTOSCOPY RETROGRADE PYELOGRAM AND LEFT DOUBLE J STENT INSERTION performed by Juaan Ratliff MD at [address]  12/22/2016    A [address] [facility] MD    DENTAL SURGERY  10 yrs ago    ENDOSCOPY, COLON, DIAGNOSTIC      EYE SURGERY      Lasik OU    FINGER TRIGGER RELEASE Left 11/1/2022    LEFT HAND TRIGGER FINGER RING FINGER RELEASE OF A1 PANFILO performed by Christy Mckeon MD at [facility] ARTHROSCOPY Right     KNEE SURGERY Right 05/2016    Ray procedure    KNEE SURGERY      GA NASAL/SINUS ENDOSCOPY W/MAXILLARY ANTROSTOMY N/A 2/22/2018    SEPTOPLASTY MICRODEBRIDER ASSISTED TURBINOPLASTY AND OUT-FRACTURING BILATERAL NASAL ENDOSCOPY performed by Macy Roberto MD at [address] [place] [address] 9/18/2023    EGD BIOPSY performed by Jean-Paul Escobedo MD at [address] [facility]       Discharge Medication List as of 11/3/2023  4:03 PM        CONTINUE these medications which have NOT CHANGED    Details   albuterol (PROVENTIL) (2.5 MG/3ML) 0.083% nebulizer solution Take 3 mLs by nebulization every 6 hours as needed for Wheezing, Disp-1 each, R-0Normal      gabapentin (NEURONTIN) 600 MG tablet take 1 tablet by mouth every morning and take 1 tablet by mouth MID DAY and take 2 tablets by mouth AT NIGHT, Disp-90 tablet, R-0Normal      lidocaine (LIDODERM) 5 % Place 1 patch onto the skin daily 12 hours on, 12 hours off., Disp-30 patch, R-0Normal      traMADol (ULTRAM) 50 MG tablet Take 1 tablet by mouth 2 times daily as needed for Pain for up to 30 days.  Max Daily Amount: 100 mg, Disp-60 tablet, R-0Normal      methocarbamol (ROBAXIN) 500 MG tablet Take 1 tablet by mouth 4 times daily, Disp-28 tablet,

## 2023-11-03 NOTE — ED TRIAGE NOTES
A & Ox4. Skin pink warm and dry. Arrived to triage in wheelchair from [facility]. Points to left lower back for pain. Denies any injury. States \"You guys won't give me any pain pills to make it go away! \"  Bent over in the wheelchair.

## 2023-11-03 NOTE — CARE COORDINATION
I was made aware pt has no transportation Ride set up by calling [phone] spoke with [place] given, eta via lyft approx 1' trip # [phone].     Electronically signed by Caridad Mena RN, BSN on 11/3/2023 at 4:41 PM

## 2023-11-07 ENCOUNTER — HOSPITAL ENCOUNTER (INPATIENT)
Age: 68
LOS: 4 days | Discharge: NURSING, REHAB OR LONG-TERM CARE | DRG: 641 | End: 2023-11-13
Attending: EMERGENCY MEDICINE | Admitting: INTERNAL MEDICINE
Payer: MEDICARE

## 2023-11-07 ENCOUNTER — APPOINTMENT (OUTPATIENT)
Dept: CT IMAGING | Age: 68
DRG: 641 | End: 2023-11-07
Payer: MEDICARE

## 2023-11-07 DIAGNOSIS — R64 CACHEXIA (HCC): Primary | ICD-10-CM

## 2023-11-07 DIAGNOSIS — G89.4 CHRONIC PAIN SYNDROME: ICD-10-CM

## 2023-11-07 DIAGNOSIS — R26.9 GAIT DISTURBANCE: ICD-10-CM

## 2023-11-07 DIAGNOSIS — R53.1 GENERAL WEAKNESS: ICD-10-CM

## 2023-11-07 DIAGNOSIS — R41.3 MEMORY IMPAIRMENT: ICD-10-CM

## 2023-11-07 DIAGNOSIS — E87.6 HYPOKALEMIA: ICD-10-CM

## 2023-11-07 LAB
ALBUMIN SERPL-MCNC: 3.3 G/DL (ref 3.5–4.6)
ALP SERPL-CCNC: 77 U/L (ref 35–104)
ALT SERPL-CCNC: 6 U/L (ref 0–41)
AMPHET UR QL SCN: ABNORMAL
ANION GAP SERPL CALCULATED.3IONS-SCNC: 9 MEQ/L (ref 9–15)
APAP SERPL-MCNC: <5 UG/ML (ref 10–30)
AST SERPL-CCNC: 9 U/L (ref 0–40)
BARBITURATES UR QL SCN: ABNORMAL
BENZODIAZ UR QL SCN: ABNORMAL
BILIRUB SERPL-MCNC: <0.2 MG/DL (ref 0.2–0.7)
BUN SERPL-MCNC: 31 MG/DL (ref 8–23)
CALCIUM SERPL-MCNC: 8.8 MG/DL (ref 8.5–9.9)
CANNABINOIDS UR QL SCN: ABNORMAL
CHLORIDE SERPL-SCNC: 104 MEQ/L (ref 95–107)
CK SERPL-CCNC: 41 U/L (ref 0–190)
CO2 SERPL-SCNC: 29 MEQ/L (ref 20–31)
COCAINE UR QL SCN: ABNORMAL
CREAT SERPL-MCNC: 1.02 MG/DL (ref 0.7–1.2)
DRUG SCREEN COMMENT UR-IMP: ABNORMAL
ETHANOL PERCENT: NORMAL G/DL
ETHANOLAMINE SERPL-MCNC: <10 MG/DL (ref 0–0.08)
FENTANYL SCREEN, URINE: ABNORMAL
GLOBULIN SER CALC-MCNC: 2.6 G/DL (ref 2.3–3.5)
GLUCOSE SERPL-MCNC: 100 MG/DL (ref 70–99)
INR PPP: 0.9
LACTATE BLDV-SCNC: 2.5 MMOL/L (ref 0.5–2.2)
METHADONE UR QL SCN: ABNORMAL
OPIATES UR QL SCN: ABNORMAL
OXYCODONE UR QL SCN: POSITIVE
PCP UR QL SCN: ABNORMAL
POTASSIUM SERPL-SCNC: 3.2 MEQ/L (ref 3.4–4.9)
PROPOXYPH UR QL SCN: ABNORMAL
PROT SERPL-MCNC: 5.9 G/DL (ref 6.3–8)
PROTHROMBIN TIME: 12.3 SEC (ref 12.3–14.9)
SALICYLATES SERPL-MCNC: <0.3 MG/DL (ref 15–30)
SODIUM SERPL-SCNC: 142 MEQ/L (ref 135–144)
TSH REFLEX: 2.97 UIU/ML (ref 0.44–3.86)

## 2023-11-07 PROCEDURE — 85025 COMPLETE CBC W/AUTO DIFF WBC: CPT

## 2023-11-07 PROCEDURE — 2580000003 HC RX 258: Performed by: EMERGENCY MEDICINE

## 2023-11-07 PROCEDURE — 80307 DRUG TEST PRSMV CHEM ANLYZR: CPT

## 2023-11-07 PROCEDURE — 6370000000 HC RX 637 (ALT 250 FOR IP): Performed by: INTERNAL MEDICINE

## 2023-11-07 PROCEDURE — 96374 THER/PROPH/DIAG INJ IV PUSH: CPT

## 2023-11-07 PROCEDURE — 2580000003 HC RX 258: Performed by: INTERNAL MEDICINE

## 2023-11-07 PROCEDURE — 80053 COMPREHEN METABOLIC PANEL: CPT

## 2023-11-07 PROCEDURE — 83605 ASSAY OF LACTIC ACID: CPT

## 2023-11-07 PROCEDURE — 82550 ASSAY OF CK (CPK): CPT

## 2023-11-07 PROCEDURE — 70450 CT HEAD/BRAIN W/O DYE: CPT

## 2023-11-07 PROCEDURE — 96361 HYDRATE IV INFUSION ADD-ON: CPT

## 2023-11-07 PROCEDURE — G0378 HOSPITAL OBSERVATION PER HR: HCPCS

## 2023-11-07 PROCEDURE — 36415 COLL VENOUS BLD VENIPUNCTURE: CPT

## 2023-11-07 PROCEDURE — 80143 DRUG ASSAY ACETAMINOPHEN: CPT

## 2023-11-07 PROCEDURE — 80179 DRUG ASSAY SALICYLATE: CPT

## 2023-11-07 PROCEDURE — 6370000000 HC RX 637 (ALT 250 FOR IP): Performed by: EMERGENCY MEDICINE

## 2023-11-07 PROCEDURE — 84443 ASSAY THYROID STIM HORMONE: CPT

## 2023-11-07 PROCEDURE — 99285 EMERGENCY DEPT VISIT HI MDM: CPT

## 2023-11-07 PROCEDURE — 85610 PROTHROMBIN TIME: CPT

## 2023-11-07 PROCEDURE — 82077 ASSAY SPEC XCP UR&BREATH IA: CPT

## 2023-11-07 RX ORDER — 0.9 % SODIUM CHLORIDE 0.9 %
1000 INTRAVENOUS SOLUTION INTRAVENOUS ONCE
Status: COMPLETED | OUTPATIENT
Start: 2023-11-07 | End: 2023-11-07

## 2023-11-07 RX ORDER — NICOTINE 21 MG/24HR
1 PATCH, TRANSDERMAL 24 HOURS TRANSDERMAL DAILY
Status: DISCONTINUED | OUTPATIENT
Start: 2023-11-07 | End: 2023-11-13 | Stop reason: HOSPADM

## 2023-11-07 RX ORDER — MAGNESIUM SULFATE IN WATER 40 MG/ML
2000 INJECTION, SOLUTION INTRAVENOUS PRN
Status: DISCONTINUED | OUTPATIENT
Start: 2023-11-07 | End: 2023-11-13 | Stop reason: HOSPADM

## 2023-11-07 RX ORDER — GABAPENTIN 300 MG/1
600 CAPSULE ORAL 3 TIMES DAILY
Status: DISCONTINUED | OUTPATIENT
Start: 2023-11-07 | End: 2023-11-13 | Stop reason: HOSPADM

## 2023-11-07 RX ORDER — ENOXAPARIN SODIUM 100 MG/ML
40 INJECTION SUBCUTANEOUS DAILY
Status: DISCONTINUED | OUTPATIENT
Start: 2023-11-08 | End: 2023-11-07

## 2023-11-07 RX ORDER — PANTOPRAZOLE SODIUM 40 MG/1
40 TABLET, DELAYED RELEASE ORAL
Status: DISCONTINUED | OUTPATIENT
Start: 2023-11-08 | End: 2023-11-13 | Stop reason: HOSPADM

## 2023-11-07 RX ORDER — SODIUM CHLORIDE 9 MG/ML
INJECTION, SOLUTION INTRAVENOUS PRN
Status: DISCONTINUED | OUTPATIENT
Start: 2023-11-07 | End: 2023-11-13 | Stop reason: HOSPADM

## 2023-11-07 RX ORDER — TRAMADOL HYDROCHLORIDE 50 MG/1
50 TABLET ORAL 2 TIMES DAILY PRN
Status: DISCONTINUED | OUTPATIENT
Start: 2023-11-07 | End: 2023-11-13 | Stop reason: HOSPADM

## 2023-11-07 RX ORDER — ENOXAPARIN SODIUM 100 MG/ML
30 INJECTION SUBCUTANEOUS DAILY
Status: DISCONTINUED | OUTPATIENT
Start: 2023-11-08 | End: 2023-11-13 | Stop reason: HOSPADM

## 2023-11-07 RX ORDER — SODIUM CHLORIDE 9 MG/ML
INJECTION, SOLUTION INTRAVENOUS CONTINUOUS
Status: ACTIVE | OUTPATIENT
Start: 2023-11-07 | End: 2023-11-08

## 2023-11-07 RX ORDER — ACETAMINOPHEN 325 MG/1
650 TABLET ORAL EVERY 6 HOURS PRN
Status: DISCONTINUED | OUTPATIENT
Start: 2023-11-07 | End: 2023-11-13 | Stop reason: HOSPADM

## 2023-11-07 RX ORDER — ONDANSETRON 2 MG/ML
4 INJECTION INTRAMUSCULAR; INTRAVENOUS EVERY 6 HOURS PRN
Status: DISCONTINUED | OUTPATIENT
Start: 2023-11-07 | End: 2023-11-13 | Stop reason: HOSPADM

## 2023-11-07 RX ORDER — SODIUM CHLORIDE 0.9 % (FLUSH) 0.9 %
5-40 SYRINGE (ML) INJECTION EVERY 12 HOURS SCHEDULED
Status: DISCONTINUED | OUTPATIENT
Start: 2023-11-07 | End: 2023-11-13 | Stop reason: HOSPADM

## 2023-11-07 RX ORDER — ALBUTEROL SULFATE 2.5 MG/3ML
2.5 SOLUTION RESPIRATORY (INHALATION) EVERY 6 HOURS PRN
Status: DISCONTINUED | OUTPATIENT
Start: 2023-11-07 | End: 2023-11-13 | Stop reason: HOSPADM

## 2023-11-07 RX ORDER — POLYETHYLENE GLYCOL 3350 17 G/17G
17 POWDER, FOR SOLUTION ORAL DAILY PRN
Status: DISCONTINUED | OUTPATIENT
Start: 2023-11-07 | End: 2023-11-13 | Stop reason: HOSPADM

## 2023-11-07 RX ORDER — POTASSIUM CHLORIDE 7.45 MG/ML
10 INJECTION INTRAVENOUS PRN
Status: DISCONTINUED | OUTPATIENT
Start: 2023-11-07 | End: 2023-11-13 | Stop reason: HOSPADM

## 2023-11-07 RX ORDER — POTASSIUM CHLORIDE 20 MEQ/1
40 TABLET, EXTENDED RELEASE ORAL PRN
Status: DISCONTINUED | OUTPATIENT
Start: 2023-11-07 | End: 2023-11-13 | Stop reason: HOSPADM

## 2023-11-07 RX ORDER — ACETAMINOPHEN 650 MG/1
650 SUPPOSITORY RECTAL EVERY 6 HOURS PRN
Status: DISCONTINUED | OUTPATIENT
Start: 2023-11-07 | End: 2023-11-13 | Stop reason: HOSPADM

## 2023-11-07 RX ORDER — POTASSIUM CHLORIDE 20 MEQ/1
40 TABLET, EXTENDED RELEASE ORAL ONCE
Status: COMPLETED | OUTPATIENT
Start: 2023-11-07 | End: 2023-11-07

## 2023-11-07 RX ORDER — ONDANSETRON 4 MG/1
4 TABLET, ORALLY DISINTEGRATING ORAL EVERY 8 HOURS PRN
Status: DISCONTINUED | OUTPATIENT
Start: 2023-11-07 | End: 2023-11-13 | Stop reason: HOSPADM

## 2023-11-07 RX ORDER — SODIUM CHLORIDE 0.9 % (FLUSH) 0.9 %
5-40 SYRINGE (ML) INJECTION PRN
Status: DISCONTINUED | OUTPATIENT
Start: 2023-11-07 | End: 2023-11-13 | Stop reason: HOSPADM

## 2023-11-07 RX ORDER — OXYCODONE HYDROCHLORIDE AND ACETAMINOPHEN 5; 325 MG/1; MG/1
1 TABLET ORAL ONCE
Status: COMPLETED | OUTPATIENT
Start: 2023-11-07 | End: 2023-11-07

## 2023-11-07 RX ADMIN — OXYCODONE AND ACETAMINOPHEN 1 TABLET: 5; 325 TABLET ORAL at 14:04

## 2023-11-07 RX ADMIN — GABAPENTIN 600 MG: 300 CAPSULE ORAL at 20:17

## 2023-11-07 RX ADMIN — SODIUM CHLORIDE: 9 INJECTION, SOLUTION INTRAVENOUS at 20:20

## 2023-11-07 RX ADMIN — SODIUM CHLORIDE, PRESERVATIVE FREE 10 ML: 5 INJECTION INTRAVENOUS at 20:17

## 2023-11-07 RX ADMIN — POTASSIUM CHLORIDE 40 MEQ: 1500 TABLET, EXTENDED RELEASE ORAL at 14:58

## 2023-11-07 RX ADMIN — ACETAMINOPHEN 650 MG: 325 TABLET ORAL at 22:59

## 2023-11-07 RX ADMIN — TRAMADOL HYDROCHLORIDE 50 MG: 50 TABLET ORAL at 19:01

## 2023-11-07 RX ADMIN — SODIUM CHLORIDE 1000 ML: 9 INJECTION, SOLUTION INTRAVENOUS at 14:06

## 2023-11-07 ASSESSMENT — PAIN DESCRIPTION - ORIENTATION
ORIENTATION: LOWER
ORIENTATION: LOWER
ORIENTATION: MID;LOWER
ORIENTATION: LOWER

## 2023-11-07 ASSESSMENT — PAIN SCALES - GENERAL
PAINLEVEL_OUTOF10: 0
PAINLEVEL_OUTOF10: 10
PAINLEVEL_OUTOF10: 0
PAINLEVEL_OUTOF10: 10
PAINLEVEL_OUTOF10: 3
PAINLEVEL_OUTOF10: 10
PAINLEVEL_OUTOF10: 10

## 2023-11-07 ASSESSMENT — ENCOUNTER SYMPTOMS
NAUSEA: 0
BACK PAIN: 1
ABDOMINAL PAIN: 0
SHORTNESS OF BREATH: 0
VOMITING: 0
EYE PAIN: 0
SORE THROAT: 0
CHEST TIGHTNESS: 0

## 2023-11-07 ASSESSMENT — PAIN DESCRIPTION - DESCRIPTORS
DESCRIPTORS: ACHING
DESCRIPTORS: DISCOMFORT
DESCRIPTORS: ACHING

## 2023-11-07 ASSESSMENT — PAIN DESCRIPTION - ONSET: ONSET: ON-GOING

## 2023-11-07 ASSESSMENT — PAIN DESCRIPTION - LOCATION
LOCATION: ABDOMEN
LOCATION: BACK

## 2023-11-07 ASSESSMENT — PAIN DESCRIPTION - FREQUENCY: FREQUENCY: CONTINUOUS

## 2023-11-07 ASSESSMENT — PAIN - FUNCTIONAL ASSESSMENT
PAIN_FUNCTIONAL_ASSESSMENT: PREVENTS OR INTERFERES SOME ACTIVE ACTIVITIES AND ADLS
PAIN_FUNCTIONAL_ASSESSMENT: 0-10
PAIN_FUNCTIONAL_ASSESSMENT: 0-10

## 2023-11-07 ASSESSMENT — PAIN DESCRIPTION - PAIN TYPE
TYPE: CHRONIC PAIN
TYPE: CHRONIC PAIN

## 2023-11-07 NOTE — ED NOTES
urine obtained by this RN, labeled and sent to lab via tube system.        Kim Locke RN  11/07/23 5632

## 2023-11-07 NOTE — ED NOTES
Labs obtained by this RN, labeled and sent to lab via tube system.   Patient aware of needed urine     Sharona Rodriguez RN  11/07/23 1365

## 2023-11-07 NOTE — FLOWSHEET NOTE
Patient arrived to room with sister at bedside. Patient yelling and cursing at staff and pushing at staff during care. Patient stated,\"You should have taken me to the  home in [place]. \" Patient unable to state the date or year. but does know the current president.

## 2023-11-07 NOTE — ACP (ADVANCE CARE PLANNING)
Advance Care Planning   Healthcare Decision Maker:    Primary Decision Maker: Antonietta Hutton Child - [phone]    Secondary Decision Maker: Chrissy Silveira - Brother/Sister - [phone]    Click here to complete Healthcare Decision Makers including selection of the Healthcare Decision Maker Relationship (ie \"Primary\"). Confirmed with sister POA document on file from 2016 is his ex-spouse now . She can provide new document of son Antonietta Hutton designated as POA.     Electronically signed by Rashid Napier RN, BSN on 11/7/2023 at 4:51 PM

## 2023-11-07 NOTE — ED TRIAGE NOTES
Pt family member states pt is unable conduct any ADLs. Pt family states multiple people have been trying to care for him but pt will not get out of bed or eat at this time. Pt family states he needs placement.

## 2023-11-07 NOTE — H&P
Hospital Medicine  History and Physical    Patient:  José Miguel Yancey  MRN: [id number]    CHIEF COMPLAINT:    Chief Complaint   Patient presents with    Failure To Thrive       History Obtained From:  Patient, EMR  Primary Care Physician: MANUELA Kyle CNP    HISTORY OF PRESENT ILLNESS:   The patient is a 77 y.o. male with PMH of bilateral carotid stenosis, duodenal ulcer, Yip's esophagus, COPD, tobacco use, lumbar stenosis, chronic back pain with opiate dependence, anxiety/insomnia, left nephrolithiasis with hydronephrosis s/p cystoscopy with JJ stent placement on 9/2023 who presented with the above CC. Patient has been gradually declining over the last few weeks / months with worsening weakness, poor appetite, weight loss and inability to take care of himself per family at the bedside, initial w/u showed prerenal azotemia, elevated lactate, hypokalemia, IVFs and potassium given, admitted for further management.     Past Medical History:      Diagnosis Date    Allergic rhinitis 02/19/2018    Anxiety     Bilateral carotid artery stenosis 5/10/2023    Chronic back pain     Colon polyp     COPD (chronic obstructive pulmonary disease) (HCC)     Depression     Disorder of stomach     valve not closing properly    Emphysema lung ([address])     Essential hypertension 11/04/2019    Gastroesophageal reflux disease 10/04/2019    Hydronephrosis of left kidney 08/23/2023    Hyperlipidemia     meds > 22 yrs    Low back pain 05/01/2018    ARNAUD (obstructive sleep apnea) 02/19/2018    Other emphysema ([address]) 10/04/2019    Other intervertebral disc degeneration, lumbar region 03/23/2018    Radiculopathy, lumbar region 02/28/2018    Restless leg syndrome     Seasonal affective disorder ([address]) 10/04/2019    Substance abuse ([address])     alcholic, quit 20 yrs        Past Surgical History:      Procedure Laterality Date    BLADDER SURGERY Left 9/11/2023    CYSTOSCOPY RETROGRADE PYELOGRAM AND LEFT DOUBLE J STENT INSERTION performed by microvascular ischemic change. ORBITS: The visualized portion of the orbits demonstrate no acute abnormality. SINUSES: Mucosal thickening SOFT TISSUES/SKULL: No acute abnormality of the visualized skull or soft tissues. No acute intracranial abnormality.  Moderate chronic senescent change           Assessment and Plan     77 y.o. male with PMH of bilateral carotid stenosis, duodenal ulcer, Yip's esophagus, MANISH, COPD, tobacco use, lumbar stenosis, chronic back pain with opiate dependence, anxiety/insomnia, left sided nephrolithiasis with hydronephrosis s/p cystoscopy with JJ stent placement on 9/2023 who presented with:    Generalized weakness, poor appetite, weight loss  - initial w/u showed prerenal azotemia, elevated lactate, hypokalemia  - needs placement per ED report  - continue IV hydration overnight  - PT/OT  - consult /case management    Recent duodenal ulcer  - continue Protonix  - was supposed to have repeat EGD as outpatient  - GI consult    Iron deficiency anemia   - follow H/H    Chronic back pain with opiate dependence  - Percocet given in ED  - on Tramadol and Gabapentin per [facility] review        Amberly Mulligan MD, MD  Admitting Hospitalist

## 2023-11-08 ENCOUNTER — TELEPHONE (OUTPATIENT)
Dept: FAMILY MEDICINE CLINIC | Age: 68
End: 2023-11-08

## 2023-11-08 PROBLEM — R64 CACHEXIA (HCC): Status: ACTIVE | Noted: 2023-11-08

## 2023-11-08 LAB
ALBUMIN SERPL-MCNC: 2.9 G/DL (ref 3.5–4.6)
ANION GAP SERPL CALCULATED.3IONS-SCNC: 8 MEQ/L (ref 9–15)
BUN SERPL-MCNC: 27 MG/DL (ref 8–23)
CALCIUM SERPL-MCNC: 8.6 MG/DL (ref 8.5–9.9)
CHLORIDE SERPL-SCNC: 107 MEQ/L (ref 95–107)
CO2 SERPL-SCNC: 23 MEQ/L (ref 20–31)
CREAT SERPL-MCNC: 0.84 MG/DL (ref 0.7–1.2)
ERYTHROCYTE [DISTWIDTH] IN BLOOD BY AUTOMATED COUNT: 17.2 % (ref 11.5–14.5)
GLUCOSE SERPL-MCNC: 76 MG/DL (ref 70–99)
HCT VFR BLD AUTO: 27 % (ref 42–52)
HGB BLD-MCNC: 8.3 G/DL (ref 14–18)
MAGNESIUM SERPL-MCNC: 1.9 MG/DL (ref 1.7–2.4)
MCH RBC QN AUTO: 27.3 PG (ref 27–31.3)
MCHC RBC AUTO-ENTMCNC: 30.7 % (ref 33–37)
MCV RBC AUTO: 88.8 FL (ref 79–92.2)
PHOSPHATE SERPL-MCNC: 3 MG/DL (ref 2.3–4.8)
PLATELET # BLD AUTO: 363 K/UL (ref 130–400)
POTASSIUM SERPL-SCNC: 4.2 MEQ/L (ref 3.4–4.9)
RBC # BLD AUTO: 3.04 M/UL (ref 4.7–6.1)
SODIUM SERPL-SCNC: 138 MEQ/L (ref 135–144)
WBC # BLD AUTO: 7.1 K/UL (ref 4.8–10.8)

## 2023-11-08 PROCEDURE — 99222 1ST HOSP IP/OBS MODERATE 55: CPT | Performed by: NURSE PRACTITIONER

## 2023-11-08 PROCEDURE — G0378 HOSPITAL OBSERVATION PER HR: HCPCS

## 2023-11-08 PROCEDURE — 83540 ASSAY OF IRON: CPT

## 2023-11-08 PROCEDURE — 80069 RENAL FUNCTION PANEL: CPT

## 2023-11-08 PROCEDURE — 96361 HYDRATE IV INFUSION ADD-ON: CPT

## 2023-11-08 PROCEDURE — 97162 PT EVAL MOD COMPLEX 30 MIN: CPT

## 2023-11-08 PROCEDURE — 36415 COLL VENOUS BLD VENIPUNCTURE: CPT

## 2023-11-08 PROCEDURE — 96372 THER/PROPH/DIAG INJ SC/IM: CPT

## 2023-11-08 PROCEDURE — 6370000000 HC RX 637 (ALT 250 FOR IP): Performed by: INTERNAL MEDICINE

## 2023-11-08 PROCEDURE — 2580000003 HC RX 258: Performed by: INTERNAL MEDICINE

## 2023-11-08 PROCEDURE — 6360000002 HC RX W HCPCS: Performed by: INTERNAL MEDICINE

## 2023-11-08 PROCEDURE — 82728 ASSAY OF FERRITIN: CPT

## 2023-11-08 PROCEDURE — 85027 COMPLETE CBC AUTOMATED: CPT

## 2023-11-08 PROCEDURE — 97166 OT EVAL MOD COMPLEX 45 MIN: CPT

## 2023-11-08 PROCEDURE — 83550 IRON BINDING TEST: CPT

## 2023-11-08 PROCEDURE — 99221 1ST HOSP IP/OBS SF/LOW 40: CPT | Performed by: PHYSICIAN ASSISTANT

## 2023-11-08 PROCEDURE — 83735 ASSAY OF MAGNESIUM: CPT

## 2023-11-08 RX ORDER — CEPHALEXIN 500 MG/1
1000 CAPSULE ORAL 2 TIMES DAILY
Status: ON HOLD | COMMUNITY
End: 2023-11-13 | Stop reason: HOSPADM

## 2023-11-08 RX ORDER — KETOROLAC TROMETHAMINE 10 MG/1
10 TABLET, FILM COATED ORAL EVERY 6 HOURS PRN
Status: ON HOLD | COMMUNITY
End: 2023-11-13 | Stop reason: HOSPADM

## 2023-11-08 RX ORDER — SODIUM CHLORIDE 9 MG/ML
INJECTION, SOLUTION INTRAVENOUS CONTINUOUS
Status: DISPENSED | OUTPATIENT
Start: 2023-11-08 | End: 2023-11-09

## 2023-11-08 RX ORDER — ALBUTEROL SULFATE 90 UG/1
2 AEROSOL, METERED RESPIRATORY (INHALATION) EVERY 6 HOURS PRN
COMMUNITY

## 2023-11-08 RX ADMIN — GABAPENTIN 600 MG: 300 CAPSULE ORAL at 13:00

## 2023-11-08 RX ADMIN — SODIUM CHLORIDE: 9 INJECTION, SOLUTION INTRAVENOUS at 12:44

## 2023-11-08 RX ADMIN — ACETAMINOPHEN 650 MG: 325 TABLET ORAL at 07:51

## 2023-11-08 RX ADMIN — GABAPENTIN 600 MG: 300 CAPSULE ORAL at 07:51

## 2023-11-08 RX ADMIN — PANTOPRAZOLE SODIUM 40 MG: 40 TABLET, DELAYED RELEASE ORAL at 15:26

## 2023-11-08 RX ADMIN — GABAPENTIN 600 MG: 300 CAPSULE ORAL at 21:12

## 2023-11-08 RX ADMIN — PANTOPRAZOLE SODIUM 40 MG: 40 TABLET, DELAYED RELEASE ORAL at 07:51

## 2023-11-08 RX ADMIN — ENOXAPARIN SODIUM 30 MG: 100 INJECTION SUBCUTANEOUS at 08:00

## 2023-11-08 RX ADMIN — TRAMADOL HYDROCHLORIDE 50 MG: 50 TABLET ORAL at 21:12

## 2023-11-08 RX ADMIN — ONDANSETRON 4 MG: 2 INJECTION INTRAMUSCULAR; INTRAVENOUS at 02:14

## 2023-11-08 RX ADMIN — TRAMADOL HYDROCHLORIDE 50 MG: 50 TABLET ORAL at 04:30

## 2023-11-08 RX ADMIN — SODIUM CHLORIDE: 9 INJECTION, SOLUTION INTRAVENOUS at 21:15

## 2023-11-08 ASSESSMENT — PAIN DESCRIPTION - LOCATION
LOCATION: ABDOMEN;BACK
LOCATION: GENERALIZED;ABDOMEN;BACK
LOCATION: GENERALIZED;ABDOMEN;BACK
LOCATION: GENERALIZED;ABDOMEN

## 2023-11-08 ASSESSMENT — PAIN SCALES - GENERAL
PAINLEVEL_OUTOF10: 8
PAINLEVEL_OUTOF10: 0
PAINLEVEL_OUTOF10: 2
PAINLEVEL_OUTOF10: 2

## 2023-11-08 ASSESSMENT — PAIN DESCRIPTION - ONSET
ONSET: ON-GOING

## 2023-11-08 ASSESSMENT — ENCOUNTER SYMPTOMS: APNEA: 0

## 2023-11-08 ASSESSMENT — PAIN DESCRIPTION - DESCRIPTORS
DESCRIPTORS: DULL
DESCRIPTORS: DULL;DISCOMFORT
DESCRIPTORS: DULL;ACHING

## 2023-11-08 ASSESSMENT — PAIN DESCRIPTION - FREQUENCY
FREQUENCY: CONTINUOUS
FREQUENCY: CONTINUOUS

## 2023-11-08 ASSESSMENT — PAIN DESCRIPTION - PAIN TYPE
TYPE: CHRONIC PAIN
TYPE: CHRONIC PAIN

## 2023-11-08 ASSESSMENT — PAIN - FUNCTIONAL ASSESSMENT
PAIN_FUNCTIONAL_ASSESSMENT: PREVENTS OR INTERFERES SOME ACTIVE ACTIVITIES AND ADLS
PAIN_FUNCTIONAL_ASSESSMENT: PREVENTS OR INTERFERES SOME ACTIVE ACTIVITIES AND ADLS

## 2023-11-08 ASSESSMENT — PAIN DESCRIPTION - ORIENTATION: ORIENTATION: RIGHT;LEFT;MID;LOWER

## 2023-11-08 NOTE — CONSULTS
Urology Consult      Vita Almonte  1955  [id number]    Date of Admission:  11/7/2023  1:00 PM  Date of Consultation:  11/8/2023    Consultant: Kellen Tao PA-C  SupervisingPhysician: Dr. Dale Swift  PCP:  MANUELA Rascon CNP       Reason for Consultation: left nephroureteral stent. Nonobstructing 8 mm left renal stone and 3 mm right renal stone      C/C:   Chief Complaint   Patient presents with    Failure To Thrive       History of Present Illness: Urolithiasis  Patient complains of bilateral  no abdominal pain without radiation to the groin. Onset of symptoms was    ago with unchanged course since that time. Patient describes the pain as none,  and rated as no. The patient has had no nausea and no diaphoresis. There has been no fever or chills. The patient is not complaining of dysuria or frequency. Risk factors for urolithiasis: none. Allergies: Allergies   Allergen Reactions    Alcohol Other (See Comments)     Sober 22 yrs    Benadryl [Diphenhydramine]      \"speeds him up\"    Demerol Hcl [Meperidine] Other (See Comments)     Aggressive behavior    Sulfa Antibiotics Other (See Comments)     Told by mother / patient unaware of reaction       PMH: Patient has a past medical history of Allergic rhinitis, Anxiety, Bilateral carotid artery stenosis, Chronic back pain, Colon polyp, COPD (chronic obstructive pulmonary disease) ([address]), Depression, Disorder of stomach, Emphysema lung ([address]), Essential hypertension, ETOH abuse, Gastroesophageal reflux disease, Hydronephrosis of left kidney, Hyperlipidemia, Low back pain, ARNAUD (obstructive sleep apnea), Other emphysema ([address]), Other intervertebral disc degeneration, lumbar region, Radiculopathy, lumbar region, Restless leg syndrome, Seasonal affective disorder ([address]), and Substance abuse ([address]). PSH: Patient has a past surgical history that includes knee surgery (Right, 05/2016); Dental surgery (10 yrs ago);  Colonoscopy (12/22/2016); eye surgery;

## 2023-11-08 NOTE — PROGRESS NOTES
2030: Completed admission paperwork with sister Sharon Law at bedside. Contact for Sharon Law is [phone]. Sharon Law is unaware of pt home medications and will bring in ACP docs and pt's home medications in in the morning. Pt is refusing to review his home medications at this time. Rach Garcia NP made aware. Sharon Law expressed concern that pt in not able to take care of self at home, taking home medications as ordered, getting more noncompliant and agitated. Sharon Law expressed pt is having hard time remembering things and having increased agitation. Pt states he wants a cigar multiple times. 2130: Pt removed IV with fluids running. Educated pt on importance of IV and fluids for hydration. Pt refusing new IV and IV fluids at this time. Lindsay Dejesus Hood updated and made aware. No further interventions at this time. PT stating he wants a cigar. 2158: Nicotine patch ordered for pt per Rach Garcia NP for pt insisting on smoking cigars and increased agitation. 2250: Educated pt on nicotine patch. Pt refusing stating, \"If I can't get a lighter with it, than I don't want it. \" Educated pt again on mechanism of action of nicotine patch, pt states he does not want it. Will continue to monitor pt.     0010: Pt asking for a cigar, educated pt on nicotine patch being only intervention available. Pt agitated and refuses. Pt states he has 3 cigars a day and hasn't drank in 29 years. Will continue to monitor pt. 0215: Pt complains of abd pain, cramping, nausea. Educated pt he is able to get PO zofran. Pt insisted on having only IV medications. Educated pt that he removed his IV and without access we can only give PO medications. PT agreeable to new IV, IV zofran, and IV fluids restarting. 22 placed in right forearm, IV fluids resumed, zofran given. Will continue to monitor pt. 0430: Pt complaining of 8/10 back and abd pain, PRN ultram given as ordered.

## 2023-11-08 NOTE — PLAN OF CARE
See OT evaluation for all goals and OT POC.  Electronically signed by MARCIN Polo on 11/8/2023 at 12:19 PM

## 2023-11-08 NOTE — TELEPHONE ENCOUNTER
Miguel Angel Minor is calling stating that the pt is in [facility] and she found a letter stating that he needs to get a stent taken care of advised for her to take the letter with her to the hospital and speak with the provider about getting this taken care of.  She will take the letter with her to the hospital.

## 2023-11-08 NOTE — PROGRESS NOTES
DVT / VTE PROPHYLAXIS EVALUATION    Recent Labs     11/07/23  1315   BUN 31*   CREATININE 1.02      HGB 9.1*   HCT 30.4*   INR 0.9     ADMITTING DX OR CHIEF COMPLAINT? Weakness  WARFARIN? DOAC'S? No  ANY APPARENT BLEEDING? No  SCHEDULED SURGERY? No     If yes to following, excluded from auto adjustment in Table 1 of policy - please contact provider with recommendations as appropriate. Include condition/exception in scratch notes. Yes No   Trauma Service or Ortho Surgery []  [x]    COVID []  [x]    Pregnancy []  [x]        Current order:      Enoxaparin 40 mg SUBQ once daily  Height: 172.7 cm (5' 8\"), Weight - Scale: 48.1 kg (106 lb)  Estimated Creatinine Clearance: 46 mL/min (based on SCr of 1.02 mg/dL). Plan:  Pharmacologic VTE prophylaxis modified based on patient weight per [facility]/P&T approved protocol     Patient Weight (kg)      50.9 and below .9 101-150.9 151-174.9 175 or greater   Estimated   CrCl  (ml/min) 30 or greater [x]   30 mg   SUBQ daily   []   40 mg   SUBQ daily (or 30 mg BID for orthopedic cases) []  30 mg SUBQ   BID  []  40 mg   SUBQ   BID []  60mg SUBQ BID    15-29.9 []  UFH 5000   units SUBQ BID []  30 mg   SUBQ daily [] 30 mg SUBQ   daily []  40 mg SUBQ   daily [] 60 mg SUBQ   daily    Less than 15 or dialysis []  UFH 5000   units SUBQ BID [] UFH 5000 units SUBQ TID []  UFH 7500   units   SUBQ TID        JUHI Pickard. Ph.  11/7/2023  9:21 PM

## 2023-11-08 NOTE — PLAN OF CARE
Therapy evaluation completed. Please see daily notes and/or progress notes for details related to planned treatment interventions, goals and functional performance.     Problem: Discharge Planning  Goal: Discharge to home or other facility with appropriate resources  11/8/2023 1032 by Faustino Alvarez RN  Outcome: Not Progressing  Flowsheets (Taken 11/8/2023 0930)  Discharge to home or other facility with appropriate resources:   Arrange for needed discharge resources and transportation as appropriate   Identify barriers to discharge with patient and caregiver  11/7/2023 2356 by Simran Valdovinos RN  Outcome: Progressing     Problem: Pain  Goal: Verbalizes/displays adequate comfort level or baseline comfort level  Recent Flowsheet Documentation  Taken 11/8/2023 1135 by Faustino Alvarez RN  Verbalizes/displays adequate comfort level or baseline comfort level: Encourage patient to monitor pain and request assistance  11/8/2023 1032 by Faustino Alvarez RN  Outcome: Not Progressing  Flowsheets  Taken 11/8/2023 1016  Verbalizes/displays adequate comfort level or baseline comfort level: Encourage patient to monitor pain and request assistance  Taken 11/8/2023 0930  Verbalizes/displays adequate comfort level or baseline comfort level: Encourage patient to monitor pain and request assistance  Taken 11/8/2023 0710  Verbalizes/displays adequate comfort level or baseline comfort level: Encourage patient to monitor pain and request assistance  11/7/2023 2356 by Simran Valdovinos RN  Outcome: Progressing

## 2023-11-08 NOTE — PROGRESS NOTES
[facility] OCCUPATIONAL THERAPY EVALUATION - ACUTE     NAME: Cammie Parikh  : 1955 (77 y.o.)  MRN: [id number]  CODE STATUS: Full Code  Room: T284/S806-49    Date of Service: 2023    Patient Diagnosis(es): Cachexia ([address]) Ashley Carter  Hypokalemia [E87.6]  Gait disturbance [R26.9]  Chronic pain syndrome [G89.4]  Weakness [R53.1]  Memory impairment [R41.3]  General weakness [R53.1]   Patient Active Problem List    Diagnosis Date Noted    Spinal stenosis of lumbar region with neurogenic claudication 2023    Flat back syndrome, acquired 2023    Opioid abuse, in remission ([address]) 2023    Trigger ring finger of left hand 2022    Weakness 2023    Duodenal ulcer 10/12/2023    Nausea and vomiting 10/12/2023    Gastric outlet obstruction 10/12/2023    Moderate malnutrition ([address]) 2023    Syncope and collapse 09/15/2023    GI bleeding 09/15/2023    Kidney stone 2023    Hydronephrosis of left kidney 2023    Flank pain 2023    Left ureteral stone 2023    Atherosclerosis of aorta ([address]) 05/10/2023    Bilateral carotid artery stenosis 05/10/2023    Age-related osteoporosis without current pathological fracture 2023    Calcification of abdominal aorta ([address]) 2023    Abnormal findings on dx imaging of heart and cor circ 03/15/2021    Abnormal result of cardiovascular function study, unspecified 03/15/2021    Palpitations 03/15/2021    Essential hypertension 2019    Smoker 10/04/2019    Gastroesophageal reflux disease 10/04/2019    Other emphysema ([address]) 10/04/2019    Seasonal affective disorder ([address]) 10/04/2019    Hyperlipidemia 2019    Anxiety 2018    Insomnia 2018    Low back pain 2018    Other intervertebral disc degeneration, lumbar region 2018    Other intervertebral disc displacement, lumbar region 2018    Presence of right artificial knee joint 2018    Radiculopathy, lumbar region 2018    Sprain of

## 2023-11-08 NOTE — PROGRESS NOTES
Physical Therapy Med Surg Initial Assessment  Facility/Department: [facility]  [facility]       NAME: Tal Somers  : 1955 (77 y.o.)  MRN: [id number]  CODE STATUS: Full Code    Date of Service: 2023    Patient Diagnosis(es): Cachexia ([address]) Alex Gale  Hypokalemia [E87.6]  Gait disturbance [R26.9]  Chronic pain syndrome [G89.4]  Weakness [R53.1]  Memory impairment [R41.3]  General weakness [R53.1]   Chief Complaint   Patient presents with    Failure To Thrive     Patient Active Problem List    Diagnosis Date Noted    Spinal stenosis of lumbar region with neurogenic claudication 2023    Flat back syndrome, acquired 2023    Opioid abuse, in remission ([address]) 2023    Trigger ring finger of left hand 2022    Weakness 2023    Duodenal ulcer 10/12/2023    Nausea and vomiting 10/12/2023    Gastric outlet obstruction 10/12/2023    Moderate malnutrition ([address]) 2023    Syncope and collapse 09/15/2023    GI bleeding 09/15/2023    Kidney stone 2023    Hydronephrosis of left kidney 2023    Flank pain 2023    Left ureteral stone 2023    Atherosclerosis of aorta ([address]) 05/10/2023    Bilateral carotid artery stenosis 05/10/2023    Age-related osteoporosis without current pathological fracture 2023    Calcification of abdominal aorta ([address]) 2023    Abnormal findings on dx imaging of heart and cor circ 03/15/2021    Abnormal result of cardiovascular function study, unspecified 03/15/2021    Palpitations 03/15/2021    Essential hypertension 2019    Smoker 10/04/2019    Gastroesophageal reflux disease 10/04/2019    Other emphysema ([address]) 10/04/2019    Seasonal affective disorder ([address]) 10/04/2019    Hyperlipidemia 2019    Anxiety 2018    Insomnia 2018    Low back pain 2018    Other intervertebral disc degeneration, lumbar region 2018    Other intervertebral disc displacement, lumbar region 2018    Presence

## 2023-11-09 ENCOUNTER — ANESTHESIA EVENT (OUTPATIENT)
Dept: ENDOSCOPY | Age: 68
End: 2023-11-09
Payer: MEDICARE

## 2023-11-09 ENCOUNTER — ANESTHESIA (OUTPATIENT)
Dept: ENDOSCOPY | Age: 68
End: 2023-11-09
Payer: MEDICARE

## 2023-11-09 ENCOUNTER — APPOINTMENT (OUTPATIENT)
Dept: CT IMAGING | Age: 68
DRG: 641 | End: 2023-11-09
Payer: MEDICARE

## 2023-11-09 LAB
ALBUMIN SERPL-MCNC: 2.8 G/DL (ref 3.5–4.6)
ANION GAP SERPL CALCULATED.3IONS-SCNC: 10 MEQ/L (ref 9–15)
ANISOCYTOSIS BLD QL SMEAR: ABNORMAL
BASOPHILS # BLD: 0 K/UL (ref 0–0.2)
BASOPHILS # BLD: 0.1 K/UL (ref 0–0.2)
BASOPHILS NFR BLD: 0.4 %
BASOPHILS NFR BLD: 1 %
BLASTS NFR BLD MANUAL: 1 %
BUN SERPL-MCNC: 21 MG/DL (ref 8–23)
BURR CELLS: ABNORMAL
CALCIUM SERPL-MCNC: 8.1 MG/DL (ref 8.5–9.9)
CHLORIDE SERPL-SCNC: 111 MEQ/L (ref 95–107)
CO2 SERPL-SCNC: 22 MEQ/L (ref 20–31)
CREAT SERPL-MCNC: 0.87 MG/DL (ref 0.7–1.2)
EOSINOPHIL # BLD: 0.2 K/UL (ref 0–0.7)
EOSINOPHIL # BLD: 0.5 K/UL (ref 0–0.7)
EOSINOPHIL NFR BLD: 2.8 %
EOSINOPHIL NFR BLD: 6 %
ERYTHROCYTE [DISTWIDTH] IN BLOOD BY AUTOMATED COUNT: 16.9 % (ref 11.5–14.5)
ERYTHROCYTE [DISTWIDTH] IN BLOOD BY AUTOMATED COUNT: 17.2 % (ref 11.5–14.5)
FERRITIN: 18 NG/ML (ref 30–400)
GLUCOSE SERPL-MCNC: 78 MG/DL (ref 70–99)
HCT VFR BLD AUTO: 25.8 % (ref 42–52)
HCT VFR BLD AUTO: 30.4 % (ref 42–52)
HGB BLD-MCNC: 7.7 G/DL (ref 14–18)
HGB BLD-MCNC: 9.1 G/DL (ref 14–18)
HYPOCHROMIA BLD QL SMEAR: ABNORMAL
IRON SATURATION: 7 % (ref 20–55)
IRON: 19 UG/DL (ref 59–158)
LYMPHOCYTES # BLD: 1.6 K/UL (ref 1–4.8)
LYMPHOCYTES # BLD: 1.9 K/UL (ref 1–4.8)
LYMPHOCYTES NFR BLD: 19 %
LYMPHOCYTES NFR BLD: 23.3 %
MACROCYTES BLD QL SMEAR: ABNORMAL
MAGNESIUM SERPL-MCNC: 1.8 MG/DL (ref 1.7–2.4)
MCH RBC QN AUTO: 26.8 PG (ref 27–31.3)
MCH RBC QN AUTO: 27.5 PG (ref 27–31.3)
MCHC RBC AUTO-ENTMCNC: 29.8 % (ref 33–37)
MCHC RBC AUTO-ENTMCNC: 29.9 % (ref 33–37)
MCV RBC AUTO: 89.9 FL (ref 79–92.2)
MCV RBC AUTO: 91.8 FL (ref 79–92.2)
MONOCYTES # BLD: 0.3 K/UL (ref 0.2–0.8)
MONOCYTES # BLD: 0.6 K/UL (ref 0.2–0.8)
MONOCYTES NFR BLD: 3.9 %
MONOCYTES NFR BLD: 7.8 %
NEUTROPHILS # BLD: 5.2 K/UL (ref 1.4–6.5)
NEUTROPHILS # BLD: 5.6 K/UL (ref 1.4–6.5)
NEUTS SEG NFR BLD: 65.4 %
NEUTS SEG NFR BLD: 68 %
PATH INTERP BLD-IMP: NORMAL
PATH INTERP BLD-IMP: YES
PATH INTERP BLD-IMP: YES
PHOSPHATE SERPL-MCNC: 2.6 MG/DL (ref 2.3–4.8)
PLATELET # BLD AUTO: 315 K/UL (ref 130–400)
PLATELET # BLD AUTO: 380 K/UL (ref 130–400)
PLATELET BLD QL SMEAR: ADEQUATE
POIKILOCYTOSIS BLD QL SMEAR: ABNORMAL
POLYCHROMASIA BLD QL SMEAR: ABNORMAL
POTASSIUM SERPL-SCNC: 3.9 MEQ/L (ref 3.4–4.9)
RBC # BLD AUTO: 2.87 M/UL (ref 4.7–6.1)
RBC # BLD AUTO: 3.31 M/UL (ref 4.7–6.1)
SLIDE REVIEW: ABNORMAL
SMUDGE CELLS BLD QL SMEAR: 26.2
SODIUM SERPL-SCNC: 143 MEQ/L (ref 135–144)
TARGETS BLD QL SMEAR: ABNORMAL
TOTAL IRON BINDING CAPACITY: 283 UG/DL (ref 250–450)
TOXIC GRANULATION: ABNORMAL
UNSATURATED IRON BINDING CAPACITY: 264 UG/DL (ref 112–347)
VARIANT LYMPHS NFR BLD: 1 %
WBC # BLD AUTO: 8.2 K/UL (ref 4.8–10.8)
WBC # BLD AUTO: 8.2 K/UL (ref 4.8–10.8)

## 2023-11-09 PROCEDURE — 83735 ASSAY OF MAGNESIUM: CPT

## 2023-11-09 PROCEDURE — 2580000003 HC RX 258: Performed by: INTERNAL MEDICINE

## 2023-11-09 PROCEDURE — 85025 COMPLETE CBC W/AUTO DIFF WBC: CPT

## 2023-11-09 PROCEDURE — 6370000000 HC RX 637 (ALT 250 FOR IP)

## 2023-11-09 PROCEDURE — 43235 EGD DIAGNOSTIC BRUSH WASH: CPT | Performed by: INTERNAL MEDICINE

## 2023-11-09 PROCEDURE — 6360000002 HC RX W HCPCS: Performed by: INTERNAL MEDICINE

## 2023-11-09 PROCEDURE — 3700000000 HC ANESTHESIA ATTENDED CARE: Performed by: INTERNAL MEDICINE

## 2023-11-09 PROCEDURE — 2060000000 HC ICU INTERMEDIATE R&B

## 2023-11-09 PROCEDURE — 6370000000 HC RX 637 (ALT 250 FOR IP): Performed by: INTERNAL MEDICINE

## 2023-11-09 PROCEDURE — 6360000004 HC RX CONTRAST MEDICATION: Performed by: NURSE PRACTITIONER

## 2023-11-09 PROCEDURE — 7100000011 HC PHASE II RECOVERY - ADDTL 15 MIN: Performed by: INTERNAL MEDICINE

## 2023-11-09 PROCEDURE — 3609017100 HC EGD: Performed by: INTERNAL MEDICINE

## 2023-11-09 PROCEDURE — 99231 SBSQ HOSP IP/OBS SF/LOW 25: CPT | Performed by: PHYSICIAN ASSISTANT

## 2023-11-09 PROCEDURE — 2709999900 HC NON-CHARGEABLE SUPPLY: Performed by: INTERNAL MEDICINE

## 2023-11-09 PROCEDURE — 2580000003 HC RX 258: Performed by: NURSE PRACTITIONER

## 2023-11-09 PROCEDURE — 0DJ08ZZ INSPECTION OF UPPER INTESTINAL TRACT, VIA NATURAL OR ARTIFICIAL OPENING ENDOSCOPIC: ICD-10-PCS | Performed by: INTERNAL MEDICINE

## 2023-11-09 PROCEDURE — 7100000010 HC PHASE II RECOVERY - FIRST 15 MIN: Performed by: INTERNAL MEDICINE

## 2023-11-09 PROCEDURE — 99232 SBSQ HOSP IP/OBS MODERATE 35: CPT | Performed by: NURSE PRACTITIONER

## 2023-11-09 PROCEDURE — 6360000002 HC RX W HCPCS: Performed by: NURSE ANESTHETIST, CERTIFIED REGISTERED

## 2023-11-09 PROCEDURE — 80069 RENAL FUNCTION PANEL: CPT

## 2023-11-09 PROCEDURE — 74177 CT ABD & PELVIS W/CONTRAST: CPT

## 2023-11-09 RX ORDER — SODIUM CHLORIDE 0.9 % (FLUSH) 0.9 %
5-40 SYRINGE (ML) INJECTION PRN
Status: DISCONTINUED | OUTPATIENT
Start: 2023-11-09 | End: 2023-11-09 | Stop reason: HOSPADM

## 2023-11-09 RX ORDER — SODIUM CHLORIDE 0.9 % (FLUSH) 0.9 %
5-40 SYRINGE (ML) INJECTION EVERY 12 HOURS SCHEDULED
Status: DISCONTINUED | OUTPATIENT
Start: 2023-11-09 | End: 2023-11-09 | Stop reason: HOSPADM

## 2023-11-09 RX ORDER — SIMETHICONE 20 MG/.3ML
EMULSION ORAL PRN
Status: DISCONTINUED | OUTPATIENT
Start: 2023-11-09 | End: 2023-11-09 | Stop reason: ALTCHOICE

## 2023-11-09 RX ORDER — PROPOFOL 10 MG/ML
INJECTION, EMULSION INTRAVENOUS PRN
Status: DISCONTINUED | OUTPATIENT
Start: 2023-11-09 | End: 2023-11-09 | Stop reason: SDUPTHER

## 2023-11-09 RX ORDER — SODIUM CHLORIDE 9 MG/ML
INJECTION, SOLUTION INTRAVENOUS PRN
Status: CANCELLED | OUTPATIENT
Start: 2023-11-09

## 2023-11-09 RX ORDER — SODIUM CHLORIDE 9 MG/ML
INJECTION, SOLUTION INTRAVENOUS CONTINUOUS
Status: ACTIVE | OUTPATIENT
Start: 2023-11-09 | End: 2023-11-10

## 2023-11-09 RX ORDER — SODIUM CHLORIDE 9 MG/ML
INJECTION, SOLUTION INTRAVENOUS PRN
Status: DISCONTINUED | OUTPATIENT
Start: 2023-11-09 | End: 2023-11-09 | Stop reason: HOSPADM

## 2023-11-09 RX ORDER — SODIUM CHLORIDE 9 MG/ML
INJECTION, SOLUTION INTRAVENOUS CONTINUOUS
Status: CANCELLED | OUTPATIENT
Start: 2023-11-09

## 2023-11-09 RX ORDER — MAGNESIUM HYDROXIDE 1200 MG/15ML
LIQUID ORAL PRN
Status: DISCONTINUED | OUTPATIENT
Start: 2023-11-09 | End: 2023-11-09 | Stop reason: ALTCHOICE

## 2023-11-09 RX ORDER — SODIUM CHLORIDE 0.9 % (FLUSH) 0.9 %
5-40 SYRINGE (ML) INJECTION EVERY 12 HOURS SCHEDULED
Status: CANCELLED | OUTPATIENT
Start: 2023-11-09

## 2023-11-09 RX ORDER — SODIUM CHLORIDE 9 MG/ML
25 INJECTION, SOLUTION INTRAVENOUS PRN
Status: DISCONTINUED | OUTPATIENT
Start: 2023-11-09 | End: 2023-11-09 | Stop reason: HOSPADM

## 2023-11-09 RX ORDER — ONDANSETRON 2 MG/ML
4 INJECTION INTRAMUSCULAR; INTRAVENOUS
Status: DISCONTINUED | OUTPATIENT
Start: 2023-11-09 | End: 2023-11-09 | Stop reason: HOSPADM

## 2023-11-09 RX ORDER — FOLIC ACID 1 MG/1
1 TABLET ORAL DAILY
Status: DISCONTINUED | OUTPATIENT
Start: 2023-11-09 | End: 2023-11-13 | Stop reason: HOSPADM

## 2023-11-09 RX ORDER — SODIUM CHLORIDE 9 MG/ML
INJECTION, SOLUTION INTRAVENOUS CONTINUOUS
Status: DISCONTINUED | OUTPATIENT
Start: 2023-11-09 | End: 2023-11-13 | Stop reason: HOSPADM

## 2023-11-09 RX ADMIN — PROPOFOL 50 MG: 10 INJECTION, EMULSION INTRAVENOUS at 12:38

## 2023-11-09 RX ADMIN — IOPAMIDOL 50 ML: 755 INJECTION, SOLUTION INTRAVENOUS at 16:30

## 2023-11-09 RX ADMIN — SODIUM CHLORIDE 500 ML: 9 INJECTION, SOLUTION INTRAVENOUS at 11:29

## 2023-11-09 RX ADMIN — SODIUM CHLORIDE: 9 INJECTION, SOLUTION INTRAVENOUS at 12:41

## 2023-11-09 RX ADMIN — PROPOFOL 100 MG: 10 INJECTION, EMULSION INTRAVENOUS at 12:35

## 2023-11-09 RX ADMIN — IRON SUCROSE 300 MG: 20 INJECTION, SOLUTION INTRAVENOUS at 14:41

## 2023-11-09 RX ADMIN — PHENYLEPHRINE HYDROCHLORIDE 200 MCG: 10 INJECTION INTRAVENOUS at 12:37

## 2023-11-09 RX ADMIN — GABAPENTIN 600 MG: 300 CAPSULE ORAL at 22:07

## 2023-11-09 RX ADMIN — SODIUM CHLORIDE: 9 INJECTION, SOLUTION INTRAVENOUS at 18:41

## 2023-11-09 RX ADMIN — PANTOPRAZOLE SODIUM 40 MG: 40 TABLET, DELAYED RELEASE ORAL at 06:42

## 2023-11-09 RX ADMIN — SODIUM CHLORIDE: 9 INJECTION, SOLUTION INTRAVENOUS at 04:56

## 2023-11-09 RX ADMIN — IOPAMIDOL 20 ML: 612 INJECTION, SOLUTION INTRAVENOUS at 16:30

## 2023-11-09 ASSESSMENT — PAIN SCALES - GENERAL
PAINLEVEL_OUTOF10: 0
PAINLEVEL_OUTOF10: 0

## 2023-11-09 ASSESSMENT — ENCOUNTER SYMPTOMS: APNEA: 0

## 2023-11-09 ASSESSMENT — LIFESTYLE VARIABLES: SMOKING_STATUS: 1

## 2023-11-09 NOTE — PLAN OF CARE
Nutrition Problem #1: Severe malnutrition  Intervention: Food and/or Nutrient Delivery: Start Oral Diet, Start Oral Nutrition Supplement  Nutritional

## 2023-11-09 NOTE — CONSULTS
meet current compliance and payer requirements. The provider is responsible for the final determination of appropriate codes,       and modifiers. Agustina Mayorga MD This document has been electronically signed. Note Initiated:11/9/2023 Note Completed:11/9/2023 12:53 PM    CT Head W/O Contrast    Result Date: 11/7/2023  EXAMINATION: CT OF THE HEAD WITHOUT CONTRAST  11/7/2023 2:12 pm TECHNIQUE: CT of the head was performed without the administration of intravenous contrast. Automated exposure control, iterative reconstruction, and/or weight based adjustment of the mA/kV was utilized to reduce the radiation dose to as low as reasonably achievable. COMPARISON: September 15 HISTORY: ORDERING SYSTEM PROVIDED HISTORY: ams TECHNOLOGIST PROVIDED HISTORY: Reason for exam:->ams Has a \"code stroke\" or \"stroke alert\" been called? ->No Decision Support Exception - unselect if not a suspected or confirmed emergency medical condition->Emergency Medical Condition (MA) What reading provider will be dictating this exam?->CRC FINDINGS: BRAIN/VENTRICLES: There is no acute intracranial hemorrhage, mass effect or midline shift. No abnormal extra-axial fluid collection. The gray-white differentiation is maintained without evidence of an acute infarct. There is prominence of the ventricles and sulci due to global parenchymal volume loss. There are nonspecific areas of hypoattenuation within the periventricular and subcortical white matter, which likely represent chronic microvascular ischemic change. ORBITS: The visualized portion of the orbits demonstrate no acute abnormality. SINUSES: Mucosal thickening SOFT TISSUES/SKULL: No acute abnormality of the visualized skull or soft tissues. No acute intracranial abnormality.  Moderate chronic senescent change     XR CHEST PORTABLE    Result Date: 11/2/2023  EXAMINATION: ONE XRAY VIEW OF THE CHEST 11/2/2023 3:15 pm COMPARISON: October 24, 2023 0048 hours HISTORY: ORDERING SYSTEM adjustment of the mA/kV was utilized to reduce the radiation dose to as low as reasonably achievable. COMPARISON: September 24, 2023 HISTORY: ORDERING SYSTEM PROVIDED HISTORY: jeremy TECHNOLOGIST PROVIDED HISTORY: Reason for exam:->jeremy Additional Contrast?->None Decision Support Exception - unselect if not a suspected or confirmed emergency medical condition->Emergency Medical Condition (MA) What reading provider will be dictating this exam?->CRC FINDINGS: Lower Chest: Emphysema. No infiltrate or effusion. Organs: Left nephroureteral stent with good positioning. Nonobstructing 8 mm left lower pole nephrolithiasis and 3 mm right lower pole nephrolithiasis. Otherwise, the Liver, gallbladder, biliary tree, bilateral adrenal glands, bilateral kidneys, spleen and pancreas are normal. GI/Bowel: No ileus or obstruction. No inflammatory bowel process. Appendix not visible. No pericecal inflammatory change to suggest occult appendicitis. Pelvis: No pelvic adenopathy or ascites. Peritoneum/Retroperitoneum: Sclerotic aorta without aneurysm. No hernia, adenopathy or ascites. Bones/Soft Tissues: Negative. 1. No hydronephrosis. No acute disease. 2. Left nephroureteral stent with good positioning. Nonobstructing 8 mm left lower pole nephrolithiasis and 3 mm right lower pole nephrolithiasis. RECOMMENDATIONS: Careful clinical correlation and follow up recommended. XR ABDOMEN (KUB) (SINGLE AP VIEW)    Result Date: 10/11/2023  EXAMINATION: ONE SUPINE XRAY VIEW(S) OF THE ABDOMEN 10/11/2023 1:46 am COMPARISON: September 6, 2023 HISTORY: ORDERING SYSTEM PROVIDED HISTORY: abd pain TECHNOLOGIST PROVIDED HISTORY: Reason for exam:->abd pain What reading provider will be dictating this exam?->CRC FINDINGS: No ileus or obstruction. No appendicoliths. Left nephroureteral stent noted in position. Osseous and body wall soft tissues unremarkable. 1. No acute disease. 2. Left nephroureteral stent noted in position.  RECOMMENDATION:

## 2023-11-09 NOTE — PROGRESS NOTES
Encouraging patient to drink as much of the oral contrast as he can which was delivered by radiology.

## 2023-11-09 NOTE — ANESTHESIA POSTPROCEDURE EVALUATION
Department of Anesthesiology  Postprocedure Note    Patient: Ricky Landaverde  MRN: [id number]  YOB: 1955  Date of evaluation: 11/9/2023      Procedure Summary     Date: 11/09/23 Room / Location: [facility] OR [address] [place] [address]    Anesthesia Start: 1234 Anesthesia Stop: 5215    Procedure: EGD DIAGNOSTIC ONLY Diagnosis:       Duodenal ulcer      (Duodenal ulcer [K26.9])    Surgeons: Jose Miguel Mesa MD Responsible Provider: MANUELA Rivas CRNA    Anesthesia Type: MAC ASA Status: 3 - Emergent          Anesthesia Type: No value filed.     Oanh Phase I: Oanh Score: 10    Oanh Phase II:        Anesthesia Post Evaluation    Patient location during evaluation: bedside  Patient participation: complete - patient participated  Level of consciousness: awake and awake and alert  Airway patency: patent  Nausea & Vomiting: no nausea and no vomiting  Complications: no  Cardiovascular status: blood pressure returned to baseline and hemodynamically stable  Respiratory status: acceptable  Hydration status: euvolemic  Pain management: adequate

## 2023-11-09 NOTE — PROGRESS NOTES
Subjective:      Patient ID: Vu Gerard is a 77 y.o. male    HPI  77year old male who has a non obstructing left lower pole nephrolithiasis with a stable left nephroureteral stent.  Denies pain or hematuria    Past Medical History:   Diagnosis Date    Allergic rhinitis 02/19/2018    Anxiety     Bilateral carotid artery stenosis 05/10/2023    Chronic back pain     Colon polyp     COPD (chronic obstructive pulmonary disease) (HCC)     Depression     Disorder of stomach     valve not closing properly    Emphysema lung ([address])     Essential hypertension 11/04/2019    ETOH abuse     SOBER X 20 YEARS    Gastroesophageal reflux disease 10/04/2019    Hydronephrosis of left kidney 08/23/2023    Hyperlipidemia     meds > 22 yrs    Low back pain 05/01/2018    ARNAUD (obstructive sleep apnea) 02/19/2018    Other emphysema ([address]) 10/04/2019    Other intervertebral disc degeneration, lumbar region 03/23/2018    Radiculopathy, lumbar region 02/28/2018    Restless leg syndrome     Seasonal affective disorder ([address]) 10/04/2019    Substance abuse ([address])     alcholic, quit 20 yrs      Past Surgical History:   Procedure Laterality Date    BLADDER SURGERY Left 9/11/2023    CYSTOSCOPY RETROGRADE PYELOGRAM AND LEFT DOUBLE J STENT INSERTION performed by Liliana Ochoa MD at [address] [place] [address]  12/22/2016    A [address] [facility] MD    DENTAL SURGERY  10 yrs ago    ENDOSCOPY, COLON, DIAGNOSTIC      EYE SURGERY      Lasik OU    FINGER TRIGGER RELEASE Left 11/1/2022    LEFT HAND TRIGGER FINGER RING FINGER RELEASE OF A1 PULLEY performed by Tangela Hyman MD at [facility] ARTHROSCOPY Right     KNEE SURGERY Right 05/2016    Ray procedure    KNEE SURGERY      IL NASAL/SINUS ENDOSCOPY W/MAXILLARY ANTROSTOMY N/A 2/22/2018    SEPTOPLASTY MICRODEBRIDER ASSISTED TURBINOPLASTY AND OUT-FRACTURING BILATERAL NASAL ENDOSCOPY performed by Brittany Stovall MD at [address] [place] [address] 9/18/2023    EGD BIOPSY performed by Jay Peoples MD at [address] History     Socioeconomic History    Marital status:      Spouse name: None    Number of children: 2    Years of education: 12    Highest education level: High school graduate   Tobacco Use    Smoking status: Every Day     Packs/day: 2.00     Years: 52.00     Additional pack years: 0.00     Total pack years: 104.00     Types: Cigars, Cigarettes     Start date: 1970    Smokeless tobacco: Never    Tobacco comments:     3-5 cigars qd   Vaping Use    Vaping Use: Never used   Substance and Sexual Activity    Alcohol use: No     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    Drug use: No    Sexual activity: Not Currently     Partners: Female     Social Determinants of Health     Financial Resource Strain: Low Risk  (2/3/2023)    Overall Financial Resource Strain (CARDIA)     Difficulty of Paying Living Expenses: Not hard at all   Food Insecurity: No Food Insecurity (11/7/2023)    Hunger Vital Sign     Worried About Running Out of Food in the Last Year: Never true     Ran Out of Food in the Last Year: Never true    Transportation Problems [facility] HRSN)   Physical Activity: Insufficiently Active (2/28/2023)    Exercise Vital Sign     Days of Exercise per Week: 7 days     Minutes of Exercise per Session: 20 min   Housing Stability: Low Risk  (11/7/2023)    Housing Stability Vital Sign     Unable to Pay for Housing in the Last Year: No     Number of Places Lived in the Last Year: 1     Unstable Housing in the Last Year: No     Family History   Problem Relation Age of Onset    Cancer Mother         stomach    Arthritis Mother     Heart Disease Father 48    Cancer Father         bone    No Known Problems Sister     Cancer Brother     Heart Disease Brother         hole in heart in 65 at 1 months age    Cancer Maternal Grandmother         lung    Cancer Paternal Grandmother     Heart Disease Paternal Grandfather     Colon Cancer Neg Hx      Current Facility-Administered Medications

## 2023-11-09 NOTE — PROGRESS NOTES
Comprehensive Nutrition Assessment    Type and Reason for Visit:  Initial, RD Nutrition Re-Screen/LOS (bmi)    Nutrition Recommendations/Plan:   Continue NPO  Recommend general  diet as tolerated post EGD  Begin High Calorie oral supplement @ B and frozen oral supplement @ L & D once po diet advacned     Malnutrition Assessment:  Malnutrition Status:  Severe malnutrition (11/09/23 0759)    Context:  Social/Environmental Circumstances     Findings of the 6 clinical characteristics of malnutrition:  Energy Intake:  50% or less estimated energy requirements for 1 month or longer  Weight Loss:  Greater than 10% over 6 months     Body Fat Loss:  Severe body fat loss Buccal region, Triceps, Orbital   Muscle Mass Loss:  Severe muscle mass loss Thigh (quadraceps), Clavicles (pectoralis & deltoids), Temples (temporalis), Scapula (trapezius)  Fluid Accumulation:  No significant fluid accumulation     Strength:  Not Performed    Nutrition Assessment:    Pt admitted with severe malnutrition, current weight reflects > 20% weight loss in < 6 months, with reports of poor po PTA and losses of adipose and muscle stores. Monitor for diet progression after EGD and begin oral nutrition supplements to aid in meeting energy and protein needs    Nutrition Related Findings:    admitted for FTT \"PMH includes: duodenal ulcer, Yip's esophagus, COPD, tobacco use,  chronic back pain with opiate dependence, anxiety/insomnia. ..gradually declining over the last few weeks / months with worsening weakness, poor appetite, weight loss and inability to take care of himself per family at the bedside, initial w/u showed prerenal azotemia,\" for repeat EGD today, labs noted, meds reviewed, IVF = .9 NS @ 100 m l/hr via peripheral line Wound Type: None       Current Nutrition Intake & Therapies:    Average Meal Intake: NPO  Average Supplements Intake: NPO  Diet NPO Exceptions are: Sips of Water with Meds    Anthropometric Measures:  Height: 172.7 cm (5' 8\")  Ideal Body Weight (IBW): 154 lbs (70 kg)    Admission Body Weight: 49 kg (108 lb)  Current Body Weight: 49 kg (108 lb), 70.1 % IBW. Weight Source: Bed Scale  Current BMI (kg/m2): 16.4  Usual Body Weight: 54.4 kg (120 lb) (( 9/23), 140-145#)  % Weight Change (Calculated): -10                    BMI Categories: Underweight (BMI less than 22) age over 72    Estimated Daily Nutrient Needs:  Energy Requirements Based On: Kcal/kg  Weight Used for Energy Requirements: Current  Energy (kcal/day): ~ 1500 kcals @ 30 kcal/kg  Weight Used for Protein Requirements: Current  Protein (g/day): ~ 73 g protein @ 1.5 g/kg  Method Used for Fluid Requirements: 1 ml/kcal  Fluid (ml/day): ~1500    Nutrition Diagnosis:   Severe malnutrition related to inadequate protein-energy intake, altered GI function as evidenced by Criteria as identified in malnutrition assessment    Nutrition Interventions:   Food and/or Nutrient Delivery: Start Oral Diet, Start Oral Nutrition Supplement  Nutrition Education/Counseling: Education not indicated  Coordination of Nutrition Care: Continue to monitor while inpatient       Goals:     Goals: Initiate PO diet, PO intake 50% or greater, by next RD assessment       Nutrition Monitoring and Evaluation:   Behavioral-Environmental Outcomes: None Identified  Food/Nutrient Intake Outcomes: Diet Advancement/Tolerance, Food and Nutrient Intake, Supplement Intake  Physical Signs/Symptoms Outcomes: GI Status, Meal Time Behavior, Weight    Discharge Planning:     Too soon to determine     Blossom Dewey RD, LD

## 2023-11-09 NOTE — CONSULTS
GENERAL SURGERY NOTE    Pt Name: tSew Pizano  MRN: [id number]  Date: 11/9/2023        SUBJECTIVE:     History of Chief Complaint:    Natalia Msitry is a 77 y.o. male with PMH of bilateral carotid stenosis, duodenal ulcer, Yip's esophagus, COPD, tobacco use, lumbar stenosis, chronic back pain with opiate dependence, anxiety/insomnia, left nephrolithiasis with hydronephrosis s/p cystoscopy with JJ stent placement on 9/2023. Patient has been gradually declining over the last few weeks / months with worsening weakness, poor appetite, weight loss and inability to take care of himself per family at the bedside, initial w/u showed prerenal azotemia, elevated lactate, hypokalemia, IVFs and potassium given, admitted for further management. Patient had an EGD today which showed duodenal ulcer within the ulcer, there is a 3 mm opening could suggest fistula, contained perforation or pseudodiverticulum. General Surgery consulted for the duodenal ulcer.     Past Medical History:   Diagnosis Date    Allergic rhinitis 02/19/2018    Anxiety     Bilateral carotid artery stenosis 05/10/2023    Chronic back pain     Colon polyp     COPD (chronic obstructive pulmonary disease) (HCC)     Depression     Disorder of stomach     valve not closing properly    Emphysema lung ([address])     Essential hypertension 11/04/2019    ETOH abuse     SOBER X 20 YEARS    Gastroesophageal reflux disease 10/04/2019    Hydronephrosis of left kidney 08/23/2023    Hyperlipidemia     meds > 22 yrs    Low back pain 05/01/2018    ARNAUD (obstructive sleep apnea) 02/19/2018    Other emphysema ([address]) 10/04/2019    Other intervertebral disc degeneration, lumbar region 03/23/2018    Radiculopathy, lumbar region 02/28/2018    Restless leg syndrome     Seasonal affective disorder ([address]) 10/04/2019    Substance abuse ([address])     alcholic, quit 20 yrs      Past Surgical History:   Procedure Laterality Date    BLADDER SURGERY Left 9/11/2023    CYSTOSCOPY RETROGRADE PYELOGRAM AND perforation or pseudodiverticulum. General Surgery consulted for the duodenal ulcer. Patient with an endoscopic findings of duodenal ulcer,  within the ulcer, there is a 3 mm opening could suggest fistula, contained perforation or pseudodiverticulum. There is no peritoneal irritation, no rebound in the physical exam  There is no urgent surgical indication at this time  General Surgery will follow.       Jonny Mai MD   General surgeon    Electronically signed by Jonny Mai MD [facility], on 11/9/2023

## 2023-11-09 NOTE — FLOWSHEET NOTE
Assumed care of patient for  12    hour shift. Previous assessment reviewed and updated as needed. Chart and meds reviewed. Pt :               Alert and oriented. Complaints of: pain  Pain:4/10  IV: 20 R FA  TELE:                           OXYGEN : RA  Dressings: none  Precautions:   standard           Falls:   high     Sreekanth: 19  Call light in reach. NOTES:       0500: The RN started a new bag of NS IV for continuous order. Patients IV remains patent.

## 2023-11-10 PROBLEM — R10.13 EPIGASTRIC PAIN: Status: ACTIVE | Noted: 2023-11-10

## 2023-11-10 LAB
ALBUMIN SERPL-MCNC: 3.1 G/DL (ref 3.5–4.6)
ANION GAP SERPL CALCULATED.3IONS-SCNC: 11 MEQ/L (ref 9–15)
BASOPHILS # BLD: 0 K/UL (ref 0–0.2)
BASOPHILS NFR BLD: 0.1 %
BUN SERPL-MCNC: 17 MG/DL (ref 8–23)
CALCIUM SERPL-MCNC: 8.6 MG/DL (ref 8.5–9.9)
CHLORIDE SERPL-SCNC: 106 MEQ/L (ref 95–107)
CO2 SERPL-SCNC: 24 MEQ/L (ref 20–31)
CREAT SERPL-MCNC: 0.74 MG/DL (ref 0.7–1.2)
EOSINOPHIL # BLD: 0.1 K/UL (ref 0–0.7)
EOSINOPHIL NFR BLD: 1.3 %
ERYTHROCYTE [DISTWIDTH] IN BLOOD BY AUTOMATED COUNT: 17.4 % (ref 11.5–14.5)
GLUCOSE SERPL-MCNC: 143 MG/DL (ref 70–99)
HCT VFR BLD AUTO: 26.8 % (ref 42–52)
HGB BLD-MCNC: 8.3 G/DL (ref 14–18)
LYMPHOCYTES # BLD: 1.1 K/UL (ref 1–4.8)
LYMPHOCYTES NFR BLD: 14.8 %
MAGNESIUM SERPL-MCNC: 2 MG/DL (ref 1.7–2.4)
MCH RBC QN AUTO: 27.1 PG (ref 27–31.3)
MCHC RBC AUTO-ENTMCNC: 31 % (ref 33–37)
MCV RBC AUTO: 87.6 FL (ref 79–92.2)
MONOCYTES # BLD: 0.5 K/UL (ref 0.2–0.8)
MONOCYTES NFR BLD: 6.9 %
NEUTROPHILS # BLD: 5.9 K/UL (ref 1.4–6.5)
NEUTS SEG NFR BLD: 76.6 %
PHOSPHATE SERPL-MCNC: 2.4 MG/DL (ref 2.3–4.8)
PLATELET # BLD AUTO: 336 K/UL (ref 130–400)
POTASSIUM SERPL-SCNC: 3.5 MEQ/L (ref 3.4–4.9)
RBC # BLD AUTO: 3.06 M/UL (ref 4.7–6.1)
SODIUM SERPL-SCNC: 141 MEQ/L (ref 135–144)
WBC # BLD AUTO: 7.7 K/UL (ref 4.8–10.8)

## 2023-11-10 PROCEDURE — 6370000000 HC RX 637 (ALT 250 FOR IP): Performed by: INTERNAL MEDICINE

## 2023-11-10 PROCEDURE — 82746 ASSAY OF FOLIC ACID SERUM: CPT

## 2023-11-10 PROCEDURE — 83735 ASSAY OF MAGNESIUM: CPT

## 2023-11-10 PROCEDURE — 6370000000 HC RX 637 (ALT 250 FOR IP): Performed by: PSYCHIATRY & NEUROLOGY

## 2023-11-10 PROCEDURE — 82607 VITAMIN B-12: CPT

## 2023-11-10 PROCEDURE — 6360000002 HC RX W HCPCS: Performed by: PSYCHIATRY & NEUROLOGY

## 2023-11-10 PROCEDURE — 99232 SBSQ HOSP IP/OBS MODERATE 35: CPT | Performed by: NURSE PRACTITIONER

## 2023-11-10 PROCEDURE — 80069 RENAL FUNCTION PANEL: CPT

## 2023-11-10 PROCEDURE — 85025 COMPLETE CBC W/AUTO DIFF WBC: CPT

## 2023-11-10 PROCEDURE — 90791 PSYCH DIAGNOSTIC EVALUATION: CPT | Performed by: PSYCHIATRY & NEUROLOGY

## 2023-11-10 PROCEDURE — 99231 SBSQ HOSP IP/OBS SF/LOW 25: CPT | Performed by: PHYSICIAN ASSISTANT

## 2023-11-10 PROCEDURE — 2060000000 HC ICU INTERMEDIATE R&B

## 2023-11-10 PROCEDURE — 36415 COLL VENOUS BLD VENIPUNCTURE: CPT

## 2023-11-10 RX ORDER — HALOPERIDOL 5 MG/ML
5 INJECTION INTRAMUSCULAR EVERY 6 HOURS PRN
Status: DISCONTINUED | OUTPATIENT
Start: 2023-11-10 | End: 2023-11-13 | Stop reason: HOSPADM

## 2023-11-10 RX ORDER — DIVALPROEX SODIUM 125 MG/1
125 CAPSULE, COATED PELLETS ORAL EVERY 8 HOURS SCHEDULED
Status: DISCONTINUED | OUTPATIENT
Start: 2023-11-10 | End: 2023-11-13 | Stop reason: HOSPADM

## 2023-11-10 RX ADMIN — GABAPENTIN 600 MG: 300 CAPSULE ORAL at 21:18

## 2023-11-10 RX ADMIN — DIVALPROEX SODIUM 125 MG: 125 CAPSULE, COATED PELLETS ORAL at 21:18

## 2023-11-10 RX ADMIN — ACETAMINOPHEN 650 MG: 325 TABLET ORAL at 21:18

## 2023-11-10 RX ADMIN — HALOPERIDOL LACTATE 5 MG: 5 INJECTION, SOLUTION INTRAMUSCULAR at 18:14

## 2023-11-10 ASSESSMENT — ENCOUNTER SYMPTOMS: APNEA: 0

## 2023-11-10 NOTE — PROGRESS NOTES
Labs     11/07/23  1315   INR 0.9       Recent Labs     11/07/23  1315   CKTOTAL 41         Urinalysis:      Lab Results   Component Value Date/Time    NITRU Negative 10/11/2023 02:27 AM    WBCUA >100 10/11/2023 02:27 AM    BACTERIA Negative 10/11/2023 02:27 AM    RBCUA  10/11/2023 02:27 AM    BLOODU LARGE 10/11/2023 02:27 AM    SPECGRAV 1.035 10/11/2023 02:27 AM    GLUCOSEU Negative 10/11/2023 02:27 AM       Radiology:  CT ABDOMEN PELVIS W IV CONTRAST Additional Contrast? Oral (gastrografin)   Final Result   1. Motion artifacts obscures detail in the area of clinical interest which   is the upper abdomen cannot proper evaluate the distal stomach and the   duodenal.  Recommended repeat examination. 2.  Patient was not able to cooperate during proper amount of oral contrast   for opacification of the stomach in the duodenal.  T      3. Consider repeat examination when patient is more able to drink better   amount of oral contrast for opacification. 4.  Can consider examination in right lateral decubitus to facilitate the   passage of oral contrast from the stomach into the duodenal, with the patient   drinking additional oral contrast when on the CT scan unit. CT Head W/O Contrast   Final Result   No acute intracranial abnormality.       Moderate chronic senescent change                 Assessment/Plan:    77 y.o. male with PMH of bilateral carotid stenosis, duodenal ulcer, Yip's esophagus, MANISH, COPD, tobacco use, lumbar stenosis, chronic back pain with opiate dependence, anxiety/insomnia, left sided nephrolithiasis with hydronephrosis s/p cystoscopy with JJ stent placement on 9/2023 who presented with:     Generalized weakness, poor appetite, weight loss  - s/p IV hydration   - continue PT/OT  - deemed to not have decision-making capacity per psychiatry  - /case management following     Recent duodenal ulcer  - continue Protonix  - EGD today showed duodenal ulcer with a 3 mm

## 2023-11-10 NOTE — PROGRESS NOTES
7A - 7P shift:    Patient continues to use foul language as he verbalizes his frustrations of being a patient here. He does not have capacity to make decisions (evaluated by Psych). Writer was told that his son, Khoa Lacey, is the DPOA for healthcare. He often states, \" I want to go home. \"   Since, his phone battery  this afternoon, he is now upset about not having a . He has not been physically aggressive nor has he tried to elope. The most aggression he displays is verbal foul language with frustration, frequent use of call light, repetitive requests, and refuses to take oral medications. Tele-sitter (avasys) in use, bed alarm on, call light within reach. Bed in low position. No IV access, non-compliant with most care.

## 2023-11-10 NOTE — PROGRESS NOTES
Assessment completed, pt refusing IV start, states \"I'm waiting for my neighbor to pick me up\" nicotine patch applied, pt made aware of signing Franco Snide form if neighbor arrives, pt agitated and belligerent with staff    11/9/23 2210  Jello and coffee given, pt not compliant with diet orders, pt yells @ staff when encouraged to follow, pt is noted to be forgetful of location, alert to self and time, agreeable @ this time to take gabapentin    11/9/23 2220  Pt yelling in hallway, c/o \"people talking\" in hallway, pt reoriented to surroundings, yells at staff saying \"Why are you in my bedroom\" pt reoriented to surroundings, ice cream given per request    11/10/23 0230  Pt found wandering into another pt room, confused about surroundings and assisted back to room, bed alarm applied and AVASYS requested from Supervisor    11/10/23 0510  Bed Alarm sounding,coffee cup thrown out of room, pt agitated with staff and tele-sitter,  attempting to reorient pt, pt more belligerent with staff and yelling obscenities, pt unable to be redirected and yells out \"This isn't a hospital, its a custodial\", [facility] called

## 2023-11-10 NOTE — PROGRESS NOTES
Grandmother     Heart Disease Paternal Grandfather     Colon Cancer Neg Hx      Current Facility-Administered Medications   Medication Dose Route Frequency Provider Last Rate Last Admin    divalproex (DEPAKOTE SPRINKLE) DR capsule 125 mg  125 mg Oral 3 times per day Shamir Otero MD        haloperidol lactate (HALDOL) injection 5 mg  5 mg IntraMUSCular Q6H PRN Shamir Otero MD        0.9 % sodium chloride infusion   IntraVENous Continuous Perla Hagan MD   Stopped at 01/30/55 3737    folic acid (FOLVITE) tablet 1 mg  1 mg Oral Daily Esperanza Acuña MD        albuterol (PROVENTIL) (2.5 MG/3ML) 0.083% nebulizer solution 2.5 mg  2.5 mg Nebulization Q6H PRN Savita Castillo MD        gabapentin (NEURONTIN) capsule 600 mg  600 mg Oral TID Savita Castillo MD   600 mg at 11/09/23 2207    pantoprazole (PROTONIX) tablet 40 mg  40 mg Oral BID AC Savita Castillo MD   40 mg at 11/09/23 0642    traMADol (ULTRAM) tablet 50 mg  50 mg Oral BID PRN Savita Castillo MD   50 mg at 11/08/23 2112    sodium chloride flush 0.9 % injection 5-40 mL  5-40 mL IntraVENous 2 times per day Savita Castillo MD   10 mL at 11/07/23 2017    sodium chloride flush 0.9 % injection 5-40 mL  5-40 mL IntraVENous PRN Savita Castillo MD        0.9 % sodium chloride infusion   IntraVENous PRN Savita Castillo MD        potassium chloride (KLOR-CON M) extended release tablet 40 mEq  40 mEq Oral PRN Savita Castillo MD        Or    potassium bicarb-citric acid (EFFER-K) effervescent tablet 40 mEq  40 mEq Oral PRN Savita Castillo MD        Or    potassium chloride 10 mEq/100 mL IVPB (Peripheral Line)  10 mEq IntraVENous PRN Savita Castillo MD        magnesium sulfate 2000 mg in 50 mL IVPB premix  2,000 mg IntraVENous PRN Savita Castillo MD        ondansetron (ZOFRAN-ODT) disintegrating tablet 4 mg  4 mg Oral Q8H PRN Savita Castillo MD        Or    ondansetron (ZOFRAN) injection 4 mg  4 mg IntraVENous Q6H PRN Savita Castillo MD   4 mg at 11/08/23 4470 polyethylene glycol (GLYCOLAX) packet 17 g  17 g Oral Daily PRN Shaye Ayala MD        acetaminophen (TYLENOL) tablet 650 mg  650 mg Oral Q6H PRN Shaye Ayala MD   650 mg at 11/08/23 0751    Or    acetaminophen (TYLENOL) suppository 650 mg  650 mg Rectal Q6H PRN Shaye Ayala MD        enoxaparin Sodium (LOVENOX) injection 30 mg  30 mg SubCUTAneous Daily Shaye Ayala MD   30 mg at 11/08/23 0800    nicotine (NICODERM CQ) 21 MG/24HR 1 patch  1 patch TransDERmal Daily Annper Barajas, APRN - CNP   1 patch at 11/09/23 2127     Alcohol, Benadryl [diphenhydramine], Demerol hcl [meperidine], and Sulfa antibiotics  reviewed      Review of Systems   Constitutional:  Negative for fever. HENT:  Negative for congestion. Respiratory:  Negative for apnea. Genitourinary:  Negative for hematuria. Neurological:  Negative for speech difficulty. Objective:   Physical Exam  HENT:      Mouth/Throat:      Mouth: Mucous membranes are moist.   Pulmonary:      Effort: Pulmonary effort is normal.   Skin:     General: Skin is warm. Neurological:      Mental Status: He is alert and oriented to person, place, and time. Assessment:       77year old male who has a non obstructing left lower pole nephrolithiasis with a stable left nephroureteral stent.  Denies pain or hematuria      Plan:      The patient is to follow up with Dr. Gregorio Ryan as consulted after discharge                         Carson Ponce PA-C

## 2023-11-10 NOTE — CONSULTS
Capacity asssessment:      1. Unable to understand the nature and purpose of the proposed treatment  2. Unable to understand the risk benefit alternative options to the proposed treatment  3. Decision making process is not affected by depression, psychosis  4. Alert and oriented x 2  5. Unable to retain information given to him  6. Unable to arrive at the decision based on the information available to him      It is my opinion, with reasonable degree of medical certainty, that patients mental capacity to make informed decisions about his care is currently impaired, and that he is thus unable to make informed decisions in this regard.       If patient has [facility] PO - he or she can make decision in the best interest of patient    Full note to be completed    PRN medication ordered for agitation    Electronically signed by Asha Banegas MD on 11/10/2023 at 8:58 AM patient history. No acute active cardiopulmonary process     XR CHEST (2 VW)    Result Date: 10/24/2023  EXAMINATION: TWO XRAY VIEWS OF THE CHEST 10/23/2023 11:16 pm COMPARISON: 21 October 2023 HISTORY: ORDERING SYSTEM PROVIDED HISTORY: CP TECHNOLOGIST PROVIDED HISTORY: Reason for exam:->CP What reading provider will be dictating this exam?->CRC FINDINGS: The lungs are without acute focal process. There is no effusion or pneumothorax. The cardiomediastinal silhouette is without acute process. The osseous structures are without acute process. No acute process. EKG: TRACING REVIEWED    RECOMMENDATIONS    Risk Management: It is my opinion, with reasonable degree of medical certainty, that patients mental capacity to make informed decisions about his care is currently impaired, and that he is thus unable to make informed decisions in this regard. Discussed with the treating physician/ team about the patient and treatment plan  Reviewed the chart      Thanks for the consult. Please call me if needed.     Electronically signed by Callum Zabala MD on 11/20/2023 at 3:30 PM         [facility] he or she can make decision in the best interest of patient      PRN medication ordered for agitation    Electronically signed by Callum Zabala MD on 11/10/2023 at 8:58 AM

## 2023-11-10 NOTE — PROGRESS NOTES
Patient was seen and evaluated as per RN to pink slipped. Evaluated patient cannot determine if pt is mentally sound and able to make decision for him self or has capacity to leave or not. Spoke with nursing supervisor. Patient is willing to stay for now to see psychiatrist.  Ileana Esposito with primary team.  Patient just wants Coke and pop tart. Addendum  Spoke with nursing  Evaluated by psych, pt has no capacity but was not pink slipped bc pt is not threatening to leave, If pt tries to leave then needs to be pink slipped. Spoke with primary.

## 2023-11-10 NOTE — PROGRESS NOTES
[facility] called to the room, patient belligerent, attempting to leave unit, non compliant, threw his cane across the room, not understanding the need for treatment. Haldol to be given per [facility] with note attached to administration.

## 2023-11-10 NOTE — CONSULTS
Capacity asssessment:      1. Unable to understand the nature and purpose of the proposed treatment  2. Unable to understand the risk benefit alternative options to the proposed treatment  3. Decision making process is not affected by depression, psychosis  4. Alert and oriented x 2  5. Unable to retain information given to him  6. Unable to arrive at the decision based on the information available to him      It is my opinion, with reasonable degree of medical certainty, that patients mental capacity to make informed decisions about his care is currently impaired, and that he is thus unable to make informed decisions in this regard.       If patient has [facility] PO - he or she can make decision in the best interest of patient    Full note to be completed    PRN medication ordered for agitation    Electronically signed by Jody Mistry MD on 11/10/2023 at 8:58 AM

## 2023-11-10 NOTE — PROGRESS NOTES
7A - 7P SHIFT:  Mr. Sung Alvarado has been non-compliant with oral medications today and has refused to take them. In addition, he continues to ask for food and states, \"just give me my clothes and let me go home. \" Uses foul language but not aggressive toward staff.  Continues to verbalize frustration regarding NPO status and is very upset; does not understand reasoning at all; non-rational.

## 2023-11-11 LAB
ALBUMIN SERPL-MCNC: 3 G/DL (ref 3.5–4.6)
ANION GAP SERPL CALCULATED.3IONS-SCNC: 9 MEQ/L (ref 9–15)
BASOPHILS # BLD: 0 K/UL (ref 0–0.2)
BASOPHILS NFR BLD: 0.3 %
BUN SERPL-MCNC: 17 MG/DL (ref 8–23)
CALCIUM SERPL-MCNC: 8.8 MG/DL (ref 8.5–9.9)
CHLORIDE SERPL-SCNC: 106 MEQ/L (ref 95–107)
CO2 SERPL-SCNC: 25 MEQ/L (ref 20–31)
CREAT SERPL-MCNC: 0.72 MG/DL (ref 0.7–1.2)
EOSINOPHIL # BLD: 0.2 K/UL (ref 0–0.7)
EOSINOPHIL NFR BLD: 2.3 %
ERYTHROCYTE [DISTWIDTH] IN BLOOD BY AUTOMATED COUNT: 17.3 % (ref 11.5–14.5)
FOLATE: 5.2 NG/ML
GLUCOSE SERPL-MCNC: 81 MG/DL (ref 70–99)
HCT VFR BLD AUTO: 25.4 % (ref 42–52)
HGB BLD-MCNC: 7.7 G/DL (ref 14–18)
LYMPHOCYTES # BLD: 1.5 K/UL (ref 1–4.8)
LYMPHOCYTES NFR BLD: 21.8 %
MAGNESIUM SERPL-MCNC: 1.9 MG/DL (ref 1.7–2.4)
MCH RBC QN AUTO: 27 PG (ref 27–31.3)
MCHC RBC AUTO-ENTMCNC: 30.3 % (ref 33–37)
MCV RBC AUTO: 89.1 FL (ref 79–92.2)
MONOCYTES # BLD: 0.7 K/UL (ref 0.2–0.8)
MONOCYTES NFR BLD: 10.6 %
NEUTROPHILS # BLD: 4.5 K/UL (ref 1.4–6.5)
NEUTS SEG NFR BLD: 64.7 %
PHOSPHATE SERPL-MCNC: 2.8 MG/DL (ref 2.3–4.8)
PLATELET # BLD AUTO: 324 K/UL (ref 130–400)
POTASSIUM SERPL-SCNC: 3.6 MEQ/L (ref 3.4–4.9)
RBC # BLD AUTO: 2.85 M/UL (ref 4.7–6.1)
SODIUM SERPL-SCNC: 140 MEQ/L (ref 135–144)
VITAMIN B-12: 320 PG/ML (ref 232–1245)
WBC # BLD AUTO: 6.9 K/UL (ref 4.8–10.8)

## 2023-11-11 PROCEDURE — 6360000002 HC RX W HCPCS: Performed by: PSYCHIATRY & NEUROLOGY

## 2023-11-11 PROCEDURE — 80069 RENAL FUNCTION PANEL: CPT

## 2023-11-11 PROCEDURE — 6370000000 HC RX 637 (ALT 250 FOR IP): Performed by: INTERNAL MEDICINE

## 2023-11-11 PROCEDURE — 6360000002 HC RX W HCPCS: Performed by: INTERNAL MEDICINE

## 2023-11-11 PROCEDURE — 85025 COMPLETE CBC W/AUTO DIFF WBC: CPT

## 2023-11-11 PROCEDURE — 2060000000 HC ICU INTERMEDIATE R&B

## 2023-11-11 PROCEDURE — 6370000000 HC RX 637 (ALT 250 FOR IP): Performed by: PSYCHIATRY & NEUROLOGY

## 2023-11-11 PROCEDURE — 2580000003 HC RX 258: Performed by: INTERNAL MEDICINE

## 2023-11-11 PROCEDURE — 99231 SBSQ HOSP IP/OBS SF/LOW 25: CPT | Performed by: NURSE PRACTITIONER

## 2023-11-11 PROCEDURE — 36415 COLL VENOUS BLD VENIPUNCTURE: CPT

## 2023-11-11 PROCEDURE — 83735 ASSAY OF MAGNESIUM: CPT

## 2023-11-11 RX ADMIN — ENOXAPARIN SODIUM 30 MG: 100 INJECTION SUBCUTANEOUS at 09:05

## 2023-11-11 RX ADMIN — DIVALPROEX SODIUM 125 MG: 125 CAPSULE, COATED PELLETS ORAL at 06:09

## 2023-11-11 RX ADMIN — DIVALPROEX SODIUM 125 MG: 125 CAPSULE, COATED PELLETS ORAL at 20:15

## 2023-11-11 RX ADMIN — PANTOPRAZOLE SODIUM 40 MG: 40 TABLET, DELAYED RELEASE ORAL at 15:09

## 2023-11-11 RX ADMIN — FOLIC ACID 1 MG: 1 TABLET ORAL at 09:05

## 2023-11-11 RX ADMIN — PANTOPRAZOLE SODIUM 40 MG: 40 TABLET, DELAYED RELEASE ORAL at 06:09

## 2023-11-11 RX ADMIN — DIVALPROEX SODIUM 125 MG: 125 CAPSULE, COATED PELLETS ORAL at 15:09

## 2023-11-11 RX ADMIN — GABAPENTIN 600 MG: 300 CAPSULE ORAL at 20:15

## 2023-11-11 RX ADMIN — HALOPERIDOL LACTATE 5 MG: 5 INJECTION, SOLUTION INTRAMUSCULAR at 18:11

## 2023-11-11 RX ADMIN — SODIUM CHLORIDE: 9 INJECTION, SOLUTION INTRAVENOUS at 15:13

## 2023-11-11 RX ADMIN — GABAPENTIN 600 MG: 300 CAPSULE ORAL at 09:05

## 2023-11-11 RX ADMIN — GABAPENTIN 600 MG: 300 CAPSULE ORAL at 15:09

## 2023-11-11 RX ADMIN — IRON SUCROSE 300 MG: 20 INJECTION, SOLUTION INTRAVENOUS at 13:25

## 2023-11-11 NOTE — PROGRESS NOTES
Date/Time     11/11/2023 06:02 AM    K 3.6 11/11/2023 06:02 AM    K 3.8 09/19/2023 03:59 AM     11/11/2023 06:02 AM    CO2 25 11/11/2023 06:02 AM    BUN 17 11/11/2023 06:02 AM    CREATININE 0.72 11/11/2023 06:02 AM    GFRAA >60.0 07/26/2021 08:57 AM    LABGLOM >60.0 11/11/2023 06:02 AM    GLUCOSE 81 11/11/2023 06:02 AM    PROT 5.9 11/07/2023 01:15 PM    LABALBU 3.0 11/11/2023 06:02 AM    CALCIUM 8.8 11/11/2023 06:02 AM    BILITOT <0.2 11/07/2023 01:15 PM    ALKPHOS 77 11/07/2023 01:15 PM    AST 9 11/07/2023 01:15 PM    ALT 6 11/07/2023 01:15 PM     IRON:    Lab Results   Component Value Date/Time    IRON 19 11/08/2023 05:49 AM     Iron Saturation:  No components found for: \"PERCENTFE\"  TIBC:    Lab Results   Component Value Date/Time    TIBC 283 11/08/2023 05:49 AM     FERRITIN:    Lab Results   Component Value Date/Time    FERRITIN 18 11/08/2023 05:49 AM       RADIOLOGY RESULTS:  CT ABDOMEN PELVIS W IV CONTRAST Additional Contrast? Oral (gastrografin)    Result Date: 11/9/2023  EXAMINATION: CT OF THE ABDOMEN AND PELVIS WITH CONTRAST 11/9/2023 4:26 pm TECHNIQUE: CT of the abdomen and pelvis was performed with the administration of intravenous contrast. Multiplanar reformatted images are provided for review. Automated exposure control, iterative reconstruction, and/or weight based adjustment of the mA/kV was utilized to reduce the radiation dose to as low as reasonably achievable. COMPARISON: None. HISTORY: ORDERING SYSTEM PROVIDED HISTORY: fistulizing duodenal ulcer, please use gastrografin oral contrast TECHNOLOGIST PROVIDED HISTORY: Reason for exam:->fistulizing duodenal ulcer, please use gastrografin oral contrast Additional Contrast?->Oral What reading provider will be dictating this exam?->CRC FINDINGS: There are motion artifacts which obscure detail particular in the area of the stomach and duodenal.  Cannot evaluate detail.  Patient unable to drink plain amount of oral contrast. Oral contrast was fistula, contained           perforation or  pseudodiverticulum. ..        -  No specimens collected. Recommendation:        - NPO        -  Continue present medications. - Obtain cross sectional study with gastrografin        - Surgical consult Procedure Code(s):        - 18778, Esophagogastroduodenoscopy, flexible, transoral; diagnostic,           including collection of specimen(s) by brushing or washing, when performed           (separate procedure) Diagnosis Code(s):        - R10.13, Epigastric pain        - K29.70, Gastritis, unspecified, without bleeding        - K26.9, Duodenal ulcer, unspecified as acute or chronic, without hemorrhage           or perforation        - K22.70, Yip's esophagus without dysplasia       CPT(R) - 2022 copyright [employer]. All Rights Reserved. The CPT codes, CCI edits and ICD codes generated are intended as suggestions       and were generated based on input data. These codes are preliminary and upon        review may be revised to meet current compliance and payer requirements. The provider is responsible for the final determination of appropriate codes,       and modifiers. Leida Craig MD This document has been electronically signed. Note Initiated:11/9/2023 Note Completed:11/9/2023 12:53 PM    CT Head W/O Contrast    Result Date: 11/7/2023  EXAMINATION: CT OF THE HEAD WITHOUT CONTRAST  11/7/2023 2:12 pm TECHNIQUE: CT of the head was performed without the administration of intravenous contrast. Automated exposure control, iterative reconstruction, and/or weight based adjustment of the mA/kV was utilized to reduce the radiation dose to as low as reasonably achievable. COMPARISON: September 15 HISTORY: ORDERING SYSTEM PROVIDED HISTORY: [facility] TECHNOLOGIST PROVIDED HISTORY: Reason for exam:->[facility] Has a \"code stroke\" or \"stroke alert\" been called? ->No Decision Support Exception - unselect if not a suspected or confirmed emergency

## 2023-11-11 NOTE — FLOWSHEET NOTE
Bed alarm on  video monitor on   but pt  found in bathroom   gait steady    instructed to call for help

## 2023-11-12 LAB
ALBUMIN SERPL-MCNC: 2.9 G/DL (ref 3.5–4.6)
ANION GAP SERPL CALCULATED.3IONS-SCNC: 11 MEQ/L (ref 9–15)
BASOPHILS # BLD: 0 K/UL (ref 0–0.2)
BASOPHILS NFR BLD: 0.4 %
BUN SERPL-MCNC: 15 MG/DL (ref 8–23)
CALCIUM SERPL-MCNC: 8.4 MG/DL (ref 8.5–9.9)
CHLORIDE SERPL-SCNC: 107 MEQ/L (ref 95–107)
CO2 SERPL-SCNC: 22 MEQ/L (ref 20–31)
CREAT SERPL-MCNC: 0.85 MG/DL (ref 0.7–1.2)
EOSINOPHIL # BLD: 0.2 K/UL (ref 0–0.7)
EOSINOPHIL NFR BLD: 2.3 %
ERYTHROCYTE [DISTWIDTH] IN BLOOD BY AUTOMATED COUNT: 17.7 % (ref 11.5–14.5)
GLUCOSE SERPL-MCNC: 92 MG/DL (ref 70–99)
HCT VFR BLD AUTO: 25.5 % (ref 42–52)
HGB BLD-MCNC: 7.8 G/DL (ref 14–18)
LYMPHOCYTES # BLD: 1.3 K/UL (ref 1–4.8)
LYMPHOCYTES NFR BLD: 17 %
MAGNESIUM SERPL-MCNC: 1.8 MG/DL (ref 1.7–2.4)
MCH RBC QN AUTO: 27.6 PG (ref 27–31.3)
MCHC RBC AUTO-ENTMCNC: 30.6 % (ref 33–37)
MCV RBC AUTO: 90.1 FL (ref 79–92.2)
MONOCYTES # BLD: 0.5 K/UL (ref 0.2–0.8)
MONOCYTES NFR BLD: 7.2 %
NEUTROPHILS # BLD: 5.3 K/UL (ref 1.4–6.5)
NEUTS SEG NFR BLD: 72.7 %
PHOSPHATE SERPL-MCNC: 2.2 MG/DL (ref 2.3–4.8)
PLATELET # BLD AUTO: 306 K/UL (ref 130–400)
POTASSIUM SERPL-SCNC: 3.5 MEQ/L (ref 3.4–4.9)
RBC # BLD AUTO: 2.83 M/UL (ref 4.7–6.1)
SODIUM SERPL-SCNC: 140 MEQ/L (ref 135–144)
WBC # BLD AUTO: 7.4 K/UL (ref 4.8–10.8)

## 2023-11-12 PROCEDURE — 2580000003 HC RX 258: Performed by: INTERNAL MEDICINE

## 2023-11-12 PROCEDURE — 6360000002 HC RX W HCPCS: Performed by: INTERNAL MEDICINE

## 2023-11-12 PROCEDURE — 80069 RENAL FUNCTION PANEL: CPT

## 2023-11-12 PROCEDURE — 6370000000 HC RX 637 (ALT 250 FOR IP): Performed by: INTERNAL MEDICINE

## 2023-11-12 PROCEDURE — 85025 COMPLETE CBC W/AUTO DIFF WBC: CPT

## 2023-11-12 PROCEDURE — 83735 ASSAY OF MAGNESIUM: CPT

## 2023-11-12 PROCEDURE — 2060000000 HC ICU INTERMEDIATE R&B

## 2023-11-12 PROCEDURE — 36415 COLL VENOUS BLD VENIPUNCTURE: CPT

## 2023-11-12 PROCEDURE — 6370000000 HC RX 637 (ALT 250 FOR IP): Performed by: PSYCHIATRY & NEUROLOGY

## 2023-11-12 PROCEDURE — 6360000002 HC RX W HCPCS: Performed by: PSYCHIATRY & NEUROLOGY

## 2023-11-12 RX ADMIN — ENOXAPARIN SODIUM 30 MG: 100 INJECTION SUBCUTANEOUS at 09:10

## 2023-11-12 RX ADMIN — DIVALPROEX SODIUM 125 MG: 125 CAPSULE, COATED PELLETS ORAL at 20:57

## 2023-11-12 RX ADMIN — FOLIC ACID 1 MG: 1 TABLET ORAL at 09:10

## 2023-11-12 RX ADMIN — DIVALPROEX SODIUM 125 MG: 125 CAPSULE, COATED PELLETS ORAL at 06:04

## 2023-11-12 RX ADMIN — GABAPENTIN 600 MG: 300 CAPSULE ORAL at 20:57

## 2023-11-12 RX ADMIN — IRON SUCROSE 300 MG: 20 INJECTION, SOLUTION INTRAVENOUS at 16:05

## 2023-11-12 RX ADMIN — TRAMADOL HYDROCHLORIDE 50 MG: 50 TABLET ORAL at 20:58

## 2023-11-12 RX ADMIN — DIVALPROEX SODIUM 125 MG: 125 CAPSULE, COATED PELLETS ORAL at 14:52

## 2023-11-12 RX ADMIN — GABAPENTIN 600 MG: 300 CAPSULE ORAL at 14:52

## 2023-11-12 RX ADMIN — SODIUM CHLORIDE: 9 INJECTION, SOLUTION INTRAVENOUS at 05:57

## 2023-11-12 RX ADMIN — ACETAMINOPHEN 650 MG: 325 TABLET ORAL at 20:57

## 2023-11-12 RX ADMIN — GABAPENTIN 600 MG: 300 CAPSULE ORAL at 09:10

## 2023-11-12 RX ADMIN — PANTOPRAZOLE SODIUM 40 MG: 40 TABLET, DELAYED RELEASE ORAL at 06:04

## 2023-11-12 RX ADMIN — HALOPERIDOL LACTATE 5 MG: 5 INJECTION, SOLUTION INTRAMUSCULAR at 00:29

## 2023-11-12 RX ADMIN — HALOPERIDOL LACTATE 5 MG: 5 INJECTION, SOLUTION INTRAMUSCULAR at 19:39

## 2023-11-12 RX ADMIN — PANTOPRAZOLE SODIUM 40 MG: 40 TABLET, DELAYED RELEASE ORAL at 14:52

## 2023-11-12 ASSESSMENT — PAIN SCALES - GENERAL
PAINLEVEL_OUTOF10: 8
PAINLEVEL_OUTOF10: 5
PAINLEVEL_OUTOF10: 5

## 2023-11-12 ASSESSMENT — PAIN DESCRIPTION - LOCATION: LOCATION: BACK

## 2023-11-12 NOTE — FLOWSHEET NOTE
Voided  300 ccs of clear  pink tinged urine  continues to get up without calling for help  bed alarm on and being  video  monitored

## 2023-11-12 NOTE — FLOWSHEET NOTE
@7169 Patient had episode of bright red hematuria in the toilet. No new symptoms or signs of distress. Notified providers as noted below through [address].            11/12/23 0345 11/12/23 0403   Treatment Team Notification   Reason for Communication Shift event Shift event   Shift Event Type Other (comment)  (messaged to notify that patient had episode of hematuria, and most recent hgb was 7.7) Other (comment)  (messaged to notify that patient had episode of hematuria, and most recent hgb was 7.7)   Name of Team Member Notified Vickey Carlson MD   Treatment Team Role Advanced Practice Nurse Consulting Provider  (Urology)   Method of Communication Secure Message Secure Message   Response Waiting for response Waiting for response   Notification Time [id number]

## 2023-11-12 NOTE — PROGRESS NOTES
@ 9455 Patient woke up at this time, trying to climb out of bed. Started yelling and cursing at nursing staff due to his IV pump beeping. When this RN attempted to speak to him, he became more agitated, using more curse words and continuing to aggressively raise voice toward me. Medicated with PRN IM Haldol at this time. Stefanie ANN, RN, [facility]  11/12/2023 12:39 AM      @0608 Patient now appears to be sleeping. Respirations are even and unlabored. No signs of distress. Bed alarm on. Av-assist monitor continuing. Stefanie ANN, HUSEYIN, [facility]  11/12/2023 01:35 AM    @9187 Shift change report given to [facility]. Included in report, that patient had episode of hematuria, and have not heard back from Urology regarding this yet.     Stefanie ANN, RN, [facility]  11/12/2023 7:02 AM

## 2023-11-13 VITALS
BODY MASS INDEX: 16.37 KG/M2 | SYSTOLIC BLOOD PRESSURE: 121 MMHG | OXYGEN SATURATION: 99 % | RESPIRATION RATE: 18 BRPM | HEIGHT: 68 IN | TEMPERATURE: 97.5 F | HEART RATE: 83 BPM | DIASTOLIC BLOOD PRESSURE: 66 MMHG | WEIGHT: 108 LBS

## 2023-11-13 PROBLEM — R10.13 EPIGASTRIC PAIN: Status: RESOLVED | Noted: 2023-11-10 | Resolved: 2023-11-13

## 2023-11-13 LAB
ALBUMIN SERPL-MCNC: 2.8 G/DL (ref 3.5–4.6)
ANION GAP SERPL CALCULATED.3IONS-SCNC: 11 MEQ/L (ref 9–15)
BASOPHILS # BLD: 0 K/UL (ref 0–0.2)
BASOPHILS NFR BLD: 0.3 %
BUN SERPL-MCNC: 14 MG/DL (ref 8–23)
CALCIUM SERPL-MCNC: 8.6 MG/DL (ref 8.5–9.9)
CHLORIDE SERPL-SCNC: 109 MEQ/L (ref 95–107)
CO2 SERPL-SCNC: 24 MEQ/L (ref 20–31)
CREAT SERPL-MCNC: 0.72 MG/DL (ref 0.7–1.2)
EOSINOPHIL # BLD: 0.2 K/UL (ref 0–0.7)
EOSINOPHIL NFR BLD: 2.5 %
ERYTHROCYTE [DISTWIDTH] IN BLOOD BY AUTOMATED COUNT: 17.9 % (ref 11.5–14.5)
GLUCOSE SERPL-MCNC: 73 MG/DL (ref 70–99)
HCT VFR BLD AUTO: 25.5 % (ref 42–52)
HGB BLD-MCNC: 7.7 G/DL (ref 14–18)
LYMPHOCYTES # BLD: 1.6 K/UL (ref 1–4.8)
LYMPHOCYTES NFR BLD: 22.1 %
MAGNESIUM SERPL-MCNC: 1.8 MG/DL (ref 1.7–2.4)
MCH RBC QN AUTO: 27.5 PG (ref 27–31.3)
MCHC RBC AUTO-ENTMCNC: 30.2 % (ref 33–37)
MCV RBC AUTO: 91.1 FL (ref 79–92.2)
MONOCYTES # BLD: 0.7 K/UL (ref 0.2–0.8)
MONOCYTES NFR BLD: 9.7 %
NEUTROPHILS # BLD: 4.7 K/UL (ref 1.4–6.5)
NEUTS SEG NFR BLD: 65 %
PHOSPHATE SERPL-MCNC: 3.1 MG/DL (ref 2.3–4.8)
PLATELET # BLD AUTO: 316 K/UL (ref 130–400)
POTASSIUM SERPL-SCNC: 4 MEQ/L (ref 3.4–4.9)
RBC # BLD AUTO: 2.8 M/UL (ref 4.7–6.1)
SODIUM SERPL-SCNC: 144 MEQ/L (ref 135–144)
WBC # BLD AUTO: 7.3 K/UL (ref 4.8–10.8)

## 2023-11-13 PROCEDURE — 97116 GAIT TRAINING THERAPY: CPT

## 2023-11-13 PROCEDURE — 6370000000 HC RX 637 (ALT 250 FOR IP): Performed by: INTERNAL MEDICINE

## 2023-11-13 PROCEDURE — 83735 ASSAY OF MAGNESIUM: CPT

## 2023-11-13 PROCEDURE — 99231 SBSQ HOSP IP/OBS SF/LOW 25: CPT | Performed by: PHYSICIAN ASSISTANT

## 2023-11-13 PROCEDURE — 2580000003 HC RX 258: Performed by: INTERNAL MEDICINE

## 2023-11-13 PROCEDURE — 36415 COLL VENOUS BLD VENIPUNCTURE: CPT

## 2023-11-13 PROCEDURE — 80069 RENAL FUNCTION PANEL: CPT

## 2023-11-13 PROCEDURE — 85025 COMPLETE CBC W/AUTO DIFF WBC: CPT

## 2023-11-13 PROCEDURE — 6370000000 HC RX 637 (ALT 250 FOR IP): Performed by: PSYCHIATRY & NEUROLOGY

## 2023-11-13 RX ORDER — FERROUS SULFATE 325(65) MG
325 TABLET ORAL EVERY OTHER DAY
Qty: 30 TABLET | Refills: 3 | DISCHARGE
Start: 2023-11-13

## 2023-11-13 RX ORDER — FERROUS SULFATE 325(65) MG
325 TABLET ORAL 2 TIMES DAILY WITH MEALS
Status: DISCONTINUED | OUTPATIENT
Start: 2023-11-13 | End: 2023-11-13 | Stop reason: HOSPADM

## 2023-11-13 RX ORDER — DIVALPROEX SODIUM 125 MG/1
125 CAPSULE, COATED PELLETS ORAL EVERY 8 HOURS SCHEDULED
Qty: 60 CAPSULE | Refills: 3 | DISCHARGE
Start: 2023-11-13

## 2023-11-13 RX ORDER — NICOTINE 21 MG/24HR
1 PATCH, TRANSDERMAL 24 HOURS TRANSDERMAL DAILY
Qty: 30 PATCH | Refills: 3 | DISCHARGE
Start: 2023-11-13

## 2023-11-13 RX ORDER — POLYETHYLENE GLYCOL 3350 17 G/17G
17 POWDER, FOR SOLUTION ORAL DAILY PRN
Qty: 527 G | Refills: 1 | DISCHARGE
Start: 2023-11-13 | End: 2023-12-13

## 2023-11-13 RX ORDER — FOLIC ACID 1 MG/1
1 TABLET ORAL DAILY
Qty: 30 TABLET | Refills: 3 | DISCHARGE
Start: 2023-11-14

## 2023-11-13 RX ORDER — METHOCARBAMOL 500 MG/1
500 TABLET, FILM COATED ORAL 3 TIMES DAILY PRN
Qty: 28 TABLET | Refills: 0 | DISCHARGE
Start: 2023-11-13

## 2023-11-13 RX ADMIN — DIVALPROEX SODIUM 125 MG: 125 CAPSULE, COATED PELLETS ORAL at 14:00

## 2023-11-13 RX ADMIN — PANTOPRAZOLE SODIUM 40 MG: 40 TABLET, DELAYED RELEASE ORAL at 16:03

## 2023-11-13 RX ADMIN — FOLIC ACID 1 MG: 1 TABLET ORAL at 09:06

## 2023-11-13 RX ADMIN — GABAPENTIN 600 MG: 300 CAPSULE ORAL at 16:02

## 2023-11-13 RX ADMIN — FERROUS SULFATE TAB 325 MG (65 MG ELEMENTAL FE) 325 MG: 325 (65 FE) TAB at 12:24

## 2023-11-13 RX ADMIN — DIVALPROEX SODIUM 125 MG: 125 CAPSULE, COATED PELLETS ORAL at 06:18

## 2023-11-13 RX ADMIN — SODIUM CHLORIDE, PRESERVATIVE FREE 10 ML: 5 INJECTION INTRAVENOUS at 09:07

## 2023-11-13 RX ADMIN — FERROUS SULFATE TAB 325 MG (65 MG ELEMENTAL FE) 325 MG: 325 (65 FE) TAB at 16:02

## 2023-11-13 RX ADMIN — GABAPENTIN 600 MG: 300 CAPSULE ORAL at 09:06

## 2023-11-13 RX ADMIN — PANTOPRAZOLE SODIUM 40 MG: 40 TABLET, DELAYED RELEASE ORAL at 06:18

## 2023-11-13 RX ADMIN — TRAMADOL HYDROCHLORIDE 50 MG: 50 TABLET ORAL at 09:07

## 2023-11-13 ASSESSMENT — PAIN DESCRIPTION - FREQUENCY
FREQUENCY: INTERMITTENT

## 2023-11-13 ASSESSMENT — PAIN DESCRIPTION - ONSET
ONSET: ON-GOING

## 2023-11-13 ASSESSMENT — PAIN SCALES - GENERAL
PAINLEVEL_OUTOF10: 5
PAINLEVEL_OUTOF10: 3
PAINLEVEL_OUTOF10: 4
PAINLEVEL_OUTOF10: 4

## 2023-11-13 ASSESSMENT — PAIN DESCRIPTION - ORIENTATION
ORIENTATION: RIGHT;LEFT

## 2023-11-13 ASSESSMENT — PAIN DESCRIPTION - LOCATION
LOCATION: GENERALIZED

## 2023-11-13 ASSESSMENT — PAIN DESCRIPTION - PAIN TYPE
TYPE: CHRONIC PAIN

## 2023-11-13 ASSESSMENT — PAIN - FUNCTIONAL ASSESSMENT
PAIN_FUNCTIONAL_ASSESSMENT: PREVENTS OR INTERFERES SOME ACTIVE ACTIVITIES AND ADLS

## 2023-11-13 ASSESSMENT — ENCOUNTER SYMPTOMS: APNEA: 0

## 2023-11-13 NOTE — PROGRESS NOTES
@4046 Upon receiving report and assessing patient, patient is aggressively yelling and cursing at this RN. Medicated with prn IM Haldol at this time. Shay ANN, HUSEYIN, [facility]  11/12/2023 7:41 PM      @0128 Patient agitated. Pulled his IVF out of his arm, and currently refusing to have a new IV placed. Shay ANN RN, [facility]  11/12/2023 10:49 PM      @2540 Patient having incontinence of bowel. Is continent of urine, and continuing to have pink colored urine. Patient continues to be impulsive and agitated. This RN has responded to bed alarm being set off by this patient many times, and he gets out of bed without waiting asking for or waiting for help, despite trying several times to educate patient on fall precautions.     Shay ANN RN, [facility]  11/13/2023 2:09 AM

## 2023-11-13 NOTE — PROGRESS NOTES
Comprehensive Nutrition Assessment    Type and Reason for Visit:  Reassess    Nutrition Recommendations/Plan:   Trial high calorie supplement BID  Frozen supplement at dinner   Continue easy to chew diet     Malnutrition Assessment:  Malnutrition Status:  Severe malnutrition (11/09/23 4148)    Context:  Social/Environmental Circumstances       Nutrition Assessment:    Pt advanced to Easy to Chew diet s/p EGD. Tolerating well with improving PO intake (>75%). RD to trial high calorie supplement d/t severe malnutrition status. Continue to monitor. Nutrition Related Findings:    admitted for FTT \"PMH includes: duodenal ulcer, Yip's esophagus, COPD, tobacco use,  chronic back pain with opiate dependence, anxiety/insomnia. ..gradually declining over the last few weeks / months with worsening weakness, poor appetite, weight loss and inability to take care of himself per family at the bedside, initial w/u showed prerenal azotemia,\" for repeat EGD today, labs noted, meds reviewed, IVF = .9 NS @ 100 m l/hr via peripheral line Wound Type: None       Current Nutrition Intake & Therapies:    Average Meal Intake: %  Average Supplements Intake: None Ordered  ADULT DIET; Easy to Chew    Anthropometric Measures:  Height: 172.7 cm (5' 8\")  Ideal Body Weight (IBW): 154 lbs (70 kg)    Admission Body Weight: 49 kg (108 lb)  Current Body Weight: 49 kg (108 lb), 70.1 % IBW.  Weight Source: Bed Scale  Current BMI (kg/m2): 16.4  Usual Body Weight: 54.4 kg (120 lb) (( 9/23), 140-145#)  % Weight Change (Calculated): -10                    BMI Categories: Underweight (BMI less than 22) age over 72    Estimated Daily Nutrient Needs:  Energy Requirements Based On: Kcal/kg  Weight Used for Energy Requirements: Current  Energy (kcal/day): ~ 1500 kcals @ 30 kcal/kg  Weight Used for Protein Requirements: Current  Protein (g/day): ~ 73 g protein @ 1.5 g/kg  Method Used for Fluid Requirements: 1 ml/kcal  Fluid (ml/day): ~1500    Nutrition Diagnosis:   Severe malnutrition related to inadequate protein-energy intake, altered GI function as evidenced by Criteria as identified in malnutrition assessment    Nutrition Interventions:   Food and/or Nutrient Delivery: Continue Current Diet, Start Oral Nutrition Supplement  Nutrition Education/Counseling: Education not indicated  Coordination of Nutrition Care: Continue to monitor while inpatient       Goals:     Goals: PO intake 75% or greater       Nutrition Monitoring and Evaluation:   Behavioral-Environmental Outcomes: None Identified  Food/Nutrient Intake Outcomes: Diet Advancement/Tolerance, Food and Nutrient Intake, Supplement Intake  Physical Signs/Symptoms Outcomes: GI Status, Meal Time Behavior, Weight    Discharge Planning:     Too soon to determine     [place], MS, RD, LD

## 2023-11-13 NOTE — PROGRESS NOTES
Physical Therapy Med Surg Daily Treatment Note  Facility/Department: [address] [place] [address]  Room: [place] [id number]       NAME: Jennifer Cornelius  : 1955 (77 y.o.)  MRN: [id number]  CODE STATUS: Full Code    Date of Service: 2023    Patient Diagnosis(es): Cachexia ([address]) Aretta Longs  Hypokalemia [E87.6]  Gait disturbance [R26.9]  Chronic pain syndrome [G89.4]  Weakness [R53.1]  Memory impairment [R41.3]  General weakness [R53.1]   Chief Complaint   Patient presents with    Failure To Thrive     Patient Active Problem List    Diagnosis Date Noted    Spinal stenosis of lumbar region with neurogenic claudication 2023    Flat back syndrome, acquired 2023    Opioid abuse, in remission ([address]) 2023    Trigger ring finger of left hand 2022    Epigastric pain 11/10/2023    Cachexia ([address]) 2023    Weakness 2023    Duodenal ulcer 10/12/2023    Nausea and vomiting 10/12/2023    Gastric outlet obstruction 10/12/2023    Moderate malnutrition ([address]) 2023    Syncope and collapse 09/15/2023    GI bleeding 09/15/2023    Kidney stone 2023    Hydronephrosis of left kidney 2023    Flank pain 2023    Left ureteral stone 2023    Atherosclerosis of aorta ([address]) 05/10/2023    Bilateral carotid artery stenosis 05/10/2023    Age-related osteoporosis without current pathological fracture 2023    Calcification of abdominal aorta ([address]) 2023    Abnormal findings on dx imaging of heart and cor circ 03/15/2021    Abnormal result of cardiovascular function study, unspecified 03/15/2021    Palpitations 03/15/2021    Essential hypertension 2019    Smoker 10/04/2019    Gastroesophageal reflux disease 10/04/2019    Other emphysema ([address]) 10/04/2019    Seasonal affective disorder ([address]) 10/04/2019    Hyperlipidemia 2019    Anxiety 2018    Insomnia 2018    Low back pain 2018    Other intervertebral disc degeneration, lumbar region 2018    Other Good  Barriers to Learning: insight     Discharge Recommendations:  Continue to assess pending progress, Patient would benefit from continued therapy after discharge         Goals  Long Term Goals  Long Term Goal 1: pt to complete bed mobility with indep  Long Term Goal 2: Pt to complete transfers with indep  Long Term Goal 3: Pt to ambulate 50-150ft with LRD and indep  Long Term Goal 4: Pt to manage 1 step to access home indep    PLAN    General Plan: 1 time a day 3-6 times a week  Safety Devices  Type of Devices: All fall risk precautions in place, Telesitter in use, Bed alarm in place, Call light within reach, Left in bed (RN in room with patient following session)     [facility] (6 CLICK) BASIC MOBILITY  AM-PAC Inpatient Mobility Raw Score : 18    Therapy Time   Individual   Time In 1034   Time Out 1045   Minutes 11     Timed Code Treatment Minutes: 2050 BackupAgent 5  Gt 6  Harvey, [facility], 11/13/23 at 12:30 PM         Definitions for assistance levels  Independent = pt does not require any physical supervision or assistance from another person for activity completion. Device may be needed.   Stand by assistance = pt requires verbal cues or instructions from another person, close to but not touching, to perform the activity  Minimal assistance= pt performs 75% or more of the activity; assistance is required to complete the activity  Moderate assistance= pt performs 50% of the activity; assistance is required to complete the activity  Maximal assistance = pt performs 25% of the activity; assistance is required to complete the activity  Dependent = pt requires total physical assistance to accomplish the task

## 2023-11-13 NOTE — PLAN OF CARE
Nutrition Problem #1: Severe malnutrition  Intervention: Food and/or Nutrient Delivery: Continue Current Diet, Start Oral Nutrition Supplement  Nutritional  Goals: PO intake >75%, wt gain

## 2023-11-13 NOTE — PROGRESS NOTES
Report called to [facility]; plan discharge to [facility] as ordered. No acute resp distress at this time. Will d/c NCP. Family notified per SHELLY Gore. Patient left via ambulance.

## 2023-11-13 NOTE — PROGRESS NOTES
Subjective:      Patient ID: Jennifer Cornelius is a 77 y.o. male    HPI 77year old male who has a non obstructing left lower pole nephrolithiasis with a stable left nephroureteral stent.  Denies pain or hematuria       Past Medical History:   Diagnosis Date    Allergic rhinitis 02/19/2018    Anxiety     Bilateral carotid artery stenosis 05/10/2023    Chronic back pain     Colon polyp     COPD (chronic obstructive pulmonary disease) (HCC)     Depression     Disorder of stomach     valve not closing properly    Emphysema lung ([address])     Essential hypertension 11/04/2019    ETOH abuse     SOBER X 20 YEARS    Gastroesophageal reflux disease 10/04/2019    Hydronephrosis of left kidney 08/23/2023    Hyperlipidemia     meds > 22 yrs    Low back pain 05/01/2018    ARNAUD (obstructive sleep apnea) 02/19/2018    Other emphysema ([address]) 10/04/2019    Other intervertebral disc degeneration, lumbar region 03/23/2018    Radiculopathy, lumbar region 02/28/2018    Restless leg syndrome     Seasonal affective disorder ([address]) 10/04/2019    Substance abuse ([address])     alcholic, quit 20 yrs      Past Surgical History:   Procedure Laterality Date    BLADDER SURGERY Left 9/11/2023    CYSTOSCOPY RETROGRADE PYELOGRAM AND LEFT DOUBLE J STENT INSERTION performed by Caro Payton MD at [address] [place] [address]  12/22/2016    A [address] [facility] MD    DENTAL SURGERY  10 yrs ago    ENDOSCOPY, COLON, DIAGNOSTIC      EYE SURGERY      Lasik OU    FINGER TRIGGER RELEASE Left 11/1/2022    LEFT HAND TRIGGER FINGER RING FINGER RELEASE OF A1 PANFILO performed by Shawn Chris MD at [facility] ARTHROSCOPY Right     KNEE SURGERY Right 05/2016    Ray procedure    KNEE SURGERY      ND NASAL/SINUS ENDOSCOPY W/MAXILLARY ANTROSTOMY N/A 2/22/2018    SEPTOPLASTY MICRODEBRIDER ASSISTED TURBINOPLASTY AND OUT-FRACTURING BILATERAL NASAL ENDOSCOPY performed by Theresa Mclean MD at [address] [place] [address] 9/18/2023    EGD BIOPSY performed by
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
## 2023-11-14 NOTE — PROGRESS NOTES
Physician Progress Note      PATIENT:               Betzaida Stinson  CSN #:                  [id number]  :                       1955  ADMIT DATE:       2023 1:00 PM  [address] DATE:        2023 4:47 PM  RESPONDING  PROVIDER #:        Yovany Garcia MD          QUERY TEXT:    Patient admitted with failure to thrive and duodenal ulcer. Noted in the   Dietician assessment to have severe malnutrition in the context of   Social/Environmental circumstances. \"\"PMH includes: duodenal ulcer, Yip's   esophagus, COPD, tobacco use,  chronic back pain with opiate dependence,   anxiety/insomnia. ..gradually declining over the last few weeks / months with   worsening weakness, poor appetite, weight loss and inability to take care of   himself per family at the bedside, initial w/u showed prerenal azotemia,\" BMI   16.4 per Dietician assessment. If possible, please document in progress notes and discharge summary if you   are evaluating and /or treating any of the following: The medical record reflects the following:  Risk Factors: Duodenal ulcer, Yip's esophagus, COPD, chronic back pain,   opiod deendence, anxiety  Clinical Indicators: ASPEN criteria met: Energy Intake:  50% or less estimated   energy requirements for 1 month or longer  Weight Loss:  Greater than 10% over 6 months  Body Fat Loss:  Severe body fat loss Buccal region, Triceps, Orbital  Muscle Mass Loss:  Severe muscle mass loss Thigh (quadraceps), Clavicles   (pectoralis & deltoids), Temples (temporalis), Scapula (trapezius)  Treatment: GI consult, EGD, high calorie ONS @ B with frozen ONS @ L&D, I&O,   weights, monitoring    ASPEN Criteria:    https://aspenjournals. onlinelibrary. baez. com/doi/full/10.1177/483965270064608  5    Thank you,  Bret Agee   Clinical Documentation Improvement Specialist  W: [phone]  Options provided:  -- Protein calorie malnutrition severe  -- Other - I will add my own diagnosis  -- Disagree - Not

## 2023-11-14 NOTE — PROGRESS NOTES
Physical Therapy  Facility/Department: [facility] MED SURG [id number]  Physical Therapy Discharge      NAME: Cammie Parikh    : 1955 (77 y.o.)  MRN: [id number]    Account: [de-identified]  Gender: male      Patient has been discharged from acute care hospital. DC patient from current PT program.      Electronically signed by Cornell Bess PT on 23 at 5:04 PM EST

## 2023-11-20 PROBLEM — R41.0 DELIRIUM: Status: ACTIVE | Noted: 2023-11-20

## 2023-12-08 ENCOUNTER — HOSPITAL ENCOUNTER (EMERGENCY)
Age: 68
Discharge: OTHER INSTITUTION | End: 2023-12-10
Payer: MEDICARE

## 2023-12-08 DIAGNOSIS — N30.01 ACUTE CYSTITIS WITH HEMATURIA: ICD-10-CM

## 2023-12-08 DIAGNOSIS — F03.918 DEMENTIA WITH BEHAVIORAL DISTURBANCE (HCC): Primary | ICD-10-CM

## 2023-12-08 DIAGNOSIS — R45.1 AGITATION: ICD-10-CM

## 2023-12-08 LAB
ACANTHOCYTES BLD QL SMEAR: ABNORMAL
ALBUMIN SERPL-MCNC: 3.5 G/DL (ref 3.5–4.6)
ALP SERPL-CCNC: 80 U/L (ref 35–104)
ALT SERPL-CCNC: 8 U/L (ref 0–41)
AMPHET UR QL SCN: NORMAL
ANION GAP SERPL CALCULATED.3IONS-SCNC: 11 MEQ/L (ref 9–15)
ANISOCYTOSIS BLD QL SMEAR: ABNORMAL
APAP SERPL-MCNC: <5 UG/ML (ref 10–30)
AST SERPL-CCNC: 10 U/L (ref 0–40)
BACTERIA URNS QL MICRO: NEGATIVE /HPF
BARBITURATES UR QL SCN: NORMAL
BASOPHILS # BLD: 0.1 K/UL (ref 0–0.2)
BASOPHILS NFR BLD: 0.7 %
BENZODIAZ UR QL SCN: NORMAL
BILIRUB SERPL-MCNC: <0.2 MG/DL (ref 0.2–0.7)
BILIRUB UR QL STRIP: NEGATIVE
BUN SERPL-MCNC: 29 MG/DL (ref 8–23)
BURR CELLS: ABNORMAL
CALCIUM SERPL-MCNC: 9.5 MG/DL (ref 8.5–9.9)
CANNABINOIDS UR QL SCN: NORMAL
CHLORIDE SERPL-SCNC: 106 MEQ/L (ref 95–107)
CK SERPL-CCNC: 64 U/L (ref 0–190)
CLARITY UR: ABNORMAL
CO2 SERPL-SCNC: 26 MEQ/L (ref 20–31)
COCAINE UR QL SCN: NORMAL
COLOR UR: ABNORMAL
CREAT SERPL-MCNC: 0.8 MG/DL (ref 0.7–1.2)
DRUG SCREEN COMMENT UR-IMP: NORMAL
EOSINOPHIL # BLD: 0.1 K/UL (ref 0–0.7)
EOSINOPHIL NFR BLD: 1.3 %
EPI CELLS #/AREA URNS AUTO: ABNORMAL /HPF (ref 0–5)
ERYTHROCYTE [DISTWIDTH] IN BLOOD BY AUTOMATED COUNT: 21.5 % (ref 11.5–14.5)
ETHANOL PERCENT: NORMAL G/DL
ETHANOLAMINE SERPL-MCNC: <10 MG/DL (ref 0–0.08)
FENTANYL SCREEN, URINE: NORMAL
GLOBULIN SER CALC-MCNC: 3.1 G/DL (ref 2.3–3.5)
GLUCOSE SERPL-MCNC: 93 MG/DL (ref 70–99)
GLUCOSE UR STRIP-MCNC: NEGATIVE MG/DL
HCT VFR BLD AUTO: 33.6 % (ref 42–52)
HGB BLD-MCNC: 9.9 G/DL (ref 14–18)
HGB UR QL STRIP: ABNORMAL
HYALINE CASTS #/AREA URNS AUTO: ABNORMAL /HPF (ref 0–5)
KETONES UR STRIP-MCNC: NEGATIVE MG/DL
LEUKOCYTE ESTERASE UR QL STRIP: ABNORMAL
LYMPHOCYTES # BLD: 1.8 K/UL (ref 1–4.8)
LYMPHOCYTES NFR BLD: 17 %
MACROCYTES BLD QL SMEAR: ABNORMAL
MCH RBC QN AUTO: 28.4 PG (ref 27–31.3)
MCHC RBC AUTO-ENTMCNC: 29.5 % (ref 33–37)
MCV RBC AUTO: 96.6 FL (ref 79–92.2)
METHADONE UR QL SCN: NORMAL
MICROCYTES BLD QL SMEAR: ABNORMAL
MONOCYTES # BLD: 0.7 K/UL (ref 0.2–0.8)
MONOCYTES NFR BLD: 6.1 %
NEUTROPHILS # BLD: 8 K/UL (ref 1.4–6.5)
NEUTS SEG NFR BLD: 74.6 %
NITRITE UR QL STRIP: NEGATIVE
OPIATES UR QL SCN: NORMAL
OXYCODONE UR QL SCN: NORMAL
PCP UR QL SCN: NORMAL
PH UR STRIP: 7.5 [PH] (ref 5–9)
PLATELET # BLD AUTO: 333 K/UL (ref 130–400)
POIKILOCYTOSIS BLD QL SMEAR: ABNORMAL
POTASSIUM SERPL-SCNC: 4.5 MEQ/L (ref 3.4–4.9)
PROPOXYPH UR QL SCN: NORMAL
PROT SERPL-MCNC: 6.6 G/DL (ref 6.3–8)
PROT UR STRIP-MCNC: 100 MG/DL
RBC # BLD AUTO: 3.48 M/UL (ref 4.7–6.1)
RBC #/AREA URNS AUTO: >100 /HPF (ref 0–5)
SALICYLATES SERPL-MCNC: <0.3 MG/DL (ref 15–30)
SLIDE REVIEW: ABNORMAL
SODIUM SERPL-SCNC: 143 MEQ/L (ref 135–144)
SP GR UR STRIP: 1.02 (ref 1–1.03)
URINE REFLEX TO CULTURE: YES
UROBILINOGEN UR STRIP-ACNC: 0.2 E.U./DL
WBC # BLD AUTO: 10.8 K/UL (ref 4.8–10.8)
WBC #/AREA URNS AUTO: ABNORMAL /HPF (ref 0–5)

## 2023-12-08 PROCEDURE — 82550 ASSAY OF CK (CPK): CPT

## 2023-12-08 PROCEDURE — 80143 DRUG ASSAY ACETAMINOPHEN: CPT

## 2023-12-08 PROCEDURE — 81001 URINALYSIS AUTO W/SCOPE: CPT

## 2023-12-08 PROCEDURE — 2580000003 HC RX 258

## 2023-12-08 PROCEDURE — 84443 ASSAY THYROID STIM HORMONE: CPT

## 2023-12-08 PROCEDURE — 85025 COMPLETE CBC W/AUTO DIFF WBC: CPT

## 2023-12-08 PROCEDURE — 36415 COLL VENOUS BLD VENIPUNCTURE: CPT

## 2023-12-08 PROCEDURE — 80307 DRUG TEST PRSMV CHEM ANLYZR: CPT

## 2023-12-08 PROCEDURE — 99285 EMERGENCY DEPT VISIT HI MDM: CPT

## 2023-12-08 PROCEDURE — 87086 URINE CULTURE/COLONY COUNT: CPT

## 2023-12-08 PROCEDURE — 96372 THER/PROPH/DIAG INJ SC/IM: CPT

## 2023-12-08 PROCEDURE — 80179 DRUG ASSAY SALICYLATE: CPT

## 2023-12-08 PROCEDURE — 82077 ASSAY SPEC XCP UR&BREATH IA: CPT

## 2023-12-08 PROCEDURE — 6360000002 HC RX W HCPCS: Performed by: EMERGENCY MEDICINE

## 2023-12-08 PROCEDURE — 80061 LIPID PANEL: CPT

## 2023-12-08 PROCEDURE — 80053 COMPREHEN METABOLIC PANEL: CPT

## 2023-12-08 RX ORDER — WATER 10 ML/10ML
INJECTION INTRAMUSCULAR; INTRAVENOUS; SUBCUTANEOUS
Status: COMPLETED
Start: 2023-12-08 | End: 2023-12-08

## 2023-12-08 RX ORDER — ZIPRASIDONE MESYLATE 20 MG/ML
20 INJECTION, POWDER, LYOPHILIZED, FOR SOLUTION INTRAMUSCULAR ONCE
Status: COMPLETED | OUTPATIENT
Start: 2023-12-08 | End: 2023-12-08

## 2023-12-08 RX ORDER — LORAZEPAM 2 MG/ML
2 INJECTION INTRAMUSCULAR ONCE
Status: COMPLETED | OUTPATIENT
Start: 2023-12-08 | End: 2023-12-08

## 2023-12-08 RX ADMIN — LORAZEPAM 2 MG: 2 INJECTION INTRAMUSCULAR; INTRAVENOUS at 21:37

## 2023-12-08 RX ADMIN — WATER 10 ML: 1 INJECTION INTRAMUSCULAR; INTRAVENOUS; SUBCUTANEOUS at 21:34

## 2023-12-08 RX ADMIN — ZIPRASIDONE MESYLATE 20 MG: 20 INJECTION, POWDER, LYOPHILIZED, FOR SOLUTION INTRAMUSCULAR at 21:34

## 2023-12-08 ASSESSMENT — ENCOUNTER SYMPTOMS
SHORTNESS OF BREATH: 0
DIARRHEA: 0
VOMITING: 0
CHEST TIGHTNESS: 0

## 2023-12-08 ASSESSMENT — PAIN - FUNCTIONAL ASSESSMENT: PAIN_FUNCTIONAL_ASSESSMENT: NONE - DENIES PAIN

## 2023-12-09 LAB
CHOLEST SERPL-MCNC: 124 MG/DL (ref 0–199)
HDLC SERPL-MCNC: 50 MG/DL (ref 40–59)
LDLC SERPL CALC-MCNC: 64 MG/DL (ref 0–129)
SARS-COV-2 RDRP RESP QL NAA+PROBE: NOT DETECTED
TRIGL SERPL-MCNC: 51 MG/DL (ref 0–150)
TSH SERPL-MCNC: 3.54 UIU/ML (ref 0.44–3.86)
VALPROATE SERPL-MCNC: 6.3 UG/ML (ref 50–100)

## 2023-12-09 PROCEDURE — 36415 COLL VENOUS BLD VENIPUNCTURE: CPT

## 2023-12-09 PROCEDURE — 80164 ASSAY DIPROPYLACETIC ACD TOT: CPT

## 2023-12-09 PROCEDURE — 93005 ELECTROCARDIOGRAM TRACING: CPT | Performed by: EMERGENCY MEDICINE

## 2023-12-09 PROCEDURE — 6370000000 HC RX 637 (ALT 250 FOR IP): Performed by: PHYSICIAN ASSISTANT

## 2023-12-09 PROCEDURE — 87635 SARS-COV-2 COVID-19 AMP PRB: CPT

## 2023-12-09 RX ORDER — CIPROFLOXACIN 500 MG/1
500 TABLET, FILM COATED ORAL ONCE
Status: COMPLETED | OUTPATIENT
Start: 2023-12-09 | End: 2023-12-09

## 2023-12-09 RX ORDER — LORAZEPAM 1 MG/1
1 TABLET ORAL ONCE
Status: COMPLETED | OUTPATIENT
Start: 2023-12-09 | End: 2023-12-09

## 2023-12-09 RX ORDER — IBUPROFEN 800 MG/1
800 TABLET ORAL ONCE
Status: COMPLETED | OUTPATIENT
Start: 2023-12-09 | End: 2023-12-09

## 2023-12-09 RX ADMIN — LORAZEPAM 1 MG: 1 TABLET ORAL at 18:50

## 2023-12-09 RX ADMIN — IBUPROFEN 800 MG: 800 TABLET, FILM COATED ORAL at 17:28

## 2023-12-09 RX ADMIN — CIPROFLOXACIN HYDROCHLORIDE 500 MG: 500 TABLET, FILM COATED ORAL at 21:46

## 2023-12-09 ASSESSMENT — PAIN - FUNCTIONAL ASSESSMENT: PAIN_FUNCTIONAL_ASSESSMENT: NONE - DENIES PAIN

## 2023-12-09 ASSESSMENT — PAIN SCALES - GENERAL: PAINLEVEL_OUTOF10: 8

## 2023-12-09 ASSESSMENT — PAIN DESCRIPTION - DESCRIPTORS: DESCRIPTORS: PATIENT UNABLE TO DESCRIBE

## 2023-12-09 NOTE — ED NOTES
Patient to [facility], changed into psych safe clothing. Patient irritable, unsteady gait, poor safety awareness.  1:1 remains at bedside for fall risk     Reva Irizarry RN  12/09/23 3083

## 2023-12-09 NOTE — ED NOTES
Patient sitting at edge of bed eating lunch at this time.  Calm, cooperative but confused, able to make needs known but oriented to self only, has poor insight and poor safety awareness     González Melo RN  12/09/23 5514

## 2023-12-09 NOTE — ED NOTES
Pt remains with a sitter quiet and cooperative with even respirations     Maryjo Iqbal RN  12/09/23 5674

## 2023-12-09 NOTE — ED NOTES
Pt awake and sitting at end of bed. Helped patient lay back down. Pt states he has to urinate. Informed pt that he has male purewick intact. Pt then urinated into purewick. Sitter remains at bedside.      Sola Duenas RN  12/09/23 7410

## 2023-12-09 NOTE — ED NOTES
Attempted to evaluate patient. Patient uncooperative at this time refusing to stay awake. Patient did however deny suicidal and homicidal ideations. Patient denies auditory or visual hallucinations. VPA level ordered for medication compliance.      Henrik Amaya RN  12/09/23 8771

## 2023-12-09 NOTE — ED NOTES
Pt has been shown call light again to call for assistance, pt continues to not use call light. Pt continues to get up without assistance even after redirecting. PT climbed out of bed and urinated in hallway. Notified charge of need for 1:1 for safety reasons due to confusion and noncompliance of safety measures put in place.      Clarke Alejandro RN  12/09/23 0170

## 2023-12-09 NOTE — ED NOTES
Provisional Diagnosis:   Dementia with behaviors     Psychosocial and Contextual Factors:   Patient has been at [facility] since 11/13 after he was admitted to the hospital for failure to thrive on 11/7. Patient was from home where he was not caring for himself, eating or drinking and unable to perform adls. Has a son who lives in [place] and is [address]. Has sister who lives in [facility]. C-SSRS Summary:    C-SSRS Suicide Screening1) Within the past month, have you wished you were dead or wished you could go to sleep and not wake up? : No2) Have you actually had any thoughts of killing yourself? : No6) Have you ever done anything, started to do anything, or prepared to do anything to end your life?: NoRisk of Suicide: No Risk  C-SSRS Risk AssessmentSuicidal and Self-Injurious Behavior : Denies suicidal and self-injurious behaviorSuicidal Ideation (Most Severe in Past Month): Denies suicidal ideationTreatment History: Noncompliant with treatment    Patient: agitated, confused   Family: None present  Agency: [address] [place] [address]     Substance Abuse: None. Patient has past history of alcohol abuse over 20 years sober     Present Suicidal Behavior:      Verbal: Denies    Attempt:Denies    Past Suicidal Behavior:     Verbal:Denies    Attempt:Denies      Self-Injurious/Self-Mutilation:none noted      Violence Current or Past: None noted. Patient is labile, agitated mood but no violence. Per NH patient has been increasingly agitated and making threatening statements to staff and other residents      Trauma Identified:  None     Protective Factors:    NH placement  Supportive family      Risk Factors:    Non-compliance with treatment  Poor insight      Clinical Summary:    Patient presented to the ED via EMS from [facility] for change in mental status and increased agitation. Reported from NH that patient was wandering into others rooms and threatening them.  Recently eloped from NH and a wander guard was then placed in which the

## 2023-12-09 NOTE — ED NOTES
\"My cigars are in a black box where I came from get them now so I can have them. \"  Explained no cigars here and if he had them he would not be able to smoke here that his cigars are not here. Pt is irritable and stated, \"I know they aren't here I want them. \"  Pt is sitting in the bed area aware the PA will be here to see the pt.       Miguel A Lino RN  12/09/23 9853

## 2023-12-09 NOTE — ED NOTES
Patient resting quietly at this time. Awaiting placement through [facility].       Ramon Bush RN  12/09/23 2780

## 2023-12-09 NOTE — ED NOTES
Patient continues to be restless and anxious, wants to leave. Moving back and forth from the bed to the chair. Demanding staff to get his belongings which are at the nursing home. Unable to redirect patient. Patient is angry and argumentative.  Provider notified of request for medication for anxiety      Jonny Licona RN  12/09/23 9168

## 2023-12-09 NOTE — ED NOTES
Holderjessie Monroy from [facility]  [phone]    Eloped from facility the previous day. Given a wander guard for tracking. Did not want to take medications claimed staff were poisoning him. Pacing, wandering the halls stating he wanted to leave. Requested scissors to cut wander guard off his ankle. Attempted to grab the nurses bag to get scissors. Got an order to send the patient out. While [facility] was sitting with patient, the patient looking for scissors to cut ankle monitor off. First time he's had an ankle placed on the patient. Has only been at the nursing home since 11/13/2023. Gallo Monroy reporting patient does not see a psychiatrist.  Gallo Monroy reported patient has been sent to the Emergency room here at [facility] for agitated behaviors however EPIC does not reflect ER visits for that reason. Gallo Monroy reporting the patient needs placement to a Memory Care unit. Gallo Monroy informed typically patients aren't [facility] units for [facility].    PODALIA is son Xiang Roberts [phone]  Patient sees Dr. Maco Hampton [address], [facility], HUSEYIN  12/09/23 7884

## 2023-12-09 NOTE — ED NOTES
Patient up and ambulated to the bathroom with limited assist. Patient can only walk short distance, states he usually uses a wheelchair at home.  Patient voices c/o pain \"all over\" Provider notified with new orders      Jenn Baeza RN  12/09/23 8541

## 2023-12-09 NOTE — ED NOTES
[facility] called regarding [facility] and they want to know before making a decision to admit the pt if he can return to [facility] if treated in a facility and does he hade a UTI. Discussed with Antione Vazquez PA-C if the pt had a UTI, which he states the pt has no UTI it is contamination. Brett Stephens talked with Ana María at [facility] and she states that their is not a reason he can not return to [facility] after getting psych help/admit. Called Denise at [facility] and explained the pt can return to [facility] and no UTI.   Tim Gilmore will call [facility] and let the facility aware no UTI and can return to [facility] after treatment     Taryn Sterling RN  12/09/23 2855

## 2023-12-09 NOTE — ED NOTES
Pt took his Motrin as ordered and he is restless, anxious, and upset he is here. Explained that were looking for a facility where he could be treated for psych services. Pt ate his brownie a bite of his chicken and corn and is angry Pt wants to talk with the doctor.       Maryjo Iqbal RN  12/09/23 1800

## 2023-12-09 NOTE — ED NOTES
Pt continues to be anxious, no medical problems per Zaria Ramos PA-C pt accepted Ativan 1 mg orally.   Will continue to monitor pt for his anxiety restlessness     Carlos A Hay RN  12/09/23 1827

## 2023-12-09 NOTE — ED NOTES
Farzana Lopez is here at the bedside seeing the pt.  For his breathing and asking to talk with the doctor     Shawn Mai RN  12/09/23 2674

## 2023-12-09 NOTE — ED NOTES
Spoke with Dr. Jm Avila, reviewed patients current and past history, labs, meds and current behaviors at nursing home.  Orders to refer out to [facility]      González Melo RN  12/09/23 1826

## 2023-12-09 NOTE — ED NOTES
Advised the pt that Jordyn Burk would be back to see him as soon as he could, he is irritable, but accepts direction no acting out behaviors. Sunni Yan PA-C will be back to see the pt.        Maryjo Iqbal RN  12/09/23 4368

## 2023-12-09 NOTE — ED NOTES
PT presented go ED from [facility] with stff c/o altered mental status. Saff at the facility states that pt was going into other pt rooms and making threatenign comments to them and the staff. Staff reports patient refusing to take medications tonight. Vital signs stable PTA. Upon arrival pt is denying HI/SI. Pt states that he refused his medications tonight \"because the staff pissed him off. \"  Pt states that he \"was talking to the other patients. \"  Pt states that he \"isn't doing anything until he gets his coat from that place. \"  Notified LYNETTE Singletary of refusal.     Loree Simon RN  12/08/23 8969

## 2023-12-09 NOTE — ED NOTES
Patient up and ambulated to the restroom with 1 assist, gait unsteady at times. Continent of bowel and bladder at this time.       Juvenal Walsh RN  12/09/23 7510

## 2023-12-09 NOTE — ED NOTES
Patient information given to [facility] to place for [facility]      Vic Gallego RN  12/09/23 8618

## 2023-12-09 NOTE — ED NOTES
Per Bennett Romo stated he has no wheezing, lungs are clear, and no need for any kind of medical concerns at this time     Cecile Damon RN  12/09/23 8342

## 2023-12-09 NOTE — ED NOTES
VPA level subtherapeutic. Once patient is no longer sedated, [facility] will attempt an assessment. Pending assessment, reported behaviors at the nursing home and poor medication compliance, patient potentially a candidate for inpatient lluvia/psych admission. Will need COVID test for lluvia psych placement. Will discuss case with Dr. Marquez Barron once assessed and medically cleared. Facility claims son was contacted for permission to send the patient to the Emergency Room. Lenard Pedersen [phone]) will need to be contacted in regards to Inpatient admission.                Flavio Saavedra, RN  12/09/23 8171

## 2023-12-10 ENCOUNTER — APPOINTMENT (OUTPATIENT)
Dept: CT IMAGING | Age: 68
End: 2023-12-10
Payer: MEDICARE

## 2023-12-10 VITALS
DIASTOLIC BLOOD PRESSURE: 95 MMHG | SYSTOLIC BLOOD PRESSURE: 143 MMHG | HEART RATE: 90 BPM | TEMPERATURE: 98.6 F | OXYGEN SATURATION: 95 % | RESPIRATION RATE: 24 BRPM

## 2023-12-10 LAB
ANION GAP SERPL CALCULATED.3IONS-SCNC: 10 MEQ/L (ref 9–15)
BACTERIA UR CULT: NORMAL
BUN SERPL-MCNC: 30 MG/DL (ref 8–23)
CALCIUM SERPL-MCNC: 9 MG/DL (ref 8.5–9.9)
CHLORIDE SERPL-SCNC: 108 MEQ/L (ref 95–107)
CO2 SERPL-SCNC: 26 MEQ/L (ref 20–31)
CREAT SERPL-MCNC: 0.89 MG/DL (ref 0.7–1.2)
GLUCOSE SERPL-MCNC: 88 MG/DL (ref 70–99)
POTASSIUM SERPL-SCNC: 4.4 MEQ/L (ref 3.4–4.9)
SODIUM SERPL-SCNC: 144 MEQ/L (ref 135–144)

## 2023-12-10 PROCEDURE — 80048 BASIC METABOLIC PNL TOTAL CA: CPT

## 2023-12-10 PROCEDURE — 80176 ASSAY OF LIDOCAINE: CPT

## 2023-12-10 PROCEDURE — 6370000000 HC RX 637 (ALT 250 FOR IP)

## 2023-12-10 PROCEDURE — 74176 CT ABD & PELVIS W/O CONTRAST: CPT

## 2023-12-10 PROCEDURE — 36415 COLL VENOUS BLD VENIPUNCTURE: CPT

## 2023-12-10 PROCEDURE — 6370000000 HC RX 637 (ALT 250 FOR IP): Performed by: PHYSICIAN ASSISTANT

## 2023-12-10 RX ORDER — TRAMADOL HYDROCHLORIDE 50 MG/1
50 TABLET ORAL ONCE
Status: COMPLETED | OUTPATIENT
Start: 2023-12-10 | End: 2023-12-10

## 2023-12-10 RX ORDER — ATORVASTATIN CALCIUM 80 MG/1
80 TABLET, FILM COATED ORAL NIGHTLY
COMMUNITY

## 2023-12-10 RX ORDER — LORAZEPAM 1 MG/1
1 TABLET ORAL EVERY 8 HOURS PRN
COMMUNITY

## 2023-12-10 RX ORDER — LORAZEPAM 1 MG/1
1 TABLET ORAL ONCE
Status: COMPLETED | OUTPATIENT
Start: 2023-12-10 | End: 2023-12-10

## 2023-12-10 RX ORDER — TRAMADOL HYDROCHLORIDE 50 MG/1
50 TABLET ORAL EVERY 12 HOURS PRN
COMMUNITY

## 2023-12-10 RX ORDER — TRAMADOL HYDROCHLORIDE 50 MG/1
50 TABLET ORAL 2 TIMES DAILY
Status: DISCONTINUED | OUTPATIENT
Start: 2023-12-10 | End: 2023-12-10 | Stop reason: HOSPADM

## 2023-12-10 RX ORDER — METHOCARBAMOL 500 MG/1
500 TABLET, FILM COATED ORAL 3 TIMES DAILY
Status: DISCONTINUED | OUTPATIENT
Start: 2023-12-10 | End: 2023-12-10 | Stop reason: HOSPADM

## 2023-12-10 RX ORDER — ACETAMINOPHEN 325 MG/1
650 TABLET ORAL EVERY 6 HOURS PRN
COMMUNITY

## 2023-12-10 RX ORDER — PANTOPRAZOLE SODIUM 40 MG/1
40 TABLET, DELAYED RELEASE ORAL
Status: DISCONTINUED | OUTPATIENT
Start: 2023-12-10 | End: 2023-12-10 | Stop reason: HOSPADM

## 2023-12-10 RX ADMIN — TRAMADOL HYDROCHLORIDE 50 MG: 50 TABLET ORAL at 10:25

## 2023-12-10 RX ADMIN — TRAMADOL HYDROCHLORIDE 50 MG: 50 TABLET ORAL at 02:26

## 2023-12-10 RX ADMIN — METHOCARBAMOL 500 MG: 500 TABLET ORAL at 10:25

## 2023-12-10 RX ADMIN — PANTOPRAZOLE SODIUM 40 MG: 40 TABLET, DELAYED RELEASE ORAL at 10:25

## 2023-12-10 RX ADMIN — LORAZEPAM 1 MG: 1 TABLET ORAL at 14:11

## 2023-12-10 ASSESSMENT — PAIN DESCRIPTION - DESCRIPTORS
DESCRIPTORS: ACHING
DESCRIPTORS: THROBBING

## 2023-12-10 ASSESSMENT — PAIN DESCRIPTION - LOCATION
LOCATION: GENERALIZED
LOCATION: BACK;FLANK

## 2023-12-10 ASSESSMENT — PAIN SCALES - GENERAL
PAINLEVEL_OUTOF10: 10
PAINLEVEL_OUTOF10: 10

## 2023-12-10 NOTE — ED NOTES
Patient assisted to the restroom. Patient urinated. Urine noted to be orange in color. Patient changed into clean dry gown and socks. Patient assisted back to bed. Warm blankets provided. Safety precautions maintained.       Smith Grey RN  12/10/23 3516

## 2023-12-10 NOTE — ED NOTES
Patient returned from [id number] [place] [address]. Patient tolerated procedure well.      Laisha Means RN  12/10/23 7807

## 2023-12-10 NOTE — ED NOTES
Per Unique at [facility] pt has been accepted to [facility] in [place] under Dr China Saenz MD, nurse to nurse can be completed at [phone], need to fax his pink sheet to [place] faxed his pink sheet to Yumiko Calix RN  12/10/23 8185

## 2023-12-10 NOTE — ED NOTES
Patient requested/provided snack and beverage. Pt feet warm to touch.  Pt provided warm blanket      Philly Loving RN  12/09/23 6190

## 2023-12-10 NOTE — ED NOTES
Pt remains asleep resting in his bed area with even respirations     Oscar Willingham RN  12/10/23 5752

## 2023-12-10 NOTE — ED NOTES
Luther Parikh was here to see the pt and ordered med's for the pt.   Pt was cooperative with talking with Luther Parikh the RAUL Iqbal RN  12/10/23 1019

## 2023-12-10 NOTE — ED NOTES
Pt up to the bathroom and back to bed, he is slightly irritable but accepts direction.  Assisted to his bed from the bathroom     Alicia Obando RN  12/10/23 5256

## 2023-12-10 NOTE — ED NOTES
Patient is awake and requesting snacks. Pretzels and oreo cookies given.      Sienna Vargas, RN  12/10/23 0002

## 2023-12-10 NOTE — PROGRESS NOTES
Orders in epic. Pt advise.  She will go to [facility] to get them done Pt requesting to view belongings. Explanation provided to pt belongings are secured in locker. Pt belongings removed from main ER locker and placed into PEGGY #1 locker. Belongings held up for pt to view through window. Pt returned to bed. Safety precautions maintained.

## 2023-12-10 NOTE — ED NOTES
Breakfast tray given to patient. Sitting at side of bed eating at this time.       Vic Gallego RN  12/10/23 0576

## 2023-12-10 NOTE — ED NOTES
Trinidad Mae PAC updated of patient difficulty throughout the night with pain/urination. New orders received.      Daphnie Hayward RN  12/10/23 7778

## 2023-12-10 NOTE — ED NOTES
Pt assisted to the restroom. Pt urinated, urine noted to be orange in color. Pt assisted back to bed. Patient provided cold water as requested. Safety precautions maintained.      Dorothea Hernandez RN  12/10/23 6856

## 2023-12-10 NOTE — ED NOTES
Helped pt with his breakfast, drinking fluids ate about 25% of his breakfast, completed vital signs with him and he walked with assistance to the bathroom  He refused to use an urinal     Coni Fontanez RN  12/10/23 3653

## 2023-12-10 NOTE — ED NOTES
Dr Jamila Chester MD signed pt's paper work for his transfer to [facility] and Beau Rader PA-C was given the admitting doctor to [facility] and Dr Anna Mcfadden as the psychiatrist that ordered a Geriatric facility for his placement     Oralia Moody RN  12/10/23 1128

## 2023-12-10 NOTE — ED NOTES
Patient up to the bathroom to urinate. Offered patient lunch upon returning to bed and patient declined. Stated \"I don;t want anything to eat, I just want to lay back down. \" Assisted back into bed and warm blanket given.       Sam Benitez RN  12/10/23 1300

## 2023-12-10 NOTE — ED NOTES
Report on pt was given to [facility]  Paper work for [address] in [place] were given to Daron.   Pt left on a gurney, pt walked to the gurney and was cooperative  All of his belongings were given to Physician's staff     Oscar Willingham, HUSEYIN  12/10/23 3130

## 2023-12-10 NOTE — ED NOTES
Talked with Moraima Rose PA-C, he will come back and see the pt medically reviewed he is taking Neurontin, Tramadol, Robaxin, and Lidoderm could these med's be ordered for him  Toby Joseph will come back and see the pt.   Pt aware the doctor will come to see him      Francois Jackson RN  12/10/23 3231

## 2023-12-10 NOTE — ED NOTES
Pt accepted his Ativan ordered for him, resting in the bed will monitor for the effects of the med given to him     Abdirahman Floyd RN  12/10/23 1629

## 2023-12-10 NOTE — ED NOTES
Spoke with Ana/EMERITA regarding patient having increased back pain and orange colored urine with pain and burning with urination. Patient requires 2 staff assist for BRP due to his increased pain and unsteady gait. No new orders at this time. Will continue to monitor.      Jason Perez RN  12/10/23 9674

## 2023-12-10 NOTE — ED NOTES
Patient is resting quietly with eyes closed in bed. No distress observed at this time.      Anna Jimenes RN  12/09/23 0766

## 2023-12-10 NOTE — ED NOTES
Patient is anxious and getting out of bed c/o being cold. Extra gripper socks applied by this nurse and extra covers for comfort.      Tati Aponte RN  12/09/23 2030

## 2023-12-10 NOTE — ED NOTES
Patient assisted to the restroom. Patient verbalized burning sensation with urination. Patient assisted back to bed and provided warm blanket. Provider notified see new orders.       Melvi Conrad RN  12/09/23 9596

## 2023-12-10 NOTE — ED NOTES
Unique called from [address] and stated that Physician's would be here at 1430 to take the pt to [facility], [address] [facility], [address] [facility], RN  12/10/23 2808

## 2023-12-10 NOTE — ED NOTES
Pt up to the bathroom anxious, slight irritability, he did not want me to touch him my hands were to cold, his body felt warm to the touch  He was aided to the bathroom.   Called discussed with Christine Martin PA-C and he will order him a medication  On hold his Robaxin as he received a dose earlier this AM  Pt is back in the bed     Chong Goldman RN  12/10/23 0814

## 2023-12-10 NOTE — ED NOTES
Copy of pink slip faxed to [facility] per request before transport can be set up      Jennifer Connelly RN  12/10/23 3994

## 2023-12-10 NOTE — ED NOTES
Report given to evening staff regarding [facility] is looking for placement/transfer for the pt.   [facility] declined him     Carlos Tian RN  12/09/23 1066

## 2023-12-10 NOTE — ED NOTES
Patient medicated for generalized pain. Lying in bed on Lt. Side. Behavior is calm and cooperative at this time.      Francine Estrada RN  12/10/23 5724

## 2023-12-10 NOTE — ED NOTES
Patient up to the bathroom to urinate and went back to bed with 1 asssit. Patient has poor safety awareness, gets up out of bed without assistance and has an unsteady gait and walks hunched over. Appears anxious and irritable but is cooperative. Offered patient a urinal but he declined. Resting quietly in bed at this time.      Cecille Puri RN  12/10/23 4913

## 2023-12-10 NOTE — ED NOTES
Report called to RN at [facility]. Reviewed current and past history, current behaviors that led to ED visit, labs, medications and chronic pain. Report also given to Physicians for transport. Belongings given to EMS.       Reva Irizarry RN  12/10/23 1576

## 2023-12-10 NOTE — ED NOTES
Patient continues with increased back pain, has doubled over gait with frequency and burning with urination. Patient noted to have pink tinged urine in commode. Patient then defecated and small amount of blood noted in stool. Continues to require 2 person assist for BRP due to pain and unsteady gait.        Sienna Vargas, RN  12/10/23 430 [facility], [address] [place] [address] Ana GÓMEZ RN  12/10/23 0942

## 2023-12-10 NOTE — ED NOTES
Patient 1 episode of incontinence. Patient ambulated to the restroom with 2 person assist. Urine color noted orange. Skin care  provided. Clean sheet applied to bed. Pt assisted back to bed. Safety precautions maintained.       Jelani Neal RN  12/10/23 8129

## 2023-12-10 NOTE — ED NOTES
Patient up and continues to c/o being cold and stating that he wants to go home. Also concerned about belongings. Patient shown belongings from locker and assured that we would keep them safe. Patient reoriented to situation with minimal effectiveness.      Mariann Soares RN  12/09/23 2035

## 2023-12-10 NOTE — ED NOTES
Patient awake. Patient provided sandwich, pretzels, and beverage as requested. Patient voiced concerns about where his box of cigars are located. Per chart review pt did not bring box of cigars to the ER.       Thea Mercedes RN  12/10/23 6937

## 2023-12-10 NOTE — ED NOTES
Pt is in bed quiet, he has been up to the bathroom several times with staff and his bed made with clean linens and new gown for the pt. He has been continent of bowel and bladder.      Taryn Sterling RN  12/10/23 1016

## 2023-12-11 LAB
EKG ATRIAL RATE: 75 BPM
EKG P AXIS: 73 DEGREES
EKG P-R INTERVAL: 138 MS
EKG Q-T INTERVAL: 406 MS
EKG QRS DURATION: 98 MS
EKG QTC CALCULATION (BAZETT): 453 MS
EKG R AXIS: 60 DEGREES
EKG T AXIS: 62 DEGREES
EKG VENTRICULAR RATE: 75 BPM

## 2023-12-11 PROCEDURE — 93010 ELECTROCARDIOGRAM REPORT: CPT | Performed by: INTERNAL MEDICINE

## 2023-12-13 LAB — LIDOCAIN SERPL-MCNC: <0.5 UG/ML (ref 1.2–5)

## 2024-01-08 ENCOUNTER — TELEPHONE (OUTPATIENT)
Dept: FAMILY MEDICINE CLINIC | Age: 69
End: 2024-01-08

## 2024-01-08 NOTE — TELEPHONE ENCOUNTER
Pt son called and would like to speak with you about his father.  In November he went to er, and then nursing home.  [facility]  Paperwork got messed up and insurance company didn't think he needed nursing home without additional services.    Please call Chu, son, at  [phone]

## 2024-01-09 ENCOUNTER — PREP FOR PROCEDURE (OUTPATIENT)
Dept: GASTROENTEROLOGY | Age: 69
End: 2024-01-09

## 2024-01-26 ENCOUNTER — TELEPHONE (OUTPATIENT)
Dept: FAMILY MEDICINE CLINIC | Age: 69
End: 2024-01-26

## 2024-01-26 NOTE — TELEPHONE ENCOUNTER
Pt calling to see if he can get his back fused together he heard that it can be done and wants to know if he can have this done? He doesn't know where or how this is done so he is calling to see if he can get this done.

## 2024-02-07 ENCOUNTER — TELEPHONE (OUTPATIENT)
Dept: UROLOGY | Age: 69
End: 2024-02-07

## 2024-02-07 NOTE — TELEPHONE ENCOUNTER
Patient called to see when he should get his stent out from September 2023.  Patient was sent a letter (in Media with Certified Mail Receipt) explaining how important it is to get that stent out.  He states that he did not get the letter.  Patient was referred to Dr. Del Toro in September.  I gave patient Dr. Del Toro's phone number to schedule.

## 2024-02-08 ENCOUNTER — TELEPHONE (OUTPATIENT)
Dept: UROLOGY | Age: 69
End: 2024-02-08

## 2024-02-08 DIAGNOSIS — N20.0 KIDNEY STONE: Primary | ICD-10-CM

## 2024-02-08 NOTE — TELEPHONE ENCOUNTER
Patient called regarding his referral to Dr. Del Toro for his stent and kidney stone.  He refuses to drive to [place] to see Dr. Del Toro.  (Stent placed 09/23)  What can he do?

## 2024-02-27 ENCOUNTER — HOSPITAL ENCOUNTER (OUTPATIENT)
Dept: GENERAL RADIOLOGY | Age: 69
Discharge: HOME | End: 2024-02-29
Attending: UROLOGY
Payer: MEDICARE

## 2024-02-27 ENCOUNTER — HOSPITAL ENCOUNTER (OUTPATIENT)
Dept: CT IMAGING | Age: 69
Discharge: HOME | End: 2024-02-29
Attending: UROLOGY
Payer: MEDICARE

## 2024-02-27 DIAGNOSIS — N20.0 KIDNEY STONE: ICD-10-CM

## 2024-02-27 PROCEDURE — 74176 CT ABD & PELVIS W/O CONTRAST: CPT

## 2024-02-27 PROCEDURE — 74018 RADEX ABDOMEN 1 VIEW: CPT

## 2024-03-07 ENCOUNTER — PREP FOR PROCEDURE (OUTPATIENT)
Dept: UROLOGY | Age: 69
End: 2024-03-07

## 2024-03-07 ENCOUNTER — OFFICE VISIT (OUTPATIENT)
Dept: UROLOGY | Age: 69
End: 2024-03-07
Payer: MEDICARE

## 2024-03-07 VITALS
HEART RATE: 79 BPM | WEIGHT: 140 LBS | HEIGHT: 68 IN | BODY MASS INDEX: 21.22 KG/M2 | OXYGEN SATURATION: 96 % | SYSTOLIC BLOOD PRESSURE: 104 MMHG | DIASTOLIC BLOOD PRESSURE: 64 MMHG

## 2024-03-07 DIAGNOSIS — N20.0 KIDNEY STONE: Primary | ICD-10-CM

## 2024-03-07 DIAGNOSIS — N20.0 KIDNEY STONE: ICD-10-CM

## 2024-03-07 DIAGNOSIS — N20.0 LEFT RENAL STONE: ICD-10-CM

## 2024-03-07 LAB
BILIRUBIN, POC: ABNORMAL
CLARITY, POC: ABNORMAL
COLOR, POC: YELLOW
GLUCOSE URINE, POC: ABNORMAL
KETONES, POC: ABNORMAL
LEUKOCYTE EST, POC: ABNORMAL
NITRITE, POC: ABNORMAL
PH, POC: 7
PROTEIN, POC: ABNORMAL
SPECIFIC GRAVITY, POC: 1.02
UROBILINOGEN, POC: 0.2

## 2024-03-07 PROCEDURE — 1123F ACP DISCUSS/DSCN MKR DOCD: CPT | Performed by: UROLOGY

## 2024-03-07 PROCEDURE — 81003 URINALYSIS AUTO W/O SCOPE: CPT | Performed by: PHYSICIAN ASSISTANT

## 2024-03-07 PROCEDURE — 3074F SYST BP LT 130 MM HG: CPT | Performed by: UROLOGY

## 2024-03-07 PROCEDURE — 99214 OFFICE O/P EST MOD 30 MIN: CPT | Performed by: UROLOGY

## 2024-03-07 PROCEDURE — 3078F DIAST BP <80 MM HG: CPT | Performed by: UROLOGY

## 2024-03-07 RX ORDER — SODIUM CHLORIDE 0.9 % (FLUSH) 0.9 %
5-40 SYRINGE (ML) INJECTION EVERY 12 HOURS SCHEDULED
Status: CANCELLED | OUTPATIENT
Start: 2024-03-07

## 2024-03-07 RX ORDER — SODIUM CHLORIDE 0.9 % (FLUSH) 0.9 %
5-40 SYRINGE (ML) INJECTION PRN
Status: CANCELLED | OUTPATIENT
Start: 2024-03-07

## 2024-03-07 RX ORDER — SODIUM CHLORIDE 9 MG/ML
INJECTION, SOLUTION INTRAVENOUS PRN
Status: CANCELLED | OUTPATIENT
Start: 2024-03-07

## 2024-03-07 ASSESSMENT — ENCOUNTER SYMPTOMS: APNEA: 0

## 2024-03-07 NOTE — PROGRESS NOTES
Subjective:      Patient ID: Marquez Miguel is a 69 y.o. male    HPI 69 year old male with CBP, COPD, Depression, prior h/o ETOH abuse, ARNAUD, HTN, and back with h/o LT renal stone 8-9 mm s/p Lt stent placement on 9/11/23. He has been lost to follow-up for various reasons and not able to make planned recommended F/U due to problems with transportation in past. He is back with an interval CT and KUB that shows a persistent 8-9 stone in Lt RP with a stent in place. He has no colic or N/V or F/C. He has no hematuria. Complains of discomfort at the tip of his penis when he urinates. The patient had a consult placed to Dr. Del Toro but did not follow through. I reviewed the KUB with him today.     Past Medical History:   Diagnosis Date    Allergic rhinitis 02/19/2018    Anxiety     Bilateral carotid artery stenosis 05/10/2023    Chronic back pain     Colon polyp     COPD (chronic obstructive pulmonary disease) ([facility])     Depression     Disorder of stomach     valve not closing properly    Emphysema lung ([facility])     Essential hypertension 11/04/2019    ETOH abuse     SOBER X 20 YEARS    Gastroesophageal reflux disease 10/04/2019    Hydronephrosis of left kidney 08/23/2023    Hyperlipidemia     meds > 22 yrs    Low back pain 05/01/2018    ARNAUD (obstructive sleep apnea) 02/19/2018    Other emphysema ([facility]) 10/04/2019    Other intervertebral disc degeneration, lumbar region 03/23/2018    Radiculopathy, lumbar region 02/28/2018    Restless leg syndrome     Seasonal affective disorder ([facility]) 10/04/2019    Substance abuse ([facility])     alcholic, quit 20 yrs      Past Surgical History:   Procedure Laterality Date    BLADDER SURGERY Left 9/11/2023    CYSTOSCOPY RETROGRADE PYELOGRAM AND LEFT DOUBLE J STENT INSERTION performed by Marcin Borges MD at [facility]    COLONOSCOPY  12/22/2016    A SHALA MARLEY    DENTAL SURGERY  10 yrs ago    ENDOSCOPY, COLON, DIAGNOSTIC      EYE SURGERY      Lasik OU    FINGER TRIGGER RELEASE Left 11/1/2022    LEFT

## 2024-03-08 LAB — BACTERIA UR CULT: NORMAL

## 2024-03-11 ENCOUNTER — OFFICE VISIT (OUTPATIENT)
Dept: FAMILY MEDICINE CLINIC | Age: 69
End: 2024-03-11
Payer: MEDICARE

## 2024-03-11 VITALS
WEIGHT: 127 LBS | SYSTOLIC BLOOD PRESSURE: 106 MMHG | BODY MASS INDEX: 19.25 KG/M2 | TEMPERATURE: 97.6 F | HEART RATE: 58 BPM | DIASTOLIC BLOOD PRESSURE: 60 MMHG | HEIGHT: 68 IN | OXYGEN SATURATION: 98 %

## 2024-03-11 DIAGNOSIS — F11.11 OPIOID ABUSE, IN REMISSION (HCC): ICD-10-CM

## 2024-03-11 DIAGNOSIS — R64 CACHEXIA (HCC): ICD-10-CM

## 2024-03-11 DIAGNOSIS — F39 UNSPECIFIED MOOD (AFFECTIVE) DISORDER (HCC): ICD-10-CM

## 2024-03-11 DIAGNOSIS — J44.9 CHRONIC OBSTRUCTIVE PULMONARY DISEASE, UNSPECIFIED COPD TYPE (HCC): ICD-10-CM

## 2024-03-11 DIAGNOSIS — Z00.00 MEDICARE ANNUAL WELLNESS VISIT, SUBSEQUENT: Primary | ICD-10-CM

## 2024-03-11 DIAGNOSIS — I70.0 CALCIFICATION OF ABDOMINAL AORTA (HCC): ICD-10-CM

## 2024-03-11 DIAGNOSIS — M46.1 SACROILIITIS (HCC): ICD-10-CM

## 2024-03-11 PROCEDURE — G0439 PPPS, SUBSEQ VISIT: HCPCS | Performed by: NURSE PRACTITIONER

## 2024-03-11 PROCEDURE — 1123F ACP DISCUSS/DSCN MKR DOCD: CPT | Performed by: NURSE PRACTITIONER

## 2024-03-11 PROCEDURE — 3078F DIAST BP <80 MM HG: CPT | Performed by: NURSE PRACTITIONER

## 2024-03-11 PROCEDURE — 3074F SYST BP LT 130 MM HG: CPT | Performed by: NURSE PRACTITIONER

## 2024-03-11 SDOH — ECONOMIC STABILITY: FOOD INSECURITY: WITHIN THE PAST 12 MONTHS, YOU WORRIED THAT YOUR FOOD WOULD RUN OUT BEFORE YOU GOT MONEY TO BUY MORE.: NEVER TRUE

## 2024-03-11 SDOH — ECONOMIC STABILITY: INCOME INSECURITY: HOW HARD IS IT FOR YOU TO PAY FOR THE VERY BASICS LIKE FOOD, HOUSING, MEDICAL CARE, AND HEATING?: SOMEWHAT HARD

## 2024-03-11 SDOH — ECONOMIC STABILITY: FOOD INSECURITY: WITHIN THE PAST 12 MONTHS, THE FOOD YOU BOUGHT JUST DIDN'T LAST AND YOU DIDN'T HAVE MONEY TO GET MORE.: NEVER TRUE

## 2024-03-11 ASSESSMENT — PATIENT HEALTH QUESTIONNAIRE - PHQ9
4. FEELING TIRED OR HAVING LITTLE ENERGY: 0
8. MOVING OR SPEAKING SO SLOWLY THAT OTHER PEOPLE COULD HAVE NOTICED. OR THE OPPOSITE, BEING SO FIGETY OR RESTLESS THAT YOU HAVE BEEN MOVING AROUND A LOT MORE THAN USUAL: 0
7. TROUBLE CONCENTRATING ON THINGS, SUCH AS READING THE NEWSPAPER OR WATCHING TELEVISION: 0
1. LITTLE INTEREST OR PLEASURE IN DOING THINGS: 0
SUM OF ALL RESPONSES TO PHQ QUESTIONS 1-9: 0
3. TROUBLE FALLING OR STAYING ASLEEP: 0
SUM OF ALL RESPONSES TO PHQ QUESTIONS 1-9: 0
6. FEELING BAD ABOUT YOURSELF - OR THAT YOU ARE A FAILURE OR HAVE LET YOURSELF OR YOUR FAMILY DOWN: 0
SUM OF ALL RESPONSES TO PHQ9 QUESTIONS 1 & 2: 0
2. FEELING DOWN, DEPRESSED OR HOPELESS: 0
10. IF YOU CHECKED OFF ANY PROBLEMS, HOW DIFFICULT HAVE THESE PROBLEMS MADE IT FOR YOU TO DO YOUR WORK, TAKE CARE OF THINGS AT HOME, OR GET ALONG WITH OTHER PEOPLE: 0
SUM OF ALL RESPONSES TO PHQ QUESTIONS 1-9: 0
5. POOR APPETITE OR OVEREATING: 0
SUM OF ALL RESPONSES TO PHQ QUESTIONS 1-9: 0
9. THOUGHTS THAT YOU WOULD BE BETTER OFF DEAD, OR OF HURTING YOURSELF: 0

## 2024-03-11 ASSESSMENT — LIFESTYLE VARIABLES
HOW OFTEN DO YOU HAVE A DRINK CONTAINING ALCOHOL: NEVER
HOW OFTEN DO YOU HAVE A DRINK CONTAINING ALCOHOL: NEVER
HOW MANY STANDARD DRINKS CONTAINING ALCOHOL DO YOU HAVE ON A TYPICAL DAY: PATIENT DOES NOT DRINK

## 2024-03-11 NOTE — PATIENT INSTRUCTIONS
calcium requirement with diet alone, but a vitamin D supplement is usually necessary to meet this goal.  When exposed to the sun, use a sunscreen that protects against both UVA and UVB radiation with an SPF of 30 or greater. Reapply every 2 to 3 hours or after sweating, drying off with a towel, or swimming.  Always wear a seat belt when traveling in a car. Always wear a helmet when riding a bicycle or motorcycle.

## 2024-03-11 NOTE — PROGRESS NOTES
Medicare Annual Wellness Visit    Marquez Miguel is here for Medicare AWV    Assessment & Plan   Medicare annual wellness visit, subsequent  Sacroiliitis (HCC)  - stable right now. Pain is \"tolerable\"    Cachexia (HCC)  - pt states that he is eating much better    Opioid abuse, in remission (HCC)  - not currently taking any. Pain is stabilized     Chronic obstructive pulmonary disease, unspecified COPD type (HCC)  -stable. Still smoking. Not interested in quitting  - declined ct lung screening    Unspecified mood (affective) disorder (HCC)  - states that he is good. However, he is not taking any of his meds    Calcification of abdominal aorta (HCC)  - will continue to follow. Declined ct lung screening    Recommendations for Preventive Services Due: see orders and patient instructions/AVS.  Recommended screening schedule for the next 5-10 years is provided to the patient in written form: see Patient Instructions/AVS.     Return in about 6 months (around 9/11/2024) for one year medicare wellness.     Subjective   The following acute and/or chronic problems were also addressed today:  Pt states that he has stopped all of his medication.   Overall feeling well.   Aches and pains present.     Eating better. Making better decisions/choices.     Patient's complete Health Risk Assessment and screening values have been reviewed and are found in Flowsheets. The following problems were reviewed today and where indicated follow up appointments were made and/or referrals ordered.    Positive Risk Factor Screenings with Interventions:    Fall Risk:  Do you feel unsteady or are you worried about falling? : (!) yes  2 or more falls in past year?: (!) yes  Fall with injury in past year?: (!) yes     Interventions:    Patient comments: outside at dark   Reviewed medications, home hazards, visual acuity, and co-morbidities that can increase risk for falls    Cognitive:   Clock Drawing Test (CDT): Normal (10 til 5)  Words recalled:

## 2024-03-13 ENCOUNTER — TELEPHONE (OUTPATIENT)
Dept: FAMILY MEDICINE CLINIC | Age: 69
End: 2024-03-13

## 2024-03-13 NOTE — TELEPHONE ENCOUNTER
There was a medication list left by pt for which ones he is NOT taking per front staff. During visit on 3/11/24 pt stated that he was not taking anything?    List is attached

## 2024-03-18 ENCOUNTER — HOSPITAL ENCOUNTER (OUTPATIENT)
Dept: PREADMISSION TESTING | Age: 69
Discharge: HOME | End: 2024-03-22
Payer: MEDICARE

## 2024-03-18 VITALS
BODY MASS INDEX: 21.44 KG/M2 | WEIGHT: 133.4 LBS | RESPIRATION RATE: 22 BRPM | TEMPERATURE: 97.8 F | DIASTOLIC BLOOD PRESSURE: 77 MMHG | HEIGHT: 66 IN | OXYGEN SATURATION: 98 % | HEART RATE: 83 BPM | SYSTOLIC BLOOD PRESSURE: 121 MMHG

## 2024-03-18 LAB
ANION GAP SERPL CALCULATED.3IONS-SCNC: 10 MEQ/L (ref 9–15)
BUN SERPL-MCNC: 21 MG/DL (ref 8–23)
CALCIUM SERPL-MCNC: 9 MG/DL (ref 8.5–9.9)
CHLORIDE SERPL-SCNC: 106 MEQ/L (ref 95–107)
CO2 SERPL-SCNC: 26 MEQ/L (ref 20–31)
CREAT SERPL-MCNC: 1.19 MG/DL (ref 0.7–1.2)
ERYTHROCYTE [DISTWIDTH] IN BLOOD BY AUTOMATED COUNT: 15.7 % (ref 11.5–14.5)
GLUCOSE SERPL-MCNC: 83 MG/DL (ref 70–99)
HCT VFR BLD AUTO: 38.8 % (ref 42–52)
HGB BLD-MCNC: 12.3 G/DL (ref 14–18)
MCH RBC QN AUTO: 29.4 PG (ref 27–31.3)
MCHC RBC AUTO-ENTMCNC: 31.7 % (ref 33–37)
MCV RBC AUTO: 92.8 FL (ref 79–92.2)
PLATELET # BLD AUTO: 309 K/UL (ref 130–400)
POTASSIUM SERPL-SCNC: 4.5 MEQ/L (ref 3.4–4.9)
RBC # BLD AUTO: 4.18 M/UL (ref 4.7–6.1)
SODIUM SERPL-SCNC: 142 MEQ/L (ref 135–144)
WBC # BLD AUTO: 7.9 K/UL (ref 4.8–10.8)

## 2024-03-18 PROCEDURE — 80048 BASIC METABOLIC PNL TOTAL CA: CPT

## 2024-03-18 PROCEDURE — 85027 COMPLETE CBC AUTOMATED: CPT

## 2024-03-18 RX ORDER — KETOROLAC TROMETHAMINE 10 MG/1
10 TABLET, FILM COATED ORAL EVERY 6 HOURS PRN
COMMUNITY

## 2024-03-18 ASSESSMENT — ENCOUNTER SYMPTOMS
SINUS PAIN: 0
COUGH: 0
SINUS PRESSURE: 0
WHEEZING: 0
TROUBLE SWALLOWING: 0
EYE PAIN: 0
APNEA: 0
VOMITING: 0
SORE THROAT: 0
RHINORRHEA: 0
PHOTOPHOBIA: 0
BACK PAIN: 1
CONSTIPATION: 0
SHORTNESS OF BREATH: 0
EYE DISCHARGE: 0
CHOKING: 0
CHEST TIGHTNESS: 0
EYE ITCHING: 0
NAUSEA: 0
ABDOMINAL PAIN: 0
FACIAL SWELLING: 0
ABDOMINAL DISTENTION: 0
EYE REDNESS: 0

## 2024-03-18 NOTE — H&P
(HCC)    GI bleeding    Atherosclerosis of aorta (HCC)    Bilateral carotid artery stenosis    Duodenal ulcer    Nausea and vomiting    Gastric outlet obstruction    Weakness    Cachexia (HCC)    Delirium    Sacroiliitis (HCC)         Plan:  Preoperative workup as follows: PAT, CBC, BMP (EKG 12/9/23 in Epic)  2.   Scheduled for: LEFT EXTRACORPOREAL SHOCK WAVE LITHOTRIPSY, CYSTOSCOPY, LEFT RETROGRADE PYELOGRAM, LEFT STENT CHANGE on 3/25/24.    SIGNATURE: Nicole Crooks, APRN - CNP  DATE: March 18, 2024

## 2024-03-24 ENCOUNTER — ANESTHESIA EVENT (OUTPATIENT)
Dept: OPERATING ROOM | Age: 69
End: 2024-03-24
Payer: MEDICARE

## 2024-03-24 NOTE — ANESTHESIA PRE PROCEDURE
Plan      general     ASA 3     (GETA)  Induction: intravenous.    MIPS: Postoperative opioids intended and Prophylactic antiemetics administered.  Anesthetic plan and risks discussed with patient.    Use of blood products discussed with patient whom consented to blood products.    Plan discussed with CRNA.    Attending anesthesiologist reviewed and agrees with Pre Eval content and Attending anesthesiologist reviewed and agrees with Preprocedure content              Chayo Mancuso MD   3/25/2024

## 2024-03-25 ENCOUNTER — ANESTHESIA (OUTPATIENT)
Dept: OPERATING ROOM | Age: 69
End: 2024-03-25
Payer: MEDICARE

## 2024-03-25 ENCOUNTER — APPOINTMENT (OUTPATIENT)
Dept: GENERAL RADIOLOGY | Age: 69
End: 2024-03-25
Attending: UROLOGY
Payer: MEDICARE

## 2024-03-25 ENCOUNTER — HOSPITAL ENCOUNTER (OUTPATIENT)
Age: 69
Setting detail: OUTPATIENT SURGERY
Discharge: HOME | End: 2024-03-25
Attending: UROLOGY | Admitting: UROLOGY
Payer: MEDICARE

## 2024-03-25 VITALS
DIASTOLIC BLOOD PRESSURE: 80 MMHG | SYSTOLIC BLOOD PRESSURE: 111 MMHG | WEIGHT: 150 LBS | HEIGHT: 68 IN | OXYGEN SATURATION: 100 % | TEMPERATURE: 97 F | BODY MASS INDEX: 22.73 KG/M2 | HEART RATE: 97 BPM | RESPIRATION RATE: 16 BRPM

## 2024-03-25 PROCEDURE — C1758 CATHETER, URETERAL: HCPCS | Performed by: UROLOGY

## 2024-03-25 PROCEDURE — 74420 UROGRAPHY RTRGR +-KUB: CPT | Performed by: UROLOGY

## 2024-03-25 PROCEDURE — 50590 FRAGMENTING OF KIDNEY STONE: CPT | Performed by: UROLOGY

## 2024-03-25 PROCEDURE — 52315 CYSTOSCOPY AND TREATMENT: CPT | Performed by: UROLOGY

## 2024-03-25 PROCEDURE — 3600000014 HC SURGERY LEVEL 4 ADDTL 15MIN: Performed by: UROLOGY

## 2024-03-25 PROCEDURE — 3600000004 HC SURGERY LEVEL 4 BASE: Performed by: UROLOGY

## 2024-03-25 PROCEDURE — 2580000003 HC RX 258: Performed by: STUDENT IN AN ORGANIZED HEALTH CARE EDUCATION/TRAINING PROGRAM

## 2024-03-25 PROCEDURE — 6360000004 HC RX CONTRAST MEDICATION: Performed by: UROLOGY

## 2024-03-25 PROCEDURE — 2580000003 HC RX 258: Performed by: UROLOGY

## 2024-03-25 PROCEDURE — 74018 RADEX ABDOMEN 1 VIEW: CPT

## 2024-03-25 PROCEDURE — 3700000000 HC ANESTHESIA ATTENDED CARE: Performed by: UROLOGY

## 2024-03-25 PROCEDURE — 6370000000 HC RX 637 (ALT 250 FOR IP): Performed by: UROLOGY

## 2024-03-25 PROCEDURE — 7100000011 HC PHASE II RECOVERY - ADDTL 15 MIN: Performed by: UROLOGY

## 2024-03-25 PROCEDURE — 6360000002 HC RX W HCPCS: Performed by: NURSE ANESTHETIST, CERTIFIED REGISTERED

## 2024-03-25 PROCEDURE — 7100000001 HC PACU RECOVERY - ADDTL 15 MIN: Performed by: UROLOGY

## 2024-03-25 PROCEDURE — C2617 STENT, NON-COR, TEM W/O DEL: HCPCS | Performed by: UROLOGY

## 2024-03-25 PROCEDURE — 3700000001 HC ADD 15 MINUTES (ANESTHESIA): Performed by: UROLOGY

## 2024-03-25 PROCEDURE — 6360000002 HC RX W HCPCS: Performed by: UROLOGY

## 2024-03-25 PROCEDURE — C1769 GUIDE WIRE: HCPCS | Performed by: UROLOGY

## 2024-03-25 PROCEDURE — 2709999900 HC NON-CHARGEABLE SUPPLY: Performed by: UROLOGY

## 2024-03-25 PROCEDURE — 7100000000 HC PACU RECOVERY - FIRST 15 MIN: Performed by: UROLOGY

## 2024-03-25 PROCEDURE — 7100000010 HC PHASE II RECOVERY - FIRST 15 MIN: Performed by: UROLOGY

## 2024-03-25 PROCEDURE — 2500000003 HC RX 250 WO HCPCS: Performed by: NURSE ANESTHETIST, CERTIFIED REGISTERED

## 2024-03-25 DEVICE — STENT URET 6FR L26CM POLYMER BLEND PH FREE COAT GRADUAL TAPR: Type: IMPLANTABLE DEVICE | Site: URETER | Status: FUNCTIONAL

## 2024-03-25 RX ORDER — PHENAZOPYRIDINE HYDROCHLORIDE 100 MG/1
100 TABLET, FILM COATED ORAL EVERY 6 HOURS PRN
Status: CANCELLED | OUTPATIENT
Start: 2024-03-25

## 2024-03-25 RX ORDER — IOPAMIDOL 408 MG/ML
INJECTION, SOLUTION INTRATHECAL PRN
Status: DISCONTINUED | OUTPATIENT
Start: 2024-03-25 | End: 2024-03-25 | Stop reason: ALTCHOICE

## 2024-03-25 RX ORDER — ONDANSETRON 2 MG/ML
INJECTION INTRAMUSCULAR; INTRAVENOUS PRN
Status: DISCONTINUED | OUTPATIENT
Start: 2024-03-25 | End: 2024-03-25 | Stop reason: SDUPTHER

## 2024-03-25 RX ORDER — SODIUM CHLORIDE 0.9 % (FLUSH) 0.9 %
5-40 SYRINGE (ML) INJECTION EVERY 12 HOURS SCHEDULED
Status: DISCONTINUED | OUTPATIENT
Start: 2024-03-25 | End: 2024-03-25 | Stop reason: HOSPADM

## 2024-03-25 RX ORDER — ONDANSETRON 2 MG/ML
4 INJECTION INTRAMUSCULAR; INTRAVENOUS
Status: DISCONTINUED | OUTPATIENT
Start: 2024-03-25 | End: 2024-03-25 | Stop reason: HOSPADM

## 2024-03-25 RX ORDER — FENTANYL CITRATE 0.05 MG/ML
25 INJECTION, SOLUTION INTRAMUSCULAR; INTRAVENOUS EVERY 5 MIN PRN
Status: DISCONTINUED | OUTPATIENT
Start: 2024-03-25 | End: 2024-03-25 | Stop reason: HOSPADM

## 2024-03-25 RX ORDER — METOCLOPRAMIDE HYDROCHLORIDE 5 MG/ML
10 INJECTION INTRAMUSCULAR; INTRAVENOUS
Status: DISCONTINUED | OUTPATIENT
Start: 2024-03-25 | End: 2024-03-25 | Stop reason: HOSPADM

## 2024-03-25 RX ORDER — FENTANYL CITRATE 50 UG/ML
INJECTION, SOLUTION INTRAMUSCULAR; INTRAVENOUS PRN
Status: DISCONTINUED | OUTPATIENT
Start: 2024-03-25 | End: 2024-03-25 | Stop reason: SDUPTHER

## 2024-03-25 RX ORDER — MIDAZOLAM HYDROCHLORIDE 1 MG/ML
INJECTION INTRAMUSCULAR; INTRAVENOUS PRN
Status: DISCONTINUED | OUTPATIENT
Start: 2024-03-25 | End: 2024-03-25 | Stop reason: SDUPTHER

## 2024-03-25 RX ORDER — LIDOCAINE HYDROCHLORIDE 10 MG/ML
INJECTION, SOLUTION EPIDURAL; INFILTRATION; INTRACAUDAL; PERINEURAL PRN
Status: DISCONTINUED | OUTPATIENT
Start: 2024-03-25 | End: 2024-03-25 | Stop reason: SDUPTHER

## 2024-03-25 RX ORDER — LIDOCAINE HYDROCHLORIDE 10 MG/ML
1 INJECTION, SOLUTION EPIDURAL; INFILTRATION; INTRACAUDAL; PERINEURAL
Status: DISCONTINUED | OUTPATIENT
Start: 2024-03-25 | End: 2024-03-25 | Stop reason: HOSPADM

## 2024-03-25 RX ORDER — EPHEDRINE SULFATE/0.9% NACL/PF 25 MG/5 ML
SYRINGE (ML) INTRAVENOUS PRN
Status: DISCONTINUED | OUTPATIENT
Start: 2024-03-25 | End: 2024-03-25 | Stop reason: SDUPTHER

## 2024-03-25 RX ORDER — PROPOFOL 10 MG/ML
INJECTION, EMULSION INTRAVENOUS PRN
Status: DISCONTINUED | OUTPATIENT
Start: 2024-03-25 | End: 2024-03-25 | Stop reason: SDUPTHER

## 2024-03-25 RX ORDER — ACETAMINOPHEN 325 MG/1
650 TABLET ORAL
Status: DISCONTINUED | OUTPATIENT
Start: 2024-03-25 | End: 2024-03-25 | Stop reason: HOSPADM

## 2024-03-25 RX ORDER — SODIUM CHLORIDE 9 MG/ML
INJECTION, SOLUTION INTRAVENOUS PRN
Status: DISCONTINUED | OUTPATIENT
Start: 2024-03-25 | End: 2024-03-25 | Stop reason: HOSPADM

## 2024-03-25 RX ORDER — DEXAMETHASONE SODIUM PHOSPHATE 4 MG/ML
INJECTION, SOLUTION INTRA-ARTICULAR; INTRALESIONAL; INTRAMUSCULAR; INTRAVENOUS; SOFT TISSUE PRN
Status: DISCONTINUED | OUTPATIENT
Start: 2024-03-25 | End: 2024-03-25 | Stop reason: SDUPTHER

## 2024-03-25 RX ORDER — MAGNESIUM HYDROXIDE 1200 MG/15ML
LIQUID ORAL PRN
Status: DISCONTINUED | OUTPATIENT
Start: 2024-03-25 | End: 2024-03-25 | Stop reason: ALTCHOICE

## 2024-03-25 RX ORDER — SODIUM CHLORIDE 9 MG/ML
INJECTION, SOLUTION INTRAVENOUS PRN
Status: CANCELLED | OUTPATIENT
Start: 2024-03-25

## 2024-03-25 RX ORDER — SODIUM CHLORIDE 0.9 % (FLUSH) 0.9 %
5-40 SYRINGE (ML) INJECTION PRN
Status: CANCELLED | OUTPATIENT
Start: 2024-03-25

## 2024-03-25 RX ORDER — NALOXONE HYDROCHLORIDE 0.4 MG/ML
INJECTION, SOLUTION INTRAMUSCULAR; INTRAVENOUS; SUBCUTANEOUS PRN
Status: DISCONTINUED | OUTPATIENT
Start: 2024-03-25 | End: 2024-03-25 | Stop reason: HOSPADM

## 2024-03-25 RX ORDER — SODIUM CHLORIDE 0.9 % (FLUSH) 0.9 %
5-40 SYRINGE (ML) INJECTION PRN
Status: DISCONTINUED | OUTPATIENT
Start: 2024-03-25 | End: 2024-03-25 | Stop reason: HOSPADM

## 2024-03-25 RX ORDER — LIDOCAINE HYDROCHLORIDE 20 MG/ML
JELLY TOPICAL PRN
Status: DISCONTINUED | OUTPATIENT
Start: 2024-03-25 | End: 2024-03-25 | Stop reason: ALTCHOICE

## 2024-03-25 RX ORDER — SODIUM CHLORIDE 0.9 % (FLUSH) 0.9 %
5-40 SYRINGE (ML) INJECTION EVERY 12 HOURS SCHEDULED
Status: CANCELLED | OUTPATIENT
Start: 2024-03-25

## 2024-03-25 RX ORDER — SODIUM CHLORIDE, SODIUM LACTATE, POTASSIUM CHLORIDE, CALCIUM CHLORIDE 600; 310; 30; 20 MG/100ML; MG/100ML; MG/100ML; MG/100ML
INJECTION, SOLUTION INTRAVENOUS CONTINUOUS
Status: DISCONTINUED | OUTPATIENT
Start: 2024-03-25 | End: 2024-03-25 | Stop reason: HOSPADM

## 2024-03-25 RX ORDER — OXYCODONE HYDROCHLORIDE 5 MG/1
5 TABLET ORAL
Status: DISCONTINUED | OUTPATIENT
Start: 2024-03-25 | End: 2024-03-25 | Stop reason: HOSPADM

## 2024-03-25 RX ADMIN — FENTANYL CITRATE 50 MCG: 50 INJECTION, SOLUTION INTRAMUSCULAR; INTRAVENOUS at 07:41

## 2024-03-25 RX ADMIN — PROPOFOL 200 MG: 10 INJECTION, EMULSION INTRAVENOUS at 07:36

## 2024-03-25 RX ADMIN — MIDAZOLAM HYDROCHLORIDE 2 MG: 1 INJECTION, SOLUTION INTRAMUSCULAR; INTRAVENOUS at 07:29

## 2024-03-25 RX ADMIN — PHENYLEPHRINE HYDROCHLORIDE 100 MCG: 10 INJECTION INTRAVENOUS at 08:25

## 2024-03-25 RX ADMIN — PHENYLEPHRINE HYDROCHLORIDE 100 MCG: 10 INJECTION INTRAVENOUS at 08:01

## 2024-03-25 RX ADMIN — EPHEDRINE SULFATE 10 MG: 5 INJECTION INTRAVENOUS at 07:49

## 2024-03-25 RX ADMIN — PHENYLEPHRINE HYDROCHLORIDE 200 MCG: 10 INJECTION INTRAVENOUS at 08:13

## 2024-03-25 RX ADMIN — PHENYLEPHRINE HYDROCHLORIDE 100 MCG: 10 INJECTION INTRAVENOUS at 08:18

## 2024-03-25 RX ADMIN — EPHEDRINE SULFATE 10 MG: 5 INJECTION INTRAVENOUS at 08:01

## 2024-03-25 RX ADMIN — PHENYLEPHRINE HYDROCHLORIDE 100 MCG: 10 INJECTION INTRAVENOUS at 08:37

## 2024-03-25 RX ADMIN — PHENYLEPHRINE HYDROCHLORIDE 100 MCG: 10 INJECTION INTRAVENOUS at 07:55

## 2024-03-25 RX ADMIN — PHENYLEPHRINE HYDROCHLORIDE 100 MCG: 10 INJECTION INTRAVENOUS at 08:31

## 2024-03-25 RX ADMIN — PHENYLEPHRINE HYDROCHLORIDE 100 MCG: 10 INJECTION INTRAVENOUS at 07:44

## 2024-03-25 RX ADMIN — SODIUM CHLORIDE, POTASSIUM CHLORIDE, SODIUM LACTATE AND CALCIUM CHLORIDE: 600; 310; 30; 20 INJECTION, SOLUTION INTRAVENOUS at 08:08

## 2024-03-25 RX ADMIN — DEXAMETHASONE SODIUM PHOSPHATE 4 MG: 4 INJECTION INTRA-ARTICULAR; INTRALESIONAL; INTRAMUSCULAR; INTRAVENOUS; SOFT TISSUE at 07:41

## 2024-03-25 RX ADMIN — PHENYLEPHRINE HYDROCHLORIDE 200 MCG: 10 INJECTION INTRAVENOUS at 07:48

## 2024-03-25 RX ADMIN — PHENYLEPHRINE HYDROCHLORIDE 200 MCG: 10 INJECTION INTRAVENOUS at 08:06

## 2024-03-25 RX ADMIN — ONDANSETRON 4 MG: 2 INJECTION INTRAMUSCULAR; INTRAVENOUS at 07:41

## 2024-03-25 RX ADMIN — SODIUM CHLORIDE, POTASSIUM CHLORIDE, SODIUM LACTATE AND CALCIUM CHLORIDE: 600; 310; 30; 20 INJECTION, SOLUTION INTRAVENOUS at 07:31

## 2024-03-25 RX ADMIN — SODIUM CHLORIDE, POTASSIUM CHLORIDE, SODIUM LACTATE AND CALCIUM CHLORIDE: 600; 310; 30; 20 INJECTION, SOLUTION INTRAVENOUS at 06:52

## 2024-03-25 RX ADMIN — LIDOCAINE HYDROCHLORIDE 50 MG: 10 INJECTION, SOLUTION EPIDURAL; INFILTRATION; INTRACAUDAL; PERINEURAL at 07:36

## 2024-03-25 RX ADMIN — EPHEDRINE SULFATE 5 MG: 5 INJECTION INTRAVENOUS at 07:55

## 2024-03-25 RX ADMIN — PHENYLEPHRINE HYDROCHLORIDE 100 MCG: 10 INJECTION INTRAVENOUS at 07:58

## 2024-03-25 RX ADMIN — CEFTRIAXONE SODIUM 1000 MG: 1 INJECTION, POWDER, FOR SOLUTION INTRAMUSCULAR; INTRAVENOUS at 07:31

## 2024-03-25 ASSESSMENT — PAIN SCALES - GENERAL
PAINLEVEL_OUTOF10: 0
PAINLEVEL_OUTOF10: 0
PAINLEVEL_OUTOF10: 7
PAINLEVEL_OUTOF10: 0
PAINLEVEL_OUTOF10: 0

## 2024-03-25 ASSESSMENT — PAIN DESCRIPTION - LOCATION: LOCATION: BACK

## 2024-03-25 ASSESSMENT — PAIN DESCRIPTION - PAIN TYPE: TYPE: CHRONIC PAIN

## 2024-03-25 NOTE — BRIEF OP NOTE
Brief Postoperative Note      Patient: Marquez Miguel  YOB: 1955  MRN: [id number]    Date of Procedure: 3/25/2024    Pre-Op Diagnosis Codes:     * Kidney stone [N20.0]    Post-Op Diagnosis: Same       Procedure(s):  LEFT EXTRACORPOREAL SHOCK WAVE LITHOTRIPSY,  CYSTOSCOPY, LEFT RETROGRADE PYELOGRAM, LEFT STENT CHANGE    Surgeon(s):  Marcin Borges MD Haas, Christopher A, MD    Anesthesia: General/Local    Estimated Blood Loss (mL): Minimal    Complications: None    Specimens: stent removed in toto    Implants: 6 fr x 26 cm stent placed      Drains: none    Findings: 1500 sw given at power of 1-8 with good fragmentation of lt RP stone. 500 sw given to proximal coil of stent and then 500 sw given to LP stones with good fragmentation. Bladder showed partially calcified Lt stent but was removed in toto and 6 Fr x 26 cm stent replaced. Good stone fragmentation confirmed by Lt retrograde.       Electronically signed by Marcin Borges MD on 3/25/2024 at 8:24 AM

## 2024-03-25 NOTE — DISCHARGE INSTRUCTIONS
D/C to home per pacu protocol. Drink plenty of liquids. No heavy activity. F/U 1 week in my office. No ASA, NSAIDs, Vits or herbals. Scripts for Ultram 50 mg po every 8 hrs prn, #10 and zofran 4 mg po every 8 hrs prn #10 writ

## 2024-03-25 NOTE — OP NOTE
Discussion with the patient's friend was held at the beginning and at the end of the case as per his request.  He was given 1 g of IV Rocephin preoperatively.    DESCRIPTION OF PROCEDURE:  The patient was brought to the operating room with informed consent signed and IV running.  He had sequential compression devices placed on his lower extremities prior to induction of general anesthesia.  After successful induction of general anesthesia, he had been placed in a supine position on the operating table.  The patient's left renal pelvic stone was identified in 2 fluoroscopic planes.  Shockwave lithotripsy commenced at a power level of 1, advancing to a power level of 8.  A total of 1500 shock waves were given to the stone with what appeared to be excellent stone fragmentation.  Shock rate was 60 shocks per minute.  There was a pause in the lithotripsy at 500 shocks for 2 to 3 minutes.  At this point, some shocks were given to the proximal coil of the stent to allow for its removal.  Given the possible calcified nature of it, about 500 shocks were given here and then another 500 shocks were given to the lower pole of the kidney, where there were some stones in this location as well with what appeared to be good stone fragmentation as well.  Thus, a total of 2500 shock waves were given.  The patient tolerated this portion well.  He was then placed in the dorsal lithotomy position.  He was prepped and draped sterilely.  2% lidocaine Uro-Jet was guided per urethra.  A 21-Gambian cystoscope was then guided atraumatically through the urethra into the bladder.  The prostate showed bilobar hypertrophy.  Upon entering the bladder, the stent was identified.  It was somewhat calcified at its end.  Using a grasping forceps, some of these calcifications were extracted from the stent, making it a more complex stent removal.  At this point, then the stent was removed in toto without much difficulty.  At that point, through the

## 2024-03-25 NOTE — ANESTHESIA POSTPROCEDURE EVALUATION
Department of Anesthesiology  Postprocedure Note    Patient: Marquez Miguel  MRN: [id number]  YOB: 1955  Date of evaluation: 3/25/2024    Procedure Summary       Date: 03/25/24 Room / Location: [facility]    Anesthesia Start: 0730 Anesthesia Stop: 0858    Procedures:       LEFT EXTRACORPOREAL SHOCK WAVE LITHOTRIPSY, (Left)      CYSTOSCOPY, LEFT RETROGRADE PYELOGRAM, LEFT STENT CHANGE (Left: Urethra) Diagnosis:       Kidney stone      (Kidney stone [N20.0])    Surgeons: Marcin Borges MD Responsible Provider: Chayo Mancuso MD    Anesthesia Type: general ASA Status: 3            Anesthesia Type: No value filed.    Oanh Phase I: Oanh Score: 10    Oanh Phase II:      Anesthesia Post Evaluation    Patient location during evaluation: bedside  Patient participation: complete - patient participated  Level of consciousness: awake  Airway patency: patent  Nausea & Vomiting: no nausea and no vomiting  Cardiovascular status: blood pressure returned to baseline  Respiratory status: acceptable  Hydration status: euvolemic  Pain management: adequate        No notable events documented.

## 2024-04-01 ENCOUNTER — TELEPHONE (OUTPATIENT)
Dept: FAMILY MEDICINE CLINIC | Age: 69
End: 2024-04-01

## 2024-04-01 ENCOUNTER — ENROLLMENT (OUTPATIENT)
Dept: PHARMACY | Facility: CLINIC | Age: 69
End: 2024-04-01

## 2024-04-01 NOTE — TELEPHONE ENCOUNTER
Pt calling asking for a letter to excuse him from jury duty. Said he is not able to walk any distance    Asking for the letter to be faxed [phone]    [phone]

## 2024-04-03 ENCOUNTER — PREP FOR PROCEDURE (OUTPATIENT)
Dept: UROLOGY | Age: 69
End: 2024-04-03

## 2024-04-03 ENCOUNTER — OFFICE VISIT (OUTPATIENT)
Dept: UROLOGY | Age: 69
End: 2024-04-03
Payer: MEDICARE

## 2024-04-03 ENCOUNTER — HOSPITAL ENCOUNTER (OUTPATIENT)
Dept: GENERAL RADIOLOGY | Age: 69
Discharge: HOME | End: 2024-04-05
Attending: UROLOGY
Payer: MEDICARE

## 2024-04-03 VITALS
HEIGHT: 68 IN | BODY MASS INDEX: 21.22 KG/M2 | SYSTOLIC BLOOD PRESSURE: 130 MMHG | OXYGEN SATURATION: 95 % | HEART RATE: 106 BPM | DIASTOLIC BLOOD PRESSURE: 84 MMHG | WEIGHT: 140 LBS

## 2024-04-03 DIAGNOSIS — R31.9 HEMATURIA, UNSPECIFIED TYPE: ICD-10-CM

## 2024-04-03 DIAGNOSIS — N20.0 KIDNEY STONE: ICD-10-CM

## 2024-04-03 DIAGNOSIS — N20.0 KIDNEY STONE: Primary | ICD-10-CM

## 2024-04-03 LAB
BILIRUBIN, POC: ABNORMAL
BLOOD URINE, POC: ABNORMAL
CLARITY, POC: CLEAR
COLOR, POC: YELLOW
GLUCOSE URINE, POC: ABNORMAL
KETONES, POC: ABNORMAL
LEUKOCYTE EST, POC: ABNORMAL
NITRITE, POC: ABNORMAL
PH, POC: 6.5
PROTEIN, POC: 30
SPECIFIC GRAVITY, POC: 1.02
UROBILINOGEN, POC: 0.2

## 2024-04-03 PROCEDURE — 99024 POSTOP FOLLOW-UP VISIT: CPT | Performed by: UROLOGY

## 2024-04-03 PROCEDURE — 81003 URINALYSIS AUTO W/O SCOPE: CPT | Performed by: UROLOGY

## 2024-04-03 PROCEDURE — 74018 RADEX ABDOMEN 1 VIEW: CPT

## 2024-04-03 RX ORDER — SODIUM CHLORIDE 9 MG/ML
INJECTION, SOLUTION INTRAVENOUS PRN
Status: CANCELLED | OUTPATIENT
Start: 2024-04-15

## 2024-04-03 RX ORDER — SODIUM CHLORIDE 0.9 % (FLUSH) 0.9 %
5-40 SYRINGE (ML) INJECTION PRN
Status: CANCELLED | OUTPATIENT
Start: 2024-04-15

## 2024-04-03 RX ORDER — SODIUM CHLORIDE 0.9 % (FLUSH) 0.9 %
5-40 SYRINGE (ML) INJECTION EVERY 12 HOURS SCHEDULED
Status: CANCELLED | OUTPATIENT
Start: 2024-04-15

## 2024-04-03 ASSESSMENT — ENCOUNTER SYMPTOMS: ABDOMINAL PAIN: 0

## 2024-04-03 NOTE — PROGRESS NOTES
Subjective:      Patient ID: Marquez Miguel is a 69 y.o. male    HPI  69 year old male with CBP, COPD, Depression, prior h/o ETOH abuse, ARNAUD, HTN, and back with h/o LT renal stone 8-9 mm s/p Lt stent placement on 9/11/23. He had been lost to follow-up for various reasons and not able to make planned recommended F/U due to problems with transportation in past. He is back after Lt ESWL and stent change on 3/25/24 and did well. He is not sure if he passed stone fragments and has some dysuria but no pain or hematuria. I reviewed the post-op KUB and there appears to be good treatment effect.         Past Medical History:   Diagnosis Date    Allergic rhinitis 02/19/2018    Anxiety     Bilateral carotid artery stenosis 05/10/2023    Chronic back pain     Colon polyp     COPD (chronic obstructive pulmonary disease) ([facility])     Depression     Disorder of stomach     valve not closing properly    Emphysema lung ([facility])     Essential hypertension 11/04/2019    ETOH abuse     SOBER X 20 YEARS    Gastroesophageal reflux disease 10/04/2019    Hydronephrosis of left kidney 08/23/2023    Hyperlipidemia     meds > 22 yrs    Low back pain 05/01/2018    ARNAUD (obstructive sleep apnea) 02/19/2018    Other emphysema ([facility]) 10/04/2019    Other intervertebral disc degeneration, lumbar region 03/23/2018    Radiculopathy, lumbar region 02/28/2018    Restless leg syndrome     Seasonal affective disorder ([facility]) 10/04/2019    Substance abuse ([facility])     alcholic, quit 20 yrs      Past Surgical History:   Procedure Laterality Date    BLADDER SURGERY Left 09/11/2023    CYSTOSCOPY RETROGRADE PYELOGRAM AND LEFT DOUBLE J STENT INSERTION performed by Marcin Borges MD at [facility] OR    COLONOSCOPY  12/22/2016    DALIA LAST MD    DEBRIDEMENT Left 3/25/2024    CYSTOSCOPY, LEFT RETROGRADE PYELOGRAM, LEFT STENT CHANGE performed by Marcin Borges MD at [facility] OR    DENTAL SURGERY  10 yrs ago    ENDOSCOPY, COLON, DIAGNOSTIC      EYE SURGERY      Lasik OU

## 2024-04-05 LAB — BACTERIA UR CULT: NORMAL

## 2024-04-09 DIAGNOSIS — M54.16 RADICULOPATHY, LUMBAR REGION: ICD-10-CM

## 2024-04-09 RX ORDER — GABAPENTIN 600 MG/1
TABLET ORAL
Qty: 90 TABLET | Refills: 0 | Status: SHIPPED | OUTPATIENT
Start: 2024-04-09 | End: 2024-05-27

## 2024-04-11 ENCOUNTER — OFFICE VISIT (OUTPATIENT)
Dept: FAMILY MEDICINE CLINIC | Age: 69
End: 2024-04-11
Payer: MEDICARE

## 2024-04-11 VITALS
DIASTOLIC BLOOD PRESSURE: 84 MMHG | TEMPERATURE: 97.7 F | HEART RATE: 69 BPM | OXYGEN SATURATION: 95 % | HEIGHT: 68 IN | BODY MASS INDEX: 19.64 KG/M2 | SYSTOLIC BLOOD PRESSURE: 126 MMHG | WEIGHT: 129.6 LBS

## 2024-04-11 DIAGNOSIS — M54.16 RADICULOPATHY, LUMBAR REGION: Primary | ICD-10-CM

## 2024-04-11 DIAGNOSIS — N20.1 LEFT URETERAL STONE: ICD-10-CM

## 2024-04-11 DIAGNOSIS — F41.9 ANXIETY: ICD-10-CM

## 2024-04-11 PROCEDURE — 3079F DIAST BP 80-89 MM HG: CPT | Performed by: NURSE PRACTITIONER

## 2024-04-11 PROCEDURE — 1123F ACP DISCUSS/DSCN MKR DOCD: CPT | Performed by: NURSE PRACTITIONER

## 2024-04-11 PROCEDURE — 99213 OFFICE O/P EST LOW 20 MIN: CPT | Performed by: NURSE PRACTITIONER

## 2024-04-11 PROCEDURE — 3074F SYST BP LT 130 MM HG: CPT | Performed by: NURSE PRACTITIONER

## 2024-04-11 RX ORDER — ROSUVASTATIN CALCIUM 40 MG/1
40 TABLET, COATED ORAL EVERY EVENING
COMMUNITY

## 2024-04-11 NOTE — PROGRESS NOTES
tablet     divalproex (DEPAKOTE SPRINKLE) 125 MG DR capsule     ferrous sulfate (IRON 325) 325 (65 Fe) MG tablet     folic acid (FOLVITE) 1 MG tablet     lidocaine (LIDODERM) 5 %     LORazepam (ATIVAN) 1 MG tablet     methocarbamol (ROBAXIN) 500 MG tablet     nicotine (NICODERM CQ) 21 MG/24HR     pantoprazole (PROTONIX) 40 MG tablet       Side effects, adverse effects of the medication prescribed today, as well as treatment plan/ rationale and result expectations have been discussed with the patient who expresses understanding and desires to proceed.    Close follow up to evaluate treatment results and for coordination of care.  I have reviewed the patient's medical history in detail and updated the computerized patient record.    As always, patient is advised that if symptoms worsen in any way they will proceed to the nearest emergency room.        Inés Layton, MANUELA - CNP

## 2024-04-15 ENCOUNTER — HOSPITAL ENCOUNTER (OUTPATIENT)
Age: 69
Setting detail: OUTPATIENT SURGERY
Discharge: HOME | End: 2024-04-15
Attending: UROLOGY | Admitting: UROLOGY
Payer: MEDICARE

## 2024-04-15 ENCOUNTER — ANESTHESIA EVENT (OUTPATIENT)
Dept: OPERATING ROOM | Age: 69
End: 2024-04-15
Payer: MEDICARE

## 2024-04-15 ENCOUNTER — ANESTHESIA (OUTPATIENT)
Dept: OPERATING ROOM | Age: 69
End: 2024-04-15
Payer: MEDICARE

## 2024-04-15 VITALS
OXYGEN SATURATION: 100 % | BODY MASS INDEX: 20.16 KG/M2 | DIASTOLIC BLOOD PRESSURE: 77 MMHG | HEART RATE: 88 BPM | HEIGHT: 68 IN | RESPIRATION RATE: 16 BRPM | SYSTOLIC BLOOD PRESSURE: 141 MMHG | TEMPERATURE: 97.4 F | WEIGHT: 133 LBS

## 2024-04-15 PROCEDURE — 2580000003 HC RX 258: Performed by: UROLOGY

## 2024-04-15 PROCEDURE — 7100000000 HC PACU RECOVERY - FIRST 15 MIN: Performed by: UROLOGY

## 2024-04-15 PROCEDURE — 3700000001 HC ADD 15 MINUTES (ANESTHESIA): Performed by: UROLOGY

## 2024-04-15 PROCEDURE — 3600000003 HC SURGERY LEVEL 3 BASE: Performed by: UROLOGY

## 2024-04-15 PROCEDURE — 6370000000 HC RX 637 (ALT 250 FOR IP): Performed by: UROLOGY

## 2024-04-15 PROCEDURE — 7100000011 HC PHASE II RECOVERY - ADDTL 15 MIN: Performed by: UROLOGY

## 2024-04-15 PROCEDURE — 6360000002 HC RX W HCPCS: Performed by: ANESTHESIOLOGY

## 2024-04-15 PROCEDURE — 2580000003 HC RX 258: Performed by: ANESTHESIOLOGY

## 2024-04-15 PROCEDURE — 3700000000 HC ANESTHESIA ATTENDED CARE: Performed by: UROLOGY

## 2024-04-15 PROCEDURE — 3600000013 HC SURGERY LEVEL 3 ADDTL 15MIN: Performed by: UROLOGY

## 2024-04-15 PROCEDURE — 7100000010 HC PHASE II RECOVERY - FIRST 15 MIN: Performed by: UROLOGY

## 2024-04-15 PROCEDURE — 2709999900 HC NON-CHARGEABLE SUPPLY: Performed by: UROLOGY

## 2024-04-15 PROCEDURE — 6360000002 HC RX W HCPCS: Performed by: UROLOGY

## 2024-04-15 PROCEDURE — 7100000001 HC PACU RECOVERY - ADDTL 15 MIN: Performed by: UROLOGY

## 2024-04-15 PROCEDURE — 52310 CYSTOSCOPY AND TREATMENT: CPT | Performed by: UROLOGY

## 2024-04-15 RX ORDER — SODIUM CHLORIDE 0.9 % (FLUSH) 0.9 %
5-40 SYRINGE (ML) INJECTION EVERY 12 HOURS SCHEDULED
Status: DISCONTINUED | OUTPATIENT
Start: 2024-04-15 | End: 2024-04-15 | Stop reason: HOSPADM

## 2024-04-15 RX ORDER — LIDOCAINE HYDROCHLORIDE 10 MG/ML
1 INJECTION, SOLUTION EPIDURAL; INFILTRATION; INTRACAUDAL; PERINEURAL
Status: DISCONTINUED | OUTPATIENT
Start: 2024-04-15 | End: 2024-04-15 | Stop reason: HOSPADM

## 2024-04-15 RX ORDER — DIPHENHYDRAMINE HYDROCHLORIDE 50 MG/ML
12.5 INJECTION INTRAMUSCULAR; INTRAVENOUS
Status: DISCONTINUED | OUTPATIENT
Start: 2024-04-15 | End: 2024-04-15 | Stop reason: HOSPADM

## 2024-04-15 RX ORDER — MEPERIDINE HYDROCHLORIDE 25 MG/ML
12.5 INJECTION INTRAMUSCULAR; INTRAVENOUS; SUBCUTANEOUS
Status: DISCONTINUED | OUTPATIENT
Start: 2024-04-15 | End: 2024-04-15 | Stop reason: HOSPADM

## 2024-04-15 RX ORDER — SODIUM CHLORIDE, SODIUM LACTATE, POTASSIUM CHLORIDE, CALCIUM CHLORIDE 600; 310; 30; 20 MG/100ML; MG/100ML; MG/100ML; MG/100ML
INJECTION, SOLUTION INTRAVENOUS CONTINUOUS
Status: DISCONTINUED | OUTPATIENT
Start: 2024-04-15 | End: 2024-04-15 | Stop reason: HOSPADM

## 2024-04-15 RX ORDER — ONDANSETRON 2 MG/ML
4 INJECTION INTRAMUSCULAR; INTRAVENOUS
Status: DISCONTINUED | OUTPATIENT
Start: 2024-04-15 | End: 2024-04-15 | Stop reason: HOSPADM

## 2024-04-15 RX ORDER — SODIUM CHLORIDE 9 MG/ML
INJECTION, SOLUTION INTRAVENOUS PRN
Status: DISCONTINUED | OUTPATIENT
Start: 2024-04-15 | End: 2024-04-15 | Stop reason: HOSPADM

## 2024-04-15 RX ORDER — OXYCODONE HYDROCHLORIDE 5 MG/1
5 TABLET ORAL
Status: DISCONTINUED | OUTPATIENT
Start: 2024-04-15 | End: 2024-04-15 | Stop reason: HOSPADM

## 2024-04-15 RX ORDER — SODIUM CHLORIDE 0.9 % (FLUSH) 0.9 %
5-40 SYRINGE (ML) INJECTION EVERY 12 HOURS SCHEDULED
Status: CANCELLED | OUTPATIENT
Start: 2024-04-15

## 2024-04-15 RX ORDER — SODIUM CHLORIDE 0.9 % (FLUSH) 0.9 %
5-40 SYRINGE (ML) INJECTION PRN
Status: DISCONTINUED | OUTPATIENT
Start: 2024-04-15 | End: 2024-04-15 | Stop reason: HOSPADM

## 2024-04-15 RX ORDER — PROPOFOL 10 MG/ML
INJECTION, EMULSION INTRAVENOUS CONTINUOUS PRN
Status: DISCONTINUED | OUTPATIENT
Start: 2024-04-15 | End: 2024-04-15 | Stop reason: SDUPTHER

## 2024-04-15 RX ORDER — NALOXONE HYDROCHLORIDE 0.4 MG/ML
INJECTION, SOLUTION INTRAMUSCULAR; INTRAVENOUS; SUBCUTANEOUS PRN
Status: DISCONTINUED | OUTPATIENT
Start: 2024-04-15 | End: 2024-04-15 | Stop reason: HOSPADM

## 2024-04-15 RX ORDER — SODIUM CHLORIDE 9 MG/ML
INJECTION, SOLUTION INTRAVENOUS PRN
Status: CANCELLED | OUTPATIENT
Start: 2024-04-15

## 2024-04-15 RX ORDER — SODIUM CHLORIDE 0.9 % (FLUSH) 0.9 %
5-40 SYRINGE (ML) INJECTION PRN
Status: CANCELLED | OUTPATIENT
Start: 2024-04-15

## 2024-04-15 RX ORDER — MAGNESIUM HYDROXIDE 1200 MG/15ML
LIQUID ORAL PRN
Status: DISCONTINUED | OUTPATIENT
Start: 2024-04-15 | End: 2024-04-15 | Stop reason: ALTCHOICE

## 2024-04-15 RX ORDER — LIDOCAINE HYDROCHLORIDE 20 MG/ML
JELLY TOPICAL PRN
Status: DISCONTINUED | OUTPATIENT
Start: 2024-04-15 | End: 2024-04-15 | Stop reason: HOSPADM

## 2024-04-15 RX ORDER — METOCLOPRAMIDE HYDROCHLORIDE 5 MG/ML
10 INJECTION INTRAMUSCULAR; INTRAVENOUS
Status: DISCONTINUED | OUTPATIENT
Start: 2024-04-15 | End: 2024-04-15 | Stop reason: HOSPADM

## 2024-04-15 RX ORDER — FENTANYL CITRATE 0.05 MG/ML
50 INJECTION, SOLUTION INTRAMUSCULAR; INTRAVENOUS EVERY 10 MIN PRN
Status: DISCONTINUED | OUTPATIENT
Start: 2024-04-15 | End: 2024-04-15 | Stop reason: HOSPADM

## 2024-04-15 RX ADMIN — CEFTRIAXONE SODIUM 1000 MG: 1 INJECTION, POWDER, FOR SOLUTION INTRAMUSCULAR; INTRAVENOUS at 09:27

## 2024-04-15 RX ADMIN — SODIUM CHLORIDE, POTASSIUM CHLORIDE, SODIUM LACTATE AND CALCIUM CHLORIDE: 600; 310; 30; 20 INJECTION, SOLUTION INTRAVENOUS at 08:29

## 2024-04-15 RX ADMIN — PROPOFOL 100 MCG/KG/MIN: 10 INJECTION, EMULSION INTRAVENOUS at 09:28

## 2024-04-15 ASSESSMENT — PAIN DESCRIPTION - ORIENTATION: ORIENTATION: LOWER

## 2024-04-15 ASSESSMENT — PAIN SCALES - GENERAL: PAINLEVEL_OUTOF10: 4

## 2024-04-15 ASSESSMENT — PAIN DESCRIPTION - LOCATION: LOCATION: BACK

## 2024-04-15 NOTE — BRIEF OP NOTE
Brief Postoperative Note      Patient: Marquez Miguel  YOB: 1955  MRN: [id number]    Date of Procedure: 4/15/2024    Ureteral stone with stent on Lt    Post-Op Diagnosis: Same       Procedure(s):  FLEXIBLE CYSTOSCOPY, LEFT DOUBLE-J STENT REMOVAL    Surgeon(s):  Marcin Borges MD    Anesthesia: Monitor Anesthesia Care/local    Estimated Blood Loss (mL): Minimal    Complications: None    Specimens: Lt stent removed in toto    Implants: none      Drains: none    Findings:  Infection Present At Time Of Surgery (PATOS) (choose all levels that have infection present):  No infection present  Other Findings: Lt stent removed in toto    Electronically signed by Marcin Borges MD on 4/15/2024 at 9:53 AM

## 2024-04-15 NOTE — ANESTHESIA POSTPROCEDURE EVALUATION
Department of Anesthesiology  Postprocedure Note    Patient: Marquez Miguel  MRN: [id number]  YOB: 1955  Date of evaluation: 4/15/2024    Procedure Summary       Date: 04/15/24 Room / Location: [address]    Anesthesia Start: 0926 Anesthesia Stop:     Procedure: FLEXIBLE CYSTOSCOPY, LEFT DOUBLE-J STENT REMOVAL (Left) Diagnosis:       Kidney stone      (Kidney stone [N20.0])    Surgeons: Marcin Borges MD Responsible Provider: Marquez Pinto MD    Anesthesia Type: MAC ASA Status: 3            Anesthesia Type: No value filed.    Oanh Phase I:      Oanh Phase II:      Anesthesia Post Evaluation    Patient location during evaluation: PACU  Patient participation: complete - patient participated  Level of consciousness: awake  Pain score: 0  Airway patency: patent  Nausea & Vomiting: no nausea  Cardiovascular status: blood pressure returned to baseline  Respiratory status: acceptable  Hydration status: euvolemic  Pain management: adequate    No notable events documented.

## 2024-04-15 NOTE — DISCHARGE INSTRUCTIONS
D/C to home per PACU protocol. Drink plenty of liquids. F/U as scheduled. Script for Uroxatral 10 mg daily, #90 with 3 refills written.

## 2024-04-15 NOTE — PROGRESS NOTES
Received from or into pacu on a cart accompanied by Dr Pinto, oral airway in place, O2 on at 8L mask, SAO2 100% see assessment.

## 2024-04-15 NOTE — OP NOTE
[facility]                   [address] [place] [address]                            OPERATIVE REPORT      PATIENT NAME: JEREMIAS LENTZ             : 1955  MED REC NO: [id number]                        ROOM: [facility]  ACCOUNT NO: [id number]                       ADMIT DATE: 04/15/2024  PROVIDER: Marcin Borges MD      DATE OF PROCEDURE:  04/15/2024    SURGEON:  Marcin Borges MD    PREOPERATIVE DIAGNOSIS:  Left proximal ureteral renal stone, status post lithotripsy and stent change on 2024, with apparent good stone fragmentation by postoperative KUB.    POSTOPERATIVE DIAGNOSIS:  Left proximal ureteral renal stone, status post lithotripsy and stent change on 2024, with apparent good stone fragmentation by postoperative KUB.    PROCEDURE:  Flexible cystoscopy with left double-J stent extraction.    ANESTHESIA:  MAC and 2% lidocaine local per urethra.    ESTIMATED BLOOD LOSS:  Minimal.    COMPLICATIONS:  None.    CONDITION:  Stable to recovery room.    BRIEF CLINICAL NOTE:  69-year-old male with COPD, depression, obstructive sleep apnea, and hypertension, who had a left renal pelvic proximal ureteral stone, 8-9 mm in size.  He underwent initial stent insertion for this in September, followed by lithotripsy and stent change in March. He was seen back in followup and appeared to have a good stone fragmentation by postoperative KUB. It was recommended he undergo cystoscopy and stent extraction.  He understood the risks and benefits of the planned procedure as outlined preoperatively and is willing to proceed as planned. He had negative urine culture prior to procedure.  A time-out was held at the beginning of the case. Family discussion was held at the beginning and end of the case.    DESCRIPTION OF PROCEDURE:  The patient was brought to the operating room with informed consent signed and IV running. He had sequential compression devices placed on

## 2024-04-15 NOTE — ANESTHESIA PRE PROCEDURE
Department of Anesthesiology  Preprocedure Note       Name:  Marquez Miguel   Age:  69 y.o.  :  1955                                          MRN:  [id number]         Date:  4/15/2024      Surgeon: Surgeon(s):  Marcin Borges MD    Procedure: Procedure(s):  FLEXIBLE CYSTOSCOPY, LEFT DOUBLE-J STENT REMOVAL / MAC / UPDATE PAT ON ADMISSION (LABS DONE 3-18-24)    Medications prior to admission:   Prior to Admission medications    Medication Sig Start Date End Date Taking? Authorizing Provider   rosuvastatin (CRESTOR) 40 MG tablet Take 1 tablet by mouth every evening    Ramón Bradley MD   gabapentin (NEURONTIN) 600 MG tablet take 1 tablet by mouth every morning and take 1 tablet by mouth MID DAY and take 2 tablets by mouth AT NIGHT 24  Inés Layton APRN - CNP   OXYGEN 2 L by Other route as needed for Other (2L via nasal cannula for COPD as needed for SP02 less than 94%)  Patient not taking: Reported on 3/7/2024    Ramón Bradley MD   FLUoxetine (PROZAC) 40 MG capsule take 1 capsule by mouth once daily 23   Inés Layton APRN - CNP   Handicap Placard MISC by Does not apply route Ex: 5 years 2028   Inés Layton APRN - CNP       Current medications:    Current Facility-Administered Medications   Medication Dose Route Frequency Provider Last Rate Last Admin   • lidocaine PF 1 % injection 1 mL  1 mL IntraDERmal Once PRN Marquez Pinto MD       • lactated ringers IV soln infusion   IntraVENous Continuous Marquez Pinto MD       • sodium chloride flush 0.9 % injection 5-40 mL  5-40 mL IntraVENous 2 times per day Marcin Borges MD       • sodium chloride flush 0.9 % injection 5-40 mL  5-40 mL IntraVENous PRN Marcin Borges MD       • 0.9 % sodium chloride infusion   IntraVENous PRN Marcin Borges MD       • cefTRIAXone (ROCEPHIN) 1,000 mg in sodium chloride 0.9 % 50 mL IVPB (mini-bag)  1,000 mg IntraVENous Q24H Katerina

## 2024-05-01 DIAGNOSIS — M54.16 RADICULOPATHY, LUMBAR REGION: ICD-10-CM

## 2024-05-01 RX ORDER — GABAPENTIN 600 MG/1
TABLET ORAL
Qty: 90 TABLET | Refills: 0 | Status: SHIPPED | OUTPATIENT
Start: 2024-05-01 | End: 2024-06-18

## 2024-05-01 NOTE — TELEPHONE ENCOUNTER
Comments: pt will be out by tomorrow       Last Office Visit (last PCP visit):   4/11/2024    Next Visit Date:  Future Appointments   Date Time Provider Department Center   5/6/2024  2:00 PM Thanh Bland MD [place] [facility]   7/10/2024  1:00 PM Leonardo Campbell PA [place] [facility]   7/11/2024 11:15 AM Inés Layton APRN - CNP [facility]   9/11/2024  1:45 PM Inés Layton APRN - CNP [facility]   3/12/2025  2:00 PM Inés Layton APRN - CNP [facility]       **If hasn't been seen in over a year OR hasn't followed up according to last diabetes/ADHD visit, make appointment for patient before sending refill to provider.    Rx requested:  Requested Prescriptions     Pending Prescriptions Disp Refills    gabapentin (NEURONTIN) 600 MG tablet 90 tablet 0     Sig: take 1 tablet by mouth every morning and take 1 tablet by mouth MID DAY and take 2 tablets by mouth AT NIGHT

## 2024-05-06 ENCOUNTER — TELEPHONE (OUTPATIENT)
Dept: NEUROSURGERY | Age: 69
End: 2024-05-06

## 2024-05-06 DIAGNOSIS — M48.062 SPINAL STENOSIS OF LUMBAR REGION WITH NEUROGENIC CLAUDICATION: Primary | ICD-10-CM

## 2024-05-06 NOTE — TELEPHONE ENCOUNTER
PT appointment was r/sed from today per our office. Patient says that his pain has gotten worse and he is having difficulty walking. He is asking if there is something Dr. Bland could prescribe until  he is seen next week? Patient is taking tylenol without relief.

## 2024-05-06 NOTE — TELEPHONE ENCOUNTER
provider will be out of the office due to surgery, message left for patient to call back to Reschedule.

## 2024-05-06 NOTE — TELEPHONE ENCOUNTER
Left message for the patient to please contact the office, Dr Bland placed an order for pain management. Please schedule with Dr Thurston this week if there are openings.

## 2024-05-14 ENCOUNTER — TELEPHONE (OUTPATIENT)
Dept: PAIN MANAGEMENT | Age: 69
End: 2024-05-14

## 2024-05-14 NOTE — TELEPHONE ENCOUNTER
Patient called in and stated that after he has surgery he does not have any help at home for ADLs and is asking if there is a way Dr. Bland can put in a referral or talk to someone about home healthcare or an aide to assist after surgery.

## 2024-05-22 ENCOUNTER — OFFICE VISIT (OUTPATIENT)
Dept: NEUROSURGERY | Age: 69
End: 2024-05-22
Payer: MEDICARE

## 2024-05-22 VITALS
HEIGHT: 68 IN | SYSTOLIC BLOOD PRESSURE: 136 MMHG | TEMPERATURE: 97.6 F | WEIGHT: 133 LBS | DIASTOLIC BLOOD PRESSURE: 72 MMHG | BODY MASS INDEX: 20.16 KG/M2

## 2024-05-22 DIAGNOSIS — M48.062 SPINAL STENOSIS OF LUMBAR REGION WITH NEUROGENIC CLAUDICATION: Primary | ICD-10-CM

## 2024-05-22 PROCEDURE — 3075F SYST BP GE 130 - 139MM HG: CPT | Performed by: NEUROLOGICAL SURGERY

## 2024-05-22 PROCEDURE — 1123F ACP DISCUSS/DSCN MKR DOCD: CPT | Performed by: NEUROLOGICAL SURGERY

## 2024-05-22 PROCEDURE — 99214 OFFICE O/P EST MOD 30 MIN: CPT | Performed by: NEUROLOGICAL SURGERY

## 2024-05-22 PROCEDURE — 3078F DIAST BP <80 MM HG: CPT | Performed by: NEUROLOGICAL SURGERY

## 2024-05-22 RX ORDER — ALFUZOSIN HYDROCHLORIDE 10 MG/1
10 TABLET, EXTENDED RELEASE ORAL DAILY
COMMUNITY
Start: 2024-04-15

## 2024-05-22 RX ORDER — SODIUM CHLORIDE 9 MG/ML
INJECTION, SOLUTION INTRAVENOUS CONTINUOUS
OUTPATIENT
Start: 2024-05-22

## 2024-05-22 RX ORDER — SODIUM CHLORIDE 9 MG/ML
INJECTION, SOLUTION INTRAVENOUS PRN
OUTPATIENT
Start: 2024-05-22

## 2024-05-22 RX ORDER — SODIUM CHLORIDE 0.9 % (FLUSH) 0.9 %
5-40 SYRINGE (ML) INJECTION EVERY 12 HOURS SCHEDULED
OUTPATIENT
Start: 2024-05-22

## 2024-05-22 RX ORDER — SODIUM CHLORIDE 0.9 % (FLUSH) 0.9 %
5-40 SYRINGE (ML) INJECTION PRN
OUTPATIENT
Start: 2024-05-22

## 2024-05-22 ASSESSMENT — ENCOUNTER SYMPTOMS
BACK PAIN: 1
ABDOMINAL DISTENTION: 0
COUGH: 0
TROUBLE SWALLOWING: 0
SHORTNESS OF BREATH: 0
ABDOMINAL PAIN: 0
EYE PAIN: 0

## 2024-05-22 NOTE — PROGRESS NOTES
Patient Name: Marquez Miguel : 1955        Date: 2024      Type of Appt: Follow up    Reason for appt: discuss surgery -L3, L4, L5 and S1 laminectomies, medial facetectomies and bilateral foraminotomies, L3-S1 pedicle screw fixation/posterolateral fusion, local morselized autograft, morselized allograft and Stealth with O-arm navigation     Pt last seen by Dr Bland on 2023    STUDIES: NO CURRENT  23- CT L/S WO Contrast @ [facility] [place]   3/31/23- Dexa Bone Density @ [facility] [place]   3/20/23- Xray L/S  @ [facility] [place]   1/15/23- MRI L/S @ [facility] [place]     Physical therapy: YES     Conservative Tx tried :  PAIN MANAGEMENT   Physical Therapy: Yes in the past but did not help  NSAID's: Yes  NEUROTIN  Narcotics: No  Muscle relaxants: No  Epidural injections: Yes 23 Lumbar Transforaminal Epidural Steroid Injection (TFESI)     Smoking: TOBACCO:   reports that he has been smoking cigars and cigarettes. He started smoking about 53 years ago. He has a 104.00 pack-year smoking history. He has never used smokeless tobacco.  ETOH:   reports no history of alcohol use.  RECREATIONAL DRUG USE:   Social History       REVIEW OF SYSTEMS:    Rash   Difficulty Urinating Nausea     Fever   Headaches  Bruising/Bleeding Easily     Hearing loss  Constipation  Sleep Disturbance    Shortness of breath Diarrhea  Neck Pain  Back Pain   
greater than right lower extremity pain to the feet as well as numbness and weakness.  His imaging reveals multilevel lumbar stenosis with worst levels being L3-S1 as well as anterolisthesis of L4 on L5, he has severe degenerative disc disease and facet degeneration.  I had extensive discussions with the patient and his friend regarding treatment options including continued conservative therapy and surgery.  I did discuss the risks, benefits and alternatives of L3, L4, L5 and S1 laminectomies, medial facetectomies and bilateral foraminotomies, L3-S1 pedicle screw fixation/posterolateral fusion, local morselized autograft, morselized allograft and Stealth with O-arm navigation and he wishes to proceed.  He does understand that he needs general medical clearance before proceeding and we again discussed smoking cessation and he understands that if he smokes after surgery he would have poor success.  He will work on smoking cessation.  Due to patient's history of alcohol abuse, he tells me he is unwilling to take any opioids or tramadol after surgery.  I will have him see Dr. Thurston from pain management for assistance with medication options for postoperative pain.  We will work on scheduling him for surgery as soon as possible.      1. Spinal stenosis of lumbar region with neurogenic claudication  - Ambulatory referral to Pain Medicine        Procedure: L3, L4, L5 and S1 laminectomies, medial facetectomies and bilateral foraminotomies, L3-S1 pedicle screw fixation/posterolateral fusion, local morselized autograft, morselized allograft and Stealth with O-arm navigation     Anesthesia: GETA     Time: 3.5 hrs     Patient Status: Admit After Surgery     Positioning/Frame: Prone on Abdi     Company/Implants: [employer]     Equipment: Aquamantis, Stealth and O-arm     Imaging Needed Prior to Surgery: None     Clearances Needed: Medical     Medications to Hold: None     CPT codes: 52688, 63048x3, 09135, 22614x2,

## 2024-05-23 ENCOUNTER — TELEPHONE (OUTPATIENT)
Dept: FAMILY MEDICINE CLINIC | Age: 69
End: 2024-05-23

## 2024-05-23 NOTE — TELEPHONE ENCOUNTER
Pt calling to see if provider can call [facility] office because they told patient that the provider needed to sign off so he can have surgery     Please call patient back once this is done please and thank you      Pt phone [phone]

## 2024-05-23 NOTE — TELEPHONE ENCOUNTER
Angiography of the  left femoral artery performed to evaluate for the placement of a closure device.  I will have to have a pre-surgical clearance visit with pt to clear him.

## 2024-05-28 ENCOUNTER — TELEPHONE (OUTPATIENT)
Dept: PAIN MANAGEMENT | Age: 69
End: 2024-05-28

## 2024-05-28 NOTE — TELEPHONE ENCOUNTER
Pt called and is wondering what steps he would have to do to have back injections again since he does not want to have surgery done by Dr. Bland

## 2024-05-29 DIAGNOSIS — M54.16 RADICULOPATHY, LUMBAR REGION: ICD-10-CM

## 2024-05-29 RX ORDER — GABAPENTIN 600 MG/1
TABLET ORAL
Qty: 90 TABLET | Refills: 0 | Status: SHIPPED | OUTPATIENT
Start: 2024-05-29 | End: 2024-07-16

## 2024-05-29 NOTE — TELEPHONE ENCOUNTER
I called and spoke with patient to advise per Dr. Tracey would like patient to see Np to discuss back injections. Pt scheduled for 6/6 with Laurie Goff cnp.

## 2024-05-29 NOTE — TELEPHONE ENCOUNTER
Pt called and wanted to know if Inés took him off of the gabapentin?    Please advise pt    [phone]

## 2024-05-30 ENCOUNTER — INITIAL CONSULT (OUTPATIENT)
Dept: PAIN MANAGEMENT | Age: 69
End: 2024-05-30
Payer: MEDICARE

## 2024-05-30 VITALS
WEIGHT: 132 LBS | TEMPERATURE: 97.8 F | BODY MASS INDEX: 20 KG/M2 | DIASTOLIC BLOOD PRESSURE: 76 MMHG | SYSTOLIC BLOOD PRESSURE: 122 MMHG | HEIGHT: 68 IN

## 2024-05-30 DIAGNOSIS — M46.1 SACROILIITIS (HCC): Primary | ICD-10-CM

## 2024-05-30 PROCEDURE — 99213 OFFICE O/P EST LOW 20 MIN: CPT | Performed by: PAIN MEDICINE

## 2024-05-30 PROCEDURE — 3078F DIAST BP <80 MM HG: CPT | Performed by: PAIN MEDICINE

## 2024-05-30 PROCEDURE — 3074F SYST BP LT 130 MM HG: CPT | Performed by: PAIN MEDICINE

## 2024-05-30 PROCEDURE — 1123F ACP DISCUSS/DSCN MKR DOCD: CPT | Performed by: PAIN MEDICINE

## 2024-05-30 ASSESSMENT — ENCOUNTER SYMPTOMS
ALLERGIC/IMMUNOLOGIC NEGATIVE: 1
GASTROINTESTINAL NEGATIVE: 1
RESPIRATORY NEGATIVE: 1
EYES NEGATIVE: 1

## 2024-05-30 NOTE — PROGRESS NOTES
Psychiatric/Behavioral: Negative.     All other systems reviewed and are negative.       Physical Exam     /76   Temp 97.8 °F (36.6 °C)   Ht 1.727 m (5' 8\")   Wt 59.9 kg (132 lb)   BMI 20.07 kg/m²   Physical Exam  Vitals and nursing note reviewed.   Constitutional:       Appearance: Normal appearance.   HENT:      Head: Normocephalic.      Right Ear: Ear canal normal.      Left Ear: Ear canal normal.      Nose: Nose normal.      Mouth/Throat:      Mouth: Mucous membranes are moist.   Eyes:      Extraocular Movements: Extraocular movements intact.      Conjunctiva/sclera: Conjunctivae normal.      Pupils: Pupils are equal, round, and reactive to light.   Cardiovascular:      Rate and Rhythm: Normal rate and regular rhythm.      Pulses: Normal pulses.      Heart sounds: Normal heart sounds.   Pulmonary:      Effort: Pulmonary effort is normal.      Breath sounds: Normal breath sounds.   Abdominal:      General: Abdomen is flat. Bowel sounds are normal.      Palpations: Abdomen is soft.   Musculoskeletal:         General: Normal range of motion.      Cervical back: Normal range of motion and neck supple.      Comments: Good gait able to walk on Toes and Heels. Good ROM Flexion and Extension, Tender Rt SI Joint, No pain on SLRs, Severe and Concordant pain on Gainslins, Pain on Pelvic compression and distractions. Power and reflexes intact both sides.   Skin:     General: Skin is warm.      Capillary Refill: Capillary refill takes less than 2 seconds.   Neurological:      General: No focal deficit present.      Mental Status: He is alert and oriented to person, place, and time. Mental status is at baseline.   Psychiatric:         Mood and Affect: Mood normal.         Behavior: Behavior normal.         Thought Content: Thought content normal.         Judgment: Judgment normal.           Plan     Plan SI Joint injections both Sides.

## 2024-06-17 ENCOUNTER — OFFICE VISIT (OUTPATIENT)
Dept: PAIN MANAGEMENT | Age: 69
End: 2024-06-17
Payer: MEDICARE

## 2024-06-17 VITALS
WEIGHT: 132 LBS | BODY MASS INDEX: 20 KG/M2 | SYSTOLIC BLOOD PRESSURE: 118 MMHG | HEIGHT: 68 IN | TEMPERATURE: 98.3 F | DIASTOLIC BLOOD PRESSURE: 68 MMHG

## 2024-06-17 DIAGNOSIS — M46.1 SACROILIITIS (HCC): Primary | ICD-10-CM

## 2024-06-17 PROCEDURE — 3078F DIAST BP <80 MM HG: CPT | Performed by: PAIN MEDICINE

## 2024-06-17 PROCEDURE — 3074F SYST BP LT 130 MM HG: CPT | Performed by: PAIN MEDICINE

## 2024-06-17 PROCEDURE — 99213 OFFICE O/P EST LOW 20 MIN: CPT | Performed by: PAIN MEDICINE

## 2024-06-17 PROCEDURE — 1123F ACP DISCUSS/DSCN MKR DOCD: CPT | Performed by: PAIN MEDICINE

## 2024-06-17 ASSESSMENT — ENCOUNTER SYMPTOMS
RESPIRATORY NEGATIVE: 1
GASTROINTESTINAL NEGATIVE: 1
EYES NEGATIVE: 1
ALLERGIC/IMMUNOLOGIC NEGATIVE: 1

## 2024-06-17 NOTE — PROGRESS NOTES
History of Present Illness     Patient Identification  Marquez Miguel is a 69 y.o. male.    Patient information was obtained from patient.      Chief Complaint   Chief Complaint   Patient presents with    Back Pain       Patient presents with complaint of back pain. This is a result of no known injury. Onset of pain was 10 years ago and has been unchanged since. The pain is located in diffuse lower back, described as dull and rated as moderate and severe, with radiation to Rt foot rates at a 7/10. Symptoms include weakness rt knee, numbness. The patient also Denied complains of fever, dysuria, weight loss, history of cancer, history of osteoporosis, history of steroid use. The patient denies incontinence, dysuria. The patient denies other injuries. Care prior to arrival consisted of ASA, NSAID, and acetaminophen and TFESIs  with moderate relief.  MRI  9/18/23: Stable severe spinal stenosis at L3-4 with stable prominent compression of both L4 nerve roots in the lateral recesses.   Stable moderate spinal stenosis at L4-5. Stable mild spinal stenosis at L2-3. Stable lateral foraminal zone disc bulging coming into contact with the exiting nerve roots at L3-4 on the left, L4-5 on the right, and bilaterally at L5-S1.    Past Medical History:   Diagnosis Date    Allergic rhinitis 02/19/2018    Anxiety     Bilateral carotid artery stenosis 05/10/2023    Chronic back pain     Colon polyp     COPD (chronic obstructive pulmonary disease) ([facility])     Depression     Disorder of stomach     valve not closing properly    Emphysema lung ([facility])     Essential hypertension 11/04/2019    ETOH abuse     SOBER X 20 YEARS    Gastroesophageal reflux disease 10/04/2019    Hydronephrosis of left kidney 08/23/2023    Hyperlipidemia     meds > 22 yrs    Low back pain 05/01/2018    ARNAUD (obstructive sleep apnea) 02/19/2018    Other emphysema ([facility]) 10/04/2019    Other intervertebral disc degeneration, lumbar region 03/23/2018    Radiculopathy,

## 2024-06-20 ENCOUNTER — TELEPHONE (OUTPATIENT)
Dept: PAIN MANAGEMENT | Age: 69
End: 2024-06-20

## 2024-06-20 NOTE — TELEPHONE ENCOUNTER
Pt got letter in the mail for what he thinks is the injection denial. States he received a injection in spring Please advice.

## 2024-06-20 NOTE — TELEPHONE ENCOUNTER
Patient understands SI joint injections were additional to surgery ordered. As of 6/18/24- request for surgery auth still pending from [employer]. Patient aware.

## 2024-06-20 NOTE — TELEPHONE ENCOUNTER
Patient called inquiring about back surgery with Dr. Thurston. He stated that Dr. Thurston told him he was a candidate for a back surgery involving fusing of the spine and he is not sure why injections are being considered at this time when it was a back surgery he was supposed to be getting. Patient is confused on what the plan is and is asking that someone give him a call to clear up his confusion.

## 2024-06-25 DIAGNOSIS — M54.16 RADICULOPATHY, LUMBAR REGION: ICD-10-CM

## 2024-06-25 NOTE — TELEPHONE ENCOUNTER
Comments:     Last Office Visit (last PCP visit):   4/11/2024    Next Visit Date:  Future Appointments   Date Time Provider Department Center   7/10/2024  1:00 PM Leonardo Campbell PA [place] [facility]   7/11/2024 11:15 AM Layton, Inés E, APRN - CNP [facility]   9/11/2024  1:45 PM Layton, Inés E, APRN - CNP [facility]   3/12/2025  2:00 PM Layton, Inés E, APRN - CNP [facility]       **If hasn't been seen in over a year OR hasn't followed up according to last diabetes/ADHD visit, make appointment for patient before sending refill to provider.    Rx requested:  Requested Prescriptions     Pending Prescriptions Disp Refills    gabapentin (NEURONTIN) 600 MG tablet 90 tablet 0     Sig: take 1 tablet by mouth every morning and take 1 tablet by mouth MID DAY and take 2 tablets by mouth AT NIGHT

## 2024-06-26 ENCOUNTER — TELEPHONE (OUTPATIENT)
Dept: FAMILY MEDICINE CLINIC | Age: 69
End: 2024-06-26

## 2024-06-26 ENCOUNTER — HOSPITAL ENCOUNTER (EMERGENCY)
Age: 69
Discharge: HOME | End: 2024-06-26
Payer: MEDICARE

## 2024-06-26 ENCOUNTER — HOSPITAL ENCOUNTER (EMERGENCY)
Age: 69
Discharge: HOME | End: 2024-06-26
Attending: EMERGENCY MEDICINE
Payer: MEDICARE

## 2024-06-26 VITALS
RESPIRATION RATE: 22 BRPM | DIASTOLIC BLOOD PRESSURE: 70 MMHG | HEIGHT: 68 IN | SYSTOLIC BLOOD PRESSURE: 112 MMHG | BODY MASS INDEX: 20.46 KG/M2 | OXYGEN SATURATION: 95 % | HEART RATE: 70 BPM | TEMPERATURE: 98.1 F | WEIGHT: 135 LBS

## 2024-06-26 VITALS
RESPIRATION RATE: 20 BRPM | OXYGEN SATURATION: 99 % | WEIGHT: 135 LBS | BODY MASS INDEX: 20.53 KG/M2 | SYSTOLIC BLOOD PRESSURE: 139 MMHG | HEART RATE: 77 BPM | DIASTOLIC BLOOD PRESSURE: 82 MMHG | TEMPERATURE: 98.3 F

## 2024-06-26 DIAGNOSIS — M54.50 CHRONIC BILATERAL LOW BACK PAIN WITHOUT SCIATICA: Primary | ICD-10-CM

## 2024-06-26 DIAGNOSIS — M54.50 ACUTE EXACERBATION OF CHRONIC LOW BACK PAIN: Primary | ICD-10-CM

## 2024-06-26 DIAGNOSIS — G89.29 CHRONIC BILATERAL LOW BACK PAIN WITHOUT SCIATICA: Primary | ICD-10-CM

## 2024-06-26 DIAGNOSIS — G89.29 ACUTE EXACERBATION OF CHRONIC LOW BACK PAIN: Primary | ICD-10-CM

## 2024-06-26 PROCEDURE — 6370000000 HC RX 637 (ALT 250 FOR IP)

## 2024-06-26 PROCEDURE — 99283 EMERGENCY DEPT VISIT LOW MDM: CPT

## 2024-06-26 PROCEDURE — 6360000002 HC RX W HCPCS: Performed by: PHYSICIAN ASSISTANT

## 2024-06-26 PROCEDURE — 96372 THER/PROPH/DIAG INJ SC/IM: CPT

## 2024-06-26 PROCEDURE — 99284 EMERGENCY DEPT VISIT MOD MDM: CPT

## 2024-06-26 RX ORDER — GABAPENTIN 600 MG/1
TABLET ORAL
Qty: 90 TABLET | Refills: 0 | Status: SHIPPED | OUTPATIENT
Start: 2024-06-26 | End: 2024-08-12

## 2024-06-26 RX ORDER — PREDNISONE 20 MG/1
40 TABLET ORAL DAILY
Qty: 10 TABLET | Refills: 0 | Status: SHIPPED | OUTPATIENT
Start: 2024-06-26 | End: 2024-07-01

## 2024-06-26 RX ORDER — CYCLOBENZAPRINE HCL 10 MG
10 TABLET ORAL 3 TIMES DAILY PRN
Qty: 21 TABLET | Refills: 0 | Status: SHIPPED | OUTPATIENT
Start: 2024-06-26 | End: 2024-07-03

## 2024-06-26 RX ORDER — TRAMADOL HYDROCHLORIDE 50 MG/1
50 TABLET ORAL ONCE
Status: COMPLETED | OUTPATIENT
Start: 2024-06-26 | End: 2024-06-26

## 2024-06-26 RX ORDER — KETOROLAC TROMETHAMINE 30 MG/ML
30 INJECTION, SOLUTION INTRAMUSCULAR; INTRAVENOUS ONCE
Status: COMPLETED | OUTPATIENT
Start: 2024-06-26 | End: 2024-06-26

## 2024-06-26 RX ADMIN — TRAMADOL HYDROCHLORIDE 50 MG: 50 TABLET ORAL at 18:33

## 2024-06-26 RX ADMIN — KETOROLAC TROMETHAMINE 30 MG: 30 INJECTION INTRAMUSCULAR; INTRAVENOUS at 06:50

## 2024-06-26 ASSESSMENT — ENCOUNTER SYMPTOMS
NAUSEA: 0
BACK PAIN: 1
COUGH: 0
ABDOMINAL PAIN: 0
DIARRHEA: 0
SHORTNESS OF BREATH: 0
SORE THROAT: 0
COLOR CHANGE: 0
VOMITING: 0
PHOTOPHOBIA: 0
BACK PAIN: 1
EYE DISCHARGE: 0
SHORTNESS OF BREATH: 0
RHINORRHEA: 0
ABDOMINAL DISTENTION: 0
ABDOMINAL PAIN: 0
CONSTIPATION: 0

## 2024-06-26 ASSESSMENT — PAIN DESCRIPTION - FREQUENCY: FREQUENCY: CONTINUOUS

## 2024-06-26 ASSESSMENT — PAIN - FUNCTIONAL ASSESSMENT
PAIN_FUNCTIONAL_ASSESSMENT: 0-10
PAIN_FUNCTIONAL_ASSESSMENT: 0-10

## 2024-06-26 ASSESSMENT — PAIN SCALES - GENERAL
PAINLEVEL_OUTOF10: 10
PAINLEVEL_OUTOF10: 10
PAINLEVEL_OUTOF10: 6

## 2024-06-26 ASSESSMENT — PAIN DESCRIPTION - ONSET: ONSET: ON-GOING

## 2024-06-26 ASSESSMENT — PAIN DESCRIPTION - LOCATION
LOCATION: BACK;LEG
LOCATION: BACK
LOCATION: BACK;LEG

## 2024-06-26 ASSESSMENT — PAIN DESCRIPTION - PAIN TYPE: TYPE: ACUTE PAIN

## 2024-06-26 ASSESSMENT — PAIN DESCRIPTION - ORIENTATION: ORIENTATION: LEFT

## 2024-06-26 NOTE — ED TRIAGE NOTES
The patient came back to the ED today for lower back pain along with right leg numbness/tingling. Pt states he filled his prescriptions and took them without any relief of pain. Pt is ambulatory with a limp.

## 2024-06-26 NOTE — TELEPHONE ENCOUNTER
Pt wants the provider to call the ER to admit him due to his toes feeling weird and having major pain but his concern is his toes so would like for you to call and have him admitted.

## 2024-06-26 NOTE — TELEPHONE ENCOUNTER
Pt came in said he was in the Er this morning is having back pain said he can't hardly walk I explained to him that his papers said there were scripts at the pharmacy to  I offered to schedule a ED follow up he declined said he is going back to the ER

## 2024-06-26 NOTE — ED NOTES
Patient stated that he is not leaving until he know the time of his pain management appointment for tomorrow. Informed patient that no doctor's offices are open at this time and he will have to call to schedule an appointment tomorrow. Verbalized understanding and ambulated out with cane.

## 2024-06-26 NOTE — ED PROVIDER NOTES
Physician  Supervising Physician Kim Kilgore, PA  06/26/24 1838       Kim Cazares, PA  06/26/24 1838

## 2024-06-26 NOTE — ED PROVIDER NOTES
[facility] ED  EMERGENCY DEPARTMENT ENCOUNTER      Pt Name: Marquez Miguel  MRN: [id number]  Birthdate 1955  Date of evaluation: 6/26/2024  Provider: Miguel Escalera PA-C  6:40 AM EDT    CHIEF COMPLAINT       Chief Complaint   Patient presents with    Back Pain     Lower back pain for the last few months          HISTORY OF PRESENT ILLNESS   (Location/Symptom, Timing/Onset, Context/Setting, Quality, Duration, Modifying Factors, Severity)  Note limiting factors.   Marquez Miguel is a 69 y.o. male who presents to the emergency department with complaint of low back pain which patient states been ongoing for over the last 8 months, patient is been seen by his regular family provider, as well as neurosurgery Dr. Bland, and Dr. Thurston of pain management.  He states that he is pending surgery on his back, states that he has been using gabapentin from his primary provider, as well as Tylenol without any significant relief, he denies any new or acute injury, no bowel or bladder dysfunction, no paresthesias, no changes in ambulatory status.  Patient requesting additional pain medications at this time.  Stating his current pain is an 8 out of 10.  Past medical history significant for substance abuse, sleep apnea, chronic low back pain, gastritis, emphysema, depression, anxiety, hypertension, COPD, alcohol abuse as per patient and chart review.    HPI    Nursing Notes were reviewed.    REVIEW OF SYSTEMS    (2-9 systems for level 4, 10 or more for level 5)     Review of Systems   Constitutional:  Negative for activity change and appetite change.   HENT:  Negative for congestion, ear discharge, ear pain, nosebleeds, rhinorrhea and sore throat.    Eyes:  Negative for discharge.   Respiratory:  Negative for shortness of breath.    Cardiovascular:  Negative for chest pain, palpitations and leg swelling.   Gastrointestinal:  Negative for abdominal distention, abdominal pain and constipation.   Genitourinary:

## 2024-06-27 ENCOUNTER — OFFICE VISIT (OUTPATIENT)
Dept: FAMILY MEDICINE CLINIC | Age: 69
End: 2024-06-27

## 2024-06-27 VITALS
BODY MASS INDEX: 20.22 KG/M2 | DIASTOLIC BLOOD PRESSURE: 56 MMHG | SYSTOLIC BLOOD PRESSURE: 122 MMHG | HEIGHT: 68 IN | TEMPERATURE: 99.1 F | HEART RATE: 62 BPM | WEIGHT: 133.4 LBS | OXYGEN SATURATION: 97 %

## 2024-06-27 DIAGNOSIS — M54.50 ACUTE EXACERBATION OF CHRONIC LOW BACK PAIN: ICD-10-CM

## 2024-06-27 DIAGNOSIS — M46.1 SACROILIITIS (HCC): Primary | ICD-10-CM

## 2024-06-27 DIAGNOSIS — G89.29 ACUTE EXACERBATION OF CHRONIC LOW BACK PAIN: ICD-10-CM

## 2024-06-27 DIAGNOSIS — M54.16 RADICULOPATHY, LUMBAR REGION: ICD-10-CM

## 2024-06-27 DIAGNOSIS — E78.5 HYPERLIPIDEMIA, UNSPECIFIED HYPERLIPIDEMIA TYPE: ICD-10-CM

## 2024-06-27 RX ORDER — ROSUVASTATIN CALCIUM 40 MG/1
40 TABLET, COATED ORAL EVERY EVENING
Qty: 30 TABLET | Refills: 5 | Status: SHIPPED | OUTPATIENT
Start: 2024-06-27

## 2024-06-27 RX ORDER — ACETAMINOPHEN 500 MG
500 TABLET ORAL EVERY 6 HOURS PRN
COMMUNITY

## 2024-06-27 RX ORDER — PANTOPRAZOLE SODIUM 40 MG/1
40 TABLET, DELAYED RELEASE ORAL DAILY
COMMUNITY
Start: 2024-06-07

## 2024-06-27 ASSESSMENT — ENCOUNTER SYMPTOMS
DIARRHEA: 0
BACK PAIN: 1
COUGH: 0
SHORTNESS OF BREATH: 0
CONSTIPATION: 0

## 2024-06-27 NOTE — PROGRESS NOTES
General: No focal deficit present.      Mental Status: He is alert and oriented to person, place, and time. Mental status is at baseline.   Psychiatric:         Mood and Affect: Mood normal.         Behavior: Behavior normal.         Thought Content: Thought content normal.         Judgment: Judgment normal.         Assessment & Plan     Diagnosis Orders   1. Sacroiliitis (HCC)        2. Hyperlipidemia, unspecified hyperlipidemia type  rosuvastatin (CRESTOR) 40 MG tablet      3. Radiculopathy, lumbar region        4. Acute exacerbation of chronic low back pain          Pt doing better today. Recommended fu with pain management tomorrow as previously scheduled.     No orders of the defined types were placed in this encounter.      Orders Placed This Encounter   Medications    rosuvastatin (CRESTOR) 40 MG tablet     Sig: Take 1 tablet by mouth every evening     Dispense:  30 tablet     Refill:  5       Medications Discontinued During This Encounter   Medication Reason    rosuvastatin (CRESTOR) 40 MG tablet REORDER      Side effects, adverse effects of the medication prescribed today, as well as treatment plan/ rationale and result expectations have been discussed with the patient who expresses understanding and desires to proceed.    Close follow up to evaluate treatment results and for coordination of care.  I have reviewed the patient's medical history in detail and updated the computerized patient record.    As always, patient is advised that if symptoms worsen in any way they will proceed to the nearest emergency room.     FU prn.       Inés Layton, MANUELA - CNP

## 2024-06-28 ENCOUNTER — OFFICE VISIT (OUTPATIENT)
Dept: PAIN MANAGEMENT | Age: 69
End: 2024-06-28
Payer: MEDICARE

## 2024-06-28 VITALS — HEIGHT: 68 IN | BODY MASS INDEX: 20.16 KG/M2 | WEIGHT: 133 LBS | TEMPERATURE: 97.2 F

## 2024-06-28 DIAGNOSIS — M46.1 SACROILIITIS (HCC): Primary | ICD-10-CM

## 2024-06-28 PROCEDURE — 99213 OFFICE O/P EST LOW 20 MIN: CPT | Performed by: PAIN MEDICINE

## 2024-06-28 PROCEDURE — 1123F ACP DISCUSS/DSCN MKR DOCD: CPT | Performed by: PAIN MEDICINE

## 2024-06-28 NOTE — PROGRESS NOTES
History of Present Illness     Patient Identification  Marquez Miguel is a 69 y.o. male.    Patient information was obtained from patient.      Chief Complaint   Chief Complaint   Patient presents with    Back Pain       Patient presents with complaint of back pain. This is a result of no known injury. Onset of pain was 10 years ago and has been unchanged since. The pain is located in diffuse lower back, described as dull and rated as moderate and severe, with radiation to Rt foot rates at a 7/10. Symptoms include weakness rt knee, numbness. The patient also Denied complains of fever, dysuria, weight loss, history of cancer, history of osteoporosis, history of steroid use. The patient denies incontinence, dysuria. The patient denies other injuries. Care prior to arrival consisted of ASA, NSAID, and acetaminophen and TFESIs  with moderate relief.  MRI  9/18/23: Stable severe spinal stenosis at L3-4 with stable prominent compression of both L4 nerve roots in the lateral recesses.   Stable moderate spinal stenosis at L4-5. Stable mild spinal stenosis at L2-3. Stable lateral foraminal zone disc bulging coming into contact with the exiting nerve roots at L3-4 on the left, L4-5 on the right, and bilaterally at L5-S1.    Past Medical History:   Diagnosis Date    Allergic rhinitis 02/19/2018    Anxiety     Bilateral carotid artery stenosis 05/10/2023    Chronic back pain     Colon polyp     COPD (chronic obstructive pulmonary disease) ([facility])     Depression     Disorder of stomach     valve not closing properly    Emphysema lung ([facility])     Essential hypertension 11/04/2019    ETOH abuse     SOBER X 20 YEARS    Gastroesophageal reflux disease 10/04/2019    Hydronephrosis of left kidney 08/23/2023    Hyperlipidemia     meds > 22 yrs    Low back pain 05/01/2018    ARNAUD (obstructive sleep apnea) 02/19/2018    Other emphysema ([facility]) 10/04/2019    Other intervertebral disc degeneration, lumbar region 03/23/2018    Radiculopathy,

## 2024-07-22 DIAGNOSIS — E78.5 HYPERLIPIDEMIA, UNSPECIFIED HYPERLIPIDEMIA TYPE: ICD-10-CM

## 2024-07-22 DIAGNOSIS — M54.16 RADICULOPATHY, LUMBAR REGION: ICD-10-CM

## 2024-07-22 RX ORDER — ROSUVASTATIN CALCIUM 40 MG/1
40 TABLET, COATED ORAL EVERY EVENING
Qty: 30 TABLET | Refills: 5 | Status: SHIPPED | OUTPATIENT
Start: 2024-07-22

## 2024-07-22 RX ORDER — GABAPENTIN 600 MG/1
TABLET ORAL
Qty: 90 TABLET | Refills: 0 | Status: SHIPPED | OUTPATIENT
Start: 2024-07-22 | End: 2024-09-07

## 2024-07-22 RX ORDER — ALFUZOSIN HYDROCHLORIDE 10 MG/1
10 TABLET, EXTENDED RELEASE ORAL DAILY
Qty: 30 TABLET | Refills: 5 | Status: SHIPPED | OUTPATIENT
Start: 2024-07-22

## 2024-07-22 RX ORDER — PANTOPRAZOLE SODIUM 40 MG/1
40 TABLET, DELAYED RELEASE ORAL DAILY
Qty: 30 TABLET | Refills: 5 | Status: SHIPPED | OUTPATIENT
Start: 2024-07-22

## 2024-08-12 DIAGNOSIS — F32.A DEPRESSION, UNSPECIFIED DEPRESSION TYPE: ICD-10-CM

## 2024-08-12 DIAGNOSIS — F41.9 ANXIETY: ICD-10-CM

## 2024-08-12 DIAGNOSIS — M54.16 RADICULOPATHY, LUMBAR REGION: ICD-10-CM

## 2024-08-12 RX ORDER — FLUOXETINE HYDROCHLORIDE 40 MG/1
40 CAPSULE ORAL DAILY
Qty: 90 CAPSULE | Refills: 1 | Status: SHIPPED | OUTPATIENT
Start: 2024-08-12

## 2024-08-12 RX ORDER — GABAPENTIN 600 MG/1
TABLET ORAL
Qty: 90 TABLET | Refills: 0 | Status: SHIPPED | OUTPATIENT
Start: 2024-08-12 | End: 2024-09-28

## 2024-08-12 NOTE — TELEPHONE ENCOUNTER
Comments: Please review, thanks    Last Office Visit (last PCP visit):   6/27/2024    Next Visit Date:  Future Appointments   Date Time Provider Department Center   8/20/2024  1:45 PM Marc Abel PA-C [facility]   9/11/2024  1:45 PM Inés Layton APRN - CNP [facility]   9/30/2024  1:45 PM Inés Layton APRN  CNP [facility]   3/12/2025  2:00 PM Inés Layton APRN [facility]       **If hasn't been seen in over a year OR hasn't followed up according to last diabetes/ADHD visit, make appointment for patient before sending refill to provider.    Rx requested:  Requested Prescriptions     Pending Prescriptions Disp Refills    gabapentin (NEURONTIN) 600 MG tablet 90 tablet 0     Sig: take 1 tablet by mouth every morning and take 1 tablet by mouth MID DAY and take 2 tablets by mouth AT NIGHT    FLUoxetine (PROZAC) 40 MG capsule 90 capsule 1     Sig: Take 1 capsule by mouth daily

## 2024-08-15 ENCOUNTER — HOSPITAL ENCOUNTER (EMERGENCY)
Age: 69
Discharge: HOME | End: 2024-08-16
Attending: EMERGENCY MEDICINE
Payer: MEDICARE

## 2024-08-15 VITALS
SYSTOLIC BLOOD PRESSURE: 101 MMHG | OXYGEN SATURATION: 94 % | BODY MASS INDEX: 21.98 KG/M2 | WEIGHT: 145 LBS | DIASTOLIC BLOOD PRESSURE: 76 MMHG | RESPIRATION RATE: 18 BRPM | HEIGHT: 68 IN | TEMPERATURE: 98 F | HEART RATE: 74 BPM

## 2024-08-15 DIAGNOSIS — M54.50 ACUTE EXACERBATION OF CHRONIC LOW BACK PAIN: Primary | ICD-10-CM

## 2024-08-15 DIAGNOSIS — G89.29 ACUTE EXACERBATION OF CHRONIC LOW BACK PAIN: Primary | ICD-10-CM

## 2024-08-15 PROCEDURE — 99284 EMERGENCY DEPT VISIT MOD MDM: CPT

## 2024-08-15 PROCEDURE — 6360000002 HC RX W HCPCS: Performed by: EMERGENCY MEDICINE

## 2024-08-15 PROCEDURE — 2500000003 HC RX 250 WO HCPCS: Performed by: EMERGENCY MEDICINE

## 2024-08-15 PROCEDURE — 96372 THER/PROPH/DIAG INJ SC/IM: CPT

## 2024-08-15 RX ORDER — ORPHENADRINE CITRATE 30 MG/ML
60 INJECTION INTRAMUSCULAR; INTRAVENOUS ONCE
Status: DISCONTINUED | OUTPATIENT
Start: 2024-08-15 | End: 2024-08-16 | Stop reason: HOSPADM

## 2024-08-15 RX ORDER — MORPHINE SULFATE 4 MG/ML
4 INJECTION, SOLUTION INTRAMUSCULAR; INTRAVENOUS ONCE
Status: DISCONTINUED | OUTPATIENT
Start: 2024-08-15 | End: 2024-08-15

## 2024-08-15 RX ORDER — KETOROLAC TROMETHAMINE 30 MG/ML
30 INJECTION, SOLUTION INTRAMUSCULAR; INTRAVENOUS ONCE
Status: COMPLETED | OUTPATIENT
Start: 2024-08-15 | End: 2024-08-15

## 2024-08-15 RX ORDER — HYDROMORPHONE HYDROCHLORIDE 1 MG/ML
1 INJECTION, SOLUTION INTRAMUSCULAR; INTRAVENOUS; SUBCUTANEOUS ONCE
Status: COMPLETED | OUTPATIENT
Start: 2024-08-15 | End: 2024-08-15

## 2024-08-15 RX ADMIN — KETOROLAC TROMETHAMINE 30 MG: 30 INJECTION, SOLUTION INTRAMUSCULAR; INTRAVENOUS at 22:30

## 2024-08-15 RX ADMIN — HYDROMORPHONE HYDROCHLORIDE 1 MG: 1 INJECTION, SOLUTION INTRAMUSCULAR; INTRAVENOUS; SUBCUTANEOUS at 23:03

## 2024-08-15 ASSESSMENT — ENCOUNTER SYMPTOMS
ABDOMINAL PAIN: 0
ABDOMINAL DISTENTION: 0
EYE DISCHARGE: 0
COUGH: 0
WHEEZING: 0
PHOTOPHOBIA: 0
CHEST TIGHTNESS: 0
SORE THROAT: 0
VOMITING: 0
SHORTNESS OF BREATH: 0

## 2024-08-15 ASSESSMENT — PAIN SCALES - GENERAL
PAINLEVEL_OUTOF10: 9
PAINLEVEL_OUTOF10: 9

## 2024-08-15 ASSESSMENT — PAIN - FUNCTIONAL ASSESSMENT: PAIN_FUNCTIONAL_ASSESSMENT: 0-10

## 2024-08-15 ASSESSMENT — PAIN DESCRIPTION - LOCATION
LOCATION: BACK
LOCATION: BACK

## 2024-08-15 ASSESSMENT — PAIN DESCRIPTION - ORIENTATION
ORIENTATION: RIGHT;LOWER
ORIENTATION: RIGHT;LOWER

## 2024-08-15 NOTE — ED TRIAGE NOTES
Pt here for lower right back pain. States he doesn't get injections in his back but can't tell me when his last one was or how often. Uses tylenol at home for pain. Last taken this afternoon

## 2024-08-16 ASSESSMENT — PAIN DESCRIPTION - LOCATION: LOCATION: BACK

## 2024-08-16 ASSESSMENT — PAIN SCALES - GENERAL: PAINLEVEL_OUTOF10: 4

## 2024-08-16 NOTE — ED PROVIDER NOTES
[facility] ED  eMERGENCY dEPARTMENT eNCOUnter      Pt Name: Marquez Miguel  MRN: [id number]  Birthdate 1955  Date of evaluation: 8/15/2024  Provider: Miguel Hodge MD    CHIEF COMPLAINT       Chief Complaint   Patient presents with    Back Pain         HISTORY OF PRESENT ILLNESS   (Location/Symptom, Timing/Onset,Context/Setting, Quality, Duration, Modifying Factors, Severity)  Note limiting factors.   Marquez Miguel is a 69 y.o. male who presents to the emergency department for evaluation of lower back pain.  Patient describes left-sided lower back pain that radiates to his left hip.  He has history of anxiety, chronic back pain, COPD, depression, emphysema, hypertension, alcohol abuse, narcotic abuse.  He comes in without any injury but exacerbation of his lower back pain that is chronic.  He receives injections he is not sure the last time he received injection but also follows with pain management meds who does the injections.  He is not a surgical candidate.  No numbness or paresthesias in the legs and no numbness to the groin region or incontinence.    HPI    NursingNotes were reviewed.    REVIEW OF SYSTEMS    (2-9 systems for level 4, 10 or more for level 5)     Review of Systems   Constitutional:  Negative for chills and diaphoresis.   HENT:  Negative for congestion, ear pain, mouth sores and sore throat.    Eyes:  Negative for photophobia and discharge.   Respiratory:  Negative for cough, chest tightness, shortness of breath and wheezing.    Cardiovascular:  Negative for chest pain and palpitations.   Gastrointestinal:  Negative for abdominal distention, abdominal pain and vomiting.   Endocrine: Negative for cold intolerance.   Genitourinary:  Negative for difficulty urinating.   Musculoskeletal:  Negative for arthralgias.   Skin:  Negative for pallor and rash.   Allergic/Immunologic: Negative for immunocompromised state.   Neurological:  Negative for dizziness and syncope. 
[meperidine], Morphine, and Sulfa antibiotics    FAMILY HISTORY       Family History   Problem Relation Age of Onset   • Cancer Mother         stomach   • Arthritis Mother    • Heart Disease Father 50   • Cancer Father         bone   • No Known Problems Sister    • Cancer Brother    • Heart Disease Brother         hole in heart in 1959 at 3 months age   • Cancer Maternal Grandmother         lung   • Cancer Paternal Grandmother    • Heart Disease Paternal Grandfather    • Colon Cancer Neg Hx           SOCIAL HISTORY       Social History     Socioeconomic History   • Marital status:      Spouse name: None   • Number of children: 2   • Years of education: 12   • Highest education level: High school graduate   Tobacco Use   • Smoking status: Every Day     Current packs/day: 2.00     Average packs/day: 2.0 packs/day for 54.6 years (109.2 ttl pk-yrs)     Types: Cigars, Cigarettes     Start date: 1970   • Smokeless tobacco: Never   • Tobacco comments:     6 cigars qd   Vaping Use   • Vaping status: Never Used   Substance and Sexual Activity   • Alcohol use: No     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   • Drug use: No   • Sexual activity: Not Currently     Partners: Female     Social Determinants of Health     Financial Resource Strain: Medium Risk (3/11/2024)    Overall Financial Resource Strain (CARDIA)    • Difficulty of Paying Living Expenses: Somewhat hard   Food Insecurity: No Food Insecurity (3/11/2024)    Hunger Vital Sign    • Worried About Running Out of Food in the Last Year: Never true    • Ran Out of Food in the Last Year: Never true   Transportation Needs: No Transportation Needs (3/11/2024)    PRAPARE - Transportation    • Lack of Transportation (Medical): No    • Lack of Transportation (Non-Medical): No   Physical Activity: Insufficiently Active (3/11/2024)    Exercise Vital Sign    • Days of Exercise per Week: 4 days    • Minutes of Exercise per Session: 20 min   Housing Stability: Low Risk  (3/11/2024)    
children: 2   • Years of education: 12   • Highest education level: High school graduate   Tobacco Use   • Smoking status: Every Day     Current packs/day: 2.00     Average packs/day: 2.0 packs/day for 54.6 years (109.2 ttl pk-yrs)     Types: Cigars, Cigarettes     Start date: 1970   • Smokeless tobacco: Never   • Tobacco comments:     6 cigars qd   Vaping Use   • Vaping status: Never Used   Substance and Sexual Activity   • Alcohol use: No     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   • Drug use: No   • Sexual activity: Not Currently     Partners: Female     Social Determinants of Health     Financial Resource Strain: Medium Risk (3/11/2024)    Overall Financial Resource Strain (CARDIA)    • Difficulty of Paying Living Expenses: Somewhat hard   Food Insecurity: No Food Insecurity (3/11/2024)    Hunger Vital Sign    • Worried About Running Out of Food in the Last Year: Never true    • Ran Out of Food in the Last Year: Never true   Transportation Needs: No Transportation Needs (3/11/2024)    PRAPARE - Transportation    • Lack of Transportation (Medical): No    • Lack of Transportation (Non-Medical): No   Physical Activity: Insufficiently Active (3/11/2024)    Exercise Vital Sign    • Days of Exercise per Week: 4 days    • Minutes of Exercise per Session: 20 min   Housing Stability: Low Risk  (3/11/2024)    Housing Stability Vital Sign    • Unable to Pay for Housing in the Last Year: No    • Number of Places Lived in the Last Year: 1    • Unstable Housing in the Last Year: No       SCREENINGS    Sarah Coma Scale  Eye Opening: Spontaneous  Best Verbal Response: Oriented  Best Motor Response: Obeys commands  Sarah Coma Scale Score: 15 @FLOW([id number])@      PHYSICAL EXAM    (up to 7 for level 4, 8 or more for level 5)     ED Triage Vitals [08/15/24 1930]   BP Systolic BP Percentile Diastolic BP Percentile Temp Temp src Pulse Respirations SpO2   (!) 152/86 -- -- 98 °F (36.7 °C) -- 99 17 95 %      Height Weight - Scale         1.727

## 2024-08-19 ENCOUNTER — TRANSCRIBE ORDERS (OUTPATIENT)
Dept: ADMINISTRATIVE | Age: 69
End: 2024-08-19

## 2024-08-19 DIAGNOSIS — M51.16 HERNIATION OF LUMBAR INTERVERTEBRAL DISC WITH RADICULOPATHY: Primary | ICD-10-CM

## 2024-08-23 ENCOUNTER — HOSPITAL ENCOUNTER (OUTPATIENT)
Dept: MRI IMAGING | Age: 69
End: 2024-08-23
Attending: FAMILY MEDICINE
Payer: MEDICARE

## 2024-08-23 DIAGNOSIS — M51.16 HERNIATION OF LUMBAR INTERVERTEBRAL DISC WITH RADICULOPATHY: ICD-10-CM

## 2024-08-23 PROCEDURE — 72148 MRI LUMBAR SPINE W/O DYE: CPT

## 2024-09-03 DIAGNOSIS — E78.5 HYPERLIPIDEMIA, UNSPECIFIED HYPERLIPIDEMIA TYPE: ICD-10-CM

## 2024-09-03 DIAGNOSIS — M54.16 RADICULOPATHY, LUMBAR REGION: ICD-10-CM

## 2024-09-03 DIAGNOSIS — F32.A DEPRESSION, UNSPECIFIED DEPRESSION TYPE: ICD-10-CM

## 2024-09-03 DIAGNOSIS — F41.9 ANXIETY: ICD-10-CM

## 2024-09-04 RX ORDER — ROSUVASTATIN CALCIUM 40 MG/1
40 TABLET, COATED ORAL EVERY EVENING
Qty: 90 TABLET | Refills: 1 | Status: SHIPPED | OUTPATIENT
Start: 2024-09-04

## 2024-09-04 RX ORDER — FLUOXETINE 40 MG/1
40 CAPSULE ORAL DAILY
Qty: 90 CAPSULE | Refills: 1 | Status: SHIPPED | OUTPATIENT
Start: 2024-09-04

## 2024-09-04 RX ORDER — GABAPENTIN 600 MG/1
TABLET ORAL
Qty: 90 TABLET | Refills: 0 | Status: SHIPPED | OUTPATIENT
Start: 2024-09-04 | End: 2024-10-20

## 2024-09-04 RX ORDER — PANTOPRAZOLE SODIUM 40 MG/1
40 TABLET, DELAYED RELEASE ORAL DAILY
Qty: 90 TABLET | Refills: 1 | Status: SHIPPED | OUTPATIENT
Start: 2024-09-04

## 2024-09-11 ENCOUNTER — OFFICE VISIT (OUTPATIENT)
Dept: FAMILY MEDICINE CLINIC | Age: 69
End: 2024-09-11

## 2024-09-11 VITALS
HEART RATE: 60 BPM | TEMPERATURE: 98 F | HEIGHT: 68 IN | DIASTOLIC BLOOD PRESSURE: 70 MMHG | BODY MASS INDEX: 19.13 KG/M2 | WEIGHT: 126.2 LBS | SYSTOLIC BLOOD PRESSURE: 100 MMHG

## 2024-09-11 DIAGNOSIS — E78.5 HYPERLIPIDEMIA, UNSPECIFIED HYPERLIPIDEMIA TYPE: ICD-10-CM

## 2024-09-11 DIAGNOSIS — F32.A DEPRESSION, UNSPECIFIED DEPRESSION TYPE: ICD-10-CM

## 2024-09-11 DIAGNOSIS — M46.1 SACROILIITIS (HCC): ICD-10-CM

## 2024-09-11 DIAGNOSIS — D64.9 ANEMIA, UNSPECIFIED TYPE: ICD-10-CM

## 2024-09-11 DIAGNOSIS — M54.16 RADICULOPATHY, LUMBAR REGION: Primary | ICD-10-CM

## 2024-09-11 LAB
ALBUMIN SERPL-MCNC: 4 G/DL (ref 3.5–4.6)
ALP SERPL-CCNC: 61 U/L (ref 35–104)
ALT SERPL-CCNC: 8 U/L (ref 0–41)
ANION GAP SERPL CALCULATED.3IONS-SCNC: 9 MEQ/L (ref 9–15)
AST SERPL-CCNC: 11 U/L (ref 0–40)
BILIRUB SERPL-MCNC: <0.2 MG/DL (ref 0.2–0.7)
BUN SERPL-MCNC: 16 MG/DL (ref 8–23)
CALCIUM SERPL-MCNC: 9.5 MG/DL (ref 8.5–9.9)
CHLORIDE SERPL-SCNC: 101 MEQ/L (ref 95–107)
CO2 SERPL-SCNC: 28 MEQ/L (ref 20–31)
CREAT SERPL-MCNC: 1.06 MG/DL (ref 0.7–1.2)
ERYTHROCYTE [DISTWIDTH] IN BLOOD BY AUTOMATED COUNT: 16.8 % (ref 11.5–14.5)
GLOBULIN SER CALC-MCNC: 2.6 G/DL (ref 2.3–3.5)
GLUCOSE SERPL-MCNC: 136 MG/DL (ref 70–99)
HCT VFR BLD AUTO: 41.6 % (ref 42–52)
HGB BLD-MCNC: 14.2 G/DL (ref 14–18)
MCH RBC QN AUTO: 32.6 PG (ref 27–31.3)
MCHC RBC AUTO-ENTMCNC: 34.1 % (ref 33–37)
MCV RBC AUTO: 95.4 FL (ref 79–92.2)
PLATELET # BLD AUTO: 317 K/UL (ref 130–400)
POTASSIUM SERPL-SCNC: 3.9 MEQ/L (ref 3.4–4.9)
PROT SERPL-MCNC: 6.6 G/DL (ref 6.3–8)
RBC # BLD AUTO: 4.36 M/UL (ref 4.7–6.1)
SODIUM SERPL-SCNC: 138 MEQ/L (ref 135–144)
WBC # BLD AUTO: 7.6 K/UL (ref 4.8–10.8)

## 2024-09-11 RX ORDER — MELOXICAM 7.5 MG/1
7.5 TABLET ORAL 2 TIMES DAILY
Qty: 60 TABLET | Refills: 2 | Status: SHIPPED | OUTPATIENT
Start: 2024-09-11

## 2024-09-11 RX ORDER — MELOXICAM 7.5 MG/1
7.5 TABLET ORAL DAILY
COMMUNITY
Start: 2024-08-08 | End: 2024-09-11

## 2024-09-11 ASSESSMENT — ENCOUNTER SYMPTOMS
BACK PAIN: 1
SHORTNESS OF BREATH: 0
COUGH: 0

## 2024-09-18 ENCOUNTER — OFFICE VISIT (OUTPATIENT)
Dept: FAMILY MEDICINE CLINIC | Age: 69
End: 2024-09-18

## 2024-09-18 VITALS
SYSTOLIC BLOOD PRESSURE: 106 MMHG | HEART RATE: 69 BPM | HEIGHT: 68 IN | OXYGEN SATURATION: 95 % | DIASTOLIC BLOOD PRESSURE: 82 MMHG | WEIGHT: 131 LBS | TEMPERATURE: 98 F | BODY MASS INDEX: 19.85 KG/M2

## 2024-09-18 DIAGNOSIS — H61.21 IMPACTED CERUMEN OF RIGHT EAR: Primary | ICD-10-CM

## 2024-09-18 ASSESSMENT — ENCOUNTER SYMPTOMS
SHORTNESS OF BREATH: 0
SINUS PRESSURE: 0
COUGH: 0
SINUS PAIN: 0

## 2024-09-20 ENCOUNTER — OFFICE VISIT (OUTPATIENT)
Age: 69
End: 2024-09-20
Payer: MEDICARE

## 2024-09-20 VITALS
BODY MASS INDEX: 20.56 KG/M2 | HEIGHT: 67 IN | WEIGHT: 131 LBS | DIASTOLIC BLOOD PRESSURE: 76 MMHG | SYSTOLIC BLOOD PRESSURE: 124 MMHG

## 2024-09-20 DIAGNOSIS — M70.62 GREATER TROCHANTERIC BURSITIS OF LEFT HIP: Primary | ICD-10-CM

## 2024-09-20 PROCEDURE — 20610 DRAIN/INJ JOINT/BURSA W/O US: CPT | Performed by: PAIN MEDICINE

## 2024-09-20 PROCEDURE — 99215 OFFICE O/P EST HI 40 MIN: CPT | Performed by: PAIN MEDICINE

## 2024-09-20 RX ORDER — TRIAMCINOLONE ACETONIDE 40 MG/ML
40 INJECTION, SUSPENSION INTRA-ARTICULAR; INTRAMUSCULAR ONCE
Status: SHIPPED | OUTPATIENT
Start: 2024-09-20

## 2024-09-20 RX ORDER — BUPIVACAINE HYDROCHLORIDE 5 MG/ML
30 INJECTION, SOLUTION EPIDURAL; INTRACAUDAL ONCE
Status: SHIPPED | OUTPATIENT
Start: 2024-09-20

## 2024-09-20 ASSESSMENT — ENCOUNTER SYMPTOMS
ALLERGIC/IMMUNOLOGIC NEGATIVE: 1
GASTROINTESTINAL NEGATIVE: 1
EYES NEGATIVE: 1

## 2024-10-05 ENCOUNTER — HOSPITAL ENCOUNTER (EMERGENCY)
Age: 69
Discharge: LEFT BEFORE DISCHARGE | End: 2024-10-05
Payer: MEDICARE

## 2024-10-05 ENCOUNTER — HOSPITAL ENCOUNTER (EMERGENCY)
Age: 69
Discharge: HOME | End: 2024-10-05
Payer: MEDICARE

## 2024-10-05 VITALS
TEMPERATURE: 97.9 F | OXYGEN SATURATION: 99 % | SYSTOLIC BLOOD PRESSURE: 118 MMHG | RESPIRATION RATE: 18 BRPM | DIASTOLIC BLOOD PRESSURE: 84 MMHG | HEART RATE: 81 BPM

## 2024-10-05 VITALS
TEMPERATURE: 97.7 F | WEIGHT: 126.2 LBS | BODY MASS INDEX: 19.13 KG/M2 | HEART RATE: 84 BPM | SYSTOLIC BLOOD PRESSURE: 125 MMHG | RESPIRATION RATE: 18 BRPM | DIASTOLIC BLOOD PRESSURE: 89 MMHG | HEIGHT: 68 IN | OXYGEN SATURATION: 97 %

## 2024-10-05 DIAGNOSIS — M54.50 ACUTE EXACERBATION OF CHRONIC LOW BACK PAIN: Primary | ICD-10-CM

## 2024-10-05 DIAGNOSIS — Z53.21 ELOPED FROM EMERGENCY DEPARTMENT: Primary | ICD-10-CM

## 2024-10-05 DIAGNOSIS — G89.29 ACUTE EXACERBATION OF CHRONIC LOW BACK PAIN: Primary | ICD-10-CM

## 2024-10-05 PROCEDURE — 6360000002 HC RX W HCPCS

## 2024-10-05 PROCEDURE — 99284 EMERGENCY DEPT VISIT MOD MDM: CPT

## 2024-10-05 PROCEDURE — 96372 THER/PROPH/DIAG INJ SC/IM: CPT

## 2024-10-05 PROCEDURE — 99281 EMR DPT VST MAYX REQ PHY/QHP: CPT

## 2024-10-05 RX ORDER — METHYLPREDNISOLONE 4 MG
TABLET, DOSE PACK ORAL
Qty: 1 KIT | Refills: 0 | Status: SHIPPED | OUTPATIENT
Start: 2024-10-05 | End: 2024-10-06 | Stop reason: SDDI

## 2024-10-05 RX ORDER — KETOROLAC TROMETHAMINE 30 MG/ML
30 INJECTION, SOLUTION INTRAMUSCULAR; INTRAVENOUS ONCE
Status: COMPLETED | OUTPATIENT
Start: 2024-10-05 | End: 2024-10-05

## 2024-10-05 RX ORDER — CYCLOBENZAPRINE HCL 10 MG
10 TABLET ORAL 3 TIMES DAILY PRN
Qty: 9 TABLET | Refills: 0 | Status: SHIPPED | OUTPATIENT
Start: 2024-10-05 | End: 2024-10-08

## 2024-10-05 RX ADMIN — KETOROLAC TROMETHAMINE 30 MG: 30 INJECTION, SOLUTION INTRAMUSCULAR at 12:28

## 2024-10-05 ASSESSMENT — PAIN - FUNCTIONAL ASSESSMENT
PAIN_FUNCTIONAL_ASSESSMENT: 0-10
PAIN_FUNCTIONAL_ASSESSMENT: 0-10

## 2024-10-05 ASSESSMENT — LIFESTYLE VARIABLES
HOW OFTEN DO YOU HAVE A DRINK CONTAINING ALCOHOL: NEVER
HOW OFTEN DO YOU HAVE A DRINK CONTAINING ALCOHOL: NEVER
HOW MANY STANDARD DRINKS CONTAINING ALCOHOL DO YOU HAVE ON A TYPICAL DAY: PATIENT DOES NOT DRINK
HOW MANY STANDARD DRINKS CONTAINING ALCOHOL DO YOU HAVE ON A TYPICAL DAY: PATIENT DOES NOT DRINK

## 2024-10-05 ASSESSMENT — ENCOUNTER SYMPTOMS
BACK PAIN: 1
BACK PAIN: 1

## 2024-10-05 ASSESSMENT — PAIN DESCRIPTION - DESCRIPTORS
DESCRIPTORS: ACHING
DESCRIPTORS: ACHING

## 2024-10-05 ASSESSMENT — PAIN DESCRIPTION - LOCATION
LOCATION: BACK

## 2024-10-05 ASSESSMENT — PAIN SCALES - GENERAL
PAINLEVEL_OUTOF10: 7

## 2024-10-05 ASSESSMENT — PAIN DESCRIPTION - PAIN TYPE: TYPE: CHRONIC PAIN

## 2024-10-05 NOTE — DISCHARGE INSTRUCTIONS
Take medications as directed.     Follow-up with PCP, pain management.     Return to ED if any new, or worsening symptoms.

## 2024-10-05 NOTE — ED PROVIDER NOTES
[facility] ED  EMERGENCY DEPARTMENT ENCOUNTER      Pt Name: Marquez Miguel  MRN: [id number]  Birthdate 1955  Date of evaluation: 10/5/2024  Provider: HERMILA Covarrubias  12:29 PM EDT    CHIEF COMPLAINT       Chief Complaint   Patient presents with    Back Pain     Pt c/o back pain         HISTORY OF PRESENT ILLNESS   (Location/Symptom, Timing/Onset, Context/Setting, Quality, Duration, Modifying Factors, Severity)  Note limiting factors.   Marquez Miguel is a 69 y.o. male whom per chart review has a PMHx of EtOH abuse, ARNAUD, chronic back pain, GERD, emphysema, depression, anxiety, hyperlipidemia, hypertension, COPD, RLS, nephrolithiasis, opioid abuse presents to ED for evaluation of back pain.  Patient reports history of chronic lower back pain and states that he has had a period of exacerbation over the last month.  Patient reports that he does follow with Dr. Zuñiga (pain management) and states that he was supposed to see him today for injections and states that the office was closed so he presented to the emergency department instead.  On chart review, patient did receive a bill of a cane injection, as well as Kenalog injection on 09/20/2024 per Dr. Thurston (pain management).  Patient reports that he is currently prescribed gabapentin and reports that he has been compliant with this medication and states that he has also been taking Tylenol and reports minimal improvement in symptoms.  Patient denies new injury, trauma to his back.  States that he is able to ambulate utilizing his cane without increased difficulty.  Patient was evaluated in the emergency department by myself approximately 1 hour ago and patient did elope from the ED, stating that he was going to go see Dr. Zuñiga.  Patient returned to the ED stating that again, Dr. Zuñiga's office was closed and he is now requesting a shot of Toradol for pain management.  Patient verbalizes no additional complaints.  Patient denies chest pain,  Transportation (Medical): No     Lack of Transportation (Non-Medical): No   Physical Activity: Insufficiently Active (3/11/2024)    Exercise Vital Sign     Days of Exercise per Week: 4 days     Minutes of Exercise per Session: 20 min   Housing Stability: Low Risk  (3/11/2024)    Housing Stability Vital Sign     Unable to Pay for Housing in the Last Year: No     Number of Places Lived in the Last Year: 1     Unstable Housing in the Last Year: No       SCREENINGS         Kenvir Coma Scale  Eye Opening: Spontaneous  Best Verbal Response: Oriented  Best Motor Response: Obeys commands  Sarah Coma Scale Score: 15                     CIWA Assessment  BP: 118/84  Pulse: 81                 PHYSICAL EXAM    (up to 7 for level 4, 8 or more for level 5)     ED Triage Vitals [10/05/24 1212]   BP Systolic BP Percentile Diastolic BP Percentile Temp Temp Source Pulse Respirations SpO2   118/84 -- -- 97.9 °F (36.6 °C) Oral 81 18 99 %      Height Weight         -- --             Physical Exam  Vitals and nursing note reviewed.   Constitutional:       General: He is not in acute distress.     Appearance: Normal appearance. He is normal weight. He is not ill-appearing or toxic-appearing.   HENT:      Head: Normocephalic and atraumatic.      Right Ear: External ear normal.      Left Ear: External ear normal.      Nose: Nose normal.      Mouth/Throat:      Mouth: Mucous membranes are moist.      Pharynx: Oropharynx is clear.   Eyes:      Extraocular Movements: Extraocular movements intact.      Pupils: Pupils are equal, round, and reactive to light.   Cardiovascular:      Rate and Rhythm: Normal rate and regular rhythm.      Pulses: Normal pulses.      Heart sounds: Normal heart sounds.   Pulmonary:      Effort: Pulmonary effort is normal.      Breath sounds: Normal breath sounds.   Abdominal:      General: Abdomen is flat.      Palpations: Abdomen is soft.   Musculoskeletal:         General: Tenderness present. Normal range of

## 2024-10-05 NOTE — ED PROVIDER NOTES
[facility] ED  EMERGENCY DEPARTMENT ENCOUNTER      Pt Name: Marquez Miguel  MRN: [id number]  Birthdate 1955  Date of evaluation: 10/5/2024  Provider: HERMILA Covarrubias  11:04 AM EDT    CHIEF COMPLAINT       Chief Complaint   Patient presents with    Back Pain         HISTORY OF PRESENT ILLNESS   (Location/Symptom, Timing/Onset, Context/Setting, Quality, Duration, Modifying Factors, Severity)  Note limiting factors.   Marquez Miguel is a 69 y.o. male whom per chart review has a PMHx of EtOH abuse, ARNAUD, chronic back pain, GERD, emphysema, depression, anxiety, hyperlipidemia, hypertension, COPD, RLS, nephrolithiasis, opioid abuse presents to ED for evaluation of back pain.  Patient reports history of chronic lower back pain and states that he has had a period of exacerbation over the last month.  Patient reports that he does follow with Dr. Zuñiga (pain management) and states that he was supposed to see him today for injections and states that the office was closed so he presented to the emergency department instead.  On chart review, patient did receive a bill of a cane injection, as well as Kenalog injection on 09/20/2024 per Dr. Thurston (pain management).  Patient reports that he is currently prescribed gabapentin and reports that he has been compliant with this medication and states that he has also been taking Tylenol and reports minimal improvement in symptoms.  Patient denies new injury, trauma to his back.  States that he is able to ambulate utilizing his cane without increased difficulty.  Patient verbalizes no additional complaints.  Patient denies chest pain, shortness of breath, N/V/D, fever, chills, hematuria, dysuria, urinary frequency/urgency, saddle anesthesias, incontinence of bowel or bladder, numbness/ting/weakness of BUE and BLE.    MRI lumbar spine completed on 08/23/2024 is remarkable for dextrocurvature of the lumbar spine.  Minimal L4 on L5 anterolisthesis.  Alignment

## 2024-10-05 NOTE — ED TRIAGE NOTES
Patient arrived to ER by private vehicle, drove self.   Patient c/o lower back pain x1 month.   Patient states h/o back pain and \"gets shots\".

## 2024-10-05 NOTE — CARE COORDINATION
Initial Contact Social Work Note - Ambulatory  2021      Date of referral: ***  Referral received from: ***  Reason for referral: ***    Previous  referral: {YES / FLANAGAN:31057}  If yes, brief summary of outcome: ***    Two Identifiers Verified: {YES / }    Insurance Provider: ***    [facility] System:  [facility]}    [place] Status: {SWCMVeteran:21499}    Community Providers: {NORMACommunityProvider:05176}    ADL Assistance Needed: {OJJKZMI:72179}    Housing/Living Concerns or Home Modification Needs: ***     Transportation Concern: ***    Medication Cost Concern: ***     Medication Adherence Concern: ***    Financial Concern(s): ***    Income (only if applicable): ***    Ability to Read/Write: {YES / KQ:95476}    Advance Care Plan:  {DUXBSIO:59666}    Other: ***    Identified Needs:   ***   ***   ***    Social Work Plan:   ***   ***   ***   Next Steps: ***      Goals Addressed    None            Initial Contact Social Work Note - Ambulatory  2021      Date of referral: 2021  Referral received from: Janette Apgar, ACM  Reason for referral: Medications and meals-on-wheels. Previous  referral: No  If yes, brief summary of outcome:     Two Identifiers Verified: Yes    Insurance Provider: [employer]    Support System:  Sibling(s)     Status: Not A [employer] Providers: None    ADL Assistance Needed: None required at this time. Housing/Living Concerns or Home Modification Needs: Owns the home where he lives. He has a home equity loan that he pays 68 dollars a month. Transportation Concern: None--own a truck    Medication Cost Concern: He stated that his medications are costing him five dollars a piece. He does not feel that he needs help with affording medications. He does not believe he is on the Social Security Extra Help Program.    Medication Adherence Concern: Patient stated he recently started using a pill box.  His sister helped him set up
Initial Contact Social Work Note - Ambulatory  6/16/2021      Date of referral: 6/16/2021  Referral received from: DAVIDE Freitas  Reason for referral: Medications and meals-on-wheels    Previous SW referral: No  If yes, brief summary of outcome:     Two Identifiers Verified: Yes    Insurance Provider: Luz Elena/Medicare    Support System:  Sibling(s)     Status: Not a [employer] Providers: None    ADL Assistance Needed: None    Housing/Living Concerns or Home Modification Needs: Patient lives in his own home which is paid off. He does have a home equity loan that he pays 68 dollars a month. Transportation Concern: Patient has his own truck and is able to drive. Medication Cost Concern: Patient claims his medications cost him 5 dollars a piece. He does not believe that he is on the Social Security Extra Help Program.     Medication Adherence Concern: Patient stated he recently got a pill box and sister helped him set up. Financial Concern(s): He denied having problems affording housing, utilities or food. Patient is not interested in meals-on-wheels. Income (only if applicable): 990/[employer] Security and 373/pension. 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 Ability to Read/Write: Yes    Advance Care Plan:  not discussed    Other: Discussed option of [employer] and their programs. Patient was not interested in this program.  Discussed meals-on-wheels and he does not feel ready for this program.  Identified Needs:   Ongoing assessment and information/referral to community resources as needed. No identified needs at time of initial assessment. Social Work Plan:   Ongoing assessment and information and referral to community resources as needed.  Next Steps: Ongoing assessment.       Goals Addressed    None
Patient Needs Assistance to Leave Residence...

## 2024-10-05 NOTE — ED TRIAGE NOTES
Pt c/o back pain. Pt states he was seen earlier and was ordered toradol and he declined. Pt states he changed his mind and now would like a shot of toradol. Pt calm, no distress noted.

## 2024-10-06 ENCOUNTER — HOSPITAL ENCOUNTER (EMERGENCY)
Age: 69
Discharge: HOME | End: 2024-10-06
Attending: EMERGENCY MEDICINE
Payer: MEDICARE

## 2024-10-06 VITALS
BODY MASS INDEX: 20 KG/M2 | HEIGHT: 68 IN | HEART RATE: 80 BPM | TEMPERATURE: 98.6 F | DIASTOLIC BLOOD PRESSURE: 77 MMHG | SYSTOLIC BLOOD PRESSURE: 108 MMHG | RESPIRATION RATE: 20 BRPM | OXYGEN SATURATION: 96 % | WEIGHT: 132 LBS

## 2024-10-06 DIAGNOSIS — M54.50 ACUTE EXACERBATION OF CHRONIC LOW BACK PAIN: Primary | ICD-10-CM

## 2024-10-06 DIAGNOSIS — G89.29 ACUTE EXACERBATION OF CHRONIC LOW BACK PAIN: Primary | ICD-10-CM

## 2024-10-06 PROCEDURE — 96372 THER/PROPH/DIAG INJ SC/IM: CPT

## 2024-10-06 PROCEDURE — 99284 EMERGENCY DEPT VISIT MOD MDM: CPT

## 2024-10-06 PROCEDURE — 6360000002 HC RX W HCPCS: Performed by: EMERGENCY MEDICINE

## 2024-10-06 RX ORDER — PREDNISONE 10 MG/1
40 TABLET ORAL NIGHTLY
Qty: 20 TABLET | Refills: 0 | Status: SHIPPED | OUTPATIENT
Start: 2024-10-06 | End: 2024-10-11

## 2024-10-06 RX ORDER — KETOROLAC TROMETHAMINE 30 MG/ML
30 INJECTION, SOLUTION INTRAMUSCULAR; INTRAVENOUS ONCE
Status: COMPLETED | OUTPATIENT
Start: 2024-10-06 | End: 2024-10-06

## 2024-10-06 RX ADMIN — KETOROLAC TROMETHAMINE 30 MG: 30 INJECTION, SOLUTION INTRAMUSCULAR at 09:46

## 2024-10-06 ASSESSMENT — PAIN - FUNCTIONAL ASSESSMENT: PAIN_FUNCTIONAL_ASSESSMENT: 0-10

## 2024-10-06 ASSESSMENT — PAIN SCALES - GENERAL: PAINLEVEL_OUTOF10: 10

## 2024-10-06 NOTE — DISCHARGE INSTRUCTIONS
You were evaluated in the Emergency Department today for your back pain. Your evaluation did not show signs of medical conditions requiring emergent intervention at this time.      Call to make an appointment with your pain doctor for your injections.    Please schedule an appointment for follow-up with your primary care physician this week for further evaluation of your symptoms.    Return to the Emergency Department if you experience worsening back pain, difficulty walking, fevers, numbness, tingling, incontinence, or any other concerning symptoms.  Thank you for choosing us for your care.

## 2024-10-06 NOTE — ED PROVIDER NOTES
[facility] ED  eMERGENCY dEPARTMENT eNCOUnter      Pt Name: Marquez Miguel  MRN: [id number]  Birthdate 1955  Date of evaluation: 10/6/2024  Provider: Thanh Terry MD        HISTORY OF PRESENT ILLNESS    Marquez Miguel is a 69 y.o. male with multiple comorbidities and chronic lower back pain being seen by physical medicine and rehab as well as pain doctor presenting with acute exacerbation of his chronic low back pain.  Seen here twice yesterday and was discharged with a Medrol Dosepak and Toradol.  He started taking the Medrol Dosepak this morning and has been taking his other medications as well.  Was supposed to see his pain doctor yesterday but the office was closed.  Usually gets injections by Dr. Tracey.  Denies any saddle anesthesia, urinary/bowel incontinence, trauma, falls, fever, chills, swelling to his lower back.  Reports that this is his typical back pain.    REVIEW OF SYSTEMS       Review of Systems   Musculoskeletal:  Positive for back pain.   Neurological:  Negative for numbness.        No saddle anesthesia, urinary/bowel incontinence       Except as noted above the remainder of the review of systems was reviewed and negative.       PAST MEDICAL HISTORY     Past Medical History:   Diagnosis Date    Allergic rhinitis 02/19/2018    Anxiety     Bilateral carotid artery stenosis 05/10/2023    Chronic back pain     Colon polyp     COPD (chronic obstructive pulmonary disease) ([facility])     Depression     Disorder of stomach     valve not closing properly    Emphysema lung ([facility])     Essential hypertension 11/04/2019    ETOH abuse     SOBER X 20 YEARS    Gastroesophageal reflux disease 10/04/2019    Hydronephrosis of left kidney 08/23/2023    Hyperlipidemia     meds > 22 yrs    Low back pain 05/01/2018    ARNAUD (obstructive sleep apnea) 02/19/2018    Other emphysema ([facility]) 10/04/2019    Other intervertebral disc degeneration, lumbar region 03/23/2018    Radiculopathy, lumbar region 02/28/2018

## 2024-10-06 NOTE — ED TRIAGE NOTES
Pt comes to er with c/o lower back pain. Pt was seen yesterday  and given medication but pt states  the pain is worse today.  Pt has chronic back pain and states that  Dr Tracey usually gives him injections into his back  
99.9

## 2024-10-07 ENCOUNTER — TELEPHONE (OUTPATIENT)
Age: 69
End: 2024-10-07

## 2024-10-07 DIAGNOSIS — M54.16 RADICULOPATHY, LUMBAR REGION: ICD-10-CM

## 2024-10-07 RX ORDER — GABAPENTIN 600 MG/1
TABLET ORAL
Qty: 90 TABLET | Refills: 0 | Status: SHIPPED | OUTPATIENT
Start: 2024-10-07 | End: 2024-11-22

## 2024-10-07 NOTE — TELEPHONE ENCOUNTER
Comments:      Last Office Visit (last PCP visit):   9/18/2024     Next Visit Date:    Future Appointments   Date Time Provider Department Center   3/12/2025  2:00 PM Inés Layton APRN - CNP [facility] ECC DEP        **If hasn't been seen in over a year OR hasn't followed up according to last diabetes/ADHD visit, make appointment for patient before sending refill to provider.     Rx requested:    Requested Prescriptions     Pending Prescriptions Disp Refills    gabapentin (NEURONTIN) 600 MG tablet 90 tablet 0     Sig: take 1 tablet by mouth every morning and take 1 tablet by mouth MID DAY and take 2 tablets by mouth AT NIGHT

## 2024-10-07 NOTE — TELEPHONE ENCOUNTER
Comments:     Last Office Visit (last PCP visit):   9/18/2024    Next Visit Date:  Future Appointments   Date Time Provider Department Center   3/12/2025  2:00 PM Inés Layton APRN - CNP [facility] ECC DEP       **If hasn't been seen in over a year OR hasn't followed up according to last diabetes/ADHD visit, make appointment for patient before sending refill to provider.    Rx requested:  Requested Prescriptions     Pending Prescriptions Disp Refills    gabapentin (NEURONTIN) 600 MG tablet 90 tablet 0     Sig: take 1 tablet by mouth every morning and take 1 tablet by mouth MID DAY and take 2 tablets by mouth AT NIGHT

## 2024-10-07 NOTE — TELEPHONE ENCOUNTER
PATIENT CALLED TO CHECK STATUS OF IS BACK INJECTION.  HE STATES HE NEVER HEARD ANYTHING ABOUT SCHEDULING.       PLEASE ADVISE

## 2024-10-08 ENCOUNTER — HOSPITAL ENCOUNTER (EMERGENCY)
Age: 69
Discharge: HOME | End: 2024-10-08
Payer: MEDICARE

## 2024-10-08 VITALS
OXYGEN SATURATION: 98 % | SYSTOLIC BLOOD PRESSURE: 125 MMHG | RESPIRATION RATE: 18 BRPM | TEMPERATURE: 97.6 F | HEART RATE: 80 BPM | DIASTOLIC BLOOD PRESSURE: 67 MMHG

## 2024-10-08 DIAGNOSIS — M54.50 ACUTE EXACERBATION OF CHRONIC LOW BACK PAIN: Primary | ICD-10-CM

## 2024-10-08 DIAGNOSIS — G89.29 ACUTE EXACERBATION OF CHRONIC LOW BACK PAIN: Primary | ICD-10-CM

## 2024-10-08 PROCEDURE — 6360000002 HC RX W HCPCS

## 2024-10-08 PROCEDURE — 96372 THER/PROPH/DIAG INJ SC/IM: CPT

## 2024-10-08 PROCEDURE — 99284 EMERGENCY DEPT VISIT MOD MDM: CPT

## 2024-10-08 RX ORDER — ORPHENADRINE CITRATE 30 MG/ML
60 INJECTION INTRAMUSCULAR; INTRAVENOUS ONCE
Status: COMPLETED | OUTPATIENT
Start: 2024-10-08 | End: 2024-10-08

## 2024-10-08 RX ADMIN — ORPHENADRINE CITRATE 60 MG: 30 INJECTION, SOLUTION INTRAMUSCULAR; INTRAVENOUS at 13:31

## 2024-10-08 ASSESSMENT — PAIN DESCRIPTION - ORIENTATION: ORIENTATION: MID;LOWER

## 2024-10-08 ASSESSMENT — PAIN - FUNCTIONAL ASSESSMENT
PAIN_FUNCTIONAL_ASSESSMENT: 0-10
PAIN_FUNCTIONAL_ASSESSMENT: NONE - DENIES PAIN

## 2024-10-08 ASSESSMENT — ENCOUNTER SYMPTOMS
SHORTNESS OF BREATH: 0
EYES NEGATIVE: 1
ALLERGIC/IMMUNOLOGIC NEGATIVE: 1
GASTROINTESTINAL NEGATIVE: 1
BACK PAIN: 1
ABDOMINAL PAIN: 0
RESPIRATORY NEGATIVE: 1

## 2024-10-08 ASSESSMENT — PAIN DESCRIPTION - PAIN TYPE: TYPE: CHRONIC PAIN

## 2024-10-08 ASSESSMENT — PAIN SCALES - GENERAL: PAINLEVEL_OUTOF10: 7

## 2024-10-08 ASSESSMENT — PAIN DESCRIPTION - DESCRIPTORS: DESCRIPTORS: ACHING

## 2024-10-08 NOTE — ED PROVIDER NOTES
[facility] ED  eMERGENCY dEPARTMENT eNCOUnter      Pt Name: Marquez Miguel  MRN: [id number]  Birthdate 1955  Date of evaluation: 10/8/2024  Provider: MANUELA Granado CNP      HISTORY OF PRESENT ILLNESS    Marquez Miguel is a 69 y.o. male who presents to the Emergency Department with several comorbidities.  Patient presents today with right and left sided lower back pain which is reported as chronic in nature.  Patient has been seen multiple times in the emergency department for the same complaint.  Patient stated he has completed a Medrol Dosepak and was given Flexeril for home with no relief from symptoms.  Patient reports he sees Dr. Zara Crawley for pain management but was unable to get in due to the office being closed.  Patient denies new injury, denies numbness tingling and weakness to bilateral lower extremities.  Denies incontinence of bladder and bowel, saddle anesthesia, no foot drop noted.  Patient is afebrile and denies fevers at home.  Patient denies dysuria.  Patient was self ambulatory to room with steady gait and cane assistance.  Patient denies chest pain denies abdominal pain denies shortness of breath.  Patient is hemodynamically stable nontoxic-appearing.        REVIEW OF SYSTEMS       Review of Systems   Constitutional:  Negative for fever.   HENT: Negative.     Eyes: Negative.    Respiratory: Negative.  Negative for shortness of breath.    Cardiovascular: Negative.  Negative for chest pain.   Gastrointestinal: Negative.  Negative for abdominal pain.   Endocrine: Negative.    Genitourinary: Negative.    Musculoskeletal:  Positive for arthralgias, back pain and myalgias. Negative for gait problem, joint swelling and neck pain.        Tenderness reported to right and left lumbar back   Skin: Negative.    Allergic/Immunologic: Negative.    Neurological: Negative.    Psychiatric/Behavioral: Negative.           PAST MEDICAL HISTORY     Past Medical History:   Diagnosis Date

## 2024-10-08 NOTE — ED NOTES
Pt medicated per order, genevieve. Well, pt sitting up in chair, uses cane to ambulate, a&ox4, skin w/d/pink.  
Pt stable, a&ox4, skin w/d/pink, 0 c/o, 0 distress, pt out from ed with steady gait using cane, 0 problems.  
English

## 2024-10-08 NOTE — ED TRIAGE NOTES
Pt came to er reports back pain, states never gets better seen here last week for same thing. Pt denies any recent injury denies loss of bowel and or bladder.

## 2024-10-15 NOTE — TELEPHONE ENCOUNTER
WE ARE ABLE TO RESUBMIT AUTH REQUEST HOWEVER, FOR AUTH TO BE CONSIDERED THE PATIENT NEEDS TO HAVE 3/5 POSITIVE PROVOCATIVE MANUEVERS DOCUMENTED.     AFTER REVIEWING THE SEPTEMBER VISIT, LULU DOCUMENTED THERE WAS NO PAIN ON ANY OF THE SI JOINT TESTING.    PATIENT WILL NEED TO COME IN FOR A FOLLOW UP TO GET NEW SI JOINT TESTING DONE

## 2024-10-15 NOTE — TELEPHONE ENCOUNTER
I Called and spoke with patient to advise. He is aware and will discuss with Dr. Zuñiga on his visit on 10/21.

## 2024-10-21 ENCOUNTER — OFFICE VISIT (OUTPATIENT)
Age: 69
End: 2024-10-21
Payer: MEDICARE

## 2024-10-21 VITALS
TEMPERATURE: 97.7 F | BODY MASS INDEX: 20.46 KG/M2 | WEIGHT: 135 LBS | SYSTOLIC BLOOD PRESSURE: 116 MMHG | DIASTOLIC BLOOD PRESSURE: 70 MMHG | HEIGHT: 68 IN

## 2024-10-21 DIAGNOSIS — M47.817 LUMBOSACRAL SPONDYLOSIS WITHOUT MYELOPATHY: ICD-10-CM

## 2024-10-21 DIAGNOSIS — M54.16 LUMBAR RADICULOPATHY: ICD-10-CM

## 2024-10-21 DIAGNOSIS — G89.4 CHRONIC PAIN SYNDROME: ICD-10-CM

## 2024-10-21 DIAGNOSIS — M46.1 SACROILIITIS (HCC): ICD-10-CM

## 2024-10-21 DIAGNOSIS — M70.62 GREATER TROCHANTERIC BURSITIS OF LEFT HIP: Primary | ICD-10-CM

## 2024-10-21 PROCEDURE — 3074F SYST BP LT 130 MM HG: CPT | Performed by: PAIN MEDICINE

## 2024-10-21 PROCEDURE — 99215 OFFICE O/P EST HI 40 MIN: CPT

## 2024-10-21 PROCEDURE — 99213 OFFICE O/P EST LOW 20 MIN: CPT | Performed by: PAIN MEDICINE

## 2024-10-21 PROCEDURE — 1123F ACP DISCUSS/DSCN MKR DOCD: CPT | Performed by: PAIN MEDICINE

## 2024-10-21 PROCEDURE — 3078F DIAST BP <80 MM HG: CPT | Performed by: PAIN MEDICINE

## 2024-10-21 ASSESSMENT — ENCOUNTER SYMPTOMS
EYES NEGATIVE: 1
RESPIRATORY NEGATIVE: 1
ALLERGIC/IMMUNOLOGIC NEGATIVE: 1
GASTROINTESTINAL NEGATIVE: 1

## 2024-10-21 NOTE — PROGRESS NOTES
Negative.     Eyes: Negative.    Respiratory: Negative.          COPD.   Cardiovascular: Negative.    Gastrointestinal: Negative.    Endocrine: Negative.    Genitourinary: Negative.    Musculoskeletal: Negative.    Skin: Negative.    Allergic/Immunologic: Negative.    Neurological: Negative.    Hematological: Negative.    Psychiatric/Behavioral: Negative.     All other systems reviewed and are negative.       Physical Exam     /70 (Site: Left Upper Arm)   Temp 97.7 °F (36.5 °C) (Temporal)   Ht 1.727 m (5' 8\")   Wt 61.2 kg (135 lb)   BMI 20.53 kg/m²   Physical Exam  Vitals and nursing note reviewed.   Constitutional:       Appearance: Normal appearance.   HENT:      Head: Normocephalic.      Right Ear: Ear canal normal.      Left Ear: Ear canal normal.      Nose: Nose normal.      Mouth/Throat:      Mouth: Mucous membranes are moist.   Eyes:      Extraocular Movements: Extraocular movements intact.      Conjunctiva/sclera: Conjunctivae normal.      Pupils: Pupils are equal, round, and reactive to light.   Cardiovascular:      Rate and Rhythm: Normal rate and regular rhythm.      Pulses: Normal pulses.      Heart sounds: Normal heart sounds.   Pulmonary:      Effort: Pulmonary effort is normal.      Breath sounds: Normal breath sounds.   Abdominal:      General: Abdomen is flat. Bowel sounds are normal.      Palpations: Abdomen is soft.   Musculoskeletal:         General: Normal range of motion.      Cervical back: Normal range of motion and neck supple.      Comments: Good gait able to walk on Toes and Heels. Good ROM Flexion and Extension, tender Rt SI Joint, No pain on SLRs, No Concordant pain on Gainslins, No Pain on Pelvic compression and distractions. Power and reflexes intact both sides. No pain on Hip grinding severe pain on Left greater trochanter.   Skin:     General: Skin is warm.      Capillary Refill: Capillary refill takes less than 2 seconds.   Neurological:      General: No focal deficit

## 2024-10-22 ENCOUNTER — TELEPHONE (OUTPATIENT)
Age: 69
End: 2024-10-22

## 2024-10-22 NOTE — TELEPHONE ENCOUNTER
LEFT L3-4 TFESI      AUTH APPROVED FROM 10/22/24-4/20/25  REFERRAL # [id number]  OK to schedule procedure approved as above.   Please note sides/levels approved and date range.   (If applicable, sides/levels approved may differ from those ordered)    TO BE SCHEDULED WITH

## 2024-10-22 NOTE — TELEPHONE ENCOUNTER
LMOM for a return call to schedule procedure with Dr. Thurston.  LEFT L3-4 TFESI     AUTH APPROVED FROM 10/22/24-4/20/25

## 2024-10-31 ENCOUNTER — PROCEDURE VISIT (OUTPATIENT)
Age: 69
End: 2024-10-31
Payer: MEDICARE

## 2024-10-31 VITALS
TEMPERATURE: 97.8 F | BODY MASS INDEX: 20.46 KG/M2 | SYSTOLIC BLOOD PRESSURE: 130 MMHG | HEART RATE: 80 BPM | DIASTOLIC BLOOD PRESSURE: 80 MMHG | OXYGEN SATURATION: 95 % | WEIGHT: 135 LBS | HEIGHT: 68 IN

## 2024-10-31 DIAGNOSIS — M54.16 LUMBAR RADICULOPATHY: Primary | ICD-10-CM

## 2024-10-31 RX ORDER — LIDOCAINE HYDROCHLORIDE 10 MG/ML
3 INJECTION, SOLUTION EPIDURAL; INFILTRATION; INTRACAUDAL; PERINEURAL ONCE
Status: COMPLETED | OUTPATIENT
Start: 2024-10-31 | End: 2024-10-31

## 2024-10-31 RX ORDER — BUPIVACAINE HYDROCHLORIDE 5 MG/ML
30 INJECTION, SOLUTION EPIDURAL; INTRACAUDAL ONCE
Status: COMPLETED | OUTPATIENT
Start: 2024-10-31 | End: 2024-10-31

## 2024-10-31 RX ORDER — TRIAMCINOLONE ACETONIDE 40 MG/ML
40 INJECTION, SUSPENSION INTRA-ARTICULAR; INTRAMUSCULAR ONCE
Status: COMPLETED | OUTPATIENT
Start: 2024-10-31 | End: 2024-10-31

## 2024-10-31 RX ADMIN — LIDOCAINE HYDROCHLORIDE 3 ML: 10 INJECTION, SOLUTION EPIDURAL; INFILTRATION; INTRACAUDAL; PERINEURAL at 10:17

## 2024-10-31 RX ADMIN — TRIAMCINOLONE ACETONIDE 40 MG: 40 INJECTION, SUSPENSION INTRA-ARTICULAR; INTRAMUSCULAR at 10:17

## 2024-10-31 RX ADMIN — BUPIVACAINE HYDROCHLORIDE 150 MG: 5 INJECTION, SOLUTION EPIDURAL; INTRACAUDAL at 10:17

## 2024-10-31 ASSESSMENT — ENCOUNTER SYMPTOMS
EYES NEGATIVE: 1
RESPIRATORY NEGATIVE: 1
GASTROINTESTINAL NEGATIVE: 1
ALLERGIC/IMMUNOLOGIC NEGATIVE: 1

## 2024-10-31 NOTE — PROGRESS NOTES
Eyes: Negative.    Respiratory: Negative.          COPD.   Cardiovascular: Negative.    Gastrointestinal: Negative.    Endocrine: Negative.    Genitourinary: Negative.    Musculoskeletal: Negative.    Skin: Negative.    Allergic/Immunologic: Negative.    Neurological: Negative.    Hematological: Negative.    Psychiatric/Behavioral: Negative.     All other systems reviewed and are negative.       Physical Exam     /76   Pulse 80   Temp 97.8 °F (36.6 °C)   Ht 1.727 m (5' 8\")   Wt 61.2 kg (135 lb)   SpO2 95%   BMI 20.53 kg/m²   Physical Exam  Vitals and nursing note reviewed.   Constitutional:       Appearance: Normal appearance.   HENT:      Head: Normocephalic.      Right Ear: Ear canal normal.      Left Ear: Ear canal normal.      Nose: Nose normal.      Mouth/Throat:      Mouth: Mucous membranes are moist.   Eyes:      Extraocular Movements: Extraocular movements intact.      Conjunctiva/sclera: Conjunctivae normal.      Pupils: Pupils are equal, round, and reactive to light.   Cardiovascular:      Rate and Rhythm: Normal rate and regular rhythm.      Pulses: Normal pulses.      Heart sounds: Normal heart sounds.   Pulmonary:      Effort: Pulmonary effort is normal.      Breath sounds: Normal breath sounds.   Abdominal:      General: Abdomen is flat. Bowel sounds are normal.      Palpations: Abdomen is soft.   Musculoskeletal:         General: Normal range of motion.      Cervical back: Normal range of motion and neck supple.      Comments: Good gait able to walk on Toes and Heels. Good ROM Flexion and Extension, tender Rt SI Joint, No pain on SLRs, No Concordant pain on Gainslins, No Pain on Pelvic compression and distractions. Power and reflexes intact both sides. No pain on Hip grinding severe pain on Left greater trochanter.   Skin:     General: Skin is warm.      Capillary Refill: Capillary refill takes less than 2 seconds.   Neurological:      General: No focal deficit present.      Mental

## 2024-11-04 DIAGNOSIS — M54.16 RADICULOPATHY, LUMBAR REGION: ICD-10-CM

## 2024-11-04 RX ORDER — GABAPENTIN 600 MG/1
TABLET ORAL
Qty: 90 TABLET | Refills: 0 | Status: SHIPPED | OUTPATIENT
Start: 2024-11-04 | End: 2024-12-20

## 2024-11-07 ENCOUNTER — OFFICE VISIT (OUTPATIENT)
Age: 69
End: 2024-11-07

## 2024-11-07 VITALS
SYSTOLIC BLOOD PRESSURE: 120 MMHG | BODY MASS INDEX: 20.92 KG/M2 | HEIGHT: 68 IN | WEIGHT: 138 LBS | TEMPERATURE: 97.5 F | DIASTOLIC BLOOD PRESSURE: 80 MMHG

## 2024-11-07 DIAGNOSIS — M47.817 LUMBOSACRAL SPONDYLOSIS WITHOUT MYELOPATHY: Primary | ICD-10-CM

## 2024-11-07 ASSESSMENT — ENCOUNTER SYMPTOMS
SHORTNESS OF BREATH: 0
BACK PAIN: 1
BLOOD IN STOOL: 0

## 2024-11-07 NOTE — PROGRESS NOTES
kg/m² Pain Score:   7      Physical Exam  Vitals and nursing note reviewed.           Pt is alert and oriented x 3.  Recent and remote memory is intact.  Mood, judgement and affect are normal.  No signs of distress or SOB noted.  Visualized skin intact.  Sensation intact to light touch. Decreased ROM with flexion and extension of low back.  Tender with palpation to bilateral lumbar spine with positive provacative maneuvers noted.  Negative SLR.    Strength, balance, and coordination are diminished but functional for ambulation. Using cane. Gait antalgic.        Assessment:      Diagnosis Orders   1. Lumbosacral spondylosis without myelopathy  DSTR NROLYTC AGNT PARVERTEB FCT ADDL LMBR/SACRAL    DSTR NROLYTC AGNT PARVERTEB FCT SNGL LMBR/SACRAL          Plan:          No orders of the defined types were placed in this encounter.      Orders Placed This Encounter   Procedures    DSTR NROLYTC AGNT PARVERTEB FCT ADDL LMBR/SACRAL     Standing Status:   Future     Standing Expiration Date:   2/5/2025     Scheduling Instructions:      BL L345 RFA with MK    DSTR NROLYTC AGNT PARVERTEB FCT SNGL LMBR/SACRAL     Standing Status:   Future     Standing Expiration Date:   2/5/2025     Discussed options with the patient today. Will repeat RFA. Gabapentin and meloxicam from PCP.   All questions were answered.  Pt verbalized understanding and agrees with above plan.     We will go ahead and order  bilateral L3, L4, L5 RF ablation under fluoroscopy to treat axial back pain under fluoro as pt has had >80% pain relief with diagnostic MBB's and improvement with past RF ablations under fluoroscopy for 6 months or greater. Other causes of pain such as tumor and fracture has been ruled out.  he has failed conservative treatment in the past. Anatomic model of pathology was shown. Risks and benefits of the procedure were discussed. All questions were answered and patient understands and agrees with the plan.     OARRS was reviewed.

## 2024-11-12 ENCOUNTER — TELEPHONE (OUTPATIENT)
Age: 69
End: 2024-11-12

## 2024-11-12 NOTE — TELEPHONE ENCOUNTER
RAMSES L345 RFA    AUTH APPROVED FROM 11/12/24-5/11/25  OK to schedule procedure approved as above.   Please note sides/levels approved and date range.   (If applicable, sides/levels approved may differ from those ordered)    TO BE SCHEDULED WITH

## 2024-11-20 ENCOUNTER — OFFICE VISIT (OUTPATIENT)
Age: 69
End: 2024-11-20

## 2024-11-20 VITALS
HEIGHT: 68 IN | DIASTOLIC BLOOD PRESSURE: 78 MMHG | BODY MASS INDEX: 20.92 KG/M2 | WEIGHT: 138 LBS | SYSTOLIC BLOOD PRESSURE: 110 MMHG | TEMPERATURE: 97.7 F

## 2024-11-20 DIAGNOSIS — M48.062 SPINAL STENOSIS OF LUMBAR REGION WITH NEUROGENIC CLAUDICATION: Primary | ICD-10-CM

## 2024-11-20 NOTE — PROGRESS NOTES
focal deficit present.      Mental Status: He is alert and oriented to person, place, and time. Mental status is at baseline.   Psychiatric:         Mood and Affect: Mood normal.         Behavior: Behavior normal.         Thought Content: Thought content normal.         Judgment: Judgment normal.           Plan   Was recommended L3, L4, L5 and S1 laminectomies, medial facetectomies and bilateral foraminotomies, L3-S1 pedicle screw fixation/posterolateral fusion, local morselized autograft, morselized allograft  by Dr Ge Pt wanted to see someone else, as he has \"difference of Opinion\"  and wanted to find another Spine surgeon 5/30/24.  Procedure:  Left L3-4 Transforaminal Epidural Steroid Injection Under Fluroscopic Guidance says it helps.

## 2024-11-22 ENCOUNTER — TELEPHONE (OUTPATIENT)
Age: 69
End: 2024-11-22

## 2024-11-22 NOTE — TELEPHONE ENCOUNTER
Pt was calling to see why Dr. Thurston is sending him to see Dr. Mcmullen in Neurosurgeon. Please Advise.

## 2024-11-24 ENCOUNTER — CLINICAL DOCUMENTATION (OUTPATIENT)
Dept: NEUROSURGERY | Age: 69
End: 2024-11-24

## 2024-11-27 ENCOUNTER — OFFICE VISIT (OUTPATIENT)
Age: 69
End: 2024-11-27
Payer: MEDICARE

## 2024-11-27 VITALS
OXYGEN SATURATION: 96 % | DIASTOLIC BLOOD PRESSURE: 80 MMHG | TEMPERATURE: 97.5 F | HEIGHT: 68 IN | SYSTOLIC BLOOD PRESSURE: 122 MMHG | HEART RATE: 87 BPM | BODY MASS INDEX: 22.28 KG/M2 | WEIGHT: 147 LBS

## 2024-11-27 DIAGNOSIS — M47.817 LUMBOSACRAL SPONDYLOSIS WITHOUT MYELOPATHY: Primary | ICD-10-CM

## 2024-11-27 PROCEDURE — 64636 DESTROY L/S FACET JNT ADDL: CPT | Performed by: PAIN MEDICINE

## 2024-11-27 PROCEDURE — 64635 DESTROY LUMB/SAC FACET JNT: CPT | Performed by: PAIN MEDICINE

## 2024-11-27 RX ORDER — LIDOCAINE HYDROCHLORIDE 10 MG/ML
2 INJECTION, SOLUTION EPIDURAL; INFILTRATION; INTRACAUDAL; PERINEURAL ONCE
Status: COMPLETED | OUTPATIENT
Start: 2024-11-27 | End: 2024-11-27

## 2024-11-27 RX ORDER — BUPIVACAINE HYDROCHLORIDE 5 MG/ML
0.5 INJECTION, SOLUTION EPIDURAL; INTRACAUDAL ONCE
Status: COMPLETED | OUTPATIENT
Start: 2024-11-27 | End: 2024-11-27

## 2024-11-27 RX ADMIN — BUPIVACAINE HYDROCHLORIDE 0.5 MG: 5 INJECTION, SOLUTION EPIDURAL; INTRACAUDAL at 15:14

## 2024-11-27 RX ADMIN — LIDOCAINE HYDROCHLORIDE 2 MG: 10 INJECTION, SOLUTION EPIDURAL; INFILTRATION; INTRACAUDAL; PERINEURAL at 15:14

## 2024-11-27 ASSESSMENT — PAIN DESCRIPTION - LOCATION
LOCATION: BACK
LOCATION: BACK

## 2024-11-27 ASSESSMENT — ENCOUNTER SYMPTOMS
ALLERGIC/IMMUNOLOGIC NEGATIVE: 1
EYES NEGATIVE: 1
GASTROINTESTINAL NEGATIVE: 1
RESPIRATORY NEGATIVE: 1

## 2024-11-27 ASSESSMENT — PAIN SCALES - GENERAL
PAINLEVEL_OUTOF10: 6
PAINLEVEL_OUTOF10: 6

## 2024-11-27 NOTE — PROGRESS NOTES
History of Present Illness     Patient Identification  Marquez Miguel is a 69 y.o. male.    Patient information was obtained from patient.      Chief Complaint   Chief Complaint   Patient presents with    Back Pain     Bilateral lumbar       Patient presents for Lumbar facet RFAs with complaint of back pain more left side with Good relief with TFESI in his leg. Had RFAs last year with good relief here for the same.  This is a result of no known injury. Onset of pain was 10 years ago and has been unchanged since. The pain is located in diffuse lower back, described as dull and rated as moderate and severe, Had radiation to Rt foot not lately rates at a 7/10. Symptoms include weakness rt knee comes and goes, numbness. The patient also Denied complains of fever, dysuria, weight loss, history of cancer, history of osteoporosis, history of steroid use. The patient denies incontinence, dysuria. The patient denies other injuries. Care prior to arrival consisted of ASA, NSAID, and acetaminophen and TFESIs  with moderate relief.  MRI  9/18/23: Stable severe spinal stenosis at L3-4 with stable prominent compression of both L4 nerve roots in the lateral recesses.   Stable moderate spinal stenosis at L4-5. Stable mild spinal stenosis at L2-3. Stable lateral foraminal zone disc bulging coming into contact with the exiting nerve roots at L3-4 on the left, L4-5 on the right, and bilaterally at L5-S1.    Past Medical History:   Diagnosis Date    Allergic rhinitis 02/19/2018    Anxiety     Bilateral carotid artery stenosis 05/10/2023    Chronic back pain     Colon polyp     COPD (chronic obstructive pulmonary disease) (HCC)     Depression     Disorder of stomach     valve not closing properly    Emphysema lung ([facility])     Essential hypertension 11/04/2019    ETOH abuse     SOBER X 20 YEARS    Gastroesophageal reflux disease 10/04/2019    Hydronephrosis of left kidney 08/23/2023    Hyperlipidemia     meds > 22 yrs    Low back pain

## 2024-11-28 DIAGNOSIS — M54.16 RADICULOPATHY, LUMBAR REGION: ICD-10-CM

## 2024-11-29 ENCOUNTER — TELEPHONE (OUTPATIENT)
Age: 69
End: 2024-11-29

## 2024-11-29 DIAGNOSIS — M54.16 RADICULOPATHY, LUMBAR REGION: ICD-10-CM

## 2024-11-29 RX ORDER — GABAPENTIN 600 MG/1
TABLET ORAL
Qty: 90 TABLET | Refills: 0 | OUTPATIENT
Start: 2024-11-29

## 2024-11-29 NOTE — TELEPHONE ENCOUNTER
LEFT PATIENT VOICEMAIL TO CALL OFFICE BACK. RETURNING HIS CALL THAT HE LEFT A VOICEMAIL REGARDING THE PAIN HE IS HAVING AFTER HIS RADIOFREQUENCY ABLATION. PLEASE INFORM PATIENT THAT IT CAN TAKE A FEW WEEKS FOR THE NERVES TO FULLY BURN AND PROVIDE RELIEF. IF PATIENT IS STILL CONCERNED AFTER A FEW WEEKS PLEASE MAKE A SOONER APPOINTMENT WITH DR. LEUNG TO DISCUSS IN PERSON.

## 2024-11-29 NOTE — TELEPHONE ENCOUNTER
Comments:     Last Office Visit (last PCP visit):   9/18/2024    Next Visit Date:  Future Appointments   Date Time Provider Department Center   12/5/2024 11:00 AM Sandi Mcmullen MD [facility]   3/12/2025  2:00 PM Inés Layton, APRN - CNP [facility] ECC DEP       **If hasn't been seen in over a year OR hasn't followed up according to last diabetes/ADHD visit, make appointment for patient before sending refill to provider.    Rx requested:  Requested Prescriptions     Pending Prescriptions Disp Refills    gabapentin (NEURONTIN) 600 MG tablet 90 tablet 0     Sig: take 1 tablet by mouth every morning and take 1 tablet by mouth MID DAY and take 2 tablets by mouth AT NIGHT

## 2024-12-01 RX ORDER — GABAPENTIN 600 MG/1
TABLET ORAL
Qty: 90 TABLET | Refills: 0 | Status: SHIPPED | OUTPATIENT
Start: 2024-12-01 | End: 2025-01-14

## 2024-12-24 NOTE — TELEPHONE ENCOUNTER
Comments:     Patient out! Please fill ASAP.    Last Office Visit (last PCP visit):   9/18/2024    Next Visit Date:  Future Appointments   Date Time Provider Department Center   3/12/2025  2:00 PM Inés Layton APRN - CNP [facility] ECC DEP       **If hasn't been seen in over a year OR hasn't followed up according to last diabetes/ADHD visit, make appointment for patient before sending refill to provider.    Rx requested:  Requested Prescriptions     Pending Prescriptions Disp Refills    cyclobenzaprine (FLEXERIL) 10 MG tablet 21 tablet 0     Sig: Take 1 tablet by mouth 3 times daily as needed for Muscle spasms

## 2024-12-24 NOTE — TELEPHONE ENCOUNTER
Comments: pt is out      Last Office Visit (last PCP visit):   9/18/2024     Next Visit Date:    Future Appointments   Date Time Provider Department Center   3/12/2025  2:00 PM Inés Layton APRN - CNP [facility] ECC DEP        **If hasn't been seen in over a year OR hasn't followed up according to last diabetes/ADHD visit, make appointment for patient before sending refill to provider.     Rx requested:    Requested Prescriptions     Pending Prescriptions Disp Refills    cyclobenzaprine (FLEXERIL) 10 MG tablet 21 tablet 0     Sig: Take 1 tablet by mouth 3 times daily as needed for Muscle spasms

## 2024-12-26 RX ORDER — CYCLOBENZAPRINE HCL 10 MG
10 TABLET ORAL 3 TIMES DAILY PRN
Qty: 21 TABLET | Refills: 0 | Status: SHIPPED | OUTPATIENT
Start: 2024-12-26 | End: 2025-01-02

## 2024-12-27 ENCOUNTER — APPOINTMENT (OUTPATIENT)
Dept: CT IMAGING | Age: 69
End: 2024-12-27
Payer: MEDICARE

## 2024-12-27 ENCOUNTER — HOSPITAL ENCOUNTER (EMERGENCY)
Age: 69
Discharge: HOME | End: 2024-12-27
Attending: EMERGENCY MEDICINE
Payer: MEDICARE

## 2024-12-27 ENCOUNTER — TELEPHONE (OUTPATIENT)
Dept: PHARMACY | Facility: CLINIC | Age: 69
End: 2024-12-27

## 2024-12-27 VITALS
HEART RATE: 85 BPM | RESPIRATION RATE: 19 BRPM | WEIGHT: 140 LBS | SYSTOLIC BLOOD PRESSURE: 125 MMHG | HEIGHT: 68 IN | TEMPERATURE: 97.2 F | DIASTOLIC BLOOD PRESSURE: 85 MMHG | OXYGEN SATURATION: 96 % | BODY MASS INDEX: 21.22 KG/M2

## 2024-12-27 DIAGNOSIS — G89.29 ACUTE EXACERBATION OF CHRONIC LOW BACK PAIN: Primary | ICD-10-CM

## 2024-12-27 DIAGNOSIS — M54.50 ACUTE EXACERBATION OF CHRONIC LOW BACK PAIN: Primary | ICD-10-CM

## 2024-12-27 PROCEDURE — 72131 CT LUMBAR SPINE W/O DYE: CPT

## 2024-12-27 PROCEDURE — 99284 EMERGENCY DEPT VISIT MOD MDM: CPT

## 2024-12-27 RX ORDER — PREDNISONE 20 MG/1
20 TABLET ORAL 2 TIMES DAILY
Qty: 10 TABLET | Refills: 0 | Status: SHIPPED | OUTPATIENT
Start: 2024-12-27 | End: 2025-01-01

## 2024-12-27 RX ORDER — OXYCODONE AND ACETAMINOPHEN 5; 325 MG/1; MG/1
1 TABLET ORAL EVERY 6 HOURS PRN
Qty: 20 TABLET | Refills: 0 | Status: SHIPPED | OUTPATIENT
Start: 2024-12-27 | End: 2025-01-01

## 2024-12-27 ASSESSMENT — PAIN DESCRIPTION - ORIENTATION: ORIENTATION: LOWER

## 2024-12-27 ASSESSMENT — PAIN DESCRIPTION - LOCATION: LOCATION: BACK

## 2024-12-27 ASSESSMENT — PAIN SCALES - GENERAL: PAINLEVEL_OUTOF10: 9

## 2024-12-27 NOTE — ED PROVIDER NOTES
[facility] ED  eMERGENCY dEPARTMENT eNCOUnter      Pt Name: Marquez Miguel  MRN: [id number]  Birthdate 1955  Date of evaluation: 12/27/2024  Provider: Kelly Salazar MD    CHIEF COMPLAINT       Chief Complaint   Patient presents with    Back Pain         HISTORY OF PRESENT ILLNESS   (Location/Symptom, Timing/Onset,Context/Setting, Quality, Duration, Modifying Factors, Severity)  Note limiting factors.   Marquez Miguel is a 69 y.o. male who presents to the emergency department with a history of low back pain.  The patient has been having it off and on for years.  The patient does take gabapentin and Flexeril and ibuprofen.  Is been getting worse over the course of the last week.  Patient has no history of trauma.  The patient has no numbness or tingling in the perianal or JARROD penile area.  Patient has no weakness in the lower extremities.  Pain is worse with movement better with rest does not radiate dull and achy in nature.  Patient denies fever chills abdominal pain headache chest pain.  Patient has seen pain management and did have epidural shots in the past for this pain.  No other complaints or concerns.    HPI    NursingNotes were reviewed.    REVIEW OF SYSTEMS    (2-9 systems for level 4, 10 or more for level 5)     Review of Systems    Except as noted above the remainder of the review of systems was reviewed and negative.       PAST MEDICAL HISTORY     Past Medical History:   Diagnosis Date    Allergic rhinitis 02/19/2018    Anxiety     Bilateral carotid artery stenosis 05/10/2023    Chronic back pain     Colon polyp     COPD (chronic obstructive pulmonary disease) (HCC)     Depression     Disorder of stomach     valve not closing properly    Emphysema lung (HCC)     Essential hypertension 11/04/2019    ETOH abuse     SOBER X 20 YEARS    Gastroesophageal reflux disease 10/04/2019    Hydronephrosis of left kidney 08/23/2023    Hyperlipidemia     meds > 22 yrs    Low back pain 05/01/2018    
Scribe Attestation (For Scribes USE Only)...

## 2024-12-27 NOTE — ED TRIAGE NOTES
Pt stated that he has chronic back pain that has gotten worse the last few days. Pt stated that he has been taking motrin and flexeril without relief. Pt denies any injury. Pt stated that the weather change might have done it

## 2024-12-27 NOTE — TELEPHONE ENCOUNTER
[facility] CLINICAL [facility]ARM[facility]: ADHERENCE REVIEW  Identified care gap per [employer]: fills at [facility]: Statin adherence      ASSESSMENT  STATIN ADHERENCE    Insurance Records claims through 24 (Prior Year PDC = 89% - PASSED ; YTD PDC = 90%; Potential Fail Date: 24):   ROSUVASTATIN TAB 40MG last filled on 24 for 90 day supply. Next refill due: 24    Prescribed si tablet/capsule daily    Per Insurer Portal: last filled on same    Per [facility]: will get  90 day supply ready to  since past due.    Lab Results   Component Value Date    CHOL 124 2023    TRIG 51 2023    HDL 50 2023     Lab Results   Component Value Date    LDL 64 2023      ALT   Date Value Ref Range Status   2024 8 0 - 41 U/L Final     AST   Date Value Ref Range Status   2024 11 0 - 40 U/L Final     The ASCVD Risk score (Elke DK, et al., 2019) failed to calculate for the following reasons:    The valid total cholesterol range is 130 to 320 mg/dL     PLAN  Per insurer report, LIS-1 - may be able to receive up to a 100-day supply for the same cost as a 30-day supply.      The following are interventions that have been identified:   Patient OVERDUE refilling ROSUVASTATIN TAB 40MG and active on home medication list.     Attempting to reach patient to review.  Left message asking for return call. Boomrathart message sent to patient.        Last Visit: 24  Next Visit: 25        Chandni Kendrick CPhT  [facility] Clinical   [facility]  Toll Free: [phone] Option 1    For Pharmacy Admin Tracking Only    Program: [employer]  CPA in place:  No  Recommendation Provided To: Pharmacy: 1  Intervention Detail: Adherence Monitorin  Intervention Accepted By: Pharmacy: 1  Gap Closed?: Yes   Time Spent (min): 10

## 2024-12-30 ENCOUNTER — HOSPITAL ENCOUNTER (EMERGENCY)
Age: 69
Discharge: HOME | End: 2024-12-30
Payer: MEDICARE

## 2024-12-30 VITALS
HEART RATE: 93 BPM | BODY MASS INDEX: 21.22 KG/M2 | OXYGEN SATURATION: 93 % | RESPIRATION RATE: 18 BRPM | SYSTOLIC BLOOD PRESSURE: 104 MMHG | TEMPERATURE: 97.8 F | WEIGHT: 140 LBS | DIASTOLIC BLOOD PRESSURE: 78 MMHG | HEIGHT: 68 IN

## 2024-12-30 DIAGNOSIS — M54.50 ACUTE EXACERBATION OF CHRONIC LOW BACK PAIN: Primary | ICD-10-CM

## 2024-12-30 DIAGNOSIS — G89.29 ACUTE EXACERBATION OF CHRONIC LOW BACK PAIN: Primary | ICD-10-CM

## 2024-12-30 DIAGNOSIS — M54.16 LUMBAR RADICULOPATHY: ICD-10-CM

## 2024-12-30 PROCEDURE — 96372 THER/PROPH/DIAG INJ SC/IM: CPT

## 2024-12-30 PROCEDURE — 99284 EMERGENCY DEPT VISIT MOD MDM: CPT

## 2024-12-30 PROCEDURE — 6360000002 HC RX W HCPCS: Performed by: PHYSICIAN ASSISTANT

## 2024-12-30 RX ORDER — ACETAMINOPHEN 500 MG
1000 TABLET ORAL ONCE
Status: DISCONTINUED | OUTPATIENT
Start: 2024-12-30 | End: 2024-12-30 | Stop reason: HOSPADM

## 2024-12-30 RX ORDER — METHYLPREDNISOLONE ACETATE 80 MG/ML
80 INJECTION, SUSPENSION INTRA-ARTICULAR; INTRALESIONAL; INTRAMUSCULAR; SOFT TISSUE ONCE
Status: COMPLETED | OUTPATIENT
Start: 2024-12-30 | End: 2024-12-30

## 2024-12-30 RX ORDER — KETOROLAC TROMETHAMINE 30 MG/ML
30 INJECTION, SOLUTION INTRAMUSCULAR; INTRAVENOUS ONCE
Status: COMPLETED | OUTPATIENT
Start: 2024-12-30 | End: 2024-12-30

## 2024-12-30 RX ADMIN — METHYLPREDNISOLONE ACETATE 80 MG: 80 INJECTION, SUSPENSION INTRA-ARTICULAR; INTRALESIONAL; INTRAMUSCULAR; SOFT TISSUE at 01:16

## 2024-12-30 RX ADMIN — KETOROLAC TROMETHAMINE 30 MG: 30 INJECTION, SOLUTION INTRAMUSCULAR at 01:15

## 2024-12-30 ASSESSMENT — ENCOUNTER SYMPTOMS
PHOTOPHOBIA: 0
BACK PAIN: 1
SORE THROAT: 0
DIARRHEA: 0
COUGH: 0
SHORTNESS OF BREATH: 0
RHINORRHEA: 0
NAUSEA: 0
ABDOMINAL PAIN: 0
VOMITING: 0
EYE PAIN: 0

## 2024-12-30 ASSESSMENT — PAIN SCALES - GENERAL
PAINLEVEL_OUTOF10: 7
PAINLEVEL_OUTOF10: 7

## 2024-12-30 ASSESSMENT — PAIN - FUNCTIONAL ASSESSMENT: PAIN_FUNCTIONAL_ASSESSMENT: 0-10

## 2024-12-30 ASSESSMENT — PAIN DESCRIPTION - LOCATION: LOCATION: BACK

## 2024-12-30 NOTE — ED PROVIDER NOTES
pain management and is wanting injections but this is taking a while.  He reports taking all of his medication how he supposed to.  He has tried physical therapy without relief.  He was recently seen here 2 days ago for similar complaints has been no new falls injuries or trauma I do not think repeat imaging is indicated at this time.  Patient did drive here and so he was given injection of Toradol Depo-Medrol and Tylenol for the pain.  He has an active prescription for oxycodone he is on muscle relaxers as well as steroids.  I am recommending that he has close follow-up with his pain management doctor and call them tomorrow.  Advise return to the ER for any new worsening or concerning symptoms.  Patient verbalized understanding patient stable for discharge.      PROCEDURES:  Unless otherwise noted below, none     Procedures    My attending is: hugo      FINAL IMPRESSION      1. Acute exacerbation of chronic low back pain    2. Lumbar radiculopathy          DISPOSITION/PLAN   DISPOSITION Discharge - Pending Orders Complete 12/30/2024 01:20:18 AM   DISPOSITION CONDITION Stable               Arlet Buitrago PA-C (electronically signed)  Attending Emergency Physician       Arlet Buitrago PA-C  12/30/24 0120

## 2025-01-01 ENCOUNTER — PREP FOR PROCEDURE (OUTPATIENT)
Dept: GASTROENTEROLOGY | Age: 70
End: 2025-01-01

## 2025-01-01 ENCOUNTER — APPOINTMENT (OUTPATIENT)
Dept: GENERAL RADIOLOGY | Age: 70
DRG: 377 | End: 2025-01-01
Payer: MEDICARE

## 2025-01-01 ENCOUNTER — ANESTHESIA (OUTPATIENT)
Dept: ENDOSCOPY | Age: 70
DRG: 378 | End: 2025-01-01
Payer: MEDICARE

## 2025-01-01 ENCOUNTER — ANESTHESIA EVENT (OUTPATIENT)
Dept: ENDOSCOPY | Age: 70
DRG: 377 | End: 2025-01-01
Payer: MEDICARE

## 2025-01-01 ENCOUNTER — HOSPITAL ENCOUNTER (EMERGENCY)
Age: 70
Discharge: HOME | DRG: 377 | End: 2025-02-08
Payer: MEDICARE

## 2025-01-01 ENCOUNTER — ANESTHESIA (OUTPATIENT)
Dept: ENDOSCOPY | Age: 70
DRG: 377 | End: 2025-01-01
Payer: MEDICARE

## 2025-01-01 ENCOUNTER — APPOINTMENT (OUTPATIENT)
Dept: CT IMAGING | Age: 70
DRG: 377 | End: 2025-01-01
Payer: MEDICARE

## 2025-01-01 ENCOUNTER — HOSPITAL ENCOUNTER (INPATIENT)
Age: 70
LOS: 1 days | DRG: 377 | End: 2025-02-11
Attending: STUDENT IN AN ORGANIZED HEALTH CARE EDUCATION/TRAINING PROGRAM | Admitting: INTERNAL MEDICINE
Payer: MEDICARE

## 2025-01-01 ENCOUNTER — ANESTHESIA EVENT (OUTPATIENT)
Dept: ENDOSCOPY | Age: 70
DRG: 378 | End: 2025-01-01
Payer: MEDICARE

## 2025-01-01 ENCOUNTER — HOSPITAL ENCOUNTER (EMERGENCY)
Age: 70
Discharge: HOME | DRG: 377 | End: 2025-02-08
Attending: STUDENT IN AN ORGANIZED HEALTH CARE EDUCATION/TRAINING PROGRAM
Payer: MEDICARE

## 2025-01-01 VITALS
HEART RATE: 77 BPM | RESPIRATION RATE: 17 BRPM | TEMPERATURE: 98.2 F | SYSTOLIC BLOOD PRESSURE: 148 MMHG | OXYGEN SATURATION: 96 % | DIASTOLIC BLOOD PRESSURE: 81 MMHG

## 2025-01-01 VITALS
WEIGHT: 129.4 LBS | RESPIRATION RATE: 16 BRPM | SYSTOLIC BLOOD PRESSURE: 137 MMHG | BODY MASS INDEX: 19.61 KG/M2 | HEART RATE: 79 BPM | TEMPERATURE: 98 F | HEIGHT: 68 IN | DIASTOLIC BLOOD PRESSURE: 113 MMHG

## 2025-01-01 VITALS
SYSTOLIC BLOOD PRESSURE: 146 MMHG | DIASTOLIC BLOOD PRESSURE: 67 MMHG | RESPIRATION RATE: 17 BRPM | HEART RATE: 64 BPM | OXYGEN SATURATION: 99 % | TEMPERATURE: 98.7 F

## 2025-01-01 DIAGNOSIS — D64.9 ANEMIA, UNSPECIFIED TYPE: ICD-10-CM

## 2025-01-01 DIAGNOSIS — E87.6 HYPOKALEMIA: Primary | ICD-10-CM

## 2025-01-01 DIAGNOSIS — M54.40 CHRONIC RIGHT-SIDED LOW BACK PAIN WITH SCIATICA, SCIATICA LATERALITY UNSPECIFIED: Primary | ICD-10-CM

## 2025-01-01 DIAGNOSIS — G89.29 ACUTE EXACERBATION OF CHRONIC LOW BACK PAIN: Primary | ICD-10-CM

## 2025-01-01 DIAGNOSIS — M54.50 ACUTE EXACERBATION OF CHRONIC LOW BACK PAIN: Primary | ICD-10-CM

## 2025-01-01 DIAGNOSIS — R91.1 PULMONARY NODULE: ICD-10-CM

## 2025-01-01 DIAGNOSIS — R10.13 ABDOMINAL PAIN, EPIGASTRIC: ICD-10-CM

## 2025-01-01 DIAGNOSIS — R19.5 DARK STOOLS: ICD-10-CM

## 2025-01-01 DIAGNOSIS — G89.29 CHRONIC RIGHT-SIDED LOW BACK PAIN WITH SCIATICA, SCIATICA LATERALITY UNSPECIFIED: Primary | ICD-10-CM

## 2025-01-01 LAB
ABO + RH BLD: NORMAL
ABO/RH: NORMAL
ALBUMIN SERPL-MCNC: 3.4 G/DL (ref 3.5–4.6)
ALBUMIN SERPL-MCNC: 3.5 G/DL (ref 3.5–4.6)
ALP SERPL-CCNC: 48 U/L (ref 35–104)
ALP SERPL-CCNC: 61 U/L (ref 35–104)
ALT SERPL-CCNC: 15 U/L (ref 0–41)
ALT SERPL-CCNC: 9 U/L (ref 0–41)
ANION GAP SERPL CALCULATED.3IONS-SCNC: 12 MEQ/L (ref 9–15)
ANION GAP SERPL CALCULATED.3IONS-SCNC: 14 MEQ/L (ref 9–15)
ANTIBODY SCREEN: NORMAL
AST SERPL-CCNC: 10 U/L (ref 0–40)
AST SERPL-CCNC: 9 U/L (ref 0–40)
B PARAP IS1001 DNA NPH QL NAA+NON-PROBE: NOT DETECTED
B PERT.PT PRMT NPH QL NAA+NON-PROBE: NOT DETECTED
BACTERIA URNS QL MICRO: NEGATIVE /HPF
BASOPHILS # BLD: 0 K/UL (ref 0–0.2)
BASOPHILS # BLD: 0 K/UL (ref 0–0.2)
BASOPHILS NFR BLD: 0.1 %
BASOPHILS NFR BLD: 0.1 %
BILIRUB SERPL-MCNC: <0.2 MG/DL (ref 0.2–0.7)
BILIRUB SERPL-MCNC: <0.2 MG/DL (ref 0.2–0.7)
BILIRUB UR QL STRIP: NEGATIVE
BILIRUB UR QL STRIP: NEGATIVE
BLOOD BANK DISPENSE STATUS: NORMAL
BLOOD BANK PRODUCT CODE: NORMAL
BNP BLD-MCNC: 523 PG/ML
BPU ID: NORMAL
BUN SERPL-MCNC: 38 MG/DL (ref 8–23)
BUN SERPL-MCNC: 47 MG/DL (ref 8–23)
C PNEUM DNA NPH QL NAA+NON-PROBE: NOT DETECTED
CALCIUM SERPL-MCNC: 8.7 MG/DL (ref 8.5–9.9)
CALCIUM SERPL-MCNC: 8.9 MG/DL (ref 8.5–9.9)
CHLORIDE SERPL-SCNC: 106 MEQ/L (ref 95–107)
CHLORIDE SERPL-SCNC: 108 MEQ/L (ref 95–107)
CLARITY UR: CLEAR
CLARITY UR: CLEAR
CO2 SERPL-SCNC: 19 MEQ/L (ref 20–31)
CO2 SERPL-SCNC: 19 MEQ/L (ref 20–31)
COLOR UR: YELLOW
COLOR UR: YELLOW
CREAT SERPL-MCNC: 0.96 MG/DL (ref 0.7–1.2)
CREAT SERPL-MCNC: 1.06 MG/DL (ref 0.7–1.2)
DESCRIPTION BLOOD BANK: NORMAL
EOSINOPHIL # BLD: 0.1 K/UL (ref 0–0.7)
EOSINOPHIL # BLD: 0.1 K/UL (ref 0–0.7)
EOSINOPHIL NFR BLD: 0.3 %
EOSINOPHIL NFR BLD: 0.6 %
EPI CELLS #/AREA URNS AUTO: NORMAL /HPF (ref 0–5)
ERYTHROCYTE [DISTWIDTH] IN BLOOD BY AUTOMATED COUNT: 13.5 % (ref 11.5–14.5)
ERYTHROCYTE [DISTWIDTH] IN BLOOD BY AUTOMATED COUNT: 13.8 % (ref 11.5–14.5)
ETHANOL PERCENT: NORMAL G/DL
ETHANOLAMINE SERPL-MCNC: <10 MG/DL (ref 0–0.08)
FLUAV RNA NPH QL NAA+NON-PROBE: NOT DETECTED
FLUBV RNA NPH QL NAA+NON-PROBE: NOT DETECTED
GLOBULIN SER CALC-MCNC: 2.2 G/DL (ref 2.3–3.5)
GLOBULIN SER CALC-MCNC: 2.7 G/DL (ref 2.3–3.5)
GLUCOSE SERPL-MCNC: 100 MG/DL (ref 70–99)
GLUCOSE SERPL-MCNC: 98 MG/DL (ref 70–99)
GLUCOSE UR STRIP-MCNC: NEGATIVE MG/DL
GLUCOSE UR STRIP-MCNC: NEGATIVE MG/DL
HADV DNA NPH QL NAA+NON-PROBE: NOT DETECTED
HCOV 229E RNA NPH QL NAA+NON-PROBE: NOT DETECTED
HCOV HKU1 RNA NPH QL NAA+NON-PROBE: NOT DETECTED
HCOV NL63 RNA NPH QL NAA+NON-PROBE: NOT DETECTED
HCOV OC43 RNA NPH QL NAA+NON-PROBE: NOT DETECTED
HCT VFR BLD AUTO: 22.5 % (ref 42–52)
HCT VFR BLD AUTO: 30.6 % (ref 42–52)
HGB BLD-MCNC: 10.9 G/DL (ref 14–18)
HGB BLD-MCNC: 7.9 G/DL (ref 14–18)
HGB UR QL STRIP: NEGATIVE
HGB UR QL STRIP: NEGATIVE
HMPV RNA NPH QL NAA+NON-PROBE: NOT DETECTED
HPIV1 RNA NPH QL NAA+NON-PROBE: NOT DETECTED
HPIV2 RNA NPH QL NAA+NON-PROBE: NOT DETECTED
HPIV3 RNA NPH QL NAA+NON-PROBE: NOT DETECTED
HPIV4 RNA NPH QL NAA+NON-PROBE: NOT DETECTED
HYALINE CASTS #/AREA URNS AUTO: NORMAL /HPF (ref 0–5)
KETONES UR STRIP-MCNC: ABNORMAL MG/DL
KETONES UR STRIP-MCNC: NEGATIVE MG/DL
LACTATE BLDV-SCNC: 1.7 MMOL/L (ref 0.5–2.2)
LACTATE BLDV-SCNC: 1.8 MMOL/L (ref 0.5–2.2)
LEUKOCYTE ESTERASE UR QL STRIP: NEGATIVE
LEUKOCYTE ESTERASE UR QL STRIP: NEGATIVE
LIPASE SERPL-CCNC: 20 U/L (ref 12–95)
LIPASE SERPL-CCNC: 23 U/L (ref 12–95)
LYMPHOCYTES # BLD: 1.5 K/UL (ref 1–4.8)
LYMPHOCYTES # BLD: 2.3 K/UL (ref 1–4.8)
LYMPHOCYTES NFR BLD: 10.6 %
LYMPHOCYTES NFR BLD: 15.5 %
M PNEUMO DNA NPH QL NAA+NON-PROBE: NOT DETECTED
MAGNESIUM SERPL-MCNC: 2.1 MG/DL (ref 1.7–2.4)
MCH RBC QN AUTO: 34 PG (ref 27–31.3)
MCH RBC QN AUTO: 34.1 PG (ref 27–31.3)
MCHC RBC AUTO-ENTMCNC: 35.1 % (ref 33–37)
MCHC RBC AUTO-ENTMCNC: 35.6 % (ref 33–37)
MCV RBC AUTO: 95.3 FL (ref 79–92.2)
MCV RBC AUTO: 97 FL (ref 79–92.2)
MONOCYTES # BLD: 0.4 K/UL (ref 0.2–0.8)
MONOCYTES # BLD: 0.5 K/UL (ref 0.2–0.8)
MONOCYTES NFR BLD: 2.6 %
MONOCYTES NFR BLD: 3.3 %
NEUTROPHILS # BLD: 12.1 K/UL (ref 1.4–6.5)
NEUTROPHILS # BLD: 12.5 K/UL (ref 1.4–6.5)
NEUTS SEG NFR BLD: 79.9 %
NEUTS SEG NFR BLD: 85.6 %
NITRITE UR QL STRIP: NEGATIVE
NITRITE UR QL STRIP: NEGATIVE
PH UR STRIP: 6 [PH] (ref 5–9)
PH UR STRIP: 6.5 [PH] (ref 5–9)
PLATELET # BLD AUTO: 378 K/UL (ref 130–400)
PLATELET # BLD AUTO: 413 K/UL (ref 130–400)
POC CREATININE WHOLE BLOOD: 1.06
POTASSIUM SERPL-SCNC: 2.8 MEQ/L (ref 3.4–4.9)
POTASSIUM SERPL-SCNC: 3 MEQ/L (ref 3.4–4.9)
PROT SERPL-MCNC: 5.6 G/DL (ref 6.3–8)
PROT SERPL-MCNC: 6.2 G/DL (ref 6.3–8)
PROT UR STRIP-MCNC: 30 MG/DL
PROT UR STRIP-MCNC: ABNORMAL MG/DL
RBC # BLD AUTO: 2.32 M/UL (ref 4.7–6.1)
RBC # BLD AUTO: 3.21 M/UL (ref 4.7–6.1)
RBC #/AREA URNS AUTO: NORMAL /HPF (ref 0–5)
RSV RNA NPH QL NAA+NON-PROBE: NOT DETECTED
RV+EV RNA NPH QL NAA+NON-PROBE: NOT DETECTED
SARS-COV-2 RNA NPH QL NAA+NON-PROBE: NOT DETECTED
SODIUM SERPL-SCNC: 139 MEQ/L (ref 135–144)
SODIUM SERPL-SCNC: 139 MEQ/L (ref 135–144)
SP GR UR STRIP: 1.02 (ref 1–1.03)
SP GR UR STRIP: 1.03 (ref 1–1.03)
TROPONIN, HIGH SENSITIVITY: 50 NG/L (ref 0–19)
TROPONIN, HIGH SENSITIVITY: 56 NG/L (ref 0–19)
URINE REFLEX TO CULTURE: ABNORMAL
URINE REFLEX TO CULTURE: ABNORMAL
UROBILINOGEN UR STRIP-ACNC: 0.2 E.U./DL
UROBILINOGEN UR STRIP-ACNC: 0.2 E.U./DL
WBC # BLD AUTO: 14.6 K/UL (ref 4.8–10.8)
WBC # BLD AUTO: 15.1 K/UL (ref 4.8–10.8)
WBC #/AREA URNS AUTO: NORMAL /HPF (ref 0–5)

## 2025-01-01 PROCEDURE — 5A12012 PERFORMANCE OF CARDIAC OUTPUT, SINGLE, MANUAL: ICD-10-PCS | Performed by: INTERNAL MEDICINE

## 2025-01-01 PROCEDURE — 82077 ASSAY SPEC XCP UR&BREATH IA: CPT

## 2025-01-01 PROCEDURE — 3E033XZ INTRODUCTION OF VASOPRESSOR INTO PERIPHERAL VEIN, PERCUTANEOUS APPROACH: ICD-10-PCS | Performed by: INTERNAL MEDICINE

## 2025-01-01 PROCEDURE — 0BH17EZ INSERTION OF ENDOTRACHEAL AIRWAY INTO TRACHEA, VIA NATURAL OR ARTIFICIAL OPENING: ICD-10-PCS | Performed by: INTERNAL MEDICINE

## 2025-01-01 PROCEDURE — 6370000000 HC RX 637 (ALT 250 FOR IP): Performed by: STUDENT IN AN ORGANIZED HEALTH CARE EDUCATION/TRAINING PROGRAM

## 2025-01-01 PROCEDURE — 83605 ASSAY OF LACTIC ACID: CPT

## 2025-01-01 PROCEDURE — 86923 COMPATIBILITY TEST ELECTRIC: CPT

## 2025-01-01 PROCEDURE — 31500 INSERT EMERGENCY AIRWAY: CPT

## 2025-01-01 PROCEDURE — 84484 ASSAY OF TROPONIN QUANT: CPT

## 2025-01-01 PROCEDURE — 6360000002 HC RX W HCPCS: Performed by: STUDENT IN AN ORGANIZED HEALTH CARE EDUCATION/TRAINING PROGRAM

## 2025-01-01 PROCEDURE — 30233N1 TRANSFUSION OF NONAUTOLOGOUS RED BLOOD CELLS INTO PERIPHERAL VEIN, PERCUTANEOUS APPROACH: ICD-10-PCS | Performed by: INTERNAL MEDICINE

## 2025-01-01 PROCEDURE — 80053 COMPREHEN METABOLIC PANEL: CPT

## 2025-01-01 PROCEDURE — 83690 ASSAY OF LIPASE: CPT

## 2025-01-01 PROCEDURE — 99285 EMERGENCY DEPT VISIT HI MDM: CPT

## 2025-01-01 PROCEDURE — 6370000000 HC RX 637 (ALT 250 FOR IP): Performed by: PHYSICIAN ASSISTANT

## 2025-01-01 PROCEDURE — 96374 THER/PROPH/DIAG INJ IV PUSH: CPT

## 2025-01-01 PROCEDURE — P9016 RBC LEUKOCYTES REDUCED: HCPCS

## 2025-01-01 PROCEDURE — 96372 THER/PROPH/DIAG INJ SC/IM: CPT

## 2025-01-01 PROCEDURE — 99223 1ST HOSP IP/OBS HIGH 75: CPT | Performed by: INTERNAL MEDICINE

## 2025-01-01 PROCEDURE — 36415 COLL VENOUS BLD VENIPUNCTURE: CPT

## 2025-01-01 PROCEDURE — 0202U NFCT DS 22 TRGT SARS-COV-2: CPT

## 2025-01-01 PROCEDURE — 85025 COMPLETE CBC W/AUTO DIFF WBC: CPT

## 2025-01-01 PROCEDURE — 2580000003 HC RX 258

## 2025-01-01 PROCEDURE — 1210000000 HC MED SURG R&B

## 2025-01-01 PROCEDURE — 6360000002 HC RX W HCPCS: Performed by: INTERNAL MEDICINE

## 2025-01-01 PROCEDURE — 86901 BLOOD TYPING SEROLOGIC RH(D): CPT

## 2025-01-01 PROCEDURE — 2580000003 HC RX 258: Performed by: INTERNAL MEDICINE

## 2025-01-01 PROCEDURE — 86850 RBC ANTIBODY SCREEN: CPT

## 2025-01-01 PROCEDURE — 31500 INSERT EMERGENCY AIRWAY: CPT | Performed by: NURSE ANESTHETIST, CERTIFIED REGISTERED

## 2025-01-01 PROCEDURE — 71045 X-RAY EXAM CHEST 1 VIEW: CPT

## 2025-01-01 PROCEDURE — 87040 BLOOD CULTURE FOR BACTERIA: CPT

## 2025-01-01 PROCEDURE — 36430 TRANSFUSION BLD/BLD COMPNT: CPT

## 2025-01-01 PROCEDURE — 83880 ASSAY OF NATRIURETIC PEPTIDE: CPT

## 2025-01-01 PROCEDURE — 83735 ASSAY OF MAGNESIUM: CPT

## 2025-01-01 PROCEDURE — 6360000002 HC RX W HCPCS

## 2025-01-01 PROCEDURE — 2580000003 HC RX 258: Performed by: STUDENT IN AN ORGANIZED HEALTH CARE EDUCATION/TRAINING PROGRAM

## 2025-01-01 PROCEDURE — 6360000002 HC RX W HCPCS: Performed by: PHYSICIAN ASSISTANT

## 2025-01-01 PROCEDURE — 92950 HEART/LUNG RESUSCITATION CPR: CPT

## 2025-01-01 PROCEDURE — 2500000003 HC RX 250 WO HCPCS: Performed by: INTERNAL MEDICINE

## 2025-01-01 PROCEDURE — 81003 URINALYSIS AUTO W/O SCOPE: CPT

## 2025-01-01 PROCEDURE — 99284 EMERGENCY DEPT VISIT MOD MDM: CPT

## 2025-01-01 PROCEDURE — 81001 URINALYSIS AUTO W/SCOPE: CPT

## 2025-01-01 PROCEDURE — 86900 BLOOD TYPING SEROLOGIC ABO: CPT

## 2025-01-01 RX ORDER — MAGNESIUM SULFATE IN WATER 40 MG/ML
2000 INJECTION, SOLUTION INTRAVENOUS PRN
Status: DISCONTINUED | OUTPATIENT
Start: 2025-01-01 | End: 2025-01-01 | Stop reason: SDUPTHER

## 2025-01-01 RX ORDER — POTASSIUM CHLORIDE 1500 MG/1
40 TABLET, EXTENDED RELEASE ORAL PRN
Status: DISCONTINUED | OUTPATIENT
Start: 2025-01-01 | End: 2025-01-01 | Stop reason: SDUPTHER

## 2025-01-01 RX ORDER — SODIUM CHLORIDE 9 MG/ML
INJECTION, SOLUTION INTRAVENOUS CONTINUOUS
Status: CANCELLED | OUTPATIENT
Start: 2025-01-01

## 2025-01-01 RX ORDER — EPINEPHRINE IN SOD CHLOR,ISO 1 MG/10 ML
SYRINGE (ML) INTRAVENOUS
Status: COMPLETED | OUTPATIENT
Start: 2025-01-01 | End: 2025-01-01

## 2025-01-01 RX ORDER — 0.9 % SODIUM CHLORIDE 0.9 %
INTRAVENOUS SOLUTION INTRAVENOUS CONTINUOUS PRN
Status: COMPLETED | OUTPATIENT
Start: 2025-01-01 | End: 2025-01-01

## 2025-01-01 RX ORDER — METOCLOPRAMIDE HYDROCHLORIDE 5 MG/ML
10 INJECTION INTRAMUSCULAR; INTRAVENOUS ONCE
Status: COMPLETED | OUTPATIENT
Start: 2025-01-01 | End: 2025-01-01

## 2025-01-01 RX ORDER — SODIUM CHLORIDE 9 MG/ML
INJECTION, SOLUTION INTRAVENOUS CONTINUOUS
Status: DISCONTINUED | OUTPATIENT
Start: 2025-01-01 | End: 2025-01-01

## 2025-01-01 RX ORDER — ONDANSETRON 4 MG/1
4 TABLET, ORALLY DISINTEGRATING ORAL EVERY 8 HOURS PRN
Status: DISCONTINUED | OUTPATIENT
Start: 2025-01-01 | End: 2025-01-01 | Stop reason: SDUPTHER

## 2025-01-01 RX ORDER — SODIUM CHLORIDE 9 MG/ML
INJECTION, SOLUTION INTRAVENOUS PRN
Status: DISCONTINUED | OUTPATIENT
Start: 2025-01-01 | End: 2025-01-01 | Stop reason: HOSPADM

## 2025-01-01 RX ORDER — LIDOCAINE HYDROCHLORIDE 20 MG/ML
INJECTION, SOLUTION INTRAVENOUS
Status: COMPLETED | OUTPATIENT
Start: 2025-01-01 | End: 2025-01-01

## 2025-01-01 RX ORDER — ACETAMINOPHEN 325 MG/1
650 TABLET ORAL EVERY 6 HOURS PRN
Status: DISCONTINUED | OUTPATIENT
Start: 2025-01-01 | End: 2025-01-01 | Stop reason: SDUPTHER

## 2025-01-01 RX ORDER — OCTREOTIDE ACETATE 100 UG/ML
150 INJECTION, SOLUTION INTRAVENOUS; SUBCUTANEOUS ONCE
Status: COMPLETED | OUTPATIENT
Start: 2025-01-01 | End: 2025-01-01

## 2025-01-01 RX ORDER — NOREPINEPHRINE BITARTRATE 0.06 MG/ML
1-100 INJECTION, SOLUTION INTRAVENOUS CONTINUOUS
Status: DISCONTINUED | OUTPATIENT
Start: 2025-01-01 | End: 2025-01-01

## 2025-01-01 RX ORDER — ONDANSETRON 4 MG/1
4 TABLET, ORALLY DISINTEGRATING ORAL EVERY 8 HOURS PRN
Status: DISCONTINUED | OUTPATIENT
Start: 2025-01-01 | End: 2025-01-01 | Stop reason: HOSPADM

## 2025-01-01 RX ORDER — IOPAMIDOL 612 MG/ML
75 INJECTION, SOLUTION INTRAVASCULAR
Status: DISCONTINUED | OUTPATIENT
Start: 2025-01-01 | End: 2025-01-01 | Stop reason: HOSPADM

## 2025-01-01 RX ORDER — SODIUM CHLORIDE 0.9 % (FLUSH) 0.9 %
5-40 SYRINGE (ML) INJECTION PRN
Status: DISCONTINUED | OUTPATIENT
Start: 2025-01-01 | End: 2025-01-01 | Stop reason: HOSPADM

## 2025-01-01 RX ORDER — AMIODARONE HYDROCHLORIDE 150 MG/3ML
INJECTION, SOLUTION INTRAVENOUS
Status: COMPLETED | OUTPATIENT
Start: 2025-01-01 | End: 2025-01-01

## 2025-01-01 RX ORDER — NALOXONE HYDROCHLORIDE 0.4 MG/ML
INJECTION, SOLUTION INTRAMUSCULAR; INTRAVENOUS; SUBCUTANEOUS PRN
Status: CANCELLED | OUTPATIENT
Start: 2025-01-01

## 2025-01-01 RX ORDER — SODIUM CHLORIDE 0.9 % (FLUSH) 0.9 %
5-40 SYRINGE (ML) INJECTION EVERY 12 HOURS SCHEDULED
Status: DISCONTINUED | OUTPATIENT
Start: 2025-01-01 | End: 2025-01-01 | Stop reason: HOSPADM

## 2025-01-01 RX ORDER — SODIUM CHLORIDE 0.9 % (FLUSH) 0.9 %
5-40 SYRINGE (ML) INJECTION PRN
Status: CANCELLED | OUTPATIENT
Start: 2025-01-01

## 2025-01-01 RX ORDER — POLYETHYLENE GLYCOL 3350 17 G/17G
17 POWDER, FOR SOLUTION ORAL DAILY PRN
Status: DISCONTINUED | OUTPATIENT
Start: 2025-01-01 | End: 2025-01-01 | Stop reason: HOSPADM

## 2025-01-01 RX ORDER — CYCLOBENZAPRINE HCL 10 MG
10 TABLET ORAL 2 TIMES DAILY PRN
Qty: 10 TABLET | Refills: 0 | Status: SHIPPED | OUTPATIENT
Start: 2025-01-01 | End: 2025-02-12

## 2025-01-01 RX ORDER — ONDANSETRON 2 MG/ML
4 INJECTION INTRAMUSCULAR; INTRAVENOUS EVERY 6 HOURS PRN
Status: DISCONTINUED | OUTPATIENT
Start: 2025-01-01 | End: 2025-01-01 | Stop reason: SDUPTHER

## 2025-01-01 RX ORDER — LIDOCAINE HYDROCHLORIDE 10 MG/ML
1 INJECTION, SOLUTION EPIDURAL; INFILTRATION; INTRACAUDAL; PERINEURAL
Status: CANCELLED | OUTPATIENT
Start: 2025-01-01 | End: 2025-02-12

## 2025-01-01 RX ORDER — MAGNESIUM HYDROXIDE/ALUMINUM HYDROXICE/SIMETHICONE 120; 1200; 1200 MG/30ML; MG/30ML; MG/30ML
30 SUSPENSION ORAL ONCE
Status: COMPLETED | OUTPATIENT
Start: 2025-01-01 | End: 2025-01-01

## 2025-01-01 RX ORDER — ONDANSETRON 2 MG/ML
4 INJECTION INTRAMUSCULAR; INTRAVENOUS EVERY 6 HOURS PRN
Status: DISCONTINUED | OUTPATIENT
Start: 2025-01-01 | End: 2025-01-01 | Stop reason: HOSPADM

## 2025-01-01 RX ORDER — SUCRALFATE 1 G/1
1 TABLET ORAL
Status: COMPLETED | OUTPATIENT
Start: 2025-01-01 | End: 2025-01-01

## 2025-01-01 RX ORDER — KETOROLAC TROMETHAMINE 15 MG/ML
15 INJECTION, SOLUTION INTRAMUSCULAR; INTRAVENOUS ONCE
Status: COMPLETED | OUTPATIENT
Start: 2025-01-01 | End: 2025-01-01

## 2025-01-01 RX ORDER — POTASSIUM CHLORIDE 7.45 MG/ML
10 INJECTION INTRAVENOUS PRN
Status: DISCONTINUED | OUTPATIENT
Start: 2025-01-01 | End: 2025-01-01 | Stop reason: HOSPADM

## 2025-01-01 RX ORDER — FENTANYL CITRATE 0.05 MG/ML
50 INJECTION, SOLUTION INTRAMUSCULAR; INTRAVENOUS ONCE
Status: DISCONTINUED | OUTPATIENT
Start: 2025-01-01 | End: 2025-01-01 | Stop reason: HOSPADM

## 2025-01-01 RX ORDER — SODIUM CHLORIDE 0.9 % (FLUSH) 0.9 %
5-40 SYRINGE (ML) INJECTION EVERY 12 HOURS SCHEDULED
Status: CANCELLED | OUTPATIENT
Start: 2025-01-01

## 2025-01-01 RX ORDER — POTASSIUM CHLORIDE 7.45 MG/ML
10 INJECTION INTRAVENOUS PRN
Status: DISCONTINUED | OUTPATIENT
Start: 2025-01-01 | End: 2025-01-01 | Stop reason: SDUPTHER

## 2025-01-01 RX ORDER — SODIUM CHLORIDE 0.9 % (FLUSH) 0.9 %
5-40 SYRINGE (ML) INJECTION EVERY 12 HOURS SCHEDULED
Status: DISCONTINUED | OUTPATIENT
Start: 2025-01-01 | End: 2025-01-01 | Stop reason: SDUPTHER

## 2025-01-01 RX ORDER — POTASSIUM CHLORIDE 1500 MG/1
40 TABLET, EXTENDED RELEASE ORAL ONCE
Status: COMPLETED | OUTPATIENT
Start: 2025-01-01 | End: 2025-01-01

## 2025-01-01 RX ORDER — SODIUM CHLORIDE 9 MG/ML
INJECTION, SOLUTION INTRAVENOUS
Status: DISCONTINUED
Start: 2025-01-01 | End: 2025-01-01 | Stop reason: HOSPADM

## 2025-01-01 RX ORDER — SODIUM CHLORIDE 9 MG/ML
INJECTION, SOLUTION INTRAVENOUS PRN
Status: CANCELLED | OUTPATIENT
Start: 2025-01-01

## 2025-01-01 RX ORDER — ORPHENADRINE CITRATE 30 MG/ML
60 INJECTION INTRAMUSCULAR; INTRAVENOUS ONCE
Status: COMPLETED | OUTPATIENT
Start: 2025-01-01 | End: 2025-01-01

## 2025-01-01 RX ORDER — ACETAMINOPHEN 650 MG/1
650 SUPPOSITORY RECTAL EVERY 6 HOURS PRN
Status: DISCONTINUED | OUTPATIENT
Start: 2025-01-01 | End: 2025-01-01 | Stop reason: SDUPTHER

## 2025-01-01 RX ORDER — FENTANYL CITRATE 0.05 MG/ML
25 INJECTION, SOLUTION INTRAMUSCULAR; INTRAVENOUS ONCE
Status: COMPLETED | OUTPATIENT
Start: 2025-01-01 | End: 2025-01-01

## 2025-01-01 RX ORDER — ONDANSETRON 2 MG/ML
4 INJECTION INTRAMUSCULAR; INTRAVENOUS
Status: CANCELLED | OUTPATIENT
Start: 2025-01-01 | End: 2025-02-12

## 2025-01-01 RX ORDER — ACETAMINOPHEN 650 MG/1
650 SUPPOSITORY RECTAL EVERY 6 HOURS PRN
Status: DISCONTINUED | OUTPATIENT
Start: 2025-01-01 | End: 2025-01-01 | Stop reason: HOSPADM

## 2025-01-01 RX ORDER — ACETAMINOPHEN 325 MG/1
650 TABLET ORAL EVERY 6 HOURS PRN
Status: DISCONTINUED | OUTPATIENT
Start: 2025-01-01 | End: 2025-01-01 | Stop reason: HOSPADM

## 2025-01-01 RX ORDER — 0.9 % SODIUM CHLORIDE 0.9 %
1000 INTRAVENOUS SOLUTION INTRAVENOUS ONCE
Status: COMPLETED | OUTPATIENT
Start: 2025-01-01 | End: 2025-01-01

## 2025-01-01 RX ORDER — SODIUM CHLORIDE 0.9 % (FLUSH) 0.9 %
5-40 SYRINGE (ML) INJECTION PRN
Status: DISCONTINUED | OUTPATIENT
Start: 2025-01-01 | End: 2025-01-01 | Stop reason: SDUPTHER

## 2025-01-01 RX ORDER — ORPHENADRINE CITRATE 30 MG/ML
60 INJECTION INTRAMUSCULAR; INTRAVENOUS ONCE
Status: DISCONTINUED | OUTPATIENT
Start: 2025-01-01 | End: 2025-01-01 | Stop reason: HOSPADM

## 2025-01-01 RX ORDER — POTASSIUM CHLORIDE 1500 MG/1
40 TABLET, EXTENDED RELEASE ORAL PRN
Status: DISCONTINUED | OUTPATIENT
Start: 2025-01-01 | End: 2025-01-01 | Stop reason: HOSPADM

## 2025-01-01 RX ORDER — POLYETHYLENE GLYCOL 3350 17 G/17G
17 POWDER, FOR SOLUTION ORAL DAILY PRN
Status: DISCONTINUED | OUTPATIENT
Start: 2025-01-01 | End: 2025-01-01 | Stop reason: SDUPTHER

## 2025-01-01 RX ORDER — ACETAMINOPHEN 325 MG/1
650 TABLET ORAL ONCE
Status: COMPLETED | OUTPATIENT
Start: 2025-01-01 | End: 2025-01-01

## 2025-01-01 RX ORDER — 0.9 % SODIUM CHLORIDE 0.9 %
500 INTRAVENOUS SOLUTION INTRAVENOUS ONCE
Status: COMPLETED | OUTPATIENT
Start: 2025-01-01 | End: 2025-01-01

## 2025-01-01 RX ORDER — MAGNESIUM SULFATE IN WATER 40 MG/ML
2000 INJECTION, SOLUTION INTRAVENOUS PRN
Status: DISCONTINUED | OUTPATIENT
Start: 2025-01-01 | End: 2025-01-01 | Stop reason: HOSPADM

## 2025-01-01 RX ORDER — SODIUM CHLORIDE 9 MG/ML
INJECTION, SOLUTION INTRAVENOUS CONTINUOUS
Status: DISCONTINUED | OUTPATIENT
Start: 2025-01-01 | End: 2025-01-01 | Stop reason: HOSPADM

## 2025-01-01 RX ORDER — LIDOCAINE 50 MG/G
1 PATCH TOPICAL DAILY
Qty: 10 PATCH | Refills: 0 | Status: SHIPPED | OUTPATIENT
Start: 2025-01-01 | End: 2025-02-12

## 2025-01-01 RX ADMIN — SODIUM CHLORIDE 40 MG: 9 INJECTION INTRAMUSCULAR; INTRAVENOUS; SUBCUTANEOUS at 03:19

## 2025-01-01 RX ADMIN — Medication 1 MG: at 11:15

## 2025-01-01 RX ADMIN — SODIUM CHLORIDE 500 ML: 9 INJECTION, SOLUTION INTRAVENOUS at 11:10

## 2025-01-01 RX ADMIN — Medication 1 MG: at 11:34

## 2025-01-01 RX ADMIN — Medication 1 MG: at 11:25

## 2025-01-01 RX ADMIN — AMIODARONE HYDROCHLORIDE 150 MG: 50 INJECTION, SOLUTION INTRAVENOUS at 11:23

## 2025-01-01 RX ADMIN — ACETAMINOPHEN 650 MG: 325 TABLET ORAL at 07:51

## 2025-01-01 RX ADMIN — SUCRALFATE 1 G: 1 TABLET ORAL at 03:16

## 2025-01-01 RX ADMIN — LIDOCAINE HYDROCHLORIDE 100 MG: 20 INJECTION, SOLUTION INTRAVENOUS at 11:39

## 2025-01-01 RX ADMIN — Medication 1 MG: at 11:37

## 2025-01-01 RX ADMIN — Medication 1 MG: at 11:18

## 2025-01-01 RX ADMIN — SODIUM CHLORIDE 500 ML: 9 INJECTION, SOLUTION INTRAVENOUS at 08:58

## 2025-01-01 RX ADMIN — Medication 1 MG: at 11:05

## 2025-01-01 RX ADMIN — POTASSIUM CHLORIDE 40 MEQ: 1500 TABLET, EXTENDED RELEASE ORAL at 03:58

## 2025-01-01 RX ADMIN — KETOROLAC TROMETHAMINE 15 MG: 15 INJECTION, SOLUTION INTRAMUSCULAR; INTRAVENOUS at 19:40

## 2025-01-01 RX ADMIN — Medication 1 MG: at 11:28

## 2025-01-01 RX ADMIN — METOCLOPRAMIDE 10 MG: 5 INJECTION, SOLUTION INTRAMUSCULAR; INTRAVENOUS at 08:21

## 2025-01-01 RX ADMIN — OCTREOTIDE ACETATE 150 MCG: 100 INJECTION, SOLUTION INTRAVENOUS; SUBCUTANEOUS at 06:52

## 2025-01-01 RX ADMIN — SODIUM CHLORIDE 1000 ML: 9 INJECTION, SOLUTION INTRAVENOUS at 11:24

## 2025-01-01 RX ADMIN — Medication 1 MG: at 11:09

## 2025-01-01 RX ADMIN — Medication 1 MG: at 11:22

## 2025-01-01 RX ADMIN — KETOROLAC TROMETHAMINE 15 MG: 15 INJECTION, SOLUTION INTRAMUSCULAR; INTRAVENOUS at 07:51

## 2025-01-01 RX ADMIN — SODIUM CHLORIDE 1000 ML: 9 INJECTION, SOLUTION INTRAVENOUS at 03:15

## 2025-01-01 RX ADMIN — Medication 1 MG: at 11:01

## 2025-01-01 RX ADMIN — Medication 1 MG: at 11:31

## 2025-01-01 RX ADMIN — ORPHENADRINE CITRATE 60 MG: 60 INJECTION INTRAMUSCULAR; INTRAVENOUS at 19:41

## 2025-01-01 RX ADMIN — OCTREOTIDE ACETATE 25 MCG/HR: 500 INJECTION, SOLUTION INTRAVENOUS; SUBCUTANEOUS at 08:50

## 2025-01-01 RX ADMIN — AMIODARONE HYDROCHLORIDE 300 MG: 50 INJECTION, SOLUTION INTRAVENOUS at 11:19

## 2025-01-01 RX ADMIN — SODIUM BICARBONATE 50 MEQ: 84 INJECTION, SOLUTION INTRAVENOUS at 11:39

## 2025-01-01 RX ADMIN — FENTANYL CITRATE 25 MCG: 0.05 INJECTION, SOLUTION INTRAMUSCULAR; INTRAVENOUS at 08:43

## 2025-01-01 RX ADMIN — SODIUM CHLORIDE 1000 ML: 9 INJECTION, SOLUTION INTRAVENOUS at 04:21

## 2025-01-01 RX ADMIN — ALUMINUM HYDROXIDE, MAGNESIUM HYDROXIDE, AND SIMETHICONE 30 ML: 200; 200; 20 SUSPENSION ORAL at 03:16

## 2025-01-01 RX ADMIN — SODIUM BICARBONATE 50 MEQ: 84 INJECTION, SOLUTION INTRAVENOUS at 11:10

## 2025-01-01 RX ADMIN — Medication 1 MG: at 11:12

## 2025-01-01 RX ADMIN — Medication 1 MG: at 11:43

## 2025-01-01 ASSESSMENT — ENCOUNTER SYMPTOMS
VOMITING: 0
EYE ITCHING: 0
SORE THROAT: 0
NAUSEA: 1
EYE REDNESS: 0
VOMITING: 0
EYE DISCHARGE: 0
CHEST TIGHTNESS: 0
SINUS PAIN: 0
COLOR CHANGE: 0
EYE PAIN: 0
EYE PAIN: 0
COUGH: 0
ABDOMINAL PAIN: 1
RHINORRHEA: 0
BACK PAIN: 1
DIARRHEA: 0
RHINORRHEA: 0
ANAL BLEEDING: 1
SHORTNESS OF BREATH: 0
ABDOMINAL PAIN: 0
COUGH: 0
NAUSEA: 0
DIARRHEA: 0
PHOTOPHOBIA: 0

## 2025-01-01 ASSESSMENT — PAIN DESCRIPTION - ORIENTATION
ORIENTATION: MID
ORIENTATION: MID;LOWER

## 2025-01-01 ASSESSMENT — PAIN SCALES - GENERAL
PAINLEVEL_OUTOF10: 10
PAINLEVEL_OUTOF10: 8
PAINLEVEL_OUTOF10: 9
PAINLEVEL_OUTOF10: 9

## 2025-01-01 ASSESSMENT — PAIN - FUNCTIONAL ASSESSMENT
PAIN_FUNCTIONAL_ASSESSMENT: 0-10
PAIN_FUNCTIONAL_ASSESSMENT: 0-10

## 2025-01-01 ASSESSMENT — PAIN DESCRIPTION - LOCATION
LOCATION: BACK;ABDOMEN
LOCATION: BACK
LOCATION: BACK

## 2025-01-01 ASSESSMENT — PAIN DESCRIPTION - DESCRIPTORS
DESCRIPTORS: ACHING

## 2025-01-01 ASSESSMENT — PAIN DESCRIPTION - PAIN TYPE: TYPE: CHRONIC PAIN

## 2025-01-01 ASSESSMENT — PAIN DESCRIPTION - FREQUENCY: FREQUENCY: CONTINUOUS

## 2025-01-02 RX ORDER — CYCLOBENZAPRINE HCL 10 MG
10 TABLET ORAL 3 TIMES DAILY PRN
Qty: 21 TABLET | Refills: 0 | OUTPATIENT
Start: 2025-01-02 | End: 2025-01-09

## 2025-01-02 NOTE — TELEPHONE ENCOUNTER
Comments:      Last Office Visit (last PCP visit):   9/18/2024     Next Visit Date:    Future Appointments   Date Time Provider Department Center   3/12/2025  2:00 PM Inés Layton APRN - CNP [facility] DEP        **If hasn't been seen in over a year OR hasn't followed up according to last diabetes/ADHD visit, make appointment for patient before sending refill to provider.     Rx requested:    Requested Prescriptions     Pending Prescriptions Disp Refills    cyclobenzaprine (FLEXERIL) 10 MG tablet 21 tablet 0     Sig: Take 1 tablet by mouth 3 times daily as needed for Muscle spasms

## 2025-01-13 ENCOUNTER — HOSPITAL ENCOUNTER (EMERGENCY)
Age: 70
Discharge: HOME | End: 2025-01-13
Attending: EMERGENCY MEDICINE
Payer: MEDICARE

## 2025-01-13 VITALS
BODY MASS INDEX: 21.22 KG/M2 | DIASTOLIC BLOOD PRESSURE: 65 MMHG | RESPIRATION RATE: 19 BRPM | HEIGHT: 68 IN | WEIGHT: 140 LBS | HEART RATE: 73 BPM | OXYGEN SATURATION: 96 % | SYSTOLIC BLOOD PRESSURE: 92 MMHG | TEMPERATURE: 97.9 F

## 2025-01-13 DIAGNOSIS — G89.29 CHRONIC RIGHT-SIDED LOW BACK PAIN WITH RIGHT-SIDED SCIATICA: Primary | ICD-10-CM

## 2025-01-13 DIAGNOSIS — M54.41 CHRONIC RIGHT-SIDED LOW BACK PAIN WITH RIGHT-SIDED SCIATICA: Primary | ICD-10-CM

## 2025-01-13 PROCEDURE — 99283 EMERGENCY DEPT VISIT LOW MDM: CPT

## 2025-01-13 RX ORDER — IBUPROFEN 800 MG/1
800 TABLET, FILM COATED ORAL EVERY 8 HOURS PRN
Qty: 30 TABLET | Refills: 0 | Status: SHIPPED | OUTPATIENT
Start: 2025-01-13

## 2025-01-13 ASSESSMENT — ENCOUNTER SYMPTOMS
ABDOMINAL PAIN: 0
VOMITING: 0
COUGH: 0
SINUS PAIN: 0
EYE PAIN: 0
EYE REDNESS: 0
SHORTNESS OF BREATH: 0
BACK PAIN: 1
NAUSEA: 0

## 2025-01-13 ASSESSMENT — PAIN DESCRIPTION - LOCATION: LOCATION: KNEE;BACK

## 2025-01-13 ASSESSMENT — PAIN - FUNCTIONAL ASSESSMENT: PAIN_FUNCTIONAL_ASSESSMENT: 0-10

## 2025-01-13 ASSESSMENT — PAIN SCALES - GENERAL: PAINLEVEL_OUTOF10: 8

## 2025-01-13 NOTE — ED NOTES
D/C instructions explained to patient who voices understanding.  Patient is ambulatory from ED with no distress observed.  Gait is steady but slow.

## 2025-01-13 NOTE — ED PROVIDER NOTES
[facility] EMERGENCY DEPARTMENT  EMERGENCY DEPARTMENT ENCOUNTER      Pt Name: Marquez Miguel  MRN: [id number]  Birthdate 1955  Date of evaluation: 1/13/2025  Provider: Delta Shearer DO  12:48 AM EST    CHIEF COMPLAINT       Chief Complaint   Patient presents with    Back Pain    Knee Pain     left         HISTORY OF PRESENT ILLNESS   (Location/Symptom, Timing/Onset, Context/Setting, Quality, Duration, Modifying Factors, Severity)  Note limiting factors.       Marquez Miguel is a 69-year-old male with history of chronic back pain, and knee pain who presents to the emergency department for a refill for his Norco medication.  Patient reports he was hopeful that we could write a prescription for oxycodone and Tylenol so he would not have to go to his family doctor for a refill.  Patient is uncertain of the physician who initially prescribed the medication.  Patient has been taking Motrin with minimal relief.          Nursing Notes were reviewed.    REVIEW OF SYSTEMS    (2-9 systems for level 4, 10 or more for level 5)     Review of Systems   Constitutional:  Negative for chills and fever.   HENT:  Negative for ear pain and sinus pain.    Eyes:  Negative for pain and redness.   Respiratory:  Negative for cough and shortness of breath.    Cardiovascular:  Negative for chest pain.   Gastrointestinal:  Negative for abdominal pain, nausea and vomiting.   Genitourinary:  Negative for dysuria and flank pain.   Musculoskeletal:  Positive for arthralgias and back pain.   Skin:  Negative for rash.   Neurological:  Negative for dizziness and headaches.   Psychiatric/Behavioral:  Negative for confusion. The patient is not nervous/anxious.        Except as noted above the remainder of the review of systems was reviewed and negative.       PAST MEDICAL HISTORY     Past Medical History:   Diagnosis Date    Allergic rhinitis 02/19/2018    Anxiety     Bilateral carotid artery stenosis 05/10/2023    Chronic back pain     Colon

## 2025-01-13 NOTE — ED TRIAGE NOTES
Pt to ER with c/o chronic back pain and left knee pain, pt has bottle with oxycodone in it states he doesn't have enough of them to get him through till tomorrow

## 2025-01-21 ENCOUNTER — OFFICE VISIT (OUTPATIENT)
Dept: FAMILY MEDICINE CLINIC | Age: 70
End: 2025-01-21
Payer: MEDICARE

## 2025-01-21 VITALS
WEIGHT: 132.2 LBS | BODY MASS INDEX: 20.03 KG/M2 | SYSTOLIC BLOOD PRESSURE: 108 MMHG | HEART RATE: 87 BPM | TEMPERATURE: 97.2 F | OXYGEN SATURATION: 99 % | HEIGHT: 68 IN | DIASTOLIC BLOOD PRESSURE: 84 MMHG

## 2025-01-21 DIAGNOSIS — M46.1 SACROILIITIS (HCC): ICD-10-CM

## 2025-01-21 DIAGNOSIS — F41.9 ANXIETY: ICD-10-CM

## 2025-01-21 DIAGNOSIS — F32.A DEPRESSION, UNSPECIFIED DEPRESSION TYPE: ICD-10-CM

## 2025-01-21 DIAGNOSIS — M54.16 RADICULOPATHY, LUMBAR REGION: ICD-10-CM

## 2025-01-21 DIAGNOSIS — D50.9 IRON DEFICIENCY ANEMIA, UNSPECIFIED IRON DEFICIENCY ANEMIA TYPE: ICD-10-CM

## 2025-01-21 DIAGNOSIS — K21.9 GASTROESOPHAGEAL REFLUX DISEASE WITHOUT ESOPHAGITIS: Primary | ICD-10-CM

## 2025-01-21 DIAGNOSIS — F11.11 OPIOID ABUSE, IN REMISSION (HCC): ICD-10-CM

## 2025-01-21 DIAGNOSIS — E78.5 HYPERLIPIDEMIA, UNSPECIFIED HYPERLIPIDEMIA TYPE: ICD-10-CM

## 2025-01-21 DIAGNOSIS — D64.9 ANEMIA, UNSPECIFIED TYPE: ICD-10-CM

## 2025-01-21 DIAGNOSIS — J44.9 CHRONIC OBSTRUCTIVE PULMONARY DISEASE, UNSPECIFIED COPD TYPE (HCC): ICD-10-CM

## 2025-01-21 LAB
ALBUMIN SERPL-MCNC: 4.3 G/DL (ref 3.5–4.6)
ALP SERPL-CCNC: 56 U/L (ref 35–104)
ALT SERPL-CCNC: 8 U/L (ref 0–41)
ANION GAP SERPL CALCULATED.3IONS-SCNC: 13 MEQ/L (ref 9–15)
AST SERPL-CCNC: 13 U/L (ref 0–40)
BILIRUB SERPL-MCNC: <0.2 MG/DL (ref 0.2–0.7)
BUN SERPL-MCNC: 19 MG/DL (ref 8–23)
CALCIUM SERPL-MCNC: 9.3 MG/DL (ref 8.5–9.9)
CHLORIDE SERPL-SCNC: 102 MEQ/L (ref 95–107)
CHOLEST SERPL-MCNC: 142 MG/DL (ref 0–199)
CO2 SERPL-SCNC: 24 MEQ/L (ref 20–31)
CREAT SERPL-MCNC: 1.2 MG/DL (ref 0.7–1.2)
ERYTHROCYTE [DISTWIDTH] IN BLOOD BY AUTOMATED COUNT: 13.3 % (ref 11.5–14.5)
GLOBULIN SER CALC-MCNC: 2.4 G/DL (ref 2.3–3.5)
GLUCOSE SERPL-MCNC: 86 MG/DL (ref 70–99)
HCT VFR BLD AUTO: 38.1 % (ref 42–52)
HDLC SERPL-MCNC: 52 MG/DL (ref 40–59)
HGB BLD-MCNC: 13.2 G/DL (ref 14–18)
IRON % SATURATION: 23 % (ref 20–55)
IRON: 74 UG/DL (ref 61–157)
LDLC SERPL CALC-MCNC: 76 MG/DL (ref 0–129)
MCH RBC QN AUTO: 34.9 PG (ref 27–31.3)
MCHC RBC AUTO-ENTMCNC: 34.6 % (ref 33–37)
MCV RBC AUTO: 100.8 FL (ref 79–92.2)
PLATELET # BLD AUTO: 321 K/UL (ref 130–400)
POTASSIUM SERPL-SCNC: 4.6 MEQ/L (ref 3.4–4.9)
PROT SERPL-MCNC: 6.7 G/DL (ref 6.3–8)
RBC # BLD AUTO: 3.78 M/UL (ref 4.7–6.1)
SODIUM SERPL-SCNC: 139 MEQ/L (ref 135–144)
TOTAL IRON BINDING CAPACITY: 325 UG/DL (ref 250–450)
TRIGL SERPL-MCNC: 72 MG/DL (ref 0–150)
UNSATURATED IRON BINDING CAPACITY: 251 UG/DL (ref 112–347)
WBC # BLD AUTO: 9.3 K/UL (ref 4.8–10.8)

## 2025-01-21 PROCEDURE — 99214 OFFICE O/P EST MOD 30 MIN: CPT | Performed by: NURSE PRACTITIONER

## 2025-01-21 PROCEDURE — 1123F ACP DISCUSS/DSCN MKR DOCD: CPT | Performed by: NURSE PRACTITIONER

## 2025-01-21 PROCEDURE — 1159F MED LIST DOCD IN RCRD: CPT | Performed by: NURSE PRACTITIONER

## 2025-01-21 PROCEDURE — 3079F DIAST BP 80-89 MM HG: CPT | Performed by: NURSE PRACTITIONER

## 2025-01-21 PROCEDURE — 3074F SYST BP LT 130 MM HG: CPT | Performed by: NURSE PRACTITIONER

## 2025-01-21 PROCEDURE — 1125F AMNT PAIN NOTED PAIN PRSNT: CPT | Performed by: NURSE PRACTITIONER

## 2025-01-21 RX ORDER — PANTOPRAZOLE SODIUM 40 MG/1
40 TABLET, DELAYED RELEASE ORAL DAILY
Qty: 90 TABLET | Refills: 1 | Status: SHIPPED | OUTPATIENT
Start: 2025-01-21

## 2025-01-21 RX ORDER — MELOXICAM 7.5 MG/1
7.5 TABLET ORAL 2 TIMES DAILY
Qty: 180 TABLET | Refills: 1 | Status: SHIPPED | OUTPATIENT
Start: 2025-01-21

## 2025-01-21 RX ORDER — IBUPROFEN 800 MG/1
800 TABLET, FILM COATED ORAL EVERY 8 HOURS PRN
Qty: 90 TABLET | Refills: 1 | Status: SHIPPED | OUTPATIENT
Start: 2025-01-21

## 2025-01-21 RX ORDER — ROSUVASTATIN CALCIUM 40 MG/1
40 TABLET, COATED ORAL EVERY EVENING
Qty: 90 TABLET | Refills: 1 | Status: SHIPPED | OUTPATIENT
Start: 2025-01-21

## 2025-01-21 RX ORDER — FLUOXETINE 40 MG/1
40 CAPSULE ORAL DAILY
Qty: 90 CAPSULE | Refills: 1 | Status: SHIPPED | OUTPATIENT
Start: 2025-01-21

## 2025-01-21 RX ORDER — GABAPENTIN 600 MG/1
TABLET ORAL
Qty: 90 TABLET | Refills: 0 | Status: SHIPPED | OUTPATIENT
Start: 2025-01-21 | End: 2025-03-06

## 2025-01-21 SDOH — ECONOMIC STABILITY: FOOD INSECURITY: WITHIN THE PAST 12 MONTHS, YOU WORRIED THAT YOUR FOOD WOULD RUN OUT BEFORE YOU GOT MONEY TO BUY MORE.: NEVER TRUE

## 2025-01-21 SDOH — ECONOMIC STABILITY: FOOD INSECURITY: WITHIN THE PAST 12 MONTHS, THE FOOD YOU BOUGHT JUST DIDN'T LAST AND YOU DIDN'T HAVE MONEY TO GET MORE.: NEVER TRUE

## 2025-01-21 ASSESSMENT — PATIENT HEALTH QUESTIONNAIRE - PHQ9
4. FEELING TIRED OR HAVING LITTLE ENERGY: NOT AT ALL
10. IF YOU CHECKED OFF ANY PROBLEMS, HOW DIFFICULT HAVE THESE PROBLEMS MADE IT FOR YOU TO DO YOUR WORK, TAKE CARE OF THINGS AT HOME, OR GET ALONG WITH OTHER PEOPLE: NOT DIFFICULT AT ALL
SUM OF ALL RESPONSES TO PHQ9 QUESTIONS 1 & 2: 0
2. FEELING DOWN, DEPRESSED OR HOPELESS: NOT AT ALL
8. MOVING OR SPEAKING SO SLOWLY THAT OTHER PEOPLE COULD HAVE NOTICED. OR THE OPPOSITE, BEING SO FIGETY OR RESTLESS THAT YOU HAVE BEEN MOVING AROUND A LOT MORE THAN USUAL: NOT AT ALL
SUM OF ALL RESPONSES TO PHQ QUESTIONS 1-9: 0
5. POOR APPETITE OR OVEREATING: NOT AT ALL
6. FEELING BAD ABOUT YOURSELF - OR THAT YOU ARE A FAILURE OR HAVE LET YOURSELF OR YOUR FAMILY DOWN: NOT AT ALL
SUM OF ALL RESPONSES TO PHQ QUESTIONS 1-9: 0
SUM OF ALL RESPONSES TO PHQ QUESTIONS 1-9: 0
3. TROUBLE FALLING OR STAYING ASLEEP: NOT AT ALL
7. TROUBLE CONCENTRATING ON THINGS, SUCH AS READING THE NEWSPAPER OR WATCHING TELEVISION: NOT AT ALL
1. LITTLE INTEREST OR PLEASURE IN DOING THINGS: NOT AT ALL
SUM OF ALL RESPONSES TO PHQ QUESTIONS 1-9: 0

## 2025-01-21 ASSESSMENT — ENCOUNTER SYMPTOMS
COUGH: 0
SHORTNESS OF BREATH: 0
BACK PAIN: 1

## 2025-01-21 NOTE — PROGRESS NOTES
time. Mental status is at baseline.   Psychiatric:         Mood and Affect: Mood normal.         Behavior: Behavior normal.         Thought Content: Thought content normal.         Judgment: Judgment normal.            Assessment & Plan   Diagnosis Orders   1. Gastroesophageal reflux disease without esophagitis  pantoprazole (PROTONIX) 40 MG tablet      2. Anxiety  FLUoxetine (PROZAC) 40 MG capsule      3. Depression, unspecified depression type  FLUoxetine (PROZAC) 40 MG capsule      4. Radiculopathy, lumbar region  gabapentin (NEURONTIN) 600 MG tablet    meloxicam (MOBIC) 7.5 MG tablet      5. Sacroiliitis (HCC)  meloxicam (MOBIC) 7.5 MG tablet      6. Hyperlipidemia, unspecified hyperlipidemia type  rosuvastatin (CRESTOR) 40 MG tablet    Comprehensive Metabolic Panel    Lipid Panel      7. Opioid abuse, in remission (HCC)  Currently following with pain management for care      8. Chronic obstructive pulmonary disease, unspecified COPD type (HCC)  Stable symptoms. Declines CT lung screening      9. Anemia, unspecified type  CBC      10. Iron deficiency anemia, unspecified iron deficiency anemia type  CBC    Iron and TIBC          Orders Placed This Encounter   Procedures    CBC     Standing Status:   Future     Number of Occurrences:   1     Standing Expiration Date:   1/21/2026    Comprehensive Metabolic Panel     Standing Status:   Future     Number of Occurrences:   1     Standing Expiration Date:   1/21/2026    Iron and TIBC     Standing Status:   Future     Number of Occurrences:   1     Standing Expiration Date:   1/21/2026    Lipid Panel     Standing Status:   Future     Number of Occurrences:   1     Standing Expiration Date:   1/21/2026       Orders Placed This Encounter   Medications    FLUoxetine (PROZAC) 40 MG capsule     Sig: Take 1 capsule by mouth daily     Dispense:  90 capsule     Refill:  1    gabapentin (NEURONTIN) 600 MG tablet     Sig: take 1 tablet by mouth every morning and take 1 tablet by

## 2025-01-30 ENCOUNTER — TELEPHONE (OUTPATIENT)
Dept: FAMILY MEDICINE CLINIC | Age: 70
End: 2025-01-30

## 2025-01-31 ENCOUNTER — HOSPITAL ENCOUNTER (EMERGENCY)
Age: 70
Discharge: HOME | End: 2025-01-31

## 2025-01-31 ENCOUNTER — OFFICE VISIT (OUTPATIENT)
Age: 70
End: 2025-01-31
Payer: MEDICARE

## 2025-01-31 ENCOUNTER — TELEPHONE (OUTPATIENT)
Dept: FAMILY MEDICINE CLINIC | Age: 70
End: 2025-01-31

## 2025-01-31 VITALS
WEIGHT: 140 LBS | OXYGEN SATURATION: 99 % | HEART RATE: 98 BPM | TEMPERATURE: 97.1 F | BODY MASS INDEX: 21.22 KG/M2 | HEIGHT: 68 IN

## 2025-01-31 VITALS
RESPIRATION RATE: 18 BRPM | HEART RATE: 75 BPM | BODY MASS INDEX: 21.29 KG/M2 | TEMPERATURE: 98 F | SYSTOLIC BLOOD PRESSURE: 118 MMHG | OXYGEN SATURATION: 96 % | DIASTOLIC BLOOD PRESSURE: 95 MMHG | WEIGHT: 140 LBS

## 2025-01-31 DIAGNOSIS — M46.1 SACROILIITIS (HCC): ICD-10-CM

## 2025-01-31 DIAGNOSIS — M54.16 RADICULOPATHY, LUMBAR REGION: ICD-10-CM

## 2025-01-31 PROCEDURE — 1123F ACP DISCUSS/DSCN MKR DOCD: CPT | Performed by: PAIN MEDICINE

## 2025-01-31 PROCEDURE — 1125F AMNT PAIN NOTED PAIN PRSNT: CPT | Performed by: PAIN MEDICINE

## 2025-01-31 PROCEDURE — 99213 OFFICE O/P EST LOW 20 MIN: CPT | Performed by: PAIN MEDICINE

## 2025-01-31 PROCEDURE — 1159F MED LIST DOCD IN RCRD: CPT | Performed by: PAIN MEDICINE

## 2025-01-31 PROCEDURE — 1160F RVW MEDS BY RX/DR IN RCRD: CPT | Performed by: PAIN MEDICINE

## 2025-01-31 PROCEDURE — 99215 OFFICE O/P EST HI 40 MIN: CPT | Performed by: PAIN MEDICINE

## 2025-01-31 RX ORDER — CYCLOBENZAPRINE HCL 10 MG
10 TABLET ORAL ONCE
Status: DISCONTINUED | OUTPATIENT
Start: 2025-01-31 | End: 2025-01-31

## 2025-01-31 RX ORDER — OXYCODONE AND ACETAMINOPHEN 5; 325 MG/1; MG/1
1 TABLET ORAL EVERY 4 HOURS PRN
COMMUNITY

## 2025-01-31 RX ORDER — KETOROLAC TROMETHAMINE 30 MG/ML
30 INJECTION, SOLUTION INTRAMUSCULAR; INTRAVENOUS ONCE
Status: DISCONTINUED | OUTPATIENT
Start: 2025-01-31 | End: 2025-01-31

## 2025-01-31 ASSESSMENT — ENCOUNTER SYMPTOMS
EYES NEGATIVE: 1
GASTROINTESTINAL NEGATIVE: 1
ALLERGIC/IMMUNOLOGIC NEGATIVE: 1
RESPIRATORY NEGATIVE: 1

## 2025-01-31 NOTE — TELEPHONE ENCOUNTER
Pt calling because he can't remember who Inés verbally referred him to and needs someone to call him and let him know.    Ph [phone]
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
## 2025-01-31 NOTE — PROGRESS NOTES
unaware of reaction       Review of Systems  Review of Systems   Constitutional: Negative.    HENT: Negative.     Eyes: Negative.    Respiratory: Negative.          COPD.   Cardiovascular: Negative.    Gastrointestinal: Negative.    Endocrine: Negative.    Genitourinary: Negative.    Musculoskeletal: Negative.    Skin: Negative.    Allergic/Immunologic: Negative.    Neurological: Negative.    Hematological: Negative.    Psychiatric/Behavioral: Negative.     All other systems reviewed and are negative.       Physical Exam     Pulse 98   Temp 97.1 °F (36.2 °C)   Ht 1.727 m (5' 8\")   Wt 63.5 kg (140 lb)   SpO2 99%   BMI 21.29 kg/m²   Physical Exam  Vitals and nursing note reviewed.   Constitutional:       Appearance: Normal appearance.   HENT:      Head: Normocephalic.      Right Ear: Ear canal normal.      Left Ear: Ear canal normal.      Nose: Nose normal.      Mouth/Throat:      Mouth: Mucous membranes are moist.   Eyes:      Extraocular Movements: Extraocular movements intact.      Conjunctiva/sclera: Conjunctivae normal.      Pupils: Pupils are equal, round, and reactive to light.   Cardiovascular:      Rate and Rhythm: Normal rate and regular rhythm.      Pulses: Normal pulses.      Heart sounds: Normal heart sounds.   Pulmonary:      Effort: Pulmonary effort is normal.      Breath sounds: Normal breath sounds.   Abdominal:      General: Abdomen is flat. Bowel sounds are normal.      Palpations: Abdomen is soft.   Musculoskeletal:         General: Normal range of motion.      Cervical back: Normal range of motion and neck supple.      Comments: Good gait able to walk on Toes and Heels. Good ROM Flexion and Extension, No Facet Tenderness. tender Rt SI Joint, No pain on SLRs, No Concordant pain on Gainslins, No Pain on Pelvic compression and distractions. Power and reflexes intact both sides. No pain on Hip grinding severe pain on Left greater trochanter.   Skin:     General: Skin is warm.      Capillary

## 2025-02-03 ENCOUNTER — HOSPITAL ENCOUNTER (EMERGENCY)
Age: 70
Discharge: HOME | End: 2025-02-03
Payer: MEDICARE

## 2025-02-03 ENCOUNTER — TELEPHONE (OUTPATIENT)
Age: 70
End: 2025-02-03

## 2025-02-03 VITALS
SYSTOLIC BLOOD PRESSURE: 111 MMHG | OXYGEN SATURATION: 97 % | TEMPERATURE: 98 F | RESPIRATION RATE: 18 BRPM | DIASTOLIC BLOOD PRESSURE: 93 MMHG | HEART RATE: 71 BPM

## 2025-02-03 DIAGNOSIS — G89.29 ACUTE EXACERBATION OF CHRONIC LOW BACK PAIN: Primary | ICD-10-CM

## 2025-02-03 DIAGNOSIS — M54.50 ACUTE EXACERBATION OF CHRONIC LOW BACK PAIN: Primary | ICD-10-CM

## 2025-02-03 PROCEDURE — 96372 THER/PROPH/DIAG INJ SC/IM: CPT

## 2025-02-03 PROCEDURE — 6370000000 HC RX 637 (ALT 250 FOR IP)

## 2025-02-03 PROCEDURE — 99284 EMERGENCY DEPT VISIT MOD MDM: CPT

## 2025-02-03 PROCEDURE — 6360000002 HC RX W HCPCS

## 2025-02-03 RX ORDER — KETOROLAC TROMETHAMINE 15 MG/ML
15 INJECTION, SOLUTION INTRAMUSCULAR; INTRAVENOUS ONCE
Status: COMPLETED | OUTPATIENT
Start: 2025-02-03 | End: 2025-02-03

## 2025-02-03 RX ORDER — METHYLPREDNISOLONE ACETATE 40 MG/ML
60 INJECTION, SUSPENSION INTRA-ARTICULAR; INTRALESIONAL; INTRAMUSCULAR; SOFT TISSUE ONCE
Status: COMPLETED | OUTPATIENT
Start: 2025-02-03 | End: 2025-02-03

## 2025-02-03 RX ORDER — ONDANSETRON 4 MG/1
4 TABLET, ORALLY DISINTEGRATING ORAL ONCE
Status: COMPLETED | OUTPATIENT
Start: 2025-02-03 | End: 2025-02-03

## 2025-02-03 RX ORDER — KETOROLAC TROMETHAMINE 10 MG/1
10 TABLET, FILM COATED ORAL EVERY 6 HOURS PRN
Qty: 20 TABLET | Refills: 0 | Status: SHIPPED | OUTPATIENT
Start: 2025-02-03

## 2025-02-03 RX ORDER — PREDNISONE 50 MG/1
50 TABLET ORAL DAILY
Qty: 5 TABLET | Refills: 0 | Status: SHIPPED | OUTPATIENT
Start: 2025-02-03 | End: 2025-02-08

## 2025-02-03 RX ADMIN — ONDANSETRON 4 MG: 4 TABLET, ORALLY DISINTEGRATING ORAL at 04:57

## 2025-02-03 RX ADMIN — KETOROLAC TROMETHAMINE 15 MG: 15 INJECTION, SOLUTION INTRAMUSCULAR; INTRAVENOUS at 04:56

## 2025-02-03 RX ADMIN — METHYLPREDNISOLONE ACETATE 60 MG: 40 INJECTION, SUSPENSION INTRA-ARTICULAR; INTRALESIONAL; INTRAMUSCULAR; INTRASYNOVIAL; SOFT TISSUE at 04:57

## 2025-02-03 ASSESSMENT — ENCOUNTER SYMPTOMS
VOMITING: 1
ABDOMINAL PAIN: 0
NAUSEA: 1
DIARRHEA: 0
SHORTNESS OF BREATH: 0
BACK PAIN: 1

## 2025-02-03 ASSESSMENT — PAIN DESCRIPTION - LOCATION: LOCATION: BACK

## 2025-02-03 ASSESSMENT — PAIN DESCRIPTION - DESCRIPTORS: DESCRIPTORS: ACHING

## 2025-02-03 ASSESSMENT — PAIN SCALES - GENERAL: PAINLEVEL_OUTOF10: 8

## 2025-02-03 NOTE — ED PROVIDER NOTES
[facility] EMERGENCY DEPARTMENT  EMERGENCY DEPARTMENT ENCOUNTER      Pt Name: Marquez Miguel  MRN: [id number]  Birthdate 1955  Date of evaluation: 2/3/2025  Provider: Olga Wild PA-C      HISTORY OF PRESENT ILLNESS    Marquez Miguel is a 70 y.o. male who presents to the Emergency Department with low back pain.  Patient states the pain is chronic.  Patient states he has had pain for approximately 4 months but had worsening of symptoms today.  Patient states he is unsure of what medication he takes for this.  Patient sees pain management with last visit being 3 days ago.  Pain management is recommending injections.  Patient states pain is sharp and located to lower left side.  Patient has nausea and vomiting that occurs with the pain which has been a chronic issue for him.  Patient denies chest pain, shortness of breath, abdominal pain, fever, chills, lower extremity weakness, urinary or bowel changes, or saddle paresthesia.     REVIEW OF SYSTEMS       Review of Systems   Constitutional:  Negative for chills and fever.   Respiratory:  Negative for shortness of breath.    Cardiovascular:  Negative for chest pain.   Gastrointestinal:  Positive for nausea and vomiting. Negative for abdominal pain and diarrhea.   Musculoskeletal:  Positive for back pain. Negative for neck pain.   Skin:  Negative for rash.         PAST MEDICAL HISTORY     Past Medical History:   Diagnosis Date    Allergic rhinitis 02/19/2018    Anxiety     Bilateral carotid artery stenosis 05/10/2023    Chronic back pain     Colon polyp     COPD (chronic obstructive pulmonary disease) (HCC)     Depression     Disorder of stomach     valve not closing properly    Emphysema lung (HCC)     Essential hypertension 11/04/2019    ETOH abuse     SOBER X 20 YEARS    Gastroesophageal reflux disease 10/04/2019    Hydronephrosis of left kidney 08/23/2023    Hyperlipidemia     meds > 22 yrs    Low back pain 05/01/2018    ARNAUD (obstructive sleep apnea)

## 2025-02-03 NOTE — TELEPHONE ENCOUNTER
REFERRAL # [id number]    RIGHT SI JOINT INJ     AUTH FROM 2/3/25-8/2/25    OK to schedule procedure approved as above.   Please note sides/levels approved and date range.   (If applicable, sides/levels approved may differ from those ordered)    TO BE SCHEDULED WITH DR LEUNG

## 2025-02-03 NOTE — ED TRIAGE NOTES
Started other day middle to lower back pain chronic in nature denies any new trauma states he needs something for pain

## 2025-02-06 ENCOUNTER — TELEPHONE (OUTPATIENT)
Age: 70
End: 2025-02-06

## 2025-02-06 ENCOUNTER — HOSPITAL ENCOUNTER (EMERGENCY)
Age: 70
Discharge: LEFT BEFORE DISCHARGE | End: 2025-02-06
Attending: STUDENT IN AN ORGANIZED HEALTH CARE EDUCATION/TRAINING PROGRAM
Payer: MEDICARE

## 2025-02-06 ENCOUNTER — TELEPHONE (OUTPATIENT)
Dept: FAMILY MEDICINE CLINIC | Age: 70
End: 2025-02-06

## 2025-02-06 ENCOUNTER — APPOINTMENT (OUTPATIENT)
Dept: CT IMAGING | Age: 70
End: 2025-02-06
Payer: MEDICARE

## 2025-02-06 VITALS
BODY MASS INDEX: 17.43 KG/M2 | HEART RATE: 64 BPM | SYSTOLIC BLOOD PRESSURE: 116 MMHG | OXYGEN SATURATION: 100 % | RESPIRATION RATE: 17 BRPM | TEMPERATURE: 97 F | WEIGHT: 115 LBS | HEIGHT: 68 IN | DIASTOLIC BLOOD PRESSURE: 65 MMHG

## 2025-02-06 DIAGNOSIS — M54.50 CHRONIC BILATERAL LOW BACK PAIN WITHOUT SCIATICA: ICD-10-CM

## 2025-02-06 DIAGNOSIS — G89.29 CHRONIC BILATERAL LOW BACK PAIN WITHOUT SCIATICA: ICD-10-CM

## 2025-02-06 DIAGNOSIS — R10.84 GENERALIZED ABDOMINAL PAIN: Primary | ICD-10-CM

## 2025-02-06 LAB
ALBUMIN SERPL-MCNC: 4.2 G/DL (ref 3.5–4.6)
ALP SERPL-CCNC: 58 U/L (ref 35–104)
ALT SERPL-CCNC: 21 U/L (ref 0–41)
ANION GAP SERPL CALCULATED.3IONS-SCNC: 16 MEQ/L (ref 9–15)
AST SERPL-CCNC: 9 U/L (ref 0–40)
BACTERIA URNS QL MICRO: NEGATIVE /HPF
BASOPHILS # BLD: 0 K/UL (ref 0–0.2)
BASOPHILS NFR BLD: 0.1 %
BILIRUB SERPL-MCNC: <0.2 MG/DL (ref 0.2–0.7)
BILIRUB UR QL STRIP: NEGATIVE
BUN SERPL-MCNC: 53 MG/DL (ref 8–23)
CALCIUM SERPL-MCNC: 9.8 MG/DL (ref 8.5–9.9)
CHLORIDE SERPL-SCNC: 104 MEQ/L (ref 95–107)
CLARITY UR: CLEAR
CO2 SERPL-SCNC: 18 MEQ/L (ref 20–31)
COLOR UR: YELLOW
CREAT SERPL-MCNC: 1.5 MG/DL (ref 0.7–1.2)
EOSINOPHIL # BLD: 0.1 K/UL (ref 0–0.7)
EOSINOPHIL NFR BLD: 0.7 %
EPI CELLS #/AREA URNS AUTO: ABNORMAL /HPF (ref 0–5)
ERYTHROCYTE [DISTWIDTH] IN BLOOD BY AUTOMATED COUNT: 13.3 % (ref 11.5–14.5)
GLOBULIN SER CALC-MCNC: 2.6 G/DL (ref 2.3–3.5)
GLUCOSE SERPL-MCNC: 120 MG/DL (ref 70–99)
GLUCOSE UR STRIP-MCNC: NEGATIVE MG/DL
HCT VFR BLD AUTO: 36.1 % (ref 42–52)
HGB BLD-MCNC: 12.9 G/DL (ref 14–18)
HGB UR QL STRIP: NEGATIVE
HYALINE CASTS #/AREA URNS AUTO: ABNORMAL /HPF (ref 0–5)
KETONES UR STRIP-MCNC: NEGATIVE MG/DL
LACTATE BLDV-SCNC: 2.5 MMOL/L (ref 0.5–2.2)
LACTATE BLDV-SCNC: 2.5 MMOL/L (ref 0.5–2.2)
LEUKOCYTE ESTERASE UR QL STRIP: NEGATIVE
LIPASE SERPL-CCNC: 39 U/L (ref 12–95)
LYMPHOCYTES # BLD: 1.4 K/UL (ref 1–4.8)
LYMPHOCYTES NFR BLD: 8.8 %
MCH RBC QN AUTO: 34.2 PG (ref 27–31.3)
MCHC RBC AUTO-ENTMCNC: 35.7 % (ref 33–37)
MCV RBC AUTO: 95.8 FL (ref 79–92.2)
MONOCYTES # BLD: 0.8 K/UL (ref 0.2–0.8)
MONOCYTES NFR BLD: 5 %
NEUTROPHILS # BLD: 13.2 K/UL (ref 1.4–6.5)
NEUTS SEG NFR BLD: 84.9 %
NITRITE UR QL STRIP: NEGATIVE
PH UR STRIP: 6 [PH] (ref 5–9)
PLATELET # BLD AUTO: 386 K/UL (ref 130–400)
POTASSIUM SERPL-SCNC: 2.7 MEQ/L (ref 3.4–4.9)
PROT SERPL-MCNC: 6.8 G/DL (ref 6.3–8)
PROT UR STRIP-MCNC: 30 MG/DL
RBC # BLD AUTO: 3.77 M/UL (ref 4.7–6.1)
RBC #/AREA URNS AUTO: ABNORMAL /HPF (ref 0–5)
SODIUM SERPL-SCNC: 138 MEQ/L (ref 135–144)
SP GR UR STRIP: 1.03 (ref 1–1.03)
URINE REFLEX TO CULTURE: ABNORMAL
UROBILINOGEN UR STRIP-ACNC: 0.2 E.U./DL
WBC # BLD AUTO: 15.6 K/UL (ref 4.8–10.8)
WBC #/AREA URNS AUTO: ABNORMAL /HPF (ref 0–5)

## 2025-02-06 PROCEDURE — 6360000002 HC RX W HCPCS

## 2025-02-06 PROCEDURE — 36415 COLL VENOUS BLD VENIPUNCTURE: CPT

## 2025-02-06 PROCEDURE — 74176 CT ABD & PELVIS W/O CONTRAST: CPT

## 2025-02-06 PROCEDURE — 80053 COMPREHEN METABOLIC PANEL: CPT

## 2025-02-06 PROCEDURE — 99284 EMERGENCY DEPT VISIT MOD MDM: CPT

## 2025-02-06 PROCEDURE — 83605 ASSAY OF LACTIC ACID: CPT

## 2025-02-06 PROCEDURE — 2580000003 HC RX 258

## 2025-02-06 PROCEDURE — 96374 THER/PROPH/DIAG INJ IV PUSH: CPT

## 2025-02-06 PROCEDURE — 83690 ASSAY OF LIPASE: CPT

## 2025-02-06 PROCEDURE — 6370000000 HC RX 637 (ALT 250 FOR IP)

## 2025-02-06 PROCEDURE — 81001 URINALYSIS AUTO W/SCOPE: CPT

## 2025-02-06 PROCEDURE — 85025 COMPLETE CBC W/AUTO DIFF WBC: CPT

## 2025-02-06 RX ORDER — ONDANSETRON 2 MG/ML
4 INJECTION INTRAMUSCULAR; INTRAVENOUS ONCE
Status: COMPLETED | OUTPATIENT
Start: 2025-02-06 | End: 2025-02-06

## 2025-02-06 RX ORDER — 0.9 % SODIUM CHLORIDE 0.9 %
1000 INTRAVENOUS SOLUTION INTRAVENOUS ONCE
Status: COMPLETED | OUTPATIENT
Start: 2025-02-06 | End: 2025-02-06

## 2025-02-06 RX ORDER — ACETAMINOPHEN 500 MG
1000 TABLET ORAL ONCE
Status: COMPLETED | OUTPATIENT
Start: 2025-02-06 | End: 2025-02-06

## 2025-02-06 RX ORDER — KETOROLAC TROMETHAMINE 30 MG/ML
30 INJECTION, SOLUTION INTRAMUSCULAR; INTRAVENOUS ONCE
Status: DISCONTINUED | OUTPATIENT
Start: 2025-02-06 | End: 2025-02-06

## 2025-02-06 RX ORDER — POTASSIUM CHLORIDE 1500 MG/1
40 TABLET, EXTENDED RELEASE ORAL ONCE
Status: COMPLETED | OUTPATIENT
Start: 2025-02-06 | End: 2025-02-06

## 2025-02-06 RX ADMIN — POTASSIUM CHLORIDE 40 MEQ: 1500 TABLET, EXTENDED RELEASE ORAL at 19:13

## 2025-02-06 RX ADMIN — ONDANSETRON 4 MG: 2 INJECTION, SOLUTION INTRAMUSCULAR; INTRAVENOUS at 18:06

## 2025-02-06 RX ADMIN — SODIUM CHLORIDE 1000 ML: 9 INJECTION, SOLUTION INTRAVENOUS at 19:15

## 2025-02-06 RX ADMIN — LIDOCAINE HYDROCHLORIDE: 20 SOLUTION ORAL at 19:18

## 2025-02-06 RX ADMIN — ACETAMINOPHEN 1000 MG: 500 TABLET ORAL at 18:05

## 2025-02-06 ASSESSMENT — PAIN DESCRIPTION - FREQUENCY: FREQUENCY: CONTINUOUS

## 2025-02-06 ASSESSMENT — PAIN - FUNCTIONAL ASSESSMENT: PAIN_FUNCTIONAL_ASSESSMENT: 0-10

## 2025-02-06 ASSESSMENT — ENCOUNTER SYMPTOMS
VOMITING: 0
CONSTIPATION: 0
ABDOMINAL PAIN: 1
NAUSEA: 1
DIARRHEA: 0
BACK PAIN: 1

## 2025-02-06 ASSESSMENT — PAIN DESCRIPTION - ORIENTATION: ORIENTATION: MID

## 2025-02-06 ASSESSMENT — PAIN SCALES - GENERAL
PAINLEVEL_OUTOF10: 7
PAINLEVEL_OUTOF10: 8

## 2025-02-06 ASSESSMENT — PAIN DESCRIPTION - LOCATION
LOCATION: BACK
LOCATION: ABDOMEN

## 2025-02-06 ASSESSMENT — PAIN DESCRIPTION - PAIN TYPE: TYPE: CHRONIC PAIN

## 2025-02-06 ASSESSMENT — PAIN DESCRIPTION - DESCRIPTORS: DESCRIPTORS: DULL

## 2025-02-06 NOTE — ED PROVIDER NOTES
each, R-0, PrintEx: 5 years 2/14/2028             ALLERGIES     Alcohol, Demerol hcl [meperidine], Morphine, and Sulfa antibiotics    FAMILY HISTORY       Family History   Problem Relation Age of Onset    Cancer Mother         stomach    Arthritis Mother     Heart Disease Father 50    Cancer Father         bone    No Known Problems Sister     Cancer Brother     Heart Disease Brother         hole in heart in 1959 at 3 months age    Cancer Maternal Grandmother         lung    Cancer Paternal Grandmother     Heart Disease Paternal Grandfather     Colon Cancer Neg Hx           SOCIAL HISTORY       Social History     Socioeconomic History    Marital status:      Spouse name: None    Number of children: 2    Years of education: 12    Highest education level: High school graduate   Tobacco Use    Smoking status: Every Day     Current packs/day: 2.00     Average packs/day: 2.0 packs/day for 55.1 years (110.2 ttl pk-yrs)     Types: Cigars, Cigarettes     Start date: 1970     Passive exposure: Current    Smokeless tobacco: Never    Tobacco comments:     6 cigars qd   Vaping Use    Vaping status: Never Used   Substance and Sexual Activity    Alcohol use: No     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    Drug use: No    Sexual activity: Not Currently     Partners: Female     Social Determinants of Health     Financial Resource Strain: Medium Risk (3/11/2024)    Overall Financial Resource Strain (CARDIA)     Difficulty of Paying Living Expenses: Somewhat hard   Food Insecurity: No Food Insecurity (1/21/2025)    Hunger Vital Sign     Worried About Running Out of Food in the Last Year: Never true     Ran Out of Food in the Last Year: Never true   Transportation Needs: No Transportation Needs (1/21/2025)    PRAPARE - Transportation     Lack of Transportation (Medical): No     Lack of Transportation (Non-Medical): No   Physical Activity: Insufficiently Active (3/11/2024)    Exercise Vital Sign     Days of Exercise per Week: 4 days

## 2025-02-06 NOTE — ED TRIAGE NOTES
Pt reports mid back pain that started after laying down, denies injury, pt reports hx. Of chronic back pain, pt to er via cane, steady, slow gait noted, a&ox4, skin w/d/pink.

## 2025-02-06 NOTE — TELEPHONE ENCOUNTER
Comments: pt is calling to see if back pain medication can be sent in for him.  Said that Dr. Hill said that he was going to call it in for him but he did not.  pt uses [facility] pt does not know what it is called he just called it his back pain medication.       Last Office Visit (last PCP visit):   1/21/2025     Next Visit Date:    Future Appointments   Date Time Provider Department Center   2/13/2025  2:00 PM Maninder Thurston MD [facility]   3/12/2025  2:00 PM Inés Layton, APRN - CNP [facility] DEP        **If hasn't been seen in over a year OR hasn't followed up according to last diabetes/ADHD visit, make appointment for patient before sending refill to provider.     Rx requested:    Requested Prescriptions      No prescriptions requested or ordered in this encounter

## 2025-02-06 NOTE — TELEPHONE ENCOUNTER
I cannot prescribe pain medications to a patient when they are under the care of a pain . He needs to go through that provider. According to his message he would need to schedule an appt with them.

## 2025-02-06 NOTE — TELEPHONE ENCOUNTER
Patient left voicemail on Dr. Thurston's cellphone, Dr Thurston could not understand it.    Called patient to see what he needed help with

## 2025-02-06 NOTE — TELEPHONE ENCOUNTER
Left message returning patients call to advise per Dr. Thurston does not prescribe meds over the phone, pt would need to be seen in the office.

## 2025-02-06 NOTE — TELEPHONE ENCOUNTER
Pt was calling to see if he could get something for pain until his appointment on 2/13/2025, Please Advise.

## 2025-02-06 NOTE — TELEPHONE ENCOUNTER
RECEIVED 2 VOICEMAIL'S FROM PATIENT REQUESTING PAIN MEDICATION. I CALLED PATIENT BACK AND HE WAS MADE AWARE DR LEUNG SAID HE WOULD NEED TO SEE HIM IN THE OFFICE FOR A FOLLOW UP. PATIENT REFUSED TO SCHEDULE FOLLOW UP. HE STATED HE IS COMING IN NEXT THURSDAY FOR AN INJECTION.

## 2025-02-07 ENCOUNTER — HOSPITAL ENCOUNTER (EMERGENCY)
Facility: HOSPITAL | Age: 70
Discharge: LEFT BEFORE DISCHARGE | End: 2025-02-07
Attending: STUDENT IN AN ORGANIZED HEALTH CARE EDUCATION/TRAINING PROGRAM
Payer: MEDICARE

## 2025-02-07 ENCOUNTER — APPOINTMENT (OUTPATIENT)
Dept: RADIOLOGY | Facility: HOSPITAL | Age: 70
End: 2025-02-07
Payer: MEDICARE

## 2025-02-07 VITALS
HEART RATE: 57 BPM | TEMPERATURE: 97.9 F | RESPIRATION RATE: 16 BRPM | WEIGHT: 146 LBS | HEIGHT: 68 IN | OXYGEN SATURATION: 98 % | DIASTOLIC BLOOD PRESSURE: 83 MMHG | SYSTOLIC BLOOD PRESSURE: 128 MMHG | BODY MASS INDEX: 22.13 KG/M2

## 2025-02-07 DIAGNOSIS — S01.81XA FACIAL LACERATION, INITIAL ENCOUNTER: ICD-10-CM
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
## 2025-02-07 DIAGNOSIS — Z53.29 LEFT AGAINST MEDICAL ADVICE: ICD-10-CM

## 2025-02-07 LAB
ABO GROUP (TYPE) IN BLOOD: NORMAL
ALBUMIN SERPL BCP-MCNC: 4 G/DL (ref 3.4–5)
ALP SERPL-CCNC: 45 U/L (ref 33–136)
ALT SERPL W P-5'-P-CCNC: 21 U/L (ref 10–52)
ANION GAP SERPL CALC-SCNC: 12 MMOL/L (ref 10–20)
ANTIBODY SCREEN: NORMAL
AST SERPL W P-5'-P-CCNC: 9 U/L (ref 9–39)
BASOPHILS # BLD AUTO: 0.01 X10*3/UL (ref 0–0.1)
BASOPHILS NFR BLD AUTO: 0.1 %
BILIRUB SERPL-MCNC: 0.3 MG/DL (ref 0–1.2)
BUN SERPL-MCNC: 51 MG/DL (ref 6–23)
CALCIUM SERPL-MCNC: 9.3 MG/DL (ref 8.6–10.3)
CARDIAC TROPONIN I PNL SERPL HS: 12 NG/L (ref 0–20)
CHLORIDE SERPL-SCNC: 111 MMOL/L (ref 98–107)
CO2 SERPL-SCNC: 18 MMOL/L (ref 21–32)
CREAT SERPL-MCNC: 1.34 MG/DL (ref 0.5–1.3)
EGFRCR SERPLBLD CKD-EPI 2021: 56 ML/MIN/1.73M*2
EOSINOPHIL # BLD AUTO: 0 X10*3/UL (ref 0–0.4)
EOSINOPHIL NFR BLD AUTO: 0 %
ERYTHROCYTE [DISTWIDTH] IN BLOOD BY AUTOMATED COUNT: 13.2 % (ref 11.5–14.5)
ETHANOL SERPL-MCNC: <10 MG/DL
GLUCOSE SERPL-MCNC: 104 MG/DL (ref 74–99)
HCT VFR BLD AUTO: 32.6 % (ref 41–52)
HGB BLD-MCNC: 11.5 G/DL (ref 13.5–17.5)
IMM GRANULOCYTES # BLD AUTO: 0.06 X10*3/UL (ref 0–0.5)
IMM GRANULOCYTES NFR BLD AUTO: 0.4 % (ref 0–0.9)
INR PPP: 0.9 (ref 0.9–1.1)
LACTATE SERPL-SCNC: 1.7 MMOL/L (ref 0.4–2)
LYMPHOCYTES # BLD AUTO: 1.4 X10*3/UL (ref 0.8–3)
LYMPHOCYTES NFR BLD AUTO: 10.5 %
MCH RBC QN AUTO: 33.5 PG (ref 26–34)
MCHC RBC AUTO-ENTMCNC: 35.3 G/DL (ref 32–36)
MCV RBC AUTO: 95 FL (ref 80–100)
MONOCYTES # BLD AUTO: 0.84 X10*3/UL (ref 0.05–0.8)
MONOCYTES NFR BLD AUTO: 6.3 %
NEUTROPHILS # BLD AUTO: 11.06 X10*3/UL (ref 1.6–5.5)
NEUTROPHILS NFR BLD AUTO: 82.7 %
NRBC BLD-RTO: 0 /100 WBCS (ref 0–0)
PLATELET # BLD AUTO: 340 X10*3/UL (ref 150–450)
POTASSIUM SERPL-SCNC: 3 MMOL/L (ref 3.5–5.3)
PROT SERPL-MCNC: 6.6 G/DL (ref 6.4–8.2)
PROTHROMBIN TIME: 9.9 SECONDS (ref 9.8–12.8)
RBC # BLD AUTO: 3.43 X10*6/UL (ref 4.5–5.9)
RH FACTOR (ANTIGEN D): NORMAL
SODIUM SERPL-SCNC: 138 MMOL/L (ref 136–145)
WBC # BLD AUTO: 13.4 X10*3/UL (ref 4.4–11.3)

## 2025-02-07 PROCEDURE — 70450 CT HEAD/BRAIN W/O DYE: CPT

## 2025-02-07 PROCEDURE — 85610 PROTHROMBIN TIME: CPT | Performed by: STUDENT IN AN ORGANIZED HEALTH CARE EDUCATION/TRAINING PROGRAM

## 2025-02-07 PROCEDURE — 83605 ASSAY OF LACTIC ACID: CPT | Performed by: STUDENT IN AN ORGANIZED HEALTH CARE EDUCATION/TRAINING PROGRAM

## 2025-02-07 PROCEDURE — 84484 ASSAY OF TROPONIN QUANT: CPT | Performed by: STUDENT IN AN ORGANIZED HEALTH CARE EDUCATION/TRAINING PROGRAM

## 2025-02-07 PROCEDURE — 71260 CT THORAX DX C+: CPT | Performed by: STUDENT IN AN ORGANIZED HEALTH CARE EDUCATION/TRAINING PROGRAM

## 2025-02-07 PROCEDURE — 71260 CT THORAX DX C+: CPT

## 2025-02-07 PROCEDURE — 2550000001 HC RX 255 CONTRASTS: Performed by: STUDENT IN AN ORGANIZED HEALTH CARE EDUCATION/TRAINING PROGRAM

## 2025-02-07 PROCEDURE — 70486 CT MAXILLOFACIAL W/O DYE: CPT

## 2025-02-07 PROCEDURE — 72128 CT CHEST SPINE W/O DYE: CPT | Performed by: STUDENT IN AN ORGANIZED HEALTH CARE EDUCATION/TRAINING PROGRAM

## 2025-02-07 PROCEDURE — 72125 CT NECK SPINE W/O DYE: CPT

## 2025-02-07 PROCEDURE — 86900 BLOOD TYPING SEROLOGIC ABO: CPT | Performed by: STUDENT IN AN ORGANIZED HEALTH CARE EDUCATION/TRAINING PROGRAM

## 2025-02-07 PROCEDURE — 72131 CT LUMBAR SPINE W/O DYE: CPT | Mod: RCN

## 2025-02-07 PROCEDURE — 70486 CT MAXILLOFACIAL W/O DYE: CPT | Performed by: STUDENT IN AN ORGANIZED HEALTH CARE EDUCATION/TRAINING PROGRAM

## 2025-02-07 PROCEDURE — 80053 COMPREHEN METABOLIC PANEL: CPT | Performed by: STUDENT IN AN ORGANIZED HEALTH CARE EDUCATION/TRAINING PROGRAM

## 2025-02-07 PROCEDURE — 74177 CT ABD & PELVIS W/CONTRAST: CPT | Performed by: STUDENT IN AN ORGANIZED HEALTH CARE EDUCATION/TRAINING PROGRAM

## 2025-02-07 PROCEDURE — 72125 CT NECK SPINE W/O DYE: CPT | Performed by: STUDENT IN AN ORGANIZED HEALTH CARE EDUCATION/TRAINING PROGRAM

## 2025-02-07 PROCEDURE — 74177 CT ABD & PELVIS W/CONTRAST: CPT

## 2025-02-07 PROCEDURE — 76377 3D RENDER W/INTRP POSTPROCES: CPT

## 2025-02-07 PROCEDURE — 12011 RPR F/E/E/N/L/M 2.5 CM/<: CPT

## 2025-02-07 PROCEDURE — 85025 COMPLETE CBC W/AUTO DIFF WBC: CPT | Performed by: STUDENT IN AN ORGANIZED HEALTH CARE EDUCATION/TRAINING PROGRAM

## 2025-02-07 PROCEDURE — 76377 3D RENDER W/INTRP POSTPROCES: CPT | Performed by: STUDENT IN AN ORGANIZED HEALTH CARE EDUCATION/TRAINING PROGRAM

## 2025-02-07 PROCEDURE — 36415 COLL VENOUS BLD VENIPUNCTURE: CPT | Performed by: STUDENT IN AN ORGANIZED HEALTH CARE EDUCATION/TRAINING PROGRAM

## 2025-02-07 PROCEDURE — 67930 REPAIR EYELID WOUND: CPT | Mod: E3 | Performed by: STUDENT IN AN ORGANIZED HEALTH CARE EDUCATION/TRAINING PROGRAM

## 2025-02-07 PROCEDURE — 86901 BLOOD TYPING SEROLOGIC RH(D): CPT | Performed by: STUDENT IN AN ORGANIZED HEALTH CARE EDUCATION/TRAINING PROGRAM

## 2025-02-07 PROCEDURE — 82077 ASSAY SPEC XCP UR&BREATH IA: CPT | Performed by: STUDENT IN AN ORGANIZED HEALTH CARE EDUCATION/TRAINING PROGRAM

## 2025-02-07 PROCEDURE — 70450 CT HEAD/BRAIN W/O DYE: CPT | Performed by: STUDENT IN AN ORGANIZED HEALTH CARE EDUCATION/TRAINING PROGRAM

## 2025-02-07 PROCEDURE — 72128 CT CHEST SPINE W/O DYE: CPT | Mod: RCN

## 2025-02-07 PROCEDURE — 99285 EMERGENCY DEPT VISIT HI MDM: CPT | Mod: 25 | Performed by: STUDENT IN AN ORGANIZED HEALTH CARE EDUCATION/TRAINING PROGRAM

## 2025-02-07 PROCEDURE — 86850 RBC ANTIBODY SCREEN: CPT | Performed by: STUDENT IN AN ORGANIZED HEALTH CARE EDUCATION/TRAINING PROGRAM

## 2025-02-07 PROCEDURE — G0390 TRAUMA RESPONS W/HOSP CRITI: HCPCS

## 2025-02-07 PROCEDURE — 72131 CT LUMBAR SPINE W/O DYE: CPT | Performed by: STUDENT IN AN ORGANIZED HEALTH CARE EDUCATION/TRAINING PROGRAM

## 2025-02-07 RX ADMIN — IOHEXOL 100 ML: 350 INJECTION, SOLUTION INTRAVENOUS at 01:18

## 2025-02-07 ASSESSMENT — PAIN DESCRIPTION - LOCATION: LOCATION: BACK

## 2025-02-07 ASSESSMENT — ENCOUNTER SYMPTOMS
AGITATION: 1
ABDOMINAL PAIN: 1
BACK PAIN: 1
WOUND: 1

## 2025-02-07 ASSESSMENT — PAIN - FUNCTIONAL ASSESSMENT: PAIN_FUNCTIONAL_ASSESSMENT: 0-10

## 2025-02-07 ASSESSMENT — PAIN SCALES - GENERAL: PAINLEVEL_OUTOF10: 7
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
## 2025-02-07 NOTE — ED NOTES
Pt found up starting to walk out of the ED. Pt was educated that we are still awaiting results prior to patient discharge. Pt appeared disgruntled and is requesting to leave. Pt refusing to go back into room and states that he just wants to leave. Pt IV was removed and patient was educated on the risks of leaving prior to discharge. Pt verbalized understanding and signed AMA form.
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
## 2025-02-08 NOTE — ED PROVIDER NOTES
[facility] EMERGENCY DEPARTMENT  Emergency Department Encounter  Emergency Medicine      Pt Name: Marquez Miguel  MRN:[id number]  Birthdate 1955  Date of evaluation: 2/8/25  Time: 6:03 PM EST  PCP:  Inés Layton APRN - CNP    CHIEF COMPLAINT       Chief Complaint   Patient presents with    Abdominal Pain       HISTORY OF PRESENT ILLNESS  (Location/Symptom, Timing/Onset, Context/Setting, Quality, Duration, ModifyingFactors, Severity.)      Marquez Miguel is a 70 y.o. male presents initial chief complaint of abdominal pain.  EMS was called patient was complaining of back pain and abdominal pain.  On arrival here patient is complaining of low back pain.  States on the right side it goes down his leg.  Said it feels identical to his previous back pain.  He does see pain management.  He is not sure what medications he is on.  He said he has a follow-up appointment with pain management doctor on Thursday.  He denies any falls.  He is denying any abdominal pain on my exam.  He denies any diarrhea nausea vomiting fevers chills lightheadedness chest pain shortness of breath dysuria hematuria testicular pain.  He denies any loss of bowel or bladder control.    PAST MEDICAL / SURGICAL / SOCIAL /FAMILY HISTORY      has a past medical history of Allergic rhinitis, Anxiety, Bilateral carotid artery stenosis, Chronic back pain, Colon polyp, COPD (chronic obstructive pulmonary disease) ([facility]), Depression, Disorder of stomach, Emphysema lung ([facility]), Essential hypertension, ETOH abuse, Gastroesophageal reflux disease, Hydronephrosis of left kidney, Hyperlipidemia, Low back pain, ARNAUD (obstructive sleep apnea), Other emphysema ([facility]), Other intervertebral disc degeneration, lumbar region, Radiculopathy, lumbar region, Restless leg syndrome, Seasonal affective disorder ([facility]), and Substance abuse ([facility]).  No other pertinent PMH on review with patient/guardian.     has a past surgical history that includes knee surgery (Right,    Musculoskeletal:         General: Tenderness (Mild tenderness to the right paralumbar region) present. No swelling. Normal range of motion.      Cervical back: Normal range of motion and neck supple.      Right lower leg: No edema.      Left lower leg: No edema.      Comments: no midline cervical thoracic or lumbar tenderness no step-offs or deformities   Skin:     General: Skin is warm and dry.   Neurological:      General: No focal deficit present.      Mental Status: He is alert and oriented to person, place, and time.         DIFFERENTIAL  DIAGNOSIS     PLAN (LABS / IMAGING / EKG):  Orders Placed This Encounter   Procedures    CBC with Auto Differential    CMP    Lactic Acid    Lipase    Urinalysis with Reflex to Culture    Ethanol    Microscopic Urinalysis       MEDICATIONS ORDERED:  Orders Placed This Encounter   Medications    ketorolac (TORADOL) injection 15 mg    orphenadrine (NORFLEX) injection 60 mg           DIAGNOSTIC RESULTS / EMERGENCY DEPARTMENT COURSE / MDM     LABS:  Results for orders placed or performed during the hospital encounter of 02/08/25   CBC with Auto Differential   Result Value Ref Range    WBC 14.6 (H) 4.8 - 10.8 K/uL    RBC 3.21 (L) 4.70 - 6.10 M/uL    Hemoglobin 10.9 (L) 14.0 - 18.0 g/dL    Hematocrit 30.6 (L) 42.0 - 52.0 %    MCV 95.3 (H) 79.0 - 92.2 fL    MCH 34.0 (H) 27.0 - 31.3 pg    MCHC 35.6 33.0 - 37.0 %    RDW 13.5 11.5 - 14.5 %    Platelets 378 130 - 400 K/uL    Neutrophils % 85.6 %    Lymphocytes % 10.6 %    Monocytes % 2.6 %    Eosinophils % 0.6 %    Basophils % 0.1 %    Neutrophils Absolute 12.5 (H) 1.4 - 6.5 K/uL    Lymphocytes Absolute 1.5 1.0 - 4.8 K/uL    Monocytes Absolute 0.4 0.2 - 0.8 K/uL    Eosinophils Absolute 0.1 0.0 - 0.7 K/uL    Basophils Absolute 0.0 0.0 - 0.2 K/uL   CMP   Result Value Ref Range    Sodium 139 135 - 144 mEq/L    Potassium 3.0 (LL) 3.4 - 4.9 mEq/L    Chloride 108 (H) 95 - 107 mEq/L    CO2 19 (L) 20 - 31 mEq/L    Anion Gap 12 9 - 15 mEq/L

## 2025-02-08 NOTE — ED TRIAGE NOTES
Pt here for lower abdominal pain that radiates to the back-was seen earlier today for the same thing. Pt says the meds prescribed to him are not working. PT AOX4. .

## 2025-02-11 PROBLEM — K92.2 UPPER GI BLEED: Status: ACTIVE | Noted: 2025-01-01

## 2025-02-11 PROBLEM — K92.2 GI BLEED: Status: ACTIVE | Noted: 2025-01-01

## 2025-02-11 NOTE — PROGRESS NOTES
1043 patient arrived to GI department bay 6. Patient wake eyes open. Patient on tele monitor HR 90. Patients VS taken. Temp 97.3. Had difficulty obtaining oxygen sat and blood pressure. Patients fingers were dusky, patient's color looked very pale and skin dry. BP cuff changed to small cuff and sat changed to sticker pulse ox and applied to forehead. Bp was 70/51 still no sat. Another nurse got Rema CRNA to come look at patient.  Patients now not responding. [facility] called at 1101

## 2025-02-11 NOTE — PROGRESS NOTES
CODE BLUE was called.  Upon arrival, CPR is being followed already.  Gastroenterologist and anesthesia team is there.  Patient received several rounds of epi for PEA.  Patient attending, Dr. Moe present also.  Patient had also 2 shocks at different time during this prolong CPR event of over 40 minutes.  He received amiodarone and lidocaine.  He also received fluid boluses, 2 units packed RBC and Levophed infusion.  ACLS protocol was followed.  Despite best efforts from multiple physicians, anesthesia team and other staff members, patient  after over 40 minutes. Cct41 min

## 2025-02-11 NOTE — PROGRESS NOTES
Pt arrives to [facility] [place] from ER. This assists with transferring pt from the cot to the bed. Pt not happy, yelling at staff because he is old. This writer explains to pt that will get him settled after we get him into bed. Pt weight obtained at this time.    1009 This writer receives call from [facility]. This writer gives report to nurse Anna Marie. Anna Marie informs this writer that transport was put in and should be arrive to get pt soon. Pt asks this writer for something to drink. This explains to pt that he cannot have anything to drink including sips. Pt became angry about not being allowed to have something to drink. Pt attempting to pull out all of his IV's. This writer explains to pt why he needs the IV's to remain in place.    1014 Transport arrives to take pt down for scope.

## 2025-02-11 NOTE — CODE DOCUMENTATION
Vitals obtained during code via bedside Echo monitor. Vitals not retrievable for late entry documentation.

## 2025-02-11 NOTE — ED NOTES
Pt continues to yell at staff making demands and being uncooperative. This nurse explained the importance of needing accurate vitals. Pt continues to pull off pulse ox and refuses to stay still to get accurate blood pressure.

## 2025-02-11 NOTE — DEATH NOTES
Death Pronouncement Note  Patient's Name: Marquez Miguel   Patient's YOB: 1955  MRN Number: [id number]    Admitting Provider: Mikala Carl MD  Attending Provider: Santana Moe MD    Patient was examined and the following were absent: Pulses, Blood Pressure, and Respiratory effort; corneal reflexes absent; pupils fixed and dilated    I declared the patient dead on 2/11/2025 at 11:43 AM    Preliminary Cause of Death: Cardiac arrest     Electronically signed by Santana Moe MD on 2/11/25 at 11:46 AM EST

## 2025-02-11 NOTE — CONSULTS
Health     Financial Resource Strain: Medium Risk (3/11/2024)    Overall Financial Resource Strain (CARDIA)     Difficulty of Paying Living Expenses: Somewhat hard   Food Insecurity: No Food Insecurity (1/21/2025)    Hunger Vital Sign     Worried About Running Out of Food in the Last Year: Never true     Ran Out of Food in the Last Year: Never true   Transportation Needs: No Transportation Needs (1/21/2025)    PRAPARE - Transportation     Lack of Transportation (Medical): No     Lack of Transportation (Non-Medical): No   Physical Activity: Insufficiently Active (3/11/2024)    Exercise Vital Sign     Days of Exercise per Week: 4 days     Minutes of Exercise per Session: 20 min   Stress: Not on file   Social Connections: Not on file   Intimate Partner Violence: Not on file   Housing Stability: Low Risk  (1/21/2025)    Housing Stability Vital Sign     Unable to Pay for Housing in the Last Year: No     Number of Times Moved in the Last Year: 0     Homeless in the Last Year: No      [] Unable to obtain due to ventilated and/ or neurologic status    Home Medications:   Medications Prior to Admission: lidocaine (LIDODERM) 5 %, Place 1 patch onto the skin daily for 10 days 12 hours on, 12 hours off.  cyclobenzaprine (FLEXERIL) 10 MG tablet, Take 1 tablet by mouth 2 times daily as needed for Muscle spasms (pain)  ketorolac (TORADOL) 10 MG tablet, Take 1 tablet by mouth every 6 hours as needed for Pain  oxyCODONE-acetaminophen (PERCOCET) 5-325 MG per tablet, Take 1 tablet by mouth every 4 hours as needed for Pain. Taking 1\2 pill Max Daily Amount: 6 tablets  FLUoxetine (PROZAC) 40 MG capsule, Take 1 capsule by mouth daily  gabapentin (NEURONTIN) 600 MG tablet, take 1 tablet by mouth every morning and take 1 tablet by mouth MID DAY and take 2 tablets by mouth AT NIGHT  meloxicam (MOBIC) 7.5 MG tablet, Take 1 tablet by mouth in the morning and at bedtime  pantoprazole (PROTONIX) 40 MG tablet, Take 1 tablet by mouth  predominant centrilobular and bullous emphysema with biapical lung scarring. There is asymmetric 8 mm pulmonary nodule in the medial right lung apex. Follow-up chest CT in 3-6 months is recommended to confirm stability.  2. No evidence of acute traumatic injury in the chest. Heavy coronary artery atherosclerotic calcifications.    ABDOMEN - PELVIS 1.  No evidence of acute traumatic injury in the abdomen/pelvis within limitations of motion degraded study. Fluid-filled nondilated small and large bowel may be seen in the setting of enterocolitis. 2. Suspected small liver hemangioma.    Thoracic and lumbar spine: Osteopenia and multilevel degenerative disc space narrowing in lower thoracic and lumbar spine. There is chronic appearing height loss of L2 vertebral body without retropulsion, however correlation with point tenderness recommended.    Signed by: Shahbaz Soliz 2/7/2025 2:05 AM Dictation workstation:   [id number]    CT ABDOMEN PELVIS WO CONTRAST Additional Contrast? None  Result Date: 2/6/2025  1. No specific acute abdominopelvic abnormality on noncontrast exam. 2.  Incidental findings are described above.        Impression:   70 y.o. male admitted with abdominal pain, GI consulted for GI bleed.  Patient's hemoglobin upon arrival was 10.9, repeat overnight 7.9.  Patient has been having coffee-ground emesis, melena and abdominal pain.  He presented with worsening abdominal pain and back pain.  Patient takes ibuprofen on a daily basis for back pain.  Last EGD 11/9/2023 noting Yip's esophagus C2 M3 on [place] criteria.  Gastritis and nonbleeding duodenal ulcer, with largest lesion 20 mm.  Likely Upper GI source with differential diagnosis including PUD  vs. gastritis in the setting of chronic NSAID use    Plan:   -Monitor H&H and transfuse to keep above goal greater than 7   -Keep patient n.p.o. for EGD today  -Proceed with urgent EGD today  -Monitor stools for color and consistency notify GI  -Monitor

## 2025-02-11 NOTE — ED NOTES
Pt continues to take off blood pressure cuff. This nurse explained the importance of needing it on at this time. Pt was reminded to keep his arm strait for proper infusion of IV fluids. Pt refused.

## 2025-02-11 NOTE — H&P
Hospitalist Group   History and Physical    Attending: Dr. Carl    CHIEF COMPLAINT:  GI Bleed    History of Present Illness:  Marquez Miguel is a 70 y.o. male with a PMHx of chronic back pain, COPD, GERD, hyperlipidemia, ARNAUD, Emphysema, HTN, ETOH abuse, Radiculopathy, and anxiety per the patient and EMR review, presents with a chief complaint of dark stools.  Patient is currently noncooperative and does not want to participate in HPI/ROS.  Patient pulling out IV and verbally aggressive.  Information predominantly from ED provider.  Per ED provider, patient states he has been having epigastric pain and was unable to eat or drink due to the pain and increased fatigue.  He also endorsed shortness of breath with a nonproductive cough.  He states that he has been taking \"quite a bit of ibuprofen\" but unable to state how much.  He endorses dark stools but did not say for how long.  Per EMR, he had an EGD in November 2023 that showed a Yip's stage C2-M3 in the esophagus, gastritis and a nonbleeding duodenal ulcer.  Of note, patient presented to the ED on 2-8-25 for similar complaints.  No reason for admission noted at that time.    REVIEW OF SYSTEMS:  No fevers, chills, cp, sob, n/v, ha, vision/hearing changes, wt changes, hot/cold flashes, other open skin lesions, diarrhea, constipation, dysuria/hematuria unless noted in HPI. Complete ROS performed with the patient and is otherwise negative.    PMH:  Past Medical History:   Diagnosis Date    Allergic rhinitis 02/19/2018    Anxiety     Bilateral carotid artery stenosis 05/10/2023    Chronic back pain     Colon polyp     COPD (chronic obstructive pulmonary disease) (HCC)     Depression     Disorder of stomach     valve not closing properly    Emphysema lung (HCC)     Essential hypertension 11/04/2019    ETOH abuse     SOBER X 20 YEARS    Gastroesophageal reflux disease 10/04/2019    Hydronephrosis of left kidney 08/23/2023    Hyperlipidemia     meds > 22 yrs     intact.      Speech: Speech normal.     Psychiatric:         Attention and behavior: Attention normal. Behavior normal.      Mood and Affect: Mood normal.        LABS:  Recent Labs     02/08/25  1850 02/11/25  0258    139   K 3.0* 2.8*   * 106   CO2 19* 19*   BUN 38* 47*   CREATININE 0.96 1.06   GLUCOSE 98 100*   CALCIUM 8.9 8.7       Recent Labs     02/08/25  1850 02/11/25  0258   WBC 14.6* 15.1*   RBC 3.21* 2.32*   HGB 10.9* 7.9*   HCT 30.6* 22.5*   MCV 95.3* 97.0*   MCH 34.0* 34.1*   MCHC 35.6 35.1   RDW 13.5 13.8    413*       Radiology: XR CHEST (SINGLE VIEW FRONTAL)    Result Date: 2/11/2025  EXAMINATION: ONE XRAY VIEW OF THE CHEST 2/11/2025 2:36 am COMPARISON: 10/24/2023 HISTORY: ORDERING SYSTEM PROVIDED HISTORY: Shortness of breath, hypotension TECHNOLOGIST PROVIDED HISTORY: Reason for exam:->Shortness of breath, hypotension What reading provider will be dictating this exam?->CRC FINDINGS: The lungs are without acute focal process.  There is no effusion or pneumothorax. The cardiomediastinal silhouette is without acute process. The osseous structures are without acute process.     No acute process.       ASSESSMENT / PLAN:    GI bleed  Hgb 7.9  Was 10.9 on 2-8-25  Endorses dark tarry stools, epigastric pain, fatigue and SOB at home  Denies hemoptysis and hematuria  Associated hypotension.  As low as 74/61 in ED.  Currently 78/55  Awaiting CT abdomen results  Admit to [facility] with continuous telemetry  IVF bolus now  Consult GI  Q6 H/H  Octreotide and Protonix  Transfuse if hemoglobin drops below 7.0  IVF continuously  SCDs  No NSAIDs    Hypokalemia  K+ 2.8  Mg 2.1  No acute arrhythmias noted  Continuous telemetry  As needed EKGs  Monitor and trend magnesium and potassium - optimize with as needed PRN protocol  Mg goal 2.0 - 3.0  K+ goal 4.0 - 5.0      3.  Elevated troponin  Troponin 56, 50 (troponins 24-29 in November 2023)  Denies CP. Endorses SOB (like 2/2 GI bleed)  CXR: No acute

## 2025-02-11 NOTE — ED PROVIDER NOTES
[facility] EMERGENCY DEPARTMENT  EMERGENCY DEPARTMENT ENCOUNTER      Pt Name: Marquez Miguel  MRN: [id number]  Birthdate 1955  Date of evaluation: 2/11/2025  Provider: Vince Alberts MD  8:29 AM    CHIEF COMPLAINT       Chief Complaint   Patient presents with    Back Pain     Back pain         HISTORY OF PRESENT ILLNESS    Marquez Miguel is a 70 y.o. male who presents to the emergency department epigastric pain, hypotension, dark stools    HPI  Patient is a 70-year-old male presenting to ED due to concern for low blood pressure, upper and lower back pain and concerns for dehydration.  Patient is a past medical history of chronic back pain, COPD, GERD, hyperlipidemia, struct of sleep apnea.  Patient endorses that he was unable to eat or drink today as he felt too weak and fatigued to do so.  He endorses some shortness of breath and frequent nonproductive coughing of mucus or blood.  He endorses no chest pain but endorses some gastric abdominal discomfort.  After further discussion with patient, he endorses that he has been using \"quite a bit of ibuprofen/arthritic medications\", he endorses that he has been using a lot of it but does not know exactly how much.  He endorses also having some dark stools.  Through chart review it does appear that in November 2023 he had an EGD performed that showed Yip's stage C2-M3 in the esophagus, gastritis and a nonbleeding duodenal ulcer.    Patient was seen in the ED on 2/08 for abdominal pain.  At that time, patient had CT of the thoracic and lumbar spine and maxillofacial along with CT of the chest and abdomen and pelvis and the head which did not reveal any obvious abnormalities.  It appears that this imaging was done on 2/07.    Nursing Notes were reviewed.    REVIEW OF SYSTEMS       Review of Systems   Constitutional:  Negative for activity change, appetite change, chills, diaphoresis, fatigue and fever.   HENT:  Negative for congestion, postnasal drip, rhinorrhea

## 2025-02-11 NOTE — ANESTHESIA PRE PROCEDURE
osteoporosis without current pathological fracture M81.0    Calcification of abdominal aorta ([facility]) I70.0    Hydronephrosis of left kidney N13.30    Flank pain R10.9    Ureteral calculus, left N20.1    Kidney stone on left side N20.0    Syncope and collapse R55    Moderate malnutrition ([facility]) E44.0    GI bleeding K92.2    Atherosclerosis of aorta ([facility]) I70.0    Bilateral carotid artery stenosis I65.23    Duodenal ulcer K26.9    Nausea and vomiting R11.2    Gastric outlet obstruction K31.1    Weakness R53.1    Cachexia ([facility]) R64    Delirium R41.0    Sacroiliitis ([facility]) M46.1    Kidney stone N20.0    GI bleed K92.2    Upper GI bleed K92.2       Past Medical History:        Diagnosis Date    Allergic rhinitis 02/19/2018    Anxiety     Bilateral carotid artery stenosis 05/10/2023    Chronic back pain     Colon polyp     COPD (chronic obstructive pulmonary disease) ([facility])     Depression     Disorder of stomach     valve not closing properly    Emphysema lung ([facility])     Essential hypertension 11/04/2019    ETOH abuse     SOBER X 20 YEARS    Gastroesophageal reflux disease 10/04/2019    Hydronephrosis of left kidney 08/23/2023    Hyperlipidemia     meds > 22 yrs    Low back pain 05/01/2018    ARNAUD (obstructive sleep apnea) 02/19/2018    Other emphysema ([facility]) 10/04/2019    Other intervertebral disc degeneration, lumbar region 03/23/2018    Radiculopathy, lumbar region 02/28/2018    Restless leg syndrome     Seasonal affective disorder ([facility]) 10/04/2019    Substance abuse ([facility])     alcholic, quit 20 yrs        Past Surgical History:        Procedure Laterality Date    BLADDER SURGERY Left 09/11/2023    CYSTOSCOPY RETROGRADE PYELOGRAM AND LEFT DOUBLE J STENT INSERTION performed by Marcin Borges MD at [facility] OR    COLONOSCOPY  12/22/2016    DALIA LAST MD    DEBRIDEMENT Left 3/25/2024    CYSTOSCOPY, LEFT RETROGRADE PYELOGRAM, LEFT STENT CHANGE performed by Marcin Borges MD at [facility] [place] [facility] OR    DENTAL SURGERY  10 yrs ago     ENDOSCOPY, COLON, DIAGNOSTIC      EYE SURGERY      Lasik OU    FINGER TRIGGER RELEASE Left 11/01/2022    LEFT HAND TRIGGER FINGER RING FINGER RELEASE OF A1 PULLEY performed by Vivek Bravo MD at [facility] OR    KNEE ARTHROSCOPY Right     KNEE SURGERY Right 05/2016    Ray procedure    KNEE SURGERY      LITHOTRIPSY Left 3/25/2024    LEFT EXTRACORPOREAL SHOCK WAVE LITHOTRIPSY, performed by Marcin Borges MD at [facility] OR    NC NASAL/SINUS ENDOSCOPY W/MAXILLARY ANTROSTOMY N/A 02/22/2018    SEPTOPLASTY MICRODEBRIDER ASSISTED TURBINOPLASTY AND OUT-FRACTURING BILATERAL NASAL ENDOSCOPY performed by Brent Newsome MD at [facility] OR    SKIN GRAFT Left 4/15/2024    FLEXIBLE CYSTOSCOPY, LEFT DOUBLE-J STENT REMOVAL performed by Marcin Borges MD at [facility] OR    UPPER GASTROINTESTINAL ENDOSCOPY N/A 09/18/2023    EGD BIOPSY performed by Keegan Garber MD at [facility]    UPPER GASTROINTESTINAL ENDOSCOPY N/A 11/09/2023    EGD DIAGNOSTIC ONLY performed by Danilo Meehan MD at [facility]       Social History:    Social History     Tobacco Use    Smoking status: Every Day     Current packs/day: 2.00     Average packs/day: 2.0 packs/day for 55.1 years (110.2 ttl pk-yrs)     Types: Cigars, Cigarettes     Start date: 1970     Passive exposure: Current    Smokeless tobacco: Never    Tobacco comments:     6 cigars qd   Substance Use Topics    Alcohol use: No     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                                Ready to quit: Not Answered  Counseling given: Not Answered  Tobacco comments: 6 cigars qd      Vital Signs (Current):   Vitals:    02/11/25 0843 02/11/25 0845 02/11/25 0922 02/11/25 1236   BP:  98/79 (!) 137/113    Pulse:  88  79   Resp: 18 16     Temp:  36.7 °C (98 °F)     TempSrc:  Axillary     SpO2:  100% 100% (!) 0%   Weight:       Height:                                                  BP Readings from Last 3 Encounters:   02/11/25 (!) 137/113   02/08/25 (!) 146/67   02/08/25 (!) 148/81

## 2025-02-11 NOTE — ED TRIAGE NOTES
Pt arrived to ED by EMS with C/O back pain and \"un able to get cold water from his faucet\" Per EMS report pt was hypotensive. Pt is A&O xs 4. Respirations even and unlabored at this time

## 2025-02-11 NOTE — ANESTHESIA PROCEDURE NOTES
Airway  Date/Time: 2/11/2025 11:02 PM  Urgency: emergent    Difficult airway    General Information and Staff    Patient location during procedure: holding area  Performed: resident/CRNA/CAA   Performed by: Geovanna Diamond APRN - CRNA  Authorized by: Geovanna Diamond APRN - CRNA      Consent for Airway (if performed for an anesthetic, see related documentation for consents)  Consent: The procedure was performed in an emergent situation. Verbal consent not obtained. Written consent not obtained.  Risks and benefits: risks, benefits and alternatives were not discussed      Indications and Patient Condition  Indications for airway management: cardio/pulmonary arrest  Spontaneous Ventilation: absent      Final Airway Details  Final airway type: endotracheal airway      Successful airway: ETT     Successful intubation technique: video laryngoscopy  Endotracheal tube insertion site: oral  ETT size (mm): 7.0  Cormack-Lehane Classification: grade I - full view of glottis  Placement verified by: chest auscultation and capnometry   Measured from: teeth  ETT to teeth (cm): 22  Number of attempts at approach: 2  Ventilation between attempts: bag mask

## 2025-02-11 NOTE — PROGRESS NOTES
Spiritual Health History and Assessment/Progress Note  [facility]    Crisis, Code [place],  ,      Name: Marquez Miguel MRN: [id number]    Age: 70 y.o.     Sex: male   Language: English   Scientology: Jainism   GI bleed     Date: 2025            Total Time Calculated: 55 min              Spiritual Assessment began in [facility]        Referral/Consult From: Multi-disciplinary team   Encounter Overview/Reason: Crisis  Service Provided For: Patient    REFERRAL:  CODE BLUE ASSESSMENT: The had coded, the medical team was working on the pt, applying live saving measures.  INTERVENTION:  The  provided supportive presence, attempted to contact family (son: Johny [phone] and Troy [phone]) left messages on Johny's phone. OUTCOME:  The pt  and called at 1143.   PLAN:  No further plans at this time.      Claudia, Belief, Meaning:   Patient unable to assess at this time  Family/Friends No family/friends present      Importance and Influence:  Patient unable to assess at this time  Family/Friends No family/friends present    Community:  Patient Other: Pt expiried  Family/Friends No family/friends present    Assessment and Plan of Care:     Patient Interventions include: Other: Unable to assess, Pt   Family/Friends Interventions include: No family/friends present    Patient Plan of Care: Other: Pt   Family/Friends Plan of Care: No family/friends present    Electronically signed by Chaplain Arjun on 2025 at 12:43 PM

## 2025-02-11 NOTE — SIGNIFICANT EVENT
Responded to the code blue; colleague was present.  The patient was in PEA with agonal breathing.  Intubated with significant slime blood coming from the ET tube making aspiration of blood leading to acute hypoxic resp failure leading to PEA arrest.  Received over 40 minutes of appropriate ACLS.  His rhythm did change to V Fib for which he was shocked and received amiodarone.  His blood pressure initially was good but he was hypoxic; stat blood was ordered and levophed was started.  He was hypoxic despite intubation until enough blood was suctioned out that he was no longer hypoxic and with pressors and blood his hypotension improved.  Despite best efforts he was back into asytole.  Multiple attempts were made to contact family with no success.  At this point all options have been exhausted and the patient .  After discussion with all medical staff including Dr Narciso Moe

## 2025-02-13 NOTE — PROGRESS NOTES
Physician Progress Note      PATIENT:               JEREMIAS LENTZ  CSN #:                  [id number]  :                       1955  ADMIT DATE:       2025 1:44 AM  DISCH DATE:        2025 4:27 PM  RESPONDING  PROVIDER #:        Santana Moe MD          QUERY TEXT:    Pt admitted with GI bleeding and anemia with Hgb 7.9; \"significant slime blood   coming from the ET tube' noted per significant event note . If possible,   please document in progress notes and discharge summary further specificity   regarding the acuity and type of anemia:    The medical record reflects the following:  Risk Factors: GI bleeding  Clinical Indicators: Hgb 7.9. GI bleeding, endorses dark tarry stool per H&P.   Significant event note \"Intubated with significant slime blood coming   from the ET tube  Treatment: 4 PRBCs transfused, monitoring H&H q 6 hrs, Octreotide, Protonix    Thank you,  Selena Hinojosa   Clinical Documentation Improvement Specialist  W: [phone]  Options provided:  -- Anemia due to acute blood loss  -- Anemia due to chronic blood loss  -- Anemia due to acute on chronic blood loss  -- Anemia due to iron deficiency  -- Anemia due to postoperative blood loss  -- Anemia due to *** (site of malignancy)  -- Anemia due to antineoplastic chemotherapy  -- Anemia due to CKD  -- Dilutional anemia  -- Other - I will add my own diagnosis  -- Disagree - Not applicable / Not valid  -- Disagree - Clinically unable to determine / Unknown  -- Refer to Clinical Documentation Reviewer    PROVIDER RESPONSE TEXT:    This patient has acute blood loss anemia.    Query created by: Paulette Hinojosa on 2025 2:54 PM      QUERY TEXT:    Pt admitted with GI bleeding. Pt developed PEA arrest with agonal breathing.   Significant blood noted coming from ETT. Pt was started on Levophed, stat   blood ordered. If possible, please document in the progress notes and   discharge summary if you are evaluating and/or treating any  of the following:    The medical record reflects the following:  Risk Factors: GI bleeding  Clinical Indicators: 2/11 Dr. Moe, significant event note \"Intubated with   significant slime blood coming from the ET tube making aspiration of blood   leading to acute hypoxic resp failure leading to PEA arrest....His blood   pressure initially was good but he was hypoxic; stat blood was ordered and   levophed was started.  He was hypoxic despite intubation until enough blood   was suctioned out that he was no longer hypoxic and with pressors and blood   his hypotension improved.\"  Hgb 7.9.  Treatment: Levophed, 4 U PRBCs, ACLS, Octreotide, Protonix    Thank you,  Selena Hinojosa   Clinical Documentation Improvement Specialist  W: [phone]  Options provided:  -- Probable Hemorrhagic Shock  -- Other - I will add my own diagnosis  -- Disagree - Not applicable / Not valid  -- Disagree - Clinically unable to determine / Unknown  -- Refer to Clinical Documentation Reviewer    PROVIDER RESPONSE TEXT:    This patient had probable hemorrhagic shock.    Query created by: Paulette Hinojosa on 2/12/2025 2:55 PM      Electronically signed by:  Santana Moe MD 2/13/2025 9:45 AM           oral

## 2025-02-16 LAB
BACTERIA BLD CULT ORG #2: NORMAL
BACTERIA BLD CULT: NORMAL

## 2025-02-19 NOTE — DISCHARGE SUMMARY
Hospital Medicine Discharge Summary    Marquez Miguel  :  1955  MRN:  [id number]    Admit date:  2025  Discharge date:  25    Admitting Physician:  Mikala Carl MD  Primary Care Physician:  Inés Layton, APRN - CNP      Discharge Diagnoses:    GIB    Chief Complaint   Patient presents with    Back Pain     Back pain     Hospital Course:   Patient presented with a GIB.  He went down for an EGD when he vomited blood and aspirated resulting in acute hypoxia resulting in a cardiac arrest.  Despite best efforts unable to achieve ROSC despite performing ACLS for approximately 40 minutes.    Exam on discharge:   BP (!) 137/113   Pulse 79   Temp 98 °F (36.7 °C) (Axillary)   Resp 16   Ht 1.727 m (5' 8\")   Wt 58.7 kg (129 lb 6.4 oz)   SpO2 (!) 0%   BMI 19.68 kg/m²   General appearance: Ill appearing.  HEENT: Conjunctivae/corneas clear.  Neck: Trachea midline.  Respiratory:  intubated  Cardiovascular: absent heart sounds and pulse.  Abdomen: Soft  Musculoskeletal: No clubbing, cyanosis or edema bilaterally.    Neuro: obtunded    Patient was seen by the following consultants   Consults:  IP CONSULT TO GI  IP CONSULT TO GI    Significant Diagnostic Studies:    Refer to chart     Please refer to chart if no studies are shown here    XR CHEST (SINGLE VIEW FRONTAL)    Result Date: 2025  EXAMINATION: ONE XRAY VIEW OF THE CHEST 2025 2:36 am COMPARISON: 10/24/2023 HISTORY: ORDERING SYSTEM PROVIDED HISTORY: Shortness of breath, hypotension TECHNOLOGIST PROVIDED HISTORY: Reason for exam:->Shortness of breath, hypotension What reading provider will be dictating this exam?->CRC FINDINGS: The lungs are without acute focal process.  There is no effusion or pneumothorax. The cardiomediastinal silhouette is without acute process. The osseous structures are without acute process.     No acute process.       Discharge Medications:         Medication List        STOP taking these medications
